# Patient Record
Sex: FEMALE | Race: WHITE | NOT HISPANIC OR LATINO
[De-identification: names, ages, dates, MRNs, and addresses within clinical notes are randomized per-mention and may not be internally consistent; named-entity substitution may affect disease eponyms.]

---

## 2017-03-18 ENCOUNTER — TRANSCRIPTION ENCOUNTER (OUTPATIENT)
Age: 82
End: 2017-03-18

## 2018-04-16 ENCOUNTER — TRANSCRIPTION ENCOUNTER (OUTPATIENT)
Age: 83
End: 2018-04-16

## 2018-04-26 VITALS
TEMPERATURE: 98 F | RESPIRATION RATE: 18 BRPM | HEART RATE: 89 BPM | DIASTOLIC BLOOD PRESSURE: 63 MMHG | SYSTOLIC BLOOD PRESSURE: 126 MMHG | OXYGEN SATURATION: 100 %

## 2018-04-26 NOTE — ED ADULT TRIAGE NOTE - CHIEF COMPLAINT QUOTE
Pt had a fall on 4/15.  seen at [facility].  worked up for DVT with neg findings.  did not have xray of foot.  Dc with abx for cellulitis of L Leg (7 days abx so far)  Leg is warm and reddened to touch.  Bruising and numbness to toes on L foot.  unable to ambulate.  Positive pedal pulse.  L arm restriced.  on Eliquis.  does not offer any other complaints

## 2018-04-26 NOTE — ED PROVIDER NOTE - ATTENDING CONTRIBUTION TO CARE
Locurto  pt s/p fall with subsequent swelling LLE  pt on AC for afib  placed on po antibiotic  with worsening pain and erythema though edema somewhat less Pt also had negative outpt duplex 3 days ago She denies fever or SOB  exam  pt with clear lungs  Card Irreg but rate controlled  + LLE edema below knee with anterior erythema and shiny appearance C/W cellulitis  no crepitance  nl distal pulse and tactile temperature  no pain on passive plantar/dorsiflexion  tactile sensation preserved  plain films  no fx or air seen  To admit for IV antibiotics

## 2018-04-26 NOTE — ED PROVIDER NOTE - OBJECTIVE STATEMENT
85F w/ hx breast CA s/p mastectomy/chemo ('94), uterine CA s/p hysterectomy ('91) and Afib (on Eliquis) presenting with worsening pain in her LLE.     Pt reports that she fell on 4/15 after 85F w/ hx breast CA s/p mastectomy/chemo ('94), uterine CA s/p hysterectomy ('91) and Afib (on Eliquis) presenting with worsening pain in her LLE.     Pt reports that she fell on 4/15 after stumbling on the several steps leading up to her building. At that time, she does not recall any trauma to her leg but sustained a laceration on her finger. Patient was able to ambulate after the fall without any issues. She subsequently developed erythema, swelling and tenderness of her L leg in her mid-calf region. Given these symptoms, she was seen at [facility] n 4/23. At that time, she LE duplex which was negative for DVT and was prescribed a course of Doxycycline (which she has been taking over the last several days). However, pt reports that she developed worsening leg pain and swelling so she came to the ED today. No fevers/chills. 85F w/ hx breast CA s/p mastectomy/chemo ('94), uterine CA s/p hysterectomy ('91) and Afib (on Eliquis) presenting with worsening pain in her LLE.     Pt reports that she fell on 4/15 after stumbling on the several steps leading up to her building. At that time, she does not recall any trauma to her leg but sustained a laceration on her finger. Patient was able to ambulate after the fall without any issues. She subsequently developed erythema, swelling and tenderness of her L leg in her mid-calf region. Given these symptoms, she was seen at [facility] n 4/23. At that time, she LE duplex which was negative for DVT and was prescribed a course of Doxycycline (which she has been taking over the last several days). However, pt reports that she developed worsening leg pain and swelling so she came to the ED today. No fevers/chills. Mildly decreased sensation over dorsum of L foot.

## 2018-04-26 NOTE — ED PROVIDER NOTE - MEDICAL DECISION MAKING DETAILS
pt w/ cellulitis of LLE. Peripheral pulses preserved, unlikely compartment syndrome. Will check CBC, CMP and Xrays of tibia/fibula, ankle and foot to r/o fracture. IV clindamycin. Admit for failed po abx.

## 2018-04-27 ENCOUNTER — INPATIENT (INPATIENT)
Facility: HOSPITAL | Age: 83
LOS: 1 days | Discharge: HOME HEALTH | End: 2018-04-29
Attending: HOSPITALIST | Admitting: HOSPITALIST
Payer: MEDICARE

## 2018-04-27 DIAGNOSIS — S80.12XA CONTUSION OF LEFT LOWER LEG, INITIAL ENCOUNTER: ICD-10-CM

## 2018-04-27 DIAGNOSIS — L03.90 CELLULITIS, UNSPECIFIED: ICD-10-CM

## 2018-04-27 DIAGNOSIS — Z90.10 ACQUIRED ABSENCE OF UNSPECIFIED BREAST AND NIPPLE: Chronic | ICD-10-CM

## 2018-04-27 DIAGNOSIS — Z90.710 ACQUIRED ABSENCE OF BOTH CERVIX AND UTERUS: Chronic | ICD-10-CM

## 2018-04-27 DIAGNOSIS — L03.116 CELLULITIS OF LEFT LOWER LIMB: ICD-10-CM

## 2018-04-27 DIAGNOSIS — I48.91 UNSPECIFIED ATRIAL FIBRILLATION: ICD-10-CM

## 2018-04-27 DIAGNOSIS — Z29.9 ENCOUNTER FOR PROPHYLACTIC MEASURES, UNSPECIFIED: ICD-10-CM

## 2018-04-27 DIAGNOSIS — D64.9 ANEMIA, UNSPECIFIED: ICD-10-CM

## 2018-04-27 DIAGNOSIS — D69.6 THROMBOCYTOPENIA, UNSPECIFIED: ICD-10-CM

## 2018-04-27 LAB
ALBUMIN SERPL ELPH-MCNC: 4.2 G/DL — SIGNIFICANT CHANGE UP (ref 3.3–5)
ALP SERPL-CCNC: 69 U/L — SIGNIFICANT CHANGE UP (ref 40–120)
ALT FLD-CCNC: 20 U/L — SIGNIFICANT CHANGE UP (ref 4–33)
APTT BLD: 33.2 SEC — SIGNIFICANT CHANGE UP (ref 27.5–37.4)
AST SERPL-CCNC: 28 U/L — SIGNIFICANT CHANGE UP (ref 4–32)
BASE EXCESS BLDV CALC-SCNC: 0.6 MMOL/L — SIGNIFICANT CHANGE UP
BASOPHILS # BLD AUTO: 0 K/UL — SIGNIFICANT CHANGE UP (ref 0–0.2)
BASOPHILS NFR BLD AUTO: 0 % — SIGNIFICANT CHANGE UP (ref 0–2)
BASOPHILS NFR SPEC: 1.8 % — SIGNIFICANT CHANGE UP (ref 0–2)
BILIRUB SERPL-MCNC: 0.7 MG/DL — SIGNIFICANT CHANGE UP (ref 0.2–1.2)
BUN SERPL-MCNC: 15 MG/DL — SIGNIFICANT CHANGE UP (ref 7–23)
BURR CELLS BLD QL SMEAR: SIGNIFICANT CHANGE UP
CALCIUM SERPL-MCNC: 9.4 MG/DL — SIGNIFICANT CHANGE UP (ref 8.4–10.5)
CHLORIDE SERPL-SCNC: 100 MMOL/L — SIGNIFICANT CHANGE UP (ref 98–107)
CO2 SERPL-SCNC: 22 MMOL/L — SIGNIFICANT CHANGE UP (ref 22–31)
CREAT SERPL-MCNC: 0.89 MG/DL — SIGNIFICANT CHANGE UP (ref 0.5–1.3)
EOSINOPHIL # BLD AUTO: 0.01 K/UL — SIGNIFICANT CHANGE UP (ref 0–0.5)
EOSINOPHIL NFR BLD AUTO: 0.1 % — SIGNIFICANT CHANGE UP (ref 0–6)
EOSINOPHIL NFR FLD: 0.9 % — SIGNIFICANT CHANGE UP (ref 0–6)
GAS PNL BLDV: 140 MMOL/L — SIGNIFICANT CHANGE UP (ref 136–146)
GIANT PLATELETS BLD QL SMEAR: PRESENT — SIGNIFICANT CHANGE UP
GLUCOSE BLDV-MCNC: 105 — HIGH (ref 70–99)
GLUCOSE SERPL-MCNC: 109 MG/DL — HIGH (ref 70–99)
HCO3 BLDV-SCNC: 24 MMOL/L — SIGNIFICANT CHANGE UP (ref 20–27)
HCT VFR BLD CALC: 30.2 % — LOW (ref 34.5–45)
HCT VFR BLDV CALC: 27.6 % — LOW (ref 34.5–45)
HGB BLD-MCNC: 9.3 G/DL — LOW (ref 11.5–15.5)
HGB BLDV-MCNC: 8.9 G/DL — LOW (ref 11.5–15.5)
IMM GRANULOCYTES # BLD AUTO: 0.07 # — SIGNIFICANT CHANGE UP
IMM GRANULOCYTES NFR BLD AUTO: 1 % — SIGNIFICANT CHANGE UP (ref 0–1.5)
INR BLD: 1.5 — HIGH (ref 0.88–1.17)
LYMPHOCYTES # BLD AUTO: 1.67 K/UL — SIGNIFICANT CHANGE UP (ref 1–3.3)
LYMPHOCYTES # BLD AUTO: 23.7 % — SIGNIFICANT CHANGE UP (ref 13–44)
LYMPHOCYTES NFR SPEC AUTO: 20.7 % — SIGNIFICANT CHANGE UP (ref 13–44)
MACROCYTES BLD QL: SIGNIFICANT CHANGE UP
MAGNESIUM SERPL-MCNC: 2 MG/DL — SIGNIFICANT CHANGE UP (ref 1.6–2.6)
MCHC RBC-ENTMCNC: 29.9 PG — SIGNIFICANT CHANGE UP (ref 27–34)
MCHC RBC-ENTMCNC: 30.8 % — LOW (ref 32–36)
MCV RBC AUTO: 97.1 FL — SIGNIFICANT CHANGE UP (ref 80–100)
MICROCYTES BLD QL: SLIGHT — SIGNIFICANT CHANGE UP
MONOCYTES # BLD AUTO: 3.09 K/UL — HIGH (ref 0–0.9)
MONOCYTES NFR BLD AUTO: 43.8 % — HIGH (ref 2–14)
MONOCYTES NFR BLD: 35.1 % — HIGH (ref 2–9)
NEUTROPHIL AB SER-ACNC: 35.2 % — LOW (ref 43–77)
NEUTROPHILS # BLD AUTO: 2.21 K/UL — SIGNIFICANT CHANGE UP (ref 1.8–7.4)
NEUTROPHILS NFR BLD AUTO: 31.4 % — LOW (ref 43–77)
NRBC # FLD: 0 — SIGNIFICANT CHANGE UP
PCO2 BLDV: 46 MMHG — SIGNIFICANT CHANGE UP (ref 41–51)
PH BLDV: 7.37 PH — SIGNIFICANT CHANGE UP (ref 7.32–7.43)
PHOSPHATE SERPL-MCNC: 3.5 MG/DL — SIGNIFICANT CHANGE UP (ref 2.5–4.5)
PLATELET # BLD AUTO: 136 K/UL — LOW (ref 150–400)
PLATELET COUNT - ESTIMATE: SIGNIFICANT CHANGE UP
PMV BLD: 12.6 FL — SIGNIFICANT CHANGE UP (ref 7–13)
PO2 BLDV: 28 MMHG — LOW (ref 35–40)
POTASSIUM BLDV-SCNC: 5 MMOL/L — HIGH (ref 3.4–4.5)
POTASSIUM SERPL-MCNC: 4.4 MMOL/L — SIGNIFICANT CHANGE UP (ref 3.5–5.3)
POTASSIUM SERPL-SCNC: 4.4 MMOL/L — SIGNIFICANT CHANGE UP (ref 3.5–5.3)
PROT SERPL-MCNC: 7.5 G/DL — SIGNIFICANT CHANGE UP (ref 6–8.3)
PROTHROM AB SERPL-ACNC: 16.8 SEC — HIGH (ref 9.8–13.1)
RBC # BLD: 3.11 M/UL — LOW (ref 3.8–5.2)
RBC # FLD: 14.7 % — HIGH (ref 10.3–14.5)
SAO2 % BLDV: 51.5 % — LOW (ref 60–85)
SCHISTOCYTES BLD QL AUTO: SLIGHT — SIGNIFICANT CHANGE UP
SMUDGE CELLS # BLD: PRESENT — SIGNIFICANT CHANGE UP
SODIUM SERPL-SCNC: 139 MMOL/L — SIGNIFICANT CHANGE UP (ref 135–145)
VARIANT LYMPHS # BLD: 6.3 % — SIGNIFICANT CHANGE UP
WBC # BLD: 7.05 K/UL — SIGNIFICANT CHANGE UP (ref 3.8–10.5)
WBC # FLD AUTO: 7.05 K/UL — SIGNIFICANT CHANGE UP (ref 3.8–10.5)

## 2018-04-27 PROCEDURE — 73600 X-RAY EXAM OF ANKLE: CPT | Mod: 26,LT

## 2018-04-27 PROCEDURE — 73590 X-RAY EXAM OF LOWER LEG: CPT | Mod: 26,LT

## 2018-04-27 PROCEDURE — 99223 1ST HOSP IP/OBS HIGH 75: CPT | Mod: AI,GC

## 2018-04-27 PROCEDURE — 71045 X-RAY EXAM CHEST 1 VIEW: CPT | Mod: 26

## 2018-04-27 PROCEDURE — 93010 ELECTROCARDIOGRAM REPORT: CPT

## 2018-04-27 PROCEDURE — 73630 X-RAY EXAM OF FOOT: CPT | Mod: 26,LT

## 2018-04-27 RX ORDER — APIXABAN 2.5 MG/1
2.5 TABLET, FILM COATED ORAL EVERY 12 HOURS
Qty: 0 | Refills: 0 | Status: DISCONTINUED | OUTPATIENT
Start: 2018-04-27 | End: 2018-04-29

## 2018-04-27 RX ORDER — ACETAMINOPHEN 500 MG
650 TABLET ORAL EVERY 6 HOURS
Qty: 0 | Refills: 0 | Status: DISCONTINUED | OUTPATIENT
Start: 2018-04-27 | End: 2018-04-28

## 2018-04-27 RX ORDER — OXYCODONE HYDROCHLORIDE 5 MG/1
5 TABLET ORAL EVERY 6 HOURS
Qty: 0 | Refills: 0 | Status: DISCONTINUED | OUTPATIENT
Start: 2018-04-27 | End: 2018-04-29

## 2018-04-27 RX ORDER — DOCUSATE SODIUM 100 MG
100 CAPSULE ORAL DAILY
Qty: 0 | Refills: 0 | Status: DISCONTINUED | OUTPATIENT
Start: 2018-04-27 | End: 2018-04-29

## 2018-04-27 RX ORDER — SENNA PLUS 8.6 MG/1
1 TABLET ORAL DAILY
Qty: 0 | Refills: 0 | Status: DISCONTINUED | OUTPATIENT
Start: 2018-04-27 | End: 2018-04-29

## 2018-04-27 RX ORDER — OXYCODONE AND ACETAMINOPHEN 5; 325 MG/1; MG/1
1 TABLET ORAL EVERY 6 HOURS
Qty: 0 | Refills: 0 | Status: DISCONTINUED | OUTPATIENT
Start: 2018-04-27 | End: 2018-04-27

## 2018-04-27 RX ORDER — ACETAMINOPHEN 500 MG
650 TABLET ORAL EVERY 6 HOURS
Qty: 0 | Refills: 0 | Status: DISCONTINUED | OUTPATIENT
Start: 2018-04-27 | End: 2018-04-29

## 2018-04-27 RX ORDER — METOPROLOL TARTRATE 50 MG
25 TABLET ORAL EVERY 8 HOURS
Qty: 0 | Refills: 0 | Status: DISCONTINUED | OUTPATIENT
Start: 2018-04-27 | End: 2018-04-29

## 2018-04-27 RX ADMIN — Medication 100 MILLIGRAM(S): at 12:13

## 2018-04-27 RX ADMIN — Medication 100 MILLIGRAM(S): at 03:19

## 2018-04-27 RX ADMIN — Medication 25 MILLIGRAM(S): at 22:39

## 2018-04-27 RX ADMIN — OXYCODONE HYDROCHLORIDE 5 MILLIGRAM(S): 5 TABLET ORAL at 23:09

## 2018-04-27 RX ADMIN — Medication 1 TABLET(S): at 12:13

## 2018-04-27 RX ADMIN — Medication 25 MILLIGRAM(S): at 15:00

## 2018-04-27 RX ADMIN — Medication 650 MILLIGRAM(S): at 08:43

## 2018-04-27 RX ADMIN — Medication 100 MILLIGRAM(S): at 12:15

## 2018-04-27 RX ADMIN — Medication 650 MILLIGRAM(S): at 19:03

## 2018-04-27 RX ADMIN — Medication 100 MILLIGRAM(S): at 18:04

## 2018-04-27 RX ADMIN — SENNA PLUS 1 TABLET(S): 8.6 TABLET ORAL at 12:15

## 2018-04-27 RX ADMIN — OXYCODONE HYDROCHLORIDE 5 MILLIGRAM(S): 5 TABLET ORAL at 22:39

## 2018-04-27 RX ADMIN — APIXABAN 2.5 MILLIGRAM(S): 2.5 TABLET, FILM COATED ORAL at 18:03

## 2018-04-27 RX ADMIN — Medication 650 MILLIGRAM(S): at 07:43

## 2018-04-27 RX ADMIN — Medication 650 MILLIGRAM(S): at 18:04

## 2018-04-27 NOTE — H&P ADULT - PROBLEM SELECTOR PLAN 1
- P/w 2 wks hx of left leg swelling with erythema and pain s/p fall, likely cellulitis  - Xray of leg/foot showed no fracture  - C/w clindamycin IV q8h.  - PT c/s due to recent fall. - P/w 2 wks hx of left leg swelling with erythema and pain s/p fall, likely cellulitis  - Xray of leg/foot showed no fracture  - C/w clindamycin IV q8h.  - F/u blood culture.   - PT c/s due to recent fall. - P/w 2 wks hx of left leg swelling with erythema and pain s/p fall, likely hematoma in setting of eliquis use.   - Xray of leg/foot showed no fracture  - Will get CT lower extremity  - Also with right hip/thigh lump likely hematoma.   - Will c/w eliquis as risk of stroke outweigh hematoma. Will monitor closely for the hematoma. Will d/c eliquis if growth in size. - P/w 2 wks hx of left leg swelling with erythema and pain s/p fall, likely hematoma in setting of eliquis use.   - Xray of leg/foot showed no fracture  - Will get CT lower extremity  - Also with right hip/thigh lump likely hematoma.   - Percocet for severe pain, tylenol for mild to moderate pain.   - Senna & colace while on opiate.   - Will c/w eliquis as risk of stroke outweigh hematoma. Will monitor closely for the hematoma. Will d/c eliquis if growth in size.

## 2018-04-27 NOTE — H&P ADULT - PROBLEM SELECTOR PLAN 4
- Mild thrombocytopenia, unknown baseline. Could be 2/2 eliquis use.  - Continue to monitor - hgb 9.3, unknown baseline  - No signs of active bleeding  - Check iron study, b12, folate

## 2018-04-27 NOTE — H&P ADULT - ASSESSMENT
85  female w/ hx breast CA s/p mastectomy/chemo ('94), uterine CA s/p hysterectomy ('91) and Afib (on Eliquis) present with 2 week hx of left lower leg swelling, pain, and erythema s/p fall, likely 2/2 cellulitis. 85  female w/ hx breast CA s/p mastectomy/chemo ('94), uterine CA s/p hysterectomy ('91) and Afib (on Eliquis) present with 2 week hx of left lower leg swelling, pain, and erythema s/p fall, likely 2/2 hematoma +/- superimposed cellulitis.

## 2018-04-27 NOTE — H&P ADULT - PROBLEM SELECTOR PLAN 3
- hgb 9.3, unknown baseline  - No signs of active bleeding  - Check iron study, b12, folate - [id number] score of 3.  - C/w metoprolol and eliquis

## 2018-04-27 NOTE — ED ADULT NURSE NOTE - ED STAT RN HANDOFF DETAILS
handoff report given to MARIELOS Monsivais pt in NAD, awaiting transportation.  Will continue to monitor patient closely.

## 2018-04-27 NOTE — H&P ADULT - NSHPPHYSICALEXAM_GEN_ALL_CORE
GENERAL: NAD, well-developed  HEAD:  Atraumatic, Normocephalic  EYES: EOMI, PERRLA, conjunctiva and sclera clear  NECK: Supple, No JVD  CHEST/LUNG: Clear to auscultation bilaterally; No wheeze  HEART: irregularly irregular; No murmurs, rubs, or gallops  ABDOMEN: Soft, Nontender, Nondistended; Bowel sounds present  EXTREMITIES:  2+ Peripheral Pulse on right, 1+ pedal pulse on left, 2+ edema of LLE to mid calf. erythema in mid calf region with small raised area measuring 3X3cm, diffuse tenderness to palpitation in LLE. Purplish discoloration on left toes.   PSYCH: AAOx3, appropriate mood and affect  NEUROLOGY: non-focal, no sensory or motor deficit.   SKIN: erythema in mid calf region with small raised area measuring 3X3cm, purplish bruise on left posterior thigh and hip measuring 10X6cm. tender to palpation.

## 2018-04-27 NOTE — ED ADULT NURSE NOTE - OBJECTIVE STATEMENT
received to room 20. c/o left lower leg pain. states had a fall almost 2 weeks ago. was seen at urgent care and sent home with no meds. then was seen at different hospital. dx with cellulitis and sent home with doxycycline. states pain/swelling has worsened since last visit to ED. area is reddened and swollen to touch. foot has non-pitting edema and is ecchymotic. +pedal pulse. awaits md shook in no acute distress with family at bedside.

## 2018-04-27 NOTE — H&P ADULT - NSHPLABSRESULTS_GEN_ALL_CORE
.  Labs reviewed personally.                          9.3    7.05  )-----------( 136      ( 27 Apr 2018 00:20 )             30.2     Hgb Trend: 9.3<--  04-27    139  |  100  |  15  ----------------------------<  109<H>  4.4   |  22  |  0.89    Ca    9.4      27 Apr 2018 00:20  Phos  3.5     04-27  Mg     2.0     04-27    TPro  7.5  /  Alb  4.2  /  TBili  0.7  /  DBili  x   /  AST  28  /  ALT  20  /  AlkPhos  69  04-27    Creatinine Trend: 0.89<--  PT/INR - ( 27 Apr 2018 00:20 )   PT: 16.8 SEC;   INR: 1.50          PTT - ( 27 Apr 2018 00:20 )  PTT:33.2 SEC      Imaging reviewed personally.

## 2018-04-27 NOTE — ED ADULT NURSE NOTE - CHIEF COMPLAINT QUOTE
Pt had a fall on 4/15.  seen at [facility].  worked up for DVT with neg findings.  did not have xray of foot.  Dc with abx for cellulitis of L Leg (7 days abx so far)  Leg is warm and reddened to touch.  Bruising and numbness to toes on L foot.  unable to ambulate.  Positive pedal pulse.  L arm restriced.  on Eliquis.  does not offer any other complaints

## 2018-04-27 NOTE — H&P ADULT - PROBLEM SELECTOR PLAN 2
- [id number] score of 3.  - C/w metoprolol and eliquis - Concern for superimpose cellulitis with area of erythema and warm.   - Will c/w empirical clindamycin IV q8h.  - F/u blood culture.

## 2018-04-27 NOTE — H&P ADULT - ATTENDING COMMENTS
Pt was seen and examed at bedside with resident.  84 yo F PMH breast CA s/p mastectomy/chemo, uterine CA s/p hysterectomy and Afib on Eliquis was admitted for 2 week hx of left lower leg swelling, pain s/p fall, found possible 2/2 hematoma +/- superimposed cellulitis. Pt afebrile, normal WBC, no signs of sepsis. Empirically started IV Abx clindamycin as Pt failed out patient Abx doxycycline. Bx sent pending result. CT LE for concerns about hematoma. Will close monitor on Eliquis. Currently no signs of active bleeding. f/u H/h and anemia work up. Pain management.  - Afib, [id number] score of 3, close monitor on Eliquis.   - dyspnea on exertion, f/u ProBNP, and TTE while in patient. Pt missed out patient Echo appointment due to admission.   Explained above findings and plan of care with Pt.

## 2018-04-27 NOTE — H&P ADULT - NSHPOUTPATIENTPROVIDERS_GEN_ALL_CORE
Dr. Deion See (cardiologist)  Dr. Deion See (PMD) Dr. Deion See (cardiologist)  Dr. Germain Metz (PMD)

## 2018-04-27 NOTE — H&P ADULT - HISTORY OF PRESENT ILLNESS
Ms. Pritchett is a 85  female w/ hx breast CA s/p mastectomy/chemo ('94), uterine CA s/p hysterectomy ('91) and Afib (on Eliquis) present to ED with left lower leg swelling and pain. Patient recalled about 2 weeks ago, she had a fall from the stairs, where she hits her right hip and her left leg, but did not hit her head. She did not notice any cut or skin laceration that time. She was able to ambulate after the fall without any problem. The next day, she notice that her left leg has been getting swollen and red with some pain. She went to urgent care and had a Xray of the leg which did not showed any fracture. She was recommended to put some ice pack and sent home. However, the swelling is getting worse. Few days later, her friend accidentally close the window and hit her finger, causing nonstop bleeding to her right index finger. She went to [facility] 4 days ago and had lower extremity doppler which did not show DVTs. She was discharged on Doxycycline. However, she reports worsening swelling/pain/erythema, and subsequently discoloration of her left toes so she present to the ED today. She also had some numbness on her left foot. Also noted the bruise on her right hip/thigh region with small bump. She noted some chills few days ago, however denied fever, n/v, CP, or SOB, purulent drainage from the legs. At home, she is independent with her ADLs, she uses cane to walk. Although she reports that she was given furosemide by her cardiologist for the past 2 months for RUIZ.     In the ED, VSS. She was given tylenol for the pain. Preliminary reports of the xray of the left leg/foot/ankle did not show any fractures.

## 2018-04-27 NOTE — ED ADULT NURSE REASSESSMENT NOTE - NS ED NURSE REASSESS COMMENT FT1
pt awake, a/ox3, vitally stable in NAD, blood cultures sent, medication running as ordered, will continue to monitor

## 2018-04-27 NOTE — H&P ADULT - PROBLEM SELECTOR PLAN 5
- On eliquis    PT consult  Diet: DASH/TLC - Mild thrombocytopenia, unknown baseline. Could be 2/2 eliquis use.  - Continue to monitor

## 2018-04-28 ENCOUNTER — TRANSCRIPTION ENCOUNTER (OUTPATIENT)
Age: 83
End: 2018-04-28

## 2018-04-28 LAB
FERRITIN SERPL-MCNC: 213.4 NG/ML — HIGH (ref 15–150)
FOLATE SERPL-MCNC: > 20 NG/ML — HIGH (ref 4.7–20)
HCT VFR BLD CALC: 30.8 % — LOW (ref 34.5–45)
HGB BLD-MCNC: 9.5 G/DL — LOW (ref 11.5–15.5)
IRON SATN MFR SERPL: 251 UG/DL — SIGNIFICANT CHANGE UP (ref 140–530)
IRON SATN MFR SERPL: 48 UG/DL — SIGNIFICANT CHANGE UP (ref 30–160)
MCHC RBC-ENTMCNC: 30.1 PG — SIGNIFICANT CHANGE UP (ref 27–34)
MCHC RBC-ENTMCNC: 30.8 % — LOW (ref 32–36)
MCV RBC AUTO: 97.5 FL — SIGNIFICANT CHANGE UP (ref 80–100)
NRBC # FLD: 0 — SIGNIFICANT CHANGE UP
NT-PROBNP SERPL-SCNC: 2589 PG/ML — SIGNIFICANT CHANGE UP
PLATELET # BLD AUTO: 124 K/UL — LOW (ref 150–400)
PMV BLD: 12.7 FL — SIGNIFICANT CHANGE UP (ref 7–13)
RBC # BLD: 3.16 M/UL — LOW (ref 3.8–5.2)
RBC # FLD: 14.9 % — HIGH (ref 10.3–14.5)
SPECIMEN SOURCE: SIGNIFICANT CHANGE UP
SPECIMEN SOURCE: SIGNIFICANT CHANGE UP
UIBC SERPL-MCNC: 203.1 UG/DL — SIGNIFICANT CHANGE UP (ref 110–370)
VIT B12 SERPL-MCNC: 591 PG/ML — SIGNIFICANT CHANGE UP (ref 200–900)
WBC # BLD: 4.95 K/UL — SIGNIFICANT CHANGE UP (ref 3.8–10.5)
WBC # FLD AUTO: 4.95 K/UL — SIGNIFICANT CHANGE UP (ref 3.8–10.5)

## 2018-04-28 PROCEDURE — 99221 1ST HOSP IP/OBS SF/LOW 40: CPT

## 2018-04-28 PROCEDURE — 99233 SBSQ HOSP IP/OBS HIGH 50: CPT | Mod: GC

## 2018-04-28 RX ORDER — IBUPROFEN 200 MG
200 TABLET ORAL EVERY 6 HOURS
Qty: 0 | Refills: 0 | Status: DISCONTINUED | OUTPATIENT
Start: 2018-04-28 | End: 2018-04-29

## 2018-04-28 RX ORDER — ACETAMINOPHEN 500 MG
650 TABLET ORAL EVERY 6 HOURS
Qty: 0 | Refills: 0 | Status: DISCONTINUED | OUTPATIENT
Start: 2018-04-28 | End: 2018-04-29

## 2018-04-28 RX ORDER — OXYCODONE HYDROCHLORIDE 5 MG/1
5 TABLET ORAL ONCE
Qty: 0 | Refills: 0 | Status: DISCONTINUED | OUTPATIENT
Start: 2018-04-28 | End: 2018-04-28

## 2018-04-28 RX ADMIN — Medication 200 MILLIGRAM(S): at 12:22

## 2018-04-28 RX ADMIN — Medication 25 MILLIGRAM(S): at 22:11

## 2018-04-28 RX ADMIN — Medication 25 MILLIGRAM(S): at 14:30

## 2018-04-28 RX ADMIN — Medication 300 MILLIGRAM(S): at 17:44

## 2018-04-28 RX ADMIN — APIXABAN 2.5 MILLIGRAM(S): 2.5 TABLET, FILM COATED ORAL at 05:30

## 2018-04-28 RX ADMIN — APIXABAN 2.5 MILLIGRAM(S): 2.5 TABLET, FILM COATED ORAL at 17:44

## 2018-04-28 RX ADMIN — OXYCODONE HYDROCHLORIDE 5 MILLIGRAM(S): 5 TABLET ORAL at 04:22

## 2018-04-28 RX ADMIN — Medication 200 MILLIGRAM(S): at 11:22

## 2018-04-28 RX ADMIN — OXYCODONE HYDROCHLORIDE 5 MILLIGRAM(S): 5 TABLET ORAL at 03:48

## 2018-04-28 RX ADMIN — Medication 300 MILLIGRAM(S): at 11:24

## 2018-04-28 RX ADMIN — Medication 1 TABLET(S): at 11:23

## 2018-04-28 RX ADMIN — Medication 200 MILLIGRAM(S): at 18:45

## 2018-04-28 RX ADMIN — Medication 300 MILLIGRAM(S): at 23:10

## 2018-04-28 RX ADMIN — Medication 100 MILLIGRAM(S): at 02:19

## 2018-04-28 NOTE — DISCHARGE NOTE ADULT - PLAN OF CARE
Continue taking antibiotic You came in with left lower leg pain after a fall, likely having underlying infection of the skin called cellulitis. Please continue to take antibiotic for __ more days. Please follow up with your primary care physician in 1-2 weeks after discharged from hospital . Stable You were found to have low blood count likely due to your history of cancer. Please follow up with your primary care physician in 1-2 weeks after discharged from hospital . Continue taking eliquis Please continue to take eliquis twice daily. Stop taking it if you notice any signs of bleeding such as bloody or black stool. Please follow up with your cardiologist in 1- 2 weeks. You came in with left lower leg pain after a fall, likely having underlying infection of the skin called cellulitis. Please continue to take antibiotic for 6 more days. Please follow up with your primary care physician in 1-2 weeks after discharged from hospital . You came in with left lower leg pain after a fall, likely having underlying infection of the skin called cellulitis. Please continue to take antibiotic for 6 more days. Please follow up with your primary care physician in 1-2 weeks after discharged from hospital. For pain, please take Tylenol and oxycodone as prescribed. Please avoid NSAIDs including ibuprofen, Motrin and Aleve as these may interact with Eliquis and cause increased risk of bleeding. Please continue to take eliquis twice daily. Stop taking it if you notice any signs of bleeding such as bloody or black stool. Please follow up with your cardiologist in 1- 2 weeks. Please avoid NSAIDs such as Motrin and Aleve while taking Eliquis as this may increase your risk of bleeding.

## 2018-04-28 NOTE — DISCHARGE NOTE ADULT - CARE PLAN
Principal Discharge DX:	Cellulitis of left leg  Goal:	Continue taking antibiotic  Assessment and plan of treatment:	You came in with left lower leg pain after a fall, likely having underlying infection of the skin called cellulitis. Please continue to take antibiotic for __ more days. Please follow up with your primary care physician in 1-2 weeks after discharged from hospital .  Secondary Diagnosis:	Anemia  Goal:	Stable  Assessment and plan of treatment:	You were found to have low blood count likely due to your history of cancer. Please follow up with your primary care physician in 1-2 weeks after discharged from hospital .  Secondary Diagnosis:	Atrial fibrillation  Goal:	Continue taking eliquis  Assessment and plan of treatment:	Please continue to take eliquis twice daily. Stop taking it if you notice any signs of bleeding such as bloody or black stool. Please follow up with your cardiologist in 1- 2 weeks. Principal Discharge DX:	Cellulitis of left leg  Goal:	Continue taking antibiotic  Assessment and plan of treatment:	You came in with left lower leg pain after a fall, likely having underlying infection of the skin called cellulitis. Please continue to take antibiotic for 6 more days. Please follow up with your primary care physician in 1-2 weeks after discharged from hospital .  Secondary Diagnosis:	Anemia  Goal:	Stable  Assessment and plan of treatment:	You were found to have low blood count likely due to your history of cancer. Please follow up with your primary care physician in 1-2 weeks after discharged from hospital .  Secondary Diagnosis:	Atrial fibrillation  Goal:	Continue taking eliquis  Assessment and plan of treatment:	Please continue to take eliquis twice daily. Stop taking it if you notice any signs of bleeding such as bloody or black stool. Please follow up with your cardiologist in 1- 2 weeks. Principal Discharge DX:	Cellulitis of left leg  Goal:	Continue taking antibiotic  Assessment and plan of treatment:	You came in with left lower leg pain after a fall, likely having underlying infection of the skin called cellulitis. Please continue to take antibiotic for 6 more days. Please follow up with your primary care physician in 1-2 weeks after discharged from hospital. For pain, please take Tylenol and oxycodone as prescribed. Please avoid NSAIDs including ibuprofen, Motrin and Aleve as these may interact with Eliquis and cause increased risk of bleeding.  Secondary Diagnosis:	Anemia  Goal:	Stable  Assessment and plan of treatment:	You were found to have low blood count likely due to your history of cancer. Please follow up with your primary care physician in 1-2 weeks after discharged from hospital .  Secondary Diagnosis:	Atrial fibrillation  Goal:	Continue taking eliquis  Assessment and plan of treatment:	Please continue to take eliquis twice daily. Stop taking it if you notice any signs of bleeding such as bloody or black stool. Please follow up with your cardiologist in 1- 2 weeks. Please avoid NSAIDs such as Motrin and Aleve while taking Eliquis as this may increase your risk of bleeding.

## 2018-04-28 NOTE — DISCHARGE NOTE ADULT - ADDITIONAL INSTRUCTIONS
Please see PCP in 1 week.  You are already on blood thinner of Eliquis- No CP /sob NSAIDS- No CP /sob moltrin/ alleve/ naprosyn/ advil/ Iburopen.

## 2018-04-28 NOTE — DISCHARGE NOTE ADULT - CARE PROVIDER_API CALL
you longo  [address] [place] [address]  Phone: [phone]  Fax: (   )    -    Deion See), Cardiovascular Disease; Internal Medicine  [address] [place] [address]  Phone: [phone]  Fax: [phone]

## 2018-04-28 NOTE — DISCHARGE NOTE ADULT - HOME CARE AGENCY
[facility] [phone]. Initial visit will be day after discharge home. A nurse will call prior to home visit

## 2018-04-28 NOTE — PROVIDER CONTACT NOTE (OTHER) - ASSESSMENT
Temp 95
Bp 89/37. Patient calm and no signs of acute distress.
pt resting comfortably In the bed. no complaints at this time.

## 2018-04-28 NOTE — PROGRESS NOTE ADULT - SUBJECTIVE AND OBJECTIVE BOX
Contact Info:  Valentin Sharif M.D., PGY-1  Pager: ns- [phone], MZI- [phone]      Chief Complaint: Patient is a 85y old  Female who presents with a chief complaint of left lower leg swelling/pain (27 Apr 2018 08:47)        INTERVAL HPI/OVERNIGHT EVENTS:   Overnight, patient was complain of severe pain on her left lower leg and was given oxycodone 5mg X2. No other events  Seen and examined patient at bedside. Denied fever, chill, nausea, vomiting, headache, CP, SOB, abdominal pain, diarrhea, or constipation.       MEDICATIONS  (STANDING):  apixaban 2.5 milliGRAM(s) Oral every 12 hours  clindamycin IVPB 600 milliGRAM(s) IV Intermittent every 8 hours  docusate sodium 100 milliGRAM(s) Oral daily  metoprolol tartrate 25 milliGRAM(s) Oral every 8 hours  multivitamin 1 Tablet(s) Oral daily  senna 1 Tablet(s) Oral daily    MEDICATIONS  (PRN):  acetaminophen   Tablet 650 milliGRAM(s) Oral every 6 hours PRN For Temp greater than 38 C (100.4 F)  acetaminophen   Tablet. 650 milliGRAM(s) Oral every 6 hours PRN Mild and Moderate Pain  oxyCODONE    IR 5 milliGRAM(s) Oral every 6 hours PRN Severe Pain (7 - 10)      Vital Signs Last 24 Hrs  T(C): 36.4 (28 Apr 2018 05:00), Max: 36.4 (27 Apr 2018 14:19)  T(F): 97.5 (28 Apr 2018 05:00), Max: 97.5 (27 Apr 2018 14:19)  HR: 68 (28 Apr 2018 05:22) (68 - 87)  BP: 99/55 (28 Apr 2018 05:22) (89/37 - 124/75)  BP(mean): --  RR: 18 (28 Apr 2018 05:00) (18 - 18)  SpO2: 100% (28 Apr 2018 05:00) (100% - 100%)  Supplemental O2: [ ] No, on Room Air [ ] Yes,     I&O's Detail    CAPILLARY BLOOD GLUCOSE          PHYSICAL EXAM:  Daily     Daily    GENERAL: NAD, well-developed  HEAD:  Atraumatic, Normocephalic  EYES: EOMI, PERRLA, conjunctiva and sclera clear  NECK: Supple, No JVD  CHEST/LUNG: Clear to auscultation bilaterally; No wheeze  HEART: irregularly irregular; No murmurs, rubs, or gallops  ABDOMEN: Soft, Nontender, Nondistended; Bowel sounds present  EXTREMITIES:  2+ Peripheral Pulse on right, 1+ pedal pulse on left, 2+ edema of LLE to mid calf. erythema in mid calf region with small raised area measuring 3X3cm, diffuse tenderness to palpitation in LLE. Purplish discoloration on left toes.   PSYCH: AAOx3, appropriate mood and affect  NEUROLOGY: non-focal, no sensory or motor deficit.   SKIN: erythema in mid calf region with small raised area measuring 3X3cm, purplish bruise on left posterior thigh and hip measuring 10X6cm. tender to palpation.    LABS:                        9.3    7.05  )-----------( 136      ( 27 Apr 2018 00:20 )             30.2     04-27    139  |  100  |  15  ----------------------------<  109<H>  4.4   |  22  |  0.89    Ca    9.4      27 Apr 2018 00:20  Phos  3.5     04-27  Mg     2.0     04-27    TPro  7.5  /  Alb  4.2  /  TBili  0.7  /  DBili  x   /  AST  28  /  ALT  20  /  AlkPhos  69  04-27    LIVER FUNCTIONS - ( 27 Apr 2018 00:20 )  Alb: 4.2 g/dL / Pro: 7.5 g/dL / ALK PHOS: 69 u/L / ALT: 20 u/L / AST: 28 u/L / GGT: x     / T. Bili 0.7 mg/dL / D. Bili x         PT/INR - ( 27 Apr 2018 00:20 )   PT: 16.8 SEC;   INR: 1.50          PTT - ( 27 Apr 2018 00:20 )  PTT:33.2 SEC      Microbiology:  Culture - Blood (04.27.18 @ 03:46)    Culture - Blood:   NO ORGANISMS ISOLATED  NO ORGANISMS ISOLATED AT 24 HOURS    Specimen Source: BLOOD      Culture - Blood (04.27.18 @ 03:38)    Culture - Blood:   NO ORGANISMS ISOLATED  NO ORGANISMS ISOLATED AT 24 HOURS    Specimen Source: BLOOD PERIPHERAL          RADIOLOGY & ADDITIONAL TESTS:  CT Lower Extremity pending      Imaging Personally Reviewed:  [ ] YES  [ ] NO  Consultant(s) Notes Reviewed:  [X] YES  [ ] NO  Care Discussed with Consultants/Other Providers [ ] YES  [ ] NO Contact Info:  Valentin Sharif M.D., PGY-1  Pager: ns- [phone], L[facility]- [phone]      Chief Complaint: Patient is a 85y old  Female who presents with a chief complaint of left lower leg swelling/pain (27 Apr 2018 08:47)        INTERVAL HPI/OVERNIGHT EVENTS:   Overnight, patient was complain of severe pain on her left medial foot and was given oxycodone 5mg X2. No other events  Seen and examined patient at bedside. She reports the bump on her left midshin has gotten smaller. Denied fever, chill, nausea, vomiting, headache, CP, SOB, abdominal pain, diarrhea, or constipation.       MEDICATIONS  (STANDING):  apixaban 2.5 milliGRAM(s) Oral every 12 hours  clindamycin IVPB 600 milliGRAM(s) IV Intermittent every 8 hours  docusate sodium 100 milliGRAM(s) Oral daily  metoprolol tartrate 25 milliGRAM(s) Oral every 8 hours  multivitamin 1 Tablet(s) Oral daily  senna 1 Tablet(s) Oral daily    MEDICATIONS  (PRN):  acetaminophen   Tablet 650 milliGRAM(s) Oral every 6 hours PRN For Temp greater than 38 C (100.4 F)  acetaminophen   Tablet. 650 milliGRAM(s) Oral every 6 hours PRN Mild and Moderate Pain  oxyCODONE    IR 5 milliGRAM(s) Oral every 6 hours PRN Severe Pain (7 - 10)      Vital Signs Last 24 Hrs  T(C): 36.4 (28 Apr 2018 05:00), Max: 36.4 (27 Apr 2018 14:19)  T(F): 97.5 (28 Apr 2018 05:00), Max: 97.5 (27 Apr 2018 14:19)  HR: 68 (28 Apr 2018 05:22) (68 - 87)  BP: 99/55 (28 Apr 2018 05:22) (89/37 - 124/75)  BP(mean): --  RR: 18 (28 Apr 2018 05:00) (18 - 18)  SpO2: 100% (28 Apr 2018 05:00) (100% - 100%)  Supplemental O2: [ ] No, on Room Air [ ] Yes,     I&O's Detail    CAPILLARY BLOOD GLUCOSE          PHYSICAL EXAM:  Daily     Daily    GENERAL: NAD, well-developed  HEAD:  Atraumatic, Normocephalic  EYES: EOMI, PERRLA, conjunctiva and sclera clear  NECK: Supple, No JVD  CHEST/LUNG: Clear to auscultation bilaterally; No wheeze  HEART: irregularly irregular; No murmurs, rubs, or gallops  ABDOMEN: Soft, Nontender, Nondistended; Bowel sounds present  EXTREMITIES:  2+ Peripheral Pulse on right, 1+ pedal pulse on left, 2+ edema of LLE to mid calf. erythema in mid calf region with small raised area measuring 3X3cm, diffuse tenderness to palpitation in LLE. Purplish discoloration on left toes.   PSYCH: AAOx3, appropriate mood and affect  NEUROLOGY: non-focal, no sensory or motor deficit.   SKIN: erythema in mid calf region with small raised area measuring 3X3cm, purplish bruise on left posterior thigh and hip measuring 10X6cm. tender to palpation.    LABS:                        9.3    7.05  )-----------( 136      ( 27 Apr 2018 00:20 )             30.2     04-27    139  |  100  |  15  ----------------------------<  109<H>  4.4   |  22  |  0.89    Ca    9.4      27 Apr 2018 00:20  Phos  3.5     04-27  Mg     2.0     04-27    TPro  7.5  /  Alb  4.2  /  TBili  0.7  /  DBili  x   /  AST  28  /  ALT  20  /  AlkPhos  69  04-27    LIVER FUNCTIONS - ( 27 Apr 2018 00:20 )  Alb: 4.2 g/dL / Pro: 7.5 g/dL / ALK PHOS: 69 u/L / ALT: 20 u/L / AST: 28 u/L / GGT: x     / T. Bili 0.7 mg/dL / D. Bili x         PT/INR - ( 27 Apr 2018 00:20 )   PT: 16.8 SEC;   INR: 1.50          PTT - ( 27 Apr 2018 00:20 )  PTT:33.2 SEC      Microbiology:  Culture - Blood (04.27.18 @ 03:46)    Culture - Blood:   NO ORGANISMS ISOLATED  NO ORGANISMS ISOLATED AT 24 HOURS    Specimen Source: BLOOD      Culture - Blood (04.27.18 @ 03:38)    Culture - Blood:   NO ORGANISMS ISOLATED  NO ORGANISMS ISOLATED AT 24 HOURS    Specimen Source: BLOOD PERIPHERAL          RADIOLOGY & ADDITIONAL TESTS:  CT Lower Extremity pending      Imaging Personally Reviewed:  [ ] YES  [ ] NO  Consultant(s) Notes Reviewed:  [X] YES  [ ] NO  Care Discussed with Consultants/Other Providers [ ] YES  [ ] NO

## 2018-04-28 NOTE — DISCHARGE NOTE ADULT - MEDICATION SUMMARY - MEDICATIONS TO TAKE
I will START or STAY ON the medications listed below when I get home from the hospital:    acetaminophen 325 mg oral tablet  -- 2 tab(s) by mouth every 6 hours, As needed, Mild Pain (1 - 3)  -- Indication: For Pain    ibuprofen 200 mg oral tablet  -- 1 tab(s) by mouth every 6 hours, As needed, Moderate Pain (4-6)  -- Indication: For Pain    apixaban 2.5 mg oral tablet  -- 1 tab(s) by mouth every 12 hours  -- Indication: For Afib    metoprolol tartrate 25 mg oral tablet  -- 1 tab(s) by mouth every 8 hours  -- Indication: For Afib    furosemide 20 mg oral tablet  -- 1 tab(s) by mouth once a day  -- Indication: For swelling    Multiple Vitamins oral tablet  -- 1 tab(s) by mouth once a day  -- Indication: For supplement I will START or STAY ON the medications listed below when I get home from the hospital:    acetaminophen 325 mg oral tablet  -- 2 tab(s) by mouth every 6 hours, As needed, Mild Pain (1 - 3)  -- Indication: For Pain    oxyCODONE 5 mg oral tablet  -- 1 tab(s) by mouth 2 times a day, As Needed -Severe Pain (7 - 10) MDD:2 tablets  -- Indication: For Pain    apixaban 2.5 mg oral tablet  -- 1 tab(s) by mouth every 12 hours  -- Indication: For Afib    metoprolol tartrate 25 mg oral tablet  -- 1 tab(s) by mouth every 8 hours  -- Indication: For Afib    furosemide 20 mg oral tablet  -- 1 tab(s) by mouth once a day  -- Indication: For swelling    clindamycin 300 mg oral capsule  -- 1 cap(s) by mouth every 6 hours  -- Indication: For Cellulitis     Multiple Vitamins oral tablet  -- 1 tab(s) by mouth once a day  -- Indication: For supplement

## 2018-04-28 NOTE — DISCHARGE NOTE ADULT - HOSPITAL COURSE
History of Present Illness: 	  Ms. Pritchett is a 85  female w/ hx breast CA s/p mastectomy/chemo ('94), uterine CA s/p hysterectomy ('91) and Afib (on Eliquis) present to ED with left lower leg swelling and pain. Patient recalled about 2 weeks ago, she had a fall from the stairs, where she hits her right hip and her left leg, but did not hit her head. She did not notice any cut or skin laceration that time. She was able to ambulate after the fall without any problem. The next day, she notice that her left leg has been getting swollen and red with some pain. She went to urgent care and had a Xray of the leg which did not showed any fracture. She was recommended to put some ice pack and sent home. However, the swelling is getting worse. Few days later, her friend accidentally close the window and hit her finger, causing nonstop bleeding to her right index finger. She went to [facility] 4 days ago and had lower extremity doppler which did not show DVTs. She was discharged on Doxycycline. However, she reports worsening swelling/pain/erythema, and subsequently discoloration of her left toes so she present to the ED today. She also had some numbness on her left foot. Also noted the bruise on her right hip/thigh region with small bump. She noted some chills few days ago, however denied fever, n/v, CP, or SOB, purulent drainage from the legs. At home, she is independent with her ADLs, she uses cane to walk. Although she reports that she was given furosemide by her cardiologist for the past 2 months for RUIZ.   In the ED, VSS. She was given tylenol for the pain. Preliminary reports of the xray of the left leg/foot/ankle did not show any fractures.     Hospital Course:  Patient had CT of left lower extremity which showed _____. Patient was started on clindamycin for concern of cellulitis. She was continued on her eliquis for her afib as the benefit of stroke prevention outweigh hematoma.  She was started on oxycodone 5mg q6h PRN, ibuprofen and tylenol for the pain. PT evaluated the patient and recommended home with home PT. Patient also was found to have anemia, iron study reveal AoCD, likely 2/2 hx of cancer. Patient was switch to oral clindamycin. Pt was deemed medically stable to be discharged home. History of Present Illness: 	  Ms. Pritchett is a 85  female w/ hx breast CA s/p mastectomy/chemo ('94), uterine CA s/p hysterectomy ('91) and Afib (on Eliquis) present to ED with left lower leg swelling and pain. Patient recalled about 2 weeks ago, she had a fall from the stairs, where she hits her right hip and her left leg, but did not hit her head. She did not notice any cut or skin laceration that time. She was able to ambulate after the fall without any problem. The next day, she notice that her left leg has been getting swollen and red with some pain. She went to urgent care and had a Xray of the leg which did not showed any fracture. She was recommended to put some ice pack and sent home. However, the swelling is getting worse. Few days later, her friend accidentally close the window and hit her finger, causing nonstop bleeding to her right index finger. She went to [facility] 4 days ago and had lower extremity doppler which did not show DVTs. She was discharged on Doxycycline. However, she reports worsening swelling/pain/erythema, and subsequently discoloration of her left toes so she present to the ED today. She also had some numbness on her left foot. Also noted the bruise on her right hip/thigh region with small bump. She noted some chills few days ago, however denied fever, n/v, CP, or SOB, purulent drainage from the legs. At home, she is independent with her ADLs, she uses cane to walk. Although she reports that she was given furosemide by her cardiologist for the past 2 months for RUIZ.   In the ED, VSS. She was given tylenol for the pain. Preliminary reports of the xray of the left leg/foot/ankle did not show any fractures.     Hospital Course:  Patient had CT of left lower extremity which demonstrated 3.8 cm hematoma within the medial subcutaneous tissues at the level the tibial mid shaft. vascular consulted for bluish discoloration of distal LLE, but determined that no intervention required as good distal perfusion was demonstrated. Patient was started on clindamycin for concern of cellulitis. LLE swelling and hematoma markedly improved during course. She was continued on her eliquis for her afib as the benefit of stroke prevention outweigh hematoma.  She was started on oxycodone 5mg q6h PRN, ibuprofen and tylenol for the pain. PT evaluated the patient and recommended home with home PT. Patient also was found to have anemia, iron study reveal anemia of chronic disease, likely 2/2 hx of cancer. Patient was switch to oral clindamycin. Pt was deemed medically stable to be discharged home. History of Present Illness: 	  Ms. Pritchett is a 85  female w/ hx breast CA s/p mastectomy/chemo ('94), uterine CA s/p hysterectomy ('91) and Afib (on Eliquis) present to ED with left lower leg swelling and pain. Patient recalled about 2 weeks ago, she had a fall from the stairs, where she hits her right hip and her left leg, but did not hit her head. She did not notice any cut or skin laceration that time. She was able to ambulate after the fall without any problem. The next day, she notice that her left leg has been getting swollen and red with some pain. She went to urgent care and had a Xray of the leg which did not showed any fracture. She was recommended to put some ice pack and sent home. However, the swelling is getting worse. Few days later, her friend accidentally close the window and hit her finger, causing nonstop bleeding to her right index finger. She went to [facility] 4 days ago and had lower extremity doppler which did not show DVTs. She was discharged on Doxycycline. However, she reports worsening swelling/pain/erythema, and subsequently discoloration of her left toes so she present to the ED today. She also had some numbness on her left foot. Also noted the bruise on her right hip/thigh region with small bump. She noted some chills few days ago, however denied fever, n/v, CP, or SOB, purulent drainage from the legs. At home, she is independent with her ADLs, she uses cane to walk. Although she reports that she was given furosemide by her cardiologist for the past 2 months for RUIZ.   In the ED, VSS. She was given tylenol for the pain. Preliminary reports of the xray of the left leg/foot/ankle did not show any fractures.     Hospital Course:  Patient had CT of left lower extremity which demonstrated 3.8 cm hematoma within the medial subcutaneous tissues at the level the tibial mid shaft. vascular consulted for bluish discoloration of distal LLE, but determined that no intervention required as good distal perfusion was demonstrated. Patient was started on clindamycin for concern of cellulitis. LLE swelling and hematoma markedly improved during course. She was continued on her eliquis for her afib as the benefit of stroke prevention outweigh hematoma.  She was started on oxycodone 5mg q6h PRN and tylenol for the pain. PT evaluated the patient and recommended home with home PT. Patient also was found to have anemia, iron study reveal anemia of chronic disease, likely 2/2 hx of cancer. Patient was switch to oral clindamycin. Pt was deemed medically stable to be discharged home. History of Present Illness: 	  Ms. Pritchett is a 85  female w/ hx breast CA s/p mastectomy/chemo ('94), uterine CA s/p hysterectomy ('91) and Afib (on Eliquis) present to ED with left lower leg swelling and pain. Patient recalled about 2 weeks ago, she had a fall from the stairs, where she hits her right hip and her left leg, but did not hit her head. She did not notice any cut or skin laceration that time. She was able to ambulate after the fall without any problem. The next day, she notice that her left leg has been getting swollen and red with some pain. She went to urgent care and had a Xray of the leg which did not showed any fracture. She was recommended to put some ice pack and sent home. However, the swelling is getting worse. Few days later, her friend accidentally close the window and hit her finger, causing nonstop bleeding to her right index finger. She went to [facility] 4 days ago and had lower extremity doppler which did not show DVTs. She was discharged on Doxycycline. However, she reports worsening swelling/pain/erythema, and subsequently discoloration of her left toes so she present to the ED today. She also had some numbness on her left foot. Also noted the bruise on her right hip/thigh region with small bump. She noted some chills few days ago, however denied fever, n/v, CP, or SOB, purulent drainage from the legs. At home, she is independent with her ADLs, she uses cane to walk. Although she reports that she was given furosemide by her cardiologist for the past 2 months for RUIZ.   In the ED, VSS. She was given tylenol for the pain. Preliminary reports of the xray of the left leg/foot/ankle did not show any fractures.     Hospital Course:  Patient had CT of left lower extremity which demonstrated 3.8 cm hematoma within the medial subcutaneous tissues at the level the tibial mid shaft. vascular consulted for bluish discoloration of distal LLE, but determined that no intervention required as good distal perfusion was demonstrated. Patient was started on clindamycin for concern of cellulitis. LLE swelling and hematoma markedly improved during course. She was continued on her eliquis for her afib as the benefit of stroke prevention outweigh hematoma.  She was started on oxycodone 5mg q6h PRN and tylenol for the pain. PT evaluated the patient and recommended home with home PT. Patient also was found to have anemia, iron study reveal anemia of chronic disease, likely 2/2 hx of cancer. Patient was switch to oral clindamycin to complete a total 7 day course. Pt was deemed medically stable to be discharged home.

## 2018-04-28 NOTE — DISCHARGE NOTE ADULT - NSFTFSERVOTHERFT_GEN_ALL_CORE
Nutrition consulted for assessment.  Patient reports that his appetite is good at the present time, although, reported that he consumed 90% of breakfast and 50% of his lunch meal today.  He has an upper denture and states that he has difficulty with foods that are not easily chewed.  Denies any difficulty swallowing and no nausea/vomiting or GI discomfort at this time.  Patient had been taking a stool softener daily at home (2 pills in the morning and 1 pill at night) for regularity.  Reports his last BM was on 3/28/17.  No micronutrient supplementation taken PTA.  Patient reports his UBW is 130 - 135 lbs, with recent weight gain due to B/L lower extremity edema.  Patient confirmed NKFA. home PT

## 2018-04-28 NOTE — PHYSICAL THERAPY INITIAL EVALUATION ADULT - PERTINENT HX OF CURRENT PROBLEM, REHAB EVAL
Pt. is an 85 year old female admitted to [facility] secondary to cellulitis. PMH: Breast cancer, hysterectomy

## 2018-04-28 NOTE — PHYSICAL THERAPY INITIAL EVALUATION ADULT - PLANNED THERAPY INTERVENTIONS, PT EVAL
transfer training/balance training/stair negotiation/bed mobility training/gait training/strengthening

## 2018-04-28 NOTE — PHYSICAL THERAPY INITIAL EVALUATION ADULT - ADDITIONAL COMMENTS
Pt. lives in a pvt apartment alone. Pt. has 3 steps with HR x 1 to enter their home. Pt. was previously ambulating household and community distances with a cane independently. Pt. was previously independent with ADLs.  Pt. returned to sitting at edge of bed at end of therapy session with all lines and tubes intact, call bell in reach and in NAD.

## 2018-04-28 NOTE — DISCHARGE NOTE ADULT - PATIENT PORTAL LINK FT
You can access the "[employer] Patient Portal, offered by [facility], by registering with the following website: [email or web address]

## 2018-04-28 NOTE — DISCHARGE NOTE ADULT - MEDICATION SUMMARY - MEDICATIONS TO STOP TAKING
I will STOP taking the medications listed below when I get home from the hospital:    doxycycline hyclate 100 mg oral tablet  -- 1 tab(s) by mouth every 12 hours  -- Avoid prolonged or excessive exposure to direct and/or artificial sunlight while taking this medication.  Do not take this drug if you are pregnant.  Finish all this medication unless otherwise directed by prescriber.  Medication should be taken with plenty of water.

## 2018-04-28 NOTE — PROGRESS NOTE ADULT - PROBLEM SELECTOR PLAN 1
- P/w 2 wks hx of left leg swelling with mid-shin bump with erythema and pain s/p fall, likely hematoma in setting of eliquis use.   - No fracture seen on xray, pending CT lower extremity to evaluate hematoma  - Also with right hip/thigh small bump likely hematoma.   - Percocet for severe pain, tylenol for mild to moderate pain.   - Senna & colace while on opiate.   - C/w eliquis as benefit of preventing stroke outweigh hematoma. Will monitor closely for the hematoma. Will d/c eliquis if growth in size. - P/w 2 wks hx of left leg swelling with mid-shin bump with erythema and pain s/p fall, likely hematoma in setting of eliquis use.   - No fracture seen on xray, pending CT lower extremity to evaluate hematoma  - Also with right hip/thigh small bump likely hematoma.   - Oxycodone for severe pain, ibuprofen for mod pain, tylenol for mild pain.   - Senna & colace while on opiate.   - C/w eliquis as benefit of preventing stroke outweigh hematoma. Will monitor closely for the hematoma. Will d/c eliquis if growth in size.

## 2018-04-28 NOTE — PROVIDER CONTACT NOTE (OTHER) - ACTION/TREATMENT ORDERED:
MD notified. Recheck Temp
Md notified. MD ordered a recheck of BP while patient is awake
will cont to monitor.

## 2018-04-28 NOTE — CONSULT NOTE ADULT - ASSESSMENT
85F with afib on eliquis s/p fall with trauma to left lower extremity, now with substantial hematoma with bruising and edema down to the foot    -palpable pulses and good capillary refill  -no urgent vascular surgery intervention  -would recommend leg elevation and ACE to help with swelling  -risk/ benefit of anticoagulation as per cardiology  -patient in some discomfort, please continue with oxycodone PRN for pain control 85F with afib on eliquis s/p fall with trauma to left lower extremity, now with substantial hematoma with bruising and edema down to the foot    -palpable pulses and good capillary refill  -no urgent vascular surgery intervention  -would recommend leg elevation and ACE to help with swelling  -risk/ benefit of anticoagulation as per cardiology  -patient in some discomfort, please continue with oxycodone PRN for pain control   -discussed with Dr. Asher 85F with afib on eliquis s/p fall with trauma to left lower extremity, now with substantial hematoma with bruising and edema down to the foot    -palpable pulses and good capillary refill  -no urgent vascular surgery intervention  -would recommend leg elevation and ACE to help with swelling  -risk/ benefit of anticoagulation as per cardiology  -patient in some discomfort, please continue with oxycodone PRN for pain control   -recall vascular surgery with any questions/ issues   -discussed with Dr. Asher

## 2018-04-28 NOTE — CONSULT NOTE ADULT - SUBJECTIVE AND OBJECTIVE BOX
VASCULAR SURGERY CONSULT NOTE    85F with atrial fibrillation on eliquis who reports falling two weeks ago on stairs and sustaining trauma to her left lower extremity. She had immediate pain in the area and went to urgent care. As per patient Xrays were performed and were negative. She had continued pain and some surrounding erythema. On presentation here, she began treatment with antibiotics. As per patient, pain and swelling has improved, but areas of discoloration have extended down into the foot. Sensory and motor currently intact, patient was able to walk with physical therapy today. Good capillary refill in the foot.   Medical history significant for breast CA s/p mastectomy/chemo ('94), uterine CA s/p hysterectomy ('91) and Afib (on Eliquis).      HPI:  Ms. Pritchett is a 85  female w/ hx breast CA s/p mastectomy/chemo ('94), uterine CA s/p hysterectomy ('91) and Afib (on Eliquis) present to ED with left lower leg swelling and pain. Patient recalled about 2 weeks ago, she had a fall from the stairs, where she hits her right hip and her left leg, but did not hit her head. She did not notice any cut or skin laceration that time. She was able to ambulate after the fall without any problem. The next day, she notice that her left leg has been getting swollen and red with some pain. She went to urgent care and had a Xray of the leg which did not showed any fracture. She was recommended to put some ice pack and sent home. However, the swelling is getting worse. Few days later, her friend accidentally close the window and hit her finger, causing nonstop bleeding to her right index finger. She went to [facility] 4 days ago and had lower extremity doppler which did not show DVTs. She was discharged on Doxycycline. However, she reports worsening swelling/pain/erythema, and subsequently discoloration of her left toes so she present to the ED today. She also had some numbness on her left foot. Also noted the bruise on her right hip/thigh region with small bump. She noted some chills few days ago, however denied fever, n/v, CP, or SOB, purulent drainage from the legs. At home, she is independent with her ADLs, she uses cane to walk. Although she reports that she was given furosemide by her cardiologist for the past 2 months for RUIZ.     In the ED, VSS. She was given tylenol for the pain. Preliminary reports of the xray of the left leg/foot/ankle did not show any fractures. (27 Apr 2018 08:47)      PAST MEDICAL & SURGICAL HISTORY:  Atrial fibrillation  Uterine cancer  Breast cancer  H/O: hysterectomy  H/O mastectomy      FAMILY HISTORY:  No pertinent family history in first degree relatives      SOCIAL HISTORY:    MEDICATIONS  (STANDING):  apixaban 2.5 milliGRAM(s) Oral every 12 hours  clindamycin   Capsule 300 milliGRAM(s) Oral every 6 hours  docusate sodium 100 milliGRAM(s) Oral daily  metoprolol tartrate 25 milliGRAM(s) Oral every 8 hours  multivitamin 1 Tablet(s) Oral daily  senna 1 Tablet(s) Oral daily    MEDICATIONS  (PRN):  acetaminophen   Tablet 650 milliGRAM(s) Oral every 6 hours PRN For Temp greater than 38 C (100.4 F)  acetaminophen   Tablet. 650 milliGRAM(s) Oral every 6 hours PRN Mild Pain (1 - 3)  ibuprofen  Tablet 200 milliGRAM(s) Oral every 6 hours PRN Moderate Pain (4-6)  oxyCODONE    IR 5 milliGRAM(s) Oral every 6 hours PRN Severe Pain (7 - 10)    Allergies    iv  contrast (Unknown)  morphine (Rash)  penicillin (Rash)  Percocet 5/325 (Unknown)  Sulfacetamide Sodium (Rash)    Intolerances    PHYSICAL EXAM    Vital Signs Last 24 Hrs  T(C): 36.1 (28 Apr 2018 14:31), Max: 36.4 (27 Apr 2018 23:39)  T(F): 97 (28 Apr 2018 14:31), Max: 97.5 (27 Apr 2018 23:39)  HR: 72 (28 Apr 2018 14:16) (68 - 79)  BP: 101/68 (28 Apr 2018 14:16) (89/37 - 124/75)  BP(mean): --  RR: 18 (28 Apr 2018 14:16) (18 - 18)  SpO2: 100% (28 Apr 2018 14:16) (100% - 100%)  Daily     Daily     General: WN/WD NAD  Neurology: A&Ox3, nonfocal, OCASIO x 4  Head:  Normocephalic, atraumatic  ENT:  Mucosa moist, no ulcerations  Neck:  Supple, no sinuses or palpable masses  Lymphatic:  No palpable cervical, supraclavicular, axillary or inguinal adenopathy  Respiratory: CTA B/L  CV: irregularly irregular, no murmur   Abdominal: Soft, NT, ND no palpable mass  MSK: LLE with large hematoma ~5x7 cm with some surrounding erythema. Bruising and edema extends down into the foot/ toes. Toes pink with brisk capillary refill. Motor and sensory intact. Palpable DP and PT pulses.                           9.5    4.95  )-----------( 124      ( 28 Apr 2018 07:40 )             30.8     04-27    139  |  100  |  15  ----------------------------<  109<H>  4.4   |  22  |  0.89    Ca    9.4      27 Apr 2018 00:20  Phos  3.5     04-27  Mg     2.0     04-27    TPro  7.5  /  Alb  4.2  /  TBili  0.7  /  DBili  x   /  AST  28  /  ALT  20  /  AlkPhos  69  04-27    PT/INR - ( 27 Apr 2018 00:20 )   PT: 16.8 SEC;   INR: 1.50          PTT - ( 27 Apr 2018 00:20 )  PTT:33.2 SEC      IMAGING STUDIES:    CT LLE:     3.8 cm hematoma within the medial subcutaneous tissues at the   level the tibial mid shaft; correlate for any history of trauma.   Superinfection or abscess is a less likely possibility and should be   excluded on a clinical basis. Moderate subcutaneous edema extending   distally from this region. No lytic or blastic lesions. VASCULAR SURGERY CONSULT NOTE    85F with atrial fibrillation on eliquis who reports falling two weeks ago on stairs and sustaining trauma to her left lower extremity. She had immediate pain in the area and went to urgent care. As per patient Xrays were performed and were negative. She had continued pain and some surrounding erythema. On presentation here, she began treatment with antibiotics. As per patient, pain and swelling has improved, but areas of discoloration have extended down into the foot. Sensory and motor currently intact, patient was able to walk with physical therapy today. Good capillary refill in the foot.   Medical history significant for breast CA s/p mastectomy/chemo ('94), uterine CA s/p hysterectomy ('91) and Afib (on Eliquis).      HPI:  Ms. Pritchett is a 85  female w/ hx breast CA s/p mastectomy/chemo ('94), uterine CA s/p hysterectomy ('91) and Afib (on Eliquis) present to ED with left lower leg swelling and pain. Patient recalled about 2 weeks ago, she had a fall from the stairs, where she hits her right hip and her left leg, but did not hit her head. She did not notice any cut or skin laceration that time. She was able to ambulate after the fall without any problem. The next day, she notice that her left leg has been getting swollen and red with some pain. She went to urgent care and had a Xray of the leg which did not showed any fracture. She was recommended to put some ice pack and sent home. However, the swelling is getting worse. Few days later, her friend accidentally close the window and hit her finger, causing nonstop bleeding to her right index finger. She went to [facility] 4 days ago and had lower extremity doppler which did not show DVTs. She was discharged on Doxycycline. However, she reports worsening swelling/pain/erythema, and subsequently discoloration of her left toes so she present to the ED today. She also had some numbness on her left foot. Also noted the bruise on her right hip/thigh region with small bump. She noted some chills few days ago, however denied fever, n/v, CP, or SOB, purulent drainage from the legs. At home, she is independent with her ADLs, she uses cane to walk. Although she reports that she was given furosemide by her cardiologist for the past 2 months for RUIZ.     In the ED, VSS. She was given tylenol for the pain. Preliminary reports of the xray of the left leg/foot/ankle did not show any fractures. (27 Apr 2018 08:47)      PAST MEDICAL & SURGICAL HISTORY:  Atrial fibrillation  Uterine cancer  Breast cancer  H/O: hysterectomy  H/O mastectomy      FAMILY HISTORY:  No pertinent family history in first degree relatives      SOCIAL HISTORY:    MEDICATIONS  (STANDING):  apixaban 2.5 milliGRAM(s) Oral every 12 hours  clindamycin   Capsule 300 milliGRAM(s) Oral every 6 hours  docusate sodium 100 milliGRAM(s) Oral daily  metoprolol tartrate 25 milliGRAM(s) Oral every 8 hours  multivitamin 1 Tablet(s) Oral daily  senna 1 Tablet(s) Oral daily    MEDICATIONS  (PRN):  acetaminophen   Tablet 650 milliGRAM(s) Oral every 6 hours PRN For Temp greater than 38 C (100.4 F)  acetaminophen   Tablet. 650 milliGRAM(s) Oral every 6 hours PRN Mild Pain (1 - 3)  ibuprofen  Tablet 200 milliGRAM(s) Oral every 6 hours PRN Moderate Pain (4-6)  oxyCODONE    IR 5 milliGRAM(s) Oral every 6 hours PRN Severe Pain (7 - 10)    Allergies    iv  contrast (Unknown)  morphine (Rash)  penicillin (Rash)  Percocet 5/325 (Unknown)  Sulfacetamide Sodium (Rash)    REVIEW OF SYSTEMS:  CONSTITUTIONAL: No weakness, fevers or chills  EYES/ENT: No visual changes;  No vertigo or throat pain   NECK: No pain or stiffness  RESPIRATORY: no shortness of breath  CARDIOVASCULAR: No chest pain or palpitations at present  GASTROINTESTINAL: No abdominal or epigastric pain. No nausea, vomiting, or hematemesis; No diarrhea or constipation. No melena or hematochezia.  GENITOURINARY: No dysuria, frequency, foamy urine, urinary urgency, incontinence or hematuria  NEUROLOGICAL: No numbness or weakness  SKIN see HPI  All other review of systems is negative unless indicated above.      PHYSICAL EXAM    Vital Signs Last 24 Hrs  T(C): 36.1 (28 Apr 2018 14:31), Max: 36.4 (27 Apr 2018 23:39)  T(F): 97 (28 Apr 2018 14:31), Max: 97.5 (27 Apr 2018 23:39)  HR: 72 (28 Apr 2018 14:16) (68 - 79)  BP: 101/68 (28 Apr 2018 14:16) (89/37 - 124/75)  BP(mean): --  RR: 18 (28 Apr 2018 14:16) (18 - 18)  SpO2: 100% (28 Apr 2018 14:16) (100% - 100%)  Daily     Daily     General: WN/WD NAD  Neurology: A&Ox3, nonfocal, OCASIO x 4  Head:  Normocephalic, atraumatic  ENT:  Mucosa moist, no ulcerations  Neck:  Supple, no sinuses or palpable masses  Lymphatic:  No palpable cervical, supraclavicular, axillary or inguinal adenopathy  Respiratory: CTA B/L  CV: irregularly irregular, no murmur   Abdominal: Soft, NT, ND no palpable mass  MSK: LLE with large hematoma ~5x7 cm with some surrounding erythema. Bruising and edema extends down into the foot/ toes. Toes pink with brisk capillary refill. Motor and sensory intact. Palpable DP and PT pulses.                           9.5    4.95  )-----------( 124      ( 28 Apr 2018 07:40 )             30.8     04-27    139  |  100  |  15  ----------------------------<  109<H>  4.4   |  22  |  0.89    Ca    9.4      27 Apr 2018 00:20  Phos  3.5     04-27  Mg     2.0     04-27    TPro  7.5  /  Alb  4.2  /  TBili  0.7  /  DBili  x   /  AST  28  /  ALT  20  /  AlkPhos  69  04-27    PT/INR - ( 27 Apr 2018 00:20 )   PT: 16.8 SEC;   INR: 1.50          PTT - ( 27 Apr 2018 00:20 )  PTT:33.2 SEC      IMAGING STUDIES:    CT LLE:     3.8 cm hematoma within the medial subcutaneous tissues at the   level the tibial mid shaft; correlate for any history of trauma.   Superinfection or abscess is a less likely possibility and should be   excluded on a clinical basis. Moderate subcutaneous edema extending   distally from this region. No lytic or blastic lesions.

## 2018-04-28 NOTE — CONSULT NOTE ADULT - ATTENDING COMMENTS
Pt. was seen and examined. Agree with above. Plan d/w the team.  L leg traumatic hematoma. No signa of active bleeding  no vascular sx interventions at this time

## 2018-04-28 NOTE — DISCHARGE NOTE ADULT - PROVIDER TOKENS
FREE:[LAST:[longo],FIRST:[you],PHONE:[phone]],FAX:[(   [phone]    -],ADDRESS:[[address] [place] [address]]],TOKEN:'[id number]'

## 2018-04-29 VITALS
DIASTOLIC BLOOD PRESSURE: 60 MMHG | TEMPERATURE: 98 F | RESPIRATION RATE: 18 BRPM | OXYGEN SATURATION: 97 % | SYSTOLIC BLOOD PRESSURE: 104 MMHG | HEART RATE: 70 BPM

## 2018-04-29 LAB
HCT VFR BLD CALC: 31.3 % — LOW (ref 34.5–45)
HGB BLD-MCNC: 9.7 G/DL — LOW (ref 11.5–15.5)
MCHC RBC-ENTMCNC: 30 PG — SIGNIFICANT CHANGE UP (ref 27–34)
MCHC RBC-ENTMCNC: 31 % — LOW (ref 32–36)
MCV RBC AUTO: 96.9 FL — SIGNIFICANT CHANGE UP (ref 80–100)
NRBC # FLD: 0 — SIGNIFICANT CHANGE UP
PLATELET # BLD AUTO: 151 K/UL — SIGNIFICANT CHANGE UP (ref 150–400)
PMV BLD: 12.4 FL — SIGNIFICANT CHANGE UP (ref 7–13)
RBC # BLD: 3.23 M/UL — LOW (ref 3.8–5.2)
RBC # FLD: 15 % — HIGH (ref 10.3–14.5)
WBC # BLD: 5.22 K/UL — SIGNIFICANT CHANGE UP (ref 3.8–10.5)
WBC # FLD AUTO: 5.22 K/UL — SIGNIFICANT CHANGE UP (ref 3.8–10.5)

## 2018-04-29 PROCEDURE — 99239 HOSP IP/OBS DSCHRG MGMT >30: CPT

## 2018-04-29 RX ORDER — OXYCODONE HYDROCHLORIDE 5 MG/1
1 TABLET ORAL
Qty: 6 | Refills: 0
Start: 2018-04-29 | End: 2018-05-01

## 2018-04-29 RX ORDER — IBUPROFEN 200 MG
1 TABLET ORAL
Qty: 0 | Refills: 0 | COMMUNITY
Start: 2018-04-29

## 2018-04-29 RX ORDER — ACETAMINOPHEN 500 MG
2 TABLET ORAL
Qty: 0 | Refills: 0 | DISCHARGE
Start: 2018-04-29

## 2018-04-29 RX ADMIN — APIXABAN 2.5 MILLIGRAM(S): 2.5 TABLET, FILM COATED ORAL at 05:00

## 2018-04-29 RX ADMIN — Medication 1 TABLET(S): at 13:01

## 2018-04-29 RX ADMIN — Medication 300 MILLIGRAM(S): at 13:01

## 2018-04-29 RX ADMIN — Medication 300 MILLIGRAM(S): at 05:00

## 2018-04-29 RX ADMIN — Medication 25 MILLIGRAM(S): at 05:00

## 2018-04-29 NOTE — PROGRESS NOTE ADULT - PROBLEM SELECTOR PLAN 4
- Normocytic anemia  - Iron studies, B12 and folate indicate chronic disease as etiology
- hgb 9.3, unknown baseline  - No signs of active bleeding  - Check iron study, b12, folate

## 2018-04-29 NOTE — PROGRESS NOTE ADULT - SUBJECTIVE AND OBJECTIVE BOX
Patient is a 85y old  Female who presents with a chief complaint of left lower leg swelling/pain (28 Apr 2018 09:39)      INTERVAL HPI/OVERNIGHT EVENTS: No acute events overnight. Denies fever, chills. States that LLE pain and swelling much improved. Hematoma size is also decreased per patient.     MEDICATIONS  (STANDING):  apixaban 2.5 milliGRAM(s) Oral every 12 hours  clindamycin   Capsule 300 milliGRAM(s) Oral every 6 hours  docusate sodium 100 milliGRAM(s) Oral daily  metoprolol tartrate 25 milliGRAM(s) Oral every 8 hours  multivitamin 1 Tablet(s) Oral daily  senna 1 Tablet(s) Oral daily    MEDICATIONS  (PRN):  acetaminophen   Tablet 650 milliGRAM(s) Oral every 6 hours PRN For Temp greater than 38 C (100.4 F)  acetaminophen   Tablet. 650 milliGRAM(s) Oral every 6 hours PRN Mild Pain (1 - 3)  ibuprofen  Tablet 200 milliGRAM(s) Oral every 6 hours PRN Moderate Pain (4-6)  oxyCODONE    IR 5 milliGRAM(s) Oral every 6 hours PRN Severe Pain (7 - 10)      Allergies    iv  contrast (Unknown)  morphine (Rash)  penicillin (Rash)  Percocet 5/325 (Unknown)  Sulfacetamide Sodium (Rash)    Intolerances        REVIEW OF SYSTEMS:  Negative unless otherwise stated in HPI    Vital Signs Last 24 Hrs  T(C): 36.4 (29 Apr 2018 04:56), Max: 36.4 (28 Apr 2018 21:38)  T(F): 97.6 (29 Apr 2018 04:56), Max: 97.6 (29 Apr 2018 04:56)  HR: 70 (29 Apr 2018 04:56) (70 - 79)  BP: 104/60 (29 Apr 2018 04:56) (101/68 - 115/62)  BP(mean): --  RR: 18 (29 Apr 2018 04:56) (18 - 18)  SpO2: 97% (29 Apr 2018 04:56) (97% - 100%)    PHYSICAL EXAM:  GENERAL: NAD, well-groomed, well-developed  HEAD:  Atraumatic, Normocephalic  EYES: EOMI, PERRLA, conjunctiva and sclera clear  NECK: Supple, No JVD  NERVOUS SYSTEM:  Alert & Oriented X3  CHEST/LUNG: Clear to auscultation bilaterally; No rales, rhonchi, wheezing, or rubs  HEART: Regular rate and rhythm; No murmurs, rubs, or gallops  ABDOMEN: Soft, Nontender, Nondistended; Bowel sounds present  EXTREMITIES: LLE swollen compared to right with bluish discoloration, hematoma observed, DP pulses intact and symmetrical, normal ROM  LYMPH: No lymphadenopathy noted      LABS:                        9.7    5.22  )-----------( 151      ( 29 Apr 2018 09:20 )             31.3             BLOOD CULTURE  04-27 @ 03:46 --    NO ORGANISMS ISOLATED  NO ORGANISMS ISOLATED AT 48 HRS.  --  04-27 @ 03:38 --    NO ORGANISMS ISOLATED  NO ORGANISMS ISOLATED AT 48 HRS.  --      Radiology:  CT Left lower extremity:  3.8 cm hematoma within the medial subcutaneous tissues at the level the tibial mid shaft; correlate for any history of trauma.   Superinfection or abscess is a less likely possibility and should be excluded on a clinical basis. Moderate subcutaneous edema extending distally from this region. No lytic or blastic lesions.

## 2018-04-29 NOTE — PROGRESS NOTE ADULT - PROBLEM SELECTOR PLAN 3
- [id number] score of 3.  - C/w metoprolol and eliquis
- [id number] score of 3.  - C/w metoprolol and eliquis

## 2018-04-29 NOTE — PROGRESS NOTE ADULT - ASSESSMENT
85  female w/ hx breast CA s/p mastectomy/chemo ('94), uterine CA s/p hysterectomy ('91) and Afib (on Eliquis) present with 2 week hx of left lower leg swelling, pain, and erythema s/p fall, likely 2/2 hematoma +/- superimposed cellulitis.
85  female w/ hx breast CA s/p mastectomy/chemo ('94), uterine CA s/p hysterectomy ('91) and Afib (on Eliquis) present with 2 week hx of left lower leg swelling, pain, and erythema s/p fall found to have hematoma with superimposed cellulitis now resolving on Abx.

## 2018-04-29 NOTE — PROGRESS NOTE ADULT - ATTENDING COMMENTS
Patient seen and examined with TEAM   Agree with above  A/P of Dr Jensen.  JORGE A calf erythema and swelling greatly improved.  C/w Clindamycin PO   H/h stable- C/w Eiquis. No  NSAIDS with Eliquis. Percocet 1 tab q 12 prn pain x 3 days ONLY  Vascular consult appreciated- No  vacular int needed.  d/c home to f/u PCP Dr Howell at [phone]  and Cardiology.  Meds to [facility] at [phone]    45 minutes with patient and coordination of discharge
Agree with Above A/p by team Dr Landry  c/w Clindamycin for L calf cellulitis.  L calf with hematoma on Eliquis  Discoloration to L toes- Vascular consult called by Team- ?? compartmental syndrome???

## 2018-04-29 NOTE — PROGRESS NOTE ADULT - PROBLEM SELECTOR PLAN 5
- Mild thrombocytopenia, now resolving, may be related to acute infection
- Mild thrombocytopenia, unknown baseline. Could be 2/2 eliquis use.  - Continue to monitor

## 2018-04-29 NOTE — PROGRESS NOTE ADULT - PROBLEM SELECTOR PLAN 2
- Concern for superimpose cellulitis with area of erythema and warm.   - C/w empirical clindamycin po Q6H for total of 7 days  - Blood Cx NTD X2 (4/27)
- Concern for superimpose cellulitis with area of erythema and warm.   - C/w empirical clindamycin IV q8h (day 2)  - Blood Cx NTD X2 (4/27)

## 2018-04-29 NOTE — PROGRESS NOTE ADULT - PROBLEM SELECTOR PLAN 1
- P/w 2 wks hx of left leg swelling with mid-shin bump with erythema and pain s/p fall, due to hematoma in setting of eliquis use, decreasing in size  - No fracture seen on xray, CT demonstrates hematoma as above  - Oxycodone for severe pain, ibuprofen for mod pain, tylenol for mild pain.   - Senna & colace while on opiate.   - C/w eliquis as benefit of preventing stroke outweigh hematoma. Hematoma has been decreasing in size and H/H is stable  - Per vascular surgery, no surgical intervention required as patient with good perfusion to the distal extremities

## 2018-04-30 LAB — TRANSFERRIN SERPL-MCNC: 220 MG/DL — SIGNIFICANT CHANGE UP (ref 200–360)

## 2018-05-02 LAB
BACTERIA BLD CULT: SIGNIFICANT CHANGE UP
BACTERIA BLD CULT: SIGNIFICANT CHANGE UP

## 2018-05-03 ENCOUNTER — TRANSCRIPTION ENCOUNTER (OUTPATIENT)
Age: 83
End: 2018-05-03

## 2018-05-10 ENCOUNTER — TRANSCRIPTION ENCOUNTER (OUTPATIENT)
Age: 83
End: 2018-05-10

## 2019-07-15 NOTE — PHYSICAL THERAPY INITIAL EVALUATION ADULT - PATIENT PROFILE REVIEW, REHAB EVAL

## 2020-08-25 NOTE — PATIENT PROFILE ADULT. - PATIENT REPRESENTATIVE: ( YOU CAN CHOOSE ANY PERSON THAT CAN ASSIST YOU WITH YOUR HEALTH CARE PREFERENCES, DOES NOT HAVE TO BE A SPOUSE, IMMEDIATE FAMILY OR SIGNIFICANT OTHER/PARTNER)
[facility] lung biopsy specimens obtained and sent to Pathology per Dr. Haresh Mendez.   
Yes
Yes

## 2021-04-09 NOTE — ED PROVIDER NOTE - GASTROINTESTINAL NEGATIVE STATEMENT, MLM
(3) slightly limited no abdominal pain, no bloating, no constipation, no diarrhea, no nausea and no vomiting.

## 2022-01-01 ENCOUNTER — INPATIENT (INPATIENT)
Facility: HOSPITAL | Age: 87
LOS: 29 days | Discharge: NURSING, REHAB OR LONG-TERM CARE | DRG: 329 | End: 2022-04-07
Attending: STUDENT IN AN ORGANIZED HEALTH CARE EDUCATION/TRAINING PROGRAM | Admitting: STUDENT IN AN ORGANIZED HEALTH CARE EDUCATION/TRAINING PROGRAM
Payer: MEDICARE

## 2022-01-01 ENCOUNTER — TRANSCRIPTION ENCOUNTER (OUTPATIENT)
Age: 87
End: 2022-01-01

## 2022-01-01 ENCOUNTER — INPATIENT (INPATIENT)
Facility: HOSPITAL | Age: 87
LOS: 12 days | DRG: 871 | End: 2022-05-24
Attending: INTERNAL MEDICINE | Admitting: STUDENT IN AN ORGANIZED HEALTH CARE EDUCATION/TRAINING PROGRAM
Payer: MEDICARE

## 2022-01-01 ENCOUNTER — INPATIENT (INPATIENT)
Facility: HOSPITAL | Age: 87
LOS: 5 days | Discharge: NURSING, REHAB OR LONG-TERM CARE | DRG: 840 | End: 2022-04-28
Attending: HOSPITALIST | Admitting: HOSPITALIST
Payer: MEDICARE

## 2022-01-01 ENCOUNTER — RESULT REVIEW (OUTPATIENT)
Age: 87
End: 2022-01-01

## 2022-01-01 VITALS
RESPIRATION RATE: 18 BRPM | HEART RATE: 69 BPM | SYSTOLIC BLOOD PRESSURE: 116 MMHG | OXYGEN SATURATION: 93 % | TEMPERATURE: 96 F | DIASTOLIC BLOOD PRESSURE: 63 MMHG

## 2022-01-01 VITALS
TEMPERATURE: 98 F | RESPIRATION RATE: 22 BRPM | DIASTOLIC BLOOD PRESSURE: 63 MMHG | HEART RATE: 123 BPM | OXYGEN SATURATION: 93 % | SYSTOLIC BLOOD PRESSURE: 116 MMHG

## 2022-01-01 VITALS
TEMPERATURE: 97 F | OXYGEN SATURATION: 93 % | RESPIRATION RATE: 16 BRPM | HEART RATE: 81 BPM | SYSTOLIC BLOOD PRESSURE: 109 MMHG | DIASTOLIC BLOOD PRESSURE: 66 MMHG

## 2022-01-01 VITALS
OXYGEN SATURATION: 100 % | DIASTOLIC BLOOD PRESSURE: 47 MMHG | SYSTOLIC BLOOD PRESSURE: 102 MMHG | RESPIRATION RATE: 17 BRPM | HEART RATE: 99 BPM | TEMPERATURE: 98 F

## 2022-01-01 VITALS
WEIGHT: 130.07 LBS | TEMPERATURE: 98 F | HEART RATE: 122 BPM | OXYGEN SATURATION: 98 % | HEIGHT: 68 IN | RESPIRATION RATE: 19 BRPM | DIASTOLIC BLOOD PRESSURE: 95 MMHG | SYSTOLIC BLOOD PRESSURE: 159 MMHG

## 2022-01-01 VITALS — HEIGHT: 68 IN | HEART RATE: 54 BPM | WEIGHT: 160.06 LBS

## 2022-01-01 DIAGNOSIS — Z90.710 ACQUIRED ABSENCE OF BOTH CERVIX AND UTERUS: Chronic | ICD-10-CM

## 2022-01-01 DIAGNOSIS — C93.10 CHRONIC MYELOMONOCYTIC LEUKEMIA NOT HAVING ACHIEVED REMISSION: ICD-10-CM

## 2022-01-01 DIAGNOSIS — Z90.13 ACQUIRED ABSENCE OF BILATERAL BREASTS AND NIPPLES: Chronic | ICD-10-CM

## 2022-01-01 DIAGNOSIS — R45.1 RESTLESSNESS AND AGITATION: ICD-10-CM

## 2022-01-01 DIAGNOSIS — I50.30 UNSPECIFIED DIASTOLIC (CONGESTIVE) HEART FAILURE: ICD-10-CM

## 2022-01-01 DIAGNOSIS — Z85.3 PERSONAL HISTORY OF MALIGNANT NEOPLASM OF BREAST: ICD-10-CM

## 2022-01-01 DIAGNOSIS — Z51.5 ENCOUNTER FOR PALLIATIVE CARE: ICD-10-CM

## 2022-01-01 DIAGNOSIS — I48.20 CHRONIC ATRIAL FIBRILLATION, UNSPECIFIED: ICD-10-CM

## 2022-01-01 DIAGNOSIS — R06.00 DYSPNEA, UNSPECIFIED: ICD-10-CM

## 2022-01-01 DIAGNOSIS — Z90.10 ACQUIRED ABSENCE OF UNSPECIFIED BREAST AND NIPPLE: Chronic | ICD-10-CM

## 2022-01-01 DIAGNOSIS — C92.10 CHRONIC MYELOID LEUKEMIA, BCR/ABL-POSITIVE, NOT HAVING ACHIEVED REMISSION: ICD-10-CM

## 2022-01-01 DIAGNOSIS — D64.9 ANEMIA, UNSPECIFIED: ICD-10-CM

## 2022-01-01 DIAGNOSIS — Z29.9 ENCOUNTER FOR PROPHYLACTIC MEASURES, UNSPECIFIED: ICD-10-CM

## 2022-01-01 DIAGNOSIS — K56.609 UNSPECIFIED INTESTINAL OBSTRUCTION, UNSPECIFIED AS TO PARTIAL VERSUS COMPLETE OBSTRUCTION: ICD-10-CM

## 2022-01-01 DIAGNOSIS — Z02.9 ENCOUNTER FOR ADMINISTRATIVE EXAMINATIONS, UNSPECIFIED: ICD-10-CM

## 2022-01-01 DIAGNOSIS — S31.000A UNSPECIFIED OPEN WOUND OF LOWER BACK AND PELVIS WITHOUT PENETRATION INTO RETROPERITONEUM, INITIAL ENCOUNTER: ICD-10-CM

## 2022-01-01 DIAGNOSIS — R53.81 OTHER MALAISE: ICD-10-CM

## 2022-01-01 DIAGNOSIS — E03.9 HYPOTHYROIDISM, UNSPECIFIED: ICD-10-CM

## 2022-01-01 DIAGNOSIS — J90 PLEURAL EFFUSION, NOT ELSEWHERE CLASSIFIED: ICD-10-CM

## 2022-01-01 DIAGNOSIS — L89.150 PRESSURE ULCER OF SACRAL REGION, UNSTAGEABLE: ICD-10-CM

## 2022-01-01 DIAGNOSIS — F39 UNSPECIFIED MOOD [AFFECTIVE] DISORDER: ICD-10-CM

## 2022-01-01 DIAGNOSIS — R11.10 VOMITING, UNSPECIFIED: ICD-10-CM

## 2022-01-01 DIAGNOSIS — Z85.42 PERSONAL HISTORY OF MALIGNANT NEOPLASM OF OTHER PARTS OF UTERUS: ICD-10-CM

## 2022-01-01 DIAGNOSIS — R52 PAIN, UNSPECIFIED: ICD-10-CM

## 2022-01-01 DIAGNOSIS — Z71.89 OTHER SPECIFIED COUNSELING: ICD-10-CM

## 2022-01-01 DIAGNOSIS — Z87.19 PERSONAL HISTORY OF OTHER DISEASES OF THE DIGESTIVE SYSTEM: ICD-10-CM

## 2022-01-01 DIAGNOSIS — E43 UNSPECIFIED SEVERE PROTEIN-CALORIE MALNUTRITION: ICD-10-CM

## 2022-01-01 DIAGNOSIS — A41.9 SEPSIS, UNSPECIFIED ORGANISM: ICD-10-CM

## 2022-01-01 DIAGNOSIS — N39.0 URINARY TRACT INFECTION, SITE NOT SPECIFIED: ICD-10-CM

## 2022-01-01 DIAGNOSIS — C80.1 MALIGNANT (PRIMARY) NEOPLASM, UNSPECIFIED: ICD-10-CM

## 2022-01-01 DIAGNOSIS — D63.8 ANEMIA IN OTHER CHRONIC DISEASES CLASSIFIED ELSEWHERE: ICD-10-CM

## 2022-01-01 DIAGNOSIS — R93.89 ABNORMAL FINDINGS ON DIAGNOSTIC IMAGING OF OTHER SPECIFIED BODY STRUCTURES: ICD-10-CM

## 2022-01-01 DIAGNOSIS — D46.9 MYELODYSPLASTIC SYNDROME, UNSPECIFIED: ICD-10-CM

## 2022-01-01 DIAGNOSIS — R53.2 FUNCTIONAL QUADRIPLEGIA: ICD-10-CM

## 2022-01-01 LAB
-  AMIKACIN: SIGNIFICANT CHANGE UP
-  AMPICILLIN: SIGNIFICANT CHANGE UP
-  AZTREONAM: SIGNIFICANT CHANGE UP
-  CEFEPIME: SIGNIFICANT CHANGE UP
-  CEFTAZIDIME: SIGNIFICANT CHANGE UP
-  CIPROFLOXACIN: SIGNIFICANT CHANGE UP
-  CIPROFLOXACIN: SIGNIFICANT CHANGE UP
-  DAPTOMYCIN: SIGNIFICANT CHANGE UP
-  GENTAMICIN: SIGNIFICANT CHANGE UP
-  IMIPENEM: SIGNIFICANT CHANGE UP
-  LEVOFLOXACIN: SIGNIFICANT CHANGE UP
-  LEVOFLOXACIN: SIGNIFICANT CHANGE UP
-  LINEZOLID: SIGNIFICANT CHANGE UP
-  MEROPENEM: SIGNIFICANT CHANGE UP
-  NITROFURANTOIN: SIGNIFICANT CHANGE UP
-  PIPERACILLIN/TAZOBACTAM: SIGNIFICANT CHANGE UP
-  TETRACYCLINE: SIGNIFICANT CHANGE UP
-  TOBRAMYCIN: SIGNIFICANT CHANGE UP
-  VANCOMYCIN: SIGNIFICANT CHANGE UP
A1C WITH ESTIMATED AVERAGE GLUCOSE RESULT: 5.5 % — SIGNIFICANT CHANGE UP (ref 4–5.6)
ABO RH CONFIRMATION: SIGNIFICANT CHANGE UP
ACANTHOCYTES BLD QL SMEAR: SIGNIFICANT CHANGE UP
ACANTHOCYTES BLD QL SMEAR: SLIGHT — SIGNIFICANT CHANGE UP
ALBUMIN SERPL ELPH-MCNC: 1.5 G/DL — LOW (ref 3.3–5)
ALBUMIN SERPL ELPH-MCNC: 1.6 G/DL — LOW (ref 3.5–5)
ALBUMIN SERPL ELPH-MCNC: 1.7 G/DL — LOW (ref 3.3–5)
ALBUMIN SERPL ELPH-MCNC: 1.7 G/DL — LOW (ref 3.5–5)
ALBUMIN SERPL ELPH-MCNC: 1.8 G/DL — LOW (ref 3.3–5)
ALBUMIN SERPL ELPH-MCNC: 1.8 G/DL — LOW (ref 3.3–5)
ALBUMIN SERPL ELPH-MCNC: 1.9 G/DL — LOW (ref 3.5–5)
ALBUMIN SERPL ELPH-MCNC: 1.9 G/DL — LOW (ref 3.5–5)
ALBUMIN SERPL ELPH-MCNC: 2 G/DL — LOW (ref 3.5–5)
ALBUMIN SERPL ELPH-MCNC: 2.2 G/DL — LOW (ref 3.3–5)
ALBUMIN SERPL ELPH-MCNC: 2.2 G/DL — LOW (ref 3.5–5)
ALBUMIN SERPL ELPH-MCNC: 2.2 G/DL — LOW (ref 3.5–5)
ALBUMIN SERPL ELPH-MCNC: 2.3 G/DL — LOW (ref 3.3–5)
ALBUMIN SERPL ELPH-MCNC: 2.3 G/DL — LOW (ref 3.5–5)
ALBUMIN SERPL ELPH-MCNC: 2.6 G/DL — LOW (ref 3.5–5)
ALBUMIN SERPL ELPH-MCNC: 3.7 G/DL — SIGNIFICANT CHANGE UP (ref 3.5–5)
ALLERGY+IMMUNOLOGY DIAG STUDY NOTE: SIGNIFICANT CHANGE UP
ALLERGY+IMMUNOLOGY DIAG STUDY NOTE: SIGNIFICANT CHANGE UP
ALP SERPL-CCNC: 134 U/L — HIGH (ref 40–120)
ALP SERPL-CCNC: 145 U/L — HIGH (ref 40–120)
ALP SERPL-CCNC: 146 U/L — HIGH (ref 40–120)
ALP SERPL-CCNC: 147 U/L — HIGH (ref 40–120)
ALP SERPL-CCNC: 183 U/L — HIGH (ref 40–120)
ALP SERPL-CCNC: 51 U/L — SIGNIFICANT CHANGE UP (ref 40–120)
ALP SERPL-CCNC: 52 U/L — SIGNIFICANT CHANGE UP (ref 40–120)
ALP SERPL-CCNC: 66 U/L — SIGNIFICANT CHANGE UP (ref 40–120)
ALP SERPL-CCNC: 67 U/L — SIGNIFICANT CHANGE UP (ref 40–120)
ALP SERPL-CCNC: 69 U/L — SIGNIFICANT CHANGE UP (ref 40–120)
ALP SERPL-CCNC: 73 U/L — SIGNIFICANT CHANGE UP (ref 40–120)
ALP SERPL-CCNC: 75 U/L — SIGNIFICANT CHANGE UP (ref 40–120)
ALP SERPL-CCNC: 75 U/L — SIGNIFICANT CHANGE UP (ref 40–120)
ALP SERPL-CCNC: 76 U/L — SIGNIFICANT CHANGE UP (ref 40–120)
ALP SERPL-CCNC: 80 U/L — SIGNIFICANT CHANGE UP (ref 40–120)
ALP SERPL-CCNC: 84 U/L — SIGNIFICANT CHANGE UP (ref 40–120)
ALP SERPL-CCNC: 86 U/L — SIGNIFICANT CHANGE UP (ref 40–120)
ALP SERPL-CCNC: 89 U/L — SIGNIFICANT CHANGE UP (ref 40–120)
ALP SERPL-CCNC: 89 U/L — SIGNIFICANT CHANGE UP (ref 40–120)
ALP SERPL-CCNC: 93 U/L — SIGNIFICANT CHANGE UP (ref 40–120)
ALP SERPL-CCNC: 94 U/L — SIGNIFICANT CHANGE UP (ref 40–120)
ALT FLD-CCNC: 10 U/L DA — SIGNIFICANT CHANGE UP (ref 10–60)
ALT FLD-CCNC: 11 U/L DA — SIGNIFICANT CHANGE UP (ref 10–60)
ALT FLD-CCNC: 11 U/L — LOW (ref 12–78)
ALT FLD-CCNC: 12 U/L DA — SIGNIFICANT CHANGE UP (ref 10–60)
ALT FLD-CCNC: 12 U/L DA — SIGNIFICANT CHANGE UP (ref 10–60)
ALT FLD-CCNC: 12 U/L — SIGNIFICANT CHANGE UP (ref 12–78)
ALT FLD-CCNC: 13 U/L DA — SIGNIFICANT CHANGE UP (ref 10–60)
ALT FLD-CCNC: 14 U/L DA — SIGNIFICANT CHANGE UP (ref 10–60)
ALT FLD-CCNC: 14 U/L DA — SIGNIFICANT CHANGE UP (ref 10–60)
ALT FLD-CCNC: 15 U/L — SIGNIFICANT CHANGE UP (ref 10–45)
ALT FLD-CCNC: 16 U/L DA — SIGNIFICANT CHANGE UP (ref 10–60)
ALT FLD-CCNC: 18 U/L DA — SIGNIFICANT CHANGE UP (ref 10–60)
ALT FLD-CCNC: 29 U/L DA — SIGNIFICANT CHANGE UP (ref 10–60)
ALT FLD-CCNC: 36 U/L DA — SIGNIFICANT CHANGE UP (ref 10–60)
ALT FLD-CCNC: 37 U/L DA — SIGNIFICANT CHANGE UP (ref 10–60)
ALT FLD-CCNC: 58 U/L DA — SIGNIFICANT CHANGE UP (ref 10–60)
ALT FLD-CCNC: 9 U/L — LOW (ref 10–45)
ALT FLD-CCNC: 9 U/L — LOW (ref 12–78)
ALT FLD-CCNC: 9 U/L — LOW (ref 12–78)
ANION GAP SERPL CALC-SCNC: 11 MMOL/L — SIGNIFICANT CHANGE UP (ref 5–17)
ANION GAP SERPL CALC-SCNC: 12 MMOL/L — SIGNIFICANT CHANGE UP (ref 5–17)
ANION GAP SERPL CALC-SCNC: 13 MMOL/L — SIGNIFICANT CHANGE UP (ref 5–17)
ANION GAP SERPL CALC-SCNC: 14 MMOL/L — SIGNIFICANT CHANGE UP (ref 5–17)
ANION GAP SERPL CALC-SCNC: 4 MMOL/L — LOW (ref 5–17)
ANION GAP SERPL CALC-SCNC: 5 MMOL/L — SIGNIFICANT CHANGE UP (ref 5–17)
ANION GAP SERPL CALC-SCNC: 6 MMOL/L — SIGNIFICANT CHANGE UP (ref 5–17)
ANION GAP SERPL CALC-SCNC: 7 MMOL/L — SIGNIFICANT CHANGE UP (ref 5–17)
ANION GAP SERPL CALC-SCNC: 8 MMOL/L — SIGNIFICANT CHANGE UP (ref 5–17)
ANION GAP SERPL CALC-SCNC: 9 MMOL/L — SIGNIFICANT CHANGE UP (ref 5–17)
ANISOCYTOSIS BLD QL: SIGNIFICANT CHANGE UP
ANISOCYTOSIS BLD QL: SIGNIFICANT CHANGE UP
ANISOCYTOSIS BLD QL: SLIGHT — SIGNIFICANT CHANGE UP
APPEARANCE UR: ABNORMAL
APPEARANCE UR: ABNORMAL
APPEARANCE UR: CLEAR — SIGNIFICANT CHANGE UP
APTT BLD: 35 SEC — SIGNIFICANT CHANGE UP (ref 27.5–35.5)
APTT BLD: 39.4 SEC — HIGH (ref 27.5–35.5)
AST SERPL-CCNC: 131 U/L — HIGH (ref 10–40)
AST SERPL-CCNC: 15 U/L — SIGNIFICANT CHANGE UP (ref 15–37)
AST SERPL-CCNC: 15 U/L — SIGNIFICANT CHANGE UP (ref 15–37)
AST SERPL-CCNC: 16 U/L — SIGNIFICANT CHANGE UP (ref 15–37)
AST SERPL-CCNC: 17 U/L — SIGNIFICANT CHANGE UP (ref 10–40)
AST SERPL-CCNC: 17 U/L — SIGNIFICANT CHANGE UP (ref 15–37)
AST SERPL-CCNC: 18 U/L — SIGNIFICANT CHANGE UP (ref 10–40)
AST SERPL-CCNC: 18 U/L — SIGNIFICANT CHANGE UP (ref 10–40)
AST SERPL-CCNC: 19 U/L — SIGNIFICANT CHANGE UP (ref 10–40)
AST SERPL-CCNC: 21 U/L — SIGNIFICANT CHANGE UP (ref 10–40)
AST SERPL-CCNC: 21 U/L — SIGNIFICANT CHANGE UP (ref 10–40)
AST SERPL-CCNC: 26 U/L — SIGNIFICANT CHANGE UP (ref 10–40)
AST SERPL-CCNC: 26 U/L — SIGNIFICANT CHANGE UP (ref 10–40)
AST SERPL-CCNC: 27 U/L — SIGNIFICANT CHANGE UP (ref 10–40)
AST SERPL-CCNC: 28 U/L — SIGNIFICANT CHANGE UP (ref 10–40)
AST SERPL-CCNC: 34 U/L — SIGNIFICANT CHANGE UP (ref 10–40)
AST SERPL-CCNC: 38 U/L — SIGNIFICANT CHANGE UP (ref 10–40)
AST SERPL-CCNC: 43 U/L — HIGH (ref 10–40)
AST SERPL-CCNC: 44 U/L — HIGH (ref 10–40)
AST SERPL-CCNC: 48 U/L — HIGH (ref 10–40)
AST SERPL-CCNC: 70 U/L — HIGH (ref 10–40)
BACTERIA # UR AUTO: NEGATIVE — SIGNIFICANT CHANGE UP
BASE EXCESS BLDV CALC-SCNC: -1 MMOL/L — SIGNIFICANT CHANGE UP (ref -2–2)
BASE EXCESS BLDV CALC-SCNC: -4.7 MMOL/L — LOW (ref -2–2)
BASE EXCESS BLDV CALC-SCNC: -6 MMOL/L — LOW (ref -2–2)
BASOPHILS # BLD AUTO: 0 K/UL — SIGNIFICANT CHANGE UP (ref 0–0.2)
BASOPHILS # BLD AUTO: 0.01 K/UL — SIGNIFICANT CHANGE UP (ref 0–0.2)
BASOPHILS # BLD AUTO: 0.02 K/UL — SIGNIFICANT CHANGE UP (ref 0–0.2)
BASOPHILS # BLD AUTO: 0.03 K/UL — SIGNIFICANT CHANGE UP (ref 0–0.2)
BASOPHILS # BLD AUTO: 0.04 K/UL — SIGNIFICANT CHANGE UP (ref 0–0.2)
BASOPHILS # BLD AUTO: 0.08 K/UL — SIGNIFICANT CHANGE UP (ref 0–0.2)
BASOPHILS # BLD AUTO: 0.1 K/UL — SIGNIFICANT CHANGE UP (ref 0–0.2)
BASOPHILS # BLD AUTO: SIGNIFICANT CHANGE UP K/UL (ref 0–0.2)
BASOPHILS # BLD AUTO: SIGNIFICANT CHANGE UP K/UL (ref 0–0.2)
BASOPHILS NFR BLD AUTO: 0 % — SIGNIFICANT CHANGE UP (ref 0–2)
BASOPHILS NFR BLD AUTO: 0.1 % — SIGNIFICANT CHANGE UP (ref 0–2)
BASOPHILS NFR BLD AUTO: 0.2 % — SIGNIFICANT CHANGE UP (ref 0–2)
BASOPHILS NFR BLD AUTO: SIGNIFICANT CHANGE UP % (ref 0–2)
BASOPHILS NFR BLD AUTO: SIGNIFICANT CHANGE UP % (ref 0–2)
BILIRUB DIRECT SERPL-MCNC: 0.2 MG/DL — SIGNIFICANT CHANGE UP (ref 0–0.3)
BILIRUB DIRECT SERPL-MCNC: 0.7 MG/DL — HIGH (ref 0–0.3)
BILIRUB INDIRECT FLD-MCNC: 0.3 MG/DL — SIGNIFICANT CHANGE UP (ref 0.2–1)
BILIRUB INDIRECT FLD-MCNC: 0.6 MG/DL — SIGNIFICANT CHANGE UP (ref 0.2–1)
BILIRUB SERPL-MCNC: 0.4 MG/DL — SIGNIFICANT CHANGE UP (ref 0.2–1.2)
BILIRUB SERPL-MCNC: 0.5 MG/DL — SIGNIFICANT CHANGE UP (ref 0.2–1.2)
BILIRUB SERPL-MCNC: 0.6 MG/DL — SIGNIFICANT CHANGE UP (ref 0.2–1.2)
BILIRUB SERPL-MCNC: 0.7 MG/DL — SIGNIFICANT CHANGE UP (ref 0.2–1.2)
BILIRUB SERPL-MCNC: 0.7 MG/DL — SIGNIFICANT CHANGE UP (ref 0.2–1.2)
BILIRUB SERPL-MCNC: 0.8 MG/DL — SIGNIFICANT CHANGE UP (ref 0.2–1.2)
BILIRUB SERPL-MCNC: 0.9 MG/DL — SIGNIFICANT CHANGE UP (ref 0.2–1.2)
BILIRUB SERPL-MCNC: 1 MG/DL — SIGNIFICANT CHANGE UP (ref 0.2–1.2)
BILIRUB SERPL-MCNC: 1 MG/DL — SIGNIFICANT CHANGE UP (ref 0.2–1.2)
BILIRUB SERPL-MCNC: 1.3 MG/DL — HIGH (ref 0.2–1.2)
BILIRUB UR-MCNC: NEGATIVE — SIGNIFICANT CHANGE UP
BLD GP AB SCN SERPL QL: SIGNIFICANT CHANGE UP
BLOOD GAS VENOUS - CREATININE: SIGNIFICANT CHANGE UP MG/DL (ref 0.5–1.3)
BUN SERPL-MCNC: 10 MG/DL — SIGNIFICANT CHANGE UP (ref 7–18)
BUN SERPL-MCNC: 11 MG/DL — SIGNIFICANT CHANGE UP (ref 7–18)
BUN SERPL-MCNC: 11 MG/DL — SIGNIFICANT CHANGE UP (ref 7–18)
BUN SERPL-MCNC: 12 MG/DL — SIGNIFICANT CHANGE UP (ref 7–18)
BUN SERPL-MCNC: 13 MG/DL — SIGNIFICANT CHANGE UP (ref 7–18)
BUN SERPL-MCNC: 13 MG/DL — SIGNIFICANT CHANGE UP (ref 7–18)
BUN SERPL-MCNC: 14 MG/DL — SIGNIFICANT CHANGE UP (ref 7–18)
BUN SERPL-MCNC: 14 MG/DL — SIGNIFICANT CHANGE UP (ref 7–18)
BUN SERPL-MCNC: 15 MG/DL — SIGNIFICANT CHANGE UP (ref 7–18)
BUN SERPL-MCNC: 16 MG/DL — SIGNIFICANT CHANGE UP (ref 7–18)
BUN SERPL-MCNC: 17 MG/DL — SIGNIFICANT CHANGE UP (ref 7–18)
BUN SERPL-MCNC: 18 MG/DL — SIGNIFICANT CHANGE UP (ref 7–18)
BUN SERPL-MCNC: 19 MG/DL — HIGH (ref 7–18)
BUN SERPL-MCNC: 19 MG/DL — HIGH (ref 7–18)
BUN SERPL-MCNC: 20 MG/DL — HIGH (ref 7–18)
BUN SERPL-MCNC: 20 MG/DL — HIGH (ref 7–18)
BUN SERPL-MCNC: 22 MG/DL — HIGH (ref 7–18)
BUN SERPL-MCNC: 23 MG/DL — HIGH (ref 7–18)
BUN SERPL-MCNC: 25 MG/DL — HIGH (ref 7–18)
BUN SERPL-MCNC: 25 MG/DL — HIGH (ref 7–18)
BUN SERPL-MCNC: 26 MG/DL — HIGH (ref 7–18)
BUN SERPL-MCNC: 27 MG/DL — HIGH (ref 7–18)
BUN SERPL-MCNC: 27 MG/DL — HIGH (ref 7–18)
BUN SERPL-MCNC: 27 MG/DL — HIGH (ref 7–23)
BUN SERPL-MCNC: 27 MG/DL — HIGH (ref 7–23)
BUN SERPL-MCNC: 28 MG/DL — HIGH (ref 7–23)
BUN SERPL-MCNC: 29 MG/DL — HIGH (ref 7–23)
BUN SERPL-MCNC: 32 MG/DL — HIGH (ref 7–18)
BUN SERPL-MCNC: 37 MG/DL — HIGH (ref 7–23)
BUN SERPL-MCNC: 42 MG/DL — HIGH (ref 7–23)
BUN SERPL-MCNC: 44 MG/DL — HIGH (ref 7–23)
BUN SERPL-MCNC: 46 MG/DL — HIGH (ref 7–23)
BURR CELLS BLD QL SMEAR: PRESENT — SIGNIFICANT CHANGE UP
BURR CELLS BLD QL SMEAR: SLIGHT — SIGNIFICANT CHANGE UP
BURR CELLS BLD QL SMEAR: SLIGHT — SIGNIFICANT CHANGE UP
CA-I BLD-SCNC: 5 MG/DL — SIGNIFICANT CHANGE UP (ref 4.5–5.6)
CA-I BLD-SCNC: 5.2 MG/DL — SIGNIFICANT CHANGE UP (ref 4.5–5.6)
CA-I BLD-SCNC: 5.3 MG/DL — SIGNIFICANT CHANGE UP (ref 4.5–5.6)
CA-I SERPL-SCNC: 1.2 MMOL/L — SIGNIFICANT CHANGE UP (ref 1.15–1.33)
CA-I SERPL-SCNC: 1.21 MMOL/L — SIGNIFICANT CHANGE UP (ref 1.15–1.33)
CA-I SERPL-SCNC: 1.22 MMOL/L — SIGNIFICANT CHANGE UP (ref 1.15–1.33)
CALCIUM SERPL-MCNC: 7.8 MG/DL — LOW (ref 8.4–10.5)
CALCIUM SERPL-MCNC: 7.9 MG/DL — LOW (ref 8.4–10.5)
CALCIUM SERPL-MCNC: 8 MG/DL — LOW (ref 8.4–10.5)
CALCIUM SERPL-MCNC: 8.1 MG/DL — LOW (ref 8.4–10.5)
CALCIUM SERPL-MCNC: 8.2 MG/DL — LOW (ref 8.4–10.5)
CALCIUM SERPL-MCNC: 8.3 MG/DL — LOW (ref 8.4–10.5)
CALCIUM SERPL-MCNC: 8.3 MG/DL — LOW (ref 8.5–10.1)
CALCIUM SERPL-MCNC: 8.3 MG/DL — LOW (ref 8.5–10.1)
CALCIUM SERPL-MCNC: 8.4 MG/DL — LOW (ref 8.5–10.1)
CALCIUM SERPL-MCNC: 8.4 MG/DL — SIGNIFICANT CHANGE UP (ref 8.4–10.5)
CALCIUM SERPL-MCNC: 8.5 MG/DL — SIGNIFICANT CHANGE UP (ref 8.4–10.5)
CALCIUM SERPL-MCNC: 8.5 MG/DL — SIGNIFICANT CHANGE UP (ref 8.5–10.1)
CALCIUM SERPL-MCNC: 8.6 MG/DL — SIGNIFICANT CHANGE UP (ref 8.4–10.5)
CALCIUM SERPL-MCNC: 8.7 MG/DL — SIGNIFICANT CHANGE UP (ref 8.5–10.1)
CALCIUM SERPL-MCNC: 8.7 MG/DL — SIGNIFICANT CHANGE UP (ref 8.5–10.1)
CALCIUM SERPL-MCNC: 8.8 MG/DL — SIGNIFICANT CHANGE UP (ref 8.4–10.5)
CALCIUM SERPL-MCNC: 9.3 MG/DL — SIGNIFICANT CHANGE UP (ref 8.4–10.5)
CHLORIDE BLDV-SCNC: 101 MMOL/L — SIGNIFICANT CHANGE UP (ref 96–108)
CHLORIDE BLDV-SCNC: 102 MMOL/L — SIGNIFICANT CHANGE UP (ref 96–108)
CHLORIDE BLDV-SCNC: 102 MMOL/L — SIGNIFICANT CHANGE UP (ref 96–108)
CHLORIDE SERPL-SCNC: 100 MMOL/L — SIGNIFICANT CHANGE UP (ref 96–108)
CHLORIDE SERPL-SCNC: 101 MMOL/L — SIGNIFICANT CHANGE UP (ref 96–108)
CHLORIDE SERPL-SCNC: 101 MMOL/L — SIGNIFICANT CHANGE UP (ref 96–108)
CHLORIDE SERPL-SCNC: 102 MMOL/L — SIGNIFICANT CHANGE UP (ref 96–108)
CHLORIDE SERPL-SCNC: 103 MMOL/L — SIGNIFICANT CHANGE UP (ref 96–108)
CHLORIDE SERPL-SCNC: 104 MMOL/L — SIGNIFICANT CHANGE UP (ref 96–108)
CHLORIDE SERPL-SCNC: 105 MMOL/L — SIGNIFICANT CHANGE UP (ref 96–108)
CHLORIDE SERPL-SCNC: 106 MMOL/L — SIGNIFICANT CHANGE UP (ref 96–108)
CHLORIDE SERPL-SCNC: 107 MMOL/L — SIGNIFICANT CHANGE UP (ref 96–108)
CHLORIDE SERPL-SCNC: 107 MMOL/L — SIGNIFICANT CHANGE UP (ref 96–108)
CHLORIDE SERPL-SCNC: 108 MMOL/L — SIGNIFICANT CHANGE UP (ref 96–108)
CHLORIDE SERPL-SCNC: 109 MMOL/L — HIGH (ref 96–108)
CHLORIDE SERPL-SCNC: 111 MMOL/L — HIGH (ref 96–108)
CK MB BLD-MCNC: 3.3 % — SIGNIFICANT CHANGE UP (ref 0–3.5)
CK MB CFR SERPL CALC: 1.1 NG/ML — SIGNIFICANT CHANGE UP (ref 0–3.6)
CK SERPL-CCNC: 33 U/L — SIGNIFICANT CHANGE UP (ref 21–215)
CO2 BLDV-SCNC: 22 MMOL/L — SIGNIFICANT CHANGE UP (ref 22–26)
CO2 BLDV-SCNC: 22 MMOL/L — SIGNIFICANT CHANGE UP (ref 22–26)
CO2 BLDV-SCNC: 25 MMOL/L — SIGNIFICANT CHANGE UP (ref 22–26)
CO2 SERPL-SCNC: 18 MMOL/L — LOW (ref 22–31)
CO2 SERPL-SCNC: 18 MMOL/L — LOW (ref 22–31)
CO2 SERPL-SCNC: 20 MMOL/L — LOW (ref 22–31)
CO2 SERPL-SCNC: 21 MMOL/L — LOW (ref 22–31)
CO2 SERPL-SCNC: 22 MMOL/L — SIGNIFICANT CHANGE UP (ref 22–31)
CO2 SERPL-SCNC: 23 MMOL/L — SIGNIFICANT CHANGE UP (ref 22–31)
CO2 SERPL-SCNC: 24 MMOL/L — SIGNIFICANT CHANGE UP (ref 22–31)
CO2 SERPL-SCNC: 25 MMOL/L — SIGNIFICANT CHANGE UP (ref 22–31)
CO2 SERPL-SCNC: 26 MMOL/L — SIGNIFICANT CHANGE UP (ref 22–31)
CO2 SERPL-SCNC: 27 MMOL/L — SIGNIFICANT CHANGE UP (ref 22–31)
CO2 SERPL-SCNC: 28 MMOL/L — SIGNIFICANT CHANGE UP (ref 22–31)
CO2 SERPL-SCNC: 29 MMOL/L — SIGNIFICANT CHANGE UP (ref 22–31)
COLOR SPEC: SIGNIFICANT CHANGE UP
COLOR SPEC: SIGNIFICANT CHANGE UP
COLOR SPEC: YELLOW — SIGNIFICANT CHANGE UP
CORTICOSTEROID BINDING GLOBULIN RESULT: 1.7 MG/DL — SIGNIFICANT CHANGE UP
CORTIS F/TOTAL MFR SERPL: 64 % — SIGNIFICANT CHANGE UP
CORTIS SERPL-MCNC: 32 UG/DL — HIGH
CORTISOL, FREE RESULT: 21 UG/DL — HIGH
CREAT ?TM UR-MCNC: 94 MG/DL — SIGNIFICANT CHANGE UP
CREAT SERPL-MCNC: 0.5 MG/DL — SIGNIFICANT CHANGE UP (ref 0.5–1.3)
CREAT SERPL-MCNC: 0.5 MG/DL — SIGNIFICANT CHANGE UP (ref 0.5–1.3)
CREAT SERPL-MCNC: 0.54 MG/DL — SIGNIFICANT CHANGE UP (ref 0.5–1.3)
CREAT SERPL-MCNC: 0.55 MG/DL — SIGNIFICANT CHANGE UP (ref 0.5–1.3)
CREAT SERPL-MCNC: 0.56 MG/DL — SIGNIFICANT CHANGE UP (ref 0.5–1.3)
CREAT SERPL-MCNC: 0.57 MG/DL — SIGNIFICANT CHANGE UP (ref 0.5–1.3)
CREAT SERPL-MCNC: 0.57 MG/DL — SIGNIFICANT CHANGE UP (ref 0.5–1.3)
CREAT SERPL-MCNC: 0.58 MG/DL — SIGNIFICANT CHANGE UP (ref 0.5–1.3)
CREAT SERPL-MCNC: 0.61 MG/DL — SIGNIFICANT CHANGE UP (ref 0.5–1.3)
CREAT SERPL-MCNC: 0.64 MG/DL — SIGNIFICANT CHANGE UP (ref 0.5–1.3)
CREAT SERPL-MCNC: 0.66 MG/DL — SIGNIFICANT CHANGE UP (ref 0.5–1.3)
CREAT SERPL-MCNC: 0.67 MG/DL — SIGNIFICANT CHANGE UP (ref 0.5–1.3)
CREAT SERPL-MCNC: 0.67 MG/DL — SIGNIFICANT CHANGE UP (ref 0.5–1.3)
CREAT SERPL-MCNC: 0.69 MG/DL — SIGNIFICANT CHANGE UP (ref 0.5–1.3)
CREAT SERPL-MCNC: 0.7 MG/DL — SIGNIFICANT CHANGE UP (ref 0.5–1.3)
CREAT SERPL-MCNC: 0.71 MG/DL — SIGNIFICANT CHANGE UP (ref 0.5–1.3)
CREAT SERPL-MCNC: 0.74 MG/DL — SIGNIFICANT CHANGE UP (ref 0.5–1.3)
CREAT SERPL-MCNC: 0.8 MG/DL — SIGNIFICANT CHANGE UP (ref 0.5–1.3)
CREAT SERPL-MCNC: 0.81 MG/DL — SIGNIFICANT CHANGE UP (ref 0.5–1.3)
CREAT SERPL-MCNC: 0.85 MG/DL — SIGNIFICANT CHANGE UP (ref 0.5–1.3)
CREAT SERPL-MCNC: 0.85 MG/DL — SIGNIFICANT CHANGE UP (ref 0.5–1.3)
CREAT SERPL-MCNC: 0.88 MG/DL — SIGNIFICANT CHANGE UP (ref 0.5–1.3)
CREAT SERPL-MCNC: 0.89 MG/DL — SIGNIFICANT CHANGE UP (ref 0.5–1.3)
CREAT SERPL-MCNC: 0.89 MG/DL — SIGNIFICANT CHANGE UP (ref 0.5–1.3)
CREAT SERPL-MCNC: 0.95 MG/DL — SIGNIFICANT CHANGE UP (ref 0.5–1.3)
CREAT SERPL-MCNC: 0.96 MG/DL — SIGNIFICANT CHANGE UP (ref 0.5–1.3)
CREAT SERPL-MCNC: 0.98 MG/DL — SIGNIFICANT CHANGE UP (ref 0.5–1.3)
CREAT SERPL-MCNC: 1.01 MG/DL — SIGNIFICANT CHANGE UP (ref 0.5–1.3)
CREAT SERPL-MCNC: 1.02 MG/DL — SIGNIFICANT CHANGE UP (ref 0.5–1.3)
CREAT SERPL-MCNC: 1.03 MG/DL — SIGNIFICANT CHANGE UP (ref 0.5–1.3)
CREAT SERPL-MCNC: 1.05 MG/DL — SIGNIFICANT CHANGE UP (ref 0.5–1.3)
CREAT SERPL-MCNC: 1.1 MG/DL — SIGNIFICANT CHANGE UP (ref 0.5–1.3)
CREAT SERPL-MCNC: 1.21 MG/DL — SIGNIFICANT CHANGE UP (ref 0.5–1.3)
CREAT SERPL-MCNC: 1.22 MG/DL — SIGNIFICANT CHANGE UP (ref 0.5–1.3)
CREAT SERPL-MCNC: 1.43 MG/DL — HIGH (ref 0.5–1.3)
CREAT SERPL-MCNC: 1.61 MG/DL — HIGH (ref 0.5–1.3)
CREAT SERPL-MCNC: 1.76 MG/DL — HIGH (ref 0.5–1.3)
CULTURE RESULTS: NO GROWTH — SIGNIFICANT CHANGE UP
CULTURE RESULTS: SIGNIFICANT CHANGE UP
D DIMER BLD IA.RAPID-MCNC: 8831 NG/ML DDU — HIGH
DACRYOCYTES BLD QL SMEAR: SLIGHT — SIGNIFICANT CHANGE UP
DACRYOCYTES BLD QL SMEAR: SLIGHT — SIGNIFICANT CHANGE UP
DIFF PNL FLD: ABNORMAL
DIGOXIN SERPL-MCNC: 0.7 NG/ML — LOW (ref 0.8–2)
DIGOXIN SERPL-MCNC: 1 NG/ML — SIGNIFICANT CHANGE UP (ref 0.8–2)
DIGOXIN SERPL-MCNC: 1.3 NG/ML — SIGNIFICANT CHANGE UP (ref 0.8–2)
DIGOXIN SERPL-MCNC: 2.1 NG/ML — HIGH (ref 0.8–2)
DIR ANTIGLOB POLYSPECIFIC INTERPRETATION: SIGNIFICANT CHANGE UP
DIR ANTIGLOB POLYSPECIFIC INTERPRETATION: SIGNIFICANT CHANGE UP
EGFR: 27 ML/MIN/1.73M2 — LOW
EGFR: 30 ML/MIN/1.73M2 — LOW
EGFR: 35 ML/MIN/1.73M2 — LOW
EGFR: 42 ML/MIN/1.73M2 — LOW
EGFR: 43 ML/MIN/1.73M2 — LOW
EGFR: 48 ML/MIN/1.73M2 — LOW
EGFR: 51 ML/MIN/1.73M2 — LOW
EGFR: 52 ML/MIN/1.73M2 — LOW
EGFR: 53 ML/MIN/1.73M2 — LOW
EGFR: 54 ML/MIN/1.73M2 — LOW
EGFR: 55 ML/MIN/1.73M2 — LOW
EGFR: 57 ML/MIN/1.73M2 — LOW
EGFR: 58 ML/MIN/1.73M2 — LOW
EGFR: 62 ML/MIN/1.73M2 — SIGNIFICANT CHANGE UP
EGFR: 62 ML/MIN/1.73M2 — SIGNIFICANT CHANGE UP
EGFR: 63 ML/MIN/1.73M2 — SIGNIFICANT CHANGE UP
EGFR: 65 ML/MIN/1.73M2 — SIGNIFICANT CHANGE UP
EGFR: 65 ML/MIN/1.73M2 — SIGNIFICANT CHANGE UP
EGFR: 69 ML/MIN/1.73M2 — SIGNIFICANT CHANGE UP
EGFR: 70 ML/MIN/1.73M2 — SIGNIFICANT CHANGE UP
EGFR: 78 ML/MIN/1.73M2 — SIGNIFICANT CHANGE UP
EGFR: 82 ML/MIN/1.73M2 — SIGNIFICANT CHANGE UP
EGFR: 83 ML/MIN/1.73M2 — SIGNIFICANT CHANGE UP
EGFR: 83 ML/MIN/1.73M2 — SIGNIFICANT CHANGE UP
EGFR: 84 ML/MIN/1.73M2 — SIGNIFICANT CHANGE UP
EGFR: 86 ML/MIN/1.73M2 — SIGNIFICANT CHANGE UP
EGFR: 86 ML/MIN/1.73M2 — SIGNIFICANT CHANGE UP
EGFR: 87 ML/MIN/1.73M2 — SIGNIFICANT CHANGE UP
EGFR: 87 ML/MIN/1.73M2 — SIGNIFICANT CHANGE UP
EGFR: 88 ML/MIN/1.73M2 — SIGNIFICANT CHANGE UP
EGFR: 90 ML/MIN/1.73M2 — SIGNIFICANT CHANGE UP
EGFR: 90 ML/MIN/1.73M2 — SIGNIFICANT CHANGE UP
ELLIPTOCYTES BLD QL SMEAR: SLIGHT — SIGNIFICANT CHANGE UP
EOSINOPHIL # BLD AUTO: 0 K/UL — SIGNIFICANT CHANGE UP (ref 0–0.5)
EOSINOPHIL # BLD AUTO: 0.01 K/UL — SIGNIFICANT CHANGE UP (ref 0–0.5)
EOSINOPHIL # BLD AUTO: 0.01 K/UL — SIGNIFICANT CHANGE UP (ref 0–0.5)
EOSINOPHIL # BLD AUTO: 0.02 K/UL — SIGNIFICANT CHANGE UP (ref 0–0.5)
EOSINOPHIL # BLD AUTO: SIGNIFICANT CHANGE UP K/UL (ref 0–0.5)
EOSINOPHIL # BLD AUTO: SIGNIFICANT CHANGE UP K/UL (ref 0–0.5)
EOSINOPHIL NFR BLD AUTO: 0 % — SIGNIFICANT CHANGE UP (ref 0–6)
EOSINOPHIL NFR BLD AUTO: 0.1 % — SIGNIFICANT CHANGE UP (ref 0–6)
EOSINOPHIL NFR BLD AUTO: SIGNIFICANT CHANGE UP % (ref 0–6)
EOSINOPHIL NFR BLD AUTO: SIGNIFICANT CHANGE UP % (ref 0–6)
EPI CELLS # UR: 18 /HPF — HIGH
ESTIMATED AVERAGE GLUCOSE: 111 MG/DL — SIGNIFICANT CHANGE UP (ref 68–114)
FERRITIN SERPL-MCNC: 493 NG/ML — HIGH (ref 15–150)
FERRITIN SERPL-MCNC: 598 NG/ML — HIGH (ref 15–150)
FOLATE SERPL-MCNC: 10.7 NG/ML — SIGNIFICANT CHANGE UP
FOLATE SERPL-MCNC: 13.1 NG/ML — SIGNIFICANT CHANGE UP
FOLATE SERPL-MCNC: 14.3 NG/ML — SIGNIFICANT CHANGE UP
GAS PNL BLDV: 127 MMOL/L — LOW (ref 136–145)
GAS PNL BLDV: 130 MMOL/L — LOW (ref 136–145)
GAS PNL BLDV: 132 MMOL/L — LOW (ref 136–145)
GAS PNL BLDV: SIGNIFICANT CHANGE UP
GIANT PLATELETS BLD QL SMEAR: PRESENT — SIGNIFICANT CHANGE UP
GIANT PLATELETS BLD QL SMEAR: PRESENT — SIGNIFICANT CHANGE UP
GLUCOSE BLDC GLUCOMTR-MCNC: 100 MG/DL — HIGH (ref 70–99)
GLUCOSE BLDC GLUCOMTR-MCNC: 103 MG/DL — HIGH (ref 70–99)
GLUCOSE BLDC GLUCOMTR-MCNC: 105 MG/DL — HIGH (ref 70–99)
GLUCOSE BLDC GLUCOMTR-MCNC: 111 MG/DL — HIGH (ref 70–99)
GLUCOSE BLDC GLUCOMTR-MCNC: 113 MG/DL — HIGH (ref 70–99)
GLUCOSE BLDC GLUCOMTR-MCNC: 122 MG/DL — HIGH (ref 70–99)
GLUCOSE BLDC GLUCOMTR-MCNC: 127 MG/DL — HIGH (ref 70–99)
GLUCOSE BLDC GLUCOMTR-MCNC: 127 MG/DL — HIGH (ref 70–99)
GLUCOSE BLDC GLUCOMTR-MCNC: 137 MG/DL — HIGH (ref 70–99)
GLUCOSE BLDC GLUCOMTR-MCNC: 138 MG/DL — HIGH (ref 70–99)
GLUCOSE BLDC GLUCOMTR-MCNC: 139 MG/DL — HIGH (ref 70–99)
GLUCOSE BLDC GLUCOMTR-MCNC: 141 MG/DL — HIGH (ref 70–99)
GLUCOSE BLDC GLUCOMTR-MCNC: 141 MG/DL — HIGH (ref 70–99)
GLUCOSE BLDC GLUCOMTR-MCNC: 142 MG/DL — HIGH (ref 70–99)
GLUCOSE BLDC GLUCOMTR-MCNC: 144 MG/DL — HIGH (ref 70–99)
GLUCOSE BLDC GLUCOMTR-MCNC: 144 MG/DL — HIGH (ref 70–99)
GLUCOSE BLDC GLUCOMTR-MCNC: 145 MG/DL — HIGH (ref 70–99)
GLUCOSE BLDC GLUCOMTR-MCNC: 145 MG/DL — HIGH (ref 70–99)
GLUCOSE BLDC GLUCOMTR-MCNC: 151 MG/DL — HIGH (ref 70–99)
GLUCOSE BLDC GLUCOMTR-MCNC: 154 MG/DL — HIGH (ref 70–99)
GLUCOSE BLDC GLUCOMTR-MCNC: 159 MG/DL — HIGH (ref 70–99)
GLUCOSE BLDC GLUCOMTR-MCNC: 72 MG/DL — SIGNIFICANT CHANGE UP (ref 70–99)
GLUCOSE BLDC GLUCOMTR-MCNC: 85 MG/DL — SIGNIFICANT CHANGE UP (ref 70–99)
GLUCOSE BLDC GLUCOMTR-MCNC: 89 MG/DL — SIGNIFICANT CHANGE UP (ref 70–99)
GLUCOSE BLDC GLUCOMTR-MCNC: 98 MG/DL — SIGNIFICANT CHANGE UP (ref 70–99)
GLUCOSE BLDV-MCNC: 110 MG/DL — HIGH (ref 70–99)
GLUCOSE BLDV-MCNC: 110 MG/DL — HIGH (ref 70–99)
GLUCOSE BLDV-MCNC: 138 MG/DL — HIGH (ref 70–99)
GLUCOSE SERPL-MCNC: 100 MG/DL — HIGH (ref 70–99)
GLUCOSE SERPL-MCNC: 102 MG/DL — HIGH (ref 70–99)
GLUCOSE SERPL-MCNC: 102 MG/DL — HIGH (ref 70–99)
GLUCOSE SERPL-MCNC: 104 MG/DL — HIGH (ref 70–99)
GLUCOSE SERPL-MCNC: 104 MG/DL — HIGH (ref 70–99)
GLUCOSE SERPL-MCNC: 106 MG/DL — HIGH (ref 70–99)
GLUCOSE SERPL-MCNC: 109 MG/DL — HIGH (ref 70–99)
GLUCOSE SERPL-MCNC: 110 MG/DL — HIGH (ref 70–99)
GLUCOSE SERPL-MCNC: 110 MG/DL — HIGH (ref 70–99)
GLUCOSE SERPL-MCNC: 112 MG/DL — HIGH (ref 70–99)
GLUCOSE SERPL-MCNC: 115 MG/DL — HIGH (ref 70–99)
GLUCOSE SERPL-MCNC: 119 MG/DL — HIGH (ref 70–99)
GLUCOSE SERPL-MCNC: 123 MG/DL — HIGH (ref 70–99)
GLUCOSE SERPL-MCNC: 123 MG/DL — HIGH (ref 70–99)
GLUCOSE SERPL-MCNC: 127 MG/DL — HIGH (ref 70–99)
GLUCOSE SERPL-MCNC: 131 MG/DL — HIGH (ref 70–99)
GLUCOSE SERPL-MCNC: 133 MG/DL — HIGH (ref 70–99)
GLUCOSE SERPL-MCNC: 143 MG/DL — HIGH (ref 70–99)
GLUCOSE SERPL-MCNC: 146 MG/DL — HIGH (ref 70–99)
GLUCOSE SERPL-MCNC: 150 MG/DL — HIGH (ref 70–99)
GLUCOSE SERPL-MCNC: 223 MG/DL — HIGH (ref 70–99)
GLUCOSE SERPL-MCNC: 74 MG/DL — SIGNIFICANT CHANGE UP (ref 70–99)
GLUCOSE SERPL-MCNC: 77 MG/DL — SIGNIFICANT CHANGE UP (ref 70–99)
GLUCOSE SERPL-MCNC: 78 MG/DL — SIGNIFICANT CHANGE UP (ref 70–99)
GLUCOSE SERPL-MCNC: 82 MG/DL — SIGNIFICANT CHANGE UP (ref 70–99)
GLUCOSE SERPL-MCNC: 87 MG/DL — SIGNIFICANT CHANGE UP (ref 70–99)
GLUCOSE SERPL-MCNC: 88 MG/DL — SIGNIFICANT CHANGE UP (ref 70–99)
GLUCOSE SERPL-MCNC: 90 MG/DL — SIGNIFICANT CHANGE UP (ref 70–99)
GLUCOSE SERPL-MCNC: 90 MG/DL — SIGNIFICANT CHANGE UP (ref 70–99)
GLUCOSE SERPL-MCNC: 94 MG/DL — SIGNIFICANT CHANGE UP (ref 70–99)
GLUCOSE SERPL-MCNC: 94 MG/DL — SIGNIFICANT CHANGE UP (ref 70–99)
GLUCOSE SERPL-MCNC: 96 MG/DL — SIGNIFICANT CHANGE UP (ref 70–99)
GLUCOSE SERPL-MCNC: 97 MG/DL — SIGNIFICANT CHANGE UP (ref 70–99)
GLUCOSE SERPL-MCNC: 98 MG/DL — SIGNIFICANT CHANGE UP (ref 70–99)
GLUCOSE UR QL: NEGATIVE — SIGNIFICANT CHANGE UP
HAPTOGLOB SERPL-MCNC: 230 MG/DL — HIGH (ref 34–200)
HAPTOGLOB SERPL-MCNC: 360 MG/DL — HIGH (ref 34–200)
HCO3 BLDV-SCNC: 20 MMOL/L — LOW (ref 22–29)
HCO3 BLDV-SCNC: 21 MMOL/L — LOW (ref 22–29)
HCO3 BLDV-SCNC: 24 MMOL/L — SIGNIFICANT CHANGE UP (ref 22–29)
HCT VFR BLD CALC: 20.8 % — CRITICAL LOW (ref 34.5–45)
HCT VFR BLD CALC: 21.7 % — LOW (ref 34.5–45)
HCT VFR BLD CALC: 22.5 % — LOW (ref 34.5–45)
HCT VFR BLD CALC: 22.5 % — LOW (ref 34.5–45)
HCT VFR BLD CALC: 22.8 % — LOW (ref 34.5–45)
HCT VFR BLD CALC: 23 % — LOW (ref 34.5–45)
HCT VFR BLD CALC: 23 % — LOW (ref 34.5–45)
HCT VFR BLD CALC: 23.3 % — LOW (ref 34.5–45)
HCT VFR BLD CALC: 23.4 % — LOW (ref 34.5–45)
HCT VFR BLD CALC: 23.9 % — LOW (ref 34.5–45)
HCT VFR BLD CALC: 23.9 % — LOW (ref 34.5–45)
HCT VFR BLD CALC: 24.7 % — LOW (ref 34.5–45)
HCT VFR BLD CALC: 25.1 % — LOW (ref 34.5–45)
HCT VFR BLD CALC: 25.3 % — LOW (ref 34.5–45)
HCT VFR BLD CALC: 25.6 % — LOW (ref 34.5–45)
HCT VFR BLD CALC: 26 % — LOW (ref 34.5–45)
HCT VFR BLD CALC: 26.2 % — LOW (ref 34.5–45)
HCT VFR BLD CALC: 26.6 % — LOW (ref 34.5–45)
HCT VFR BLD CALC: 26.6 % — LOW (ref 34.5–45)
HCT VFR BLD CALC: 26.7 % — LOW (ref 34.5–45)
HCT VFR BLD CALC: 26.8 % — LOW (ref 34.5–45)
HCT VFR BLD CALC: 26.8 % — LOW (ref 34.5–45)
HCT VFR BLD CALC: 26.9 % — LOW (ref 34.5–45)
HCT VFR BLD CALC: 27.1 % — LOW (ref 34.5–45)
HCT VFR BLD CALC: 27.1 % — LOW (ref 34.5–45)
HCT VFR BLD CALC: 27.3 % — LOW (ref 34.5–45)
HCT VFR BLD CALC: 27.3 % — LOW (ref 34.5–45)
HCT VFR BLD CALC: 27.4 % — LOW (ref 34.5–45)
HCT VFR BLD CALC: 27.9 % — LOW (ref 34.5–45)
HCT VFR BLD CALC: 28.2 % — LOW (ref 34.5–45)
HCT VFR BLD CALC: 28.2 % — LOW (ref 34.5–45)
HCT VFR BLD CALC: 28.3 % — LOW (ref 34.5–45)
HCT VFR BLD CALC: 29.5 % — LOW (ref 34.5–45)
HCT VFR BLD CALC: 29.6 % — LOW (ref 34.5–45)
HCT VFR BLD CALC: 29.6 % — LOW (ref 34.5–45)
HCT VFR BLD CALC: 29.9 % — LOW (ref 34.5–45)
HCT VFR BLD CALC: 30.4 % — LOW (ref 34.5–45)
HCT VFR BLD CALC: 31.3 % — LOW (ref 34.5–45)
HCT VFR BLD CALC: 31.3 % — LOW (ref 34.5–45)
HCT VFR BLD CALC: 31.6 % — LOW (ref 34.5–45)
HCT VFR BLD CALC: 32.6 % — LOW (ref 34.5–45)
HCT VFR BLD CALC: 32.7 % — LOW (ref 34.5–45)
HCT VFR BLD CALC: 34.6 % — SIGNIFICANT CHANGE UP (ref 34.5–45)
HCT VFR BLD CALC: 38.3 % — SIGNIFICANT CHANGE UP (ref 34.5–45)
HCT VFR BLDA CALC: 26 % — LOW (ref 34.5–46.5)
HCT VFR BLDA CALC: 29 % — LOW (ref 34.5–46.5)
HCT VFR BLDA CALC: 30 % — LOW (ref 34.5–46.5)
HGB BLD CALC-MCNC: 8.8 G/DL — LOW (ref 11.7–16.1)
HGB BLD CALC-MCNC: 9.5 G/DL — LOW (ref 11.7–16.1)
HGB BLD CALC-MCNC: 9.9 G/DL — LOW (ref 11.7–16.1)
HGB BLD-MCNC: 10 G/DL — LOW (ref 11.5–15.5)
HGB BLD-MCNC: 10.1 G/DL — LOW (ref 11.5–15.5)
HGB BLD-MCNC: 10.2 G/DL — LOW (ref 11.5–15.5)
HGB BLD-MCNC: 11 G/DL — LOW (ref 11.5–15.5)
HGB BLD-MCNC: 11.4 G/DL — LOW (ref 11.5–15.5)
HGB BLD-MCNC: 6.5 G/DL — CRITICAL LOW (ref 11.5–15.5)
HGB BLD-MCNC: 6.9 G/DL — CRITICAL LOW (ref 11.5–15.5)
HGB BLD-MCNC: 7 G/DL — CRITICAL LOW (ref 11.5–15.5)
HGB BLD-MCNC: 7.1 G/DL — LOW (ref 11.5–15.5)
HGB BLD-MCNC: 7.1 G/DL — LOW (ref 11.5–15.5)
HGB BLD-MCNC: 7.2 G/DL — LOW (ref 11.5–15.5)
HGB BLD-MCNC: 7.3 G/DL — LOW (ref 11.5–15.5)
HGB BLD-MCNC: 7.5 G/DL — LOW (ref 11.5–15.5)
HGB BLD-MCNC: 7.6 G/DL — LOW (ref 11.5–15.5)
HGB BLD-MCNC: 7.6 G/DL — LOW (ref 11.5–15.5)
HGB BLD-MCNC: 7.7 G/DL — LOW (ref 11.5–15.5)
HGB BLD-MCNC: 7.8 G/DL — LOW (ref 11.5–15.5)
HGB BLD-MCNC: 7.8 G/DL — LOW (ref 11.5–15.5)
HGB BLD-MCNC: 8 G/DL — LOW (ref 11.5–15.5)
HGB BLD-MCNC: 8.2 G/DL — LOW (ref 11.5–15.5)
HGB BLD-MCNC: 8.3 G/DL — LOW (ref 11.5–15.5)
HGB BLD-MCNC: 8.4 G/DL — LOW (ref 11.5–15.5)
HGB BLD-MCNC: 8.4 G/DL — LOW (ref 11.5–15.5)
HGB BLD-MCNC: 8.5 G/DL — LOW (ref 11.5–15.5)
HGB BLD-MCNC: 8.6 G/DL — LOW (ref 11.5–15.5)
HGB BLD-MCNC: 8.8 G/DL — LOW (ref 11.5–15.5)
HGB BLD-MCNC: 8.9 G/DL — LOW (ref 11.5–15.5)
HGB BLD-MCNC: 9 G/DL — LOW (ref 11.5–15.5)
HGB BLD-MCNC: 9.1 G/DL — LOW (ref 11.5–15.5)
HGB BLD-MCNC: 9.2 G/DL — LOW (ref 11.5–15.5)
HGB BLD-MCNC: 9.2 G/DL — LOW (ref 11.5–15.5)
HGB BLD-MCNC: 9.4 G/DL — LOW (ref 11.5–15.5)
HGB BLD-MCNC: 9.5 G/DL — LOW (ref 11.5–15.5)
HGB BLD-MCNC: 9.6 G/DL — LOW (ref 11.5–15.5)
HGB BLD-MCNC: 9.7 G/DL — LOW (ref 11.5–15.5)
HGB BLD-MCNC: 9.8 G/DL — LOW (ref 11.5–15.5)
HUMAN ERYTHROCYTE ANTIGEN PANEL RESULT: SIGNIFICANT CHANGE UP
HYALINE CASTS # UR AUTO: 2 /LPF — SIGNIFICANT CHANGE UP (ref 0–7)
HYPOCHROMIA BLD QL: SLIGHT — SIGNIFICANT CHANGE UP
IMM GRANULOCYTES NFR BLD AUTO: 1.7 % — HIGH (ref 0–1.5)
IMM GRANULOCYTES NFR BLD AUTO: 12.8 % — HIGH (ref 0–1.5)
IMM GRANULOCYTES NFR BLD AUTO: 17.6 % — HIGH (ref 0–1.5)
IMM GRANULOCYTES NFR BLD AUTO: 5.5 % — HIGH (ref 0–1.5)
IMM GRANULOCYTES NFR BLD AUTO: 5.5 % — HIGH (ref 0–1.5)
IMM GRANULOCYTES NFR BLD AUTO: 8.8 % — HIGH (ref 0–1.5)
IMM GRANULOCYTES NFR BLD AUTO: SIGNIFICANT CHANGE UP % (ref 0–1.5)
IMM GRANULOCYTES NFR BLD AUTO: SIGNIFICANT CHANGE UP % (ref 0–1.5)
INR BLD: 1.26 RATIO — HIGH (ref 0.88–1.16)
INR BLD: 1.97 RATIO — HIGH (ref 0.88–1.16)
INR BLD: 2.15 RATIO — HIGH (ref 0.88–1.16)
IRON SATN MFR SERPL: 16 % — SIGNIFICANT CHANGE UP (ref 15–50)
IRON SATN MFR SERPL: 20 UG/DL — LOW (ref 40–150)
IRON SATN MFR SERPL: 48 % — SIGNIFICANT CHANGE UP (ref 14–50)
IRON SATN MFR SERPL: 66 UG/DL — SIGNIFICANT CHANGE UP (ref 30–160)
KETONES UR-MCNC: NEGATIVE — SIGNIFICANT CHANGE UP
LACTATE BLDV-MCNC: 1 MMOL/L — SIGNIFICANT CHANGE UP (ref 0.7–2)
LACTATE BLDV-MCNC: 2.9 MMOL/L — HIGH (ref 0.7–2)
LACTATE BLDV-MCNC: 3.8 MMOL/L — HIGH (ref 0.7–2)
LACTATE SERPL-SCNC: 1.3 MMOL/L — SIGNIFICANT CHANGE UP (ref 0.7–2)
LACTATE SERPL-SCNC: 1.4 MMOL/L — SIGNIFICANT CHANGE UP (ref 0.7–2)
LDH SERPL L TO P-CCNC: 222 U/L — SIGNIFICANT CHANGE UP (ref 50–242)
LDH SERPL L TO P-CCNC: 230 U/L — HIGH (ref 120–225)
LEUKOCYTE ESTERASE UR-ACNC: ABNORMAL
LEUKOCYTE ESTERASE UR-ACNC: ABNORMAL
LEUKOCYTE ESTERASE UR-ACNC: NEGATIVE — SIGNIFICANT CHANGE UP
LG PLATELETS BLD QL AUTO: SLIGHT — SIGNIFICANT CHANGE UP
LIDOCAIN IGE QN: 135 U/L — SIGNIFICANT CHANGE UP (ref 73–393)
LYMPHOCYTES # BLD AUTO: 0 % — LOW (ref 13–44)
LYMPHOCYTES # BLD AUTO: 0 K/UL — LOW (ref 1–3.3)
LYMPHOCYTES # BLD AUTO: 0.45 K/UL — LOW (ref 1–3.3)
LYMPHOCYTES # BLD AUTO: 0.52 K/UL — LOW (ref 1–3.3)
LYMPHOCYTES # BLD AUTO: 0.55 K/UL — LOW (ref 1–3.3)
LYMPHOCYTES # BLD AUTO: 0.63 K/UL — LOW (ref 1–3.3)
LYMPHOCYTES # BLD AUTO: 0.65 K/UL — LOW (ref 1–3.3)
LYMPHOCYTES # BLD AUTO: 0.71 K/UL — LOW (ref 1–3.3)
LYMPHOCYTES # BLD AUTO: 0.76 K/UL — LOW (ref 1–3.3)
LYMPHOCYTES # BLD AUTO: 0.77 K/UL — LOW (ref 1–3.3)
LYMPHOCYTES # BLD AUTO: 0.82 K/UL — LOW (ref 1–3.3)
LYMPHOCYTES # BLD AUTO: 1.05 K/UL — SIGNIFICANT CHANGE UP (ref 1–3.3)
LYMPHOCYTES # BLD AUTO: 1.08 K/UL — SIGNIFICANT CHANGE UP (ref 1–3.3)
LYMPHOCYTES # BLD AUTO: 1.27 K/UL — SIGNIFICANT CHANGE UP (ref 1–3.3)
LYMPHOCYTES # BLD AUTO: 1.36 K/UL — SIGNIFICANT CHANGE UP (ref 1–3.3)
LYMPHOCYTES # BLD AUTO: 11 % — LOW (ref 13–44)
LYMPHOCYTES # BLD AUTO: 13 % — SIGNIFICANT CHANGE UP (ref 13–44)
LYMPHOCYTES # BLD AUTO: 2 % — LOW (ref 13–44)
LYMPHOCYTES # BLD AUTO: 2.1 K/UL — SIGNIFICANT CHANGE UP (ref 1–3.3)
LYMPHOCYTES # BLD AUTO: 2.3 % — LOW (ref 13–44)
LYMPHOCYTES # BLD AUTO: 2.35 K/UL — SIGNIFICANT CHANGE UP (ref 1–3.3)
LYMPHOCYTES # BLD AUTO: 2.9 % — LOW (ref 13–44)
LYMPHOCYTES # BLD AUTO: 23 % — SIGNIFICANT CHANGE UP (ref 13–44)
LYMPHOCYTES # BLD AUTO: 3 % — LOW (ref 13–44)
LYMPHOCYTES # BLD AUTO: 3.03 K/UL — SIGNIFICANT CHANGE UP (ref 1–3.3)
LYMPHOCYTES # BLD AUTO: 3.23 K/UL — SIGNIFICANT CHANGE UP (ref 1–3.3)
LYMPHOCYTES # BLD AUTO: 3.4 % — LOW (ref 13–44)
LYMPHOCYTES # BLD AUTO: 4 % — LOW (ref 13–44)
LYMPHOCYTES # BLD AUTO: 4.33 K/UL — HIGH (ref 1–3.3)
LYMPHOCYTES # BLD AUTO: 4.5 % — LOW (ref 13–44)
LYMPHOCYTES # BLD AUTO: 4.5 % — LOW (ref 13–44)
LYMPHOCYTES # BLD AUTO: 5 % — LOW (ref 13–44)
LYMPHOCYTES # BLD AUTO: 5.04 K/UL — HIGH (ref 1–3.3)
LYMPHOCYTES # BLD AUTO: 5.5 % — LOW (ref 13–44)
LYMPHOCYTES # BLD AUTO: 6 % — LOW (ref 13–44)
LYMPHOCYTES # BLD AUTO: 7 % — LOW (ref 13–44)
LYMPHOCYTES # BLD AUTO: SIGNIFICANT CHANGE UP % (ref 13–44)
LYMPHOCYTES # BLD AUTO: SIGNIFICANT CHANGE UP % (ref 13–44)
LYMPHOCYTES # BLD AUTO: SIGNIFICANT CHANGE UP K/UL (ref 1–3.3)
LYMPHOCYTES # BLD AUTO: SIGNIFICANT CHANGE UP K/UL (ref 1–3.3)
MACROCYTES BLD QL: SLIGHT — SIGNIFICANT CHANGE UP
MAGNESIUM SERPL-MCNC: 1.4 MG/DL — LOW (ref 1.6–2.6)
MAGNESIUM SERPL-MCNC: 1.5 MG/DL — LOW (ref 1.6–2.6)
MAGNESIUM SERPL-MCNC: 1.5 MG/DL — LOW (ref 1.6–2.6)
MAGNESIUM SERPL-MCNC: 1.6 MG/DL — SIGNIFICANT CHANGE UP (ref 1.6–2.6)
MAGNESIUM SERPL-MCNC: 1.7 MG/DL — SIGNIFICANT CHANGE UP (ref 1.6–2.6)
MAGNESIUM SERPL-MCNC: 1.8 MG/DL — SIGNIFICANT CHANGE UP (ref 1.6–2.6)
MAGNESIUM SERPL-MCNC: 1.9 MG/DL — SIGNIFICANT CHANGE UP (ref 1.6–2.6)
MAGNESIUM SERPL-MCNC: 2 MG/DL — SIGNIFICANT CHANGE UP (ref 1.6–2.6)
MAGNESIUM SERPL-MCNC: 3.4 MG/DL — HIGH (ref 1.6–2.6)
MANUAL SMEAR VERIFICATION: SIGNIFICANT CHANGE UP
MCHC RBC-ENTMCNC: 27.9 PG — SIGNIFICANT CHANGE UP (ref 27–34)
MCHC RBC-ENTMCNC: 27.9 PG — SIGNIFICANT CHANGE UP (ref 27–34)
MCHC RBC-ENTMCNC: 28 PG — SIGNIFICANT CHANGE UP (ref 27–34)
MCHC RBC-ENTMCNC: 28.1 PG — SIGNIFICANT CHANGE UP (ref 27–34)
MCHC RBC-ENTMCNC: 28.2 PG — SIGNIFICANT CHANGE UP (ref 27–34)
MCHC RBC-ENTMCNC: 28.3 PG — SIGNIFICANT CHANGE UP (ref 27–34)
MCHC RBC-ENTMCNC: 28.4 PG — SIGNIFICANT CHANGE UP (ref 27–34)
MCHC RBC-ENTMCNC: 28.5 PG — SIGNIFICANT CHANGE UP (ref 27–34)
MCHC RBC-ENTMCNC: 28.6 PG — SIGNIFICANT CHANGE UP (ref 27–34)
MCHC RBC-ENTMCNC: 28.6 PG — SIGNIFICANT CHANGE UP (ref 27–34)
MCHC RBC-ENTMCNC: 28.7 PG — SIGNIFICANT CHANGE UP (ref 27–34)
MCHC RBC-ENTMCNC: 28.8 PG — SIGNIFICANT CHANGE UP (ref 27–34)
MCHC RBC-ENTMCNC: 28.9 PG — SIGNIFICANT CHANGE UP (ref 27–34)
MCHC RBC-ENTMCNC: 29 PG — SIGNIFICANT CHANGE UP (ref 27–34)
MCHC RBC-ENTMCNC: 29.1 PG — SIGNIFICANT CHANGE UP (ref 27–34)
MCHC RBC-ENTMCNC: 29.2 PG — SIGNIFICANT CHANGE UP (ref 27–34)
MCHC RBC-ENTMCNC: 29.2 PG — SIGNIFICANT CHANGE UP (ref 27–34)
MCHC RBC-ENTMCNC: 29.3 PG — SIGNIFICANT CHANGE UP (ref 27–34)
MCHC RBC-ENTMCNC: 29.4 PG — SIGNIFICANT CHANGE UP (ref 27–34)
MCHC RBC-ENTMCNC: 29.4 PG — SIGNIFICANT CHANGE UP (ref 27–34)
MCHC RBC-ENTMCNC: 29.5 PG — SIGNIFICANT CHANGE UP (ref 27–34)
MCHC RBC-ENTMCNC: 29.5 PG — SIGNIFICANT CHANGE UP (ref 27–34)
MCHC RBC-ENTMCNC: 29.7 PG — SIGNIFICANT CHANGE UP (ref 27–34)
MCHC RBC-ENTMCNC: 29.8 GM/DL — LOW (ref 32–36)
MCHC RBC-ENTMCNC: 30.3 GM/DL — LOW (ref 32–36)
MCHC RBC-ENTMCNC: 30.6 GM/DL — LOW (ref 32–36)
MCHC RBC-ENTMCNC: 30.7 GM/DL — LOW (ref 32–36)
MCHC RBC-ENTMCNC: 30.8 GM/DL — LOW (ref 32–36)
MCHC RBC-ENTMCNC: 30.8 GM/DL — LOW (ref 32–36)
MCHC RBC-ENTMCNC: 30.9 GM/DL — LOW (ref 32–36)
MCHC RBC-ENTMCNC: 31 GM/DL — LOW (ref 32–36)
MCHC RBC-ENTMCNC: 31.1 GM/DL — LOW (ref 32–36)
MCHC RBC-ENTMCNC: 31.1 GM/DL — LOW (ref 32–36)
MCHC RBC-ENTMCNC: 31.2 GM/DL — LOW (ref 32–36)
MCHC RBC-ENTMCNC: 31.3 GM/DL — LOW (ref 32–36)
MCHC RBC-ENTMCNC: 31.4 GM/DL — LOW (ref 32–36)
MCHC RBC-ENTMCNC: 31.5 GM/DL — LOW (ref 32–36)
MCHC RBC-ENTMCNC: 31.6 GM/DL — LOW (ref 32–36)
MCHC RBC-ENTMCNC: 31.7 GM/DL — LOW (ref 32–36)
MCHC RBC-ENTMCNC: 31.8 GM/DL — LOW (ref 32–36)
MCHC RBC-ENTMCNC: 31.8 GM/DL — LOW (ref 32–36)
MCHC RBC-ENTMCNC: 31.9 GM/DL — LOW (ref 32–36)
MCHC RBC-ENTMCNC: 32 GM/DL — SIGNIFICANT CHANGE UP (ref 32–36)
MCHC RBC-ENTMCNC: 32 GM/DL — SIGNIFICANT CHANGE UP (ref 32–36)
MCHC RBC-ENTMCNC: 32.1 GM/DL — SIGNIFICANT CHANGE UP (ref 32–36)
MCHC RBC-ENTMCNC: 32.2 GM/DL — SIGNIFICANT CHANGE UP (ref 32–36)
MCHC RBC-ENTMCNC: 32.2 GM/DL — SIGNIFICANT CHANGE UP (ref 32–36)
MCHC RBC-ENTMCNC: 32.3 GM/DL — SIGNIFICANT CHANGE UP (ref 32–36)
MCHC RBC-ENTMCNC: 32.3 GM/DL — SIGNIFICANT CHANGE UP (ref 32–36)
MCHC RBC-ENTMCNC: 32.4 GM/DL — SIGNIFICANT CHANGE UP (ref 32–36)
MCHC RBC-ENTMCNC: 32.4 GM/DL — SIGNIFICANT CHANGE UP (ref 32–36)
MCHC RBC-ENTMCNC: 33 GM/DL — SIGNIFICANT CHANGE UP (ref 32–36)
MCV RBC AUTO: 86.5 FL — SIGNIFICANT CHANGE UP (ref 80–100)
MCV RBC AUTO: 87.9 FL — SIGNIFICANT CHANGE UP (ref 80–100)
MCV RBC AUTO: 88.6 FL — SIGNIFICANT CHANGE UP (ref 80–100)
MCV RBC AUTO: 89 FL — SIGNIFICANT CHANGE UP (ref 80–100)
MCV RBC AUTO: 89.1 FL — SIGNIFICANT CHANGE UP (ref 80–100)
MCV RBC AUTO: 89.3 FL — SIGNIFICANT CHANGE UP (ref 80–100)
MCV RBC AUTO: 89.7 FL — SIGNIFICANT CHANGE UP (ref 80–100)
MCV RBC AUTO: 90 FL — SIGNIFICANT CHANGE UP (ref 80–100)
MCV RBC AUTO: 90 FL — SIGNIFICANT CHANGE UP (ref 80–100)
MCV RBC AUTO: 90.1 FL — SIGNIFICANT CHANGE UP (ref 80–100)
MCV RBC AUTO: 90.2 FL — SIGNIFICANT CHANGE UP (ref 80–100)
MCV RBC AUTO: 90.3 FL — SIGNIFICANT CHANGE UP (ref 80–100)
MCV RBC AUTO: 90.4 FL — SIGNIFICANT CHANGE UP (ref 80–100)
MCV RBC AUTO: 90.4 FL — SIGNIFICANT CHANGE UP (ref 80–100)
MCV RBC AUTO: 90.6 FL — SIGNIFICANT CHANGE UP (ref 80–100)
MCV RBC AUTO: 90.8 FL — SIGNIFICANT CHANGE UP (ref 80–100)
MCV RBC AUTO: 91 FL — SIGNIFICANT CHANGE UP (ref 80–100)
MCV RBC AUTO: 91.1 FL — SIGNIFICANT CHANGE UP (ref 80–100)
MCV RBC AUTO: 91.2 FL — SIGNIFICANT CHANGE UP (ref 80–100)
MCV RBC AUTO: 91.5 FL — SIGNIFICANT CHANGE UP (ref 80–100)
MCV RBC AUTO: 91.5 FL — SIGNIFICANT CHANGE UP (ref 80–100)
MCV RBC AUTO: 91.6 FL — SIGNIFICANT CHANGE UP (ref 80–100)
MCV RBC AUTO: 91.8 FL — SIGNIFICANT CHANGE UP (ref 80–100)
MCV RBC AUTO: 92.1 FL — SIGNIFICANT CHANGE UP (ref 80–100)
MCV RBC AUTO: 92.1 FL — SIGNIFICANT CHANGE UP (ref 80–100)
MCV RBC AUTO: 92.2 FL — SIGNIFICANT CHANGE UP (ref 80–100)
MCV RBC AUTO: 92.4 FL — SIGNIFICANT CHANGE UP (ref 80–100)
MCV RBC AUTO: 92.5 FL — SIGNIFICANT CHANGE UP (ref 80–100)
MCV RBC AUTO: 92.5 FL — SIGNIFICANT CHANGE UP (ref 80–100)
MCV RBC AUTO: 92.6 FL — SIGNIFICANT CHANGE UP (ref 80–100)
MCV RBC AUTO: 92.8 FL — SIGNIFICANT CHANGE UP (ref 80–100)
MCV RBC AUTO: 92.9 FL — SIGNIFICANT CHANGE UP (ref 80–100)
MCV RBC AUTO: 93.1 FL — SIGNIFICANT CHANGE UP (ref 80–100)
MCV RBC AUTO: 93.5 FL — SIGNIFICANT CHANGE UP (ref 80–100)
MCV RBC AUTO: 93.7 FL — SIGNIFICANT CHANGE UP (ref 80–100)
MCV RBC AUTO: 94 FL — SIGNIFICANT CHANGE UP (ref 80–100)
MCV RBC AUTO: 94 FL — SIGNIFICANT CHANGE UP (ref 80–100)
MCV RBC AUTO: 94.2 FL — SIGNIFICANT CHANGE UP (ref 80–100)
MCV RBC AUTO: 97.2 FL — SIGNIFICANT CHANGE UP (ref 80–100)
METAMYELOCYTES # FLD: 1 % — HIGH (ref 0–0)
METAMYELOCYTES # FLD: 2 % — HIGH (ref 0–0)
METAMYELOCYTES # FLD: 2 % — HIGH (ref 0–0)
METAMYELOCYTES # FLD: 3 % — HIGH (ref 0–0)
METHOD TYPE: SIGNIFICANT CHANGE UP
METHOD TYPE: SIGNIFICANT CHANGE UP
MICROCYTES BLD QL: SLIGHT — SIGNIFICANT CHANGE UP
MONOCYTES # BLD AUTO: 0.44 K/UL — SIGNIFICANT CHANGE UP (ref 0–0.9)
MONOCYTES # BLD AUTO: 0.58 K/UL — SIGNIFICANT CHANGE UP (ref 0–0.9)
MONOCYTES # BLD AUTO: 1.8 K/UL — HIGH (ref 0–0.9)
MONOCYTES # BLD AUTO: 1.9 K/UL — HIGH (ref 0–0.9)
MONOCYTES # BLD AUTO: 10.6 K/UL — HIGH (ref 0–0.9)
MONOCYTES # BLD AUTO: 13.11 K/UL — HIGH (ref 0–0.9)
MONOCYTES # BLD AUTO: 14.02 K/UL — HIGH (ref 0–0.9)
MONOCYTES # BLD AUTO: 2.29 K/UL — HIGH (ref 0–0.9)
MONOCYTES # BLD AUTO: 2.31 K/UL — HIGH (ref 0–0.9)
MONOCYTES # BLD AUTO: 2.72 K/UL — HIGH (ref 0–0.9)
MONOCYTES # BLD AUTO: 2.94 K/UL — HIGH (ref 0–0.9)
MONOCYTES # BLD AUTO: 3.07 K/UL — HIGH (ref 0–0.9)
MONOCYTES # BLD AUTO: 3.92 K/UL — HIGH (ref 0–0.9)
MONOCYTES # BLD AUTO: 4.24 K/UL — HIGH (ref 0–0.9)
MONOCYTES # BLD AUTO: 5.11 K/UL — HIGH (ref 0–0.9)
MONOCYTES # BLD AUTO: 6.22 K/UL — HIGH (ref 0–0.9)
MONOCYTES # BLD AUTO: 7.13 K/UL — HIGH (ref 0–0.9)
MONOCYTES # BLD AUTO: 7.35 K/UL — HIGH (ref 0–0.9)
MONOCYTES # BLD AUTO: 7.57 K/UL — HIGH (ref 0–0.9)
MONOCYTES # BLD AUTO: 7.84 K/UL — HIGH (ref 0–0.9)
MONOCYTES # BLD AUTO: SIGNIFICANT CHANGE UP K/UL (ref 0–0.9)
MONOCYTES # BLD AUTO: SIGNIFICANT CHANGE UP K/UL (ref 0–0.9)
MONOCYTES NFR BLD AUTO: 1.8 % — LOW (ref 2–14)
MONOCYTES NFR BLD AUTO: 12 % — SIGNIFICANT CHANGE UP (ref 2–14)
MONOCYTES NFR BLD AUTO: 13 % — SIGNIFICANT CHANGE UP (ref 2–14)
MONOCYTES NFR BLD AUTO: 14 % — SIGNIFICANT CHANGE UP (ref 2–14)
MONOCYTES NFR BLD AUTO: 19 % — HIGH (ref 2–14)
MONOCYTES NFR BLD AUTO: 19.4 % — HIGH (ref 2–14)
MONOCYTES NFR BLD AUTO: 2 % — SIGNIFICANT CHANGE UP (ref 2–14)
MONOCYTES NFR BLD AUTO: 20 % — HIGH (ref 2–14)
MONOCYTES NFR BLD AUTO: 20 % — HIGH (ref 2–14)
MONOCYTES NFR BLD AUTO: 21 % — HIGH (ref 2–14)
MONOCYTES NFR BLD AUTO: 21 % — HIGH (ref 2–14)
MONOCYTES NFR BLD AUTO: 25 % — HIGH (ref 2–14)
MONOCYTES NFR BLD AUTO: 25 % — HIGH (ref 2–14)
MONOCYTES NFR BLD AUTO: 25.4 % — HIGH (ref 2–14)
MONOCYTES NFR BLD AUTO: 26 % — HIGH (ref 2–14)
MONOCYTES NFR BLD AUTO: 26.9 % — HIGH (ref 2–14)
MONOCYTES NFR BLD AUTO: 29.2 % — HIGH (ref 2–14)
MONOCYTES NFR BLD AUTO: 31.6 % — HIGH (ref 2–14)
MONOCYTES NFR BLD AUTO: 5 % — SIGNIFICANT CHANGE UP (ref 2–14)
MONOCYTES NFR BLD AUTO: 7 % — SIGNIFICANT CHANGE UP (ref 2–14)
MONOCYTES NFR BLD AUTO: SIGNIFICANT CHANGE UP % (ref 2–14)
MONOCYTES NFR BLD AUTO: SIGNIFICANT CHANGE UP % (ref 2–14)
MRSA PCR RESULT.: SIGNIFICANT CHANGE UP
MYELOCYTES NFR BLD: 1 % — HIGH (ref 0–0)
MYELOCYTES NFR BLD: 3 % — HIGH (ref 0–0)
MYELOCYTES NFR BLD: 3 % — HIGH (ref 0–0)
MYELOCYTES NFR BLD: 7 % — HIGH (ref 0–0)
NEUTROPHILS # BLD AUTO: 10.66 K/UL — HIGH (ref 1.8–7.4)
NEUTROPHILS # BLD AUTO: 11.73 K/UL — HIGH (ref 1.8–7.4)
NEUTROPHILS # BLD AUTO: 13.36 K/UL — HIGH (ref 1.8–7.4)
NEUTROPHILS # BLD AUTO: 14.67 K/UL — HIGH (ref 1.8–7.4)
NEUTROPHILS # BLD AUTO: 16.33 K/UL — HIGH (ref 1.8–7.4)
NEUTROPHILS # BLD AUTO: 16.44 K/UL — HIGH (ref 1.8–7.4)
NEUTROPHILS # BLD AUTO: 20.99 K/UL — HIGH (ref 1.8–7.4)
NEUTROPHILS # BLD AUTO: 21.21 K/UL — HIGH (ref 1.8–7.4)
NEUTROPHILS # BLD AUTO: 22.35 K/UL — HIGH (ref 1.8–7.4)
NEUTROPHILS # BLD AUTO: 25.24 K/UL — HIGH (ref 1.8–7.4)
NEUTROPHILS # BLD AUTO: 27.05 K/UL — HIGH (ref 1.8–7.4)
NEUTROPHILS # BLD AUTO: 27.81 K/UL — HIGH (ref 1.8–7.4)
NEUTROPHILS # BLD AUTO: 28.1 K/UL — HIGH (ref 1.8–7.4)
NEUTROPHILS # BLD AUTO: 28.31 K/UL — HIGH (ref 1.8–7.4)
NEUTROPHILS # BLD AUTO: 31.81 K/UL — HIGH (ref 1.8–7.4)
NEUTROPHILS # BLD AUTO: 32.25 K/UL — HIGH (ref 1.8–7.4)
NEUTROPHILS # BLD AUTO: 32.78 K/UL — HIGH (ref 1.8–7.4)
NEUTROPHILS # BLD AUTO: 6.49 K/UL — SIGNIFICANT CHANGE UP (ref 1.8–7.4)
NEUTROPHILS # BLD AUTO: 7.79 K/UL — HIGH (ref 1.8–7.4)
NEUTROPHILS # BLD AUTO: 9.27 K/UL — HIGH (ref 1.8–7.4)
NEUTROPHILS # BLD AUTO: SIGNIFICANT CHANGE UP K/UL (ref 1.8–7.4)
NEUTROPHILS # BLD AUTO: SIGNIFICANT CHANGE UP K/UL (ref 1.8–7.4)
NEUTROPHILS NFR BLD AUTO: 47.7 % — SIGNIFICANT CHANGE UP (ref 43–77)
NEUTROPHILS NFR BLD AUTO: 54 % — SIGNIFICANT CHANGE UP (ref 43–77)
NEUTROPHILS NFR BLD AUTO: 56.6 % — SIGNIFICANT CHANGE UP (ref 43–77)
NEUTROPHILS NFR BLD AUTO: 59 % — SIGNIFICANT CHANGE UP (ref 43–77)
NEUTROPHILS NFR BLD AUTO: 64.3 % — SIGNIFICANT CHANGE UP (ref 43–77)
NEUTROPHILS NFR BLD AUTO: 64.5 % — SIGNIFICANT CHANGE UP (ref 43–77)
NEUTROPHILS NFR BLD AUTO: 65 % — SIGNIFICANT CHANGE UP (ref 43–77)
NEUTROPHILS NFR BLD AUTO: 65 % — SIGNIFICANT CHANGE UP (ref 43–77)
NEUTROPHILS NFR BLD AUTO: 66 % — SIGNIFICANT CHANGE UP (ref 43–77)
NEUTROPHILS NFR BLD AUTO: 67 % — SIGNIFICANT CHANGE UP (ref 43–77)
NEUTROPHILS NFR BLD AUTO: 68 % — SIGNIFICANT CHANGE UP (ref 43–77)
NEUTROPHILS NFR BLD AUTO: 72 % — SIGNIFICANT CHANGE UP (ref 43–77)
NEUTROPHILS NFR BLD AUTO: 72.9 % — SIGNIFICANT CHANGE UP (ref 43–77)
NEUTROPHILS NFR BLD AUTO: 73 % — SIGNIFICANT CHANGE UP (ref 43–77)
NEUTROPHILS NFR BLD AUTO: 77 % — SIGNIFICANT CHANGE UP (ref 43–77)
NEUTROPHILS NFR BLD AUTO: 77 % — SIGNIFICANT CHANGE UP (ref 43–77)
NEUTROPHILS NFR BLD AUTO: 78 % — HIGH (ref 43–77)
NEUTROPHILS NFR BLD AUTO: 85 % — HIGH (ref 43–77)
NEUTROPHILS NFR BLD AUTO: 85 % — HIGH (ref 43–77)
NEUTROPHILS NFR BLD AUTO: 98.2 % — HIGH (ref 43–77)
NEUTROPHILS NFR BLD AUTO: SIGNIFICANT CHANGE UP % (ref 43–77)
NEUTROPHILS NFR BLD AUTO: SIGNIFICANT CHANGE UP % (ref 43–77)
NEUTS BAND # BLD: 1 % — SIGNIFICANT CHANGE UP (ref 0–8)
NEUTS BAND # BLD: 2 % — SIGNIFICANT CHANGE UP (ref 0–8)
NEUTS BAND # BLD: 2 % — SIGNIFICANT CHANGE UP (ref 0–8)
NEUTS BAND # BLD: 5 % — SIGNIFICANT CHANGE UP (ref 0–8)
NEUTS BAND # BLD: 5 % — SIGNIFICANT CHANGE UP (ref 0–8)
NITRITE UR-MCNC: NEGATIVE — SIGNIFICANT CHANGE UP
NRBC # BLD: 0 /100 WBCS — SIGNIFICANT CHANGE UP (ref 0–0)
NRBC # BLD: 0 /100 — SIGNIFICANT CHANGE UP (ref 0–0)
NRBC # BLD: 0 — SIGNIFICANT CHANGE UP
NRBC # BLD: 1 /100 WBCS — HIGH (ref 0–0)
NRBC # BLD: SIGNIFICANT CHANGE UP /100 WBCS (ref 0–0)
NRBC # BLD: SIGNIFICANT CHANGE UP /100 WBCS (ref 0–0)
NT-PROBNP SERPL-SCNC: HIGH PG/ML (ref 0–450)
OB PNL STL: POSITIVE
ORGANISM # SPEC MICROSCOPIC CNT: SIGNIFICANT CHANGE UP
OVALOCYTES BLD QL SMEAR: SLIGHT — SIGNIFICANT CHANGE UP
PCO2 BLDV: 37 MMHG — LOW (ref 39–42)
PCO2 BLDV: 41 MMHG — SIGNIFICANT CHANGE UP (ref 39–42)
PCO2 BLDV: 42 MMHG — SIGNIFICANT CHANGE UP (ref 39–42)
PH BLDV: 7.29 — LOW (ref 7.32–7.43)
PH BLDV: 7.32 — SIGNIFICANT CHANGE UP (ref 7.32–7.43)
PH BLDV: 7.41 — SIGNIFICANT CHANGE UP (ref 7.32–7.43)
PH UR: 6 — SIGNIFICANT CHANGE UP (ref 5–8)
PHOSPHATE SERPL-MCNC: 2 MG/DL — LOW (ref 2.5–4.5)
PHOSPHATE SERPL-MCNC: 2.1 MG/DL — LOW (ref 2.5–4.5)
PHOSPHATE SERPL-MCNC: 2.1 MG/DL — LOW (ref 2.5–4.5)
PHOSPHATE SERPL-MCNC: 2.3 MG/DL — LOW (ref 2.5–4.5)
PHOSPHATE SERPL-MCNC: 2.3 MG/DL — LOW (ref 2.5–4.5)
PHOSPHATE SERPL-MCNC: 2.4 MG/DL — LOW (ref 2.5–4.5)
PHOSPHATE SERPL-MCNC: 2.5 MG/DL — SIGNIFICANT CHANGE UP (ref 2.5–4.5)
PHOSPHATE SERPL-MCNC: 2.5 MG/DL — SIGNIFICANT CHANGE UP (ref 2.5–4.5)
PHOSPHATE SERPL-MCNC: 2.7 MG/DL — SIGNIFICANT CHANGE UP (ref 2.5–4.5)
PHOSPHATE SERPL-MCNC: 2.9 MG/DL — SIGNIFICANT CHANGE UP (ref 2.5–4.5)
PHOSPHATE SERPL-MCNC: 2.9 MG/DL — SIGNIFICANT CHANGE UP (ref 2.5–4.5)
PHOSPHATE SERPL-MCNC: 3 MG/DL — SIGNIFICANT CHANGE UP (ref 2.5–4.5)
PHOSPHATE SERPL-MCNC: 3.1 MG/DL — SIGNIFICANT CHANGE UP (ref 2.5–4.5)
PHOSPHATE SERPL-MCNC: 3.3 MG/DL — SIGNIFICANT CHANGE UP (ref 2.5–4.5)
PHOSPHATE SERPL-MCNC: 3.3 MG/DL — SIGNIFICANT CHANGE UP (ref 2.5–4.5)
PHOSPHATE SERPL-MCNC: 3.4 MG/DL — SIGNIFICANT CHANGE UP (ref 2.5–4.5)
PHOSPHATE SERPL-MCNC: 3.5 MG/DL — SIGNIFICANT CHANGE UP (ref 2.5–4.5)
PHOSPHATE SERPL-MCNC: 3.5 MG/DL — SIGNIFICANT CHANGE UP (ref 2.5–4.5)
PHOSPHATE SERPL-MCNC: 4.2 MG/DL — SIGNIFICANT CHANGE UP (ref 2.5–4.5)
PHOSPHATE SERPL-MCNC: 4.2 MG/DL — SIGNIFICANT CHANGE UP (ref 2.5–4.5)
PHOSPHATE SERPL-MCNC: 4.6 MG/DL — HIGH (ref 2.5–4.5)
PHOSPHATE SERPL-MCNC: 4.7 MG/DL — HIGH (ref 2.5–4.5)
PHOSPHATE SERPL-MCNC: 4.8 MG/DL — HIGH (ref 2.5–4.5)
PHOSPHATE SERPL-MCNC: 4.8 MG/DL — HIGH (ref 2.5–4.5)
PLAT MORPH BLD: NORMAL — SIGNIFICANT CHANGE UP
PLATELET # BLD AUTO: 100 K/UL — LOW (ref 150–400)
PLATELET # BLD AUTO: 102 K/UL — LOW (ref 150–400)
PLATELET # BLD AUTO: 102 K/UL — LOW (ref 150–400)
PLATELET # BLD AUTO: 104 K/UL — LOW (ref 150–400)
PLATELET # BLD AUTO: 105 K/UL — LOW (ref 150–400)
PLATELET # BLD AUTO: 106 K/UL — LOW (ref 150–400)
PLATELET # BLD AUTO: 107 K/UL — LOW (ref 150–400)
PLATELET # BLD AUTO: 114 K/UL — LOW (ref 150–400)
PLATELET # BLD AUTO: 119 K/UL — LOW (ref 150–400)
PLATELET # BLD AUTO: 120 K/UL — LOW (ref 150–400)
PLATELET # BLD AUTO: 125 K/UL — LOW (ref 150–400)
PLATELET # BLD AUTO: 128 K/UL — LOW (ref 150–400)
PLATELET # BLD AUTO: 132 K/UL — LOW (ref 150–400)
PLATELET # BLD AUTO: 132 K/UL — LOW (ref 150–400)
PLATELET # BLD AUTO: 135 K/UL — LOW (ref 150–400)
PLATELET # BLD AUTO: 139 K/UL — LOW (ref 150–400)
PLATELET # BLD AUTO: 140 K/UL — LOW (ref 150–400)
PLATELET # BLD AUTO: 145 K/UL — LOW (ref 150–400)
PLATELET # BLD AUTO: 146 K/UL — LOW (ref 150–400)
PLATELET # BLD AUTO: 171 K/UL — SIGNIFICANT CHANGE UP (ref 150–400)
PLATELET # BLD AUTO: 172 K/UL — SIGNIFICANT CHANGE UP (ref 150–400)
PLATELET # BLD AUTO: 181 K/UL — SIGNIFICANT CHANGE UP (ref 150–400)
PLATELET # BLD AUTO: 194 K/UL — SIGNIFICANT CHANGE UP (ref 150–400)
PLATELET # BLD AUTO: 199 K/UL — SIGNIFICANT CHANGE UP (ref 150–400)
PLATELET # BLD AUTO: 204 K/UL — SIGNIFICANT CHANGE UP (ref 150–400)
PLATELET # BLD AUTO: 206 K/UL — SIGNIFICANT CHANGE UP (ref 150–400)
PLATELET # BLD AUTO: 208 K/UL — SIGNIFICANT CHANGE UP (ref 150–400)
PLATELET # BLD AUTO: 209 K/UL — SIGNIFICANT CHANGE UP (ref 150–400)
PLATELET # BLD AUTO: 213 K/UL — SIGNIFICANT CHANGE UP (ref 150–400)
PLATELET # BLD AUTO: 217 K/UL — SIGNIFICANT CHANGE UP (ref 150–400)
PLATELET # BLD AUTO: 218 K/UL — SIGNIFICANT CHANGE UP (ref 150–400)
PLATELET # BLD AUTO: 219 K/UL — SIGNIFICANT CHANGE UP (ref 150–400)
PLATELET # BLD AUTO: 238 K/UL — SIGNIFICANT CHANGE UP (ref 150–400)
PLATELET # BLD AUTO: 253 K/UL — SIGNIFICANT CHANGE UP (ref 150–400)
PLATELET # BLD AUTO: 262 K/UL — SIGNIFICANT CHANGE UP (ref 150–400)
PLATELET # BLD AUTO: 270 K/UL — SIGNIFICANT CHANGE UP (ref 150–400)
PLATELET # BLD AUTO: 79 K/UL — LOW (ref 150–400)
PLATELET # BLD AUTO: 84 K/UL — LOW (ref 150–400)
PLATELET # BLD AUTO: 89 K/UL — LOW (ref 150–400)
PLATELET COUNT - ESTIMATE: ABNORMAL
PLATELET COUNT - ESTIMATE: NORMAL — SIGNIFICANT CHANGE UP
PO2 BLDV: 25 MMHG — SIGNIFICANT CHANGE UP (ref 25–45)
PO2 BLDV: 40 MMHG — SIGNIFICANT CHANGE UP (ref 25–45)
PO2 BLDV: 63 MMHG — HIGH (ref 25–45)
POIKILOCYTOSIS BLD QL AUTO: SIGNIFICANT CHANGE UP
POIKILOCYTOSIS BLD QL AUTO: SLIGHT — SIGNIFICANT CHANGE UP
POLYCHROMASIA BLD QL SMEAR: SLIGHT — SIGNIFICANT CHANGE UP
POTASSIUM BLDV-SCNC: 3.1 MMOL/L — LOW (ref 3.5–5.1)
POTASSIUM BLDV-SCNC: 4.5 MMOL/L — SIGNIFICANT CHANGE UP (ref 3.5–5.1)
POTASSIUM BLDV-SCNC: 5 MMOL/L — SIGNIFICANT CHANGE UP (ref 3.5–5.1)
POTASSIUM SERPL-MCNC: 3.2 MMOL/L — LOW (ref 3.5–5.3)
POTASSIUM SERPL-MCNC: 3.2 MMOL/L — LOW (ref 3.5–5.3)
POTASSIUM SERPL-MCNC: 3.3 MMOL/L — LOW (ref 3.5–5.3)
POTASSIUM SERPL-MCNC: 3.4 MMOL/L — LOW (ref 3.5–5.3)
POTASSIUM SERPL-MCNC: 3.5 MMOL/L — SIGNIFICANT CHANGE UP (ref 3.5–5.3)
POTASSIUM SERPL-MCNC: 3.6 MMOL/L — SIGNIFICANT CHANGE UP (ref 3.5–5.3)
POTASSIUM SERPL-MCNC: 3.7 MMOL/L — SIGNIFICANT CHANGE UP (ref 3.5–5.3)
POTASSIUM SERPL-MCNC: 3.8 MMOL/L — SIGNIFICANT CHANGE UP (ref 3.5–5.3)
POTASSIUM SERPL-MCNC: 3.9 MMOL/L — SIGNIFICANT CHANGE UP (ref 3.5–5.3)
POTASSIUM SERPL-MCNC: 4 MMOL/L — SIGNIFICANT CHANGE UP (ref 3.5–5.3)
POTASSIUM SERPL-MCNC: 4.1 MMOL/L — SIGNIFICANT CHANGE UP (ref 3.5–5.3)
POTASSIUM SERPL-MCNC: 4.1 MMOL/L — SIGNIFICANT CHANGE UP (ref 3.5–5.3)
POTASSIUM SERPL-MCNC: 4.2 MMOL/L — SIGNIFICANT CHANGE UP (ref 3.5–5.3)
POTASSIUM SERPL-MCNC: 4.3 MMOL/L — SIGNIFICANT CHANGE UP (ref 3.5–5.3)
POTASSIUM SERPL-MCNC: 4.4 MMOL/L — SIGNIFICANT CHANGE UP (ref 3.5–5.3)
POTASSIUM SERPL-MCNC: 5 MMOL/L — SIGNIFICANT CHANGE UP (ref 3.5–5.3)
POTASSIUM SERPL-MCNC: 5.8 MMOL/L — HIGH (ref 3.5–5.3)
POTASSIUM SERPL-SCNC: 3.2 MMOL/L — LOW (ref 3.5–5.3)
POTASSIUM SERPL-SCNC: 3.2 MMOL/L — LOW (ref 3.5–5.3)
POTASSIUM SERPL-SCNC: 3.3 MMOL/L — LOW (ref 3.5–5.3)
POTASSIUM SERPL-SCNC: 3.4 MMOL/L — LOW (ref 3.5–5.3)
POTASSIUM SERPL-SCNC: 3.5 MMOL/L — SIGNIFICANT CHANGE UP (ref 3.5–5.3)
POTASSIUM SERPL-SCNC: 3.6 MMOL/L — SIGNIFICANT CHANGE UP (ref 3.5–5.3)
POTASSIUM SERPL-SCNC: 3.7 MMOL/L — SIGNIFICANT CHANGE UP (ref 3.5–5.3)
POTASSIUM SERPL-SCNC: 3.8 MMOL/L — SIGNIFICANT CHANGE UP (ref 3.5–5.3)
POTASSIUM SERPL-SCNC: 3.9 MMOL/L — SIGNIFICANT CHANGE UP (ref 3.5–5.3)
POTASSIUM SERPL-SCNC: 4 MMOL/L — SIGNIFICANT CHANGE UP (ref 3.5–5.3)
POTASSIUM SERPL-SCNC: 4.1 MMOL/L — SIGNIFICANT CHANGE UP (ref 3.5–5.3)
POTASSIUM SERPL-SCNC: 4.1 MMOL/L — SIGNIFICANT CHANGE UP (ref 3.5–5.3)
POTASSIUM SERPL-SCNC: 4.2 MMOL/L — SIGNIFICANT CHANGE UP (ref 3.5–5.3)
POTASSIUM SERPL-SCNC: 4.3 MMOL/L — SIGNIFICANT CHANGE UP (ref 3.5–5.3)
POTASSIUM SERPL-SCNC: 4.4 MMOL/L — SIGNIFICANT CHANGE UP (ref 3.5–5.3)
POTASSIUM SERPL-SCNC: 5 MMOL/L — SIGNIFICANT CHANGE UP (ref 3.5–5.3)
POTASSIUM SERPL-SCNC: 5.8 MMOL/L — HIGH (ref 3.5–5.3)
PROCALCITONIN SERPL-MCNC: 1.3 NG/ML — HIGH (ref 0.02–0.1)
PROT SERPL-MCNC: 5.1 G/DL — LOW (ref 6–8.3)
PROT SERPL-MCNC: 5.2 G/DL — LOW (ref 6–8.3)
PROT SERPL-MCNC: 5.3 G/DL — LOW (ref 6–8.3)
PROT SERPL-MCNC: 5.5 G/DL — LOW (ref 6–8.3)
PROT SERPL-MCNC: 5.6 G/DL — LOW (ref 6–8.3)
PROT SERPL-MCNC: 5.8 G/DL — LOW (ref 6–8.3)
PROT SERPL-MCNC: 5.9 G/DL — LOW (ref 6–8.3)
PROT SERPL-MCNC: 6.1 G/DL — SIGNIFICANT CHANGE UP (ref 6–8.3)
PROT SERPL-MCNC: 6.2 G/DL — SIGNIFICANT CHANGE UP (ref 6–8.3)
PROT SERPL-MCNC: 6.5 G/DL — SIGNIFICANT CHANGE UP (ref 6–8.3)
PROT SERPL-MCNC: 6.5 G/DL — SIGNIFICANT CHANGE UP (ref 6–8.3)
PROT SERPL-MCNC: 7.1 G/DL — SIGNIFICANT CHANGE UP (ref 6–8.3)
PROT SERPL-MCNC: 8.6 G/DL — HIGH (ref 6–8.3)
PROT UR-MCNC: 100
PROT UR-MCNC: 30 MG/DL
PROT UR-MCNC: ABNORMAL
PROTHROM AB SERPL-ACNC: 14.8 SEC — HIGH (ref 10.5–13.4)
PROTHROM AB SERPL-ACNC: 23 SEC — HIGH (ref 10.5–13.4)
PROTHROM AB SERPL-ACNC: 24.9 SEC — HIGH (ref 10.5–13.4)
RAPID RVP RESULT: SIGNIFICANT CHANGE UP
RBC # BLD: 2.32 M/UL — LOW (ref 3.8–5.2)
RBC # BLD: 2.41 M/UL — LOW (ref 3.8–5.2)
RBC # BLD: 2.42 M/UL — LOW (ref 3.8–5.2)
RBC # BLD: 2.43 M/UL — LOW (ref 3.8–5.2)
RBC # BLD: 2.46 M/UL — LOW (ref 3.8–5.2)
RBC # BLD: 2.49 M/UL — LOW (ref 3.8–5.2)
RBC # BLD: 2.54 M/UL — LOW (ref 3.8–5.2)
RBC # BLD: 2.58 M/UL — LOW (ref 3.8–5.2)
RBC # BLD: 2.63 M/UL — LOW (ref 3.8–5.2)
RBC # BLD: 2.65 M/UL — LOW (ref 3.8–5.2)
RBC # BLD: 2.71 M/UL — LOW (ref 3.8–5.2)
RBC # BLD: 2.72 M/UL — LOW (ref 3.8–5.2)
RBC # BLD: 2.79 M/UL — LOW (ref 3.8–5.2)
RBC # BLD: 2.8 M/UL — LOW (ref 3.8–5.2)
RBC # BLD: 2.82 M/UL — LOW (ref 3.8–5.2)
RBC # BLD: 2.84 M/UL — LOW (ref 3.8–5.2)
RBC # BLD: 2.88 M/UL — LOW (ref 3.8–5.2)
RBC # BLD: 2.91 M/UL — LOW (ref 3.8–5.2)
RBC # BLD: 2.92 M/UL — LOW (ref 3.8–5.2)
RBC # BLD: 2.92 M/UL — LOW (ref 3.8–5.2)
RBC # BLD: 2.93 M/UL — LOW (ref 3.8–5.2)
RBC # BLD: 2.94 M/UL — LOW (ref 3.8–5.2)
RBC # BLD: 2.94 M/UL — LOW (ref 3.8–5.2)
RBC # BLD: 2.96 M/UL — LOW (ref 3.8–5.2)
RBC # BLD: 2.97 M/UL — LOW (ref 3.8–5.2)
RBC # BLD: 2.99 M/UL — LOW (ref 3.8–5.2)
RBC # BLD: 3 M/UL — LOW (ref 3.8–5.2)
RBC # BLD: 3.03 M/UL — LOW (ref 3.8–5.2)
RBC # BLD: 3.06 M/UL — LOW (ref 3.8–5.2)
RBC # BLD: 3.09 M/UL — LOW (ref 3.8–5.2)
RBC # BLD: 3.18 M/UL — LOW (ref 3.8–5.2)
RBC # BLD: 3.19 M/UL — LOW (ref 3.8–5.2)
RBC # BLD: 3.2 M/UL — LOW (ref 3.8–5.2)
RBC # BLD: 3.23 M/UL — LOW (ref 3.8–5.2)
RBC # BLD: 3.25 M/UL — LOW (ref 3.8–5.2)
RBC # BLD: 3.26 M/UL — LOW (ref 3.8–5.2)
RBC # BLD: 3.3 M/UL — LOW (ref 3.8–5.2)
RBC # BLD: 3.33 M/UL — LOW (ref 3.8–5.2)
RBC # BLD: 3.4 M/UL — LOW (ref 3.8–5.2)
RBC # BLD: 3.48 M/UL — LOW (ref 3.8–5.2)
RBC # BLD: 3.52 M/UL — LOW (ref 3.8–5.2)
RBC # BLD: 3.81 M/UL — SIGNIFICANT CHANGE UP (ref 3.8–5.2)
RBC # BLD: 3.94 M/UL — SIGNIFICANT CHANGE UP (ref 3.8–5.2)
RBC # FLD: 16 % — HIGH (ref 10.3–14.5)
RBC # FLD: 16 % — HIGH (ref 10.3–14.5)
RBC # FLD: 16.1 % — HIGH (ref 10.3–14.5)
RBC # FLD: 16.3 % — HIGH (ref 10.3–14.5)
RBC # FLD: 16.6 % — HIGH (ref 10.3–14.5)
RBC # FLD: 17 % — HIGH (ref 10.3–14.5)
RBC # FLD: 17.2 % — HIGH (ref 10.3–14.5)
RBC # FLD: 17.3 % — HIGH (ref 10.3–14.5)
RBC # FLD: 17.6 % — HIGH (ref 10.3–14.5)
RBC # FLD: 17.6 % — HIGH (ref 10.3–14.5)
RBC # FLD: 17.9 % — HIGH (ref 10.3–14.5)
RBC # FLD: 18 % — HIGH (ref 10.3–14.5)
RBC # FLD: 18.1 % — HIGH (ref 10.3–14.5)
RBC # FLD: 18.4 % — HIGH (ref 10.3–14.5)
RBC # FLD: 18.6 % — HIGH (ref 10.3–14.5)
RBC # FLD: 18.6 % — HIGH (ref 10.3–14.5)
RBC # FLD: 18.9 % — HIGH (ref 10.3–14.5)
RBC # FLD: 18.9 % — HIGH (ref 10.3–14.5)
RBC # FLD: 19 % — HIGH (ref 10.3–14.5)
RBC # FLD: 19 % — HIGH (ref 10.3–14.5)
RBC # FLD: 19.2 % — HIGH (ref 10.3–14.5)
RBC # FLD: 19.2 % — HIGH (ref 10.3–14.5)
RBC # FLD: 19.3 % — HIGH (ref 10.3–14.5)
RBC # FLD: 19.5 % — HIGH (ref 10.3–14.5)
RBC # FLD: 19.7 % — HIGH (ref 10.3–14.5)
RBC # FLD: 19.9 % — HIGH (ref 10.3–14.5)
RBC # FLD: 20 % — HIGH (ref 10.3–14.5)
RBC # FLD: 20 % — HIGH (ref 10.3–14.5)
RBC # FLD: 20.1 % — HIGH (ref 10.3–14.5)
RBC # FLD: 20.1 % — HIGH (ref 10.3–14.5)
RBC # FLD: 20.2 % — HIGH (ref 10.3–14.5)
RBC # FLD: 20.5 % — HIGH (ref 10.3–14.5)
RBC # FLD: 20.6 % — HIGH (ref 10.3–14.5)
RBC # FLD: 20.8 % — HIGH (ref 10.3–14.5)
RBC # FLD: 21.2 % — HIGH (ref 10.3–14.5)
RBC BLD AUTO: ABNORMAL
RBC CASTS # UR COMP ASSIST: 1 /HPF — SIGNIFICANT CHANGE UP (ref 0–4)
RETICS #: 39 K/UL — SIGNIFICANT CHANGE UP (ref 25–125)
RETICS #: 45.1 K/UL — SIGNIFICANT CHANGE UP (ref 25–125)
RETICS/RBC NFR: 1.4 % — SIGNIFICANT CHANGE UP (ref 0.5–2.5)
RETICS/RBC NFR: 1.6 % — SIGNIFICANT CHANGE UP (ref 0.5–2.5)
S AUREUS DNA NOSE QL NAA+PROBE: DETECTED
S AUREUS DNA NOSE QL NAA+PROBE: SIGNIFICANT CHANGE UP
S AUREUS DNA NOSE QL NAA+PROBE: SIGNIFICANT CHANGE UP
SAO2 % BLDV: 33.8 % — LOW (ref 67–88)
SAO2 % BLDV: 62.6 % — LOW (ref 67–88)
SAO2 % BLDV: 91.1 % — HIGH (ref 67–88)
SARS-COV-2 RNA SPEC QL NAA+PROBE: SIGNIFICANT CHANGE UP
SCHISTOCYTES BLD QL AUTO: SIGNIFICANT CHANGE UP
SCHISTOCYTES BLD QL AUTO: SIGNIFICANT CHANGE UP
SCHISTOCYTES BLD QL AUTO: SLIGHT — SIGNIFICANT CHANGE UP
SMUDGE CELLS # BLD: PRESENT — SIGNIFICANT CHANGE UP
SODIUM SERPL-SCNC: 131 MMOL/L — LOW (ref 135–145)
SODIUM SERPL-SCNC: 131 MMOL/L — LOW (ref 135–145)
SODIUM SERPL-SCNC: 135 MMOL/L — SIGNIFICANT CHANGE UP (ref 135–145)
SODIUM SERPL-SCNC: 136 MMOL/L — SIGNIFICANT CHANGE UP (ref 135–145)
SODIUM SERPL-SCNC: 137 MMOL/L — SIGNIFICANT CHANGE UP (ref 135–145)
SODIUM SERPL-SCNC: 138 MMOL/L — SIGNIFICANT CHANGE UP (ref 135–145)
SODIUM SERPL-SCNC: 139 MMOL/L — SIGNIFICANT CHANGE UP (ref 135–145)
SODIUM SERPL-SCNC: 140 MMOL/L — SIGNIFICANT CHANGE UP (ref 135–145)
SODIUM SERPL-SCNC: 141 MMOL/L — SIGNIFICANT CHANGE UP (ref 135–145)
SODIUM SERPL-SCNC: 141 MMOL/L — SIGNIFICANT CHANGE UP (ref 135–145)
SODIUM UR-SCNC: 13 MMOL/L — SIGNIFICANT CHANGE UP
SP GR SPEC: 1.01 — SIGNIFICANT CHANGE UP (ref 1.01–1.02)
SPECIMEN SOURCE: SIGNIFICANT CHANGE UP
SPHEROCYTES BLD QL SMEAR: SLIGHT — SIGNIFICANT CHANGE UP
SURGICAL PATHOLOGY STUDY: SIGNIFICANT CHANGE UP
T3 SERPL-MCNC: 42 NG/DL — LOW (ref 80–200)
T4 AB SER-ACNC: 4 UG/DL — LOW (ref 4.6–12)
T4 AB SER-ACNC: 4.4 UG/DL — LOW (ref 4.6–12)
T4 FREE SERPL-MCNC: 0.8 NG/DL — LOW (ref 0.9–1.8)
TARGETS BLD QL SMEAR: SLIGHT — SIGNIFICANT CHANGE UP
TIBC SERPL-MCNC: 128 UG/DL — LOW (ref 250–450)
TIBC SERPL-MCNC: 139 UG/DL — LOW (ref 220–430)
TRANSFERRIN SERPL-MCNC: 99 MG/DL — LOW (ref 200–360)
TRIGL SERPL-MCNC: 48 MG/DL — SIGNIFICANT CHANGE UP
TRIGL SERPL-MCNC: 71 MG/DL — SIGNIFICANT CHANGE UP
TROPONIN I, HIGH SENSITIVITY RESULT: 10.8 NG/L — SIGNIFICANT CHANGE UP
TROPONIN I, HIGH SENSITIVITY RESULT: 11.4 NG/L — SIGNIFICANT CHANGE UP
TROPONIN I, HIGH SENSITIVITY RESULT: 47.6 NG/L — SIGNIFICANT CHANGE UP
TROPONIN I, HIGH SENSITIVITY RESULT: 61.1 NG/L — HIGH
TSH SERPL-MCNC: 12.4 UIU/ML — HIGH (ref 0.36–3.74)
TSH SERPL-MCNC: 12.9 UIU/ML — HIGH (ref 0.36–3.74)
TSH SERPL-MCNC: 7.61 UU/ML — HIGH (ref 0.34–4.82)
TSH SERPL-MCNC: 9.92 UIU/ML — HIGH (ref 0.27–4.2)
UIBC SERPL-MCNC: 108 UG/DL — LOW (ref 110–370)
UIBC SERPL-MCNC: 73 UG/DL — LOW (ref 110–370)
UROBILINOGEN FLD QL: NEGATIVE — SIGNIFICANT CHANGE UP
VIT B12 SERPL-MCNC: 1213 PG/ML — SIGNIFICANT CHANGE UP (ref 232–1245)
VIT B12 SERPL-MCNC: 1301 PG/ML — HIGH (ref 232–1245)
VIT B12 SERPL-MCNC: 1453 PG/ML — HIGH (ref 232–1245)
WBC # BLD: 10.06 K/UL — SIGNIFICANT CHANGE UP (ref 3.8–10.5)
WBC # BLD: 10.26 K/UL — SIGNIFICANT CHANGE UP (ref 3.8–10.5)
WBC # BLD: 12.11 K/UL — HIGH (ref 3.8–10.5)
WBC # BLD: 13.47 K/UL — HIGH (ref 3.8–10.5)
WBC # BLD: 14.05 K/UL — HIGH (ref 3.8–10.5)
WBC # BLD: 15.04 K/UL — HIGH (ref 3.8–10.5)
WBC # BLD: 15.42 K/UL — HIGH (ref 3.8–10.5)
WBC # BLD: 15.67 K/UL — HIGH (ref 3.8–10.5)
WBC # BLD: 16.11 K/UL — HIGH (ref 3.8–10.5)
WBC # BLD: 16.21 K/UL — HIGH (ref 3.8–10.5)
WBC # BLD: 16.49 K/UL — HIGH (ref 3.8–10.5)
WBC # BLD: 17.53 K/UL — HIGH (ref 3.8–10.5)
WBC # BLD: 17.72 K/UL — HIGH (ref 3.8–10.5)
WBC # BLD: 21.65 K/UL — HIGH (ref 3.8–10.5)
WBC # BLD: 21.92 K/UL — HIGH (ref 3.8–10.5)
WBC # BLD: 22.37 K/UL — HIGH (ref 3.8–10.5)
WBC # BLD: 24.86 K/UL — HIGH (ref 3.8–10.5)
WBC # BLD: 25.99 K/UL — HIGH (ref 3.8–10.5)
WBC # BLD: 26.8 K/UL — HIGH (ref 3.8–10.5)
WBC # BLD: 27.12 K/UL — HIGH (ref 3.8–10.5)
WBC # BLD: 27.3 K/UL — HIGH (ref 3.8–10.5)
WBC # BLD: 27.33 K/UL — HIGH (ref 3.8–10.5)
WBC # BLD: 28.78 K/UL — HIGH (ref 3.8–10.5)
WBC # BLD: 28.95 K/UL — HIGH (ref 3.8–10.5)
WBC # BLD: 29.49 K/UL — HIGH (ref 3.8–10.5)
WBC # BLD: 31.64 K/UL — HIGH (ref 3.8–10.5)
WBC # BLD: 32.39 K/UL — HIGH (ref 3.8–10.5)
WBC # BLD: 32.67 K/UL — HIGH (ref 3.8–10.5)
WBC # BLD: 34.98 K/UL — HIGH (ref 3.8–10.5)
WBC # BLD: 35.65 K/UL — HIGH (ref 3.8–10.5)
WBC # BLD: 36.06 K/UL — HIGH (ref 3.8–10.5)
WBC # BLD: 36.9 K/UL — HIGH (ref 3.8–10.5)
WBC # BLD: 37.94 K/UL — HIGH (ref 3.8–10.5)
WBC # BLD: 38.57 K/UL — HIGH (ref 3.8–10.5)
WBC # BLD: 39.21 K/UL — HIGH (ref 3.8–10.5)
WBC # BLD: 39.32 K/UL — HIGH (ref 3.8–10.5)
WBC # BLD: 39.44 K/UL — HIGH (ref 3.8–10.5)
WBC # BLD: 44.43 K/UL — CRITICAL HIGH (ref 3.8–10.5)
WBC # BLD: 50.43 K/UL — CRITICAL HIGH (ref 3.8–10.5)
WBC # BLD: 8.17 K/UL — SIGNIFICANT CHANGE UP (ref 3.8–10.5)
WBC # BLD: 9.96 K/UL — SIGNIFICANT CHANGE UP (ref 3.8–10.5)
WBC # FLD AUTO: 10.06 K/UL — SIGNIFICANT CHANGE UP (ref 3.8–10.5)
WBC # FLD AUTO: 10.26 K/UL — SIGNIFICANT CHANGE UP (ref 3.8–10.5)
WBC # FLD AUTO: 12.11 K/UL — HIGH (ref 3.8–10.5)
WBC # FLD AUTO: 13.47 K/UL — HIGH (ref 3.8–10.5)
WBC # FLD AUTO: 14.05 K/UL — HIGH (ref 3.8–10.5)
WBC # FLD AUTO: 15.04 K/UL — HIGH (ref 3.8–10.5)
WBC # FLD AUTO: 15.42 K/UL — HIGH (ref 3.8–10.5)
WBC # FLD AUTO: 15.67 K/UL — HIGH (ref 3.8–10.5)
WBC # FLD AUTO: 16.11 K/UL — HIGH (ref 3.8–10.5)
WBC # FLD AUTO: 16.21 K/UL — HIGH (ref 3.8–10.5)
WBC # FLD AUTO: 16.49 K/UL — HIGH (ref 3.8–10.5)
WBC # FLD AUTO: 17.53 K/UL — HIGH (ref 3.8–10.5)
WBC # FLD AUTO: 17.72 K/UL — HIGH (ref 3.8–10.5)
WBC # FLD AUTO: 21.65 K/UL — HIGH (ref 3.8–10.5)
WBC # FLD AUTO: 21.92 K/UL — HIGH (ref 3.8–10.5)
WBC # FLD AUTO: 22.37 K/UL — HIGH (ref 3.8–10.5)
WBC # FLD AUTO: 24.86 K/UL — HIGH (ref 3.8–10.5)
WBC # FLD AUTO: 25.99 K/UL — HIGH (ref 3.8–10.5)
WBC # FLD AUTO: 26.8 K/UL — HIGH (ref 3.8–10.5)
WBC # FLD AUTO: 27.12 K/UL — HIGH (ref 3.8–10.5)
WBC # FLD AUTO: 27.3 K/UL — HIGH (ref 3.8–10.5)
WBC # FLD AUTO: 27.33 K/UL — HIGH (ref 3.8–10.5)
WBC # FLD AUTO: 28.78 K/UL — HIGH (ref 3.8–10.5)
WBC # FLD AUTO: 28.95 K/UL — HIGH (ref 3.8–10.5)
WBC # FLD AUTO: 29.49 K/UL — HIGH (ref 3.8–10.5)
WBC # FLD AUTO: 31.64 K/UL — HIGH (ref 3.8–10.5)
WBC # FLD AUTO: 32.39 K/UL — HIGH (ref 3.8–10.5)
WBC # FLD AUTO: 32.67 K/UL — HIGH (ref 3.8–10.5)
WBC # FLD AUTO: 34.98 K/UL — HIGH (ref 3.8–10.5)
WBC # FLD AUTO: 35.65 K/UL — HIGH (ref 3.8–10.5)
WBC # FLD AUTO: 36.06 K/UL — HIGH (ref 3.8–10.5)
WBC # FLD AUTO: 36.9 K/UL — HIGH (ref 3.8–10.5)
WBC # FLD AUTO: 37.94 K/UL — HIGH (ref 3.8–10.5)
WBC # FLD AUTO: 38.57 K/UL — HIGH (ref 3.8–10.5)
WBC # FLD AUTO: 39.21 K/UL — HIGH (ref 3.8–10.5)
WBC # FLD AUTO: 39.32 K/UL — HIGH (ref 3.8–10.5)
WBC # FLD AUTO: 39.44 K/UL — HIGH (ref 3.8–10.5)
WBC # FLD AUTO: 44.43 K/UL — CRITICAL HIGH (ref 3.8–10.5)
WBC # FLD AUTO: 50.43 K/UL — CRITICAL HIGH (ref 3.8–10.5)
WBC # FLD AUTO: 8.17 K/UL — SIGNIFICANT CHANGE UP (ref 3.8–10.5)
WBC # FLD AUTO: 9.96 K/UL — SIGNIFICANT CHANGE UP (ref 3.8–10.5)
WBC UR QL: >50

## 2022-01-01 PROCEDURE — 99233 SBSQ HOSP IP/OBS HIGH 50: CPT | Mod: GC

## 2022-01-01 PROCEDURE — 99233 SBSQ HOSP IP/OBS HIGH 50: CPT

## 2022-01-01 PROCEDURE — 85027 COMPLETE CBC AUTOMATED: CPT

## 2022-01-01 PROCEDURE — 86077 PHYS BLOOD BANK SERV XMATCH: CPT

## 2022-01-01 PROCEDURE — 87086 URINE CULTURE/COLONY COUNT: CPT

## 2022-01-01 PROCEDURE — P9040: CPT

## 2022-01-01 PROCEDURE — 80048 BASIC METABOLIC PNL TOTAL CA: CPT

## 2022-01-01 PROCEDURE — 82565 ASSAY OF CREATININE: CPT

## 2022-01-01 PROCEDURE — C1889: CPT

## 2022-01-01 PROCEDURE — 82435 ASSAY OF BLOOD CHLORIDE: CPT

## 2022-01-01 PROCEDURE — 84443 ASSAY THYROID STIM HORMONE: CPT

## 2022-01-01 PROCEDURE — 99232 SBSQ HOSP IP/OBS MODERATE 35: CPT

## 2022-01-01 PROCEDURE — 99223 1ST HOSP IP/OBS HIGH 75: CPT | Mod: GC

## 2022-01-01 PROCEDURE — 84100 ASSAY OF PHOSPHORUS: CPT

## 2022-01-01 PROCEDURE — 84436 ASSAY OF TOTAL THYROXINE: CPT

## 2022-01-01 PROCEDURE — 71250 CT THORAX DX C-: CPT | Mod: QQ

## 2022-01-01 PROCEDURE — 83735 ASSAY OF MAGNESIUM: CPT

## 2022-01-01 PROCEDURE — 36573 INSJ PICC RS&I 5 YR+: CPT

## 2022-01-01 PROCEDURE — 86922 COMPATIBILITY TEST ANTIGLOB: CPT

## 2022-01-01 PROCEDURE — P9047: CPT

## 2022-01-01 PROCEDURE — 87040 BLOOD CULTURE FOR BACTERIA: CPT

## 2022-01-01 PROCEDURE — 82962 GLUCOSE BLOOD TEST: CPT

## 2022-01-01 PROCEDURE — 80053 COMPREHEN METABOLIC PANEL: CPT

## 2022-01-01 PROCEDURE — 97110 THERAPEUTIC EXERCISES: CPT

## 2022-01-01 PROCEDURE — 88302 TISSUE EXAM BY PATHOLOGIST: CPT

## 2022-01-01 PROCEDURE — U0005: CPT

## 2022-01-01 PROCEDURE — 80162 ASSAY OF DIGOXIN TOTAL: CPT

## 2022-01-01 PROCEDURE — 85014 HEMATOCRIT: CPT

## 2022-01-01 PROCEDURE — 87077 CULTURE AEROBIC IDENTIFY: CPT

## 2022-01-01 PROCEDURE — 93971 EXTREMITY STUDY: CPT

## 2022-01-01 PROCEDURE — 87640 STAPH A DNA AMP PROBE: CPT

## 2022-01-01 PROCEDURE — 82746 ASSAY OF FOLIC ACID SERUM: CPT

## 2022-01-01 PROCEDURE — 82330 ASSAY OF CALCIUM: CPT

## 2022-01-01 PROCEDURE — 93971 EXTREMITY STUDY: CPT | Mod: 26,LT

## 2022-01-01 PROCEDURE — 86880 COOMBS TEST DIRECT: CPT

## 2022-01-01 PROCEDURE — 99232 SBSQ HOSP IP/OBS MODERATE 35: CPT | Mod: GC

## 2022-01-01 PROCEDURE — 84466 ASSAY OF TRANSFERRIN: CPT

## 2022-01-01 PROCEDURE — 74019 RADEX ABDOMEN 2 VIEWS: CPT | Mod: 26

## 2022-01-01 PROCEDURE — 84480 ASSAY TRIIODOTHYRONINE (T3): CPT

## 2022-01-01 PROCEDURE — 44130 BOWEL TO BOWEL FUSION: CPT | Mod: AS

## 2022-01-01 PROCEDURE — 80076 HEPATIC FUNCTION PANEL: CPT

## 2022-01-01 PROCEDURE — 85025 COMPLETE CBC W/AUTO DIFF WBC: CPT

## 2022-01-01 PROCEDURE — 82553 CREATINE MB FRACTION: CPT

## 2022-01-01 PROCEDURE — 71045 X-RAY EXAM CHEST 1 VIEW: CPT | Mod: 26

## 2022-01-01 PROCEDURE — 99291 CRITICAL CARE FIRST HOUR: CPT | Mod: CS

## 2022-01-01 PROCEDURE — 71250 CT THORAX DX C-: CPT | Mod: 26

## 2022-01-01 PROCEDURE — 93970 EXTREMITY STUDY: CPT

## 2022-01-01 PROCEDURE — 83540 ASSAY OF IRON: CPT

## 2022-01-01 PROCEDURE — 97116 GAIT TRAINING THERAPY: CPT

## 2022-01-01 PROCEDURE — 74176 CT ABD & PELVIS W/O CONTRAST: CPT | Mod: 26

## 2022-01-01 PROCEDURE — 74018 RADEX ABDOMEN 1 VIEW: CPT | Mod: 26

## 2022-01-01 PROCEDURE — 72100 X-RAY EXAM L-S SPINE 2/3 VWS: CPT

## 2022-01-01 PROCEDURE — 85018 HEMOGLOBIN: CPT

## 2022-01-01 PROCEDURE — 87641 MR-STAPH DNA AMP PROBE: CPT

## 2022-01-01 PROCEDURE — 82607 VITAMIN B-12: CPT

## 2022-01-01 PROCEDURE — 87186 SC STD MICRODIL/AGAR DIL: CPT

## 2022-01-01 PROCEDURE — 82728 ASSAY OF FERRITIN: CPT

## 2022-01-01 PROCEDURE — 85379 FIBRIN DEGRADATION QUANT: CPT

## 2022-01-01 PROCEDURE — 74176 CT ABD & PELVIS W/O CONTRAST: CPT

## 2022-01-01 PROCEDURE — 86870 RBC ANTIBODY IDENTIFICATION: CPT

## 2022-01-01 PROCEDURE — 87635 SARS-COV-2 COVID-19 AMP PRB: CPT

## 2022-01-01 PROCEDURE — 82947 ASSAY GLUCOSE BLOOD QUANT: CPT

## 2022-01-01 PROCEDURE — 99223 1ST HOSP IP/OBS HIGH 75: CPT

## 2022-01-01 PROCEDURE — 76937 US GUIDE VASCULAR ACCESS: CPT

## 2022-01-01 PROCEDURE — 71045 X-RAY EXAM CHEST 1 VIEW: CPT

## 2022-01-01 PROCEDURE — 83010 ASSAY OF HAPTOGLOBIN QUANT: CPT

## 2022-01-01 PROCEDURE — 97162 PT EVAL MOD COMPLEX 30 MIN: CPT

## 2022-01-01 PROCEDURE — 0001U RBC DNA HEA 35 AG 11 BLD GRP: CPT

## 2022-01-01 PROCEDURE — 83605 ASSAY OF LACTIC ACID: CPT

## 2022-01-01 PROCEDURE — 71045 X-RAY EXAM CHEST 1 VIEW: CPT | Mod: 26,76

## 2022-01-01 PROCEDURE — 84132 ASSAY OF SERUM POTASSIUM: CPT

## 2022-01-01 PROCEDURE — 82272 OCCULT BLD FECES 1-3 TESTS: CPT

## 2022-01-01 PROCEDURE — 99285 EMERGENCY DEPT VISIT HI MDM: CPT | Mod: 25

## 2022-01-01 PROCEDURE — 85045 AUTOMATED RETICULOCYTE COUNT: CPT

## 2022-01-01 PROCEDURE — 77001 FLUOROGUIDE FOR VEIN DEVICE: CPT

## 2022-01-01 PROCEDURE — 86850 RBC ANTIBODY SCREEN: CPT

## 2022-01-01 PROCEDURE — 84478 ASSAY OF TRIGLYCERIDES: CPT

## 2022-01-01 PROCEDURE — 93306 TTE W/DOPPLER COMPLETE: CPT | Mod: 26

## 2022-01-01 PROCEDURE — 88302 TISSUE EXAM BY PATHOLOGIST: CPT | Mod: 26

## 2022-01-01 PROCEDURE — 85610 PROTHROMBIN TIME: CPT

## 2022-01-01 PROCEDURE — C1751: CPT

## 2022-01-01 PROCEDURE — 97112 NEUROMUSCULAR REEDUCATION: CPT

## 2022-01-01 PROCEDURE — 86901 BLOOD TYPING SEROLOGIC RH(D): CPT

## 2022-01-01 PROCEDURE — 74176 CT ABD & PELVIS W/O CONTRAST: CPT | Mod: 26,MA

## 2022-01-01 PROCEDURE — 97164 PT RE-EVAL EST PLAN CARE: CPT

## 2022-01-01 PROCEDURE — 93005 ELECTROCARDIOGRAM TRACING: CPT

## 2022-01-01 PROCEDURE — 36415 COLL VENOUS BLD VENIPUNCTURE: CPT

## 2022-01-01 PROCEDURE — 81001 URINALYSIS AUTO W/SCOPE: CPT

## 2022-01-01 PROCEDURE — 74018 RADEX ABDOMEN 1 VIEW: CPT

## 2022-01-01 PROCEDURE — 99232 SBSQ HOSP IP/OBS MODERATE 35: CPT | Mod: 57

## 2022-01-01 PROCEDURE — 83615 LACTATE (LD) (LDH) ENZYME: CPT

## 2022-01-01 PROCEDURE — 85730 THROMBOPLASTIN TIME PARTIAL: CPT

## 2022-01-01 PROCEDURE — 84439 ASSAY OF FREE THYROXINE: CPT

## 2022-01-01 PROCEDURE — 99239 HOSP IP/OBS DSCHRG MGMT >30: CPT

## 2022-01-01 PROCEDURE — 36430 TRANSFUSION BLD/BLD COMPNT: CPT

## 2022-01-01 PROCEDURE — 96375 TX/PRO/DX INJ NEW DRUG ADDON: CPT

## 2022-01-01 PROCEDURE — 86900 BLOOD TYPING SEROLOGIC ABO: CPT

## 2022-01-01 PROCEDURE — 74176 CT ABD & PELVIS W/O CONTRAST: CPT | Mod: MA

## 2022-01-01 PROCEDURE — 93970 EXTREMITY STUDY: CPT | Mod: 26

## 2022-01-01 PROCEDURE — 99222 1ST HOSP IP/OBS MODERATE 55: CPT

## 2022-01-01 PROCEDURE — 83880 ASSAY OF NATRIURETIC PEPTIDE: CPT

## 2022-01-01 PROCEDURE — 99291 CRITICAL CARE FIRST HOUR: CPT | Mod: 24

## 2022-01-01 PROCEDURE — 97530 THERAPEUTIC ACTIVITIES: CPT

## 2022-01-01 PROCEDURE — 99292 CRITICAL CARE ADDL 30 MIN: CPT | Mod: CS

## 2022-01-01 PROCEDURE — 84300 ASSAY OF URINE SODIUM: CPT

## 2022-01-01 PROCEDURE — 93306 TTE W/DOPPLER COMPLETE: CPT

## 2022-01-01 PROCEDURE — 92610 EVALUATE SWALLOWING FUNCTION: CPT

## 2022-01-01 PROCEDURE — P9016: CPT

## 2022-01-01 PROCEDURE — 99285 EMERGENCY DEPT VISIT HI MDM: CPT

## 2022-01-01 PROCEDURE — 74019 RADEX ABDOMEN 2 VIEWS: CPT

## 2022-01-01 PROCEDURE — 84484 ASSAY OF TROPONIN QUANT: CPT

## 2022-01-01 PROCEDURE — 86860 RBC ANTIBODY ELUTION: CPT

## 2022-01-01 PROCEDURE — 76937 US GUIDE VASCULAR ACCESS: CPT | Mod: 26

## 2022-01-01 PROCEDURE — 84145 PROCALCITONIN (PCT): CPT

## 2022-01-01 PROCEDURE — 96374 THER/PROPH/DIAG INJ IV PUSH: CPT

## 2022-01-01 PROCEDURE — 82803 BLOOD GASES ANY COMBINATION: CPT

## 2022-01-01 PROCEDURE — 83690 ASSAY OF LIPASE: CPT

## 2022-01-01 PROCEDURE — 84295 ASSAY OF SERUM SODIUM: CPT

## 2022-01-01 PROCEDURE — U0003: CPT

## 2022-01-01 PROCEDURE — 99497 ADVNCD CARE PLAN 30 MIN: CPT | Mod: 25

## 2022-01-01 PROCEDURE — 82570 ASSAY OF URINE CREATININE: CPT

## 2022-01-01 PROCEDURE — 83550 IRON BINDING TEST: CPT

## 2022-01-01 PROCEDURE — 44130 BOWEL TO BOWEL FUSION: CPT

## 2022-01-01 PROCEDURE — 36573 INSJ PICC RS&I 5 YR+: CPT | Mod: 53

## 2022-01-01 PROCEDURE — 0225U NFCT DS DNA&RNA 21 SARSCOV2: CPT

## 2022-01-01 PROCEDURE — 72100 X-RAY EXAM L-S SPINE 2/3 VWS: CPT | Mod: 26

## 2022-01-01 PROCEDURE — 82550 ASSAY OF CK (CPK): CPT

## 2022-01-01 PROCEDURE — 83036 HEMOGLOBIN GLYCOSYLATED A1C: CPT

## 2022-01-01 PROCEDURE — 86923 COMPATIBILITY TEST ELECTRIC: CPT

## 2022-01-01 PROCEDURE — 99291 CRITICAL CARE FIRST HOUR: CPT

## 2022-01-01 PROCEDURE — 71250 CT THORAX DX C-: CPT

## 2022-01-01 PROCEDURE — 71045 X-RAY EXAM CHEST 1 VIEW: CPT | Mod: 26,77

## 2022-01-01 PROCEDURE — 82533 TOTAL CORTISOL: CPT

## 2022-01-01 DEVICE — MESH HERNIA VENTRALIGHT ST ELLIPSE 4X6IN: Type: IMPLANTABLE DEVICE | Status: FUNCTIONAL

## 2022-01-01 DEVICE — IMPLANTABLE DEVICE: Type: IMPLANTABLE DEVICE | Status: FUNCTIONAL

## 2022-01-01 DEVICE — MESH HERNIA VENTRALIGHT ST ELLIPSE 6X8IN: Type: IMPLANTABLE DEVICE | Status: FUNCTIONAL

## 2022-01-01 DEVICE — STAPLER COVIDIEN TRI-STAPLE 60MM TAN RELOAD: Type: IMPLANTABLE DEVICE | Status: FUNCTIONAL

## 2022-01-01 DEVICE — MESH HERNIA VENTRALIGHT ST CIRCLE 4.5IN: Type: IMPLANTABLE DEVICE | Status: FUNCTIONAL

## 2022-01-01 DEVICE — CLIP APPLIER COVIDIEN ENDOCLIP III 5MM: Type: IMPLANTABLE DEVICE | Status: FUNCTIONAL

## 2022-01-01 DEVICE — DEVICE ABSORBATACK 5MM 30 TACK FIXATION: Type: IMPLANTABLE DEVICE | Status: FUNCTIONAL

## 2022-01-01 RX ORDER — HYDROMORPHONE HYDROCHLORIDE 2 MG/ML
2 INJECTION INTRAMUSCULAR; INTRAVENOUS; SUBCUTANEOUS
Refills: 0 | Status: DISCONTINUED | OUTPATIENT
Start: 2022-01-01 | End: 2022-01-01

## 2022-01-01 RX ORDER — LEVOTHYROXINE SODIUM 125 MCG
25 TABLET ORAL
Refills: 0 | Status: DISCONTINUED | OUTPATIENT
Start: 2022-01-01 | End: 2022-01-01

## 2022-01-01 RX ORDER — MORPHINE SULFATE 50 MG/1
1 CAPSULE, EXTENDED RELEASE ORAL ONCE
Refills: 0 | Status: DISCONTINUED | OUTPATIENT
Start: 2022-01-01 | End: 2022-01-01

## 2022-01-01 RX ORDER — SENNA PLUS 8.6 MG/1
2 TABLET ORAL
Qty: 0 | Refills: 0 | DISCHARGE
Start: 2022-01-01

## 2022-01-01 RX ORDER — SODIUM CHLORIDE 9 MG/ML
500 INJECTION INTRAMUSCULAR; INTRAVENOUS; SUBCUTANEOUS ONCE
Refills: 0 | Status: COMPLETED | OUTPATIENT
Start: 2022-01-01 | End: 2022-01-01

## 2022-01-01 RX ORDER — MAGNESIUM HYDROXIDE 400 MG/1
15 TABLET, CHEWABLE ORAL AT BEDTIME
Refills: 0 | Status: DISCONTINUED | OUTPATIENT
Start: 2022-01-01 | End: 2022-01-01

## 2022-01-01 RX ORDER — ELECTROLYTE SOLUTION,INJ
1 VIAL (ML) INTRAVENOUS
Refills: 0 | Status: DISCONTINUED | OUTPATIENT
Start: 2022-01-01 | End: 2022-01-01

## 2022-01-01 RX ORDER — FUROSEMIDE 40 MG
20 TABLET ORAL ONCE
Refills: 0 | Status: COMPLETED | OUTPATIENT
Start: 2022-01-01 | End: 2022-01-01

## 2022-01-01 RX ORDER — POTASSIUM PHOSPHATE, MONOBASIC POTASSIUM PHOSPHATE, DIBASIC 236; 224 MG/ML; MG/ML
15 INJECTION, SOLUTION INTRAVENOUS ONCE
Refills: 0 | Status: COMPLETED | OUTPATIENT
Start: 2022-01-01 | End: 2022-01-01

## 2022-01-01 RX ORDER — ALBUMIN HUMAN 25 %
100 VIAL (ML) INTRAVENOUS ONCE
Refills: 0 | Status: COMPLETED | OUTPATIENT
Start: 2022-01-01 | End: 2022-01-01

## 2022-01-01 RX ORDER — SODIUM CHLORIDE 9 MG/ML
1000 INJECTION, SOLUTION INTRAVENOUS ONCE
Refills: 0 | Status: COMPLETED | OUTPATIENT
Start: 2022-01-01 | End: 2022-01-01

## 2022-01-01 RX ORDER — I.V. FAT EMULSION 20 G/100ML
0.76 EMULSION INTRAVENOUS
Qty: 50 | Refills: 0 | Status: DISCONTINUED | OUTPATIENT
Start: 2022-01-01 | End: 2022-01-01

## 2022-01-01 RX ORDER — MULTIVIT-MIN/FERROUS GLUCONATE 9 MG/15 ML
1 LIQUID (ML) ORAL DAILY
Refills: 0 | Status: DISCONTINUED | OUTPATIENT
Start: 2022-01-01 | End: 2022-01-01

## 2022-01-01 RX ORDER — GABAPENTIN 400 MG/1
200 CAPSULE ORAL AT BEDTIME
Refills: 0 | Status: DISCONTINUED | OUTPATIENT
Start: 2022-01-01 | End: 2022-01-01

## 2022-01-01 RX ORDER — POTASSIUM CHLORIDE 20 MEQ
10 PACKET (EA) ORAL
Refills: 0 | Status: COMPLETED | OUTPATIENT
Start: 2022-01-01 | End: 2022-01-01

## 2022-01-01 RX ORDER — SODIUM CHLORIDE 9 MG/ML
1000 INJECTION INTRAMUSCULAR; INTRAVENOUS; SUBCUTANEOUS
Refills: 0 | Status: DISCONTINUED | OUTPATIENT
Start: 2022-01-01 | End: 2022-01-01

## 2022-01-01 RX ORDER — METOPROLOL TARTRATE 50 MG
2.5 TABLET ORAL EVERY 6 HOURS
Refills: 0 | Status: DISCONTINUED | OUTPATIENT
Start: 2022-01-01 | End: 2022-01-01

## 2022-01-01 RX ORDER — METOPROLOL TARTRATE 50 MG
2.5 TABLET ORAL ONCE
Refills: 0 | Status: COMPLETED | OUTPATIENT
Start: 2022-01-01 | End: 2022-01-01

## 2022-01-01 RX ORDER — VANCOMYCIN HCL 1 G
1000 VIAL (EA) INTRAVENOUS ONCE
Refills: 0 | Status: COMPLETED | OUTPATIENT
Start: 2022-01-01 | End: 2022-01-01

## 2022-01-01 RX ORDER — SODIUM CHLORIDE 9 MG/ML
1000 INJECTION, SOLUTION INTRAVENOUS
Refills: 0 | Status: DISCONTINUED | OUTPATIENT
Start: 2022-01-01 | End: 2022-01-01

## 2022-01-01 RX ORDER — HYDROMORPHONE HYDROCHLORIDE 2 MG/ML
0.2 INJECTION INTRAMUSCULAR; INTRAVENOUS; SUBCUTANEOUS
Refills: 0 | Status: DISCONTINUED | OUTPATIENT
Start: 2022-01-01 | End: 2022-01-01

## 2022-01-01 RX ORDER — ACETAMINOPHEN 500 MG
1000 TABLET ORAL ONCE
Refills: 0 | Status: COMPLETED | OUTPATIENT
Start: 2022-01-01 | End: 2022-01-01

## 2022-01-01 RX ORDER — NOREPINEPHRINE BITARTRATE/D5W 8 MG/250ML
0.05 PLASTIC BAG, INJECTION (ML) INTRAVENOUS
Qty: 16 | Refills: 0 | Status: DISCONTINUED | OUTPATIENT
Start: 2022-01-01 | End: 2022-01-01

## 2022-01-01 RX ORDER — METRONIDAZOLE 500 MG
TABLET ORAL
Refills: 0 | Status: DISCONTINUED | OUTPATIENT
Start: 2022-01-01 | End: 2022-01-01

## 2022-01-01 RX ORDER — I.V. FAT EMULSION 20 G/100ML
0.38 EMULSION INTRAVENOUS
Qty: 25 | Refills: 0 | Status: DISCONTINUED | OUTPATIENT
Start: 2022-01-01 | End: 2022-01-01

## 2022-01-01 RX ORDER — HEPARIN SODIUM 5000 [USP'U]/ML
5000 INJECTION INTRAVENOUS; SUBCUTANEOUS EVERY 12 HOURS
Refills: 0 | Status: DISCONTINUED | OUTPATIENT
Start: 2022-01-01 | End: 2022-01-01

## 2022-01-01 RX ORDER — DIGOXIN 250 MCG
250 TABLET ORAL ONCE
Refills: 0 | Status: COMPLETED | OUTPATIENT
Start: 2022-01-01 | End: 2022-01-01

## 2022-01-01 RX ORDER — EPINEPHRINE 0.3 MG/.3ML
0.01 INJECTION INTRAMUSCULAR; SUBCUTANEOUS ONCE
Refills: 0 | Status: COMPLETED | OUTPATIENT
Start: 2022-01-01 | End: 2022-01-01

## 2022-01-01 RX ORDER — I.V. FAT EMULSION 20 G/100ML
0.38 EMULSION INTRAVENOUS
Qty: 50 | Refills: 0 | Status: DISCONTINUED | OUTPATIENT
Start: 2022-01-01 | End: 2022-01-01

## 2022-01-01 RX ORDER — CEFEPIME 1 G/1
2000 INJECTION, POWDER, FOR SOLUTION INTRAMUSCULAR; INTRAVENOUS ONCE
Refills: 0 | Status: COMPLETED | OUTPATIENT
Start: 2022-01-01 | End: 2022-01-01

## 2022-01-01 RX ORDER — METRONIDAZOLE 500 MG
500 TABLET ORAL EVERY 8 HOURS
Refills: 0 | Status: DISCONTINUED | OUTPATIENT
Start: 2022-01-01 | End: 2022-01-01

## 2022-01-01 RX ORDER — CIPROFLOXACIN LACTATE 400MG/40ML
1 VIAL (ML) INTRAVENOUS
Qty: 0 | Refills: 0 | DISCHARGE
Start: 2022-01-01

## 2022-01-01 RX ORDER — HYDROMORPHONE HYDROCHLORIDE 2 MG/ML
1 INJECTION INTRAMUSCULAR; INTRAVENOUS; SUBCUTANEOUS
Refills: 0 | Status: DISCONTINUED | OUTPATIENT
Start: 2022-01-01 | End: 2022-01-01

## 2022-01-01 RX ORDER — DIGOXIN 250 MCG
1 TABLET ORAL
Qty: 0 | Refills: 0 | DISCHARGE
Start: 2022-01-01

## 2022-01-01 RX ORDER — HYDROMORPHONE HYDROCHLORIDE 2 MG/ML
0.2 INJECTION INTRAMUSCULAR; INTRAVENOUS; SUBCUTANEOUS EVERY 4 HOURS
Refills: 0 | Status: DISCONTINUED | OUTPATIENT
Start: 2022-01-01 | End: 2022-01-01

## 2022-01-01 RX ORDER — PANTOPRAZOLE SODIUM 20 MG/1
40 TABLET, DELAYED RELEASE ORAL
Refills: 0 | Status: DISCONTINUED | OUTPATIENT
Start: 2022-01-01 | End: 2022-01-01

## 2022-01-01 RX ORDER — CEFPODOXIME PROXETIL 100 MG
200 TABLET ORAL EVERY 12 HOURS
Refills: 0 | Status: DISCONTINUED | OUTPATIENT
Start: 2022-01-01 | End: 2022-01-01

## 2022-01-01 RX ORDER — MIRTAZAPINE 45 MG/1
15 TABLET, ORALLY DISINTEGRATING ORAL AT BEDTIME
Refills: 0 | Status: DISCONTINUED | OUTPATIENT
Start: 2022-01-01 | End: 2022-01-01

## 2022-01-01 RX ORDER — HYDROMORPHONE HYDROCHLORIDE 2 MG/ML
0.5 INJECTION INTRAMUSCULAR; INTRAVENOUS; SUBCUTANEOUS
Refills: 0 | Status: DISCONTINUED | OUTPATIENT
Start: 2022-01-01 | End: 2022-01-01

## 2022-01-01 RX ORDER — MORPHINE SULFATE 50 MG/1
2 CAPSULE, EXTENDED RELEASE ORAL ONCE
Refills: 0 | Status: DISCONTINUED | OUTPATIENT
Start: 2022-01-01 | End: 2022-01-01

## 2022-01-01 RX ORDER — ACETAMINOPHEN 500 MG
650 TABLET ORAL ONCE
Refills: 0 | Status: COMPLETED | OUTPATIENT
Start: 2022-01-01 | End: 2022-01-01

## 2022-01-01 RX ORDER — ACETAMINOPHEN 500 MG
20.31 TABLET ORAL
Qty: 0 | Refills: 0 | DISCHARGE
Start: 2022-01-01

## 2022-01-01 RX ORDER — METOPROLOL TARTRATE 50 MG
1 TABLET ORAL
Qty: 0 | Refills: 0 | DISCHARGE

## 2022-01-01 RX ORDER — ONDANSETRON 8 MG/1
4 TABLET, FILM COATED ORAL EVERY 6 HOURS
Refills: 0 | Status: DISCONTINUED | OUTPATIENT
Start: 2022-01-01 | End: 2022-01-01

## 2022-01-01 RX ORDER — NYSTATIN CREAM 100000 [USP'U]/G
1 CREAM TOPICAL EVERY 8 HOURS
Refills: 0 | Status: DISCONTINUED | OUTPATIENT
Start: 2022-01-01 | End: 2022-01-01

## 2022-01-01 RX ORDER — METOPROLOL TARTRATE 50 MG
25 TABLET ORAL DAILY
Refills: 0 | Status: DISCONTINUED | OUTPATIENT
Start: 2022-01-01 | End: 2022-01-01

## 2022-01-01 RX ORDER — NYSTATIN CREAM 100000 [USP'U]/G
1 CREAM TOPICAL
Qty: 0 | Refills: 0 | DISCHARGE
Start: 2022-01-01

## 2022-01-01 RX ORDER — LEVOTHYROXINE SODIUM 125 MCG
50 TABLET ORAL DAILY
Refills: 0 | Status: DISCONTINUED | OUTPATIENT
Start: 2022-01-01 | End: 2022-01-01

## 2022-01-01 RX ORDER — BUMETANIDE 0.25 MG/ML
1 INJECTION INTRAMUSCULAR; INTRAVENOUS ONCE
Refills: 0 | Status: COMPLETED | OUTPATIENT
Start: 2022-01-01 | End: 2022-01-01

## 2022-01-01 RX ORDER — ACETAMINOPHEN 500 MG
2 TABLET ORAL
Qty: 0 | Refills: 0 | DISCHARGE

## 2022-01-01 RX ORDER — POTASSIUM CHLORIDE 20 MEQ
10 PACKET (EA) ORAL
Refills: 0 | Status: DISCONTINUED | OUTPATIENT
Start: 2022-01-01 | End: 2022-01-01

## 2022-01-01 RX ORDER — BUMETANIDE 0.25 MG/ML
2 INJECTION INTRAMUSCULAR; INTRAVENOUS EVERY 12 HOURS
Refills: 0 | Status: DISCONTINUED | OUTPATIENT
Start: 2022-01-01 | End: 2022-01-01

## 2022-01-01 RX ORDER — SODIUM CHLORIDE 9 MG/ML
500 INJECTION, SOLUTION INTRAVENOUS ONCE
Refills: 0 | Status: DISCONTINUED | OUTPATIENT
Start: 2022-01-01 | End: 2022-01-01

## 2022-01-01 RX ORDER — ACETAMINOPHEN 500 MG
650 TABLET ORAL EVERY 6 HOURS
Refills: 0 | Status: DISCONTINUED | OUTPATIENT
Start: 2022-01-01 | End: 2022-01-01

## 2022-01-01 RX ORDER — THIAMINE MONONITRATE (VIT B1) 100 MG
100 TABLET ORAL DAILY
Refills: 0 | Status: DISCONTINUED | OUTPATIENT
Start: 2022-01-01 | End: 2022-01-01

## 2022-01-01 RX ORDER — NOREPINEPHRINE BITARTRATE/D5W 8 MG/250ML
0.05 PLASTIC BAG, INJECTION (ML) INTRAVENOUS
Qty: 8 | Refills: 0 | Status: DISCONTINUED | OUTPATIENT
Start: 2022-01-01 | End: 2022-01-01

## 2022-01-01 RX ORDER — ALBUMIN HUMAN 25 %
100 VIAL (ML) INTRAVENOUS EVERY 8 HOURS
Refills: 0 | Status: COMPLETED | OUTPATIENT
Start: 2022-01-01 | End: 2022-01-01

## 2022-01-01 RX ORDER — I.V. FAT EMULSION 20 G/100ML
0 EMULSION INTRAVENOUS
Qty: 0 | Refills: 0 | DISCHARGE
Start: 2022-01-01

## 2022-01-01 RX ORDER — ESCITALOPRAM OXALATE 10 MG/1
5 TABLET, FILM COATED ORAL DAILY
Refills: 0 | Status: DISCONTINUED | OUTPATIENT
Start: 2022-01-01 | End: 2022-01-01

## 2022-01-01 RX ORDER — CEFTRIAXONE 500 MG/1
1000 INJECTION, POWDER, FOR SOLUTION INTRAMUSCULAR; INTRAVENOUS
Qty: 0 | Refills: 0 | DISCHARGE
Start: 2022-01-01 | End: 2022-01-01

## 2022-01-01 RX ORDER — SODIUM CHLORIDE 9 MG/ML
500 INJECTION, SOLUTION INTRAVENOUS ONCE
Refills: 0 | Status: COMPLETED | OUTPATIENT
Start: 2022-01-01 | End: 2022-01-01

## 2022-01-01 RX ORDER — METRONIDAZOLE 500 MG
500 TABLET ORAL ONCE
Refills: 0 | Status: COMPLETED | OUTPATIENT
Start: 2022-01-01 | End: 2022-01-01

## 2022-01-01 RX ORDER — DEXTROSE 50 % IN WATER 50 %
25 SYRINGE (ML) INTRAVENOUS ONCE
Refills: 0 | Status: DISCONTINUED | OUTPATIENT
Start: 2022-01-01 | End: 2022-01-01

## 2022-01-01 RX ORDER — LEVOTHYROXINE SODIUM 125 MCG
1 TABLET ORAL
Qty: 0 | Refills: 0 | DISCHARGE
Start: 2022-01-01

## 2022-01-01 RX ORDER — COLLAGENASE CLOSTRIDIUM HIST. 250 UNIT/G
1 OINTMENT (GRAM) TOPICAL
Refills: 0 | Status: DISCONTINUED | OUTPATIENT
Start: 2022-01-01 | End: 2022-01-01

## 2022-01-01 RX ORDER — PANTOPRAZOLE SODIUM 20 MG/1
40 TABLET, DELAYED RELEASE ORAL DAILY
Refills: 0 | Status: DISCONTINUED | OUTPATIENT
Start: 2022-01-01 | End: 2022-01-01

## 2022-01-01 RX ORDER — CEFTRIAXONE 500 MG/1
INJECTION, POWDER, FOR SOLUTION INTRAMUSCULAR; INTRAVENOUS
Refills: 0 | Status: DISCONTINUED | OUTPATIENT
Start: 2022-01-01 | End: 2022-01-01

## 2022-01-01 RX ORDER — DIGOXIN 250 MCG
125 TABLET ORAL DAILY
Refills: 0 | Status: DISCONTINUED | OUTPATIENT
Start: 2022-01-01 | End: 2022-01-01

## 2022-01-01 RX ORDER — MAGNESIUM SULFATE 500 MG/ML
1 VIAL (ML) INJECTION ONCE
Refills: 0 | Status: COMPLETED | OUTPATIENT
Start: 2022-01-01 | End: 2022-01-01

## 2022-01-01 RX ORDER — PANTOPRAZOLE SODIUM 20 MG/1
1 TABLET, DELAYED RELEASE ORAL
Qty: 0 | Refills: 0 | DISCHARGE
Start: 2022-01-01

## 2022-01-01 RX ORDER — DIPHENHYDRAMINE HCL 50 MG
50 CAPSULE ORAL ONCE
Refills: 0 | Status: DISCONTINUED | OUTPATIENT
Start: 2022-01-01 | End: 2022-01-01

## 2022-01-01 RX ORDER — ACETAMINOPHEN 500 MG
1000 TABLET ORAL EVERY 6 HOURS
Refills: 0 | Status: COMPLETED | OUTPATIENT
Start: 2022-01-01 | End: 2022-01-01

## 2022-01-01 RX ORDER — METOCLOPRAMIDE HCL 10 MG
10 TABLET ORAL ONCE
Refills: 0 | Status: COMPLETED | OUTPATIENT
Start: 2022-01-01 | End: 2022-01-01

## 2022-01-01 RX ORDER — INSULIN LISPRO 100/ML
VIAL (ML) SUBCUTANEOUS EVERY 6 HOURS
Refills: 0 | Status: DISCONTINUED | OUTPATIENT
Start: 2022-01-01 | End: 2022-01-01

## 2022-01-01 RX ORDER — IOHEXOL 300 MG/ML
30 INJECTION, SOLUTION INTRAVENOUS ONCE
Refills: 0 | Status: COMPLETED | OUTPATIENT
Start: 2022-01-01 | End: 2022-01-01

## 2022-01-01 RX ORDER — MIRTAZAPINE 45 MG/1
1 TABLET, ORALLY DISINTEGRATING ORAL
Qty: 0 | Refills: 0 | DISCHARGE
Start: 2022-01-01

## 2022-01-01 RX ORDER — CEFEPIME 1 G/1
1000 INJECTION, POWDER, FOR SOLUTION INTRAMUSCULAR; INTRAVENOUS ONCE
Refills: 0 | Status: DISCONTINUED | OUTPATIENT
Start: 2022-01-01 | End: 2022-01-01

## 2022-01-01 RX ORDER — SODIUM,POTASSIUM PHOSPHATES 278-250MG
1 POWDER IN PACKET (EA) ORAL ONCE
Refills: 0 | Status: COMPLETED | OUTPATIENT
Start: 2022-01-01 | End: 2022-01-01

## 2022-01-01 RX ORDER — MORPHINE SULFATE 50 MG/1
1 CAPSULE, EXTENDED RELEASE ORAL EVERY 4 HOURS
Refills: 0 | Status: DISCONTINUED | OUTPATIENT
Start: 2022-01-01 | End: 2022-01-01

## 2022-01-01 RX ORDER — LEVOTHYROXINE SODIUM 125 MCG
1 TABLET ORAL
Qty: 0 | Refills: 0 | DISCHARGE

## 2022-01-01 RX ORDER — HYDROMORPHONE HYDROCHLORIDE 2 MG/ML
0.8 INJECTION INTRAMUSCULAR; INTRAVENOUS; SUBCUTANEOUS
Refills: 0 | Status: DISCONTINUED | OUTPATIENT
Start: 2022-01-01 | End: 2022-01-01

## 2022-01-01 RX ORDER — FUROSEMIDE 40 MG
1 TABLET ORAL
Qty: 0 | Refills: 0 | DISCHARGE

## 2022-01-01 RX ORDER — DIGOXIN 250 MCG
500 TABLET ORAL ONCE
Refills: 0 | Status: COMPLETED | OUTPATIENT
Start: 2022-01-01 | End: 2022-01-01

## 2022-01-01 RX ORDER — ROBINUL 0.2 MG/ML
0.4 INJECTION INTRAMUSCULAR; INTRAVENOUS EVERY 6 HOURS
Refills: 0 | Status: DISCONTINUED | OUTPATIENT
Start: 2022-01-01 | End: 2022-01-01

## 2022-01-01 RX ORDER — ANASTROZOLE 1 MG/1
1 TABLET ORAL
Qty: 0 | Refills: 0 | DISCHARGE

## 2022-01-01 RX ORDER — IRON SUCROSE 20 MG/ML
200 INJECTION, SOLUTION INTRAVENOUS ONCE
Refills: 0 | Status: COMPLETED | OUTPATIENT
Start: 2022-01-01 | End: 2022-01-01

## 2022-01-01 RX ORDER — POLYETHYLENE GLYCOL 3350 17 G/17G
17 POWDER, FOR SOLUTION ORAL DAILY
Refills: 0 | Status: DISCONTINUED | OUTPATIENT
Start: 2022-01-01 | End: 2022-01-01

## 2022-01-01 RX ORDER — KETOROLAC TROMETHAMINE 30 MG/ML
15 SYRINGE (ML) INJECTION ONCE
Refills: 0 | Status: DISCONTINUED | OUTPATIENT
Start: 2022-01-01 | End: 2022-01-01

## 2022-01-01 RX ORDER — HEPARIN SODIUM 5000 [USP'U]/ML
5000 INJECTION INTRAVENOUS; SUBCUTANEOUS EVERY 8 HOURS
Refills: 0 | Status: DISCONTINUED | OUTPATIENT
Start: 2022-01-01 | End: 2022-01-01

## 2022-01-01 RX ORDER — NYSTATIN CREAM 100000 [USP'U]/G
1 CREAM TOPICAL
Refills: 0 | Status: DISCONTINUED | OUTPATIENT
Start: 2022-01-01 | End: 2022-01-01

## 2022-01-01 RX ORDER — SENNA PLUS 8.6 MG/1
2 TABLET ORAL AT BEDTIME
Refills: 0 | Status: DISCONTINUED | OUTPATIENT
Start: 2022-01-01 | End: 2022-01-01

## 2022-01-01 RX ORDER — GLUCAGON INJECTION, SOLUTION 0.5 MG/.1ML
1 INJECTION, SOLUTION SUBCUTANEOUS ONCE
Refills: 0 | Status: DISCONTINUED | OUTPATIENT
Start: 2022-01-01 | End: 2022-01-01

## 2022-01-01 RX ORDER — HYDROMORPHONE HYDROCHLORIDE 2 MG/ML
0.5 INJECTION INTRAMUSCULAR; INTRAVENOUS; SUBCUTANEOUS ONCE
Refills: 0 | Status: DISCONTINUED | OUTPATIENT
Start: 2022-01-01 | End: 2022-01-01

## 2022-01-01 RX ORDER — ONDANSETRON 8 MG/1
4 TABLET, FILM COATED ORAL EVERY 8 HOURS
Refills: 0 | Status: DISCONTINUED | OUTPATIENT
Start: 2022-01-01 | End: 2022-01-01

## 2022-01-01 RX ORDER — DIGOXIN 250 MCG
500 TABLET ORAL ONCE
Refills: 0 | Status: DISCONTINUED | OUTPATIENT
Start: 2022-01-01 | End: 2022-01-01

## 2022-01-01 RX ORDER — POLYETHYLENE GLYCOL 3350 17 G/17G
17 POWDER, FOR SOLUTION ORAL
Qty: 0 | Refills: 0 | DISCHARGE
Start: 2022-01-01

## 2022-01-01 RX ORDER — CEFTRIAXONE 500 MG/1
1000 INJECTION, POWDER, FOR SOLUTION INTRAMUSCULAR; INTRAVENOUS ONCE
Refills: 0 | Status: COMPLETED | OUTPATIENT
Start: 2022-01-01 | End: 2022-01-01

## 2022-01-01 RX ORDER — ALBUMIN HUMAN 25 %
50 VIAL (ML) INTRAVENOUS EVERY 8 HOURS
Refills: 0 | Status: COMPLETED | OUTPATIENT
Start: 2022-01-01 | End: 2022-01-01

## 2022-01-01 RX ORDER — MULTIVIT-MIN/FERROUS GLUCONATE 9 MG/15 ML
15 LIQUID (ML) ORAL
Qty: 0 | Refills: 0 | DISCHARGE

## 2022-01-01 RX ORDER — BUMETANIDE 0.25 MG/ML
0.25 INJECTION INTRAMUSCULAR; INTRAVENOUS
Qty: 20 | Refills: 0 | Status: DISCONTINUED | OUTPATIENT
Start: 2022-01-01 | End: 2022-01-01

## 2022-01-01 RX ORDER — HYDROMORPHONE HYDROCHLORIDE 2 MG/ML
0.5 INJECTION INTRAMUSCULAR; INTRAVENOUS; SUBCUTANEOUS EVERY 4 HOURS
Refills: 0 | Status: DISCONTINUED | OUTPATIENT
Start: 2022-01-01 | End: 2022-01-01

## 2022-01-01 RX ORDER — CEFEPIME 1 G/1
INJECTION, POWDER, FOR SOLUTION INTRAMUSCULAR; INTRAVENOUS
Refills: 0 | Status: DISCONTINUED | OUTPATIENT
Start: 2022-01-01 | End: 2022-01-01

## 2022-01-01 RX ORDER — AZTREONAM 2 G
1000 VIAL (EA) INJECTION EVERY 12 HOURS
Refills: 0 | Status: DISCONTINUED | OUTPATIENT
Start: 2022-01-01 | End: 2022-01-01

## 2022-01-01 RX ORDER — LANOLIN ALCOHOL/MO/W.PET/CERES
3 CREAM (GRAM) TOPICAL AT BEDTIME
Refills: 0 | Status: DISCONTINUED | OUTPATIENT
Start: 2022-01-01 | End: 2022-01-01

## 2022-01-01 RX ORDER — CEFTRIAXONE 500 MG/1
1000 INJECTION, POWDER, FOR SOLUTION INTRAMUSCULAR; INTRAVENOUS EVERY 24 HOURS
Refills: 0 | Status: DISCONTINUED | OUTPATIENT
Start: 2022-01-01 | End: 2022-01-01

## 2022-01-01 RX ORDER — ONDANSETRON 8 MG/1
1 TABLET, FILM COATED ORAL
Qty: 0 | Refills: 0 | DISCHARGE
Start: 2022-01-01

## 2022-01-01 RX ORDER — HYDROCORTISONE 20 MG
100 TABLET ORAL ONCE
Refills: 0 | Status: COMPLETED | OUTPATIENT
Start: 2022-01-01 | End: 2022-01-01

## 2022-01-01 RX ORDER — GABAPENTIN 400 MG/1
2 CAPSULE ORAL
Qty: 0 | Refills: 0 | DISCHARGE

## 2022-01-01 RX ORDER — ASCORBIC ACID 60 MG
500 TABLET,CHEWABLE ORAL DAILY
Refills: 0 | Status: DISCONTINUED | OUTPATIENT
Start: 2022-01-01 | End: 2022-01-01

## 2022-01-01 RX ORDER — APIXABAN 2.5 MG/1
1 TABLET, FILM COATED ORAL
Qty: 0 | Refills: 0 | DISCHARGE

## 2022-01-01 RX ORDER — CEFEPIME 1 G/1
2000 INJECTION, POWDER, FOR SOLUTION INTRAMUSCULAR; INTRAVENOUS EVERY 8 HOURS
Refills: 0 | Status: DISCONTINUED | OUTPATIENT
Start: 2022-01-01 | End: 2022-01-01

## 2022-01-01 RX ORDER — LEVOTHYROXINE SODIUM 125 MCG
25 TABLET ORAL DAILY
Refills: 0 | Status: DISCONTINUED | OUTPATIENT
Start: 2022-01-01 | End: 2022-01-01

## 2022-01-01 RX ORDER — LACTULOSE 10 G/15ML
15 SOLUTION ORAL
Qty: 0 | Refills: 0 | DISCHARGE

## 2022-01-01 RX ORDER — MIDODRINE HYDROCHLORIDE 2.5 MG/1
10 TABLET ORAL EVERY 8 HOURS
Refills: 0 | Status: DISCONTINUED | OUTPATIENT
Start: 2022-01-01 | End: 2022-01-01

## 2022-01-01 RX ORDER — CHLORHEXIDINE GLUCONATE 213 G/1000ML
1 SOLUTION TOPICAL DAILY
Refills: 0 | Status: DISCONTINUED | OUTPATIENT
Start: 2022-01-01 | End: 2022-01-01

## 2022-01-01 RX ORDER — ONDANSETRON 8 MG/1
4 TABLET, FILM COATED ORAL ONCE
Refills: 0 | Status: DISCONTINUED | OUTPATIENT
Start: 2022-01-01 | End: 2022-01-01

## 2022-01-01 RX ORDER — MUPIROCIN 20 MG/G
1 OINTMENT TOPICAL EVERY 12 HOURS
Refills: 0 | Status: DISCONTINUED | OUTPATIENT
Start: 2022-01-01 | End: 2022-01-01

## 2022-01-01 RX ORDER — HYDROMORPHONE HYDROCHLORIDE 2 MG/ML
0.5 INJECTION INTRAMUSCULAR; INTRAVENOUS; SUBCUTANEOUS EVERY 6 HOURS
Refills: 0 | Status: DISCONTINUED | OUTPATIENT
Start: 2022-01-01 | End: 2022-01-01

## 2022-01-01 RX ORDER — MAGNESIUM SULFATE 500 MG/ML
2 VIAL (ML) INJECTION ONCE
Refills: 0 | Status: COMPLETED | OUTPATIENT
Start: 2022-01-01 | End: 2022-01-01

## 2022-01-01 RX ORDER — AZTREONAM 2 G
1000 VIAL (EA) INJECTION EVERY 8 HOURS
Refills: 0 | Status: DISCONTINUED | OUTPATIENT
Start: 2022-01-01 | End: 2022-01-01

## 2022-01-01 RX ORDER — LANOLIN ALCOHOL/MO/W.PET/CERES
1 CREAM (GRAM) TOPICAL
Qty: 0 | Refills: 0 | DISCHARGE
Start: 2022-01-01

## 2022-01-01 RX ORDER — ANASTROZOLE 1 MG/1
1 TABLET ORAL DAILY
Refills: 0 | Status: DISCONTINUED | OUTPATIENT
Start: 2022-01-01 | End: 2022-01-01

## 2022-01-01 RX ORDER — CHLORHEXIDINE GLUCONATE 213 G/1000ML
1 SOLUTION TOPICAL
Refills: 0 | Status: DISCONTINUED | OUTPATIENT
Start: 2022-01-01 | End: 2022-01-01

## 2022-01-01 RX ORDER — DEXTROSE 50 % IN WATER 50 %
15 SYRINGE (ML) INTRAVENOUS ONCE
Refills: 0 | Status: DISCONTINUED | OUTPATIENT
Start: 2022-01-01 | End: 2022-01-01

## 2022-01-01 RX ORDER — ONDANSETRON 8 MG/1
4 TABLET, FILM COATED ORAL ONCE
Refills: 0 | Status: COMPLETED | OUTPATIENT
Start: 2022-01-01 | End: 2022-01-01

## 2022-01-01 RX ORDER — IOHEXOL 300 MG/ML
500 INJECTION, SOLUTION INTRAVENOUS
Refills: 0 | Status: DISCONTINUED | OUTPATIENT
Start: 2022-01-01 | End: 2022-01-01

## 2022-01-01 RX ORDER — IOHEXOL 300 MG/ML
30 INJECTION, SOLUTION INTRAVENOUS ONCE
Refills: 0 | Status: DISCONTINUED | OUTPATIENT
Start: 2022-01-01 | End: 2022-01-01

## 2022-01-01 RX ORDER — HYDROMORPHONE HYDROCHLORIDE 2 MG/ML
1 INJECTION INTRAMUSCULAR; INTRAVENOUS; SUBCUTANEOUS EVERY 4 HOURS
Refills: 0 | Status: DISCONTINUED | OUTPATIENT
Start: 2022-01-01 | End: 2022-01-01

## 2022-01-01 RX ORDER — PANTOPRAZOLE SODIUM 20 MG/1
40 TABLET, DELAYED RELEASE ORAL ONCE
Refills: 0 | Status: COMPLETED | OUTPATIENT
Start: 2022-01-01 | End: 2022-01-01

## 2022-01-01 RX ORDER — ESCITALOPRAM OXALATE 10 MG/1
1 TABLET, FILM COATED ORAL
Qty: 0 | Refills: 0 | DISCHARGE
Start: 2022-01-01

## 2022-01-01 RX ORDER — HYDROMORPHONE HYDROCHLORIDE 2 MG/ML
1 INJECTION INTRAMUSCULAR; INTRAVENOUS; SUBCUTANEOUS ONCE
Refills: 0 | Status: DISCONTINUED | OUTPATIENT
Start: 2022-01-01 | End: 2022-01-01

## 2022-01-01 RX ORDER — VANCOMYCIN HCL 1 G
1000 VIAL (EA) INTRAVENOUS EVERY 12 HOURS
Refills: 0 | Status: DISCONTINUED | OUTPATIENT
Start: 2022-01-01 | End: 2022-01-01

## 2022-01-01 RX ORDER — SODIUM CHLORIDE 9 MG/ML
10 INJECTION INTRAMUSCULAR; INTRAVENOUS; SUBCUTANEOUS
Refills: 0 | Status: DISCONTINUED | OUTPATIENT
Start: 2022-01-01 | End: 2022-01-01

## 2022-01-01 RX ORDER — SODIUM CHLORIDE 9 MG/ML
2000 INJECTION INTRAMUSCULAR; INTRAVENOUS; SUBCUTANEOUS ONCE
Refills: 0 | Status: DISCONTINUED | OUTPATIENT
Start: 2022-01-01 | End: 2022-01-01

## 2022-01-01 RX ORDER — FUROSEMIDE 40 MG
20 TABLET ORAL DAILY
Refills: 0 | Status: DISCONTINUED | OUTPATIENT
Start: 2022-01-01 | End: 2022-01-01

## 2022-01-01 RX ORDER — LANOLIN ALCOHOL/MO/W.PET/CERES
5 CREAM (GRAM) TOPICAL ONCE
Refills: 0 | Status: COMPLETED | OUTPATIENT
Start: 2022-01-01 | End: 2022-01-01

## 2022-01-01 RX ORDER — SODIUM CHLORIDE 9 MG/ML
1000 INJECTION INTRAMUSCULAR; INTRAVENOUS; SUBCUTANEOUS ONCE
Refills: 0 | Status: COMPLETED | OUTPATIENT
Start: 2022-01-01 | End: 2022-01-01

## 2022-01-01 RX ORDER — MORPHINE SULFATE 50 MG/1
2 CAPSULE, EXTENDED RELEASE ORAL EVERY 4 HOURS
Refills: 0 | Status: DISCONTINUED | OUTPATIENT
Start: 2022-01-01 | End: 2022-01-01

## 2022-01-01 RX ORDER — DEXTROSE 50 % IN WATER 50 %
12.5 SYRINGE (ML) INTRAVENOUS ONCE
Refills: 0 | Status: DISCONTINUED | OUTPATIENT
Start: 2022-01-01 | End: 2022-01-01

## 2022-01-01 RX ORDER — POLYETHYLENE GLYCOL 3350 17 G/17G
0 POWDER, FOR SOLUTION ORAL
Qty: 0 | Refills: 0 | DISCHARGE

## 2022-01-01 RX ORDER — ESCITALOPRAM OXALATE 10 MG/1
10 TABLET, FILM COATED ORAL DAILY
Refills: 0 | Status: DISCONTINUED | OUTPATIENT
Start: 2022-01-01 | End: 2022-01-01

## 2022-01-01 RX ADMIN — Medication 1 APPLICATION(S): at 06:04

## 2022-01-01 RX ADMIN — HYDROMORPHONE HYDROCHLORIDE 0.2 MILLIGRAM(S): 2 INJECTION INTRAMUSCULAR; INTRAVENOUS; SUBCUTANEOUS at 09:12

## 2022-01-01 RX ADMIN — HEPARIN SODIUM 5000 UNIT(S): 5000 INJECTION INTRAVENOUS; SUBCUTANEOUS at 13:24

## 2022-01-01 RX ADMIN — HYDROMORPHONE HYDROCHLORIDE 1 MILLIGRAM(S): 2 INJECTION INTRAMUSCULAR; INTRAVENOUS; SUBCUTANEOUS at 13:36

## 2022-01-01 RX ADMIN — Medication 400 MILLIGRAM(S): at 06:21

## 2022-01-01 RX ADMIN — Medication 100 MILLIEQUIVALENT(S): at 14:22

## 2022-01-01 RX ADMIN — SODIUM CHLORIDE 500 MILLILITER(S): 9 INJECTION INTRAMUSCULAR; INTRAVENOUS; SUBCUTANEOUS at 11:35

## 2022-01-01 RX ADMIN — HYDROMORPHONE HYDROCHLORIDE 0.5 MILLIGRAM(S): 2 INJECTION INTRAMUSCULAR; INTRAVENOUS; SUBCUTANEOUS at 05:20

## 2022-01-01 RX ADMIN — Medication 125 MICROGRAM(S): at 12:17

## 2022-01-01 RX ADMIN — Medication 3 MILLIGRAM(S): at 21:18

## 2022-01-01 RX ADMIN — Medication 0.5 MILLIGRAM(S): at 13:15

## 2022-01-01 RX ADMIN — MORPHINE SULFATE 1 MILLIGRAM(S): 50 CAPSULE, EXTENDED RELEASE ORAL at 05:25

## 2022-01-01 RX ADMIN — Medication 400 MILLIGRAM(S): at 15:13

## 2022-01-01 RX ADMIN — Medication 125 MICROGRAM(S): at 06:10

## 2022-01-01 RX ADMIN — Medication 650 MILLIGRAM(S): at 22:48

## 2022-01-01 RX ADMIN — Medication 125 MICROGRAM(S): at 05:50

## 2022-01-01 RX ADMIN — Medication 100 MILLIGRAM(S): at 15:14

## 2022-01-01 RX ADMIN — Medication 1 ENEMA: at 17:57

## 2022-01-01 RX ADMIN — Medication 1000 MILLIGRAM(S): at 19:20

## 2022-01-01 RX ADMIN — HYDROMORPHONE HYDROCHLORIDE 0.2 MILLIGRAM(S): 2 INJECTION INTRAMUSCULAR; INTRAVENOUS; SUBCUTANEOUS at 14:06

## 2022-01-01 RX ADMIN — Medication 50 MILLILITER(S): at 22:40

## 2022-01-01 RX ADMIN — MUPIROCIN 1 APPLICATION(S): 20 OINTMENT TOPICAL at 17:50

## 2022-01-01 RX ADMIN — NYSTATIN CREAM 1 APPLICATION(S): 100000 CREAM TOPICAL at 06:06

## 2022-01-01 RX ADMIN — HEPARIN SODIUM 5000 UNIT(S): 5000 INJECTION INTRAVENOUS; SUBCUTANEOUS at 21:06

## 2022-01-01 RX ADMIN — ANASTROZOLE 1 MILLIGRAM(S): 1 TABLET ORAL at 11:39

## 2022-01-01 RX ADMIN — MUPIROCIN 1 APPLICATION(S): 20 OINTMENT TOPICAL at 18:21

## 2022-01-01 RX ADMIN — Medication 50 MILLIGRAM(S): at 01:11

## 2022-01-01 RX ADMIN — Medication 50 MILLIGRAM(S): at 07:50

## 2022-01-01 RX ADMIN — ANASTROZOLE 1 MILLIGRAM(S): 1 TABLET ORAL at 13:48

## 2022-01-01 RX ADMIN — Medication 100 MILLIGRAM(S): at 13:22

## 2022-01-01 RX ADMIN — PANTOPRAZOLE SODIUM 40 MILLIGRAM(S): 20 TABLET, DELAYED RELEASE ORAL at 06:05

## 2022-01-01 RX ADMIN — HYDROMORPHONE HYDROCHLORIDE 0.5 MILLIGRAM(S): 2 INJECTION INTRAMUSCULAR; INTRAVENOUS; SUBCUTANEOUS at 22:38

## 2022-01-01 RX ADMIN — Medication 100 MILLIEQUIVALENT(S): at 12:40

## 2022-01-01 RX ADMIN — Medication 125 MICROGRAM(S): at 06:04

## 2022-01-01 RX ADMIN — Medication 100 MILLIGRAM(S): at 21:28

## 2022-01-01 RX ADMIN — CHLORHEXIDINE GLUCONATE 1 APPLICATION(S): 213 SOLUTION TOPICAL at 13:13

## 2022-01-01 RX ADMIN — IRON SUCROSE 200 MILLIGRAM(S): 20 INJECTION, SOLUTION INTRAVENOUS at 17:43

## 2022-01-01 RX ADMIN — CHLORHEXIDINE GLUCONATE 1 APPLICATION(S): 213 SOLUTION TOPICAL at 05:23

## 2022-01-01 RX ADMIN — Medication 1 APPLICATION(S): at 18:10

## 2022-01-01 RX ADMIN — Medication 0.2 MILLIGRAM(S): at 17:42

## 2022-01-01 RX ADMIN — MUPIROCIN 1 APPLICATION(S): 20 OINTMENT TOPICAL at 05:35

## 2022-01-01 RX ADMIN — Medication 25 MICROGRAM(S): at 05:05

## 2022-01-01 RX ADMIN — HYDROMORPHONE HYDROCHLORIDE 1 MILLIGRAM(S): 2 INJECTION INTRAMUSCULAR; INTRAVENOUS; SUBCUTANEOUS at 06:54

## 2022-01-01 RX ADMIN — HYDROMORPHONE HYDROCHLORIDE 0.2 MILLIGRAM(S): 2 INJECTION INTRAMUSCULAR; INTRAVENOUS; SUBCUTANEOUS at 21:38

## 2022-01-01 RX ADMIN — ONDANSETRON 4 MILLIGRAM(S): 8 TABLET, FILM COATED ORAL at 06:46

## 2022-01-01 RX ADMIN — Medication 1000 MILLIGRAM(S): at 13:00

## 2022-01-01 RX ADMIN — Medication 50 MILLIGRAM(S): at 13:17

## 2022-01-01 RX ADMIN — Medication 650 MILLIGRAM(S): at 06:15

## 2022-01-01 RX ADMIN — CHLORHEXIDINE GLUCONATE 1 APPLICATION(S): 213 SOLUTION TOPICAL at 11:18

## 2022-01-01 RX ADMIN — Medication 400 MILLIGRAM(S): at 12:42

## 2022-01-01 RX ADMIN — Medication 125 MICROGRAM(S): at 11:34

## 2022-01-01 RX ADMIN — Medication 100 GRAM(S): at 21:05

## 2022-01-01 RX ADMIN — HYDROMORPHONE HYDROCHLORIDE 1 MILLIGRAM(S): 2 INJECTION INTRAMUSCULAR; INTRAVENOUS; SUBCUTANEOUS at 07:29

## 2022-01-01 RX ADMIN — Medication 10 MILLIGRAM(S): at 02:27

## 2022-01-01 RX ADMIN — HYDROMORPHONE HYDROCHLORIDE 0.5 MILLIGRAM(S): 2 INJECTION INTRAMUSCULAR; INTRAVENOUS; SUBCUTANEOUS at 23:10

## 2022-01-01 RX ADMIN — HEPARIN SODIUM 5000 UNIT(S): 5000 INJECTION INTRAVENOUS; SUBCUTANEOUS at 05:26

## 2022-01-01 RX ADMIN — PANTOPRAZOLE SODIUM 40 MILLIGRAM(S): 20 TABLET, DELAYED RELEASE ORAL at 12:16

## 2022-01-01 RX ADMIN — MIRTAZAPINE 15 MILLIGRAM(S): 45 TABLET, ORALLY DISINTEGRATING ORAL at 21:17

## 2022-01-01 RX ADMIN — Medication 1000 MILLIGRAM(S): at 12:30

## 2022-01-01 RX ADMIN — Medication 500 MILLIGRAM(S): at 11:13

## 2022-01-01 RX ADMIN — HYDROMORPHONE HYDROCHLORIDE 0.2 MILLIGRAM(S): 2 INJECTION INTRAMUSCULAR; INTRAVENOUS; SUBCUTANEOUS at 00:39

## 2022-01-01 RX ADMIN — MIRTAZAPINE 15 MILLIGRAM(S): 45 TABLET, ORALLY DISINTEGRATING ORAL at 20:48

## 2022-01-01 RX ADMIN — MIRTAZAPINE 15 MILLIGRAM(S): 45 TABLET, ORALLY DISINTEGRATING ORAL at 21:18

## 2022-01-01 RX ADMIN — Medication 650 MILLIGRAM(S): at 23:10

## 2022-01-01 RX ADMIN — Medication 25 GRAM(S): at 01:45

## 2022-01-01 RX ADMIN — GABAPENTIN 200 MILLIGRAM(S): 400 CAPSULE ORAL at 21:41

## 2022-01-01 RX ADMIN — Medication 25 MICROGRAM(S): at 05:47

## 2022-01-01 RX ADMIN — Medication 50 MILLIGRAM(S): at 14:47

## 2022-01-01 RX ADMIN — MORPHINE SULFATE 2 MILLIGRAM(S): 50 CAPSULE, EXTENDED RELEASE ORAL at 03:13

## 2022-01-01 RX ADMIN — NYSTATIN CREAM 1 APPLICATION(S): 100000 CREAM TOPICAL at 18:10

## 2022-01-01 RX ADMIN — POLYETHYLENE GLYCOL 3350 17 GRAM(S): 17 POWDER, FOR SOLUTION ORAL at 15:23

## 2022-01-01 RX ADMIN — CEFEPIME 100 MILLIGRAM(S): 1 INJECTION, POWDER, FOR SOLUTION INTRAMUSCULAR; INTRAVENOUS at 16:15

## 2022-01-01 RX ADMIN — HEPARIN SODIUM 5000 UNIT(S): 5000 INJECTION INTRAVENOUS; SUBCUTANEOUS at 05:05

## 2022-01-01 RX ADMIN — ONDANSETRON 4 MILLIGRAM(S): 8 TABLET, FILM COATED ORAL at 14:03

## 2022-01-01 RX ADMIN — Medication 20 MILLIGRAM(S): at 11:25

## 2022-01-01 RX ADMIN — SODIUM CHLORIDE 1000 MILLILITER(S): 9 INJECTION, SOLUTION INTRAVENOUS at 00:01

## 2022-01-01 RX ADMIN — Medication 2.5 MILLIGRAM(S): at 06:20

## 2022-01-01 RX ADMIN — HYDROMORPHONE HYDROCHLORIDE 0.5 MILLIGRAM(S): 2 INJECTION INTRAMUSCULAR; INTRAVENOUS; SUBCUTANEOUS at 12:35

## 2022-01-01 RX ADMIN — Medication 100 MILLIGRAM(S): at 22:00

## 2022-01-01 RX ADMIN — Medication 25 MICROGRAM(S): at 06:33

## 2022-01-01 RX ADMIN — I.V. FAT EMULSION 5.21 GM/KG/DAY: 20 EMULSION INTRAVENOUS at 22:05

## 2022-01-01 RX ADMIN — Medication 20 MILLIGRAM(S): at 06:28

## 2022-01-01 RX ADMIN — Medication 100 MILLIGRAM(S): at 21:35

## 2022-01-01 RX ADMIN — ANASTROZOLE 1 MILLIGRAM(S): 1 TABLET ORAL at 10:05

## 2022-01-01 RX ADMIN — Medication 400 MILLIGRAM(S): at 11:39

## 2022-01-01 RX ADMIN — Medication 400 MILLIGRAM(S): at 22:44

## 2022-01-01 RX ADMIN — HYDROMORPHONE HYDROCHLORIDE 0.2 MILLIGRAM(S): 2 INJECTION INTRAMUSCULAR; INTRAVENOUS; SUBCUTANEOUS at 06:39

## 2022-01-01 RX ADMIN — Medication 125 MICROGRAM(S): at 05:47

## 2022-01-01 RX ADMIN — Medication 1000 MILLIGRAM(S): at 06:07

## 2022-01-01 RX ADMIN — Medication 50 MILLILITER(S): at 13:01

## 2022-01-01 RX ADMIN — HEPARIN SODIUM 5000 UNIT(S): 5000 INJECTION INTRAVENOUS; SUBCUTANEOUS at 05:44

## 2022-01-01 RX ADMIN — Medication 1 TABLET(S): at 11:14

## 2022-01-01 RX ADMIN — Medication 1 TABLET(S): at 11:40

## 2022-01-01 RX ADMIN — HEPARIN SODIUM 5000 UNIT(S): 5000 INJECTION INTRAVENOUS; SUBCUTANEOUS at 13:03

## 2022-01-01 RX ADMIN — Medication 50 MILLILITER(S): at 01:40

## 2022-01-01 RX ADMIN — HEPARIN SODIUM 5000 UNIT(S): 5000 INJECTION INTRAVENOUS; SUBCUTANEOUS at 21:02

## 2022-01-01 RX ADMIN — HEPARIN SODIUM 5000 UNIT(S): 5000 INJECTION INTRAVENOUS; SUBCUTANEOUS at 21:42

## 2022-01-01 RX ADMIN — Medication 25 MILLIGRAM(S): at 05:43

## 2022-01-01 RX ADMIN — HYDROMORPHONE HYDROCHLORIDE 0.5 MILLIGRAM(S): 2 INJECTION INTRAMUSCULAR; INTRAVENOUS; SUBCUTANEOUS at 01:56

## 2022-01-01 RX ADMIN — HYDROMORPHONE HYDROCHLORIDE 0.2 MILLIGRAM(S): 2 INJECTION INTRAMUSCULAR; INTRAVENOUS; SUBCUTANEOUS at 17:58

## 2022-01-01 RX ADMIN — Medication 2.5 MILLIGRAM(S): at 17:50

## 2022-01-01 RX ADMIN — HYDROMORPHONE HYDROCHLORIDE 0.5 MILLIGRAM(S): 2 INJECTION INTRAMUSCULAR; INTRAVENOUS; SUBCUTANEOUS at 06:03

## 2022-01-01 RX ADMIN — Medication 1 APPLICATION(S): at 17:23

## 2022-01-01 RX ADMIN — Medication 100 MILLIEQUIVALENT(S): at 13:30

## 2022-01-01 RX ADMIN — Medication 100 MILLIGRAM(S): at 21:42

## 2022-01-01 RX ADMIN — Medication 100 MILLIGRAM(S): at 13:02

## 2022-01-01 RX ADMIN — Medication 650 MILLIGRAM(S): at 21:19

## 2022-01-01 RX ADMIN — POLYETHYLENE GLYCOL 3350 17 GRAM(S): 17 POWDER, FOR SOLUTION ORAL at 17:43

## 2022-01-01 RX ADMIN — Medication 15 MILLIGRAM(S): at 23:51

## 2022-01-01 RX ADMIN — Medication 25 MICROGRAM(S): at 06:04

## 2022-01-01 RX ADMIN — Medication 125 MICROGRAM(S): at 06:09

## 2022-01-01 RX ADMIN — NYSTATIN CREAM 1 APPLICATION(S): 100000 CREAM TOPICAL at 05:46

## 2022-01-01 RX ADMIN — Medication 100 MILLIGRAM(S): at 21:21

## 2022-01-01 RX ADMIN — CHLORHEXIDINE GLUCONATE 1 APPLICATION(S): 213 SOLUTION TOPICAL at 11:46

## 2022-01-01 RX ADMIN — Medication 400 MILLIGRAM(S): at 17:34

## 2022-01-01 RX ADMIN — HYDROMORPHONE HYDROCHLORIDE 0.5 MILLIGRAM(S): 2 INJECTION INTRAMUSCULAR; INTRAVENOUS; SUBCUTANEOUS at 00:54

## 2022-01-01 RX ADMIN — Medication 650 MILLIGRAM(S): at 05:48

## 2022-01-01 RX ADMIN — HYDROMORPHONE HYDROCHLORIDE 1 MILLIGRAM(S): 2 INJECTION INTRAMUSCULAR; INTRAVENOUS; SUBCUTANEOUS at 11:01

## 2022-01-01 RX ADMIN — HEPARIN SODIUM 5000 UNIT(S): 5000 INJECTION INTRAVENOUS; SUBCUTANEOUS at 17:56

## 2022-01-01 RX ADMIN — Medication 100 MILLIGRAM(S): at 02:01

## 2022-01-01 RX ADMIN — Medication 100 MILLIGRAM(S): at 11:51

## 2022-01-01 RX ADMIN — Medication 50 MILLILITER(S): at 22:36

## 2022-01-01 RX ADMIN — GABAPENTIN 200 MILLIGRAM(S): 400 CAPSULE ORAL at 22:41

## 2022-01-01 RX ADMIN — HYDROMORPHONE HYDROCHLORIDE 1 MILLIGRAM(S): 2 INJECTION INTRAMUSCULAR; INTRAVENOUS; SUBCUTANEOUS at 03:51

## 2022-01-01 RX ADMIN — ANASTROZOLE 1 MILLIGRAM(S): 1 TABLET ORAL at 11:13

## 2022-01-01 RX ADMIN — Medication 100 MILLIEQUIVALENT(S): at 11:40

## 2022-01-01 RX ADMIN — CHLORHEXIDINE GLUCONATE 1 APPLICATION(S): 213 SOLUTION TOPICAL at 05:11

## 2022-01-01 RX ADMIN — Medication 400 MILLIGRAM(S): at 05:00

## 2022-01-01 RX ADMIN — SENNA PLUS 2 TABLET(S): 8.6 TABLET ORAL at 20:46

## 2022-01-01 RX ADMIN — Medication 125 MICROGRAM(S): at 13:24

## 2022-01-01 RX ADMIN — Medication 100 MILLIEQUIVALENT(S): at 09:29

## 2022-01-01 RX ADMIN — Medication 650 MILLIGRAM(S): at 21:41

## 2022-01-01 RX ADMIN — PANTOPRAZOLE SODIUM 40 MILLIGRAM(S): 20 TABLET, DELAYED RELEASE ORAL at 06:09

## 2022-01-01 RX ADMIN — Medication 50 MILLILITER(S): at 19:10

## 2022-01-01 RX ADMIN — MIRTAZAPINE 15 MILLIGRAM(S): 45 TABLET, ORALLY DISINTEGRATING ORAL at 21:55

## 2022-01-01 RX ADMIN — Medication 1000 MILLIGRAM(S): at 18:48

## 2022-01-01 RX ADMIN — CEFTRIAXONE 100 MILLIGRAM(S): 500 INJECTION, POWDER, FOR SOLUTION INTRAMUSCULAR; INTRAVENOUS at 13:29

## 2022-01-01 RX ADMIN — Medication 30 MILLILITER(S): at 05:32

## 2022-01-01 RX ADMIN — SODIUM CHLORIDE 75 MILLILITER(S): 9 INJECTION, SOLUTION INTRAVENOUS at 17:07

## 2022-01-01 RX ADMIN — Medication 10 MILLIGRAM(S): at 06:44

## 2022-01-01 RX ADMIN — HYDROMORPHONE HYDROCHLORIDE 0.2 MILLIGRAM(S): 2 INJECTION INTRAMUSCULAR; INTRAVENOUS; SUBCUTANEOUS at 01:22

## 2022-01-01 RX ADMIN — SODIUM CHLORIDE 500 MILLILITER(S): 9 INJECTION INTRAMUSCULAR; INTRAVENOUS; SUBCUTANEOUS at 11:03

## 2022-01-01 RX ADMIN — MUPIROCIN 1 APPLICATION(S): 20 OINTMENT TOPICAL at 18:53

## 2022-01-01 RX ADMIN — MUPIROCIN 1 APPLICATION(S): 20 OINTMENT TOPICAL at 05:26

## 2022-01-01 RX ADMIN — Medication 50 MILLILITER(S): at 18:58

## 2022-01-01 RX ADMIN — Medication 400 MILLIGRAM(S): at 09:26

## 2022-01-01 RX ADMIN — HYDROMORPHONE HYDROCHLORIDE 0.2 MILLIGRAM(S): 2 INJECTION INTRAMUSCULAR; INTRAVENOUS; SUBCUTANEOUS at 13:36

## 2022-01-01 RX ADMIN — ONDANSETRON 4 MILLIGRAM(S): 8 TABLET, FILM COATED ORAL at 02:04

## 2022-01-01 RX ADMIN — Medication 650 MILLIGRAM(S): at 19:57

## 2022-01-01 RX ADMIN — Medication 100 MILLIEQUIVALENT(S): at 11:07

## 2022-01-01 RX ADMIN — Medication 125 MICROGRAM(S): at 13:49

## 2022-01-01 RX ADMIN — Medication 0.5 MILLIGRAM(S): at 09:12

## 2022-01-01 RX ADMIN — Medication 100 MILLIGRAM(S): at 00:08

## 2022-01-01 RX ADMIN — CHLORHEXIDINE GLUCONATE 1 APPLICATION(S): 213 SOLUTION TOPICAL at 12:07

## 2022-01-01 RX ADMIN — Medication 100 MILLIGRAM(S): at 05:34

## 2022-01-01 RX ADMIN — Medication 125 MICROGRAM(S): at 12:03

## 2022-01-01 RX ADMIN — HYDROMORPHONE HYDROCHLORIDE 0.2 MILLIGRAM(S): 2 INJECTION INTRAMUSCULAR; INTRAVENOUS; SUBCUTANEOUS at 01:52

## 2022-01-01 RX ADMIN — Medication 50 MILLIGRAM(S): at 14:06

## 2022-01-01 RX ADMIN — HYDROMORPHONE HYDROCHLORIDE 0.5 MILLIGRAM(S): 2 INJECTION INTRAMUSCULAR; INTRAVENOUS; SUBCUTANEOUS at 05:07

## 2022-01-01 RX ADMIN — HYDROMORPHONE HYDROCHLORIDE 0.2 MILLIGRAM(S): 2 INJECTION INTRAMUSCULAR; INTRAVENOUS; SUBCUTANEOUS at 01:40

## 2022-01-01 RX ADMIN — HYDROMORPHONE HYDROCHLORIDE 0.2 MILLIGRAM(S): 2 INJECTION INTRAMUSCULAR; INTRAVENOUS; SUBCUTANEOUS at 05:35

## 2022-01-01 RX ADMIN — POTASSIUM PHOSPHATE, MONOBASIC POTASSIUM PHOSPHATE, DIBASIC 62.5 MILLIMOLE(S): 236; 224 INJECTION, SOLUTION INTRAVENOUS at 17:40

## 2022-01-01 RX ADMIN — POLYETHYLENE GLYCOL 3350 17 GRAM(S): 17 POWDER, FOR SOLUTION ORAL at 11:09

## 2022-01-01 RX ADMIN — HYDROMORPHONE HYDROCHLORIDE 1 MILLIGRAM(S): 2 INJECTION INTRAMUSCULAR; INTRAVENOUS; SUBCUTANEOUS at 09:12

## 2022-01-01 RX ADMIN — ANASTROZOLE 1 MILLIGRAM(S): 1 TABLET ORAL at 12:41

## 2022-01-01 RX ADMIN — GABAPENTIN 200 MILLIGRAM(S): 400 CAPSULE ORAL at 20:45

## 2022-01-01 RX ADMIN — Medication 50 MILLIGRAM(S): at 15:14

## 2022-01-01 RX ADMIN — CEFEPIME 100 MILLIGRAM(S): 1 INJECTION, POWDER, FOR SOLUTION INTRAMUSCULAR; INTRAVENOUS at 21:03

## 2022-01-01 RX ADMIN — ANASTROZOLE 1 MILLIGRAM(S): 1 TABLET ORAL at 12:06

## 2022-01-01 RX ADMIN — Medication 0.5 MILLIGRAM(S): at 21:47

## 2022-01-01 RX ADMIN — Medication 25 MICROGRAM(S): at 06:03

## 2022-01-01 RX ADMIN — Medication 1000 MILLIGRAM(S): at 10:30

## 2022-01-01 RX ADMIN — HYDROMORPHONE HYDROCHLORIDE 0.5 MILLIGRAM(S): 2 INJECTION INTRAMUSCULAR; INTRAVENOUS; SUBCUTANEOUS at 12:25

## 2022-01-01 RX ADMIN — Medication 100 MILLIGRAM(S): at 09:59

## 2022-01-01 RX ADMIN — Medication 1 TABLET(S): at 11:48

## 2022-01-01 RX ADMIN — PANTOPRAZOLE SODIUM 40 MILLIGRAM(S): 20 TABLET, DELAYED RELEASE ORAL at 06:04

## 2022-01-01 RX ADMIN — SODIUM CHLORIDE 75 MILLILITER(S): 9 INJECTION, SOLUTION INTRAVENOUS at 13:34

## 2022-01-01 RX ADMIN — HYDROMORPHONE HYDROCHLORIDE 0.5 MILLIGRAM(S): 2 INJECTION INTRAMUSCULAR; INTRAVENOUS; SUBCUTANEOUS at 15:40

## 2022-01-01 RX ADMIN — PANTOPRAZOLE SODIUM 40 MILLIGRAM(S): 20 TABLET, DELAYED RELEASE ORAL at 06:10

## 2022-01-01 RX ADMIN — Medication 400 MILLIGRAM(S): at 03:10

## 2022-01-01 RX ADMIN — HYDROMORPHONE HYDROCHLORIDE 0.2 MILLIGRAM(S): 2 INJECTION INTRAMUSCULAR; INTRAVENOUS; SUBCUTANEOUS at 02:32

## 2022-01-01 RX ADMIN — Medication 125 MICROGRAM(S): at 06:03

## 2022-01-01 RX ADMIN — HYDROMORPHONE HYDROCHLORIDE 0.2 MILLIGRAM(S): 2 INJECTION INTRAMUSCULAR; INTRAVENOUS; SUBCUTANEOUS at 18:37

## 2022-01-01 RX ADMIN — MIRTAZAPINE 15 MILLIGRAM(S): 45 TABLET, ORALLY DISINTEGRATING ORAL at 21:41

## 2022-01-01 RX ADMIN — ANASTROZOLE 1 MILLIGRAM(S): 1 TABLET ORAL at 12:14

## 2022-01-01 RX ADMIN — Medication 100 MILLIGRAM(S): at 14:07

## 2022-01-01 RX ADMIN — MUPIROCIN 1 APPLICATION(S): 20 OINTMENT TOPICAL at 18:40

## 2022-01-01 RX ADMIN — Medication 1000 MILLIGRAM(S): at 23:15

## 2022-01-01 RX ADMIN — HYDROMORPHONE HYDROCHLORIDE 1 MILLIGRAM(S): 2 INJECTION INTRAMUSCULAR; INTRAVENOUS; SUBCUTANEOUS at 08:07

## 2022-01-01 RX ADMIN — Medication 5 MILLIGRAM(S): at 01:37

## 2022-01-01 RX ADMIN — SODIUM CHLORIDE 75 MILLILITER(S): 9 INJECTION, SOLUTION INTRAVENOUS at 01:15

## 2022-01-01 RX ADMIN — Medication 500 MILLIGRAM(S): at 12:01

## 2022-01-01 RX ADMIN — MIDODRINE HYDROCHLORIDE 10 MILLIGRAM(S): 2.5 TABLET ORAL at 14:08

## 2022-01-01 RX ADMIN — Medication 100 MILLIEQUIVALENT(S): at 01:42

## 2022-01-01 RX ADMIN — CHLORHEXIDINE GLUCONATE 1 APPLICATION(S): 213 SOLUTION TOPICAL at 13:11

## 2022-01-01 RX ADMIN — Medication 2.5 MILLIGRAM(S): at 13:15

## 2022-01-01 RX ADMIN — ESCITALOPRAM OXALATE 10 MILLIGRAM(S): 10 TABLET, FILM COATED ORAL at 11:58

## 2022-01-01 RX ADMIN — ANASTROZOLE 1 MILLIGRAM(S): 1 TABLET ORAL at 11:36

## 2022-01-01 RX ADMIN — Medication 20 MILLIGRAM(S): at 17:27

## 2022-01-01 RX ADMIN — Medication 400 MILLIGRAM(S): at 19:07

## 2022-01-01 RX ADMIN — HYDROMORPHONE HYDROCHLORIDE 1 MILLIGRAM(S): 2 INJECTION INTRAMUSCULAR; INTRAVENOUS; SUBCUTANEOUS at 21:48

## 2022-01-01 RX ADMIN — Medication 20 MILLIGRAM(S): at 19:58

## 2022-01-01 RX ADMIN — HEPARIN SODIUM 5000 UNIT(S): 5000 INJECTION INTRAVENOUS; SUBCUTANEOUS at 19:19

## 2022-01-01 RX ADMIN — SODIUM CHLORIDE 75 MILLILITER(S): 9 INJECTION INTRAMUSCULAR; INTRAVENOUS; SUBCUTANEOUS at 04:24

## 2022-01-01 RX ADMIN — Medication 0.5 MILLIGRAM(S): at 05:24

## 2022-01-01 RX ADMIN — HYDROMORPHONE HYDROCHLORIDE 0.2 MILLIGRAM(S): 2 INJECTION INTRAMUSCULAR; INTRAVENOUS; SUBCUTANEOUS at 15:00

## 2022-01-01 RX ADMIN — MORPHINE SULFATE 1 MILLIGRAM(S): 50 CAPSULE, EXTENDED RELEASE ORAL at 05:10

## 2022-01-01 RX ADMIN — PANTOPRAZOLE SODIUM 40 MILLIGRAM(S): 20 TABLET, DELAYED RELEASE ORAL at 12:41

## 2022-01-01 RX ADMIN — MUPIROCIN 1 APPLICATION(S): 20 OINTMENT TOPICAL at 05:52

## 2022-01-01 RX ADMIN — ESCITALOPRAM OXALATE 10 MILLIGRAM(S): 10 TABLET, FILM COATED ORAL at 12:28

## 2022-01-01 RX ADMIN — HYDROMORPHONE HYDROCHLORIDE 1 MILLIGRAM(S): 2 INJECTION INTRAMUSCULAR; INTRAVENOUS; SUBCUTANEOUS at 17:11

## 2022-01-01 RX ADMIN — Medication 25 MICROGRAM(S): at 05:29

## 2022-01-01 RX ADMIN — Medication 6.81 MICROGRAM(S)/KG/MIN: at 03:14

## 2022-01-01 RX ADMIN — CHLORHEXIDINE GLUCONATE 1 APPLICATION(S): 213 SOLUTION TOPICAL at 05:06

## 2022-01-01 RX ADMIN — Medication 1 MILLIGRAM(S): at 15:49

## 2022-01-01 RX ADMIN — ONDANSETRON 4 MILLIGRAM(S): 8 TABLET, FILM COATED ORAL at 20:34

## 2022-01-01 RX ADMIN — SODIUM CHLORIDE 75 MILLILITER(S): 9 INJECTION, SOLUTION INTRAVENOUS at 18:40

## 2022-01-01 RX ADMIN — PANTOPRAZOLE SODIUM 40 MILLIGRAM(S): 20 TABLET, DELAYED RELEASE ORAL at 06:44

## 2022-01-01 RX ADMIN — Medication 100 MILLIGRAM(S): at 13:24

## 2022-01-01 RX ADMIN — Medication 125 MICROGRAM(S): at 12:50

## 2022-01-01 RX ADMIN — ESCITALOPRAM OXALATE 10 MILLIGRAM(S): 10 TABLET, FILM COATED ORAL at 11:40

## 2022-01-01 RX ADMIN — Medication 650 MILLIGRAM(S): at 05:44

## 2022-01-01 RX ADMIN — MUPIROCIN 1 APPLICATION(S): 20 OINTMENT TOPICAL at 05:27

## 2022-01-01 RX ADMIN — PANTOPRAZOLE SODIUM 40 MILLIGRAM(S): 20 TABLET, DELAYED RELEASE ORAL at 12:31

## 2022-01-01 RX ADMIN — HYDROMORPHONE HYDROCHLORIDE 0.2 MILLIGRAM(S): 2 INJECTION INTRAMUSCULAR; INTRAVENOUS; SUBCUTANEOUS at 02:12

## 2022-01-01 RX ADMIN — Medication 650 MILLIGRAM(S): at 18:46

## 2022-01-01 RX ADMIN — SENNA PLUS 2 TABLET(S): 8.6 TABLET ORAL at 21:17

## 2022-01-01 RX ADMIN — Medication 650 MILLIGRAM(S): at 10:51

## 2022-01-01 RX ADMIN — NYSTATIN CREAM 1 APPLICATION(S): 100000 CREAM TOPICAL at 06:03

## 2022-01-01 RX ADMIN — SODIUM CHLORIDE 1000 MILLILITER(S): 9 INJECTION, SOLUTION INTRAVENOUS at 10:13

## 2022-01-01 RX ADMIN — HEPARIN SODIUM 5000 UNIT(S): 5000 INJECTION INTRAVENOUS; SUBCUTANEOUS at 05:01

## 2022-01-01 RX ADMIN — Medication 125 MICROGRAM(S): at 11:18

## 2022-01-01 RX ADMIN — I.V. FAT EMULSION 5.21 GM/KG/DAY: 20 EMULSION INTRAVENOUS at 12:44

## 2022-01-01 RX ADMIN — Medication 25 MICROGRAM(S): at 05:35

## 2022-01-01 RX ADMIN — ANASTROZOLE 1 MILLIGRAM(S): 1 TABLET ORAL at 12:12

## 2022-01-01 RX ADMIN — Medication 650 MILLIGRAM(S): at 23:29

## 2022-01-01 RX ADMIN — HEPARIN SODIUM 5000 UNIT(S): 5000 INJECTION INTRAVENOUS; SUBCUTANEOUS at 14:27

## 2022-01-01 RX ADMIN — Medication 650 MILLIGRAM(S): at 21:48

## 2022-01-01 RX ADMIN — HEPARIN SODIUM 5000 UNIT(S): 5000 INJECTION INTRAVENOUS; SUBCUTANEOUS at 21:17

## 2022-01-01 RX ADMIN — I.V. FAT EMULSION 20.8 GM/KG/DAY: 20 EMULSION INTRAVENOUS at 22:28

## 2022-01-01 RX ADMIN — Medication 20 MILLIGRAM(S): at 10:58

## 2022-01-01 RX ADMIN — HYDROMORPHONE HYDROCHLORIDE 0.2 MILLIGRAM(S): 2 INJECTION INTRAMUSCULAR; INTRAVENOUS; SUBCUTANEOUS at 22:55

## 2022-01-01 RX ADMIN — Medication 650 MILLIGRAM(S): at 22:00

## 2022-01-01 RX ADMIN — ONDANSETRON 4 MILLIGRAM(S): 8 TABLET, FILM COATED ORAL at 19:11

## 2022-01-01 RX ADMIN — Medication 20 MILLIGRAM(S): at 10:18

## 2022-01-01 RX ADMIN — SENNA PLUS 2 TABLET(S): 8.6 TABLET ORAL at 21:02

## 2022-01-01 RX ADMIN — Medication 400 MILLIGRAM(S): at 01:17

## 2022-01-01 RX ADMIN — Medication 125 MICROGRAM(S): at 05:45

## 2022-01-01 RX ADMIN — Medication 250 MICROGRAM(S): at 05:08

## 2022-01-01 RX ADMIN — PANTOPRAZOLE SODIUM 40 MILLIGRAM(S): 20 TABLET, DELAYED RELEASE ORAL at 06:03

## 2022-01-01 RX ADMIN — Medication 25 MILLIGRAM(S): at 12:51

## 2022-01-01 RX ADMIN — Medication 100 MILLIGRAM(S): at 18:12

## 2022-01-01 RX ADMIN — Medication 100 MILLIGRAM(S): at 13:19

## 2022-01-01 RX ADMIN — SODIUM CHLORIDE 85 MILLILITER(S): 9 INJECTION, SOLUTION INTRAVENOUS at 12:26

## 2022-01-01 RX ADMIN — CHLORHEXIDINE GLUCONATE 1 APPLICATION(S): 213 SOLUTION TOPICAL at 06:11

## 2022-01-01 RX ADMIN — Medication 650 MILLIGRAM(S): at 13:00

## 2022-01-01 RX ADMIN — BUMETANIDE 1 MILLIGRAM(S): 0.25 INJECTION INTRAMUSCULAR; INTRAVENOUS at 03:14

## 2022-01-01 RX ADMIN — HYDROMORPHONE HYDROCHLORIDE 0.5 MILLIGRAM(S): 2 INJECTION INTRAMUSCULAR; INTRAVENOUS; SUBCUTANEOUS at 15:55

## 2022-01-01 RX ADMIN — HYDROMORPHONE HYDROCHLORIDE 2 MILLIGRAM(S): 2 INJECTION INTRAMUSCULAR; INTRAVENOUS; SUBCUTANEOUS at 08:30

## 2022-01-01 RX ADMIN — ONDANSETRON 4 MILLIGRAM(S): 8 TABLET, FILM COATED ORAL at 00:05

## 2022-01-01 RX ADMIN — NYSTATIN CREAM 1 APPLICATION(S): 100000 CREAM TOPICAL at 17:17

## 2022-01-01 RX ADMIN — MIDODRINE HYDROCHLORIDE 10 MILLIGRAM(S): 2.5 TABLET ORAL at 21:02

## 2022-01-01 RX ADMIN — MUPIROCIN 1 APPLICATION(S): 20 OINTMENT TOPICAL at 18:14

## 2022-01-01 RX ADMIN — Medication 1 APPLICATION(S): at 06:48

## 2022-01-01 RX ADMIN — Medication 650 MILLIGRAM(S): at 19:04

## 2022-01-01 RX ADMIN — Medication 100 MILLIEQUIVALENT(S): at 10:57

## 2022-01-01 RX ADMIN — Medication 100 MILLIEQUIVALENT(S): at 15:20

## 2022-01-01 RX ADMIN — ANASTROZOLE 1 MILLIGRAM(S): 1 TABLET ORAL at 12:28

## 2022-01-01 RX ADMIN — Medication 25 MILLIGRAM(S): at 05:44

## 2022-01-01 RX ADMIN — HYDROMORPHONE HYDROCHLORIDE 0.5 MILLIGRAM(S): 2 INJECTION INTRAMUSCULAR; INTRAVENOUS; SUBCUTANEOUS at 17:28

## 2022-01-01 RX ADMIN — Medication 50 MILLIGRAM(S): at 02:01

## 2022-01-01 RX ADMIN — HYDROMORPHONE HYDROCHLORIDE 1 MILLIGRAM(S): 2 INJECTION INTRAMUSCULAR; INTRAVENOUS; SUBCUTANEOUS at 16:20

## 2022-01-01 RX ADMIN — Medication 650 MILLIGRAM(S): at 21:00

## 2022-01-01 RX ADMIN — Medication 1000 MILLIGRAM(S): at 19:15

## 2022-01-01 RX ADMIN — HYDROMORPHONE HYDROCHLORIDE 1 MILLIGRAM(S): 2 INJECTION INTRAMUSCULAR; INTRAVENOUS; SUBCUTANEOUS at 16:05

## 2022-01-01 RX ADMIN — HYDROMORPHONE HYDROCHLORIDE 0.2 MILLIGRAM(S): 2 INJECTION INTRAMUSCULAR; INTRAVENOUS; SUBCUTANEOUS at 18:26

## 2022-01-01 RX ADMIN — HYDROMORPHONE HYDROCHLORIDE 0.2 MILLIGRAM(S): 2 INJECTION INTRAMUSCULAR; INTRAVENOUS; SUBCUTANEOUS at 05:37

## 2022-01-01 RX ADMIN — CEFEPIME 100 MILLIGRAM(S): 1 INJECTION, POWDER, FOR SOLUTION INTRAMUSCULAR; INTRAVENOUS at 06:33

## 2022-01-01 RX ADMIN — HEPARIN SODIUM 5000 UNIT(S): 5000 INJECTION INTRAVENOUS; SUBCUTANEOUS at 14:08

## 2022-01-01 RX ADMIN — CHLORHEXIDINE GLUCONATE 1 APPLICATION(S): 213 SOLUTION TOPICAL at 13:50

## 2022-01-01 RX ADMIN — HYDROMORPHONE HYDROCHLORIDE 1 MILLIGRAM(S): 2 INJECTION INTRAMUSCULAR; INTRAVENOUS; SUBCUTANEOUS at 08:45

## 2022-01-01 RX ADMIN — Medication 100 MILLIGRAM(S): at 05:01

## 2022-01-01 RX ADMIN — SODIUM CHLORIDE 75 MILLILITER(S): 9 INJECTION, SOLUTION INTRAVENOUS at 04:47

## 2022-01-01 RX ADMIN — Medication 1 EACH: at 22:24

## 2022-01-01 RX ADMIN — GABAPENTIN 200 MILLIGRAM(S): 400 CAPSULE ORAL at 21:59

## 2022-01-01 RX ADMIN — HYDROMORPHONE HYDROCHLORIDE 0.5 MILLIGRAM(S): 2 INJECTION INTRAMUSCULAR; INTRAVENOUS; SUBCUTANEOUS at 05:35

## 2022-01-01 RX ADMIN — Medication 2.5 MILLIGRAM(S): at 18:27

## 2022-01-01 RX ADMIN — Medication 50 MILLILITER(S): at 06:38

## 2022-01-01 RX ADMIN — Medication 125 MICROGRAM(S): at 05:43

## 2022-01-01 RX ADMIN — Medication 250 MILLIGRAM(S): at 00:30

## 2022-01-01 RX ADMIN — Medication 400 MILLIGRAM(S): at 10:16

## 2022-01-01 RX ADMIN — MIDODRINE HYDROCHLORIDE 10 MILLIGRAM(S): 2.5 TABLET ORAL at 05:06

## 2022-01-01 RX ADMIN — ANASTROZOLE 1 MILLIGRAM(S): 1 TABLET ORAL at 12:01

## 2022-01-01 RX ADMIN — SENNA PLUS 2 TABLET(S): 8.6 TABLET ORAL at 22:38

## 2022-01-01 RX ADMIN — Medication 100 MILLIEQUIVALENT(S): at 02:43

## 2022-01-01 RX ADMIN — Medication 400 MILLIGRAM(S): at 13:18

## 2022-01-01 RX ADMIN — HYDROMORPHONE HYDROCHLORIDE 1 MILLIGRAM(S): 2 INJECTION INTRAMUSCULAR; INTRAVENOUS; SUBCUTANEOUS at 03:36

## 2022-01-01 RX ADMIN — PANTOPRAZOLE SODIUM 40 MILLIGRAM(S): 20 TABLET, DELAYED RELEASE ORAL at 13:31

## 2022-01-01 RX ADMIN — Medication 125 MICROGRAM(S): at 05:51

## 2022-01-01 RX ADMIN — Medication 500 MILLIGRAM(S): at 11:38

## 2022-01-01 RX ADMIN — HYDROMORPHONE HYDROCHLORIDE 0.2 MILLIGRAM(S): 2 INJECTION INTRAMUSCULAR; INTRAVENOUS; SUBCUTANEOUS at 22:50

## 2022-01-01 RX ADMIN — Medication 400 MILLIGRAM(S): at 22:54

## 2022-01-01 RX ADMIN — SODIUM CHLORIDE 500 MILLILITER(S): 9 INJECTION, SOLUTION INTRAVENOUS at 05:57

## 2022-01-01 RX ADMIN — MUPIROCIN 1 APPLICATION(S): 20 OINTMENT TOPICAL at 18:12

## 2022-01-01 RX ADMIN — MUPIROCIN 1 APPLICATION(S): 20 OINTMENT TOPICAL at 17:33

## 2022-01-01 RX ADMIN — HEPARIN SODIUM 5000 UNIT(S): 5000 INJECTION INTRAVENOUS; SUBCUTANEOUS at 14:06

## 2022-01-01 RX ADMIN — Medication 1000 MILLIGRAM(S): at 00:58

## 2022-01-01 RX ADMIN — GABAPENTIN 200 MILLIGRAM(S): 400 CAPSULE ORAL at 20:48

## 2022-01-01 RX ADMIN — Medication 650 MILLIGRAM(S): at 22:04

## 2022-01-01 RX ADMIN — HYDROMORPHONE HYDROCHLORIDE 0.2 MILLIGRAM(S): 2 INJECTION INTRAMUSCULAR; INTRAVENOUS; SUBCUTANEOUS at 02:16

## 2022-01-01 RX ADMIN — Medication 15 MILLIGRAM(S): at 10:16

## 2022-01-01 RX ADMIN — Medication 100 MILLIGRAM(S): at 21:38

## 2022-01-01 RX ADMIN — PANTOPRAZOLE SODIUM 40 MILLIGRAM(S): 20 TABLET, DELAYED RELEASE ORAL at 05:32

## 2022-01-01 RX ADMIN — Medication 0.5 MILLIGRAM(S): at 07:30

## 2022-01-01 RX ADMIN — ONDANSETRON 4 MILLIGRAM(S): 8 TABLET, FILM COATED ORAL at 19:57

## 2022-01-01 RX ADMIN — Medication 25 MILLIGRAM(S): at 05:50

## 2022-01-01 RX ADMIN — Medication 50 MILLILITER(S): at 21:43

## 2022-01-01 RX ADMIN — Medication 1 APPLICATION(S): at 05:50

## 2022-01-01 RX ADMIN — Medication 2.5 MILLIGRAM(S): at 00:06

## 2022-01-01 RX ADMIN — HEPARIN SODIUM 5000 UNIT(S): 5000 INJECTION INTRAVENOUS; SUBCUTANEOUS at 06:47

## 2022-01-01 RX ADMIN — Medication 125 MICROGRAM(S): at 06:00

## 2022-01-01 RX ADMIN — PANTOPRAZOLE SODIUM 40 MILLIGRAM(S): 20 TABLET, DELAYED RELEASE ORAL at 06:31

## 2022-01-01 RX ADMIN — ESCITALOPRAM OXALATE 10 MILLIGRAM(S): 10 TABLET, FILM COATED ORAL at 11:14

## 2022-01-01 RX ADMIN — Medication 15 MILLIGRAM(S): at 09:46

## 2022-01-01 RX ADMIN — HYDROMORPHONE HYDROCHLORIDE 0.5 MILLIGRAM(S): 2 INJECTION INTRAMUSCULAR; INTRAVENOUS; SUBCUTANEOUS at 21:42

## 2022-01-01 RX ADMIN — Medication 2.5 MILLIGRAM(S): at 00:05

## 2022-01-01 RX ADMIN — ONDANSETRON 4 MILLIGRAM(S): 8 TABLET, FILM COATED ORAL at 18:30

## 2022-01-01 RX ADMIN — I.V. FAT EMULSION 5.21 GM/KG/DAY: 20 EMULSION INTRAVENOUS at 23:52

## 2022-01-01 RX ADMIN — ONDANSETRON 4 MILLIGRAM(S): 8 TABLET, FILM COATED ORAL at 13:27

## 2022-01-01 RX ADMIN — Medication 0.5 MILLIGRAM(S): at 13:35

## 2022-01-01 RX ADMIN — Medication 25 MILLIGRAM(S): at 05:26

## 2022-01-01 RX ADMIN — HYDROMORPHONE HYDROCHLORIDE 0.2 MILLIGRAM(S): 2 INJECTION INTRAMUSCULAR; INTRAVENOUS; SUBCUTANEOUS at 21:22

## 2022-01-01 RX ADMIN — MUPIROCIN 1 APPLICATION(S): 20 OINTMENT TOPICAL at 05:45

## 2022-01-01 RX ADMIN — ONDANSETRON 4 MILLIGRAM(S): 8 TABLET, FILM COATED ORAL at 16:36

## 2022-01-01 RX ADMIN — Medication 100 MILLIGRAM(S): at 21:17

## 2022-01-01 RX ADMIN — Medication 2.5 MILLIGRAM(S): at 12:31

## 2022-01-01 RX ADMIN — MUPIROCIN 1 APPLICATION(S): 20 OINTMENT TOPICAL at 18:08

## 2022-01-01 RX ADMIN — CHLORHEXIDINE GLUCONATE 1 APPLICATION(S): 213 SOLUTION TOPICAL at 11:19

## 2022-01-01 RX ADMIN — SODIUM CHLORIDE 50 MILLILITER(S): 9 INJECTION INTRAMUSCULAR; INTRAVENOUS; SUBCUTANEOUS at 08:16

## 2022-01-01 RX ADMIN — PANTOPRAZOLE SODIUM 40 MILLIGRAM(S): 20 TABLET, DELAYED RELEASE ORAL at 11:27

## 2022-01-01 RX ADMIN — PANTOPRAZOLE SODIUM 40 MILLIGRAM(S): 20 TABLET, DELAYED RELEASE ORAL at 10:45

## 2022-01-01 RX ADMIN — HYDROMORPHONE HYDROCHLORIDE 0.5 MILLIGRAM(S): 2 INJECTION INTRAMUSCULAR; INTRAVENOUS; SUBCUTANEOUS at 14:05

## 2022-01-01 RX ADMIN — Medication 100 MILLIEQUIVALENT(S): at 09:46

## 2022-01-01 RX ADMIN — Medication 50 MILLIGRAM(S): at 01:54

## 2022-01-01 RX ADMIN — MORPHINE SULFATE 2 MILLIGRAM(S): 50 CAPSULE, EXTENDED RELEASE ORAL at 14:50

## 2022-01-01 RX ADMIN — Medication 2.5 MILLIGRAM(S): at 18:07

## 2022-01-01 RX ADMIN — Medication 400 MILLIGRAM(S): at 18:50

## 2022-01-01 RX ADMIN — Medication 125 MICROGRAM(S): at 11:51

## 2022-01-01 RX ADMIN — MORPHINE SULFATE 2 MILLIGRAM(S): 50 CAPSULE, EXTENDED RELEASE ORAL at 03:00

## 2022-01-01 RX ADMIN — HYDROMORPHONE HYDROCHLORIDE 1 MILLIGRAM(S): 2 INJECTION INTRAMUSCULAR; INTRAVENOUS; SUBCUTANEOUS at 08:34

## 2022-01-01 RX ADMIN — Medication 20 MILLIGRAM(S): at 12:31

## 2022-01-01 RX ADMIN — Medication 400 MILLIGRAM(S): at 12:38

## 2022-01-01 RX ADMIN — Medication 125 MICROGRAM(S): at 12:31

## 2022-01-01 RX ADMIN — Medication 100 MILLIGRAM(S): at 15:07

## 2022-01-01 RX ADMIN — MUPIROCIN 1 APPLICATION(S): 20 OINTMENT TOPICAL at 06:00

## 2022-01-01 RX ADMIN — Medication 10 MILLIGRAM(S): at 13:25

## 2022-01-01 RX ADMIN — PANTOPRAZOLE SODIUM 40 MILLIGRAM(S): 20 TABLET, DELAYED RELEASE ORAL at 05:51

## 2022-01-01 RX ADMIN — MUPIROCIN 1 APPLICATION(S): 20 OINTMENT TOPICAL at 18:34

## 2022-01-01 RX ADMIN — Medication 50 MILLILITER(S): at 17:59

## 2022-01-01 RX ADMIN — Medication 50 MILLILITER(S): at 01:10

## 2022-01-01 RX ADMIN — ANASTROZOLE 1 MILLIGRAM(S): 1 TABLET ORAL at 11:25

## 2022-01-01 RX ADMIN — Medication 0.2 MILLIGRAM(S): at 16:06

## 2022-01-01 RX ADMIN — Medication 2.5 MILLIGRAM(S): at 05:41

## 2022-01-01 RX ADMIN — SODIUM CHLORIDE 1000 MILLILITER(S): 9 INJECTION, SOLUTION INTRAVENOUS at 17:13

## 2022-01-01 RX ADMIN — Medication 25 MICROGRAM(S): at 06:32

## 2022-01-01 RX ADMIN — Medication 100 MILLIGRAM(S): at 12:10

## 2022-01-01 RX ADMIN — Medication 400 MILLIGRAM(S): at 15:14

## 2022-01-01 RX ADMIN — CEFEPIME 100 MILLIGRAM(S): 1 INJECTION, POWDER, FOR SOLUTION INTRAMUSCULAR; INTRAVENOUS at 13:02

## 2022-01-01 RX ADMIN — PANTOPRAZOLE SODIUM 40 MILLIGRAM(S): 20 TABLET, DELAYED RELEASE ORAL at 06:17

## 2022-01-01 RX ADMIN — Medication 650 MILLIGRAM(S): at 11:26

## 2022-01-01 RX ADMIN — Medication 100 MILLIGRAM(S): at 17:42

## 2022-01-01 RX ADMIN — HEPARIN SODIUM 5000 UNIT(S): 5000 INJECTION INTRAVENOUS; SUBCUTANEOUS at 21:20

## 2022-01-01 RX ADMIN — HYDROMORPHONE HYDROCHLORIDE 0.5 MILLIGRAM(S): 2 INJECTION INTRAMUSCULAR; INTRAVENOUS; SUBCUTANEOUS at 17:37

## 2022-01-01 RX ADMIN — Medication 1000 MILLIGRAM(S): at 14:50

## 2022-01-01 RX ADMIN — Medication 10 MILLIGRAM(S): at 05:59

## 2022-01-01 RX ADMIN — CEFEPIME 100 MILLIGRAM(S): 1 INJECTION, POWDER, FOR SOLUTION INTRAMUSCULAR; INTRAVENOUS at 21:17

## 2022-01-01 RX ADMIN — Medication 500 MILLIGRAM(S): at 12:28

## 2022-01-01 RX ADMIN — CHLORHEXIDINE GLUCONATE 1 APPLICATION(S): 213 SOLUTION TOPICAL at 05:38

## 2022-01-01 RX ADMIN — NYSTATIN CREAM 1 APPLICATION(S): 100000 CREAM TOPICAL at 06:45

## 2022-01-01 RX ADMIN — MIRTAZAPINE 15 MILLIGRAM(S): 45 TABLET, ORALLY DISINTEGRATING ORAL at 21:53

## 2022-01-01 RX ADMIN — HYDROMORPHONE HYDROCHLORIDE 0.2 MILLIGRAM(S): 2 INJECTION INTRAMUSCULAR; INTRAVENOUS; SUBCUTANEOUS at 18:40

## 2022-01-01 RX ADMIN — HEPARIN SODIUM 5000 UNIT(S): 5000 INJECTION INTRAVENOUS; SUBCUTANEOUS at 13:25

## 2022-01-01 RX ADMIN — HEPARIN SODIUM 5000 UNIT(S): 5000 INJECTION INTRAVENOUS; SUBCUTANEOUS at 06:34

## 2022-01-01 RX ADMIN — Medication 650 MILLIGRAM(S): at 06:45

## 2022-01-01 RX ADMIN — NYSTATIN CREAM 1 APPLICATION(S): 100000 CREAM TOPICAL at 18:01

## 2022-01-01 RX ADMIN — PANTOPRAZOLE SODIUM 40 MILLIGRAM(S): 20 TABLET, DELAYED RELEASE ORAL at 06:00

## 2022-01-01 RX ADMIN — Medication 50 MILLIGRAM(S): at 00:05

## 2022-01-01 RX ADMIN — ESCITALOPRAM OXALATE 10 MILLIGRAM(S): 10 TABLET, FILM COATED ORAL at 12:01

## 2022-01-01 RX ADMIN — Medication 25 MILLIGRAM(S): at 06:09

## 2022-01-01 RX ADMIN — Medication 125 MICROGRAM(S): at 11:46

## 2022-01-01 RX ADMIN — HEPARIN SODIUM 5000 UNIT(S): 5000 INJECTION INTRAVENOUS; SUBCUTANEOUS at 18:17

## 2022-01-01 RX ADMIN — Medication 125 MICROGRAM(S): at 13:10

## 2022-01-01 RX ADMIN — CHLORHEXIDINE GLUCONATE 1 APPLICATION(S): 213 SOLUTION TOPICAL at 06:45

## 2022-01-01 RX ADMIN — Medication 650 MILLIGRAM(S): at 22:11

## 2022-01-01 RX ADMIN — Medication 400 MILLIGRAM(S): at 20:14

## 2022-01-01 RX ADMIN — Medication 650 MILLIGRAM(S): at 19:27

## 2022-01-01 RX ADMIN — CEFEPIME 100 MILLIGRAM(S): 1 INJECTION, POWDER, FOR SOLUTION INTRAMUSCULAR; INTRAVENOUS at 05:06

## 2022-01-01 RX ADMIN — MIRTAZAPINE 15 MILLIGRAM(S): 45 TABLET, ORALLY DISINTEGRATING ORAL at 22:38

## 2022-01-01 RX ADMIN — Medication 1 TABLET(S): at 21:05

## 2022-01-01 RX ADMIN — PANTOPRAZOLE SODIUM 40 MILLIGRAM(S): 20 TABLET, DELAYED RELEASE ORAL at 05:47

## 2022-01-01 RX ADMIN — HEPARIN SODIUM 5000 UNIT(S): 5000 INJECTION INTRAVENOUS; SUBCUTANEOUS at 05:36

## 2022-01-01 RX ADMIN — Medication 1 TABLET(S): at 12:27

## 2022-01-01 RX ADMIN — Medication 1000 MILLIGRAM(S): at 15:43

## 2022-01-01 RX ADMIN — CEFTRIAXONE 100 MILLIGRAM(S): 500 INJECTION, POWDER, FOR SOLUTION INTRAMUSCULAR; INTRAVENOUS at 11:06

## 2022-01-01 RX ADMIN — POLYETHYLENE GLYCOL 3350 17 GRAM(S): 17 POWDER, FOR SOLUTION ORAL at 11:13

## 2022-01-01 RX ADMIN — NYSTATIN CREAM 1 APPLICATION(S): 100000 CREAM TOPICAL at 05:49

## 2022-01-01 RX ADMIN — Medication 63 EACH: at 22:04

## 2022-01-01 RX ADMIN — HYDROMORPHONE HYDROCHLORIDE 2 MILLIGRAM(S): 2 INJECTION INTRAMUSCULAR; INTRAVENOUS; SUBCUTANEOUS at 08:15

## 2022-01-01 RX ADMIN — HYDROMORPHONE HYDROCHLORIDE 0.2 MILLIGRAM(S): 2 INJECTION INTRAMUSCULAR; INTRAVENOUS; SUBCUTANEOUS at 17:43

## 2022-01-01 RX ADMIN — Medication 1 APPLICATION(S): at 06:01

## 2022-01-01 RX ADMIN — Medication 1000 MILLIGRAM(S): at 06:41

## 2022-01-01 RX ADMIN — Medication 25 MICROGRAM(S): at 05:38

## 2022-01-01 RX ADMIN — HYDROMORPHONE HYDROCHLORIDE 1 MILLIGRAM(S): 2 INJECTION INTRAMUSCULAR; INTRAVENOUS; SUBCUTANEOUS at 09:06

## 2022-01-01 RX ADMIN — Medication 650 MILLIGRAM(S): at 18:48

## 2022-01-01 RX ADMIN — Medication 500 MICROGRAM(S): at 00:27

## 2022-01-01 RX ADMIN — Medication 1 EACH: at 22:04

## 2022-01-01 RX ADMIN — Medication 50 MILLILITER(S): at 13:19

## 2022-01-01 RX ADMIN — Medication 0.2 MILLIGRAM(S): at 08:53

## 2022-01-01 RX ADMIN — Medication 400 MILLIGRAM(S): at 00:29

## 2022-01-01 RX ADMIN — PANTOPRAZOLE SODIUM 40 MILLIGRAM(S): 20 TABLET, DELAYED RELEASE ORAL at 06:27

## 2022-01-01 RX ADMIN — HYDROMORPHONE HYDROCHLORIDE 0.2 MILLIGRAM(S): 2 INJECTION INTRAMUSCULAR; INTRAVENOUS; SUBCUTANEOUS at 09:16

## 2022-01-01 RX ADMIN — NYSTATIN CREAM 1 APPLICATION(S): 100000 CREAM TOPICAL at 17:25

## 2022-01-01 RX ADMIN — SODIUM CHLORIDE 500 MILLILITER(S): 9 INJECTION INTRAMUSCULAR; INTRAVENOUS; SUBCUTANEOUS at 08:19

## 2022-01-01 RX ADMIN — Medication 0.5 MILLIGRAM(S): at 09:06

## 2022-01-01 RX ADMIN — Medication 500 MILLIGRAM(S): at 11:48

## 2022-01-01 RX ADMIN — Medication 100 MILLIGRAM(S): at 05:26

## 2022-01-01 RX ADMIN — PANTOPRAZOLE SODIUM 40 MILLIGRAM(S): 20 TABLET, DELAYED RELEASE ORAL at 12:03

## 2022-01-01 RX ADMIN — BUMETANIDE 2.5 MG/HR: 0.25 INJECTION INTRAMUSCULAR; INTRAVENOUS at 10:33

## 2022-01-01 RX ADMIN — Medication 650 MILLIGRAM(S): at 20:35

## 2022-01-01 RX ADMIN — Medication 20 MILLIGRAM(S): at 18:07

## 2022-01-01 RX ADMIN — Medication 100 MILLIGRAM(S): at 14:27

## 2022-01-01 RX ADMIN — CHLORHEXIDINE GLUCONATE 1 APPLICATION(S): 213 SOLUTION TOPICAL at 11:52

## 2022-01-01 RX ADMIN — SODIUM CHLORIDE 1000 MILLILITER(S): 9 INJECTION, SOLUTION INTRAVENOUS at 01:13

## 2022-01-01 RX ADMIN — Medication 1 APPLICATION(S): at 17:40

## 2022-01-01 RX ADMIN — Medication 650 MILLIGRAM(S): at 12:15

## 2022-01-01 RX ADMIN — Medication 650 MILLIGRAM(S): at 20:17

## 2022-01-01 RX ADMIN — Medication 50 MILLIGRAM(S): at 13:19

## 2022-01-01 RX ADMIN — Medication 100 MILLIEQUIVALENT(S): at 10:00

## 2022-01-01 RX ADMIN — Medication 100 MILLIGRAM(S): at 02:42

## 2022-01-01 RX ADMIN — PANTOPRAZOLE SODIUM 40 MILLIGRAM(S): 20 TABLET, DELAYED RELEASE ORAL at 11:18

## 2022-01-01 RX ADMIN — HYDROMORPHONE HYDROCHLORIDE 0.2 MILLIGRAM(S): 2 INJECTION INTRAMUSCULAR; INTRAVENOUS; SUBCUTANEOUS at 18:23

## 2022-01-01 RX ADMIN — BUMETANIDE 5 MG/HR: 0.25 INJECTION INTRAMUSCULAR; INTRAVENOUS at 04:13

## 2022-01-01 RX ADMIN — Medication 650 MILLIGRAM(S): at 07:30

## 2022-01-01 RX ADMIN — HYDROMORPHONE HYDROCHLORIDE 1 MILLIGRAM(S): 2 INJECTION INTRAMUSCULAR; INTRAVENOUS; SUBCUTANEOUS at 05:24

## 2022-01-01 RX ADMIN — MUPIROCIN 1 APPLICATION(S): 20 OINTMENT TOPICAL at 18:32

## 2022-01-01 RX ADMIN — Medication 650 MILLIGRAM(S): at 19:05

## 2022-01-01 RX ADMIN — MIRTAZAPINE 15 MILLIGRAM(S): 45 TABLET, ORALLY DISINTEGRATING ORAL at 21:33

## 2022-01-01 RX ADMIN — SENNA PLUS 2 TABLET(S): 8.6 TABLET ORAL at 21:18

## 2022-01-01 RX ADMIN — SODIUM CHLORIDE 500 MILLILITER(S): 9 INJECTION INTRAMUSCULAR; INTRAVENOUS; SUBCUTANEOUS at 08:52

## 2022-01-01 RX ADMIN — Medication 25 MILLIGRAM(S): at 05:51

## 2022-01-01 RX ADMIN — PANTOPRAZOLE SODIUM 40 MILLIGRAM(S): 20 TABLET, DELAYED RELEASE ORAL at 06:32

## 2022-01-01 RX ADMIN — GABAPENTIN 200 MILLIGRAM(S): 400 CAPSULE ORAL at 21:16

## 2022-01-01 RX ADMIN — Medication 42 EACH: at 12:45

## 2022-01-01 RX ADMIN — Medication 20 MILLIGRAM(S): at 06:32

## 2022-01-01 RX ADMIN — Medication 1 APPLICATION(S): at 18:01

## 2022-01-01 RX ADMIN — Medication 1000 MILLIGRAM(S): at 13:16

## 2022-01-01 RX ADMIN — Medication 125 MICROGRAM(S): at 11:21

## 2022-01-01 RX ADMIN — Medication 125 MICROGRAM(S): at 06:31

## 2022-01-01 RX ADMIN — HYDROMORPHONE HYDROCHLORIDE 1 MILLIGRAM(S): 2 INJECTION INTRAMUSCULAR; INTRAVENOUS; SUBCUTANEOUS at 10:46

## 2022-01-01 RX ADMIN — CHLORHEXIDINE GLUCONATE 1 APPLICATION(S): 213 SOLUTION TOPICAL at 06:00

## 2022-01-01 RX ADMIN — SODIUM CHLORIDE 1000 MILLILITER(S): 9 INJECTION, SOLUTION INTRAVENOUS at 22:54

## 2022-01-01 RX ADMIN — Medication 400 MILLIGRAM(S): at 12:48

## 2022-01-01 RX ADMIN — Medication 1000 MILLIGRAM(S): at 13:35

## 2022-01-01 RX ADMIN — Medication 125 MICROGRAM(S): at 11:55

## 2022-01-01 RX ADMIN — Medication 1000 MILLIGRAM(S): at 22:07

## 2022-01-01 RX ADMIN — HYDROMORPHONE HYDROCHLORIDE 0.2 MILLIGRAM(S): 2 INJECTION INTRAMUSCULAR; INTRAVENOUS; SUBCUTANEOUS at 01:13

## 2022-01-01 RX ADMIN — HYDROMORPHONE HYDROCHLORIDE 2 MILLIGRAM(S): 2 INJECTION INTRAMUSCULAR; INTRAVENOUS; SUBCUTANEOUS at 15:49

## 2022-01-01 RX ADMIN — HYDROMORPHONE HYDROCHLORIDE 0.2 MILLIGRAM(S): 2 INJECTION INTRAMUSCULAR; INTRAVENOUS; SUBCUTANEOUS at 22:48

## 2022-01-01 RX ADMIN — Medication 650 MILLIGRAM(S): at 21:55

## 2022-01-01 RX ADMIN — Medication 125 MICROGRAM(S): at 11:26

## 2022-01-01 RX ADMIN — Medication 2.5 MILLIGRAM(S): at 11:34

## 2022-01-01 RX ADMIN — NYSTATIN CREAM 1 APPLICATION(S): 100000 CREAM TOPICAL at 06:01

## 2022-01-01 RX ADMIN — MORPHINE SULFATE 2 MILLIGRAM(S): 50 CAPSULE, EXTENDED RELEASE ORAL at 14:34

## 2022-01-01 RX ADMIN — POTASSIUM PHOSPHATE, MONOBASIC POTASSIUM PHOSPHATE, DIBASIC 62.5 MILLIMOLE(S): 236; 224 INJECTION, SOLUTION INTRAVENOUS at 07:04

## 2022-01-01 RX ADMIN — Medication 100 MILLIGRAM(S): at 06:55

## 2022-01-01 RX ADMIN — Medication 0.2 MILLIGRAM(S): at 14:55

## 2022-01-01 RX ADMIN — Medication 3.07 MICROGRAM(S)/KG/MIN: at 23:39

## 2022-01-01 RX ADMIN — MUPIROCIN 1 APPLICATION(S): 20 OINTMENT TOPICAL at 18:19

## 2022-01-01 RX ADMIN — Medication 25 MICROGRAM(S): at 06:00

## 2022-01-01 RX ADMIN — HYDROMORPHONE HYDROCHLORIDE 0.2 MILLIGRAM(S): 2 INJECTION INTRAMUSCULAR; INTRAVENOUS; SUBCUTANEOUS at 06:10

## 2022-01-01 RX ADMIN — HYDROMORPHONE HYDROCHLORIDE 1 MILLIGRAM(S): 2 INJECTION INTRAMUSCULAR; INTRAVENOUS; SUBCUTANEOUS at 06:39

## 2022-01-01 RX ADMIN — HYDROMORPHONE HYDROCHLORIDE 1 MILLIGRAM(S): 2 INJECTION INTRAMUSCULAR; INTRAVENOUS; SUBCUTANEOUS at 08:19

## 2022-01-01 RX ADMIN — HYDROMORPHONE HYDROCHLORIDE 0.2 MILLIGRAM(S): 2 INJECTION INTRAMUSCULAR; INTRAVENOUS; SUBCUTANEOUS at 08:53

## 2022-01-01 RX ADMIN — CEFTRIAXONE 1000 MILLIGRAM(S): 500 INJECTION, POWDER, FOR SOLUTION INTRAMUSCULAR; INTRAVENOUS at 16:20

## 2022-01-01 RX ADMIN — Medication 100 MILLIGRAM(S): at 06:00

## 2022-01-01 RX ADMIN — Medication 25 MILLIGRAM(S): at 06:04

## 2022-01-01 RX ADMIN — Medication 50 MILLILITER(S): at 05:25

## 2022-01-01 RX ADMIN — Medication 1000 MILLIGRAM(S): at 23:24

## 2022-01-01 RX ADMIN — Medication 25 MILLIGRAM(S): at 06:10

## 2022-01-01 RX ADMIN — HYDROMORPHONE HYDROCHLORIDE 1 MILLIGRAM(S): 2 INJECTION INTRAMUSCULAR; INTRAVENOUS; SUBCUTANEOUS at 01:24

## 2022-01-01 RX ADMIN — HYDROMORPHONE HYDROCHLORIDE 0.2 MILLIGRAM(S): 2 INJECTION INTRAMUSCULAR; INTRAVENOUS; SUBCUTANEOUS at 14:19

## 2022-01-01 RX ADMIN — HYDROMORPHONE HYDROCHLORIDE 0.2 MILLIGRAM(S): 2 INJECTION INTRAMUSCULAR; INTRAVENOUS; SUBCUTANEOUS at 06:24

## 2022-01-01 RX ADMIN — Medication 10 MILLIGRAM(S): at 23:29

## 2022-01-01 RX ADMIN — NYSTATIN CREAM 1 APPLICATION(S): 100000 CREAM TOPICAL at 17:23

## 2022-01-01 RX ADMIN — Medication 100 MILLIGRAM(S): at 10:58

## 2022-01-01 RX ADMIN — Medication 125 MICROGRAM(S): at 13:12

## 2022-01-01 RX ADMIN — MORPHINE SULFATE 2 MILLIGRAM(S): 50 CAPSULE, EXTENDED RELEASE ORAL at 20:18

## 2022-01-01 RX ADMIN — SODIUM CHLORIDE 1000 MILLILITER(S): 9 INJECTION, SOLUTION INTRAVENOUS at 00:49

## 2022-01-01 RX ADMIN — Medication 100 GRAM(S): at 10:33

## 2022-01-01 RX ADMIN — SODIUM CHLORIDE 90 MILLILITER(S): 9 INJECTION, SOLUTION INTRAVENOUS at 21:57

## 2022-01-01 RX ADMIN — SODIUM CHLORIDE 30 MILLILITER(S): 9 INJECTION INTRAMUSCULAR; INTRAVENOUS; SUBCUTANEOUS at 20:10

## 2022-01-01 RX ADMIN — POLYETHYLENE GLYCOL 3350 17 GRAM(S): 17 POWDER, FOR SOLUTION ORAL at 11:40

## 2022-01-01 RX ADMIN — Medication 3 MILLIGRAM(S): at 21:41

## 2022-01-01 RX ADMIN — Medication 125 MICROGRAM(S): at 12:06

## 2022-01-01 RX ADMIN — Medication 500 MILLIGRAM(S): at 11:57

## 2022-01-01 RX ADMIN — Medication 25 MICROGRAM(S): at 05:07

## 2022-01-01 RX ADMIN — Medication 6.81 MICROGRAM(S)/KG/MIN: at 04:13

## 2022-01-01 RX ADMIN — HYDROMORPHONE HYDROCHLORIDE 0.2 MILLIGRAM(S): 2 INJECTION INTRAMUSCULAR; INTRAVENOUS; SUBCUTANEOUS at 17:28

## 2022-01-01 RX ADMIN — HEPARIN SODIUM 5000 UNIT(S): 5000 INJECTION INTRAVENOUS; SUBCUTANEOUS at 05:32

## 2022-01-01 RX ADMIN — Medication 50 MILLIGRAM(S): at 03:01

## 2022-01-01 RX ADMIN — Medication 1 APPLICATION(S): at 06:08

## 2022-01-01 RX ADMIN — Medication 1 TABLET(S): at 11:58

## 2022-01-01 RX ADMIN — HYDROMORPHONE HYDROCHLORIDE 0.2 MILLIGRAM(S): 2 INJECTION INTRAMUSCULAR; INTRAVENOUS; SUBCUTANEOUS at 09:56

## 2022-01-01 RX ADMIN — HYDROMORPHONE HYDROCHLORIDE 1 MILLIGRAM(S): 2 INJECTION INTRAMUSCULAR; INTRAVENOUS; SUBCUTANEOUS at 08:30

## 2022-01-01 RX ADMIN — Medication 1000 MILLIGRAM(S): at 04:38

## 2022-01-01 RX ADMIN — Medication 125 MICROGRAM(S): at 06:44

## 2022-01-01 RX ADMIN — Medication 400 MILLIGRAM(S): at 19:08

## 2022-01-01 RX ADMIN — Medication 1000 MILLIGRAM(S): at 20:53

## 2022-01-01 RX ADMIN — Medication 1000 MILLIGRAM(S): at 12:48

## 2022-01-01 RX ADMIN — ANASTROZOLE 1 MILLIGRAM(S): 1 TABLET ORAL at 12:30

## 2022-01-01 RX ADMIN — HYDROMORPHONE HYDROCHLORIDE 1 MILLIGRAM(S): 2 INJECTION INTRAMUSCULAR; INTRAVENOUS; SUBCUTANEOUS at 08:35

## 2022-01-01 RX ADMIN — ANASTROZOLE 1 MILLIGRAM(S): 1 TABLET ORAL at 13:10

## 2022-01-01 RX ADMIN — IOHEXOL 30 MILLILITER(S): 300 INJECTION, SOLUTION INTRAVENOUS at 13:46

## 2022-01-01 RX ADMIN — Medication 100 MILLIEQUIVALENT(S): at 13:33

## 2022-01-01 RX ADMIN — Medication 6.81 MICROGRAM(S)/KG/MIN: at 10:33

## 2022-01-01 RX ADMIN — Medication 100 MILLIGRAM(S): at 21:06

## 2022-01-01 RX ADMIN — Medication 2.5 MILLIGRAM(S): at 18:33

## 2022-01-01 RX ADMIN — ESCITALOPRAM OXALATE 10 MILLIGRAM(S): 10 TABLET, FILM COATED ORAL at 11:48

## 2022-01-01 RX ADMIN — Medication 1 TABLET(S): at 12:01

## 2022-01-01 RX ADMIN — HEPARIN SODIUM 5000 UNIT(S): 5000 INJECTION INTRAVENOUS; SUBCUTANEOUS at 13:15

## 2022-01-01 RX ADMIN — Medication 0.5 MILLIGRAM(S): at 08:20

## 2022-01-01 RX ADMIN — CHLORHEXIDINE GLUCONATE 1 APPLICATION(S): 213 SOLUTION TOPICAL at 11:41

## 2022-01-01 RX ADMIN — CEFEPIME 100 MILLIGRAM(S): 1 INJECTION, POWDER, FOR SOLUTION INTRAMUSCULAR; INTRAVENOUS at 11:09

## 2022-01-01 RX ADMIN — ANASTROZOLE 1 MILLIGRAM(S): 1 TABLET ORAL at 11:58

## 2022-01-01 RX ADMIN — SODIUM CHLORIDE 1000 MILLILITER(S): 9 INJECTION, SOLUTION INTRAVENOUS at 21:33

## 2022-01-01 RX ADMIN — HYDROMORPHONE HYDROCHLORIDE 0.2 MILLIGRAM(S): 2 INJECTION INTRAMUSCULAR; INTRAVENOUS; SUBCUTANEOUS at 10:27

## 2022-01-01 RX ADMIN — PANTOPRAZOLE SODIUM 40 MILLIGRAM(S): 20 TABLET, DELAYED RELEASE ORAL at 12:06

## 2022-01-01 RX ADMIN — Medication 1000 MILLIGRAM(S): at 06:02

## 2022-01-01 RX ADMIN — HYDROMORPHONE HYDROCHLORIDE 0.5 MILLIGRAM(S): 2 INJECTION INTRAMUSCULAR; INTRAVENOUS; SUBCUTANEOUS at 13:25

## 2022-01-01 RX ADMIN — CHLORHEXIDINE GLUCONATE 1 APPLICATION(S): 213 SOLUTION TOPICAL at 04:55

## 2022-01-01 RX ADMIN — Medication 1000 MILLIGRAM(S): at 09:55

## 2022-01-01 RX ADMIN — HYDROMORPHONE HYDROCHLORIDE 1 MILLIGRAM(S): 2 INJECTION INTRAMUSCULAR; INTRAVENOUS; SUBCUTANEOUS at 13:15

## 2022-01-01 RX ADMIN — ANASTROZOLE 1 MILLIGRAM(S): 1 TABLET ORAL at 12:16

## 2022-01-01 RX ADMIN — Medication 100 MILLIGRAM(S): at 15:13

## 2022-01-01 RX ADMIN — SODIUM CHLORIDE 75 MILLILITER(S): 9 INJECTION INTRAMUSCULAR; INTRAVENOUS; SUBCUTANEOUS at 11:34

## 2022-01-01 RX ADMIN — ONDANSETRON 4 MILLIGRAM(S): 8 TABLET, FILM COATED ORAL at 08:52

## 2022-01-01 RX ADMIN — MUPIROCIN 1 APPLICATION(S): 20 OINTMENT TOPICAL at 06:48

## 2022-01-01 RX ADMIN — I.V. FAT EMULSION 5.21 GM/KG/DAY: 20 EMULSION INTRAVENOUS at 22:01

## 2022-01-01 RX ADMIN — Medication 1000 MILLIGRAM(S): at 01:45

## 2022-01-01 RX ADMIN — Medication 50 MILLIGRAM(S): at 02:42

## 2022-01-01 RX ADMIN — HYDROMORPHONE HYDROCHLORIDE 0.5 MILLIGRAM(S): 2 INJECTION INTRAMUSCULAR; INTRAVENOUS; SUBCUTANEOUS at 12:10

## 2022-01-01 RX ADMIN — Medication 125 MICROGRAM(S): at 06:33

## 2022-01-01 RX ADMIN — ONDANSETRON 4 MILLIGRAM(S): 8 TABLET, FILM COATED ORAL at 00:56

## 2022-01-01 RX ADMIN — Medication 0.5 MILLIGRAM(S): at 01:24

## 2022-01-01 RX ADMIN — HYDROMORPHONE HYDROCHLORIDE 0.2 MILLIGRAM(S): 2 INJECTION INTRAMUSCULAR; INTRAVENOUS; SUBCUTANEOUS at 17:30

## 2022-01-01 RX ADMIN — ANASTROZOLE 1 MILLIGRAM(S): 1 TABLET ORAL at 11:34

## 2022-01-01 RX ADMIN — Medication 1 APPLICATION(S): at 05:47

## 2022-01-01 RX ADMIN — Medication 1000 MILLIGRAM(S): at 20:05

## 2022-01-01 RX ADMIN — MORPHINE SULFATE 2 MILLIGRAM(S): 50 CAPSULE, EXTENDED RELEASE ORAL at 20:03

## 2022-01-01 RX ADMIN — ESCITALOPRAM OXALATE 5 MILLIGRAM(S): 10 TABLET, FILM COATED ORAL at 11:08

## 2022-01-01 RX ADMIN — PANTOPRAZOLE SODIUM 40 MILLIGRAM(S): 20 TABLET, DELAYED RELEASE ORAL at 13:13

## 2022-01-01 RX ADMIN — ANASTROZOLE 1 MILLIGRAM(S): 1 TABLET ORAL at 11:48

## 2022-01-01 RX ADMIN — Medication 650 MILLIGRAM(S): at 22:25

## 2022-01-01 RX ADMIN — SODIUM CHLORIDE 75 MILLILITER(S): 9 INJECTION, SOLUTION INTRAVENOUS at 09:46

## 2022-01-01 RX ADMIN — CHLORHEXIDINE GLUCONATE 1 APPLICATION(S): 213 SOLUTION TOPICAL at 11:00

## 2022-01-01 RX ADMIN — EPINEPHRINE 0.01 MILLIGRAM(S): 0.3 INJECTION INTRAMUSCULAR; SUBCUTANEOUS at 23:40

## 2022-01-01 RX ADMIN — HYDROMORPHONE HYDROCHLORIDE 0.2 MILLIGRAM(S): 2 INJECTION INTRAMUSCULAR; INTRAVENOUS; SUBCUTANEOUS at 01:37

## 2022-01-01 RX ADMIN — Medication 100 MILLIEQUIVALENT(S): at 03:55

## 2022-01-01 RX ADMIN — Medication 25 MICROGRAM(S): at 06:25

## 2022-01-01 RX ADMIN — Medication 15 MILLIGRAM(S): at 23:29

## 2022-01-01 RX ADMIN — Medication 1 APPLICATION(S): at 17:45

## 2022-01-01 RX ADMIN — Medication 100 MILLIGRAM(S): at 05:44

## 2022-01-01 RX ADMIN — HYDROMORPHONE HYDROCHLORIDE 0.2 MILLIGRAM(S): 2 INJECTION INTRAMUSCULAR; INTRAVENOUS; SUBCUTANEOUS at 11:27

## 2022-01-01 RX ADMIN — HYDROMORPHONE HYDROCHLORIDE 0.2 MILLIGRAM(S): 2 INJECTION INTRAMUSCULAR; INTRAVENOUS; SUBCUTANEOUS at 09:55

## 2022-01-01 RX ADMIN — Medication 650 MILLIGRAM(S): at 12:26

## 2022-01-01 RX ADMIN — Medication 63 EACH: at 22:01

## 2022-01-01 RX ADMIN — ONDANSETRON 4 MILLIGRAM(S): 8 TABLET, FILM COATED ORAL at 06:29

## 2022-01-01 RX ADMIN — ONDANSETRON 4 MILLIGRAM(S): 8 TABLET, FILM COATED ORAL at 14:49

## 2022-01-01 RX ADMIN — Medication 50 MILLIGRAM(S): at 13:24

## 2022-01-01 RX ADMIN — Medication 650 MILLIGRAM(S): at 23:37

## 2022-01-01 RX ADMIN — HYDROMORPHONE HYDROCHLORIDE 0.2 MILLIGRAM(S): 2 INJECTION INTRAMUSCULAR; INTRAVENOUS; SUBCUTANEOUS at 05:38

## 2022-01-01 RX ADMIN — CHLORHEXIDINE GLUCONATE 1 APPLICATION(S): 213 SOLUTION TOPICAL at 12:20

## 2022-01-01 RX ADMIN — Medication 0.5 MILLIGRAM(S): at 17:11

## 2022-01-01 RX ADMIN — Medication 6.81 MICROGRAM(S)/KG/MIN: at 00:01

## 2022-01-01 RX ADMIN — Medication 25 MICROGRAM(S): at 06:24

## 2022-01-01 RX ADMIN — Medication 650 MILLIGRAM(S): at 06:32

## 2022-01-01 RX ADMIN — CEFTRIAXONE 100 MILLIGRAM(S): 500 INJECTION, POWDER, FOR SOLUTION INTRAMUSCULAR; INTRAVENOUS at 04:17

## 2022-01-01 RX ADMIN — Medication 2.5 MILLIGRAM(S): at 13:56

## 2022-01-01 RX ADMIN — ANASTROZOLE 1 MILLIGRAM(S): 1 TABLET ORAL at 12:17

## 2022-01-01 RX ADMIN — MUPIROCIN 1 APPLICATION(S): 20 OINTMENT TOPICAL at 05:19

## 2022-01-01 RX ADMIN — Medication 50 MILLIGRAM(S): at 13:49

## 2022-01-01 RX ADMIN — Medication 50 MILLIGRAM(S): at 13:11

## 2022-01-01 RX ADMIN — HYDROMORPHONE HYDROCHLORIDE 0.5 MILLIGRAM(S): 2 INJECTION INTRAMUSCULAR; INTRAVENOUS; SUBCUTANEOUS at 13:40

## 2022-01-01 RX ADMIN — Medication 0.5 MILLIGRAM(S): at 10:57

## 2022-01-01 RX ADMIN — HEPARIN SODIUM 5000 UNIT(S): 5000 INJECTION INTRAVENOUS; SUBCUTANEOUS at 21:28

## 2022-01-01 RX ADMIN — Medication 25 MICROGRAM(S): at 05:37

## 2022-01-01 RX ADMIN — HYDROMORPHONE HYDROCHLORIDE 0.5 MILLIGRAM(S): 2 INJECTION INTRAMUSCULAR; INTRAVENOUS; SUBCUTANEOUS at 11:47

## 2022-01-01 RX ADMIN — Medication 25 MILLIGRAM(S): at 06:03

## 2022-01-01 RX ADMIN — Medication 100 MILLIGRAM(S): at 06:46

## 2022-01-01 RX ADMIN — HYDROMORPHONE HYDROCHLORIDE 1 MILLIGRAM(S): 2 INJECTION INTRAMUSCULAR; INTRAVENOUS; SUBCUTANEOUS at 19:51

## 2022-01-01 RX ADMIN — MUPIROCIN 1 APPLICATION(S): 20 OINTMENT TOPICAL at 05:38

## 2022-01-01 RX ADMIN — HYDROMORPHONE HYDROCHLORIDE 0.2 MILLIGRAM(S): 2 INJECTION INTRAMUSCULAR; INTRAVENOUS; SUBCUTANEOUS at 13:12

## 2022-01-01 RX ADMIN — HYDROMORPHONE HYDROCHLORIDE 0.2 MILLIGRAM(S): 2 INJECTION INTRAMUSCULAR; INTRAVENOUS; SUBCUTANEOUS at 14:23

## 2022-01-01 RX ADMIN — Medication 10 MILLIGRAM(S): at 21:16

## 2022-01-01 RX ADMIN — POLYETHYLENE GLYCOL 3350 17 GRAM(S): 17 POWDER, FOR SOLUTION ORAL at 12:14

## 2022-01-01 RX ADMIN — Medication 25 MICROGRAM(S): at 05:11

## 2022-01-01 RX ADMIN — Medication 250 MILLIGRAM(S): at 17:04

## 2022-01-01 RX ADMIN — ONDANSETRON 4 MILLIGRAM(S): 8 TABLET, FILM COATED ORAL at 12:13

## 2022-01-01 RX ADMIN — Medication 25 MICROGRAM(S): at 06:34

## 2022-01-01 RX ADMIN — Medication 400 MILLIGRAM(S): at 14:25

## 2022-01-01 RX ADMIN — Medication 100 MILLIGRAM(S): at 03:13

## 2022-01-01 RX ADMIN — Medication 50 MILLILITER(S): at 05:02

## 2022-01-01 RX ADMIN — SODIUM CHLORIDE 1000 MILLILITER(S): 9 INJECTION INTRAMUSCULAR; INTRAVENOUS; SUBCUTANEOUS at 11:06

## 2022-01-01 RX ADMIN — HYDROMORPHONE HYDROCHLORIDE 1 MILLIGRAM(S): 2 INJECTION INTRAMUSCULAR; INTRAVENOUS; SUBCUTANEOUS at 07:44

## 2022-01-01 RX ADMIN — MIRTAZAPINE 15 MILLIGRAM(S): 45 TABLET, ORALLY DISINTEGRATING ORAL at 20:46

## 2022-01-01 RX ADMIN — Medication 400 MILLIGRAM(S): at 05:24

## 2022-01-01 RX ADMIN — SODIUM CHLORIDE 1000 MILLILITER(S): 9 INJECTION INTRAMUSCULAR; INTRAVENOUS; SUBCUTANEOUS at 06:07

## 2022-01-01 RX ADMIN — Medication 400 MILLIGRAM(S): at 21:35

## 2022-01-01 RX ADMIN — SODIUM CHLORIDE 1000 MILLILITER(S): 9 INJECTION INTRAMUSCULAR; INTRAVENOUS; SUBCUTANEOUS at 00:07

## 2022-01-01 RX ADMIN — CEFEPIME 100 MILLIGRAM(S): 1 INJECTION, POWDER, FOR SOLUTION INTRAMUSCULAR; INTRAVENOUS at 00:08

## 2022-01-01 RX ADMIN — Medication 50 MILLIGRAM(S): at 01:32

## 2022-03-08 NOTE — ED PROVIDER NOTE - CARE PLAN
1 Principal Discharge DX:	Vomiting   Principal Discharge DX:	Vomiting  Secondary Diagnosis:	SBO (small bowel obstruction)

## 2022-03-08 NOTE — ED ADULT NURSE NOTE - OBJECTIVE STATEMENT
pt is here for dizziness.  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

## 2022-03-08 NOTE — CONSULT NOTE ADULT - SUBJECTIVE AND OBJECTIVE BOX
NICK OBRIEN  --------------------------------------------------  HPI:  88 year old female with PMH of Afib on metoprolol, uterine CA s/p radiation and hysterectomy, breast cancer s/p chemo and bilateral mastectomy presents to the ED complaining of lower abdominal pain with nausea and vomiting. She thinks her symptoms are from lactulose or from a chicken sandwich she has prior to onset of symptoms. Patient states that she suffers from constipation and takes multiple different over the counter the drugs, she has been on lactulose for the past 2 days from her PMD and states she is still passing stool which is well formed. States she used to be on a blood thinner, but she had too much bleeding so it was stopped. Denies chest pain, worsening shortness of breath, dysuria or difficulty urinating. (08 Mar 2022 11:15)    PAST MEDICAL & SURGICAL HISTORY:  Breast cancer  s/p chemo and mammogram    Uterine cancer  s/p radiation and hysterectomy    Afib    S/P bilateral mastectomy    History of hysterectomy      FAMILY HISTORY:    REVIEW OF SYSTEMS: All negative except the one in documented in HPI  ----------------------------------  MEDICATIONS  (STANDING):  metoprolol tartrate Injectable 2.5 milliGRAM(s) IV Push every 6 hours  sodium chloride 0.9%. 1000 milliLiter(s) (75 mL/Hr) IV Continuous <Continuous>    MEDICATIONS  (PRN):  ondansetron Injectable 4 milliGRAM(s) IV Push every 6 hours PRN Nausea and/or Vomiting    Allergies    contrast media (iodine-based) (Short breath)  penicillin (Hives)  sulfa drugs (Short breath)    Intolerances      Vital Signs Last 24 Hrs  T(C): 36.5 (08 Mar 2022 07:54), Max: 36.5 (08 Mar 2022 07:54)  T(F): 97.7 (08 Mar 2022 07:54), Max: 97.7 (08 Mar 2022 07:54)  HR: 122 (08 Mar 2022 07:54) (122 - 122)  BP: 159/95 (08 Mar 2022 07:54) (159/95 - 159/95)  BP(mean): --  RR: 19 (08 Mar 2022 07:54) (19 - 19)  SpO2: 98% (08 Mar 2022 07:54) (98% - 98%)    PHYSICAL EXAMINATION:  General: Well developed. well nourished. not in distress  HEENT: AT, NC. PERRL. intact EOM. no throat erythema or exudate.   Neck: supple. no JVD. no palpable lymph nodes  Chest: CTA bilaterally  Heart: normal S1,S2. RRR. no heart murmur.  Abdomen: soft. non-tender. non-distended. + BS. no hernia or palpable masses.  Genital: not examined  Ext: no C/C/E. no calf tenderness.  Neuro: AAO x3. no focal weakness. no speech disorder  Skin: no rash  ---------------------------------------    LABS:  --------                        11.0   10.06 )-----------( 145      ( 08 Mar 2022 08:47 )             34.6     03-08    136  |  103  |  17  ----------------------------<  146<H>  3.9   |  25  |  0.95    Ca    9.3      08 Mar 2022 08:47  Phos  3.5     03-08  Mg     1.9     03-08    TPro  8.6<H>  /  Alb  3.7  /  TBili  0.8  /  DBili  x   /  AST  18  /  ALT  18  /  AlkPhos  80  03-08      Urinalysis Basic - ( 08 Mar 2022 10:56 )    Color: Yellow / Appearance: Clear / S.015 / pH: x  Gluc: x / Ketone: Negative  / Bili: Negative / Urobili: Negative   Blood: x / Protein: 30 mg/dL / Nitrite: Negative   Leuk Esterase: Negative / RBC: 10-25 /HPF / WBC 0-2 /HPF   Sq Epi: x / Non Sq Epi: Few /HPF / Bacteria: Trace /HPF         NICK OBRIEN  --------------------------------------------------  HPI:  88 year old female with PMH of Afib on metoprolol, uterine CA s/p radiation and hysterectomy, breast cancer s/p chemo and bilateral mastectomy presents to the ED complaining of lower abdominal pain with nausea and vomiting. She thinks her symptoms are from lactulose or from a chicken sandwich she has prior to onset of symptoms. Patient states that she suffers from constipation and takes multiple different over the counter the drugs, she has been on lactulose for the past 2 days from her PMD and states she is still passing stool which is well formed. States she used to be on a blood thinner, but she had too much bleeding so it was stopped. Denies chest pain, worsening shortness of breath, dysuria or difficulty urinating. (08 Mar 2022 11:15)    PAST MEDICAL & SURGICAL HISTORY:  Breast cancer  s/p chemo and mastectomy    Uterine cancer  s/p radiation and hysterectomy    Atrial Fibrillation    S/P bilateral mastectomy    History of hysterectomy      FAMILY HISTORY:  Daughter: Type 1 Dm2    REVIEW OF SYSTEMS: All negative except the one in documented in HPI  ----------------------------------  MEDICATIONS  (STANDING):  metoprolol tartrate Injectable 2.5 milliGRAM(s) IV Push every 6 hours  sodium chloride 0.9%. 1000 milliLiter(s) (75 mL/Hr) IV Continuous <Continuous>    MEDICATIONS  (PRN):  ondansetron Injectable 4 milliGRAM(s) IV Push every 6 hours PRN Nausea and/or Vomiting    Allergies    contrast media (iodine-based) (Short breath)  penicillin (Hives)  sulfa drugs (Short breath)    Intolerances      Vital Signs Last 24 Hrs  T(C): 36.5 (08 Mar 2022 07:54), Max: 36.5 (08 Mar 2022 07:54)  T(F): 97.7 (08 Mar 2022 07:54), Max: 97.7 (08 Mar 2022 07:54)  HR: 122 (08 Mar 2022 07:54) (122 - 122)  BP: 159/95 (08 Mar 2022 07:54) (159/95 - 159/95)  BP(mean): --  RR: 19 (08 Mar 2022 07:54) (19 - 19)  SpO2: 98% (08 Mar 2022 07:54) (98% - 98%)    PHYSICAL EXAMINATION:  General: Well developed. well nourished. not in distress  HEENT: AT, NC. PERRL. intact EOM. no throat erythema or exudate.   Neck: supple. no JVD. no palpable lymph nodes  Chest: CTA bilaterally  Heart: normal S1,S2. RRR. no heart murmur.  Abdomen: soft. non-tender. non-distended. + BS. no hernia or palpable masses.  Genital: not examined  Ext: no C/C/E. no calf tenderness.  Neuro: AAO x3. no focal weakness. no speech disorder  Skin: no rash  ---------------------------------------    LABS:  --------                        11.0   10.06 )-----------( 145      ( 08 Mar 2022 08:47 )             34.6     03-08    136  |  103  |  17  ----------------------------<  146<H>  3.9   |  25  |  0.95    Ca    9.3      08 Mar 2022 08:47  Phos  3.5     03-08  Mg     1.9     03-08    TPro  8.6<H>  /  Alb  3.7  /  TBili  0.8  /  DBili  x   /  AST  18  /  ALT  18  /  AlkPhos  80  03-08      Urinalysis Basic - ( 08 Mar 2022 10:56 )    Color: Yellow / Appearance: Clear / S.015 / pH: x  Gluc: x / Ketone: Negative  / Bili: Negative / Urobili: Negative   Blood: x / Protein: 30 mg/dL / Nitrite: Negative   Leuk Esterase: Negative / RBC: 10-25 /HPF / WBC 0-2 /HPF   Sq Epi: x / Non Sq Epi: Few /HPF / Bacteria: Trace /HPF

## 2022-03-08 NOTE — ED PROVIDER NOTE - ATTENDING CONTRIBUTION TO CARE
89 y/o F PMH breast cancer s/p b/l mastectomy, uterine cancer, recent dx "blood disorder" s/p bone marrow biopsy 1 month ago, afib (not on AC) presents to ED c/o N/V and lower abd pain since last night. Denies any other symptoms. Pt well appearing, mild diffuse tenderness. Will assess for SBO and other GI pathology with labs/CT and reassess.

## 2022-03-08 NOTE — ED PROVIDER NOTE - CLINICAL SUMMARY MEDICAL DECISION MAKING FREE TEXT BOX
89 y/o F PMH breast cancer s/p b/l mastectomy, uterine cancer, recent dx "blood disorder" s/p bone marrow biopsy 1 month ago, afib (not on AC) presents to ED c/o multiple episodes NBNB vomiting, abd pain, and loose stools onset last night s/p eating chicken roll. concern for food related illness, gastroenteritis, obstruction, diverticulitis, ACS. plan labs ekg CXR CTAP nausea control reassess

## 2022-03-08 NOTE — H&P ADULT - NSHPLABSRESULTS_GEN_ALL_CORE
LABS:                        11.0   10.06 )-----------( 145      ( 08 Mar 2022 08:47 )             34.6     03-08    136  |  103  |  17  ----------------------------<  146<H>  3.9   |  25  |  0.95    Ca    9.3      08 Mar 2022 08:47  Phos  3.5     03-08  Mg     1.9     03-08    TPro  8.6<H>  /  Alb  3.7  /  TBili  0.8  /  DBili  x   /  AST  18  /  ALT  18  /  AlkPhos  80  03-08    Urinalysis Basic - ( 08 Mar 2022 10:56 )    Color: Yellow / Appearance: Clear / S.015 / pH: x  Gluc: x / Ketone: Negative  / Bili: Negative / Urobili: Negative   Blood: x / Protein: 30 mg/dL / Nitrite: Negative   Leuk Esterase: Negative / RBC: 10-25 /HPF / WBC 0-2 /HPF   Sq Epi: x / Non Sq Epi: Few /HPF / Bacteria: Trace /HPF    RADIOLOGY & ADDITIONAL STUDIES:  < from: CT Abdomen and Pelvis No Cont (22 @ 09:24) >    ACC: [id number] EXAM:  CT ABDOMEN AND PELVIS                          PROCEDURE DATE:  2022      INTERPRETATION:  CLINICAL INFORMATION: Nausea and vomiting    COMPARISON: None.    CONTRAST/COMPLICATIONS:  IV Contrast: NONE  Oral Contrast: NONE  Complications: None reported at time of study completion    PROCEDURE:  CT of the Abdomen and Pelvis was performed.  Sagittal and coronal reformats were performed.    FINDINGS:  LOWER CHEST: Clear lung bases. Cardiomegaly.    Please note that evaluation of the abdominal organs and vascular   structures is limited by lack of intravenous contrast.    LIVER: Subcentimeter hepatic hypodensity, too small to further   characterize.  BILE DUCTS: Normal caliber.  GALLBLADDER: Within normal limits.  SPLEEN: Atrophic.  PANCREAS: Within normal limits.  ADRENALS: Within normal limits.  KIDNEYS/URETERS: Left renal hypodensity, possibly cyst. No hydronephrosis.    BLADDER: Within normal limits.  REPRODUCTIVE ORGANS: Hysterectomy.    BOWEL: Gastric distention. Small bowel distention with transition in the   pelvis. Appendix is not visualized. No evidence of inflammation in the   pericecal region. Colonic diverticulosis.  PERITONEUM: Mild ascites.  VESSELS: Atherosclerotic calcifications.  RETROPERITONEUM/LYMPH NODES: No lymphadenopathy.  ABDOMINAL WALL: Umbilical fluid collection measuring 1.9 x 0.9 cm.   Postsurgical changes.  BONES: Degenerative changes. Compression deformities of T12 and L1.    IMPRESSION:  Small bowel obstruction with transition in the pelvis.    --- End of Report ---    LIZZ ROY MD; Attending Radiologist  This document has been electronically signed. Mar  8 2022  9:45AM    < end of copied text >

## 2022-03-08 NOTE — CONSULT NOTE ADULT - ASSESSMENT
88 year old female with PMH of Afib on metoprolol, uterine CA s/p radiation and hysterectomy, breast cancer s/p chemo and bilateral mastectomy, recently diagnosed Myelodysplastic Sydrome presents to the ED complaining of lower abdominal pain with nausea and vomiting. Pt admitted to Surgery for Small Bowel Obstruction. Medicine consulted for management for chronic medical conditions.     #SBO- currently pt is NPO, on NGT decompression. Rest of the management as per primary team  #Afib-EKG showed Rapid Afib with HR @107. Not on Anticoagulation due to risk of bleeding. Takes metoprolol succinate 25 mg at home. Continue with lopressor 2.5 iv q6 while pt is NPO.   #Breast Ca s/p b/l mastectomy-Takes anastrazole at home   #MDS-Follow up with outpatient hematologist      88 year old female with PMH of Afib on metoprolol, uterine CA s/p radiation and hysterectomy, breast cancer s/p chemo and bilateral mastectomy, recently diagnosed Myelodysplastic Sydrome presents to the ED complaining of lower abdominal pain with nausea and vomiting. Pt admitted to Surgery for Small Bowel Obstruction. Medicine consulted for management for chronic medical conditions.     #SBO- currently pt is NPO, on NGT decompression. Rest of the management as per primary team  #Chronic Atrial Fibrillation-EKG showed Rapid Afib with HR @107. Not on Anticoagulation due to risk of bleeding. Takes metoprolol succinate 25 mg at home. Continue with lopressor 2.5 iv q6 while pt is NPO.   #Breast Ca s/p b/l mastectomy-Takes anastrazole at home   #Myelodysplatic Syndrome-Follow up with outpatient hematologist

## 2022-03-08 NOTE — ED PROVIDER NOTE - PHYSICAL EXAMINATION
Gen: well developed female NAD   HEENT: NCAT, EOMI, no nasal discharge, mucous membranes dry   CV: irregularly irregular, +S1/S2, no M/R/G  Resp: CTAB, no W/R/R  GI: Abdomen soft non-distended +mild TTP b/l lower abdomen   MSK: No open wounds, no bruising, no LE edema  Neuro: A&Ox4, following commands, moving all four extremities spontaneously  Psych: appropriate mood

## 2022-03-08 NOTE — H&P ADULT - HISTORY OF PRESENT ILLNESS
88 year old female with PMH of Afib on metoprolol, uterine CA s/p radiation and hysterectomy, breast cancer s/p chemo and bilateral mastectomy presents to the ED complaining of lower abdominal pain with nausea and vomiting. She thinks her symptoms are from lactulose or from a chicken sandwich she has prior to onset of symptoms. Patient states that she suffers from constipation and takes multiple different over the counter the drugs, she has been on lactulose for the past 2 days from her PMD and states she is still passing stool which is well formed. States she used to be on a blood thinner, but she had too much bleeding so it was stopped. Denies chest pain, worsening shortness of breath, dysuria or difficulty urinating.

## 2022-03-08 NOTE — H&P ADULT - NSHPPHYSICALEXAM_GEN_ALL_CORE
GEN: alert, NAD  HEAD: NCAT  RESP: respirations nonlabored  CV: irregular, tachycardia  ABD: Soft, tender to palpation across lower abdomen   : normal external genitalia  Extremities: no edema bilaterally  MSK: no calf tenderness bilaterally  PSYCH: normal affect  NEURO: A&Ox3 Vital Signs Last 24 Hrs  T(C): 36.9 (08 Mar 2022 12:26), Max: 36.9 (08 Mar 2022 12:26)  T(F): 98.4 (08 Mar 2022 12:26), Max: 98.4 (08 Mar 2022 12:26)  HR: 93 (08 Mar 2022 12:26) (93 - 122)  BP: 126/76 (08 Mar 2022 12:26) (126/76 - 159/95)  RR: 17 (08 Mar 2022 12:26) (17 - 19)  SpO2: 100% (08 Mar 2022 12:26) (98% - 100%)    GEN: alert, NAD  HEAD: NCAT  RESP: respirations nonlabored  CV: irregular, tachycardia  ABD: Soft, tender to palpation across lower abdomen   : normal external genitalia  Extremities: no edema bilaterally  MSK: no calf tenderness bilaterally  SKIN: warm and dry   PSYCH: normal affect  NEURO: A&Ox3

## 2022-03-08 NOTE — ED ADULT NURSE NOTE - NSIMPLEMENTINTERV_GEN_ALL_ED
Implemented All Universal Safety Interventions:  [place] to call system. Call bell, personal items and telephone within reach. Instruct patient to call for assistance. Room bathroom lighting operational. Non-slip footwear when patient is off stretcher. Physically safe environment: no spills, clutter or unnecessary equipment. Stretcher in lowest position, wheels locked, appropriate side rails in place.

## 2022-03-08 NOTE — H&P ADULT - NSICDXPASTMEDICALHX_GEN_ALL_CORE_FT
PAST MEDICAL HISTORY:  Afib     Breast cancer s/p chemo and mammogram    Uterine cancer s/p radiation and hysterectomy

## 2022-03-08 NOTE — ED PROVIDER NOTE - OBJECTIVE STATEMENT
89 y/o F PMH breast cancer s/p b/l mastectomy, uterine cancer, recent dx "blood disorder" s/p bone marrow biopsy 1 month ago, afib (not on AC) presents to ED c/o multiple episodes NBNB vomiting onset last night s/p eating chicken roll. Pt also reports she has been taking lactulose from her PMD in the last 2 days for constipation. states she is having BMs, nonbloody. reports chills last night, denies dysuria, hematuria, vaginal bleeding.

## 2022-03-08 NOTE — PATIENT PROFILE ADULT - FALL HARM RISK - HARM RISK INTERVENTIONS

## 2022-03-08 NOTE — H&P ADULT - ASSESSMENT
88 year old female with small bowel obstruction   PSH of hysterectomy  Afib with RVR    - NGT placed, follow up the chest xray  - NPO with IVF  - IV metoprolol x1 ordered  - remote telemetry  - medical consult called, may need cardiology consult   - hold pain medication   - discuss with Dr. Cleveland 88 year old female with small bowel obstruction   PSH of hysterectomy  Afib with RVR    - NGT placed, follow up the chest xray  - NPO with IVF  - IV metoprolol x1 ordered  - remote telemetry  - medical consult called, may need cardiology consult   - hold pain medication   - discuss with Dr. Cleveland

## 2022-03-08 NOTE — CONSULT NOTE ADULT - ATTENDING COMMENTS
89yo F PMHx of Breast CA s/p bilateral masectomy, Uterine CA 89yo F PMHx of Breast CA s/p bilateral mastectomy, Uterine CA, Atrial Fibrillation (not on AC due to bleeding) who presented with nausea and vomiting, found to have SBO. Patient admitted to surgical service, planned for conservative management. Medicine consulted for management of A. Fib with RVR. Patient tachycardic to 120s on admission, which improved IV metoprolol. Would continue Metoprolol 2.5mg q6h IV while patient NPO. Monitor on telemetry. Resume Metoprolol XL 25mg when patient able to tolerate PO. No AC due to history of GI Bleed. 87yo F PMHx of Breast CA s/p bilateral mastectomy, Uterine CA, Atrial Fibrillation (not on AC due to bleeding) who presented with nausea and vomiting, found to have SBO. Patient admitted to surgical service, planned for conservative management. Medicine consulted for management of A. Fib with RVR. Patient tachycardic to 120s on admission, which improved IV metoprolol. Would continue Metoprolol 2.5mg q6h IV while patient NPO. Monitor on telemetry. Resume Metoprolol XL 25mg when patient able to tolerate PO. No AC due to history of bleeding. 87yo F PMHx of Breast CA s/p bilateral mastectomy, Uterine CA, Atrial Fibrillation (not on AC due to bleeding) who presented with nausea and vomiting, found to have SBO. Patient admitted to surgical service, planned for conservative management. Medicine consulted for management of A. Fib with RVR. Patient tachycardic to 120s on admission, which improved IV metoprolol. Would continue Metoprolol 2.5mg q6h IV while patient NPO. Monitor on telemetry. Resume Metoprolol XL 25mg when patient able to tolerate PO. No AC due to history of bleeding. Rest of management as per surgical team.

## 2022-03-09 PROBLEM — I48.91 UNSPECIFIED ATRIAL FIBRILLATION: Chronic | Status: ACTIVE | Noted: 2018-04-27

## 2022-03-09 NOTE — PROGRESS NOTE ADULT - ASSESSMENT
88 year old female with PMH of Afib on metoprolol, uterine CA s/p radiation and hysterectomy, breast cancer s/p chemo and bilateral mastectomy, recently diagnosed Myelodysplastic Sydrome presents to the ED complaining of lower abdominal pain with nausea and vomiting. Pt admitted to Surgery for Small Bowel Obstruction. Medicine consulted for management for chronic medical conditions.     #SBO- currently pt is NPO, on NGT decompression. Rest of the management as per primary team  #Chronic Atrial Fibrillation-EKG showed Rapid Afib with HR @107. Not on Anticoagulation due to risk of bleeding. Takes metoprolol succinate 25 mg at home. Continue with lopressor 2.5 iv q6 while pt is NPO. Continue telemetry monitoring. HR is controlled on lopressor.   #Breast Ca s/p b/l mastectomy-Takes anastrazole at home   #Myelodysplatic Syndrome-Follow up with outpatient hematologist      88 year old female with PMH of Afib on metoprolol, uterine CA s/p radiation and hysterectomy, breast cancer s/p chemo and bilateral mastectomy, recently diagnosed Myelodysplastic Sydrome presents to the ED complaining of lower abdominal pain with nausea and vomiting. Pt admitted to Surgery for Small Bowel Obstruction. Medicine consulted for management for chronic medical conditions.     #SBO- currently pt is NPO, on NGT decompression. Rest of the management as per primary team  #Chronic Atrial Fibrillation-EKG showed Rapid Afib with HR @107. Not on Anticoagulation due to risk of bleeding. Takes metoprolol succinate 25 mg at home. Continue with lopressor 2.5 iv q6 while pt is NPO. Continue telemetry monitoring. HR is controlled on lopressor.   #Breast Ca s/p b/l mastectomy-Takes anastrazole at home   #Myelodysplatic Syndrome-Follow up with outpatient hematologist     #Pre-operative clearance  RCRI 0 points  3.9 % 30-day risk of death, MI, or cardiac arrest  METS>4  Pt is medically optimized to undergo Intermediate risk surgery for SBO without any further cardiac workup.

## 2022-03-09 NOTE — PROGRESS NOTE ADULT - ASSESSMENT
88 year old female with small bowel obstruction   PSH of hysterectomy  Chronic Afib     - continue NGT to low continuous suction  - NPO with IVF  - Continue lopressor 2.5 iv q6 as per medicine while NPO  - remote telemetry  - hold pain medication

## 2022-03-09 NOTE — PROGRESS NOTE ADULT - SUBJECTIVE AND OBJECTIVE BOX
NICK OBRIEN  --------------------------------------------------  HPI:  88 year old female with PMH of Afib on metoprolol, uterine CA s/p radiation and hysterectomy, breast cancer s/p chemo and bilateral mastectomy presents to the ED complaining of lower abdominal pain with nausea and vomiting. She thinks her symptoms are from lactulose or from a chicken sandwich she has prior to onset of symptoms. Patient states that she suffers from constipation and takes multiple different over the counter the drugs, she has been on lactulose for the past 2 days from her PMD and states she is still passing stool which is well formed. States she used to be on a blood thinner, but she had too much bleeding so it was stopped. Denies chest pain, worsening shortness of breath, dysuria or difficulty urinating. (08 Mar 2022 11:15)    PAST MEDICAL & SURGICAL HISTORY:  Breast cancer  s/p chemo and mammogram    Uterine cancer  s/p radiation and hysterectomy    Afib    S/P bilateral mastectomy    History of hysterectomy      FAMILY HISTORY:    REVIEW OF SYSTEMS: All negative except the one in documented in HPI  ----------------------------------  MEDICATIONS  (STANDING):  heparin   Injectable 5000 Unit(s) SubCutaneous every 12 hours  metoprolol tartrate Injectable 2.5 milliGRAM(s) IV Push every 6 hours  pantoprazole  Injectable 40 milliGRAM(s) IV Push daily  sodium chloride 0.9%. 1000 milliLiter(s) (75 mL/Hr) IV Continuous <Continuous>    MEDICATIONS  (PRN):  ondansetron Injectable 4 milliGRAM(s) IV Push every 6 hours PRN Nausea and/or Vomiting    Allergies    contrast media (iodine-based) (Short breath)  penicillin (Hives)  sulfa drugs (Short breath)    Intolerances      Vital Signs Last 24 Hrs  T(C): 37.4 (09 Mar 2022 05:25), Max: 37.4 (09 Mar 2022 05:25)  T(F): 99.3 (09 Mar 2022 05:25), Max: 99.3 (09 Mar 2022 05:25)  HR: 88 (09 Mar 2022 05:25) (88 - 96)  BP: 116/63 (09 Mar 2022 05:25) (99/84 - 126/76)  BP(mean): 88 (08 Mar 2022 15:12) (88 - 88)  RR: 18 (09 Mar 2022 05:25) (16 - 18)  SpO2: 100% (09 Mar 2022 05:25) (100% - 100%)    PHYSICAL EXAMINATION:  General: Well developed. well nourished. not in distress  HEENT: AT, NC. PERRL. intact EOM. no throat erythema or exudate.   Neck: supple. no JVD. no palpable lymph nodes  Chest: CTA bilaterally  Heart: normal S1,S2. RRR. no heart murmur.  Abdomen: soft. non-tender. non-distended. + BS. no hernia or palpable masses.  Genital: not examined  Ext: no C/C/E. no calf tenderness.  Neuro: AAO x3. no focal weakness. no speech disorder  Skin: no rash  ---------------------------------------    LABS:  --------                        9.8    9.96  )-----------( 120      ( 09 Mar 2022 06:24 )             31.3     03-09    140  |  109<H>  |  14  ----------------------------<  102<H>  3.8   |  25  |  0.74    Ca    8.5      09 Mar 2022 06:24  Phos  2.7     03-09  Mg     2.0     03-09    TPro  8.6<H>  /  Alb  3.7  /  TBili  0.8  /  DBili  x   /  AST  18  /  ALT  18  /  AlkPhos  80  03-08      Urinalysis Basic - ( 08 Mar 2022 10:56 )    Color: Yellow / Appearance: Clear / S.015 / pH: x  Gluc: x / Ketone: Negative  / Bili: Negative / Urobili: Negative   Blood: x / Protein: 30 mg/dL / Nitrite: Negative   Leuk Esterase: Negative / RBC: 10-25 /HPF / WBC 0-2 /HPF   Sq Epi: x / Non Sq Epi: Few /HPF / Bacteria: Trace /HPF         NICK OBRIEN  --------------------------------------------------  HPI:  88 year old female with PMH of Afib on metoprolol, uterine CA s/p radiation and hysterectomy, breast cancer s/p chemo and bilateral mastectomy presents to the ED complaining of lower abdominal pain with nausea and vomiting. She thinks her symptoms are from lactulose or from a chicken sandwich she has prior to onset of symptoms. Patient states that she suffers from constipation and takes multiple different over the counter the drugs, she has been on lactulose for the past 2 days from her PMD and states she is still passing stool which is well formed. States she used to be on a blood thinner, but she had too much bleeding so it was stopped. Denies chest pain, worsening shortness of breath, dysuria or difficulty urinating. (08 Mar 2022 11:15)    PAST MEDICAL & SURGICAL HISTORY:  Breast cancer  s/p chemo and mammogram    Uterine cancer  s/p radiation and hysterectomy    Afib    S/P bilateral mastectomy    History of hysterectomy      FAMILY HISTORY:    REVIEW OF SYSTEMS: All negative except the one in documented in HPI  ----------------------------------  MEDICATIONS  (STANDING):  heparin   Injectable 5000 Unit(s) SubCutaneous every 12 hours  metoprolol tartrate Injectable 2.5 milliGRAM(s) IV Push every 6 hours  pantoprazole  Injectable 40 milliGRAM(s) IV Push daily  sodium chloride 0.9%. 1000 milliLiter(s) (75 mL/Hr) IV Continuous <Continuous>    MEDICATIONS  (PRN):  ondansetron Injectable 4 milliGRAM(s) IV Push every 6 hours PRN Nausea and/or Vomiting    Allergies    contrast media (iodine-based) (Short breath)  penicillin (Hives)  sulfa drugs (Short breath)    Intolerances      Vital Signs Last 24 Hrs  T(C): 37.4 (09 Mar 2022 05:25), Max: 37.4 (09 Mar 2022 05:25)  T(F): 99.3 (09 Mar 2022 05:25), Max: 99.3 (09 Mar 2022 05:25)  HR: 88 (09 Mar 2022 05:25) (88 - 96)  BP: 116/63 (09 Mar 2022 05:25) (99/84 - 126/76)  BP(mean): 88 (08 Mar 2022 15:12) (88 - 88)  RR: 18 (09 Mar 2022 05:25) (16 - 18)  SpO2: 100% (09 Mar 2022 05:25) (100% - 100%)    PHYSICAL EXAMINATION:  General: Well developed. well nourished. not in distress  HEENT: AT, NC. PERRL. intact EOM. no throat erythema or exudate.   Neck: supple. no JVD. no palpable lymph nodes  Chest: CTA bilaterally  Heart: normal S1,S2. irregular. no heart murmur.  Abdomen: soft. non-tender. non-distended. + BS. no hernia or palpable masses.  Genital: not examined  Ext: no C/C/E. no calf tenderness.  Neuro: AAO x3. no focal weakness. no speech disorder  Skin: no rash  ---------------------------------------    LABS:  --------                        9.8    9.96  )-----------( 120      ( 09 Mar 2022 06:24 )             31.3     03-09    140  |  109<H>  |  14  ----------------------------<  102<H>  3.8   |  25  |  0.74    Ca    8.5      09 Mar 2022 06:24  Phos  2.7     03-09  Mg     2.0     03-09    TPro  8.6<H>  /  Alb  3.7  /  TBili  0.8  /  DBili  x   /  AST  18  /  ALT  18  /  AlkPhos  80  03-08      Urinalysis Basic - ( 08 Mar 2022 10:56 )    Color: Yellow / Appearance: Clear / S.015 / pH: x  Gluc: x / Ketone: Negative  / Bili: Negative / Urobili: Negative   Blood: x / Protein: 30 mg/dL / Nitrite: Negative   Leuk Esterase: Negative / RBC: 10-25 /HPF / WBC 0-2 /HPF   Sq Epi: x / Non Sq Epi: Few /HPF / Bacteria: Trace /HPF

## 2022-03-09 NOTE — PROGRESS NOTE ADULT - SUBJECTIVE AND OBJECTIVE BOX
INTERVAL HPI/OVERNIGHT EVENTS:  Pt seen and examined. No acute events overnight  Abdominal pain improving   NGT with minimal output  Denied nausea/vomiting  -Flatus/BM    MEDICATIONS  (STANDING):  heparin   Injectable 5000 Unit(s) SubCutaneous every 12 hours  metoprolol tartrate Injectable 2.5 milliGRAM(s) IV Push every 6 hours  pantoprazole  Injectable 40 milliGRAM(s) IV Push daily  sodium chloride 0.9%. 1000 milliLiter(s) (75 mL/Hr) IV Continuous <Continuous>    MEDICATIONS  (PRN):  ondansetron Injectable 4 milliGRAM(s) IV Push every 6 hours PRN Nausea and/or Vomiting      Vital Signs Last 24 Hrs  T(C): 37.4 (09 Mar 2022 05:25), Max: 37.4 (09 Mar 2022 05:25)  T(F): 99.3 (09 Mar 2022 05:25), Max: 99.3 (09 Mar 2022 05:25)  HR: 88 (09 Mar 2022 05:25) (88 - 96)  BP: 116/63 (09 Mar 2022 05:25) (99/84 - 126/76)  BP(mean): 88 (08 Mar 2022 15:12) (88 - 88)  RR: 18 (09 Mar 2022 05:25) (16 - 18)  SpO2: 100% (09 Mar 2022 05:25) (100% - 100%)    Physical:  General: A&Ox3. NAD  ENT: NGT 600cc dark gastric output  Abdomen: Soft nondistended, lower mid abdominal tenderness. Midline scar healed. No guarding    I&O's Detail    08 Mar 2022 07:01  -  09 Mar 2022 07:00  --------------------------------------------------------  IN:    sodium chloride 0.9%: 450 mL  Total IN: 450 mL    OUT:    Nasogastric/Oral tube (mL): 600 mL  Total OUT: 600 mL    Total NET: -150 mL    LABS:                        9.8    9.96  )-----------( 120      ( 09 Mar 2022 06:24 )             31.3             03-09    140  |  109<H>  |  14  ----------------------------<  102<H>  3.8   |  25  |  0.74    Ca    8.5      09 Mar 2022 06:24  Phos  2.7     03-09  Mg     2.0     03-09    TPro  8.6<H>  /  Alb  3.7  /  TBili  0.8  /  DBili  x   /  AST  18  /  ALT  18  /  AlkPhos  80  03-08

## 2022-03-10 NOTE — DISCHARGE NOTE PROVIDER - CARE PROVIDER_API CALL
Fausto Cleveland (MD)  Surgery  [address] [place] [address]  Phone: [phone]  Fax: [phone]  Follow Up Time: 2 weeks   Fausto Cleveland (MD)  Surgery  [address] [place] [address]  Phone: [phone]  Fax: [phone]  Follow Up Time: 2 weeks    Dr. Eugenio Alvarado, PCP  Phone: [phone]  Fax: (   )    -  Follow Up Time:    Fausto Cleveland (MD)  Surgery  [address] [facility], [place] [address]  Phone: [phone]  Fax: [phone]  Follow Up Time: 2 weeks    Dr. Eugenio Alvarado, PCP  Phone: [phone]  Fax: (   )    -  Follow Up Time:     Martín Neal  HEMATOLOGY  [address] [place] [address], [address]  [place] [address]  Phone: [phone]  Fax: [phone]  Follow Up Time: 2 weeks

## 2022-03-10 NOTE — DISCHARGE NOTE NURSING/CASE MANAGEMENT/SOCIAL WORK - PATIENT PORTAL LINK FT
You can access the FollowMyHealth Patient Portal offered by [facility] by registering at the following website: [email or web address]. By joining [employer]’s FollowMyHealth portal, you will also be able to view your health information using other applications (apps) compatible with our system.

## 2022-03-10 NOTE — DISCHARGE NOTE PROVIDER - PROVIDER TOKENS
PROVIDER:[TOKEN:[[id number]],FOLLOWUP:[2 weeks]] PROVIDER:[TOKEN:[[id number]],FOLLOWUP:[2 weeks]],FREE:[LAST:[Dr. Eugenio Alvarado],FIRST:[PCP],PHONE:[phone]:[(   )    -]] PROVIDER:[TOKEN:[[id number]],FOLLOWUP:[2 weeks]],FREE:[LAST:[Dr. Eugenio Alvarado],FIRST:[PCP],PHONE:[phone]:[(   )    -]],PROVIDER:[TOKEN:[[id number]],FOLLOWUP:[2 weeks]]

## 2022-03-10 NOTE — DISCHARGE NOTE PROVIDER - NSDCCPTREATMENT_GEN_ALL_CORE_FT
PRINCIPAL PROCEDURE  Procedure: Ileocolic bypass  Findings and Treatment: Laparoscopic WILLIAM with ileocolic bypass

## 2022-03-10 NOTE — DISCHARGE NOTE NURSING/CASE MANAGEMENT/SOCIAL WORK - NSDCPEFALRISK_GEN_ALL_CORE
For information on Fall & Injury Prevention, visit: [email or web address] OR  [email or web address] OR  https://www.cdc.gov/steadi/patient.html

## 2022-03-10 NOTE — DISCHARGE NOTE PROVIDER - DETAILS OF MALNUTRITION DIAGNOSIS/DIAGNOSES
This patient has been assessed with a concern for Malnutrition and was treated during this hospitalization for the following Nutrition diagnosis/diagnoses:     -  03/25/2022: Severe protein-calorie malnutrition

## 2022-03-10 NOTE — DISCHARGE NOTE PROVIDER - NSDCMRMEDTOKEN_GEN_ALL_CORE_FT
anastrozole 1 mg oral tablet: 1 tab(s) orally once a day  gabapentin 100 mg oral capsule: 2 tab(s) orally once a day (at bedtime)  Metoprolol Tartrate 25 mg oral tablet: 1 tab(s) orally once a day  MiraLax oral powder for reconstitution:   Multiple Vitamins oral tablet: 1 tab(s) orally once a day   acetaminophen 160 mg/5 mL oral suspension: 20.31 milliliter(s) orally every 6 hours, As needed, Temp greater or equal to 38C (100.4F), Mild Pain (1 - 3)  anastrozole 1 mg oral tablet: 1 tab(s) orally once a day  fat emulsion with fish, medium chain, olive, and soy oil 20% intravenous emulsion:  intravenous   gabapentin 100 mg oral capsule: 2 tab(s) orally once a day (at bedtime)  Metoprolol Tartrate 25 mg oral tablet: 1 tab(s) orally once a day  MiraLax oral powder for reconstitution:   Multiple Vitamins oral tablet: 1 tab(s) orally once a day  ondansetron 4 mg oral tablet, disintegratin tab(s) orally 3 times a day   acetaminophen 160 mg/5 mL oral suspension: 20.31 milliliter(s) orally every 6 hours, As needed, Temp greater or equal to 38C (100.4F), Mild Pain (1 - 3)  anastrozole 1 mg oral tablet: 1 tab(s) orally once a day  Digitek 125 mcg (0.125 mg) oral tablet: 1 tab(s) orally once a day  gabapentin 100 mg oral capsule: 2 tab(s) orally once a day (at bedtime)  Metoprolol Tartrate 25 mg oral tablet: 1 tab(s) orally once a day  MiraLax oral powder for reconstitution:   mirtazapine 15 mg oral tablet: 1 tab(s) orally once a day (at bedtime)  Multiple Vitamins oral tablet: 1 tab(s) orally once a day  ondansetron 4 mg oral tablet, disintegratin tab(s) orally 3 times a day  pantoprazole 40 mg oral delayed release tablet: 1 tab(s) orally once a day (before a meal)  senna oral tablet: 2 tab(s) orally once a day (at bedtime)   acetaminophen 160 mg/5 mL oral suspension: 20.31 milliliter(s) orally every 6 hours, As needed, Temp greater or equal to 38C (100.4F), Mild Pain (1 - 3)  anastrozole 1 mg oral tablet: 1 tab(s) orally once a day  Digitek 125 mcg (0.125 mg) oral tablet: 1 tab(s) orally once a day  gabapentin 100 mg oral capsule: 2 tab(s) orally once a day (at bedtime)  Metoprolol Tartrate 25 mg oral tablet: 1 tab(s) orally once a day  MiraLax oral powder for reconstitution:   mirtazapine 15 mg oral tablet: 1 tab(s) orally once a day (at bedtime)  Multiple Vitamins oral tablet: 1 tab(s) orally once a day  nystatin 100,000 units/g topical powder: Apply topically to affected area every 8 hours  ondansetron 4 mg oral tablet, disintegratin tab(s) orally 3 times a day  pantoprazole 40 mg oral delayed release tablet: 1 tab(s) orally once a day (before a meal)  senna oral tablet: 2 tab(s) orally once a day (at bedtime)

## 2022-03-10 NOTE — PROGRESS NOTE ADULT - SUBJECTIVE AND OBJECTIVE BOX
S: Overnight patient reports passing gas and having a bowel movement. NGT removed and patient now eating regular diet.    O:  Vital Signs Last 24 Hrs  T(C): 36.1 (10 Mar 2022 13:52), Max: 36.9 (10 Mar 2022 00:02)  T(F): 97 (10 Mar 2022 13:52), Max: 98.5 (10 Mar 2022 00:02)  HR: 94 (10 Mar 2022 13:52) (90 - 96)  BP: 127/63 (10 Mar 2022 13:52) (109/57 - 127/63)  BP(mean): 78 (09 Mar 2022 20:24) (78 - 78)  RR: 18 (10 Mar 2022 13:52) (17 - 18)  SpO2: 95% (10 Mar 2022 13:52) (95% - 99%)    GENERAL: NAD, well-developed  HEAD:  Atraumatic, Normocephalic  EYES: EOMI, PERRLA, conjunctiva and sclera clear  NECK: Supple, No JVD  CHEST/LUNG: Clear to auscultation bilaterally; No wheeze  HEART: irregular; No murmurs, rubs, or gallops  ABDOMEN: Soft, Nontender, Nondistended; Bowel sounds present  EXTREMITIES:  2+ Peripheral Pulses, No clubbing, cyanosis, or edema  PSYCH: AAOx3  NEUROLOGY: non-focal  SKIN: No rashes or lesions    metoprolol succinate ER 25 milliGRAM(s) Oral daily  ondansetron Injectable 4 milliGRAM(s) IV Push every 6 hours PRN                            8.9    10.26 )-----------( 102      ( 10 Mar 2022 05:44 )             28.3       03-10    138  |  109<H>  |  12  ----------------------------<  98  3.6   |  25  |  0.71    Ca    8.5      10 Mar 2022 05:44  Phos  2.5     03-09  Mg     1.8     03-10

## 2022-03-10 NOTE — PROGRESS NOTE ADULT - SUBJECTIVE AND OBJECTIVE BOX
Patient seen and examined at bedside   Denies nausea and vomiting  Admits to BM last night and this AM  Admits to flatus  Denies abdominal pain     Vital Signs Last 24 Hrs  T(F): 98.3 (03-10-22 @ 06:08), Max: 98.9 (03-09-22 @ 16:23)  HR: 96 (03-10-22 @ 06:08)  BP: 109/57 (03-10-22 @ 06:08)  RR: 17 (03-10-22 @ 06:08)  SpO2: 99% (03-10-22 @ 06:08)    GENERAL: Alert, NAD  CHEST/LUNG: respirations nonlabored  ABDOMEN: soft, Nontender, Nondistended, no palpable hernia or masses  EXTREMITIES:  no calf tenderness, No edema    I&O's Detail    LABS:                        8.9    10.26 )-----------( 102      ( 10 Mar 2022 05:44 )             28.3     03-10    138  |  109<H>  |  12  ----------------------------<  98  3.6   |  25  |  0.71    Ca    8.5      10 Mar 2022 05:44  Phos  2.5     03-09  Mg     1.8     03-10

## 2022-03-10 NOTE — PROGRESS NOTE ADULT - ASSESSMENT
88 year old female with PMH of Afib on metoprolol, uterine CA s/p radiation and hysterectomy, breast cancer s/p chemo and bilateral mastectomy, recently diagnosed Myelodysplastic Sydrome presents to the ED complaining of lower abdominal pain with nausea and vomiting. Pt admitted to Surgery for Small Bowel Obstruction. Medicine consulted for management for chronic medical conditions.     #SBO  #Chronic Atrial Fibrillation with RVR  #H/o breast cancer  #H/o uterine cancer    Patient tolerating diet, Metoprolol 25mg resumed, heart rate remains stable. Would restart patient on anastrazole now that tolerating diet. No anti-coagulation for atrial fibrillation due to prior bleeding.

## 2022-03-10 NOTE — DISCHARGE NOTE PROVIDER - NSDCFUADDAPPT_GEN_ALL_CORE_FT
- Follow up with surgeon, Dr. Cleveland within 1-2 weeks  - Follow up with your hematologist  - Follow up with PCP/cardiologist for further care - Follow up with surgeon, Dr. Cleveland within 1-2 weeks  - Follow up with your hematologist, Dr. Neal  - Follow up with PCP/cardiologist for further care

## 2022-03-10 NOTE — PROGRESS NOTE ADULT - ASSESSMENT
88 year old female with resolved small bowel obstruction due to Right Inguinal Hernia which is currently reduced    - diet advance to low fiber  - recommend inguinal hernia repair however patient still unsure if she wants surgery due to her current medical conditions  -   88 year old female with resolved small bowel obstruction due to Right Inguinal Hernia which is currently reduced    - diet advance to low fiber  - recommend inguinal hernia repair however patient still unsure if she wants surgery due to her current medical conditions  - if patient unwilling to have surgery this admission will plan for discharge when tolerating diet  - discuss with Dr. Cleveland

## 2022-03-10 NOTE — DISCHARGE NOTE PROVIDER - NSDCCPCAREPLAN_GEN_ALL_CORE_FT
PRINCIPAL DISCHARGE DIAGNOSIS  Diagnosis: Small bowel obstruction  Assessment and Plan of Treatment: Please follow-up with your surgeon in 1 week. Drink plenty of fluids and rest as needed. Call for any fever over 101, nausea, vomiting, severe pain, no passing of gas or bowel movement.   DIET  You may resume your regular diet as normal.      SECONDARY DISCHARGE DIAGNOSES  Diagnosis: Afib  Assessment and Plan of Treatment: continue home metoprolol dose as previously directed    Diagnosis: Right inguinal hernia  Assessment and Plan of Treatment: Recommend surigmariia preair. please follow up with Dr. Cleveland for possible elective surgical repair.    Diagnosis: SBO (small bowel obstruction)  Assessment and Plan of Treatment:      PRINCIPAL DISCHARGE DIAGNOSIS  Diagnosis: Small bowel obstruction  Assessment and Plan of Treatment: Please follow-up with your surgeon in 1 week. Drink plenty of fluids and rest as needed. Call for any fever over 101, nausea, vomiting, severe pain, no passing of gas or bowel movement.   DIET  You may resume your regular diet as normal.      SECONDARY DISCHARGE DIAGNOSES  Diagnosis: SBO (small bowel obstruction)  Assessment and Plan of Treatment:     Diagnosis: Afib  Assessment and Plan of Treatment: continue home metoprolol dose as previously directed  digoxin added, continued and follow up with cardiologist    Diagnosis: Myelodysplastic syndrome  Assessment and Plan of Treatment: f/u with Dr. Neal    Diagnosis: Right inguinal hernia  Assessment and Plan of Treatment: Recommend surigcal preair. please follow up with Dr. Cleveland for possible elective surgical repair.     PRINCIPAL DISCHARGE DIAGNOSIS  Diagnosis: Small bowel obstruction  Assessment and Plan of Treatment: Please follow-up with your surgeon in 1 week. Drink plenty of fluids and rest as needed. Call for any fever over 101, nausea, vomiting, severe pain, no passing of gas or bowel movement.   DIET  You may resume your regular diet as normal.      SECONDARY DISCHARGE DIAGNOSES  Diagnosis: Afib  Assessment and Plan of Treatment: continue home metoprolol dose as previously directed  digoxin added, continued and follow up with cardiologist    Diagnosis: Right inguinal hernia  Assessment and Plan of Treatment: Recommend surigcal preair. please follow up with Dr. Cleveland for possible elective surgical repair.    Diagnosis: Myelodysplastic syndrome  Assessment and Plan of Treatment: f/u with Dr. Neal     PRINCIPAL DISCHARGE DIAGNOSIS  Diagnosis: Small bowel obstruction  Assessment and Plan of Treatment: Please follow-up with your surgeon in 1 week. Drink plenty of fluids and rest as needed. Call for any fever over 101, nausea, vomiting, severe pain, no passing of gas or bowel movement.   DIET  Continue with soft diet. Eat small portions.  Continue on a bowel regimen. Use Tap water enemas as needed to have bowel movements.      SECONDARY DISCHARGE DIAGNOSES  Diagnosis: Chronic atrial fibrillation with RVR  Assessment and Plan of Treatment: continue home metoprolol dose as previously directed  digoxin added, continued and follow up with cardiologist  You are not on anti-coagulation due to a history of bleeding.    Diagnosis: Right inguinal hernia  Assessment and Plan of Treatment: Recommend surigmariia preair. please follow up with Dr. Cleveland for possible elective surgical repair.    Diagnosis: Myelodysplastic syndrome  Assessment and Plan of Treatment: You recieved intermittent   f/u with Dr. Neal    Diagnosis: Severe protein-calorie malnutrition  Assessment and Plan of Treatment: Continue taking Ensure with meals.    Diagnosis: Bilateral pleural effusion  Assessment and Plan of Treatment: You had bilateral pleural effusions. Please take Lasix as needed for shortness of breath and LE edema.

## 2022-03-10 NOTE — DISCHARGE NOTE PROVIDER - ATTENDING DISCHARGE PHYSICAL EXAMINATION:
GENERAL: NAD, well-developed  HEAD:  Atraumatic, Normocephalic  EYES: EOMI, PERRLA, conjunctiva and sclera clear  NECK: Supple, No JVD  CHEST/LUNG: Clear to auscultation bilaterally; No wheeze  HEART: Irregular  rate and rhythm; No murmurs, rubs, or gallops  ABDOMEN: Soft, Nontender, Nondistended; Bowel sounds present  EXTREMITIES:  2+ Peripheral Pulses, No clubbing, cyanosis, 1+ pitting edema bilaterally  PSYCH: AAOx3  NEUROLOGY: non-focal  SKIN: No rashes or lesions

## 2022-03-10 NOTE — DISCHARGE NOTE PROVIDER - NSDCPNSUBOBJ_GEN_ALL_CORE
#Small Bowel Obstruction s/p Ileocolic Bypass with Lysis of Adhesions  #Chronic Atrial Fibrillation  #Bilateral Pleural Effusions  #Acute Hypoxic Respiratory Failure, resolved  #CMML  #Severe Protein Calorie Malnutrition

## 2022-03-10 NOTE — DISCHARGE NOTE PROVIDER - HOSPITAL COURSE
HPI:  88 year old female with PMH of Afib on metoprolol, uterine CA s/p radiation and hysterectomy, breast cancer s/p chemo and bilateral mastectomy presents to the ED complaining of lower abdominal pain with nausea and vomiting. She thinks her symptoms are from lactulose or from a chicken sandwich she has prior to onset of symptoms. Patient states that she suffers from constipation and takes multiple different over the counter the drugs, she has been on lactulose for the past 2 days from her PMD and states she is still passing stool which is well formed. States she used to be on a blood thinner, but she had too much bleeding so it was stopped. Denies chest pain, worsening shortness of breath, dysuria or difficulty urinating. (08 Mar 2022 11:15)  Patient was found to have small bowel obstruction and small Right Inguinal Hernia. Patient had NGT placed and was admitted for conservative management. Hernia was reduced. Surgery was offered and recommended to the patient but patient declined at this time. NGT was removed and diet advanced as tolerated. She was cleared for discharge when she was tolerating a regular diet, having adequate bowel function, pain was resolved, she was ambulating and voiding.       88 year old female with PMH of Afib on metoprolol, uterine CA s/p radiation and hysterectomy, breast cancer s/p chemo and bilateral mastectomy presents to the ED complaining of lower abdominal pain with nausea and vomiting. She thinks her symptoms are from lactulose or from a chicken sandwich she has prior to onset of symptoms. Patient states that she suffers from constipation and takes multiple different over the counter the drugs, she has been on lactulose for the past 2 days from her PMD and states she is still passing stool which is well formed. States she used to be on a blood thinner, but she had too much bleeding so it was stopped. Denies chest pain, worsening shortness of breath, dysuria or difficulty urinating.     Patient was found to have small bowel obstruction and small right inguinal Hernia. Patient had NGT placed and was admitted for conservative management. Hernia was reduced. Surgery was offered and recommended to the patient but patient declined initially due to concerns about comorbidities; however given delayed improvement, pt was taken to the OR and underwent diagnostic laparoscopy where she was found to have RLQ ileocolic adhesion with inguinal hernia; therefore underwent IHR and also extensive lysis of adhesion in which could not be safely fully mobilized so ileocolic bypass was performed. Pt postoperatively was medically unstable with hypotension and afib with RVR, transferred to ICU for further management. Pt was resuscitated with fluid and BP support, rate controlled with digoxin/betablockers. Once pt became optimized, was downgraded to med/surg. Cardiology and medicine kept following for MERCEDEZ and pleural effusion due to fluid shift, resolved with medical management. 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 Over the course, pt waxed and waned in terms of bowel function recovery, requiring prolonged nutrition -PICC placed and TPN started. Pt fully tolerated clear liquid diet with return of bowel function at the time of disposition. Pt had urinary retention, wilson ***. PT evaluated pt and recommended JANEEN, referrals made by CM, and pt discharged to [facility] with follow up instructions.   88 year old female with PMH of Afib on metoprolol, uterine CA s/p radiation and hysterectomy, breast cancer s/p chemo and bilateral mastectomy presents to the ED complaining of lower abdominal pain with nausea and vomiting. She thinks her symptoms are from lactulose or from a chicken sandwich she has prior to onset of symptoms. Patient states that she suffers from constipation and takes multiple different over the counter the drugs, she has been on lactulose for the past 2 days from her PMD and states she is still passing stool which is well formed. States she used to be on a blood thinner, but she had too much bleeding so it was stopped. Denies chest pain, worsening shortness of breath, dysuria or difficulty urinating.     Patient was found to have small bowel obstruction and small right inguinal Hernia. Patient had NGT placed and was admitted for conservative management. Hernia was reduced. Surgery was offered and recommended to the patient but patient declined initially due to concerns about comorbidities; however given delayed improvement, pt was taken to the OR and underwent diagnostic laparoscopy where she was found to have RLQ ileocolic adhesion with inguinal hernia; therefore underwent IHR and also extensive lysis of adhesion in which could not be safely fully mobilized so ileocolic bypass was performed. Pt postoperatively was medically unstable with hypotension and afib with RVR, transferred to ICU for further management. Pt was resuscitated with fluid and BP support, rate controlled with digoxin/betablockers. Once pt became optimized, was downgraded to med/surg. Cardiology and medicine kept following for MERCEDEZ and pleural effusion due to fluid shift, resolved with medical management. 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 Over the course, pt waxed and waned in terms of bowel function recovery, requiring prolonged nutrition -PICC placed and TPN started. Pt fully tolerated clear liquid diet with return of bowel function at the time of disposition. Pt had urinary retention, wilson inserted. Indwelling wilson catheter discontinued. Pt passed TOV. PICC removed. Pt is tolerating po.  PT evaluated pt and recommended JANEEN, referrals made by CM, and pt discharged to [facility] with follow up instructions.     This is brief summary of patient's hospitalization. Refer to medical record for complete details of hospital course.   88 year old female with PMH of Afib on metoprolol, uterine CA s/p radiation and hysterectomy, breast cancer s/p chemo and bilateral mastectomy presents to the ED complaining of lower abdominal pain with nausea and vomiting. She thinks her symptoms are from lactulose or from a chicken sandwich she has prior to onset of symptoms. Patient states that she suffers from constipation and takes multiple different over the counter the drugs, she has been on lactulose for the past 2 days from her PMD and states she is still passing stool which is well formed. States she used to be on a blood thinner, but she had too much bleeding so it was stopped. Denies chest pain, worsening shortness of breath, dysuria or difficulty urinating.     Patient was found to have small bowel obstruction and small right inguinal Hernia. Patient had NGT placed and was admitted for conservative management. Hernia was reduced. Surgery was offered and recommended to the patient but patient declined initially due to concerns about comorbidities; however given delayed improvement, pt was taken to the OR and underwent diagnostic laparoscopy where she was found to have RLQ ileocolic adhesion with inguinal hernia; therefore underwent IHR and also extensive lysis of adhesion in which could not be safely fully mobilized so ileocolic bypass was performed. Pt postoperatively was medically unstable with hypotension and afib with RVR, transferred to ICU for further management. Pt was resuscitated with fluid and BP support, rate controlled with digoxin/betablockers. Once pt became optimized, was downgraded to med/surg. Cardiology and medicine kept following for MERCEDEZ and pleural effusion due to fluid shift, resolved with medical management. 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 Over the course, pt waxed and waned in terms of bowel function recovery, requiring prolonged nutrition -PICC placed and TPN started. Pt fully tolerated clear liquid diet with return of bowel function at the time of disposition. Pt had urinary retention, wilson inserted. Indwelling wilson catheter discontinued. Pt passed TOV. PICC removed. Pt is tolerating soft diet. PT evaluated pt and recommended JANEEN, referrals made by CM, and pt discharged to [facility] with follow up instructions.     This is brief summary of patient's hospitalization. Refer to medical record for complete details of hospital course.   88 year old female with PMH of Afib on metoprolol, uterine CA s/p radiation and hysterectomy, breast cancer s/p chemo and bilateral mastectomy presents to the ED complaining of lower abdominal pain with nausea and vomiting. She thinks her symptoms are from lactulose or from a chicken sandwich she has prior to onset of symptoms. Patient states that she suffers from constipation and takes multiple different over the counter the drugs, she has been on lactulose for the past 2 days from her PMD and states she is still passing stool which is well formed. States she used to be on a blood thinner, but she had too much bleeding so it was stopped. Denies chest pain, worsening shortness of breath, dysuria or difficulty urinating.     Patient was found to have small bowel obstruction and small right inguinal Hernia. Patient had NGT placed and was admitted for conservative management. Hernia was reduced. Surgery was offered and recommended to the patient but patient declined initially due to concerns about comorbidities; however given delayed improvement, pt was taken to the OR and underwent diagnostic laparoscopy where she was found to have RLQ ileocolic adhesion with inguinal hernia; therefore underwent IHR and also extensive lysis of adhesion in which could not be safely fully mobilized so ileocolic bypass was performed. Pt postoperatively was medically unstable with hypotension and afib with RVR, transferred to ICU for further management. Pt was resuscitated with fluid and BP support, rate controlled with digoxin/betablockers. Once pt became optimized, was downgraded to med/surg. Cardiology and medicine kept following for MERCEDEZ and pleural effusion due to fluid shift, resolved with medical management. 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 Over the course, pt waxed and waned in terms of bowel function recovery, requiring prolonged nutrition -PICC placed and TPN started. Pt fully tolerated clear liquid diet with return of bowel function at the time of disposition. Pt had urinary retention, wilson inserted. Indwelling wilson catheter discontinued. Pt passed TOV. PICC removed. Pt is tolerating soft diet. PT evaluated pt and recommended JANEEN, referrals made by CM, and pt discharged to [facility] with follow up instructions.     This is brief summary of patient's hospitalization. Refer to medical record for complete details of hospital course.

## 2022-03-11 NOTE — PROGRESS NOTE ADULT - ASSESSMENT
sbo  pt has been and still refusing any surgical intervention  abd pain    recommended surgical intervention to be considered, pt refusing "im too old for surgery"  oob  observation  ngt if n/v develops

## 2022-03-11 NOTE — PROGRESS NOTE ADULT - SUBJECTIVE AND OBJECTIVE BOX
NICK MICAH  --------------------------------------------------  HPI:  88 year old female with PMH of Afib on metoprolol, uterine CA s/p radiation and hysterectomy, breast cancer s/p chemo and bilateral mastectomy presents to the ED complaining of lower abdominal pain with nausea and vomiting. She thinks her symptoms are from lactulose or from a chicken sandwich she has prior to onset of symptoms. Patient states that she suffers from constipation and takes multiple different over the counter the drugs, she has been on lactulose for the past 2 days from her PMD and states she is still passing stool which is well formed. States she used to be on a blood thinner, but she had too much bleeding so it was stopped. Denies chest pain, worsening shortness of breath, dysuria or difficulty urinating. (08 Mar 2022 11:15)    PAST MEDICAL & SURGICAL HISTORY:  Breast cancer    Uterine cancer    Atrial fibrillation    Breast cancer  s/p chemo and mammogram    Uterine cancer  s/p radiation and hysterectomy    Afib    H/O mastectomy    H/O: hysterectomy    S/P bilateral mastectomy    History of hysterectomy      FAMILY HISTORY:  No pertinent family history in first degree relatives      REVIEW OF SYSTEMS: All negative except the one in documented in HPI  ----------------------------------  MEDICATIONS  (STANDING):  metoprolol succinate ER 25 milliGRAM(s) Oral daily    MEDICATIONS  (PRN):  ondansetron Injectable 4 milliGRAM(s) IV Push every 6 hours PRN Nausea and/or Vomiting    Allergies    contrast media (iodine-based) (Short breath)  iv  contrast (Unknown)  morphine (Rash)  penicillin (Hives)  penicillin (Rash)  Percocet 5/325 (Unknown)  sulfa drugs (Short breath)  Sulfacetamide Sodium (Rash)    Intolerances      Vital Signs Last 24 Hrs  T(C): 36.8 (11 Mar 2022 05:20), Max: 37 (10 Mar 2022 20:18)  T(F): 98.3 (11 Mar 2022 05:20), Max: 98.6 (10 Mar 2022 20:18)  HR: 108 (11 Mar 2022 05:20) (94 - 108)  BP: 122/71 (11 Mar 2022 05:20) (122/71 - 127/63)  BP(mean): --  RR: 18 (11 Mar 2022 05:20) (17 - 18)  SpO2: 97% (11 Mar 2022 05:20) (95% - 98%)    PHYSICAL EXAMINATION:  General: Well developed. well nourished. not in distress  HEENT: AT, NC. PERRL. intact EOM. no throat erythema or exudate.   Neck: supple. no JVD. no palpable lymph nodes  Chest: CTA bilaterally  Heart: normal S1,S2. RRR. no heart murmur.  Abdomen: soft. non-tender. non-distended. + BS. no hernia or palpable masses.  Genital: not examined  Ext: no C/C/E. no calf tenderness.  Neuro: AAO x3. no focal weakness. no speech disorder  Skin: no rash  ---------------------------------------    LABS:  --------                        8.9    10.26 )-----------( 102      ( 10 Mar 2022 05:44 )             28.3     03-10    138  |  109<H>  |  12  ----------------------------<  98  3.6   |  25  |  0.71    Ca    8.5      10 Mar 2022 05:44  Phos  2.5     03-09  Mg     1.8     03-10

## 2022-03-11 NOTE — PROGRESS NOTE ADULT - SUBJECTIVE AND OBJECTIVE BOX
Pt s- new complaints. +lower abd pain, No flatus.  ICU Vital Signs Last 24 Hrs  T(C): 36.8 (11 Mar 2022 05:20), Max: 37 (10 Mar 2022 20:18)  T(F): 98.3 (11 Mar 2022 05:20), Max: 98.6 (10 Mar 2022 20:18)  HR: 108 (11 Mar 2022 05:20) (94 - 108)  BP: 122/71 (11 Mar 2022 05:20) (122/71 - 127/63)  BP(mean): --  ABP: --  ABP(mean): --  RR: 18 (11 Mar 2022 05:20) (17 - 18)  SpO2: 97% (11 Mar 2022 05:20) (95% - 98%)  Alert nad  Abd: soft +lower abd tenderness, voluntay guarding, no rebound tnderness                        8.9    10.26 )-----------( 102      ( 10 Mar 2022 05:44 )             28.3   03-10    138  |  109<H>  |  12  ----------------------------<  98  3.6   |  25  |  0.71    Ca    8.5      10 Mar 2022 05:44  Phos  2.5     03-09  Mg     1.8     03-10

## 2022-03-11 NOTE — PROGRESS NOTE ADULT - ASSESSMENT
88 year old female with PMH of Afib on metoprolol, uterine CA s/p radiation and hysterectomy, breast cancer s/p chemo and bilateral mastectomy, recently diagnosed Myelodysplastic Sydrome presents to the ED complaining of lower abdominal pain with nausea and vomiting. Pt admitted to Surgery for Small Bowel Obstruction. Medicine consulted for management for chronic medical conditions.     #SBO- Pt started on low fiber diet. Pt currently refusing surgery. Rest of the management as per primary team  #Chronic Atrial Fibrillation-EKG showed Rapid Afib with HR @107 on admission. Not on Anticoagulation due to risk of bleeding. Continue with home dose of metoprolol succinate 25 mg. HR controlled  #Breast Ca s/p b/l mastectomy-Takes anastrazole at home   #Myelodysplastic Syndrome-Follow up with outpatient hematologist

## 2022-03-11 NOTE — CHART NOTE - NSCHARTNOTEFT_GEN_A_CORE
Patient re-examined at bedside. Complaining of lower abdominal pain and bloating. Denies vomiting but admits to spitting up bile and belching. Admits to flatus, but not frequent and states she is constipated and unable to have a bowel movement. States she does not have an appetite.   On exam patient is tender to palpation across lower abdomen, mild distention. No masses or hernia palpated on exam.   Advised patient not to eat or drink any more due to her current symptoms. IVF reordered and NPO order placed. Will obtain CT scan with IV contrast for further evaluation. Discussed with Dr. Cleveland

## 2022-03-12 NOTE — BRIEF OPERATIVE NOTE - NSICDXBRIEFPROCEDURE_GEN_ALL_CORE_FT
PROCEDURES:  Ileocolic bypass 12-Mar-2022 00:07:09 Laparoscopic WILLIAM with ileocolic bypass [place]

## 2022-03-12 NOTE — CONSULT NOTE ADULT - ASSESSMENT
88 year old female with PMH of Afib on metoprolol, uterine CA s/p radiation and hysterectomy, breast cancer s/p chemo and bilateral mastectomy presents to the ED complaining of lower abdominal pain with nausea and vomiting. Pt admitted for SBO. Pt had laparoscopic ileocolic bypass 3/11 for SBO. ICU consulted for Hypotension and Rapid Afib.     #SBO s/p laparoscopic ileocolic bypass 3/11   #Hypotension  #Rapid Afib  #H/O Breast Cancer   #H/O Uterine Cancer         Neuro  -AAOX3, no acute issues     Cardiovascular  #Hypotension  -BP 75/40, got 2.5 liters of bolus in last 24 hrs without much improvement  -Can be sepsis  -      Pulmonary  -    Infections  -    Nephro  -    Gastrointestinal  -    Heme  -    Endocrine  -    Skin/Catheters  -      Prophylaxis    88 year old female with PMH of Afib on metoprolol, uterine CA s/p radiation and hysterectomy, breast cancer s/p chemo and bilateral mastectomy presents to the ED complaining of lower abdominal pain with nausea and vomiting. Pt admitted for SBO. Pt had laparoscopic ileocolic bypass 3/11 for SBO. ICU consulted for Hypotension and Rapid Afib.     #SBO s/p laparoscopic ileocolic bypass 3/11   #Hypotension  #Rapid Afib  #MERCEDEZ  #Myelodysplastic syndrome  #H/O Breast Cancer   #H/O Uterine Cancer         Neuro  -AAOX3, no acute issues     Cardiovascular  #Hypotension  -BP 75/40, got 2.5 liters of bolus in last 24 hrs without much improvement  -Can be related to sepsis  -Pt is s/p laparoscopic ileocolic bypass 3/11   -WBC count increased to 27 K, afebrile   -Antibiotic coverage broadened to Aztreonam (pt is allergic to Penicillin-gets throat swelling)  -Pt has RIJ, can start pressors if required   -f/u Blood Cx and Urine Cx     #Rapid afib with RVR  -H/O Afib (not on AC due to bleeding risk), takes metoprolol at home  -HR noted to be 130-150s  -Got 2.5 liter fluid bolus and 2.5 iv metoprolol due to low BP  -Pt remains in rapid Afib despite fluid resuscitation  -Will give digoxin 500 mcg   -Monitor HR       Pulmonary  #Pneumonia  -CXR shows right lobe infiltrate, WBC 27 K, no fever   -c/w aztreonam     Infections  #Sepsis  -BP 75/40, got 2.5 liters of bolus in last 24 hrs without much improvement  -Can be related to sepsis  -Pt is s/p laparoscopic ileocolic bypass 3/11   -WBC count increased to 27 K, afebrile   -Antibiotic coverage broadened to Aztreonam (pt is allergic to Penicillin-gets throat swelling)  -Pt has RIJ, can start pressors if required   -f/u Blood Cx and Urine Cx       Nephro  #MERCEDEZ   -Cr 0.71>1.21  -s/p 2.5 liter bolus  -c/w iv fluids  -Pt has Kinsey Catheter, monitor Uo    Gastrointestinal  #SBO s/p laparoscopic ileocolic bypass 3/11   -Pt has NGT and 1 drain at surgical site   -Pain control   -Surgery following     Heme  #Myelodysplastic syndrome  -Platelet count 84, Hb 9,5  -Monitor CBC     Endocrine  -No acute issues     Skin/Catheters  -RIJ   -Right Abdominal drain   -Kinsey catheter     Prophylaxis   -Heparin sc for DVT ppx    88 year old female with PMH of Afib on metoprolol, uterine CA s/p radiation and hysterectomy, breast cancer s/p chemo and bilateral mastectomy presents to the ED complaining of lower abdominal pain with nausea and vomiting. Pt admitted for SBO. Pt had laparoscopic ileocolic bypass 3/11 for SBO. ICU consulted for Hypotension and Rapid Afib.     #SBO s/p laparoscopic ileocolic bypass 3/11   #Hypotension  #Rapid Afib  #MERCEDEZ  #Myelodysplastic syndrome  #H/O Breast Cancer   #H/O Uterine Cancer         Neuro  -AAOX3, no acute issues     Cardiovascular  #Hypotension  -BP 75/40, got 2.5 liters of bolus in last 24 hrs without much improvement  -Can be related to sepsis  -Pt is s/p laparoscopic ileocolic bypass 3/11   -WBC count increased to 27 K, afebrile   -Antibiotic coverage broadened to Aztreonam (pt is allergic to Penicillin-gets throat swelling)  -Pt has RIJ, can start pressors if required   -f/u Blood Cx and Urine Cx     #Rapid afib with RVR  -H/O Afib (not on AC due to bleeding risk), takes metoprolol at home  -HR noted to be 130-150s  -Got 2.5 liter fluid bolus and 2.5 iv metoprolol due to low BP  -Pt remains in rapid Afib despite fluid resuscitation  -Will give digoxin 500 mcg   -Monitor HR       Pulmonary  #Pneumonia  -CXR shows right lobe infiltrate, WBC 27 K, no fever   -c/w aztreonam     Infections  #Sepsis  -BP 75/40, got 2.5 liters of bolus in last 24 hrs without much improvement  -Can be related to sepsis  -Pt is s/p laparoscopic ileocolic bypass 3/11   -WBC count increased to 27 K, afebrile   -Antibiotic coverage broadened to Aztreonam (pt is allergic to Penicillin-gets throat swelling)  -Pt has RIJ, can start pressors if required   -f/u Blood Cx and Urine Cx       Nephro  #MERCEDEZ   -Cr 0.71>1.21  -s/p 2.5 liter bolus  -c/w iv fluids  -Pt has Kinsey Catheter, monitor Uo    Gastrointestinal  #SBO s/p laparoscopic ileocolic bypass 3/11   -Pt has NGT and 1 drain at surgical site   -Pain control   -Surgery following     Heme  #Myelodysplastic syndrome  -Platelet count 84, Hb 9,5  -Monitor CBC     #Breast Cancer  -s/p bilateral mastectomy  -on anastrazole at home, holding for now as pt NPO     Endocrine  -No acute issues     Skin/Catheters  -RIJ   -Right Abdominal drain   -Kinsey catheter     Prophylaxis   -Heparin sc for DVT ppx

## 2022-03-12 NOTE — PROGRESS NOTE ADULT - SUBJECTIVE AND OBJECTIVE BOX
Surgery Post-Operative Note    Subjective:  Patient seen at bedside  Pt states feels ok. c/o nausea. Denies abd pain        T(C): 36.6 (03-12-22 @ 06:11), Max: 36.7 (03-11-22 @ 13:40)  HR: 108 (03-12-22 @ 06:11) (98 - 108)  BP: 94/64 (03-12-22 @ 06:11) (94/64 - 125/76)  RR: 18 (03-12-22 @ 06:11) (15 - 20)  SpO2: 96% (03-12-22 @ 06:11) (96% - 99%)  Wt(kg): --    Physical Exam:    Gen: awake, alert oriented NAD  HEENT: anicteric. NGT in place  Abd: surgical wounds dressed c/d/i.  incisional tenderness  Pelvic: Kinsey catheter in place with clear urine  Ext: warm to touch no c/c/e    MEDICATIONS  (STANDING):  acetaminophen   IVPB .. 1000 milliGRAM(s) IV Intermittent every 6 hours  clindamycin IVPB 600 milliGRAM(s) IV Intermittent every 8 hours  sodium chloride 0.9%. 1000 milliLiter(s) (50 mL/Hr) IV Continuous <Continuous>    MEDICATIONS  (PRN):  ondansetron Injectable 4 milliGRAM(s) IV Push every 6 hours PRN Nausea and/or Vomiting          I&O's Detail    11 Mar 2022 07:01  -  12 Mar 2022 06:44  --------------------------------------------------------  IN:    Lactated Ringers Bolus: 1500 mL  Total IN: 1500 mL    OUT:    Blood Loss (mL): 20 mL    Bulb (mL): 230 mL    Indwelling Catheter - Urethral (mL): 650 mL    Nasogastric/Oral tube (mL): 300 mL  Total OUT: 1200 mL    Total NET: 300 mL

## 2022-03-12 NOTE — PROGRESS NOTE ADULT - SUBJECTIVE AND OBJECTIVE BOX
S: Patient underwent laparoscopic lysis of adhesions with ileocolic bypass yesterday. Today patient mildly hypotensive, but alert and talking. Reports feeling a bit better.    O:  Vital Signs Last 24 Hrs  T(C): 36.8 (12 Mar 2022 17:00), Max: 36.9 (12 Mar 2022 14:00)  T(F): 98.3 (12 Mar 2022 17:00), Max: 98.4 (12 Mar 2022 14:00)  HR: 111 (12 Mar 2022 18:27) (99 - 122)  BP: 83/42 (12 Mar 2022 18:27) (72/53 - 125/76)  BP(mean): 51 (12 Mar 2022 18:27) (51 - 98)  RR: 18 (12 Mar 2022 14:00) (15 - 20)  SpO2: 93% (12 Mar 2022 18:27) (93% - 99%)    GENERAL: NAD, well-developed  HEAD:  Atraumatic, Normocephalic  EYES: EOMI, PERRLA, conjunctiva and sclera clear  NECK: Supple, No JVD  CHEST/LUNG: Clear to auscultation bilaterally; No wheeze  HEART: Irregular, tachycardic. No murmurs, rubs, or gallops  ABDOMEN: Soft, Nontender, Nondistended; Bowel sounds present  EXTREMITIES:  2+ Peripheral Pulses, No clubbing, cyanosis, or edema  PSYCH: AAOx3  NEUROLOGY: non-focal  SKIN: No rashes or lesions    acetaminophen   IVPB .. 1000 milliGRAM(s) IV Intermittent every 6 hours  clindamycin IVPB 600 milliGRAM(s) IV Intermittent every 8 hours  ondansetron Injectable 4 milliGRAM(s) IV Push every 6 hours PRN  sodium chloride 0.9%. 1000 milliLiter(s) IV Continuous <Continuous>                            9.5    27.12 )-----------( 84       ( 12 Mar 2022 13:38 )             29.6       03-12    136  |  104  |  16  ----------------------------<  115<H>  3.2<L>   |  24  |  1.21    Ca    7.9<L>      12 Mar 2022 07:00    TPro  5.3<L>  /  Alb  2.2<L>  /  TBili  1.3<H>  /  DBili  0.7<H>  /  AST  17  /  ALT  11  /  AlkPhos  52  03-12

## 2022-03-12 NOTE — PROGRESS NOTE ADULT - ASSESSMENT
88 year old female with PMH of Afib on metoprolol, uterine CA s/p radiation and hysterectomy, breast cancer s/p chemo and bilateral mastectomy, recently diagnosed Myelodysplastic Syndrome presents to the ED complaining of lower abdominal pain with nausea and vomiting. Pt admitted to Surgery for Small Bowel Obstruction. Patient failed conservative management and went to the OR for lysis of adhesions and ileocolic bypass. Medicine consulted for management for chronic medical conditions.     #SBO s/p WILLIAM and ileocolic bypass  #Chronic Atrial Fibrillation with RVR  #H/o breast cancer  #H/o uterine cancer  #Hypotension  #RLL infiltrate, atelectasis vs. developing pneumonia  #Acute Kidney Injury    Patient has leukocytosis, possible stress response from recent surgery. If WBC continues to trend upwards or has fevers more than 24 hours after surgery would consider additional anti-biotics coverage with ceftriaxone or cefepime. Avoid zosyn given penicillin allergy with throat swelling. For Atrial Fibrillation with RVR, R 120-130 will continue to monitor given low BP. If persistently elevated can give metoprolol 2.5mg IVP as BP allows. If BP drops with metoprolol, would Digoxin 250mg q6h x2 doses if HR persistently >135. Patient mildly hypotensive, per discussion with surgical attending, monitor urine output and mental status and will limit fluid boluses. If patient has altered mental status, would consult ICU. Serial bowel exams and continue bowel rest.

## 2022-03-12 NOTE — CONSULT NOTE ADULT - SUBJECTIVE AND OBJECTIVE BOX
Patient is a 88y old  Female who presents with a chief complaint of small bowel obstruction (12 Mar 2022 19:22)      Initial HPI on admission:  HPI:  88 year old female with PMH of Afib on metoprolol, uterine CA s/p radiation and hysterectomy, breast cancer s/p chemo and bilateral mastectomy presents to the ED complaining of lower abdominal pain with nausea and vomiting. She thinks her symptoms are from lactulose or from a chicken sandwich she has prior to onset of symptoms. Patient states that she suffers from constipation and takes multiple different over the counter the drugs, she has been on lactulose for the past 2 days from her PMD and states she is still passing stool which is well formed. States she used to be on a blood thinner, but she had too much bleeding so it was stopped. Denies chest pain, worsening shortness of breath, dysuria or difficulty urinating. (08 Mar 2022 11:15)      BRIEF HOSPITAL COURSE: Pt had laparoscopic, ileocolic bypass 3/11 for SBO. Pt noted to be hypotensive with SBP in 70-80s and rapid Afib with HR in 150s. Pt got 2.5  liters bolus in last 24 hrs with temporary improvement in BP and HR. Pt also got 1 dose of metoprolol 2.5 iv push for rapid Afib. Pt got 3 doses of clindamycin. WBC 27 k. Pt remains afebrile.     PAST MEDICAL & SURGICAL HISTORY:  Breast cancer    Uterine cancer    Atrial fibrillation    Breast cancer  s/p chemo and mammogram    Uterine cancer  s/p radiation and hysterectomy    Afib    H/O mastectomy    H/O: hysterectomy    S/P bilateral mastectomy    History of hysterectomy      Allergies    contrast media (iodine-based) (Short breath)  iv  contrast (Unknown)  morphine (Rash)  penicillin (Hives)  penicillin (Rash)  Percocet 5/325 (Unknown)  sulfa drugs (Short breath)  Sulfacetamide Sodium (Rash)    Intolerances      FAMILY HISTORY:  No pertinent family history in first degree relatives            Medications:  acetaminophen   IVPB .. 1000 milliGRAM(s) IV Intermittent every 6 hours  ondansetron Injectable 4 milliGRAM(s) IV Push every 6 hours PRN  sodium chloride 0.9%. 1000 milliLiter(s) IV Continuous <Continuous>      vent settings      Vital Signs Last 24 Hrs  T(C): 36.6 (12 Mar 2022 22:31), Max: 36.9 (12 Mar 2022 14:00)  T(F): 97.8 (12 Mar 2022 22:31), Max: 98.4 (12 Mar 2022 14:00)  HR: 106 (12 Mar 2022 22:31) (99 - 122)  BP: 86/41 (12 Mar 2022 22:31) (72/53 - 125/76)  BP(mean): 59 (12 Mar 2022 20:33) (51 - 98)  RR: 18 (12 Mar 2022 22:31) (15 - 20)  SpO2: 97% (12 Mar 2022 22:31) (93% - 99%)          03-11 @ 07:01  -  03-12 @ 07:00  --------------------------------------------------------  IN: 1500 mL / OUT: 1200 mL / NET: 300 mL          LABS:                        9.5    27.12 )-----------( 84       ( 12 Mar 2022 13:38 )             29.6     03-12    136  |  104  |  16  ----------------------------<  115<H>  3.2<L>   |  24  |  1.21    Ca    7.9<L>      12 Mar 2022 07:00    TPro  5.3<L>  /  Alb  2.2<L>  /  TBili  1.3<H>  /  DBili  0.7<H>  /  AST  17  /  ALT  11  /  AlkPhos  52  03-12          CAPILLARY BLOOD GLUCOSE            CULTURES:  Culture Results:   <10,000 CFU/mL Normal Urogenital Ira (03-08 @ 18:19)        Physical Examination:  General - NAD  Eyes - PERRLA, EOM intact  ENT - Nonicteric sclerae, PERRLA, EOMI. Oropharynx clear. Moist mucous membranes. Conjunctivae appear well perfused.   Neck - No noticeable or palpable swelling, redness or rash around throat or on face  Lymph Nodes - No lymphadenopathy  Cardiovascular - RRR no m/r/g, no JVD, no carotid bruits  Lungs - Clear to ascultation, no use of accessory muscles, no crackles or wheezes.  Skin - No rashes, skin warm and dry, no erythematous areas  Abdomen - Normal bowel sounds, abdomen soft and nontender, no hepatosplenomegaly.  Extremities - No edema, cyanosis or clubbing  MusculoSkeletal - 5/5 strength, normal range of motion, no swollen or erythematous  joints.  Neurological – Alert and oriented x 3, CN 2-12 grossly intact.        RADIOLOGY REVIEWED ** Patient is a 88y old  Female who presents with a chief complaint of small bowel obstruction (12 Mar 2022 19:22)      Initial HPI on admission:  HPI:  88 year old female with PMH of Afib on metoprolol, uterine CA s/p radiation and hysterectomy, breast cancer s/p chemo and bilateral mastectomy presents to the ED complaining of lower abdominal pain with nausea and vomiting. She thinks her symptoms are from lactulose or from a chicken sandwich she has prior to onset of symptoms. Patient states that she suffers from constipation and takes multiple different over the counter the drugs, she has been on lactulose for the past 2 days from her PMD and states she is still passing stool which is well formed. States she used to be on a blood thinner, but she had too much bleeding so it was stopped. Denies chest pain, worsening shortness of breath, dysuria or difficulty urinating. (08 Mar 2022 11:15)      BRIEF HOSPITAL COURSE: Pt had laparoscopic, ileocolic bypass 3/11 for SBO. Pt noted to be hypotensive with SBP in 70-80s and rapid Afib with HR in 150s. Pt got 2.5  liters bolus in last 24 hrs with temporary improvement in BP and HR. Pt also got 1 dose of metoprolol 2.5 iv push for rapid Afib. Pt got 3 doses of clindamycin. WBC 27 k. Pt remains afebrile.     PAST MEDICAL & SURGICAL HISTORY:  Breast cancer    Uterine cancer    Atrial fibrillation    Breast cancer  s/p chemo and mammogram    Uterine cancer  s/p radiation and hysterectomy    Afib    H/O mastectomy    H/O: hysterectomy    S/P bilateral mastectomy    History of hysterectomy      Allergies    contrast media (iodine-based) (Short breath)  iv  contrast (Unknown)  morphine (Rash)  penicillin (Hives)  penicillin (Rash)  Percocet 5/325 (Unknown)  sulfa drugs (Short breath)  Sulfacetamide Sodium (Rash)    Intolerances      FAMILY HISTORY:  No pertinent family history in first degree relatives            Medications:  acetaminophen   IVPB .. 1000 milliGRAM(s) IV Intermittent every 6 hours  ondansetron Injectable 4 milliGRAM(s) IV Push every 6 hours PRN  sodium chloride 0.9%. 1000 milliLiter(s) IV Continuous <Continuous>      vent settings      Vital Signs Last 24 Hrs  T(C): 36.6 (12 Mar 2022 22:31), Max: 36.9 (12 Mar 2022 14:00)  T(F): 97.8 (12 Mar 2022 22:31), Max: 98.4 (12 Mar 2022 14:00)  HR: 106 (12 Mar 2022 22:31) (99 - 122)  BP: 86/41 (12 Mar 2022 22:31) (72/53 - 125/76)  BP(mean): 59 (12 Mar 2022 20:33) (51 - 98)  RR: 18 (12 Mar 2022 22:31) (15 - 20)  SpO2: 97% (12 Mar 2022 22:31) (93% - 99%)          03-11 @ 07:01  -  03-12 @ 07:00  --------------------------------------------------------  IN: 1500 mL / OUT: 1200 mL / NET: 300 mL          LABS:                        9.5    27.12 )-----------( 84       ( 12 Mar 2022 13:38 )             29.6     03-12    136  |  104  |  16  ----------------------------<  115<H>  3.2<L>   |  24  |  1.21    Ca    7.9<L>      12 Mar 2022 07:00    TPro  5.3<L>  /  Alb  2.2<L>  /  TBili  1.3<H>  /  DBili  0.7<H>  /  AST  17  /  ALT  11  /  AlkPhos  52  03-12          CAPILLARY BLOOD GLUCOSE            CULTURES:  Culture Results:   <10,000 CFU/mL Normal Urogenital Ira (03-08 @ 18:19)        Physical Examination:  General - NAD  Eyes - PERRLA, EOM intact  ENT - Nonicteric sclerae, PERRLA, EOMI. Oropharynx clear. Moist mucous membranes. Conjunctivae appear well perfused.   Neck - No noticeable or palpable swelling, redness or rash around throat or on face  Lymph Nodes - No lymphadenopathy  Cardiovascular - RRR no m/r/g, no JVD, no carotid bruits  Lungs - Clear to ascultation, no use of accessory muscles, no crackles or wheezes.  Skin - No rashes, skin warm and dry, no erythematous areas  Abdomen -WALLACE drain, abdominal tenderness present at surgical site   Extremities - No edema, cyanosis or clubbing  MusculoSkeletal - 5/5 strength, normal range of motion, no swollen or erythematous  joints.  Neurological – Alert and oriented x 3, CN 2-12 grossly intact.        RADIOLOGY REVIEWED **

## 2022-03-12 NOTE — CHART NOTE - NSCHARTNOTEFT_GEN_A_CORE
Called by Surgery about patient being hypotensive with BP 72/53. Pt evaluated at bedside. Pt AOx3 in no acute distress. Endorses dizziness but denies chest pain, SOB, fever, chills, N/V/D. Pt is s/p ileocolic bypass for SBO POD 1. 20ml blood loss documented during surgery and 230ml though wound bulb. Pt receiving 2nd 500cc bolus at the time of examination. Current BP 90/43, . Lungs clear to auscultation with no crackles or wheeze, Irregularly irregular HR on auscultation of heart, abdomen tender 2/2 surgery, extremities warm, peripheral pulses intact. Hb 9 this am, 8.9 on 3/10. Nurse informed to hold metoprolol. Pt placed on tele. BP seems to be responding well to IVF, Repeat BP after completion of bolus. Recommend repeat Hb within 6 hours.

## 2022-03-12 NOTE — PROGRESS NOTE ADULT - ASSESSMENT
s/p diagnostic laparoscopic, ileocolic bypass 3/11  1. Cont NPO  2. IV fluids  3. IV abx x 24hrs  4. Pain control  5. OOB to chair/ambulation  6. Cont NGT

## 2022-03-12 NOTE — CONSULT NOTE ADULT - ATTENDING COMMENTS
Patient is an 89 y/o female with PMH of AFIB on eliquis, uterine cancer s/p hysterectomy, breast cancer s/p bilateral mastectomy and recent dx of myelodysplastic syndrome. She was admitted to the hospital march 8, 2022 with a chief complaint of abdominal pain. She had surgery for small bowel obstruction on 3-. It was done laparoscopically with EBL 0f 20cc, and a drain was left. There was lysis of adhesions and a ileocolic bypass. After the surgery the patient was discharged to . The next morning she was hypotensive ( sys 84mmhg ). Her CXR has evolved to now show a right lower lobe infiltrate. there is no fever but a new leukocytosis ( WBC 27K ). The medical consult team requested an ICU consult with intent to transfer the patient for AFIB with a rapid VR and hypotension despite 2L fluid resuscitation. We will make a volume assessment first. She has a centra line so transducing the CVP will be quick and easy. Can also use POCUS and measure IVC size. Will hold SC heparin and send an HIT. DVT prophylaxis with pneumatic compression. Broaden antibiotic coverage with Aztreonam ( pt. allergic to PCN ). Will likely treat AFIB with Digoxin. but other options exist. Dx- hypotension, AFIB with rapid ventricular rate. The problems being treated here are unrelated to the surgical procedure. I reviewed all laboratory and roentgenographic data and any previous medical record from [facility] that existed. I also discussed the case with the ED attending or referring physician.

## 2022-03-12 NOTE — PROGRESS NOTE ADULT - SUBJECTIVE AND OBJECTIVE BOX
Pt s- complaints. no n/v   72/53 p112 a/f  Abd: soft appropriate incisional tenderness  WALLACE-= 230cc serosanguinous drainage  no calf swelling or erythema  wbc 21  h&h 9/28.2    a/ laprsocopic ileocolic bypass  hypotension    p: iv bolus  medical consult  recheck bp after bolus  observation

## 2022-03-13 NOTE — PROGRESS NOTE ADULT - ASSESSMENT
post op ileocolic bypass  tacchycardia  hypotension slightly improved    await bowel function  fluid and HR mgt per ICU team

## 2022-03-13 NOTE — PROGRESS NOTE ADULT - ASSESSMENT
88 year old female with PMH of Afib on metoprolol, uterine CA s/p radiation and hysterectomy, breast cancer s/p chemo and bilateral mastectomy presents to the ED complaining of lower abdominal pain with nausea and vomiting. Pt admitted for SBO. Pt had laparoscopic ileocolic bypass 3/11 for SBO. ICU consulted for Hypotension and Rapid Afib.     #SBO s/p laparoscopic ileocolic bypass 3/11   #Hypotension  #Rapid Afib  #MERCEDEZ  #Myelodysplastic syndrome  #H/O Breast Cancer   #H/O Uterine Cancer         Neuro  -AAOX3, no acute issues     Cardiovascular  #Hypotension  -BP 75/40, got 2.5 liters of bolus in last 24 hrs without much improvement  -sepsis vs post op  -Pt is s/p laparoscopic ileocolic bypass 3/11   -WBC count increased to 27 K, afebrile   -Antibiotic coverage broadened to Aztreonam (pt is allergic to Penicillin-gets throat swelling)  -Pt has RIJ, can start pressors if required   -f/u Blood Cx and Urine Cx     #Rapid afib with RVR  -H/O Afib (not on AC due to bleeding risk), takes metoprolol at home  -HR noted to be 130-150s >> improved to 120s  - given 4.5L over past 24 hrs 3/12-3/13  - likely in setting of sepsis  -Pt remains in rapid Afib despite fluid resuscitation  -Will give digoxin 500 mcg   -Monitor HR       Pulmonary  #Pneumonia  -CXR shows right lobe infiltrate, WBC 27 K, no fever   -c/w aztreonam     Infections  #Sepsis  -BP 75/40, got 2.5 liters of bolus prior to ICU, then another 2L administered  - standing ivf  -Pt is s/p laparoscopic ileocolic bypass 3/11   -WBC count increased to 27 K, afebrile   -Antibiotic coverage broadened to Aztreonam (pt is allergic to Penicillin-gets throat swelling)  -Pt has RIJ, can start pressors if required   -f/u Blood Cx and Urine Cx       Nephro  #MERCEDEZ   -Cr 0.71>1.21  -s/p 2.5 liter bolus  -c/w iv fluids  -Pt has Kinsey Catheter, monitor Uo    Gastrointestinal  #SBO s/p laparoscopic ileocolic bypass 3/11   -Pt has NGT and 1 drain at surgical site   -Pain control   -Surgery following     Heme  #Myelodysplastic syndrome  -Platelet count 84, Hb 9,5  -Monitor CBC     #Breast Cancer  -s/p bilateral mastectomy  -on anastrazole at home, holding for now as pt NPO     Endocrine  -No acute issues     Skin/Catheters  -RIJ   -Right Abdominal drain   -Kinsey catheter     Prophylaxis   -Heparin sc for DVT ppx in AM 3/13

## 2022-03-13 NOTE — DIETITIAN INITIAL EVALUATION ADULT. - OTHER INFO
Patient from home admitted for small bowel obstrictions, s/p laparoscopic ileocolic bypass on 3/11/22, transferred from surgery floor to ICU with hypotension/Afib. Visited pt. asleep, presently NPO with NG tube with low suctioning & rt. abdomin WALLACE drainage, surgery/team following, surgical incision wound with left/rt. leg edema 1+ Patient from home admitted for small bowel obstrictions, s/p laparoscopic ileocolic bypass on 3/11/22, transferred from surgery floor to ICU for hypotension/Afib monitoring. Visited pt. asleep, presently NPO with NG tube with low suctioning & rt. abdomin WALLACE drainage, surgery/team following, surgical incision wound with left/rt. leg edema 1+

## 2022-03-13 NOTE — DIETITIAN INITIAL EVALUATION ADULT. - PERTINENT LABORATORY DATA
03-13 Na136 mmol/L Glu 88 mg/dL K+ 4.4 mmol/L Cr  1.43 mg/dL<H> BUN 22 mg/dL<H>   03-13 Phos 4.2 mg/dL   03-13 Alb 2.0 g/dL<L>        03-13-22 @ 11:43 HgbA1C 5.5

## 2022-03-13 NOTE — PROGRESS NOTE ADULT - SUBJECTIVE AND OBJECTIVE BOX
Pt s- new complaints  ICU Vital Signs Last 24 Hrs  T(C): 36.5 (13 Mar 2022 08:00), Max: 36.9 (12 Mar 2022 14:00)  T(F): 97.7 (13 Mar 2022 08:00), Max: 98.4 (12 Mar 2022 14:00)  HR: 112 (13 Mar 2022 08:30) (101 - 138)  BP: 86/44 (13 Mar 2022 08:30) (78/40 - 119/49)  BP(mean): 52 (13 Mar 2022 08:30) (49 - 77)  ABP: --  ABP(mean): --  RR: 15 (13 Mar 2022 08:30) (14 - 23)  SpO2: 94% (13 Mar 2022 08:00) (90% - 99%)  Alert nad  Abd: soft minimal incisional tenderness  WALLACE: 60 serous drainage  I&O's Detail    12 Mar 2022 06:01  -  13 Mar 2022 07:00  --------------------------------------------------------  IN:    IV PiggyBack: 100 mL    IV PiggyBack: 50 mL    Lactated Ringers: 375 mL    Lactated Ringers Bolus: 2000 mL  Total IN: 2525 mL    OUT:    Bulb (mL): 60 mL    Indwelling Catheter - Urethral (mL): 435 mL    Nasogastric/Oral tube (mL): 400 mL  Total OUT: 895 mL    Total NET: 1630 mL      13 Mar 2022 07:01  -  13 Mar 2022 08:36  --------------------------------------------------------  IN:    IV PiggyBack: 50 mL    IV PiggyBack: 50 mL    Lactated Ringers: 75 mL  Total IN: 175 mL    OUT:    Indwelling Catheter - Urethral (mL): 50 mL  Total OUT: 50 mL    Total NET: 125 mL                        8.6    28.78 )-----------( 89       ( 13 Mar 2022 04:29 )             27.4   03-13    136  |  107  |  22<H>  ----------------------------<  88  4.4   |  24  |  1.43<H>    Ca    7.9<L>      13 Mar 2022 04:29  Phos  4.2     03-13  Mg     1.7     03-13    TPro  5.2<L>  /  Alb  2.0<L>  /  TBili  0.9  /  DBili  x   /  AST  27  /  ALT  13  /  AlkPhos  51  03-13

## 2022-03-13 NOTE — PROGRESS NOTE ADULT - SUBJECTIVE AND OBJECTIVE BOX
INTERVAL HPI/OVERNIGHT EVENTS:   Pt received 2.5L on floors. 2L LR in ICU. POCUS done at bedside shows 9mm IVC prior to 2L bolus. BP improved after 2L and currently on standing fluids. Pt complaining of headache given ofirmev. Pt is dig loaded for rapid afib 130-150s. Otherwise, pt is AOx3.     PRESSORS: [ ] YES [ ] NO  WHICH:    ANTIBIOTICS:                      Antimicrobial:  aztreonam  IVPB 1000 milliGRAM(s) IV Intermittent every 8 hours    Cardiovascular:    Pulmonary:    Hematalogic:    Other:  chlorhexidine 2% Cloths 1 Application(s) Topical daily  lactated ringers. 1000 milliLiter(s) IV Continuous <Continuous>  ondansetron Injectable 4 milliGRAM(s) IV Push every 6 hours PRN    aztreonam  IVPB 1000 milliGRAM(s) IV Intermittent every 8 hours  chlorhexidine 2% Cloths 1 Application(s) Topical daily  lactated ringers. 1000 milliLiter(s) IV Continuous <Continuous>  ondansetron Injectable 4 milliGRAM(s) IV Push every 6 hours PRN    Drug Dosing Weight  Height (cm): 172.7 (08 Mar 2022 07:54)  Weight (kg): 65.5 (12 Mar 2022 23:45)  BMI (kg/m2): 22 (12 Mar 2022 23:45)  BSA (m2): 1.78 (12 Mar 2022 23:45)    CENTRAL LINE: [ ] YES [ ] NO  LOCATION:   DATE INSERTED:  REMOVE: [ ] YES [ ] NO  EXPLAIN:    CHILDS: [ ] YES [ ] NO    DATE INSERTED:  REMOVE:  [ ] YES [ ] NO  EXPLAIN:    A-LINE:  [ ] YES [ ] NO  LOCATION:   DATE INSERTED:  REMOVE:  [ ] YES [ ] NO  EXPLAIN:    PMH -reviewed admission note, no change since admission    ICU Vital Signs Last 24 Hrs  T(C): 35.7 (12 Mar 2022 23:45), Max: 36.9 (12 Mar 2022 14:00)  T(F): 96.3 (12 Mar 2022 23:45), Max: 98.4 (12 Mar 2022 14:00)  HR: 138 (13 Mar 2022 01:00) (106 - 138)  BP: 95/53 (13 Mar 2022 01:00) (72/53 - 100/46)  BP(mean): 59 (13 Mar 2022 01:00) (49 - 62)  ABP: --  ABP(mean): --  RR: 16 (13 Mar 2022 01:00) (16 - 21)  SpO2: 96% (13 Mar 2022 01:00) (93% - 99%)            03-11 @ 07:01  -  03-12 @ 07:00  --------------------------------------------------------  IN: 1500 mL / OUT: 1200 mL / NET: 300 mL            PHYSICAL EXAM:    General - NAD  Eyes - PERRLA, EOM intact  ENT - Nonicteric sclerae, PERRLA, EOMI. Oropharynx clear. Moist mucous membranes. Conjunctivae appear well perfused.   Neck - No noticeable or palpable swelling, redness or rash around throat or on face  Lymph Nodes - No lymphadenopathy  Cardiovascular - RRR no m/r/g, no JVD, no carotid bruits  Lungs - Clear to ascultation, no use of accessory muscles, no crackles or wheezes.  Skin - No rashes, skin warm and dry, no erythematous areas  Abdomen -WALLACE drain, abdominal tenderness present at surgical site   Extremities - No edema, cyanosis or clubbing  MusculoSkeletal - 5/5 strength, normal range of motion, no swollen or erythematous  joints.  Neurological – Alert and oriented x 3, CN 2-12 grossly intact.    LABS:  CBC Full  -  ( 12 Mar 2022 13:38 )  WBC Count : 27.12 K/uL  RBC Count : 3.23 M/uL  Hemoglobin : 9.5 g/dL  Hematocrit : 29.6 %  Platelet Count - Automated : 84 K/uL  Mean Cell Volume : 91.6 fl  Mean Cell Hemoglobin : 29.4 pg  Mean Cell Hemoglobin Concentration : 32.1 gm/dL  Auto Neutrophil # : x  Auto Lymphocyte # : x  Auto Monocyte # : x  Auto Eosinophil # : x  Auto Basophil # : x  Auto Neutrophil % : x  Auto Lymphocyte % : x  Auto Monocyte % : x  Auto Eosinophil % : x  Auto Basophil % : x    03-12    136  |  104  |  16  ----------------------------<  115<H>  3.2<L>   |  24  |  1.21    Ca    7.9<L>      12 Mar 2022 07:00    TPro  5.3<L>  /  Alb  2.2<L>  /  TBili  1.3<H>  /  DBili  0.7<H>  /  AST  17  /  ALT  11  /  AlkPhos  52  03-12            RADIOLOGY & ADDITIONAL STUDIES REVIEWED:  ***    GOALS OF CARE DISCUSSION WITH PATIENT/FAMILY/PROXY:    CRITICAL CARE TIME SPENT: 35 minutes

## 2022-03-13 NOTE — DIETITIAN INITIAL EVALUATION ADULT. - FACTORS AFF FOOD INTAKE
abdomina pain, small bowel obstruction, Afib, breast/uterine, s/p b/l mastectomy, hysterectomy/difficulty with food procurement/preparation/persistent constipation/other (specify)/NPO

## 2022-03-13 NOTE — CHART NOTE - NSCHARTNOTEFT_GEN_A_CORE
Patients daughter Ms Zach Snow was updated regarding patients current clinical condition. all questions and concerns answered.

## 2022-03-13 NOTE — DIETITIAN INITIAL EVALUATION ADULT. - PERTINENT MEDS FT
MEDICATIONS  (STANDING):  aztreonam  IVPB 1000 milliGRAM(s) IV Intermittent every 12 hours  chlorhexidine 2% Cloths 1 Application(s) Topical daily  clindamycin IVPB 600 milliGRAM(s) IV Intermittent every 8 hours  dextrose 5% + sodium chloride 0.9%. 1000 milliLiter(s) (75 mL/Hr) IV Continuous <Continuous>  heparin   Injectable 5000 Unit(s) SubCutaneous every 8 hours  metoprolol tartrate Injectable 2.5 milliGRAM(s) IV Push every 6 hours    MEDICATIONS  (PRN):  ondansetron Injectable 4 milliGRAM(s) IV Push every 6 hours PRN Nausea and/or Vomiting

## 2022-03-14 NOTE — PROGRESS NOTE ADULT - ASSESSMENT
88 year old female with PMH of Afib on metoprolol, uterine CA s/p radiation and hysterectomy, breast cancer s/p chemo and bilateral mastectomy presents to the ED complaining of lower abdominal pain with nausea and vomiting. Pt admitted for SBO. Pt had laparoscopic ileocolic bypass 3/11 for SBO. ICU consulted for Hypotension and Rapid Afib.     #SBO s/p laparoscopic ileocolic bypass 3/11   #Hypotension  #Rapid Afib  #MERCEDEZ  #Myelodysplastic syndrome  #H/O Breast Cancer   #H/O Uterine Cancer         Neuro  -AAOX3, no acute issues   -Pain post of is tolerated  -IV tylenol    Cardiovascular  #Hypotension  -BP 75/40, got 2.5 liters of bolus in last 24 hrs without much improvement    -sepsis vs post op vs Afib RVR    #Sepsis  -WBC count increased to 27 K, to 30s AF  -Bcx and urine cultures -ve, likely intrabdominal  - given 5L over since surgery  -Antibiotic coverage broadened to Aztreonam (pt is allergic to Penicillin-gets throat swelling), and clinda to cover anerobes      #Rapid afib with RVR  -H/O Afib (not on AC due to bleeding risk), takes metoprolol at home  -HR noted to be 130-150s >> improved to 120s  -Pt remains in rapid Afib despite fluid resuscitation  -digoxin 250 mcg given- post op, 500 yesterday, now rate is improved at low 100s  -continue dig 125 Iv daily        Pulmonary  #Pneumonia  -CXR shows right lobe infiltrate, WBC 27 K, no fever   -c/w aztreonam     Infections  #Sepsis  -BP 75/40, got 2.5 liters of bolus prior to ICU, then another 2L administered  - standing ivf  -Pt is s/p laparoscopic ileocolic bypass 3/11   -WBC count increased to 27 K, afebrile   -Antibiotic coverage broadened to Aztreonam (pt is allergic to Penicillin-gets throat swelling)  -Pt has RIJ, can start pressors if required   -f/u Blood Cx and Urine Cx       Nephro  #MERCEDEZ   -Cr 0.71>1.21>> now resolved 1.01  -s/p fluids  -Dc fluids for now due to net +ve status  -Pt has Kinsey Catheter, monitor Uo    Gastrointestinal  #SBO s/p laparoscopic ileocolic bypass 3/11   -Pt has NGT and 1 WALLACE drain at surgical site   -No flatus, no BMs must stay NPO   -may feed tomorrow??  -Pain control   -Surgery following     Heme  #Myelodysplastic syndrome  -with anemia and stable thrombocytopenia  -Monitor CBC     #Breast Cancer  -s/p bilateral mastectomy  -on anastrazole at home, holding for now as pt NPO     Endocrine  -No acute issues     Skin/Catheters  -RIJ   -Right Abdominal drain   -Kinsey catheter     Prophylaxis   -Heparin sc for DVT ppx in AM 3/13  -Will consult cardiology regarding AC once patient is off the ICU

## 2022-03-14 NOTE — PROGRESS NOTE ADULT - SUBJECTIVE AND OBJECTIVE BOX
INTERVAL HPI/OVERNIGHT EVENTS:   Hypotensive overnight  given small bolus  500cc-750c  started on 0.08mcg of levo  now normotensive on levo  no symptoms otherwise    PRESSORS: [X ] YES  WHICH: levo    ANTIBIOTICS:      Aztreonam            DATE STARTED: 3/13/2022 day 2  ANTIBIOTICS:       clinda           DATE STARTED:  3/13/2022 day 2      Antimicrobial:  aztreonam  IVPB 1000 milliGRAM(s) IV Intermittent every 12 hours  clindamycin IVPB 600 milliGRAM(s) IV Intermittent every 8 hours    Cardiovascular:  digoxin  Injectable 125 MICROGram(s) IV Push daily  norepinephrine Infusion 0.05 MICROgram(s)/kG/Min IV Continuous <Continuous>    Pulmonary:    Hematalogic:  heparin   Injectable 5000 Unit(s) SubCutaneous every 8 hours    Other:  albumin human 25% IVPB 50 milliLiter(s) IV Intermittent every 8 hours  chlorhexidine 2% Cloths 1 Application(s) Topical daily  ondansetron Injectable 4 milliGRAM(s) IV Push every 6 hours PRN      Drug Dosing Weight  Height (cm): 172.7 (08 Mar 2022 07:54)  Weight (kg): 65.5 (12 Mar 2022 23:45)  BMI (kg/m2): 22 (12 Mar 2022 23:45)  BSA (m2): 1.78 (12 Mar 2022 23:45)    CENTRAL LINE: [X ] YES  LOCATION:  [facility]  DATE INSERTED: 3/11/2022 inra op      CHILDS: [ X] YES    DATE INSERTED: 3/11/2022      A-LINE:  [X ] NO      PMH/Social Hx/Fam Hx -reviewed admission note, no change since admission  PAST MEDICAL & SURGICAL HISTORY:  Breast cancer    Uterine cancer    Atrial fibrillation    Breast cancer  s/p chemo and mammogram    Uterine cancer  s/p radiation and hysterectomy    Afib    H/O mastectomy    H/O: hysterectomy    S/P bilateral mastectomy    History of hysterectomy      Heart faliure: acute [ ] chronic [ ] acute or chronic [ ] diastolic [ ] systolic [ ] combied systolic and diastolic[ ]  MERCEDEZ: ATN[ ] renal medullary necrosis [ ] CKD I [ ]CKDII [ ]CKD III [ ]CKD IV [ ]CKD V [ ]Other pathological lesions [ ]  Abdominal Nutrition Status: malnutrition [ ] cachexia [ ] morbid obesity/BMI=40 [ ] Supplement ordered [___________]     T(C): 35.7 (03-14-22 @ 05:35), Max: 37.9 (03-13-22 @ 20:00)  HR: 108 (03-14-22 @ 10:15)  BP: 118/59 (03-14-22 @ 10:15)  BP(mean): 71 (03-14-22 @ 10:15)  ABP: --  ABP(mean): --  RR: 18 (03-14-22 @ 10:15)  SpO2: 93% (03-14-22 @ 10:15)  Wt(kg): --      PHYSICAL EXAM    GENERAL: NAD, lying in bed comfortably  HEAD:  Atraumatic, Normocephalic  EYES: EOMI, PERRLA, conjunctiva and sclera clear  ENT: Moist mucous membranes, has draining suctioning NGT   NECK: Supple, No JVD  CHEST/LUNG: Clear to auscultation bilaterally; No rales, rhonchi, wheezing, or rubs. Unlabored respirations  HEART: Regular rate and rhythm; No murmurs, rubs, or gallops  ABDOMEN: hypoactive BS Soft, mild ttp in the drain site,  WALLACE has 20cc serosanguinous. Nondistended.   EXTREMITIES:  2+ Peripheral Pulses, brisk capillary refill. No clubbing, cyanosis, or edema  NERVOUS SYSTEM:  Alert & Oriented X3, speech clear. No deficits   MSK: FROM all 4 extremities, full and equal strength  SKIN: No rashes or lesions    03-13 @ 07:01  -  03-14 @ 07:00  --------------------------------------------------------  IN: 4478.9 mL / OUT: 1719 mL / NET: 2759.9 mL                    LABS:  CBC Full  -  ( 14 Mar 2022 03:53 )  WBC Count : 31.64 K/uL  RBC Count : 2.92 M/uL  Hemoglobin : 8.2 g/dL  Hematocrit : 26.8 %  Platelet Count - Automated : 100 K/uL  Mean Cell Volume : 91.8 fl  Mean Cell Hemoglobin : 28.1 pg  Mean Cell Hemoglobin Concentration : 30.6 gm/dL  Auto Neutrophil # : x  Auto Lymphocyte # : x  Auto Monocyte # : x  Auto Eosinophil # : x  Auto Basophil # : x  Auto Neutrophil % : x  Auto Lymphocyte % : x  Auto Monocyte % : x  Auto Eosinophil % : x  Auto Basophil % : x    03-14    137  |  108  |  26<H>  ----------------------------<  123<H>  4.1   |  23  |  1.01    Ca    8.1<L>      14 Mar 2022 03:53  Phos  2.7     03-14  Mg     1.8     03-14    TPro  5.2<L>  /  Alb  2.0<L>  /  TBili  0.9  /  DBili  x   /  AST  27  /  ALT  13  /  AlkPhos  51  03-13        Culture Results:   No growth to date. (03-13 @ 08:29)  Culture Results:   No growth to date. (03-13 @ 08:29)  Culture Results:   No growth (03-13 @ 08:24)      RADIOLOGY & ADDITIONAL STUDIES REVIEWED:      [ ]GOALS OF CARE DISCUSSION WITH PATIENT/FAMILY/PROXY:    CRITICAL CARE TIME SPENT: 35 minutes

## 2022-03-14 NOTE — CONSULT NOTE ADULT - ASSESSMENT
Low grade fever  Leukocytosis  ? intraabdominal infection  No evidence of any other infection    Plan: Continue Azactam 1g iv q12  Continue Clindamycin 600mg iv q8

## 2022-03-14 NOTE — CONSULT NOTE ADULT - SUBJECTIVE AND OBJECTIVE BOX
HPI:  88 year old female with PMH of Afib on metoprolol, uterine CA s/p radiation and hysterectomy, breast cancer s/p chemo and bilateral mastectomy presents to the ED complaining of lower abdominal pain with nausea and vomiting. She thinks her symptoms are from lactulose or from a chicken sandwich she has prior to onset of symptoms. Patient states that she suffers from constipation and takes multiple different over the counter the drugs, she has been on lactulose for the past 2 days from her PMD and states she is still passing stool which is well formed. States she used to be on a blood thinner, but she had too much bleeding so it was stopped. Denies chest pain, worsening shortness of breath, dysuria or difficulty urinating. (08 Mar 2022 11:15)    REVIEW OF SYSTEMS:  [  ] Not able to elicit  General:	  Chest:	  GI:	  :  Skin:	  Musculoskeletal:	  Neuro:    PAST MEDICAL & SURGICAL HISTORY:  Breast cancer    Uterine cancer    Atrial fibrillation    Breast cancer  s/p chemo and mammogram    Uterine cancer  s/p radiation and hysterectomy    Afib    H/O mastectomy    H/O: hysterectomy    S/P bilateral mastectomy    History of hysterectomy      ALLERGIES: contrast media (iodine-based) (Short breath)  iv  contrast (Unknown)  penicillin (Hives)  penicillin (Rash)  sulfa drugs (Short breath)  Sulfacetamide Sodium (Rash)    MEDS:  albumin human 25% IVPB 50 milliLiter(s) IV Intermittent every 8 hours  aztreonam  IVPB 1000 milliGRAM(s) IV Intermittent every 12 hours  chlorhexidine 2% Cloths 1 Application(s) Topical daily  clindamycin IVPB 600 milliGRAM(s) IV Intermittent every 8 hours  digoxin  Injectable 125 MICROGram(s) IV Push daily  heparin   Injectable 5000 Unit(s) SubCutaneous every 8 hours  norepinephrine Infusion 0.05 MICROgram(s)/kG/Min IV Continuous <Continuous>  ondansetron Injectable 4 milliGRAM(s) IV Push every 6 hours PRN    SOCIAL HISTORY:  Smoker:      FAMILY HISTORY:  No pertinent family history in first degree relatives      VITALS:  Vital Signs Last 24 Hrs  T(C): 36.2 (14 Mar 2022 08:00), Max: 37.9 (13 Mar 2022 20:00)  T(F): 97.2 (14 Mar 2022 08:00), Max: 100.3 (13 Mar 2022 20:00)  HR: 85 (14 Mar 2022 13:15) (85 - 133)  BP: 90/56 (14 Mar 2022 13:15) (76/45 - 141/125)  BP(mean): 59 (14 Mar 2022 13:15) (47 - 129)  RR: 22 (14 Mar 2022 13:15) (15 - 34)  SpO2: 95% (14 Mar 2022 13:15) (82% - 97%)      PHYSICAL EXAM:  Constitutional:  HEENT:  Neck:  Respiratory:  Cardiovascular:  Gastrointestinal:  Extremities:  Skin:  Ortho:  Neuro:      LABS/DIAGNOSTIC TESTS:                        8.2    31.64 )-----------( 100      ( 14 Mar 2022 03:53 )             26.8     WBC Count: 31.64 K/uL (03-14 @ 03:53)  WBC Count: 28.78 K/uL (03-13 @ 04:29)  WBC Count: 27.12 K/uL (03-12 @ 13:38)  WBC Count: 21.92 K/uL (03-12 @ 07:00)    03-14    137  |  108  |  26<H>  ----------------------------<  123<H>  4.1   |  23  |  1.01    Ca    8.1<L>      14 Mar 2022 03:53  Phos  2.7     03-14  Mg     1.8     03-14    TPro  5.2<L>  /  Alb  2.0<L>  /  TBili  0.9  /  DBili  x   /  AST  27  /  ALT  13  /  AlkPhos  51  03-13      LIVER FUNCTIONS - ( 13 Mar 2022 04:29 )  Alb: 2.0 g/dL / Pro: 5.2 g/dL / ALK PHOS: 51 U/L / ALT: 13 U/L DA / AST: 27 U/L / GGT: x               ABG -     CULTURES:   .Blood Blood-Peripheral  03-13 @ 08:29   No growth to date.  --  --      Clean Catch Clean Catch (Midstream)  03-13 @ 08:24   No growth  --  --      Clean Catch Clean Catch (Midstream)  03-08 @ 18:19   <10,000 CFU/mL Normal Urogenital Ira  --  --          RADIOLOGY:  < from: Xray Chest 1 View- PORTABLE-Urgent (Xray Chest 1 View- PORTABLE-Urgent .) (03.12.22 @ 10:07) >    ACC: [id number] EXAM:  XR CHEST PORTABLE URGENT 1V                          PROCEDURE DATE:  03/12/2022          INTERPRETATION:  INDICATION: Suspect fluid overload. Atrial fibrillation.   Vomiting    COMPARISON: Study of earlier in the day    FINDINGS:  Heart/Vascular: Cardiomegaly is again appreciated. Right-sided IJ line is   again seen with the tip overlying the superior vena cava  Pulmonary: There is hazy density again seen at the right lung base   suggesting a small pleural effusion similarto the prior study. Bilateral   axillary clips are appreciated.  Bones: Osteopenia  NG tube is again seen with the tip in the left upper quadrant of the   abdomen.    Impression:  Cardiomegaly with hazy opacity at the right lung base, unchanged. Right   central line and NG tube unchanged.        --- End of Report ---      -------------------------------------------------------------------------------------------------------------------------------------------------------------    \  < from: CT Abdomen and Pelvis No Cont (03.11.22 @ 16:53) >    ACC: [id number] EXAM:  CT ABDOMEN AND PELVIS                          PROCEDURE DATE:  03/11/2022          INTERPRETATION:  CLINICAL INFORMATION: Abdominal pain. Small bowel   obstruction, follow-up.    COMPARISON: 3/8/2022.    CONTRAST/COMPLICATIONS:  IV Contrast: NONE  Oral Contrast: NONE  Complications: None reported at time of study completion    PROCEDURE:  CT of the Abdomen and Pelvis was performed.  Sagittal and coronal reformats were performed.    FINDINGS:  LOWER CHEST: Cardiomegaly. Small bilateral pleural effusions.    LIVER: Within normal limits.  BILE DUCTS: Normal caliber.  GALLBLADDER: Within normal limits.  SPLEEN: Within normal limits.  PANCREAS: Within normal limits.  ADRENALS: Within normal limits.  KIDNEYS/URETERS: Left renal cysts.    BLADDER: Within normal limits.  REPRODUCTIVE ORGANS: Hysterectomy.    BOWEL: Multiple distended and fluid-filled loops of small bowel are again   noted with possible transition in the right lower quadrant. Colonic   diverticulosis without acute diverticulitis.Appendix is not visualized.   No evidence of inflammation in the pericecal region.  PERITONEUM: No ascites.  VESSELS: Atherosclerotic changes.  RETROPERITONEUM/LYMPH NODES: No lymphadenopathy.  ABDOMINAL WALL: Anasarca.  BONES: Degenerative changes. Compression fracture of T12. Compression   fracture of L1 with a fragment retrospect retropulsed into the spinal   canal as before, with associated spinal canal narrowing.    IMPRESSION:  Small bowel obstruction is again noted without significant interval   change.        --- End of Report ---      < end of copied text >   HPI:  88 year old female with PMH of Afib on metoprolol, uterine CA s/p radiation and hysterectomy, breast cancer s/p chemo and bilateral mastectomy presents to the ED complaining of lower abdominal pain with nausea and vomiting. She thinks her symptoms are from lactulose or from a chicken sandwich she has prior to onset of symptoms. Patient states that she suffers from constipation and takes multiple different over the counter the drugs, she has been on lactulose for the past 2 days from her PMD and states she is still passing stool which is well formed. States she used to be on a blood thinner, but she had too much bleeding so it was stopped. Denies chest pain, worsening shortness of breath, dysuria or difficulty urinating. (08 Mar 2022 11:15)    History as above, patient admitted from home for lower abdominal pain associated with nausea and vomiting.  CT shows small bowel obstruction and patient is s/p laparoscopic lysis of adhesions with ileocolic bypass on 3/12.   She was sent to the ICU as she required pressor support.  She was seen laying comfortably in bed with no acute distress.  Patient reports mild abdominal pain at this time, and denies any nausea or vomiting.  She also denies passing any flatus or bowel movements at this time.  She had a low grade fever of 100.3 last night with elevated WBC count, and all cultures are negative.    REVIEW OF SYSTEMS:  [  ] Not able to elicit  General: low grade fever +, no chills, no malaise   Chest: infrequent dry cough +, no shortness of breath, no chest pain  GI: no nvd, abdominal pain +  : no urinary symptoms   Skin: no rashes no cyanosis  Musculoskeletal: no trauma no LBP  Neuro: no ha's no dizziness     PAST MEDICAL & SURGICAL HISTORY:  Breast cancer    Uterine cancer    Atrial fibrillation    Breast cancer  s/p chemo and mammogram    Uterine cancer  s/p radiation and hysterectomy    Afib    H/O mastectomy    H/O: hysterectomy    S/P bilateral mastectomy    History of hysterectomy      ALLERGIES: contrast media (iodine-based) (Short breath)  iv  contrast (Unknown)  penicillin (Hives)  penicillin (Rash)  sulfa drugs (Short breath)  Sulfacetamide Sodium (Rash)    MEDS:  albumin human 25% IVPB 50 milliLiter(s) IV Intermittent every 8 hours  aztreonam  IVPB 1000 milliGRAM(s) IV Intermittent every 12 hours  chlorhexidine 2% Cloths 1 Application(s) Topical daily  clindamycin IVPB 600 milliGRAM(s) IV Intermittent every 8 hours  digoxin  Injectable 125 MICROGram(s) IV Push daily  heparin   Injectable 5000 Unit(s) SubCutaneous every 8 hours  norepinephrine Infusion 0.05 MICROgram(s)/kG/Min IV Continuous <Continuous>  ondansetron Injectable 4 milliGRAM(s) IV Push every 6 hours PRN    SOCIAL HISTORY:  Smoker:  Denies    FAMILY HISTORY:  No pertinent family history in first degree relatives      VITALS:  Vital Signs Last 24 Hrs  T(C): 36.2 (14 Mar 2022 08:00), Max: 37.9 (13 Mar 2022 20:00)  T(F): 97.2 (14 Mar 2022 08:00), Max: 100.3 (13 Mar 2022 20:00)  HR: 85 (14 Mar 2022 13:15) (85 - 133)  BP: 90/56 (14 Mar 2022 13:15) (76/45 - 141/125)  BP(mean): 59 (14 Mar 2022 13:15) (47 - 129)  RR: 22 (14 Mar 2022 13:15) (15 - 34)  SpO2: 95% (14 Mar 2022 13:15) (82% - 97%)      PHYSICAL EXAM:  Constitutional: Well developed, well groomed, no distress  HEENT: normocephalic with moist oral mucosa, right NG  Neck: supple, right IJ  Respiratory: normal, airway patent, breath sounds equal, clear to auscultate bilaterally, no rales, no rhonchi, no wheeze  Cardiovascular: Regular rate and rhythm, no murmurs  Gastrointestinal: +BS, normal, soft, nontender, nondistended, no rebound tenderness, no guarding, no rigidity, no organomegaly  : Kinsey catheter in place with clear urine  Extremities: right leg edema +2 with mild warmth, no erythema present, no cyanosis, no clubbing  Skin: no rashes  Ortho: no jt swelling  Neuro: AAO x 3        LABS/DIAGNOSTIC TESTS:                        8.2    31.64 )-----------( 100      ( 14 Mar 2022 03:53 )             26.8     WBC Count: 31.64 K/uL (03-14 @ 03:53)  WBC Count: 28.78 K/uL (03-13 @ 04:29)  WBC Count: 27.12 K/uL (03-12 @ 13:38)  WBC Count: 21.92 K/uL (03-12 @ 07:00)    03-14    137  |  108  |  26<H>  ----------------------------<  123<H>  4.1   |  23  |  1.01    Ca    8.1<L>      14 Mar 2022 03:53  Phos  2.7     03-14  Mg     1.8     03-14    TPro  5.2<L>  /  Alb  2.0<L>  /  TBili  0.9  /  DBili  x   /  AST  27  /  ALT  13  /  AlkPhos  51  03-13      LIVER FUNCTIONS - ( 13 Mar 2022 04:29 )  Alb: 2.0 g/dL / Pro: 5.2 g/dL / ALK PHOS: 51 U/L / ALT: 13 U/L DA / AST: 27 U/L / GGT: x               ABG -     CULTURES:   .Blood Blood-Peripheral  03-13 @ 08:29   No growth to date.  --  --      Clean Catch Clean Catch (Midstream)  03-13 @ 08:24   No growth  --  --      Clean Catch Clean Catch (Midstream)  03-08 @ 18:19   <10,000 CFU/mL Normal Urogenital Ira  --  --          RADIOLOGY:  < from: Xray Chest 1 View- PORTABLE-Urgent (Xray Chest 1 View- PORTABLE-Urgent .) (03.12.22 @ 10:07) >    ACC: [id number] EXAM:  XR CHEST PORTABLE URGENT 1V                          PROCEDURE DATE:  03/12/2022          INTERPRETATION:  INDICATION: Suspect fluid overload. Atrial fibrillation.   Vomiting    COMPARISON: Study of earlier in the day    FINDINGS:  Heart/Vascular: Cardiomegaly is again appreciated. Right-sided IJ line is   again seen with the tip overlying the superior vena cava  Pulmonary: There is hazy density again seen at the right lung base   suggesting a small pleural effusion similarto the prior study. Bilateral   axillary clips are appreciated.  Bones: Osteopenia  NG tube is again seen with the tip in the left upper quadrant of the   abdomen.    Impression:  Cardiomegaly with hazy opacity at the right lung base, unchanged. Right   central line and NG tube unchanged.        --- End of Report ---      -------------------------------------------------------------------------------------------------------------------------------------------------------------    \  < from: CT Abdomen and Pelvis No Cont (03.11.22 @ 16:53) >    ACC: [id number] EXAM:  CT ABDOMEN AND PELVIS                          PROCEDURE DATE:  03/11/2022          INTERPRETATION:  CLINICAL INFORMATION: Abdominal pain. Small bowel   obstruction, follow-up.    COMPARISON: 3/8/2022.    CONTRAST/COMPLICATIONS:  IV Contrast: NONE  Oral Contrast: NONE  Complications: None reported at time of study completion    PROCEDURE:  CT of the Abdomen and Pelvis was performed.  Sagittal and coronal reformats were performed.    FINDINGS:  LOWER CHEST: Cardiomegaly. Small bilateral pleural effusions.    LIVER: Within normal limits.  BILE DUCTS: Normal caliber.  GALLBLADDER: Within normal limits.  SPLEEN: Within normal limits.  PANCREAS: Within normal limits.  ADRENALS: Within normal limits.  KIDNEYS/URETERS: Left renal cysts.    BLADDER: Within normal limits.  REPRODUCTIVE ORGANS: Hysterectomy.    BOWEL: Multiple distended and fluid-filled loops of small bowel are again   noted with possible transition in the right lower quadrant. Colonic   diverticulosis without acute diverticulitis.Appendix is not visualized.   No evidence of inflammation in the pericecal region.  PERITONEUM: No ascites.  VESSELS: Atherosclerotic changes.  RETROPERITONEUM/LYMPH NODES: No lymphadenopathy.  ABDOMINAL WALL: Anasarca.  BONES: Degenerative changes. Compression fracture of T12. Compression   fracture of L1 with a fragment retrospect retropulsed into the spinal   canal as before, with associated spinal canal narrowing.    IMPRESSION:  Small bowel obstruction is again noted without significant interval   change.        --- End of Report ---      < end of copied text >

## 2022-03-14 NOTE — PROGRESS NOTE ADULT - SUBJECTIVE AND OBJECTIVE BOX
Pt s- new complaints. no flatus    on pressor,  Vital Signs Last 24 Hrs  T(C): 35.7 (14 Mar 2022 05:35), Max: 37.9 (13 Mar 2022 20:00)  T(F): 96.2 (14 Mar 2022 05:35), Max: 100.3 (13 Mar 2022 20:00)  HR: 95 (14 Mar 2022 08:00) (90 - 133)  BP: 121/67 (14 Mar 2022 08:00) (76/45 - 141/125)  BP(mean): 78 (14 Mar 2022 08:00) (47 - 129)  RR: 22 (14 Mar 2022 08:00) (14 - 29)  SpO2: 95% (14 Mar 2022 08:00) (84% - 98%)  Alert nad  Abd: soft minimal incisional tenderness,  ngt =600cc bilious  elian:35cc serous drainage.    I&O's Detail    13 Mar 2022 07:01  -  14 Mar 2022 07:00  --------------------------------------------------------  IN:    dextrose 5% + sodium chloride 0.9%: 425 mL    dextrose 5% + sodium chloride 0.9%: 1050 mL    IV PiggyBack: 150 mL    IV PiggyBack: 250 mL    IV PiggyBack: 100 mL    IV PiggyBack: 100 mL    Lactated Ringers: 375 mL    Lactated Ringers Bolus: 2000 mL    Norepinephrine: 24 mL  Total IN: 4474 mL    OUT:    Bulb (mL): 35 mL    Indwelling Catheter - Urethral (mL): 1054 mL    Nasogastric/Oral tube (mL): 600 mL  Total OUT: 1689 mL    Total NET: 2785 mL                          8.2    31.64 )-----------( 100      ( 14 Mar 2022 03:53 )             26.8   03-14    137  |  108  |  26<H>  ----------------------------<  123<H>  4.1   |  23  |  1.01    Ca    8.1<L>      14 Mar 2022 03:53  Phos  2.7     03-14  Mg     1.8     03-14    TPro  5.2<L>  /  Alb  2.0<L>  /  TBili  0.9  /  DBili  x   /  AST  27  /  ALT  13  /  AlkPhos  51  03-13

## 2022-03-14 NOTE — CHART NOTE - NSCHARTNOTEFT_GEN_A_CORE
Informed by Nurse that the patient's surgeon has instructed to put the [place] sump on continuous suction

## 2022-03-15 NOTE — PHYSICAL THERAPY INITIAL EVALUATION ADULT - SKIN INTEGRITY
with steri-strips on mid-abdominal area; +WALLACE drain on right upper abdominal quadrant/surgical incision/wound

## 2022-03-15 NOTE — CHART NOTE - NSCHARTNOTEFT_GEN_A_CORE
c/o nausea after clears  Not vomiting    Feels not passing flatus  Abd soft, mildly distended/tympanic  Surgical wounds dressed c/d/i    Will make NPO with ice chips  Reglan 10mg IV x 1  Low threshold for replacing NGT if vomiting

## 2022-03-15 NOTE — PROGRESS NOTE ADULT - SUBJECTIVE AND OBJECTIVE BOX
INTERVAL HPI/OVERNIGHT EVENTS:   no events  mild desats with exertion  passed gas    PRESSORS:  [X ] NO      ANTIBIOTICS:      Aztreonam            DATE STARTED: 3/13/2022 day 3  ANTIBIOTICS:       clinda           DATE STARTED:  3/13/2022 day 3    Antimicrobial:  aztreonam  IVPB 1000 milliGRAM(s) IV Intermittent every 12 hours  clindamycin IVPB 600 milliGRAM(s) IV Intermittent every 8 hours    Cardiovascular:  digoxin  Injectable 125 MICROGram(s) IV Push daily    Pulmonary:    Hematalogic:  heparin   Injectable 5000 Unit(s) SubCutaneous every 8 hours    Other:  albumin human 25% IVPB 50 milliLiter(s) IV Intermittent every 8 hours  chlorhexidine 2% Cloths 1 Application(s) Topical daily  mupirocin 2% Nasal 1 Application(s) Both Nostrils every 12 hours  ondansetron Injectable 4 milliGRAM(s) IV Push every 6 hours PRN      Drug Dosing Weight  Height (cm): 172.7 (08 Mar 2022 07:54)  Weight (kg): 65.5 (12 Mar 2022 23:45)  BMI (kg/m2): 22 (12 Mar 2022 23:45)  BSA (m2): 1.78 (12 Mar 2022 23:45)    CENTRAL LINE: [X ] YES  LOCATION:  [facility]  DATE INSERTED: 3/11/2022 intra op      CHILDS: [ X] YES    DATE INSERTED: 3/11/2022      A-LINE:  [X ] NO      PMH/Social Hx/Fam Hx -reviewed admission note, no change since admission  PAST MEDICAL & SURGICAL HISTORY:  Breast cancer    Uterine cancer    Atrial fibrillation    Breast cancer  s/p chemo and mammogram    Uterine cancer  s/p radiation and hysterectomy    Afib    H/O mastectomy    H/O: hysterectomy    S/P bilateral mastectomy    History of hysterectomy      Heart faliure: acute [ ] chronic [ ] acute or chronic [ ] diastolic [ ] systolic [ ] combied systolic and diastolic[ ]  MERCEDEZ: ATN[ ] renal medullary necrosis [ ] CKD I [ ]CKDII [ ]CKD III [ ]CKD IV [ ]CKD V [ ]Other pathological lesions [ ]  Abdominal Nutrition Status: malnutrition [ ] cachexia [ ] morbid obesity/BMI=40 [ ] Supplement ordered [___________]     T(C): 36.2 (03-15-22 @ 08:00), Max: 37.1 (03-15-22 @ 05:42)  HR: 87 (03-15-22 @ 09:00)  BP: 111/51 (03-15-22 @ 09:00)  BP(mean): 67 (03-15-22 @ 09:00)  ABP: --  ABP(mean): --  RR: 19 (03-15-22 @ 09:00)  SpO2: 96% (03-15-22 @ 09:00)  Wt(kg): --          03-14 @ 07:01  -  03-15 @ 07:00  --------------------------------------------------------  IN: 1532.2 mL / OUT: 1490 mL / NET: 42.2 mL            PHYSICAL EXAM    GENERAL: NAD, lying in bed comfortably  HEAD:  Atraumatic, Normocephalic  EYES: EOMI, PERRLA, conjunctiva and sclera clear  ENT: Moist mucous membranes, has draining suctioning NGT   NECK: Supple, No JVD  CHEST/LUNG: Clear to auscultation bilaterally; No rales, rhonchi, wheezing, or rubs. Unlabored respirations  HEART: Regular rate and rhythm; No murmurs, rubs, or gallops  ABDOMEN: hypoactive BS Soft, mild ttp in the drain site,  WALLACE has 20cc serosanguinous. Nondistended.   EXTREMITIES:  2+ Peripheral Pulses, brisk capillary refill. No clubbing, cyanosis, or edema  NERVOUS SYSTEM:  Alert & Oriented X3, speech clear. No deficits   MSK: FROM all 4 extremities, full and equal strength  SKIN: No rashes or lesions        LABS:  CBC Full  -  ( 15 Mar 2022 03:52 )  WBC Count : 22.37 K/uL  RBC Count : 2.97 M/uL  Hemoglobin : 8.5 g/dL  Hematocrit : 27.1 %  Platelet Count - Automated : 125 K/uL  Mean Cell Volume : 91.2 fl  Mean Cell Hemoglobin : 28.6 pg  Mean Cell Hemoglobin Concentration : 31.4 gm/dL  Auto Neutrophil # : 16.33 K/uL  Auto Lymphocyte # : 0.52 K/uL  Auto Monocyte # : 4.24 K/uL  Auto Eosinophil # : 0.02 K/uL  Auto Basophil # : 0.04 K/uL  Auto Neutrophil % : 72.9 %  Auto Lymphocyte % : 2.3 %  Auto Monocyte % : 19.0 %  Auto Eosinophil % : 0.1 %  Auto Basophil % : 0.2 %    03-15    141  |  111<H>  |  18  ----------------------------<  109<H>  3.5   |  24  |  0.70    Ca    8.4      15 Mar 2022 03:52  Phos  2.3     03-15  Mg     1.9     03-15    TPro  5.6<L>  /  Alb  2.3<L>  /  TBili  0.6  /  DBili  x   /  AST  21  /  ALT  12  /  AlkPhos  66  03-15        Culture Results:   No growth to date. (03-13 @ 08:29)  Culture Results:   No growth to date. (03-13 @ 08:29)  Culture Results:   No growth (03-13 @ 08:24)      RADIOLOGY & ADDITIONAL STUDIES REVIEWED:      [ X]GOALS OF CARE DISCUSSION WITH PATIENT/FAMILY/PROXY:    CRITICAL CARE TIME SPENT: 35 minutes

## 2022-03-15 NOTE — PROGRESS NOTE ADULT - ASSESSMENT
Low grade fever - improving  Leukocytosis - improving  ? intraabdominal infection  No evidence of any other infection    Plan: Continue Azactam 1g iv q12  Continue Clindamycin 600mg iv q8    Time spent : 32 minutes Low grade fever - improving  Leukocytosis - improving  ? intraabdominal infection  No evidence of any other infection    Plan: Continue Azactam 1g iv q12  Continue Clindamycin 600mg iv q8  If WBC count continues to downtrend, consider stopping antibiotics    Time spent : 32 minutes Low grade fever - improving  Leukocytosis - improving  ? intraabdominal infection  No evidence of any other infection    Plan: Continue Azactam 1g iv q12  Continue Clindamycin 600mg iv q8  If WBC count continues to downtrend, consider stopping antibiotics in the next few days.    Time spent : 32 minutes    I agree with above

## 2022-03-15 NOTE — PROGRESS NOTE ADULT - ASSESSMENT
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 NGT d/c'd 3/15. WBC 22 (31). Hypophosphatemia     -Ice chips, IVF  -Continue WALLACE monitor output   -IV Abx  -Monitor/replete electrolytes   -Pain control PRN   -DVT ppx  -Discussed with Dr. Cleveland

## 2022-03-15 NOTE — PROGRESS NOTE ADULT - ASSESSMENT
88 year old female with PMH of Afib on metoprolol, uterine CA s/p radiation and hysterectomy, breast cancer s/p chemo and bilateral mastectomy presents to the ED complaining of lower abdominal pain with nausea and vomiting. Pt admitted for SBO. Pt had laparoscopic ileocolic bypass 3/11 for SBO. ICU consulted for Hypotension and Rapid Afib.     #SBO s/p laparoscopic ileocolic bypass 3/11   #Hypotension  #Rapid Afib  #MERCEDEZ  #Myelodysplastic syndrome  #H/O Breast Cancer   #H/O Uterine Cancer         Neuro  -AAOX3, no acute issues   -Pain post of is tolerated  -IV tylenol    Cardiovascular  #Hypotension  -sepsis vs post op vs Afib RVR  -BP 75/40 post op, got intermittent boluses  -MAP is now stable 72 off pressors  -c/w Abx        #Sepsis  -WBC count increased to 27 K, to 30s AF  -Bcx and urine cultures -ve, likely intraabdominal  - given 5L over since surgery  -Antibiotic coverage broadened to Aztreonam (pt is allergic to Penicillin-gets throat swelling), and clinda to cover anerobes  -clinda added now day 3      #Rapid afib with RVR  -H/O Afib (not on AC due to bleeding risk), takes metoprolol at home  -HR noted to be 130-150s >> improved to 120s post op  -Pt remains in rapid Afib despite fluid resuscitation  -digoxin 250 mcg given- post op, 500 yesterday, now rate is improved at low 100s  -continue dig 125 Iv daily, HR now is at 90s controlled        Pulmonary  -Desats on exertion to high 80s,    #Pneumonia vs Post op atelectasis  -CXR shows right lobe infiltrate, WBC 27 K, no fever   -c/w aztreonam and clinda  -c/w IS    Infections  #Sepsis  -BP 75/40, got 2.5 liters of bolus prior to ICU,   -now s/p 5L worth of boluses  -Pt is s/p laparoscopic ileocolic bypass 3/11   -WBC count increased to 27 K, afebrile   -CXR shows right lobe infiltrate, WBC 27 K, no fever   -Bood Cx and Urine Cx are -ve  -Antibiotic coverage broadened to Aztreonam (pt is allergic to Penicillin-gets throat swelling), clinda added  -Pt has [facility], can start pressors if required         Nephro  #MERCEDEZ   -Cr 0.71>1.21>> now resolved 1.01  -s/p fluids  -Dc fluids for now due to net +ve status  -Pt has Kinsey Catheter, monitor Uop    Gastrointestinal  #SBO s/p laparoscopic ileocolic bypass 3/11   -Pt has NGT and 1 WALLACE drain at surgical site   -small flatus reported  -may feed later today 3/15   -Surgery following     Heme  #Myelodysplastic syndrome  -with anemia and stable thrombocytopenia  -Monitor CBC     #Breast Cancer  -s/p bilateral mastectomy  -on anastrazole at home, holding for now as pt NPO     Endocrine  -No acute issues     Skin/Catheters  -[facility]   -Right Abdominal drain   -Kinsey catheter     Prophylaxis   -Heparin sc for DVT ppx in AM 3/13  -Will consult cardiology regarding AC once patient is off the ICU

## 2022-03-15 NOTE — PROGRESS NOTE ADULT - SUBJECTIVE AND OBJECTIVE BOX
ICU visit:  88y Female is under our care for    REVIEW OF SYSTEMS:  [  ] Not able to elicit  General:	  Chest:	  GI:	  :  Skin:	  Musculoskeletal:	  Neuro:	    MEDS:  aztreonam  IVPB 1000 milliGRAM(s) IV Intermittent every 12 hours  clindamycin IVPB 600 milliGRAM(s) IV Intermittent every 8 hours    ALLERGIES: Allergies    contrast media (iodine-based) (Short breath)  iv  contrast (Unknown)  penicillin (Hives)  penicillin (Rash)  sulfa drugs (Short breath)  Sulfacetamide Sodium (Rash)    Intolerances        VITALS:  ICU Vital Signs Last 24 Hrs  T(C): 35.6 (15 Mar 2022 11:00), Max: 37.1 (15 Mar 2022 05:42)  T(F): 96 (15 Mar 2022 11:00), Max: 98.8 (15 Mar 2022 05:42)  HR: 103 (15 Mar 2022 11:00) (85 - 110)  BP: 122/72 (15 Mar 2022 11:00) (90/56 - 143/60)  BP(mean): 82 (15 Mar 2022 11:00) (55 - 94)  ABP: --  ABP(mean): --  RR: 19 (15 Mar 2022 11:00) (15 - 34)  SpO2: 98% (15 Mar 2022 11:00) (73% - 99%)      PHYSICAL EXAM:  HEENT:  Neck:  Respiratory:  Cardiovascular:  Gastrointestinal:  Extremities:  Skin:  Ortho:  Neuro:    LABS/DIAGNOSTIC TESTS:                        8.5    22.37 )-----------( 125      ( 15 Mar 2022 03:52 )             27.1     WBC Count: 22.37 K/uL (03-15 @ 03:52)  WBC Count: 31.64 K/uL (03-14 @ 03:53)  WBC Count: 28.78 K/uL (03-13 @ 04:29)  WBC Count: 27.12 K/uL (03-12 @ 13:38)  WBC Count: 21.92 K/uL (03-12 @ 07:00)    03-15    141  |  111<H>  |  18  ----------------------------<  109<H>  3.5   |  24  |  0.70    Ca    8.4      15 Mar 2022 03:52  Phos  2.3     03-15  Mg     1.9     03-15    TPro  5.6<L>  /  Alb  2.3<L>  /  TBili  0.6  /  DBili  x   /  AST  21  /  ALT  12  /  AlkPhos  66  03-15          CULTURES:   .Blood Blood-Peripheral  03-13 @ 08:29   No growth to date.  --  --      Clean Catch Clean Catch (Midstream)  03-13 @ 08:24   No growth  --  --      Clean Catch Clean Catch (Midstream)  03-08 @ 18:19   <10,000 CFU/mL Normal Urogenital Ira  --  --        RADIOLOGY:  no new studies ICU visit:  88y Female is under our care for leukocytosis and low grade fevers.  Patient was seen in the ICU laying comfortably in bed with no acute distress.  Blood and urine cultures are negative, patient remains afebrile and WBC count is downtrending.    REVIEW OF SYSTEMS:  [  ] Not able to elicit  General: no fevers no malaise  Chest: dry infrequent cough + no sob  GI: no nvd  : no urinary sxs   Skin: no rashes  Musculoskeletal: no trauma no LBP  Neuro: no ha's no dizziness     MEDS:  aztreonam  IVPB 1000 milliGRAM(s) IV Intermittent every 12 hours  clindamycin IVPB 600 milliGRAM(s) IV Intermittent every 8 hours    ALLERGIES: Allergies    contrast media (iodine-based) (Short breath)  iv  contrast (Unknown)  penicillin (Hives)  penicillin (Rash)  sulfa drugs (Short breath)  Sulfacetamide Sodium (Rash)    Intolerances        VITALS:  ICU Vital Signs Last 24 Hrs  T(C): 35.6 (15 Mar 2022 11:00), Max: 37.1 (15 Mar 2022 05:42)  T(F): 96 (15 Mar 2022 11:00), Max: 98.8 (15 Mar 2022 05:42)  HR: 103 (15 Mar 2022 11:00) (85 - 110)  BP: 122/72 (15 Mar 2022 11:00) (90/56 - 143/60)  BP(mean): 82 (15 Mar 2022 11:00) (55 - 94)  ABP: --  ABP(mean): --  RR: 19 (15 Mar 2022 11:00) (15 - 34)  SpO2: 98% (15 Mar 2022 11:00) (73% - 99%)      PHYSICAL EXAM:  HEENT: NG tube removed  Neck: supple no LN's   Respiratory: lungs clear no rales  Cardiovascular: S1 S2 reg no murmurs  Gastrointestinal: +BS with soft, nondistended abdomen; nontender  Extremities: no edema  Skin: no rashes  Ortho: n/a  Neuro: AAO x 3    LABS/DIAGNOSTIC TESTS:                        8.5    22.37 )-----------( 125      ( 15 Mar 2022 03:52 )             27.1     WBC Count: 22.37 K/uL (03-15 @ 03:52)  WBC Count: 31.64 K/uL (03-14 @ 03:53)  WBC Count: 28.78 K/uL (03-13 @ 04:29)  WBC Count: 27.12 K/uL (03-12 @ 13:38)  WBC Count: 21.92 K/uL (03-12 @ 07:00)    03-15    141  |  111<H>  |  18  ----------------------------<  109<H>  3.5   |  24  |  0.70    Ca    8.4      15 Mar 2022 03:52  Phos  2.3     03-15  Mg     1.9     03-15    TPro  5.6<L>  /  Alb  2.3<L>  /  TBili  0.6  /  DBili  x   /  AST  21  /  ALT  12  /  AlkPhos  66  03-15          CULTURES:   .Blood Blood-Peripheral  03-13 @ 08:29   No growth to date.  --  --      Clean Catch Clean Catch (Midstream)  03-13 @ 08:24   No growth  --  --      Clean Catch Clean Catch (Midstream)  03-08 @ 18:19   <10,000 CFU/mL Normal Urogenital Ira  --  --        RADIOLOGY:  no new studies

## 2022-03-15 NOTE — PROGRESS NOTE ADULT - SUBJECTIVE AND OBJECTIVE BOX
INTERVAL HPI/OVERNIGHT EVENTS:  Pt seen and examined at bedside.   Pt resting comfortably. NGT removed this morning   +flatus/-BM.   Denies N/V    MEDICATIONS  (STANDING):  albumin human 25% IVPB 50 milliLiter(s) IV Intermittent every 8 hours  aztreonam  IVPB 1000 milliGRAM(s) IV Intermittent every 12 hours  chlorhexidine 2% Cloths 1 Application(s) Topical daily  clindamycin IVPB 600 milliGRAM(s) IV Intermittent every 8 hours  dextrose 5% + sodium chloride 0.9%. 1000 milliLiter(s) (75 mL/Hr) IV Continuous <Continuous>  digoxin  Injectable 125 MICROGram(s) IV Push daily  heparin   Injectable 5000 Unit(s) SubCutaneous every 8 hours  mupirocin 2% Nasal 1 Application(s) Both Nostrils every 12 hours    MEDICATIONS  (PRN):  ondansetron Injectable 4 milliGRAM(s) IV Push every 6 hours PRN Nausea and/or Vomiting      Vital Signs Last 24 Hrs  T(C): 36.2 (15 Mar 2022 08:00), Max: 37.1 (15 Mar 2022 05:42)  T(F): 97.2 (15 Mar 2022 08:00), Max: 98.8 (15 Mar 2022 05:42)  HR: 87 (15 Mar 2022 09:00) (85 - 110)  BP: 111/51 (15 Mar 2022 09:00) (90/56 - 143/60)  BP(mean): 67 (15 Mar 2022 09:00) (55 - 94)  RR: 19 (15 Mar 2022 09:00) (15 - 34)  SpO2: 96% (15 Mar 2022 09:00) (73% - 99%)    Physical:  General: A&Ox3. NAD.  Respirations: Unlabored   Abdomen: Soft nondistended, appropriate incisional tenderness, WALLACE SS, perez CDI    I&O's Detail    14 Mar 2022 07:01  -  15 Mar 2022 07:00  --------------------------------------------------------  IN:    dextrose 5% + sodium chloride 0.9%: 150 mL    dextrose 5% + sodium chloride 0.9%: 750 mL    IV PiggyBack: 100 mL    IV PiggyBack: 150 mL    IV PiggyBack: 200 mL    IV PiggyBack: 100 mL    Norepinephrine: 9.9 mL    Norepinephrine: 9.8 mL  Total IN: 1469.7 mL    OUT:    Bulb (mL): 40 mL    Indwelling Catheter - Urethral (mL): 1125 mL    Nasogastric/Oral tube (mL): 325 mL    Oral Fluid: 0 mL  Total OUT: 1490 mL    Total NET: -20.3 mL          LABS:                        8.5    22.37 )-----------( 125      ( 15 Mar 2022 03:52 )             27.1             03-15    141  |  111<H>  |  18  ----------------------------<  109<H>  3.5   |  24  |  0.70    Ca    8.4      15 Mar 2022 03:52  Phos  2.3     03-15  Mg     1.9     03-15    TPro  5.6<L>  /  Alb  2.3<L>  /  TBili  0.6  /  DBili  x   /  AST  21  /  ALT  12  /  AlkPhos  66  03-15

## 2022-03-15 NOTE — CHART NOTE - NSCHARTNOTEFT_GEN_A_CORE
88 year old female with PMH of Afib on metoprolol, uterine CA s/p radiation and hysterectomy, breast cancer s/p chemo and bilateral mastectomy presents to the ED complaining of lower abdominal pain with nausea and vomiting, despite being able to pass stools.    In the the ED: HD stable and tachy, Afib 110s on EKG, Exam showed TTP in b/l Lower quadrants, Labs unremarkable, however abdominal films showed SBO in the transition into the pelvis.    HOSPITAL COURSE:   Surgery consulted.  Had NGT for decompression, and conservative managment with NPO and IV fluids, initially refusing surgery, diet advancement was complicated by persistent abdominal pain and bloating, CT 3/11 scan showed SBO with no interval change  and patient underwent a ileocolic bypass with Dr. Cleveland, on 3/11.  ICU consulted for hypotension 70s/40s and Afib RVR rate in the 130-150    ICU COURSE  Received IV fluids 2.4 littre bolus, lopressor pushes and dig.  Abx were started, broad coverage for concerns for sepsis. Allergic to penicillins, so she is on Azactam and clinda with ID following.    Patient has been appropriate, alert and oriented.   Post op pain was controlled with dilaudid x1 the day following surgery, otherwise PRN IV tylenol.     She continued to be NPO with the NG tube pauline milton placed on continuous suction. In anticipation to advance diet and dc tube once flatus was achieved.    Her blood pressure was maintained with intermittent boluses as well as small doses of levophed as needed to maintain a MAP above 65 she is s/p about 5L since post op.  For concerns for sepsis with intraabdominal source post op, patient has been on Azactam and clinda per ID recs- day 3     Afib and RVR were controlled on lopressor pushes PRN however the persistent tachycardia required dig loading and maintainance, for which she is now on dig 125mg IV daily with cardiology following    CONCLUSION  SBO: Abdominal pain controlled, with PRN iv tylenol as needed.Flatus achieved on morning of 3/15/2022, POD day 3-4, NG tube out. Diet pending per surgery recs.  CV: Rate controlled on Dig 125mg IV daily, BP stable off pressors. No prior AC due to bleeding risk, needs reassessment of risk vs benefits given a chadvasc of 2  On abx, leukocytosis trended down,  all cultures including bcx and ucx all NGTG.    Patient is otherwise optimized for dg to surgery service    f/u Diet  f/u BMs  f/u Abx duration  per ID  f/u AC and cardiology recs    Signed out to Afshan ZAZUETA    Many thanks for honor of taking care of her int he ICU

## 2022-03-15 NOTE — CHART NOTE - NSCHARTNOTEFT_GEN_A_CORE
Called by RN  Pt with 2 episodes of vomiting    NGT replaced  850ml bilious fluid     IV fluids  Monitor output  AXR in AM

## 2022-03-16 NOTE — CHART NOTE - NSCHARTNOTEFT_GEN_A_CORE
Reassessment:     Patient is a 88y old  Female who presents with a chief complaint of small bowel obstruction (16 Mar 2022 12:01)      Factors impacting intake: [ ] none [ ] nausea  [ ] vomiting [ ] diarrhea [ ] constipation  [ ]chewing problems [ ] swallowing issues  [X ] other: SBO, Afib, h/o breast/uterine cancer    Diet Prescription: Diet, NPO:   With Ice Chips/Sips of Water (03-15-22 @ 21:24)    Intake: Patient down grade to surgery floor from ICU on 03/15, s/p ileocolic bypass 3/11, visited pt. "resting" NPO with IV fluids & NG tube suctioning maintained, followed by Surgery/team, rec. alternate nutrition support if pt. NPO prolonged as medically feasible, ongoing WALLACE drainage w/woud care noted. RD available     Daily Weight in k (13 Mar 2022 07:30)    % Weight Change: wt. fluctuation possible due to fluid shift    Pertinent Medications: MEDICATIONS  (STANDING):  acetaminophen     Tablet .. 650 milliGRAM(s) Oral once  aztreonam  IVPB 1000 milliGRAM(s) IV Intermittent every 12 hours  chlorhexidine 2% Cloths 1 Application(s) Topical daily  clindamycin IVPB 600 milliGRAM(s) IV Intermittent every 8 hours  dextrose 5% + sodium chloride 0.45%. 1000 milliLiter(s) (90 mL/Hr) IV Continuous <Continuous>  digoxin  Injectable 125 MICROGram(s) IV Push daily  heparin   Injectable 5000 Unit(s) SubCutaneous every 8 hours  metoclopramide Injectable 10 milliGRAM(s) IV Push once  mupirocin 2% Nasal 1 Application(s) Both Nostrils every 12 hours    MEDICATIONS  (PRN):  ondansetron Injectable 4 milliGRAM(s) IV Push every 6 hours PRN Nausea and/or Vomiting    Pertinent Labs: - Na140 mmol/L Glu 106 mg/dL<H> K+ 3.3 mmol/L<L> Cr  0.66 mg/dL BUN 15 mg/dL - Phos 3.0 mg/dL -16 Alb 2.6 g/dL<L>     CAPILLARY BLOOD GLUCOSE    Skin: surgical wound care     Estimated Needs:   [ X] no change since previous assessment  [ ] recalculated:     Previous Nutrition Diagnosis:   [ ] Inadequate Energy Intake [ X]Inadequate Oral Intake [ ] Excessive Energy Intake   [ ] Underweight [ ] Increased Nutrient Needs [ ] Overweight/Obesity   [ ] Altered GI Function [ ] Unintended Weight Loss [ ] Food & Nutrition Related Knowledge Deficit [ ] Malnutrition     Nutrition Diagnosis is [X ] ongoing  [ ] resolved [ ] not applicable     New Nutrition Diagnosis: [ ] not applicable       Interventions: To meet nutrition needs     Recommend  [ ] Change Diet To:  [ ] Nutrition Supplement  [ ] Nutrition Support  [ ] Other:     Monitoring and Evaluation:   [X ] PO intake [ x ] Tolerance to diet prescription [ x ] weights [ x ] labs[ x ] follow up per protocol  [ ] other:

## 2022-03-16 NOTE — PROGRESS NOTE ADULT - SUBJECTIVE AND OBJECTIVE BOX
88y Female is under our care for     REVIEW OF SYSTEMS:  [  ] Not able to elicit  General:	  Chest:	  GI:	  :  Skin:	  Musculoskeletal:	  Neuro:	    MEDS:  aztreonam  IVPB 1000 milliGRAM(s) IV Intermittent every 12 hours  clindamycin IVPB 600 milliGRAM(s) IV Intermittent every 8 hours    ALLERGIES: Allergies    contrast media (iodine-based) (Short breath)  iv  contrast (Unknown)  penicillin (Hives)  penicillin (Rash)  sulfa drugs (Short breath)  Sulfacetamide Sodium (Rash)    Intolerances        VITALS:  Vital Signs Last 24 Hrs  T(C): 36.7 (16 Mar 2022 06:29), Max: 36.7 (16 Mar 2022 06:29)  T(F): 98.1 (16 Mar 2022 06:29), Max: 98.1 (16 Mar 2022 06:29)  HR: 91 (16 Mar 2022 06:29) (74 - 100)  BP: 127/69 (16 Mar 2022 06:29) (119/69 - 149/66)  BP(mean): 90 (15 Mar 2022 12:08) (90 - 90)  RR: 18 (16 Mar 2022 06:29) (18 - 23)  SpO2: 98% (16 Mar 2022 06:29) (98% - 100%)      PHYSICAL EXAM:  HEENT:  Neck:  Respiratory:  Cardiovascular:  Gastrointestinal:  Extremities:  Skin:  Ortho:  Neuro:    LABS/DIAGNOSTIC TESTS:                        8.5    15.67 )-----------( 132      ( 16 Mar 2022 06:29 )             27.3     WBC Count: 15.67 K/uL (03-16 @ 06:29)  WBC Count: 22.37 K/uL (03-15 @ 03:52)  WBC Count: 31.64 K/uL (03-14 @ 03:53)  WBC Count: 28.78 K/uL (03-13 @ 04:29)  WBC Count: 27.12 K/uL (03-12 @ 13:38)    03-16    140  |  108  |  15  ----------------------------<  106<H>  3.3<L>   |  28  |  0.66    Ca    8.6      16 Mar 2022 06:29  Phos  3.0     03-16  Mg     1.8     03-16    TPro  5.8<L>  /  Alb  2.6<L>  /  TBili  0.6  /  DBili  x   /  AST  19  /  ALT  14  /  AlkPhos  75  03-16      CULTURES:   .Blood Blood-Peripheral  03-13 @ 08:29   No growth to date.  --  --      Clean Catch Clean Catch (Midstream)  03-13 @ 08:24   No growth  --  --      Clean Catch Clean Catch (Midstream)  03-08 @ 18:19   <10,000 CFU/mL Normal Urogenital Ira  --  --        RADIOLOGY:  no new studies 88y Female is under our care for SBO and fevers.  Patient was downgraded to medicine service yesterday and was seen sitting comfortably in the chair with no acute distress.  She was advanced to clears yesterday and subsequently had 2 episodes of vomiting, NGT was replaced.  Patient remains afebrile and WBC count is downtrending.  She denies any nausea or flatus at this time.    REVIEW OF SYSTEMS:  [  ] Not able to elicit  General: no fevers no malaise  Chest: no cough no sob  GI: no nvd  : no urinary sxs   Skin: no rashes  Musculoskeletal: no trauma no LBP  Neuro: no ha's no dizziness     MEDS:  aztreonam  IVPB 1000 milliGRAM(s) IV Intermittent every 12 hours  clindamycin IVPB 600 milliGRAM(s) IV Intermittent every 8 hours    ALLERGIES: Allergies    contrast media (iodine-based) (Short breath)  iv  contrast (Unknown)  penicillin (Hives)  penicillin (Rash)  sulfa drugs (Short breath)  Sulfacetamide Sodium (Rash)    Intolerances        VITALS:  Vital Signs Last 24 Hrs  T(C): 36.7 (16 Mar 2022 06:29), Max: 36.7 (16 Mar 2022 06:29)  T(F): 98.1 (16 Mar 2022 06:29), Max: 98.1 (16 Mar 2022 06:29)  HR: 91 (16 Mar 2022 06:29) (74 - 100)  BP: 127/69 (16 Mar 2022 06:29) (119/69 - 149/66)  BP(mean): 90 (15 Mar 2022 12:08) (90 - 90)  RR: 18 (16 Mar 2022 06:29) (18 - 23)  SpO2: 98% (16 Mar 2022 06:29) (98% - 100%)      PHYSICAL EXAM:  HEENT: Right NG  Neck: supple no LN's   Respiratory: lungs clear no rales  Cardiovascular: S1 S2 reg no murmurs  Gastrointestinal: +BS with soft, nondistended abdomen; RUQ tenderness  Extremities: no edema  Skin: no rashes  Ortho: n/a  Neuro: AAO x 3    LABS/DIAGNOSTIC TESTS:                        8.5    15.67 )-----------( 132      ( 16 Mar 2022 06:29 )             27.3     WBC Count: 15.67 K/uL (03-16 @ 06:29)  WBC Count: 22.37 K/uL (03-15 @ 03:52)  WBC Count: 31.64 K/uL (03-14 @ 03:53)  WBC Count: 28.78 K/uL (03-13 @ 04:29)  WBC Count: 27.12 K/uL (03-12 @ 13:38)    03-16    140  |  108  |  15  ----------------------------<  106<H>  3.3<L>   |  28  |  0.66    Ca    8.6      16 Mar 2022 06:29  Phos  3.0     03-16  Mg     1.8     03-16    TPro  5.8<L>  /  Alb  2.6<L>  /  TBili  0.6  /  DBili  x   /  AST  19  /  ALT  14  /  AlkPhos  75  03-16      CULTURES:   .Blood Blood-Peripheral  03-13 @ 08:29   No growth to date.  --  --      Clean Catch Clean Catch (Midstream)  03-13 @ 08:24   No growth  --  --      Clean Catch Clean Catch (Midstream)  03-08 @ 18:19   <10,000 CFU/mL Normal Urogenital Ira  --  --        RADIOLOGY:  no new studies

## 2022-03-16 NOTE — PROGRESS NOTE ADULT - SUBJECTIVE AND OBJECTIVE BOX
INTERVAL HPI/OVERNIGHT EVENTS:  Pt seen and examined at bedside.   Nausea/vomiting overnight, NGT replaced, initial 900 cc out  NPO, NGT in place to wall suction   Denies F/BM     MEDICATIONS  (STANDING):  acetaminophen     Tablet .. 650 milliGRAM(s) Oral once  aztreonam  IVPB 1000 milliGRAM(s) IV Intermittent every 12 hours  chlorhexidine 2% Cloths 1 Application(s) Topical daily  clindamycin IVPB 600 milliGRAM(s) IV Intermittent every 8 hours  dextrose 5% + sodium chloride 0.45%. 1000 milliLiter(s) (90 mL/Hr) IV Continuous <Continuous>  digoxin  Injectable 125 MICROGram(s) IV Push daily  heparin   Injectable 5000 Unit(s) SubCutaneous every 8 hours  metoclopramide Injectable 10 milliGRAM(s) IV Push once  mupirocin 2% Nasal 1 Application(s) Both Nostrils every 12 hours  potassium chloride  10 mEq/100 mL IVPB 10 milliEquivalent(s) IV Intermittent every 1 hour    MEDICATIONS  (PRN):  ondansetron Injectable 4 milliGRAM(s) IV Push every 6 hours PRN Nausea and/or Vomiting      Vital Signs Last 24 Hrs  T(C): 36.7 (16 Mar 2022 06:29), Max: 36.7 (16 Mar 2022 06:29)  T(F): 98.1 (16 Mar 2022 06:29), Max: 98.1 (16 Mar 2022 06:29)  HR: 91 (16 Mar 2022 06:29) (74 - 103)  BP: 127/69 (16 Mar 2022 06:29) (111/51 - 149/66)  BP(mean): 90 (15 Mar 2022 12:08) (67 - 90)  RR: 18 (16 Mar 2022 06:29) (17 - 28)  SpO2: 98% (16 Mar 2022 06:29) (95% - 100%)    Physical:  General: A&Ox3. NAD.  Respirations: Unlabored   Abdomen: Soft nondistended, appropriate incisional tenderness, WALLACE SS. No rebound, no guarding, no peritonitis     I&O's Detail    15 Mar 2022 07:01  -  16 Mar 2022 07:00  --------------------------------------------------------  IN:    dextrose 5% + sodium chloride 0.9%: 150 mL    IV PiggyBack: 187.5 mL  Total IN: 337.5 mL    OUT:    Bulb (mL): 30 mL    Indwelling Catheter - Urethral (mL): 1550 mL    Nasogastric/Oral tube (mL): 1150 mL  Total OUT: 2730 mL    Total NET: -2392.5 mL          LABS:                        8.5    15.67 )-----------( 132      ( 16 Mar 2022 06:29 )             27.3             03-16    140  |  108  |  15  ----------------------------<  106<H>  3.3<L>   |  28  |  0.66    Ca    8.6      16 Mar 2022 06:29  Phos  3.0     03-16  Mg     1.8     03-16    TPro  5.8<L>  /  Alb  2.6<L>  /  TBili  0.6  /  DBili  x   /  AST  19  /  ALT  14  /  AlkPhos  75  03-16

## 2022-03-16 NOTE — PROGRESS NOTE ADULT - ASSESSMENT
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 NGT replaced overnight. WBC 15 (22). Hypokalemia     -NPO, NGT, monitor output   -Continue WALLACE monitor output   -IV Abx  -Monitor/replete electrolytes   -Pain control PRN   -DVT ppx  -Discussed with Dr. Cleveland

## 2022-03-17 NOTE — PROGRESS NOTE ADULT - ASSESSMENT
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 NGT replaced overnight. WBC 21 (15). Hypokalemia     -NPO, NGT, monitor output   -Continue WALLACE monitor output   -IV Abx  -Monitor/replete electrolytes   -Pain control PRN   -DVT ppx  -Discussed with Dr. Cleveland  	  89 y/o female s/p ileocolic bypass 3/11. NGT replaced overnight. WBC 21 (15). Hypokalemia     -NPO, NGT, monitor output   -Continue WALLACE monitor output   -IV Abx  -Monitor/replete electrolytes, f/u AM Mg, phos  -Pain control PRN   -DVT ppx  -Discussed with Dr. Cleveland

## 2022-03-17 NOTE — PROGRESS NOTE ADULT - SUBJECTIVE AND OBJECTIVE BOX
INTERVAL HPI/OVERNIGHT EVENTS:  Pt seen and examined at bedside.   Pt resting comfortably. No acute complaints.   NGT in place, output noted  NPO  Denies flatus/BM.   Denies N/V    MEDICATIONS  (STANDING):  aztreonam  IVPB 1000 milliGRAM(s) IV Intermittent every 12 hours  chlorhexidine 2% Cloths 1 Application(s) Topical daily  clindamycin IVPB 600 milliGRAM(s) IV Intermittent every 8 hours  dextrose 5% + sodium chloride 0.45%. 1000 milliLiter(s) (90 mL/Hr) IV Continuous <Continuous>  digoxin  Injectable 125 MICROGram(s) IV Push daily  heparin   Injectable 5000 Unit(s) SubCutaneous every 8 hours  metoclopramide Injectable 10 milliGRAM(s) IV Push once  mupirocin 2% Nasal 1 Application(s) Both Nostrils every 12 hours    MEDICATIONS  (PRN):  ondansetron Injectable 4 milliGRAM(s) IV Push every 6 hours PRN Nausea and/or Vomiting      Vital Signs Last 24 Hrs  T(C): 37.1 (17 Mar 2022 05:47), Max: 37.1 (17 Mar 2022 05:47)  T(F): 98.8 (17 Mar 2022 05:47), Max: 98.8 (17 Mar 2022 05:47)  HR: 98 (17 Mar 2022 05:47) (98 - 103)  BP: 121/67 (17 Mar 2022 05:47) (121/67 - 130/77)  BP(mean): 89 (16 Mar 2022 20:49) (89 - 89)  RR: 18 (17 Mar 2022 05:47) (18 - 18)  SpO2: 96% (17 Mar 2022 05:47) (92% - 96%)    Physical:  General: A&Ox3. NAD. NGT in place to wall suction   Respirations: Unlabored  Abdomen: Soft nondistended, nontender. No rebound, no guarding, no peritonitis, dressing C/D/I     I&O's Detail    16 Mar 2022 07:01  -  17 Mar 2022 07:00  --------------------------------------------------------  IN:    dextrose 5% + sodium chloride 0.45%: 1710 mL  Total IN: 1710 mL    OUT:    Bulb (mL): 110 mL    Indwelling Catheter - Urethral (mL): 1175 mL    Nasogastric/Oral tube (mL): 450 mL  Total OUT: 1735 mL    Total NET: -25 mL          LABS:                        8.4    21.65 )-----------( 146      ( 17 Mar 2022 07:04 )             26.7             03-17    140  |  107  |  13  ----------------------------<  123<H>  3.2<L>   |  27  |  0.56    Ca    8.3<L>      17 Mar 2022 07:04  Phos  3.0     03-16  Mg     1.8     03-16    TPro  5.8<L>  /  Alb  2.6<L>  /  TBili  0.6  /  DBili  x   /  AST  19  /  ALT  14  /  AlkPhos  75  03-16

## 2022-03-17 NOTE — PROGRESS NOTE ADULT - SUBJECTIVE AND OBJECTIVE BOX
88y Female is under our care for     REVIEW OF SYSTEMS:  [  ] Not able to elicit  General:	  Chest:	  GI:	  :  Skin:	  Musculoskeletal:	  Neuro:	    MEDS:  aztreonam  IVPB 1000 milliGRAM(s) IV Intermittent every 12 hours  clindamycin IVPB 600 milliGRAM(s) IV Intermittent every 8 hours    ALLERGIES: Allergies    contrast media (iodine-based) (Short breath)  iv  contrast (Unknown)  penicillin (Hives)  penicillin (Rash)  sulfa drugs (Short breath)  Sulfacetamide Sodium (Rash)    Intolerances        VITALS:  Vital Signs Last 24 Hrs  T(C): 37.1 (17 Mar 2022 05:47), Max: 37.1 (17 Mar 2022 05:47)  T(F): 98.8 (17 Mar 2022 05:47), Max: 98.8 (17 Mar 2022 05:47)  HR: 93 (17 Mar 2022 11:03) (93 - 103)  BP: 132/61 (17 Mar 2022 11:03) (121/67 - 132/61)  BP(mean): 89 (16 Mar 2022 20:49) (89 - 89)  RR: 18 (17 Mar 2022 05:47) (18 - 18)  SpO2: 93% (17 Mar 2022 11:03) (92% - 96%)      PHYSICAL EXAM:  HEENT:  Neck:  Respiratory:  Cardiovascular:  Gastrointestinal:  Extremities:  Skin:  Ortho:  Neuro:    LABS/DIAGNOSTIC TESTS:                        8.4    21.65 )-----------( 146      ( 17 Mar 2022 07:04 )             26.7     WBC Count: 21.65 K/uL (03-17 @ 07:04)  WBC Count: 15.67 K/uL (03-16 @ 06:29)  WBC Count: 22.37 K/uL (03-15 @ 03:52)  WBC Count: 31.64 K/uL (03-14 @ 03:53)  WBC Count: 28.78 K/uL (03-13 @ 04:29)    03-17    140  |  107  |  13  ----------------------------<  123<H>  3.2<L>   |  27  |  0.56    Ca    8.3<L>      17 Mar 2022 07:04  Phos  2.1     03-17  Mg     1.6     03-17    TPro  5.8<L>  /  Alb  2.6<L>  /  TBili  0.6  /  DBili  x   /  AST  19  /  ALT  14  /  AlkPhos  75  03-16      CULTURES:   .Blood Blood-Peripheral  03-13 @ 08:29   No growth to date.  --  --      Clean Catch Clean Catch (Midstream)  03-13 @ 08:24   No growth  --  --      Clean Catch Clean Catch (Midstream)  03-08 @ 18:19   <10,000 CFU/mL Normal Urogenital Ira  --  --        RADIOLOGY:    < from: Xray Abdomen 1 View PORTABLE -Routine (Xray Abdomen 1 View PORTABLE -Routine in AM.) (03.16.22 @ 12:29) >    ACC: [id number] EXAM:  XR ABDOMEN PORTABLE ROUTINE 1V                          PROCEDURE DATE:  03/16/2022          INTERPRETATION:  KUB: AP, 2 images    COMPARISON: CT abdomen 3/11/2022.    CLINICAL INFORMATION: SBO. Follow-up.    FINDINGS:  NG tubecoiled within collapsed stomach.  Air distended midabdominal small bowel consistent with small bowel   obstruction. Large bowel decompressed. Line No free intra-abdominal air.  No obvious intra-abdominal masses.  No abnormal calcifications.  The osseous structures are intact.    IMPRESSION:    SBO.    --- End of Report ---            ALDA BALTAZAR MD; Attending Radiologist  This document has been electronically signed. Mar 17 2022 10:48AM    < end of copied text >   88y Female is under our care for SBO and leukocytosis.  Patient was seen sitting comfortably in the chair with no acute distress.  She reports some intermittent nausea, however not at present and denies any flatus at this time.  Patient remains afebrile with increasing WBC count.    REVIEW OF SYSTEMS:  [  ] Not able to elicit  General: no fevers no malaise  Chest: no cough no sob  GI: intermittent nausea +, no vomiting or diarrhea  : no urinary sxs   Skin: no rashes  Musculoskeletal: no trauma no LBP  Neuro: no ha's no dizziness     MEDS:  aztreonam  IVPB 1000 milliGRAM(s) IV Intermittent every 12 hours  clindamycin IVPB 600 milliGRAM(s) IV Intermittent every 8 hours    ALLERGIES: Allergies    contrast media (iodine-based) (Short breath)  iv  contrast (Unknown)  penicillin (Hives)  penicillin (Rash)  sulfa drugs (Short breath)  Sulfacetamide Sodium (Rash)    Intolerances        VITALS:  Vital Signs Last 24 Hrs  T(C): 37.1 (17 Mar 2022 05:47), Max: 37.1 (17 Mar 2022 05:47)  T(F): 98.8 (17 Mar 2022 05:47), Max: 98.8 (17 Mar 2022 05:47)  HR: 93 (17 Mar 2022 11:03) (93 - 103)  BP: 132/61 (17 Mar 2022 11:03) (121/67 - 132/61)  BP(mean): 89 (16 Mar 2022 20:49) (89 - 89)  RR: 18 (17 Mar 2022 05:47) (18 - 18)  SpO2: 93% (17 Mar 2022 11:03) (92% - 96%)      PHYSICAL EXAM:  HEENT: Right NG  Neck: supple no LN's   Respiratory: lungs clear no rales  Cardiovascular: S1 S2 reg no murmurs  Gastrointestinal: +BS with soft, nondistended abdomen; RUQ tenderness  Extremities: no edema  Skin: no rashes  Ortho: n/a  Neuro: AAO x 3    LABS/DIAGNOSTIC TESTS:                        8.4    21.65 )-----------( 146      ( 17 Mar 2022 07:04 )             26.7     WBC Count: 21.65 K/uL (03-17 @ 07:04)  WBC Count: 15.67 K/uL (03-16 @ 06:29)  WBC Count: 22.37 K/uL (03-15 @ 03:52)  WBC Count: 31.64 K/uL (03-14 @ 03:53)  WBC Count: 28.78 K/uL (03-13 @ 04:29)    03-17    140  |  107  |  13  ----------------------------<  123<H>  3.2<L>   |  27  |  0.56    Ca    8.3<L>      17 Mar 2022 07:04  Phos  2.1     03-17  Mg     1.6     03-17    TPro  5.8<L>  /  Alb  2.6<L>  /  TBili  0.6  /  DBili  x   /  AST  19  /  ALT  14  /  AlkPhos  75  03-16      CULTURES:   .Blood Blood-Peripheral  03-13 @ 08:29   No growth to date.  --  --      Clean Catch Clean Catch (Midstream)  03-13 @ 08:24   No growth  --  --      Clean Catch Clean Catch (Midstream)  03-08 @ 18:19   <10,000 CFU/mL Normal Urogenital Ira  --  --        RADIOLOGY:    < from: Xray Abdomen 1 View PORTABLE -Routine (Xray Abdomen 1 View PORTABLE -Routine in AM.) (03.16.22 @ 12:29) >    ACC: [id number] EXAM:  XR ABDOMEN PORTABLE ROUTINE 1V                          PROCEDURE DATE:  03/16/2022          INTERPRETATION:  KUB: AP, 2 images    COMPARISON: CT abdomen 3/11/2022.    CLINICAL INFORMATION: SBO. Follow-up.    FINDINGS:  NG tubecoiled within collapsed stomach.  Air distended midabdominal small bowel consistent with small bowel   obstruction. Large bowel decompressed. Line No free intra-abdominal air.  No obvious intra-abdominal masses.  No abnormal calcifications.  The osseous structures are intact.    IMPRESSION:    SBO.    --- End of Report ---            ALDA BALTAZAR MD; Attending Radiologist  This document has been electronically signed. Mar 17 2022 10:48AM    < end of copied text >

## 2022-03-17 NOTE — PROGRESS NOTE ADULT - ASSESSMENT
Low grade fever - resolved  Leukocytosis  ? intraabdominal infection  No evidence of any other infection    Plan: Continue Azactam 1g iv q12  Continue Clindamycin 600mg iv q8             Low grade fever - resolved  Leukocytosis  ? intraabdominal infection  No evidence of any other infection    Plan: Continue Azactam 1g iv q12  Continue Clindamycin 600mg iv q8    I agree with above

## 2022-03-17 NOTE — CHART NOTE - NSCHARTNOTEFT_GEN_A_CORE
Called by RN for pt reporting b/l lower extremity edema and pain. Pt seen and examined at bedside, daughter and daughter's  present. Reports several days of R>L edema with tenderness to palpation. R LE 2+ pitting edema, LLE 1+ pitting edema, generalized tenderness to palpation in lower extremities, nonspecific to calf b/l. Extremity soft, perfused. Plan for b/l duplex of LE to further assess. Discussed with Dr. Cleveland.

## 2022-03-18 NOTE — CHART NOTE - NSCHARTNOTEFT_GEN_A_CORE
Patient was seen at bedside after her walk with student around the floor.   Patient has no acute complaints.     Patient states that she had a reaction to IV contrast over 15 years ago where she got a rash that went away shortly after. She denied any SOB, chest pain or any other cardiopulmonary issues at the time.     As per Dr. Cleveland patient will receive contrast through NGT for CT scan. Patient was seen at bedside after her walk with student around the floor.   Patient has no acute complaints.     Patient states that she had a reaction to IV contrast over 15 years ago where she got a rash that went away shortly after. She denied any SOB, chest pain or any other cardiopulmonary issues at the time.     As per Dr. Cleveland patient will receive contrast through NGT for CT scan.    All risks and benefits were discussed with patient who spoke to her daughter and consented the contrast through NGT.     Contrast was given at 13:30.  Nurse was notified to assess patient for any new signs or symptoms, including rash or cardiopulmonary symptoms every 30 minutes.   CT scan was notified and will take the CT scan at 16:30.

## 2022-03-18 NOTE — PROGRESS NOTE ADULT - SUBJECTIVE AND OBJECTIVE BOX
88y Female is under our care for     REVIEW OF SYSTEMS:  [  ] Not able to elicit  General:	  Chest:	  GI:	  :  Skin:	  Musculoskeletal:	  Neuro:	    MEDS:  aztreonam  IVPB 1000 milliGRAM(s) IV Intermittent every 12 hours  clindamycin IVPB 600 milliGRAM(s) IV Intermittent every 8 hours    ALLERGIES: Allergies    contrast media (iodine-based) (Short breath)  iv  contrast (Unknown)  penicillin (Hives)  penicillin (Rash)  sulfa drugs (Short breath)  Sulfacetamide Sodium (Rash)    Intolerances        VITALS:  Vital Signs Last 24 Hrs  T(C): 36.3 (18 Mar 2022 05:14), Max: 36.6 (17 Mar 2022 16:00)  T(F): 97.3 (18 Mar 2022 05:14), Max: 97.9 (17 Mar 2022 16:00)  HR: 91 (18 Mar 2022 05:14) (91 - 100)  BP: 110/51 (18 Mar 2022 05:14) (110/51 - 148/75)  BP(mean): 63 (18 Mar 2022 05:14) (63 - 63)  RR: 18 (18 Mar 2022 05:14) (16 - 20)  SpO2: 97% (18 Mar 2022 05:14) (92% - 97%)      PHYSICAL EXAM:  HEENT:  Neck:  Respiratory:  Cardiovascular:  Gastrointestinal:  Extremities:  Skin:  Ortho:  Neuro:    LABS/DIAGNOSTIC TESTS:                        7.6    25.99 )-----------( 140      ( 18 Mar 2022 05:59 )             23.9     WBC Count: 25.99 K/uL (03-18 @ 05:59)  WBC Count: 21.65 K/uL (03-17 @ 07:04)  WBC Count: 15.67 K/uL (03-16 @ 06:29)  WBC Count: 22.37 K/uL (03-15 @ 03:52)  WBC Count: 31.64 K/uL (03-14 @ 03:53)    03-18    140  |  108  |  10  ----------------------------<  112<H>  3.3<L>   |  27  |  0.55    Ca    7.8<L>      18 Mar 2022 05:59  Phos  2.0     03-18  Mg     1.5     03-18        CULTURES:   .Blood Blood-Peripheral  03-13 @ 08:29   No Growth Final  --  --      Clean Catch Clean Catch (Midstream)  03-13 @ 08:24   No growth  --  --      Clean Catch Clean Catch (Midstream)  03-08 @ 18:19   <10,000 CFU/mL Normal Urogenital Ira  --  --        RADIOLOGY:   < from: US Duplex Venous Lower Ext Complete, Bilateral (03.17.22 @ 17:29) >    ACC: [id number] EXAM:  US DPLX LWR EXT VEINS COMPL BI                          PROCEDURE DATE:  03/17/2022          INTERPRETATION:  CLINICAL INFORMATION: Leg pain and swelling    COMPARISON: None available.    TECHNIQUE: Duplex sonography of the BILATERAL LOWER extremity veins with   color and spectral Doppler, with and without compression.    FINDINGS:    RIGHT:  Normal compressibility of the RIGHT common femoral, femoral and popliteal   veins.  Doppler examination shows normal spontaneous and phasic flow.  No RIGHT calf vein thrombosis is detected.    LEFT:  Normal compressibility of the LEFT common femoral, femoral and popliteal   veins.  Doppler examination shows normal spontaneous and phasic flow.  No LEFT calf vein thrombosis is detected.    There is bilateral lower extremity soft tissue edema.    IMPRESSION:  No evidence of deep venous thrombosis in either lower extremity.          --- End of Report ---            LIZZ ROY MD; Attending Radiologist  This document has been electronically signed. Mar 17 2022  5:32PM    < end of copied text >   88y Female is under our care for leukocytosis and SBO.  Patient was seen sitting comfortably in the chair with no acute distress.  She denies any nausea or vomiting and reports passing flatus this morning.  Patient remains afebrile, WBC count is increasing and is pending CT scan.    REVIEW OF SYSTEMS:  [  ] Not able to elicit  General: no fevers no malaise  Chest: no cough no sob  GI: no nvd, abd pain +  : no urinary sxs   Skin: no rashes  Musculoskeletal: no trauma no LBP  Neuro: no ha's no dizziness     MEDS:  aztreonam  IVPB 1000 milliGRAM(s) IV Intermittent every 12 hours  clindamycin IVPB 600 milliGRAM(s) IV Intermittent every 8 hours    ALLERGIES: Allergies    contrast media (iodine-based) (Short breath)  iv  contrast (Unknown)  penicillin (Hives)  penicillin (Rash)  sulfa drugs (Short breath)  Sulfacetamide Sodium (Rash)    Intolerances        VITALS:  Vital Signs Last 24 Hrs  T(C): 36.3 (18 Mar 2022 05:14), Max: 36.6 (17 Mar 2022 16:00)  T(F): 97.3 (18 Mar 2022 05:14), Max: 97.9 (17 Mar 2022 16:00)  HR: 91 (18 Mar 2022 05:14) (91 - 100)  BP: 110/51 (18 Mar 2022 05:14) (110/51 - 148/75)  BP(mean): 63 (18 Mar 2022 05:14) (63 - 63)  RR: 18 (18 Mar 2022 05:14) (16 - 20)  SpO2: 97% (18 Mar 2022 05:14) (92% - 97%)      PHYSICAL EXAM:  HEENT: Right NG  Neck: supple no LN's   Respiratory: lungs clear no rales  Cardiovascular: S1 S2 reg no murmurs  Gastrointestinal: +BS with soft, nondistended abdomen; RUQ tenderness  Extremities: no edema  Skin: no rashes  Ortho: n/a  Neuro: AAO x 3      LABS/DIAGNOSTIC TESTS:                        7.6    25.99 )-----------( 140      ( 18 Mar 2022 05:59 )             23.9     WBC Count: 25.99 K/uL (03-18 @ 05:59)  WBC Count: 21.65 K/uL (03-17 @ 07:04)  WBC Count: 15.67 K/uL (03-16 @ 06:29)  WBC Count: 22.37 K/uL (03-15 @ 03:52)  WBC Count: 31.64 K/uL (03-14 @ 03:53)    03-18    140  |  108  |  10  ----------------------------<  112<H>  3.3<L>   |  27  |  0.55    Ca    7.8<L>      18 Mar 2022 05:59  Phos  2.0     03-18  Mg     1.5     03-18        CULTURES:   .Blood Blood-Peripheral  03-13 @ 08:29   No Growth Final  --  --      Clean Catch Clean Catch (Midstream)  03-13 @ 08:24   No growth  --  --      Clean Catch Clean Catch (Midstream)  03-08 @ 18:19   <10,000 CFU/mL Normal Urogenital Ira  --  --        RADIOLOGY:   < from: US Duplex Venous Lower Ext Complete, Bilateral (03.17.22 @ 17:29) >    ACC: [id number] EXAM:  US DPLX LWR EXT VEINS COMPL BI                          PROCEDURE DATE:  03/17/2022          INTERPRETATION:  CLINICAL INFORMATION: Leg pain and swelling    COMPARISON: None available.    TECHNIQUE: Duplex sonography of the BILATERAL LOWER extremity veins with   color and spectral Doppler, with and without compression.    FINDINGS:    RIGHT:  Normal compressibility of the RIGHT common femoral, femoral and popliteal   veins.  Doppler examination shows normal spontaneous and phasic flow.  No RIGHT calf vein thrombosis is detected.    LEFT:  Normal compressibility of the LEFT common femoral, femoral and popliteal   veins.  Doppler examination shows normal spontaneous and phasic flow.  No LEFT calf vein thrombosis is detected.    There is bilateral lower extremity soft tissue edema.    IMPRESSION:  No evidence of deep venous thrombosis in either lower extremity.          --- End of Report ---            LIZZ ROY MD; Attending Radiologist  This document has been electronically signed. Mar 17 2022  5:32PM    < end of copied text >

## 2022-03-18 NOTE — PROGRESS NOTE ADULT - ASSESSMENT
Low grade fever - resolved  Leukocytosis  ? intraabdominal infection  No evidence of any other infection    Plan: Continue Azactam 1g iv q12  Continue Clindamycin 600mg iv q8                   Low grade fever - resolved  Leukocytosis  ? intraabdominal infection  No evidence of any other infection    Plan: Continue Azactam 1g iv q12  Continue Clindamycin 600mg iv q8  Pending CT scan                   Low grade fever - resolved  Leukocytosis  ? intraabdominal infection  No evidence of any other infection    Plan: Continue Azactam 1g iv q12  Continue Clindamycin 600mg iv q8  Pending CT scan    I agree with above

## 2022-03-18 NOTE — PROGRESS NOTE ADULT - SUBJECTIVE AND OBJECTIVE BOX
INTERVAL HPI/OVERNIGHT EVENTS:  Pt seen and examined at bedside.   Pt resting comfortably.   NPO, NGT   +flatus/-BM.   Denies N/V, denies abdominal pain     MEDICATIONS  (STANDING):  aztreonam  IVPB 1000 milliGRAM(s) IV Intermittent every 12 hours  chlorhexidine 2% Cloths 1 Application(s) Topical daily  clindamycin IVPB 600 milliGRAM(s) IV Intermittent every 8 hours  dextrose 5% + sodium chloride 0.45%. 1000 milliLiter(s) (90 mL/Hr) IV Continuous <Continuous>  digoxin  Injectable 125 MICROGram(s) IV Push daily  heparin   Injectable 5000 Unit(s) SubCutaneous every 8 hours  metoclopramide Injectable 10 milliGRAM(s) IV Push once  mupirocin 2% Nasal 1 Application(s) Both Nostrils every 12 hours  potassium chloride  10 mEq/100 mL IVPB 10 milliEquivalent(s) IV Intermittent every 1 hour    MEDICATIONS  (PRN):  ondansetron Injectable 4 milliGRAM(s) IV Push every 6 hours PRN Nausea and/or Vomiting      Vital Signs Last 24 Hrs  T(C): 36.3 (18 Mar 2022 05:14), Max: 36.6 (17 Mar 2022 16:00)  T(F): 97.3 (18 Mar 2022 05:14), Max: 97.9 (17 Mar 2022 16:00)  HR: 91 (18 Mar 2022 05:14) (91 - 100)  BP: 110/51 (18 Mar 2022 05:14) (110/51 - 148/75)  BP(mean): 63 (18 Mar 2022 05:14) (63 - 63)  RR: 18 (18 Mar 2022 05:14) (16 - 20)  SpO2: 97% (18 Mar 2022 05:14) (92% - 97%)    Physical:  General: A&Ox3. NAD. NGT in place  Respirations: Unlabored   Abdomen: Soft nondistended, appropriate incisional tenderness, dressings C/D/I, WALLACE noted with minimal output SS  LE: R>L pitting edema, no calf tenderness b/l     I&O's Detail    17 Mar 2022 07:01  -  18 Mar 2022 07:00  --------------------------------------------------------  IN:    dextrose 5% + sodium chloride 0.45%: 1809 mL    IV PiggyBack: 50 mL    IV PiggyBack: 50 mL    IV PiggyBack: 50 mL  Total IN: 1959 mL    OUT:    Bulb (mL): 160 mL    Indwelling Catheter - Urethral (mL): 1200 mL    Nasogastric/Oral tube (mL): 660 mL  Total OUT: 2020 mL    Total NET: -61 mL          LABS:                        7.6    25.99 )-----------( 140      ( 18 Mar 2022 05:59 )             23.9             03-18    140  |  108  |  10  ----------------------------<  112<H>  3.3<L>   |  27  |  0.55    Ca    7.8<L>      18 Mar 2022 05:59  Phos  2.1     03-17  Mg     1.6     03-17    < from: US Duplex Venous Lower Ext Complete, Bilateral (03.17.22 @ 17:29) >  ACC: [id number] EXAM:  US DPLX LWR EXT VEINS COMPL BI                          PROCEDURE DATE:  03/17/2022          INTERPRETATION:  CLINICAL INFORMATION: Leg pain and swelling    COMPARISON: None available.    TECHNIQUE: Duplex sonography of the BILATERAL LOWER extremity veins with   color and spectral Doppler, with and without compression.    FINDINGS:    RIGHT:  Normal compressibility of the RIGHT common femoral, femoral and popliteal   veins.  Doppler examination shows normal spontaneous and phasic flow.  No RIGHT calf vein thrombosis is detected.    LEFT:  Normal compressibility of the LEFT common femoral, femoral and popliteal   veins.  Doppler examination shows normal spontaneous and phasic flow.  No LEFT calf vein thrombosis is detected.    There is bilateral lower extremity soft tissue edema.    IMPRESSION:  No evidence of deep venous thrombosis in either lower extremity.          --- End of Report ---            LIZZ ROY MD; Attending Radiologist  This document has been electronically signed. Mar 17 2022  5:32PM    < end of copied text >

## 2022-03-19 NOTE — PROGRESS NOTE ADULT - ASSESSMENT
s/p lap ileocolic bypass pod #8  await BM, failed attempt to remove ngt recently  f/u axr today to assess anastomosis after CT contrast yesterday  oob  observation  ngt ot [facility]

## 2022-03-19 NOTE — PROGRESS NOTE ADULT - SUBJECTIVE AND OBJECTIVE BOX
Pt s- new complaints  Admits flatus, no bm, wants ngt out "driving me crazy"  ICU Vital Signs Last 24 Hrs  T(C): 37 (19 Mar 2022 06:17), Max: 37 (19 Mar 2022 06:17)  T(F): 98.6 (19 Mar 2022 06:17), Max: 98.6 (19 Mar 2022 06:17)  HR: 95 (19 Mar 2022 06:17) (90 - 99)  BP: 105/54 (19 Mar 2022 06:17) (105/54 - 148/70)  BP(mean): --  ABP: --  ABP(mean): --  RR: 18 (19 Mar 2022 06:17) (16 - 19)  SpO2: 96% (19 Mar 2022 06:17) (92% - 96%)  Alert nad  Abd:soft nt nd                        7.7    24.86 )-----------( 172      ( 19 Mar 2022 06:54 )             23.9   03-19    139  |  106  |  11  ----------------------------<  102<H>  3.6   |  27  |  0.57    Ca    7.9<L>      19 Mar 2022 06:54  Phos  3.1     03-19  Mg     1.7     03-19    TPro  5.6<L>  /  Alb  1.9<L>  /  TBili  0.7  /  DBili  x   /  AST  34  /  ALT  12  /  AlkPhos  84  03-18

## 2022-03-20 NOTE — PROGRESS NOTE ADULT - SUBJECTIVE AND OBJECTIVE BOX
PGY-1 Progress Note discussed with attending    PAGER #: [phone]] TILL 5:00 PM  PLEASE CONTACT ON CALL TEAM:  - On Call Team (Please refer to Imelda) FROM 5:00 PM - 8:30PM  - Nightfloat Team FROM 8:30 -7:30 AM    CHIEF COMPLAINT & BRIEF HOSPITAL COURSE:  89 y/o female with PMH of AFIB not on Eliquis due to easy bleeding, uterine cancer s/p hysterectomy, breast cancer s/p bilateral mastectomy and recent dx of myelodysplastic syndrome. She was admitted to the hospital March 8, 2022 with a chief complaint of abdominal pain. She had surgery for small bowel obstruction on 3-. It was done laparoscopically with EBL 0f 20cc, and a drain was left. There was lysis of adhesions and a ileocolic bypass. Patient was treated with antibiotics for RLL pneumonia.     INTERVAL HPI/OVERNIGHT EVENTS: Patient is c/o b/l LE edema, denies any pain or discomfort, sat well on RA, no sob or O2 requirements.         MEDICATIONS  (STANDING):  aztreonam  IVPB 1000 milliGRAM(s) IV Intermittent every 12 hours  chlorhexidine 2% Cloths 1 Application(s) Topical daily  clindamycin IVPB 600 milliGRAM(s) IV Intermittent every 8 hours  dextrose 5% + sodium chloride 0.45%. 1000 milliLiter(s) (30 mL/Hr) IV Continuous <Continuous>  digoxin  Injectable 125 MICROGram(s) IV Push daily  heparin   Injectable 5000 Unit(s) SubCutaneous every 12 hours  metoclopramide Injectable 10 milliGRAM(s) IV Push once  mupirocin 2% Nasal 1 Application(s) Both Nostrils every 12 hours    MEDICATIONS  (PRN):  ondansetron Injectable 4 milliGRAM(s) IV Push every 6 hours PRN Nausea and/or Vomiting      Vital Signs Last 24 Hrs  T(C): 36.6 (20 Mar 2022 14:25), Max: 36.6 (20 Mar 2022 14:25)  T(F): 97.8 (20 Mar 2022 14:25), Max: 97.8 (20 Mar 2022 14:25)  HR: 84 (20 Mar 2022 14:25) (84 - 93)  BP: 91/51 (20 Mar 2022 14:25) (91/51 - 130/53)  BP(mean): 78 (19 Mar 2022 21:58) (78 - 78)  RR: 18 (20 Mar 2022 14:25) (18 - 18)  SpO2: 94% (20 Mar 2022 14:25) (92% - 96%)    PHYSICAL EXAMINATION:  GENERAL: NAD, thin, fragile, pale, NGT in place   HEAD:  Atraumatic, Normocephalic  EYES:  conjunctiva and sclera clear  NECK: Supple, No JVD, Normal thyroid  CHEST/LUNG: Clear to auscultation. Clear to percussion bilaterally; No rales, rhonchi, wheezing, or rubs  HEART: Regular rate and rhythm; No murmurs, rubs, or gallops  ABDOMEN: Soft, Nontender, Nondistended; Bowel sounds present, no pain or masses on palpation  NERVOUS SYSTEM:  Alert & Oriented X3  : Kinsey in place   EXTREMITIES:  2+ Peripheral Pulses, No clubbing or cyanosis, bilateral LE edema R>L tender, no erythema or warmth  SKIN: warm dry                          8.8    26.80 )-----------( 181      ( 20 Mar 2022 06:57 )             27.9     03-20    139  |  105  |  13  ----------------------------<  96  3.5   |  27  |  0.54    Ca    8.2<L>      20 Mar 2022 06:57  Phos  3.1     03-19  Mg     1.7     03-19    TPro  5.6<L>  /  Alb  1.9<L>  /  TBili  0.7  /  DBili  x   /  AST  34  /  ALT  12  /  AlkPhos  84  03-18    LIVER FUNCTIONS - ( 18 Mar 2022 22:56 )  Alb: 1.9 g/dL / Pro: 5.6 g/dL / ALK PHOS: 84 U/L / ALT: 12 U/L DA / AST: 34 U/L / GGT: x                 I&O's Summary    19 Mar 2022 07:01  -  20 Mar 2022 07:00  --------------------------------------------------------  IN: 0 mL / OUT: 610 mL / NET: -610 mL    20 Mar 2022 07:01  -  20 Mar 2022 17:49  --------------------------------------------------------  IN: 0 mL / OUT: 550 mL / NET: -550 mL

## 2022-03-20 NOTE — PROGRESS NOTE ADULT - ASSESSMENT
87 y/o female with PMH of A fib not on Eliquis due to easy bleeding, on metoprolol at home, uterine cancer s/p hysterectomy, breast cancer s/p bilateral mastectomy and recent dx of myelodysplastic syndrome. She was admitted to the hospital March 8, 2022 with a chief complaint of abdominal pain. She had surgery for small bowel obstruction on 3-. It was done laparoscopically with EBL 0f 20cc, and a drain was left. There was lysis of adhesions and a ileocolic bypass. Patient was in ICU for close monitoring, A fib with RVR, received treatment with antibiotics for RLL pneumonia.    As per surgery patient was noted to have b/l LE edema concerning for fluid overload.     Patient seen and examined at bedside, noted to have b/l edema R>L, denies any chest pain, palpitations or sob. VS borderline low blood pressure, asymptomatic, rate controlled A fib on digoxin, sat well 96% on RA. On imaging, CXR and CT abdomen noted to have bilateral pleural effusions/atelectasis R>L, echo showed severe TR, RA enlargement, LA enlargement, severe pulmonary hypertension 77 mmHg normal, systolic function and no diastolic function assessed due to arrhythmia.      At this moment, there is low suspicion for fluid overload and no diuresis is indicated. However, patient is moderate risk as per Wells score for PE given active MDS, immobilization, LE edema and although HR is <100 patient is on rate control medication.   On the other hand, patient has severe pulmonary hypertension and severe TR which can also cause respiratory symptoms which she does not have now.     Recommendations as follows:  - Obtain CTA chest to r/o PE  - Doppler of LE to r/o DVT  - Do not give diuresis  - Monitor I and O   - Incentive spirometry   - OOB to chair- ambulate as tolerated   - Elevate LE when lying down   - Pulm consult for Pulmonary hypertension   - Outpatient follow up with Cardiologist 87 y/o female with PMH of A fib not on Eliquis due to easy bleeding, on metoprolol at home, uterine cancer s/p hysterectomy, breast cancer s/p bilateral mastectomy and recent dx of myelodysplastic syndrome. She was admitted to the hospital March 8, 2022 with a chief complaint of abdominal pain. She had surgery for small bowel obstruction on 3-. It was done laparoscopically with EBL 0f 20cc, and a drain was left. There was lysis of adhesions and a ileocolic bypass. Patient was in ICU for close monitoring, A fib with RVR, received treatment with antibiotics for RLL pneumonia.    As per surgery patient was noted to have b/l LE edema concerning for fluid overload.     Patient seen and examined at bedside, noted to have b/l edema R>L, denies any chest pain, palpitations or sob. VS borderline low blood pressure, asymptomatic, rate controlled A fib on digoxin, sat well 96% on RA. On imaging, CXR and CT abdomen noted to have bilateral pleural effusions/atelectasis R>L, echo showed severe TR, RA enlargement, LA enlargement, severe pulmonary hypertension 77 mmHg normal, systolic function and no diastolic function assessed due to arrhythmia.      At this moment, no diuresis indicated as bp is low. However, patient is moderate risk as per Wells score for PE given active MDS, immobilization, LE edema and although HR is <100 patient is on rate control medication.   On the other hand, patient has severe pulmonary hypertension and severe TR which can also cause respiratory symptoms which she does not have now.     D/Dx: LE edema due to immobilization (likely),  severe pulmonary HTN, DVT/PE    Recommendations as follows:  - Obtain CTA chest to r/o PE  - Doppler of LE to r/o DVT  - Do not give diuresis  - Monitor I and O   - Incentive spirometry   - OOB to chair- ambulate as tolerated   - Elevate LE when lying down   - Pulm consult for Pulmonary hypertension   - Outpatient follow up with Cardiologist

## 2022-03-20 NOTE — PROGRESS NOTE ADULT - SUBJECTIVE AND OBJECTIVE BOX
Pt complaint of irritation from ngt. no n/v no pain, flatus no bm  ICU Vital Signs Last 24 Hrs  T(C): 36.6 (20 Mar 2022 14:25), Max: 36.6 (20 Mar 2022 14:25)  T(F): 97.8 (20 Mar 2022 14:25), Max: 97.8 (20 Mar 2022 14:25)  HR: 84 (20 Mar 2022 14:25) (84 - 93)  BP: 91/51 (20 Mar 2022 14:25) (91/51 - 130/53)  BP(mean): 78 (19 Mar 2022 21:58) (78 - 78)  ABP: --  ABP(mean): --  RR: 18 (20 Mar 2022 14:25) (18 - 18)  SpO2: 94% (20 Mar 2022 14:25) (92% - 96%)  Alert nad  Abd: soft minimal incisional tenderness. no guarding  ngt: 300cc                        8.8    26.80 )-----------( 181      ( 20 Mar 2022 06:57 )             27.9   03-20    139  |  105  |  13  ----------------------------<  96  3.5   |  27  |  0.54    Ca    8.2<L>      20 Mar 2022 06:57  Phos  3.1     03-19  Mg     1.7     03-19    TPro  5.6<L>  /  Alb  1.9<L>  /  TBili  0.7  /  DBili  x   /  AST  34  /  ALT  12  /  AlkPhos  84  03-18

## 2022-03-20 NOTE — PROGRESS NOTE ADULT - ASSESSMENT
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

## 2022-03-20 NOTE — CHART NOTE - NSCHARTNOTEFT_GEN_A_CORE
pt with continued complaint of lower extremity swelling  had previous duplex negative for dvt 3-17 after similar complaint  o2 sat 94%  hx afib  repeat duplex ordered  pt on hep SQ for dvt prophylaxis  medicine service called for f/u

## 2022-03-21 NOTE — PROGRESS NOTE ADULT - SUBJECTIVE AND OBJECTIVE BOX
89y Female is under our care for     REVIEW OF SYSTEMS:  [  ] Not able to elicit  General:	  Chest:	  GI:	  :  Skin:	  Musculoskeletal:	  Neuro:	    MEDS:  aztreonam  IVPB 1000 milliGRAM(s) IV Intermittent every 12 hours  clindamycin IVPB 600 milliGRAM(s) IV Intermittent every 8 hours    ALLERGIES: Allergies    contrast media (iodine-based) (Short breath)  iv  contrast (Unknown)  penicillin (Hives)  penicillin (Rash)  sulfa drugs (Short breath)  Sulfacetamide Sodium (Rash)    Intolerances        VITALS:  Vital Signs Last 24 Hrs  T(C): 36.6 (21 Mar 2022 05:20), Max: 37.1 (20 Mar 2022 21:22)  T(F): 97.9 (21 Mar 2022 05:20), Max: 98.7 (20 Mar 2022 21:22)  HR: 101 (21 Mar 2022 05:20) (84 - 101)  BP: 118/69 (21 Mar 2022 05:20) (91/51 - 125/56)  BP(mean): 73 (20 Mar 2022 21:22) (73 - 73)  RR: 20 (21 Mar 2022 05:20) (16 - 20)  SpO2: 91% (21 Mar 2022 05:20) (90% - 94%)      PHYSICAL EXAM:  HEENT:  Neck:  Respiratory:  Cardiovascular:  Gastrointestinal:  Extremities:  Skin:  Ortho:  Neuro:    LABS/DIAGNOSTIC TESTS:                        8.5    28.95 )-----------( 218      ( 21 Mar 2022 06:50 )             26.2     WBC Count: 28.95 K/uL (03-21 @ 06:50)  WBC Count: 26.80 K/uL (03-20 @ 06:57)  WBC Count: 24.86 K/uL (03-19 @ 06:54)  WBC Count: 25.99 K/uL (03-18 @ 05:59)  WBC Count: 21.65 K/uL (03-17 @ 07:04)    03-21    140  |  105  |  12  ----------------------------<  90  3.5   |  27  |  0.50    Ca    8.0<L>      21 Mar 2022 06:50        CULTURES:   .Blood Blood-Peripheral  03-13 @ 08:29   No Growth Final  --  --      Clean Catch Clean Catch (Midstream)  03-13 @ 08:24   No growth  --  --      Clean Catch Clean Catch (Midstream)  03-08 @ 18:19   <10,000 CFU/mL Normal Urogenital Ira  --  --        RADIOLOGY:    < from: Xray Abdomen 2 Views (03.19.22 @ 11:34) >    ACC: [id number] EXAM:  XR ABDOMEN 2V                          PROCEDURE DATE:  03/19/2022          INTERPRETATION:  Supine and erect abdominal films. 2 supine images.   Patient is postoperative for small bowel to colon bypass.    NG tube coiling in stomach and what appears to be a drain in the right   lower quadrant again noted.    There is persistent small bowel distention in the midabdomen    Findings are similar to March 16.    Basilar lung or pleural findings are also unchanged.    IMPRESSION: Persistent small bowel distention and basilar lung or pleural   findings. Incidentally noted is left hip degeneration.    --- End of Report ---            ALDA BORRERO MD; Attending Radiologist  This document has been electronically signed. Mar 19 2022 11:48AM    --------------------------------------------------------------------------------------------------------------------------------------------------------------------    < from: CT Abdomen and Pelvis w/ Oral Cont (03.18.22 @ 17:27) >    ACC: [id number] EXAM:  CT ABDOMEN AND PELVIS OC                          PROCEDURE DATE:  03/18/2022          INTERPRETATION:  CLINICAL INFORMATION: History of small bowel obstruction.    COMPARISON: 03/11/2022.    CONTRAST/COMPLICATIONS:  IV Contrast: NONE  Oral Contrast: Omnipaque 300  Complications: None reported at time of study completion    PROCEDURE:  CT of the Abdomen and Pelvis was performed.  Sagittal and coronal reformats were performed.    FINDINGS:  LOWER CHEST: Mild to moderate bilateral pleural effusions right more than   left with associated compression atelectasis of the dependent lower   lobes. Cardiomegaly. Coronary artery calcification.    LIVER: Within normal limits.  BILE DUCTS: Normal caliber.  GALLBLADDER: Distended.  SPLEEN: Somewhat atrophic.  PANCREAS: Within normal limits.  ADRENALS: Within normal limits.  KIDNEYS/URETERS: Small left renal cysts.    BLADDER: Collapsed around the Kinsey catheter.  REPRODUCTIVE ORGANS: Hysterectomy.    BOWEL: Mild to moderately dilated proximal small bowel measuring up to   3.7 cm. A transition point is seen in the left paracentral region of mid   abdomen (2:98). Enteric contrast does not extend into the distal small   bowel. There is evidence of right lower quadrant postsurgical sutures.   Linear distribution of gas in the right colon may be trapped air rather   than pneumatosis. Postsurgical changes in the right lower quadrant. NG   tube extending into the stomach.  PERITONEUM: No ascites.  VESSELS: Atherosclerotic changes.  RETROPERITONEUM/LYMPH NODES: No lymphadenopathy.  ABDOMINAL WALL: Anasarca. Evidence of a drain extending from the right   lower quadrant into the posterior lower pelvis. Small pockets of air   within the subcutaneous soft tissues of left anterior abdominal wall and   left inguinal region.  BONES: Degenerative changes. Stable compressions of L1 and L2.    IMPRESSION:  Proximal to mid small bowel obstruction with evidence of a transition   point as described above. No evidence of bowel perforation.    Additional findings as above.        --- End of Report ---            MELVA LEE MD; Attending Radiologist  This document has been electronically signed. Mar 18 2022  7:18PM    < end of copied text >   89y Female is under our care for leukocytosis.  Patient was seen sitting comfortably in the chair with no acute distress.  She feels very upset and uncomfortable with the duration of the NG tube.  Patients WBC count is uptrending, however there is no evidence of any infectious process.  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 Jose Neal (Hematologist) for the past 3 years.  She remains afebrile and reports passing flatus.    REVIEW OF SYSTEMS:  [  ] Not able to elicit  General: no fevers no malaise  Chest: no cough no sob  GI: no nvd  : no urinary sxs   Skin: no rashes  Musculoskeletal: no trauma no LBP  Neuro: no ha's no dizziness     MEDS:  aztreonam  IVPB 1000 milliGRAM(s) IV Intermittent every 12 hours  clindamycin IVPB 600 milliGRAM(s) IV Intermittent every 8 hours    ALLERGIES: Allergies    contrast media (iodine-based) (Short breath)  iv  contrast (Unknown)  penicillin (Hives)  penicillin (Rash)  sulfa drugs (Short breath)  Sulfacetamide Sodium (Rash)    Intolerances        VITALS:  Vital Signs Last 24 Hrs  T(C): 36.6 (21 Mar 2022 05:20), Max: 37.1 (20 Mar 2022 21:22)  T(F): 97.9 (21 Mar 2022 05:20), Max: 98.7 (20 Mar 2022 21:22)  HR: 101 (21 Mar 2022 05:20) (84 - 101)  BP: 118/69 (21 Mar 2022 05:20) (91/51 - 125/56)  BP(mean): 73 (20 Mar 2022 21:22) (73 - 73)  RR: 20 (21 Mar 2022 05:20) (16 - 20)  SpO2: 91% (21 Mar 2022 05:20) (90% - 94%)      PHYSICAL EXAM:  HEENT: Right NG  Neck: supple no LN's   Respiratory: lungs clear no rales  Cardiovascular: S1 S2 reg no murmurs  Gastrointestinal: +BS with soft, nondistended abdomen; RUQ tenderness  Extremities: Bilateral pedal edema +1  Skin: no rashes  Ortho: n/a  Neuro: AAO x 3    LABS/DIAGNOSTIC TESTS:                        8.5    28.95 )-----------( 218      ( 21 Mar 2022 06:50 )             26.2     WBC Count: 28.95 K/uL (03-21 @ 06:50)  WBC Count: 26.80 K/uL (03-20 @ 06:57)  WBC Count: 24.86 K/uL (03-19 @ 06:54)  WBC Count: 25.99 K/uL (03-18 @ 05:59)  WBC Count: 21.65 K/uL (03-17 @ 07:04)    03-21    140  |  105  |  12  ----------------------------<  90  3.5   |  27  |  0.50    Ca    8.0<L>      21 Mar 2022 06:50        CULTURES:   .Blood Blood-Peripheral  03-13 @ 08:29   No Growth Final  --  --      Clean Catch Clean Catch (Midstream)  03-13 @ 08:24   No growth  --  --      Clean Catch Clean Catch (Midstream)  03-08 @ 18:19   <10,000 CFU/mL Normal Urogenital Ira  --  --        RADIOLOGY:    < from: Xray Abdomen 2 Views (03.19.22 @ 11:34) >    ACC: [id number] EXAM:  XR ABDOMEN 2V                          PROCEDURE DATE:  03/19/2022          INTERPRETATION:  Supine and erect abdominal films. 2 supine images.   Patient is postoperative for small bowel to colon bypass.    NG tube coiling in stomach and what appears to be a drain in the right   lower quadrant again noted.    There is persistent small bowel distention in the midabdomen    Findings are similar to March 16.    Basilar lung or pleural findings are also unchanged.    IMPRESSION: Persistent small bowel distention and basilar lung or pleural   findings. Incidentally noted is left hip degeneration.    --- End of Report ---            ALDA BORRERO MD; Attending Radiologist  This document has been electronically signed. Mar 19 2022 11:48AM    --------------------------------------------------------------------------------------------------------------------------------------------------------------------    < from: CT Abdomen and Pelvis w/ Oral Cont (03.18.22 @ 17:27) >    ACC: [id number] EXAM:  CT ABDOMEN AND PELVIS OC                          PROCEDURE DATE:  03/18/2022          INTERPRETATION:  CLINICAL INFORMATION: History of small bowel obstruction.    COMPARISON: 03/11/2022.    CONTRAST/COMPLICATIONS:  IV Contrast: NONE  Oral Contrast: Omnipaque 300  Complications: None reported at time of study completion    PROCEDURE:  CT of the Abdomen and Pelvis was performed.  Sagittal and coronal reformats were performed.    FINDINGS:  LOWER CHEST: Mild to moderate bilateral pleural effusions right more than   left with associated compression atelectasis of the dependent lower   lobes. Cardiomegaly. Coronary artery calcification.    LIVER: Within normal limits.  BILE DUCTS: Normal caliber.  GALLBLADDER: Distended.  SPLEEN: Somewhat atrophic.  PANCREAS: Within normal limits.  ADRENALS: Within normal limits.  KIDNEYS/URETERS: Small left renal cysts.    BLADDER: Collapsed around the Kinsey catheter.  REPRODUCTIVE ORGANS: Hysterectomy.    BOWEL: Mild to moderately dilated proximal small bowel measuring up to   3.7 cm. A transition point is seen in the left paracentral region of mid   abdomen (2:98). Enteric contrast does not extend into the distal small   bowel. There is evidence of right lower quadrant postsurgical sutures.   Linear distribution of gas in the right colon may be trapped air rather   than pneumatosis. Postsurgical changes in the right lower quadrant. NG   tube extending into the stomach.  PERITONEUM: No ascites.  VESSELS: Atherosclerotic changes.  RETROPERITONEUM/LYMPH NODES: No lymphadenopathy.  ABDOMINAL WALL: Anasarca. Evidence of a drain extending from the right   lower quadrant into the posterior lower pelvis. Small pockets of air   within the subcutaneous soft tissues of left anterior abdominal wall and   left inguinal region.  BONES: Degenerative changes. Stable compressions of L1 and L2.    IMPRESSION:  Proximal to mid small bowel obstruction with evidence of a transition   point as described above. No evidence of bowel perforation.    Additional findings as above.        --- End of Report ---            MELVA LEE MD; Attending Radiologist  This document has been electronically signed. Mar 18 2022  7:18PM    < end of copied text >

## 2022-03-21 NOTE — PROGRESS NOTE ADULT - ASSESSMENT
A/P    pod#10 s/p lap WILLIAM, ileocolic bypass due to extensive adhesions likely 2/2 radiation tx   - NPO  - NGT for ileus, still significant residual, continue as tolerated  - leukocytosis unlikely from intraabdominal source, plan to consult hem/onc for further w/u; pt has extensive malignancy history with verbal report of bone biopsy to r/o bleeding disorder/leukemia  - ID following    pleural effusion  - likely fluid overload from prior resuscitation  - UO appropriate, lasix prn  - echo with normal LV fxn, pulm htn as per medicine assessment, consider pulm consult  - O2 supp     h/o afib  - AC held due to prior bleeding risk, on hepSQ bid  - on digoxin  - currently rate controlled    Disposition plan  - PT rec JANEEN once med appropriate

## 2022-03-21 NOTE — PROGRESS NOTE ADULT - SUBJECTIVE AND OBJECTIVE BOX
Pt c/o discomfort from NGT  +flatus  denies fever or chills  oob with PT assistance      Vitals:  afeb  mildly tachycardic 90-100s  normotensive  O2sat low 90s on room air, intermittent wheezes    Labs:  wbc 28, h/h stable, otherwise wnl    I/O:  NGT 500cc 24hr, 100cc past 12hr  UO 900cc  WALLACE 195cc serous    Imaging:  CT a/p on 3/18, significant for pleural effusion BL, dilated SB with no obvious TP, no intraabdominal collection  prior echo with normal EF, L atrial dilatation with tricuspid regurg, normal mitral valve  LE duplex negative 3/17

## 2022-03-21 NOTE — PROGRESS NOTE ADULT - SUBJECTIVE AND OBJECTIVE BOX
S: No acute overnight events. Patient reports passing gas today, but no BM.    O:  Vital Signs Last 24 Hrs  T(C): 36.8 (21 Mar 2022 21:34), Max: 36.8 (21 Mar 2022 21:34)  T(F): 98.2 (21 Mar 2022 21:34), Max: 98.2 (21 Mar 2022 21:34)  HR: 92 (21 Mar 2022 21:34) (92 - 101)  BP: 117/57 (21 Mar 2022 21:34) (116/54 - 118/69)  BP(mean): --  RR: 18 (21 Mar 2022 21:34) (16 - 20)  SpO2: 99% (21 Mar 2022 21:34) (91% - 99%)    GENERAL: NAD, well-developed  HEAD:  Atraumatic, Normocephalic  EYES: EOMI, PERRLA, conjunctiva and sclera clear  NECK: Supple, No JVD  CHEST/LUNG: Clear to auscultation bilaterally; No wheeze  HEART: Irregular, No murmurs, rubs, or gallops  ABDOMEN: Soft, Nontender, Nondistended; Bowel sounds present  EXTREMITIES:  2+ Peripheral Pulses, No clubbing, cyanosis, 2+ pitting edema on left, 1+ on right  PSYCH: AAOx3  NEUROLOGY: non-focal  SKIN: No rashes or lesions    chlorhexidine 2% Cloths 1 Application(s) Topical daily  dextrose 5% + sodium chloride 0.45%. 1000 milliLiter(s) IV Continuous <Continuous>  digoxin  Injectable 125 MICROGram(s) IV Push daily  heparin   Injectable 5000 Unit(s) SubCutaneous every 12 hours  mupirocin 2% Nasal 1 Application(s) Both Nostrils every 12 hours  ondansetron Injectable 4 milliGRAM(s) IV Push every 6 hours PRN  pantoprazole  Injectable 40 milliGRAM(s) IV Push daily                            8.5    28.95 )-----------( 218      ( 21 Mar 2022 06:50 )             26.2       03-21    140  |  105  |  12  ----------------------------<  90  3.5   |  27  |  0.50    Ca    8.0<L>      21 Mar 2022 06:50

## 2022-03-21 NOTE — PROGRESS NOTE ADULT - ASSESSMENT
88yo F PMHx of Uterine CA s/p hysterectomy, A. Fib, breast CA s/p b/l mastectomy, recent MDS diagnosis who presented with SBO. Patient went for ex-lap with lysis of adhesions and ileocolic bypass with anastamosis on 3/11/22. Medicine initially following for A. Fib with RVR, re-consulted for bilateral LE edema.    #Small Bowel Obstruction  #Chronic Atrial Fibrillation  #Bilateral LE Edema    - LE Doppler negative on 3/17 for DVT  - likely edema from volume resuscitation  - BP improved today, agree to give lasix 20mg IV, can continue daily as BP allows  - encourage ambulation, when patient in chair or laying in bed, would keep legs elevated  - HR controlled on digoxin

## 2022-03-21 NOTE — CHART NOTE - NSCHARTNOTEFT_GEN_A_CORE
Assessment:   89yFemalePatient is a 89y old  Female who presents with a chief complaint of small bowel obstruction (21 Mar 2022 11:35)      Factors impacting intake: [ ] none [ ] nausea  [ ] vomiting [ ] diarrhea [ ] constipation  [ ]chewing problems [ ] swallowing issues  [x ] other: visited patient in room ,patient is s/p lap WILLIAM, POD #10. remains NPO in-house. suggest To advance diet pending surgical decision.     Diet Presciption: Diet, NPO:   With Ice Chips/Sips of Water (03-15-22 @ 21:24)    Intake: inadequate     Current Weight: none   % Weight Change    Pertinent Medications: MEDICATIONS  (STANDING):  aztreonam  IVPB 1000 milliGRAM(s) IV Intermittent every 12 hours  chlorhexidine 2% Cloths 1 Application(s) Topical daily  clindamycin IVPB 600 milliGRAM(s) IV Intermittent every 8 hours  dextrose 5% + sodium chloride 0.45%. 1000 milliLiter(s) (30 mL/Hr) IV Continuous <Continuous>  digoxin  Injectable 125 MICROGram(s) IV Push daily  furosemide   Injectable 20 milliGRAM(s) IV Push once  heparin   Injectable 5000 Unit(s) SubCutaneous every 12 hours  metoclopramide Injectable 10 milliGRAM(s) IV Push once  mupirocin 2% Nasal 1 Application(s) Both Nostrils every 12 hours  pantoprazole  Injectable 40 milliGRAM(s) IV Push daily    MEDICATIONS  (PRN):  ondansetron Injectable 4 milliGRAM(s) IV Push every 6 hours PRN Nausea and/or Vomiting    Pertinent Labs: 03-21 Na140 mmol/L Glu 90 mg/dL K+ 3.5 mmol/L Cr  0.50 mg/dL BUN 12 mg/dL 03-19 Phos 3.1 mg/dL 03-18 Alb 1.9 g/dL<L>     CAPILLARY BLOOD GLUCOSE        Skin: NO pressure injury     Estimated Needs:   [x ] no change since previous assessment  [ ] recalculated:     Previous Nutrition Diagnosis:   [ ] Inadequate Energy Intake [x ]Inadequate Oral Intake [ ] Excessive Energy Intake   [ ] Underweight [ ] Increased Nutrient Needs [ ] Overweight/Obesity   [ ] Altered GI Function [ ] Unintended Weight Loss [ ] Food & Nutrition Related Knowledge Deficit [ ] Malnutrition     Nutrition Diagnosis is [x ] ongoing  [ ] resolved [ ] not applicable     New Nutrition Diagnosis: [ ] not applicable       Interventions:   Recommend  [ ] Change Diet To:  [ ] Nutrition Supplement  [ ] Nutrition Support  [x ] Other: suggest To advance diet pending surgical decision.       Monitoring and Evaluation:   [ ] PO intake [ x ] Tolerance to diet prescription [ x ] weights [ x ] labs[ x ] follow up per protocol  [ ] other:

## 2022-03-21 NOTE — PROGRESS NOTE ADULT - ASSESSMENT
Low grade fever - resolved  Leukocytosis  ? intraabdominal infection  No evidence of any other infection    Plan: Continue Azactam 1g iv q12  Continue Clindamycin 600mg iv q8  Recommend Heme/Onc consult as WBC count is uptrending and there is no evidence or source of infection at this time                   Low grade fever - resolved  Leukocytosis  No evidence of any infection  Early CMML (as per patient)    Plan: Continue Azactam 1g iv q12  Continue Clindamycin 600mg iv q8  Recommend Heme/Onc consult as WBC count is uptrending and there is no evidence or source of infection at this time and she reports history of CMML.                   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  No evidence of any infection  Early CMML (as per patient)    Plan:   ·	Can DC antibiotics  ·	Recommend Heme/Onc consult as WBC count is uptrending and there is no evidence or source of infection at this time and she reports history of CMML.  ·	Leukocytosis secondary to CMML - patient is cleared by infectious disease to get PICC line for TPN                   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  No evidence of any infection  Early CMML (as per patient)    Plan:   ·	Can DC antibiotics  ·	Recommend Heme/Onc consult as WBC count is uptrending and there is no evidence or source of infection at this time and she reports history of CMML.  ·	Leukocytosis secondary to CMML - patient is cleared by infectious disease to get PICC line for TPN    I agree with above

## 2022-03-22 NOTE — PROGRESS NOTE ADULT - NS ATTEND AMEND GEN_ALL_CORE FT
No N/V, tolerating clear liquids, +flatus, no BM.     Plan:  Advance to full liquid diet tomorrow if no N/V  D/c Kinsey tomorrow  Continue Lasix 20 mg oral daily

## 2022-03-22 NOTE — PROGRESS NOTE ADULT - SUBJECTIVE AND OBJECTIVE BOX
INTERVAL HPI/OVERNIGHT EVENTS:    No acute events overnight.   Pt resting comfortably. No acute complaints.   ngt removed yesterday  + flatus   Denies N/V    MEDICATIONS  (STANDING):  chlorhexidine 2% Cloths 1 Application(s) Topical daily  dextrose 5% + sodium chloride 0.45%. 1000 milliLiter(s) (30 mL/Hr) IV Continuous <Continuous>  digoxin  Injectable 125 MICROGram(s) IV Push daily  heparin   Injectable 5000 Unit(s) SubCutaneous every 12 hours  mupirocin 2% Nasal 1 Application(s) Both Nostrils every 12 hours  pantoprazole  Injectable 40 milliGRAM(s) IV Push daily    MEDICATIONS  (PRN):  ondansetron Injectable 4 milliGRAM(s) IV Push every 6 hours PRN Nausea and/or Vomiting      Vital Signs Last 24 Hrs  T(C): 36.6 (22 Mar 2022 05:41), Max: 36.8 (21 Mar 2022 21:34)  T(F): 97.8 (22 Mar 2022 05:41), Max: 98.2 (21 Mar 2022 21:34)  HR: 98 (22 Mar 2022 05:41) (92 - 98)  BP: 123/59 (22 Mar 2022 05:41) (116/54 - 123/59)  BP(mean): --  RR: 18 (22 Mar 2022 05:41) (16 - 18)  SpO2: 96% (22 Mar 2022 05:41) (96% - 99%)      PHYSICAL EXAM  General: Alert and oriented, not in acute distress  Resp: Breathing unlabored  Abdomen: Soft, nondistended, nontender, dressing cdi, WALLACE serosang  : No wilson catheter, no dysuria or hematuria  Extremities: No pedal edema    I&O's Detail    21 Mar 2022 07:01  -  22 Mar 2022 07:00  --------------------------------------------------------  IN:  Total IN: 0 mL    OUT:    Bulb (mL): 85 mL    Indwelling Catheter - Urethral (mL): 1350 mL  Total OUT: 1435 mL    Total NET: -1435 mL          LABS:                        8.0    27.33 )-----------( 217      ( 22 Mar 2022 06:22 )             25.1             03-22    141  |  106  |  12  ----------------------------<  88  3.5   |  28  |  0.57    Ca    7.8<L>      22 Mar 2022 06:22  Phos  2.9     03-22  Mg     1.6     03-22    TPro  5.1<L>  /  Alb  1.7<L>  /  TBili  0.5  /  DBili  x   /  AST  21  /  ALT  10  /  AlkPhos  67  03-22

## 2022-03-22 NOTE — PROGRESS NOTE ADULT - SUBJECTIVE AND OBJECTIVE BOX
NICK OBRIEN  89y  Female      Patient is a 89y old  Female who presents with a chief complaint of small bowel obstruction (22 Mar 2022 09:01)    CHIEF COMPLAINT & BRIEF HOSPITAL COURSE:  89 y/o female with PMH of AFIB not on Eliquis due to easy bleeding, uterine cancer s/p hysterectomy, breast cancer s/p bilateral mastectomy and recent dx of myelodysplastic syndrome. She was admitted to the hospital March 8, 2022 with a chief complaint of abdominal pain. She had surgery for small bowel obstruction on 3-. It was done laparoscopically with EBL 0f 20cc, and a drain was left. There was lysis of adhesions and a ileocolic bypass. Patient was treated with antibiotics for RLL pneumonia.     INTERVAL HPI/OVERNIGHT EVENTS: Patient is c/o b/l LE edema, denies any pain or discomfort, sat well on RA, no sob or O2 requirements.     PAST MEDICAL/SURGICAL HISTORY  PAST MEDICAL & SURGICAL HISTORY:  Breast cancer    Uterine cancer    Atrial fibrillation    Breast cancer  s/p chemo and mammogram    Uterine cancer  s/p radiation and hysterectomy    Afib    H/O mastectomy    H/O: hysterectomy    S/P bilateral mastectomy    History of hysterectomy        REVIEW OF SYSTEMS:  CONSTITUTIONAL: No fever, weight loss, or fatigue  EYES: No eye pain, visual disturbances, or discharge  ENMT:  No difficulty hearing, tinnitus, vertigo; No sinus or throat pain  NECK: No pain or stiffness  BREASTS: No pain, masses, or nipple discharge  RESPIRATORY: No cough, wheezing, chills or hemoptysis; No shortness of breath  CARDIOVASCULAR: No chest pain, palpitations, dizziness, or leg swelling  GASTROINTESTINAL: No abdominal or epigastric pain. No nausea, vomiting, or hematemesis; No diarrhea or constipation. No melena or hematochezia.  GENITOURINARY: No dysuria, frequency, hematuria, or incontinence  NEUROLOGICAL: No headaches, memory loss, loss of strength, numbness, or tremors  SKIN: No itching, burning, rashes, or lesions   LYMPH NODES: No enlarged glands  ENDOCRINE: No heat or cold intolerance; No hair loss  MUSCULOSKELETAL: No joint pain or swelling; No muscle, back, or extremity pain  PSYCHIATRIC: No depression, anxiety, mood swings, or difficulty sleeping  HEME/LYMPH: No easy bruising, or bleeding gums  ALLERY AND IMMUNOLOGIC: No hives or eczema    T(C): 36.6 (03-22-22 @ 05:41), Max: 36.8 (03-21-22 @ 21:34)  HR: 98 (03-22-22 @ 05:41) (92 - 98)  BP: 123/59 (03-22-22 @ 05:41) (116/54 - 123/59)  RR: 18 (03-22-22 @ 05:41) (16 - 18)  SpO2: 96% (03-22-22 @ 05:41) (96% - 99%)  Wt(kg): --Vital Signs Last 24 Hrs  T(C): 36.6 (22 Mar 2022 05:41), Max: 36.8 (21 Mar 2022 21:34)  T(F): 97.8 (22 Mar 2022 05:41), Max: 98.2 (21 Mar 2022 21:34)  HR: 98 (22 Mar 2022 05:41) (92 - 98)  BP: 123/59 (22 Mar 2022 05:41) (116/54 - 123/59)  BP(mean): --  RR: 18 (22 Mar 2022 05:41) (16 - 18)  SpO2: 96% (22 Mar 2022 05:41) (96% - 99%)    PHYSICAL EXAM:  GENERAL: NAD, well-groomed, well-developed  HEAD:  Atraumatic, Normocephalic  EYES: EOMI, PERRLA, conjunctiva and sclera clear  ENMT: No tonsillar erythema, exudates, or enlargement; Moist mucous membranes, Good dentition, No lesions  NECK: Supple, No JVD, Normal thyroid  NERVOUS SYSTEM:  Alert & Oriented X3, Good concentration; Motor Strength 5/5 B/L upper and lower extremities; DTRs 2+ intact and symmetric  CHEST/LUNG: Clear to percussion bilaterally; No rales, rhonchi, wheezing, or rubs  HEART: Regular rate and rhythm; No murmurs, rubs, or gallops  ABDOMEN: Soft, Nontender, Nondistended; Bowel sounds present  EXTREMITIES:  2+ Peripheral Pulses, No clubbing, cyanosis, or edema  LYMPH: No lymphadenopathy noted  SKIN: No rashes or lesions    Consultant(s) Notes Reviewed:  [x ] YES  [ ] NO  Care Discussed with Consultants/Other Providers [ x] YES  [ ] NO    LABS:  CBC   03-22-22 @ 06:22  Hematcorit 25.1  Hemoglobin 8.0  Mean Cell Hemoglobin 28.7  Platelet Count-Automated 217  RBC Count 2.79  Red Cell Distrib Width 17.9  Wbc Count 27.33      BMP  03-22-22 @ 06:22  Anion Gap. Serum 7  Blood Urea Nitrogen,Serm 12  Calcium, Total Serum 7.8  Carbon Dioxide, Serum 28  Chloride, Serum 106  Creatinine, Serum 0.57  eGFR in  --  eGFR in Non Afican American --  Gloucose, serum 88  Potassium, Serum 3.5  Sodium, Serum 141              03-21-22 @ 06:50  Anion Gap. Serum 8  Blood Urea Nitrogen,Serm 12  Calcium, Total Serum 8.0  Carbon Dioxide, Serum 27  Chloride, Serum 105  Creatinine, Serum 0.50  eGFR in  --  eGFR in Non Afican American --  Gloucose, serum 90  Potassium, Serum 3.5  Sodium, Serum 140              03-20-22 @ 06:57  Anion Gap. Serum 7  Blood Urea Nitrogen,Serm 13  Calcium, Total Serum 8.2  Carbon Dioxide, Serum 27  Chloride, Serum 105  Creatinine, Serum 0.54  eGFR in  --  eGFR in Non Afican American --  Gloucose, serum 96  Potassium, Serum 3.5  Sodium, Serum 139                  CMP  03-22-22 @ 06:22  Kiya Aminotransferase(ALT/SGPT)10  Albumin, Serum 1.7  Alkaline Phosphatase, Serum 67  Anion Gap, Serum 7  Aspartate Aminotransferase (AST/SGOT)21  Bilirubin Total, Serum 0.5  Blood Urea Nitrogen, Serum 12  Calcium,Total Serum 7.8  Carbon Dioxide, Serum 28  Chloride, Serum 106  Creatinine, Serum 0.57  eGFR if  --  eGFR if Non African American --  Glucose, Serum 88  Potassium, Serum 3.5  Protein Total, Serum 5.1  Sodium, Serum 141                      03-21-22 @ 06:50  Kiya Aminotransferase(ALT/SGPT)--  Albumin, Serum --  Alkaline Phosphatase, Serum --  Anion Gap, Serum 8  Aspartate Aminotransferase (AST/SGOT)--  Bilirubin Total, Serum --  Blood Urea Nitrogen, Serum 12  Calcium,Total Serum 8.0  Carbon Dioxide, Serum 27  Chloride, Serum 105  Creatinine, Serum 0.50  eGFR if  --  eGFR if Non African American --  Glucose, Serum 90  Potassium, Serum 3.5  Protein Total, Serum --  Sodium, Serum 140                      03-20-22 @ 06:57  Kiya Aminotransferase(ALT/SGPT)--  Albumin, Serum --  Alkaline Phosphatase, Serum --  Anion Gap, Serum 7  Aspartate Aminotransferase (AST/SGOT)--  Bilirubin Total, Serum --  Blood Urea Nitrogen, Serum 13  Calcium,Total Serum 8.2  Carbon Dioxide, Serum 27  Chloride, Serum 105  Creatinine, Serum 0.54  eGFR if  --  eGFR if Non African American --  Glucose, Serum 96  Potassium, Serum 3.5  Protein Total, Serum --  Sodium, Serum 139                          PT/INR      Amylase/Lipase            RADIOLOGY & ADDITIONAL TESTS:    Imaging Personally Reviewed:  [ ] YES  [ ] NO JAMEE OBRIENLLE  89y  Female      Patient is a 89y old  Female who presents with a chief complaint of small bowel obstruction (22 Mar 2022 09:01)    CHIEF COMPLAINT & BRIEF HOSPITAL COURSE:  87 y/o female with PMH of AFIB not on Eliquis due to easy bleeding, uterine cancer s/p hysterectomy, breast cancer s/p bilateral mastectomy and recent dx of myelodysplastic syndrome. She was admitted to the hospital March 8, 2022 with a chief complaint of abdominal pain. She had surgery for small bowel obstruction on 3-. It was done laparoscopically with EBL 0f 20cc, and a drain was left. There was lysis of adhesions and a ileocolic bypass. Patient was treated with antibiotics for RLL pneumonia.     INTERVAL HPI/OVERNIGHT EVENTS: Patient feels better today a dn has reduced  b/l LE edema, denies any pain or discomfort, sat well on RA, no sob or O2 requirements.     PAST MEDICAL/SURGICAL HISTORY  PAST MEDICAL & SURGICAL HISTORY:  Breast cancer    Uterine cancer    Atrial fibrillation    Breast cancer  s/p chemo and mammogram    Uterine cancer  s/p radiation and hysterectomy    Afib    H/O mastectomy    H/O: hysterectomy    S/P bilateral mastectomy    History of hysterectomy        T(C): 36.6 (03-22-22 @ 05:41), Max: 36.8 (03-21-22 @ 21:34)  HR: 98 (03-22-22 @ 05:41) (92 - 98)  BP: 123/59 (03-22-22 @ 05:41) (116/54 - 123/59)  RR: 18 (03-22-22 @ 05:41) (16 - 18)  SpO2: 96% (03-22-22 @ 05:41) (96% - 99%)  Wt(kg): --Vital Signs Last 24 Hrs  T(C): 36.6 (22 Mar 2022 05:41), Max: 36.8 (21 Mar 2022 21:34)  T(F): 97.8 (22 Mar 2022 05:41), Max: 98.2 (21 Mar 2022 21:34)  HR: 98 (22 Mar 2022 05:41) (92 - 98)  BP: 123/59 (22 Mar 2022 05:41) (116/54 - 123/59)  BP(mean): --  RR: 18 (22 Mar 2022 05:41) (16 - 18)  SpO2: 96% (22 Mar 2022 05:41) (96% - 99%)    PHYSICAL EXAM:  GENERAL: NAD, well-groomed, well-developed  HEAD:  Atraumatic, Normocephalic  EYES: EOMI, PERRLA, conjunctiva and sclera clear  NECK: Supple, No JVD, Normal thyroid  NERVOUS SYSTEM:  Alert & Oriented ,   CHEST/LUNG:  No rales, rhonchi, wheezing, or rubs  HEART: s1,s2, No murmurs, rubs, or gallops  ABDOMEN: Soft, Nontender, Nondistended; Bowel sounds present  EXTREMITIES:  2+ Peripheral Pulses, No clubbing, cyanosis, or edema  LYMPH: No lymphadenopathy noted  SKIN: No rashes or lesions    Consultant(s) Notes Reviewed:  [x ] YES  [ ] NO  Care Discussed with Consultants/Other Providers [ x] YES  [ ] NO    LABS:  CBC   03-22-22 @ 06:22  Hematcorit 25.1  Hemoglobin 8.0  Mean Cell Hemoglobin 28.7  Platelet Count-Automated 217  RBC Count 2.79  Red Cell Distrib Width 17.9  Wbc Count 27.33      BMP  03-22-22 @ 06:22  Anion Gap. Serum 7  Blood Urea Nitrogen,Serm 12  Calcium, Total Serum 7.8  Carbon Dioxide, Serum 28  Chloride, Serum 106  Creatinine, Serum 0.57  eGFR in  --  eGFR in Non Afican American --  Gloucose, serum 88  Potassium, Serum 3.5  Sodium, Serum 141              03-21-22 @ 06:50  Anion Gap. Serum 8  Blood Urea Nitrogen,Serm 12  Calcium, Total Serum 8.0  Carbon Dioxide, Serum 27  Chloride, Serum 105  Creatinine, Serum 0.50  eGFR in  --  eGFR in Non Afican American --  Gloucose, serum 90  Potassium, Serum 3.5  Sodium, Serum 140              03-20-22 @ 06:57  Anion Gap. Serum 7  Blood Urea Nitrogen,Serm 13  Calcium, Total Serum 8.2  Carbon Dioxide, Serum 27  Chloride, Serum 105  Creatinine, Serum 0.54  eGFR in  --  eGFR in Non Afican American --  Gloucose, serum 96  Potassium, Serum 3.5  Sodium, Serum 139                  CMP  03-22-22 @ 06:22  Kiya Aminotransferase(ALT/SGPT)10  Albumin, Serum 1.7  Alkaline Phosphatase, Serum 67  Anion Gap, Serum 7  Aspartate Aminotransferase (AST/SGOT)21  Bilirubin Total, Serum 0.5  Blood Urea Nitrogen, Serum 12  Calcium,Total Serum 7.8  Carbon Dioxide, Serum 28  Chloride, Serum 106  Creatinine, Serum 0.57  eGFR if  --  eGFR if Non African American --  Glucose, Serum 88  Potassium, Serum 3.5  Protein Total, Serum 5.1  Sodium, Serum 141                      03-21-22 @ 06:50  Kiya Aminotransferase(ALT/SGPT)--  Albumin, Serum --  Alkaline Phosphatase, Serum --  Anion Gap, Serum 8  Aspartate Aminotransferase (AST/SGOT)--  Bilirubin Total, Serum --  Blood Urea Nitrogen, Serum 12  Calcium,Total Serum 8.0  Carbon Dioxide, Serum 27  Chloride, Serum 105  Creatinine, Serum 0.50  eGFR if  --  eGFR if Non African American --  Glucose, Serum 90  Potassium, Serum 3.5  Protein Total, Serum --  Sodium, Serum 140                      03-20-22 @ 06:57  Kiya Aminotransferase(ALT/SGPT)--  Albumin, Serum --  Alkaline Phosphatase, Serum --  Anion Gap, Serum 7  Aspartate Aminotransferase (AST/SGOT)--  Bilirubin Total, Serum --  Blood Urea Nitrogen, Serum 13  Calcium,Total Serum 8.2  Carbon Dioxide, Serum 27  Chloride, Serum 105  Creatinine, Serum 0.54  eGFR if  --  eGFR if Non African American --  Glucose, Serum 96  Potassium, Serum 3.5  Protein Total, Serum --  Sodium, Serum 139                          PT/INR      Amylase/Lipase

## 2022-03-22 NOTE — PROGRESS NOTE ADULT - ASSESSMENT
89yo F PMHx of Uterine CA s/p hysterectomy, A. Fib, breast CA s/p b/l mastectomy, recent MDS diagnosis who presented with SBO. Patient went for ex-lap with lysis of adhesions and ileocolic bypass with anastamosis on 3/11/22. Medicine initially following for A. Fib with RVR, re-consulted for bilateral LE edema.    #Chronic Atrial Fibrillation  - AC held due to prior bleeding risk, on hepSQ bid  - on digoxin and metoprolol  - currently rate controlled    #Bilateral LE Edema  - LE Doppler negative on 3/17 for DVT  - likely edema from volume resuscitation s/p 20 lasix 3/21, strict i/o  - can continue lasix daily as BP allows  - encourage ambulation, when patient in chair or laying in bed,  -  keep legs elevated    # Small Bowel Obstruction s/p ileocolic bypass  - adv diet as per surgery  - resumed anastrozole    # pleural effusion r>l  - likely fluid overload from prior resuscitation, now asympstomatic   -  lasix prn  - echo showed dilated atria bilaterally, severe pulm htn,   - pt will need cardio / pulmonary outpt f/u   - O2 supp     Plan discussed with attending Dr. Ewing.  89yo F PMHx of Uterine CA s/p hysterectomy, A. Fib, breast CA s/p b/l mastectomy, recent MDS diagnosis who presented with SBO. Patient went for ex-lap with lysis of adhesions and ileocolic bypass with anastamosis on 3/11/22. Medicine initially following for A. Fib with RVR, re-consulted for bilateral LE edema.    #Chronic Atrial Fibrillation  - AC held due to prior bleeding risk, on hepSQ bid  - on digoxin and metoprolol  - currently rate controlled    #Bilateral LE Edema  - LE Doppler negative on 3/17 for DVT  - likely edema from volume resuscitation s/p 20 lasix 3/21, 3/22, strict i/o, edema improved  - encourage ambulation, when patient in chair or laying in bed,  -  keep legs elevated    # Small Bowel Obstruction s/p ileocolic bypass  - adv diet as per surgery  - resumed anastrozole    # pleural effusion r>l  - likely fluid overload from prior resuscitation, now asympstomatic   -  lasix prn  - echo showed dilated atria bilaterally, severe pulm htn,   - pt will need cardio / pulmonary outpt f/u   - O2 supp     Plan discussed with attending Dr. Ewing.

## 2022-03-22 NOTE — PROGRESS NOTE ADULT - ASSESSMENT
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

## 2022-03-22 NOTE — PROGRESS NOTE ADULT - ASSESSMENT
89 yoF pod#10 s/p lap WILLIAM, ileocolic bypass due to extensive adhesions likely 2/2 radiation tx     leukocytosis d/t CMML, follows outpt hem/onc, Dr. Wilde; abx d/c'ed  NGT removed, no n/v  UO appropriate s/p lasix IV 20mg yesterday, no evidence of resp congestion  afeb, vss    1. s/p ileocolic bypass  - adv to clears    2. pleural effusion  - likely fluid overload from prior resuscitation, now asymptomatic   - UO appropriate, lasix prn  - echo with normal LV fxn, pulm htn as per medicine assessment, consider pulm consult  - O2 supp     3. h/o afib  - AC held due to prior bleeding risk, on hepSQ bid  - on digoxin  - currently rate controlled    4. Disposition plan  - PT rec JANEEN once med appropriate   89 yoF pod#10 s/p lap WILLIAM, ileocolic bypass due to extensive adhesions likely 2/2 radiation tx     leukocytosis d/t CMML, follows outpt hem/onc, Dr. Wilde; abx d/c'ed  NGT removed, no n/v  UO appropriate s/p lasix IV 20mg yesterday, no evidence of resp congestion  afeb, vss    1. s/p ileocolic bypass  - adv to clears  - resume anastrozole/metop with parameter    2. pleural effusion  - likely fluid overload from prior resuscitation, now asympstomatic   - UO appropriate, lasix prn  - echo with normal LV fxn, pulm htn as per medicine assessment, consider pulm consult  - O2 supp     3. h/o afib  - AC held due to prior bleeding risk, on hepSQ bid  - on digoxin  - currently rate controlled    4. Disposition plan  - PT rec JANEEN once med appropriate

## 2022-03-22 NOTE — PROGRESS NOTE ADULT - SUBJECTIVE AND OBJECTIVE BOX
89y Female is under our care for     REVIEW OF SYSTEMS:  [  ] Not able to elicit  General:	  Chest:	  GI:	  :  Skin:	  Musculoskeletal:	  Neuro:	    MEDS:    ALLERGIES: Allergies    contrast media (iodine-based) (Short breath)  iv  contrast (Unknown)  penicillin (Hives)  penicillin (Rash)  sulfa drugs (Short breath)  Sulfacetamide Sodium (Rash)    Intolerances        VITALS:  Vital Signs Last 24 Hrs  T(C): 36.6 (22 Mar 2022 05:41), Max: 36.8 (21 Mar 2022 21:34)  T(F): 97.8 (22 Mar 2022 05:41), Max: 98.2 (21 Mar 2022 21:34)  HR: 98 (22 Mar 2022 05:41) (92 - 98)  BP: 123/59 (22 Mar 2022 05:41) (116/54 - 123/59)  BP(mean): --  RR: 18 (22 Mar 2022 05:41) (16 - 18)  SpO2: 96% (22 Mar 2022 05:41) (96% - 99%)      PHYSICAL EXAM:  HEENT:  Neck:  Respiratory:  Cardiovascular:  Gastrointestinal:  Extremities:  Skin:  Ortho:  Neuro:    LABS/DIAGNOSTIC TESTS:                        8.0    27.33 )-----------( 217      ( 22 Mar 2022 06:22 )             25.1     WBC Count: 27.33 K/uL (03-22 @ 06:22)  WBC Count: 28.95 K/uL (03-21 @ 06:50)  WBC Count: 26.80 K/uL (03-20 @ 06:57)  WBC Count: 24.86 K/uL (03-19 @ 06:54)  WBC Count: 25.99 K/uL (03-18 @ 05:59)    03-22    141  |  106  |  12  ----------------------------<  88  3.5   |  28  |  0.57    Ca    7.8<L>      22 Mar 2022 06:22  Phos  2.9     03-22  Mg     1.6     03-22    TPro  5.1<L>  /  Alb  1.7<L>  /  TBili  0.5  /  DBili  x   /  AST  21  /  ALT  10  /  AlkPhos  67  03-22      CULTURES:   .Blood Blood-Peripheral  03-13 @ 08:29   No Growth Final  --  --      Clean Catch Clean Catch (Midstream)  03-13 @ 08:24   No growth  --  --      Clean Catch Clean Catch (Midstream)  03-08 @ 18:19   <10,000 CFU/mL Normal Urogenital Ira  --  --        RADIOLOGY:  no new studies 89y Female is under our care for leukocytosis.  Patient was seen sitting comfortably in the chair with no acute distress.  Patients NG tube was removed last night, and diet was advanced to clears.  Patient was seen drinking fluids, and denies any nausea or increased abdominal pain.  She reports passing flatus and claims that she feels the urge to have a bowel movement.     REVIEW OF SYSTEMS:  [  ] Not able to elicit  General: no fevers no malaise  Chest: no cough no sob  GI: no nvd  : no urinary sxs   Skin: no rashes  Musculoskeletal: no trauma no LBP  Neuro: no ha's no dizziness     MEDS:    ALLERGIES: Allergies    contrast media (iodine-based) (Short breath)  iv  contrast (Unknown)  penicillin (Hives)  penicillin (Rash)  sulfa drugs (Short breath)  Sulfacetamide Sodium (Rash)    Intolerances        VITALS:  Vital Signs Last 24 Hrs  T(C): 36.6 (22 Mar 2022 05:41), Max: 36.8 (21 Mar 2022 21:34)  T(F): 97.8 (22 Mar 2022 05:41), Max: 98.2 (21 Mar 2022 21:34)  HR: 98 (22 Mar 2022 05:41) (92 - 98)  BP: 123/59 (22 Mar 2022 05:41) (116/54 - 123/59)  BP(mean): --  RR: 18 (22 Mar 2022 05:41) (16 - 18)  SpO2: 96% (22 Mar 2022 05:41) (96% - 99%)      PHYSICAL EXAM:  HEENT: Right NG  Neck: supple no LN's   Respiratory: lungs clear no rales  Cardiovascular: S1 S2 reg no murmurs  Gastrointestinal: +BS with soft, nondistended abdomen; mild RUQ tenderness on scope site  Extremities: Bilateral pedal edema +1  Skin: no rashes  Ortho: n/a  Neuro: AAO x 3    LABS/DIAGNOSTIC TESTS:                        8.0    27.33 )-----------( 217      ( 22 Mar 2022 06:22 )             25.1     WBC Count: 27.33 K/uL (03-22 @ 06:22)  WBC Count: 28.95 K/uL (03-21 @ 06:50)  WBC Count: 26.80 K/uL (03-20 @ 06:57)  WBC Count: 24.86 K/uL (03-19 @ 06:54)  WBC Count: 25.99 K/uL (03-18 @ 05:59)    03-22    141  |  106  |  12  ----------------------------<  88  3.5   |  28  |  0.57    Ca    7.8<L>      22 Mar 2022 06:22  Phos  2.9     03-22  Mg     1.6     03-22    TPro  5.1<L>  /  Alb  1.7<L>  /  TBili  0.5  /  DBili  x   /  AST  21  /  ALT  10  /  AlkPhos  67  03-22      CULTURES:   .Blood Blood-Peripheral  03-13 @ 08:29   No Growth Final  --  --      Clean Catch Clean Catch (Midstream)  03-13 @ 08:24   No growth  --  --      Clean Catch Clean Catch (Midstream)  03-08 @ 18:19   <10,000 CFU/mL Normal Urogenital Ira  --  --        RADIOLOGY:  no new studies

## 2022-03-23 NOTE — PROGRESS NOTE ADULT - ASSESSMENT
89yo F PMHx of Uterine CA s/p hysterectomy, A. Fib, breast CA s/p b/l mastectomy, recent MDS diagnosis who presented with SBO. Patient went for ex-lap with lysis of adhesions and ileocolic bypass with anastomosis on 3/11/22. Medicine initially following for A. Fib with RVR, re-consulted for bilateral LE edema.    #Bilateral LE Edema  - LE Doppler negative on 3/17 for DVT  - edema improved s/p lasix x 2 days  -monitor for now clinically for any need of further diuresis  - encourage ambulation,  -  keep legs elevated      #elevated wbc  in setting of hx of CMML  heamonc f/u    #Chronic Atrial Fibrillation  - AC held due to prior bleeding risk, on hepSQ bid  - on digoxin and metoprolol  - currently rate controlled    #Bilateral LE Edema  - LE Doppler negative on 3/17 for DVT  - edema improved s/p lasix x 2 days  -monitor for now clinically for any need of further diuresis  - encourage ambulation,  -  keep legs elevated    # Small Bowel Obstruction s/p ileocolic bypass  - adv diet as per surgery  - resumed anastrozole    # pleural effusion r>l  - likely fluid overload from prior resuscitation, now asymptomatic   - echo showed dilated atria bilaterally, severe pulm htn,   - pt will need cardio / pulmonary outpt f/u   - O2 supp     Plan discussed with attending Dr. Ewing 88yo F PMHx of Uterine CA s/p hysterectomy, A. Fib, breast CA s/p b/l mastectomy, recent MDS diagnosis who presented with SBO. Patient went for ex-lap with lysis of adhesions and ileocolic bypass with anastomosis on 3/11/22. Medicine initially following for A. Fib with RVR, re-consulted for bilateral LE edema.    #Bilateral LE Edema  - LE Doppler negative on 3/17 for DVT  - edema improved s/p lasix x 2 days  -monitor for now clinically for any need of further diuresis  - encourage ambulation,  -  keep legs elevated      #elevated wbc  in setting of hx of CMML  heamonc f/u  abx d/c    #Chronic Atrial Fibrillation  - AC held due to prior bleeding risk, on hepSQ bid  - on digoxin and metoprolol  - currently rate controlled    # Small Bowel Obstruction s/p ileocolic bypass  - adv diet as per surgery  - resumed anastrozole    # pleural effusion r>l  - likely fluid overload from prior resuscitation, now asymptomatic   - echo showed dilated atria bilaterally, severe pulm htn,   - pt will need cardio / pulmonary outpt f/u   - O2 supp     Plan discussed with attending Dr. Ewing

## 2022-03-23 NOTE — PROGRESS NOTE ADULT - NS ATTEND AMEND GEN_ALL_CORE FT
Tolerating liquids, no N/V. C/o some increase in abdominal pain, on exam - abdomen soft, non-distended, minimal diffuse tenderness.     Plan:  d/c Kinsey and drain  Continue liquid diet

## 2022-03-23 NOTE — PROGRESS NOTE ADULT - SUBJECTIVE AND OBJECTIVE BOX
NICK OBRIEN  89y  Female      Patient is a 89y old  Female who presents with a chief complaint of small bowel obstruction (23 Mar 2022 09:32)        PAST MEDICAL/SURGICAL HISTORY  PAST MEDICAL & SURGICAL HISTORY:  Breast cancer    Uterine cancer    Atrial fibrillation    Breast cancer  s/p chemo and mammogram    Uterine cancer  s/p radiation and hysterectomy    Afib    H/O mastectomy    H/O: hysterectomy    S/P bilateral mastectomy    History of hysterectomy        REVIEW OF SYSTEMS:  CONSTITUTIONAL: No fever, weight loss, or fatigue  EYES: No eye pain, visual disturbances, or discharge  ENMT:  No difficulty hearing, tinnitus, vertigo; No sinus or throat pain  NECK: No pain or stiffness  BREASTS: No pain, masses, or nipple discharge  RESPIRATORY: No cough, wheezing, chills or hemoptysis; No shortness of breath  CARDIOVASCULAR: No chest pain, palpitations, dizziness, or leg swelling  GASTROINTESTINAL: No abdominal or epigastric pain. No nausea, vomiting, or hematemesis; No diarrhea or constipation. No melena or hematochezia.  GENITOURINARY: No dysuria, frequency, hematuria, or incontinence  NEUROLOGICAL: No headaches, memory loss, loss of strength, numbness, or tremors  SKIN: No itching, burning, rashes, or lesions   LYMPH NODES: No enlarged glands  ENDOCRINE: No heat or cold intolerance; No hair loss  MUSCULOSKELETAL: No joint pain or swelling; No muscle, back, or extremity pain  PSYCHIATRIC: No depression, anxiety, mood swings, or difficulty sleeping  HEME/LYMPH: No easy bruising, or bleeding gums  ALLERY AND IMMUNOLOGIC: No hives or eczema    T(C): 36.9 (03-23-22 @ 05:24), Max: 36.9 (03-23-22 @ 05:24)  HR: 83 (03-23-22 @ 05:24) (83 - 102)  BP: 120/56 (03-23-22 @ 05:24) (97/55 - 122/63)  RR: 20 (03-23-22 @ 05:24) (17 - 20)  SpO2: 94% (03-23-22 @ 05:24) (91% - 94%)  Wt(kg): --Vital Signs Last 24 Hrs  T(C): 36.9 (23 Mar 2022 05:24), Max: 36.9 (23 Mar 2022 05:24)  T(F): 98.4 (23 Mar 2022 05:24), Max: 98.4 (23 Mar 2022 05:24)  HR: 83 (23 Mar 2022 05:24) (83 - 102)  BP: 120/56 (23 Mar 2022 05:24) (97/55 - 122/63)  BP(mean): 70 (23 Mar 2022 05:24) (65 - 70)  RR: 20 (23 Mar 2022 05:24) (17 - 20)  SpO2: 94% (23 Mar 2022 05:24) (91% - 94%)    PHYSICAL EXAM:  GENERAL: NAD, well-groomed, well-developed  HEAD:  Atraumatic, Normocephalic  EYES: EOMI, PERRLA, conjunctiva and sclera clear  ENMT: No tonsillar erythema, exudates, or enlargement; Moist mucous membranes, Good dentition, No lesions  NECK: Supple, No JVD, Normal thyroid  NERVOUS SYSTEM:  Alert & Oriented X3, Good concentration; Motor Strength 5/5 B/L upper and lower extremities; DTRs 2+ intact and symmetric  CHEST/LUNG: Clear to percussion bilaterally; No rales, rhonchi, wheezing, or rubs  HEART: Regular rate and rhythm; No murmurs, rubs, or gallops  ABDOMEN: Soft, Nontender, Nondistended; Bowel sounds present  EXTREMITIES:  2+ Peripheral Pulses, No clubbing, cyanosis, or edema  LYMPH: No lymphadenopathy noted  SKIN: No rashes or lesions    Consultant(s) Notes Reviewed:  [x ] YES  [ ] NO  Care Discussed with Consultants/Other Providers [ x] YES  [ ] NO    LABS:  CBC   03-23-22 @ 06:48  Hematcorit 26.9  Hemoglobin 8.3  Mean Cell Hemoglobin 28.2  Platelet Count-Automated 238  RBC Count 2.94  Red Cell Distrib Width 18.4  Wbc Count 29.49      BMP  03-23-22 @ 06:48  Anion Gap. Serum 4  Blood Urea Nitrogen,Serm 14  Calcium, Total Serum 8.4  Carbon Dioxide, Serum 29  Chloride, Serum 104  Creatinine, Serum 0.66  eGFR in  --  eGFR in Non Afican American --  Gloucose, serum 87  Potassium, Serum 3.6  Sodium, Serum 137              03-22-22 @ 06:22  Anion Gap. Serum 7  Blood Urea Nitrogen,Serm 12  Calcium, Total Serum 7.8  Carbon Dioxide, Serum 28  Chloride, Serum 106  Creatinine, Serum 0.57  eGFR in  --  eGFR in Non Afican American --  Gloucose, serum 88  Potassium, Serum 3.5  Sodium, Serum 141              03-21-22 @ 06:50  Anion Gap. Serum 8  Blood Urea Nitrogen,Serm 12  Calcium, Total Serum 8.0  Carbon Dioxide, Serum 27  Chloride, Serum 105  Creatinine, Serum 0.50  eGFR in  --  eGFR in Non Afican American --  Gloucose, serum 90  Potassium, Serum 3.5  Sodium, Serum 140                  CMP  03-23-22 @ 06:48  Kiya Aminotransferase(ALT/SGPT)--  Albumin, Serum --  Alkaline Phosphatase, Serum --  Anion Gap, Serum 4  Aspartate Aminotransferase (AST/SGOT)--  Bilirubin Total, Serum --  Blood Urea Nitrogen, Serum 14  Calcium,Total Serum 8.4  Carbon Dioxide, Serum 29  Chloride, Serum 104  Creatinine, Serum 0.66  eGFR if  --  eGFR if Non African American --  Glucose, Serum 87  Potassium, Serum 3.6  Protein Total, Serum --  Sodium, Serum 137                      03-22-22 @ 06:22  Kiya Aminotransferase(ALT/SGPT)10  Albumin, Serum 1.7  Alkaline Phosphatase, Serum 67  Anion Gap, Serum 7  Aspartate Aminotransferase (AST/SGOT)21  Bilirubin Total, Serum 0.5  Blood Urea Nitrogen, Serum 12  Calcium,Total Serum 7.8  Carbon Dioxide, Serum 28  Chloride, Serum 106  Creatinine, Serum 0.57  eGFR if  --  eGFR if Non African American --  Glucose, Serum 88  Potassium, Serum 3.5  Protein Total, Serum 5.1  Sodium, Serum 141                      03-21-22 @ 06:50  Kiya Aminotransferase(ALT/SGPT)--  Albumin, Serum --  Alkaline Phosphatase, Serum --  Anion Gap, Serum 8  Aspartate Aminotransferase (AST/SGOT)--  Bilirubin Total, Serum --  Blood Urea Nitrogen, Serum 12  Calcium,Total Serum 8.0  Carbon Dioxide, Serum 27  Chloride, Serum 105  Creatinine, Serum 0.50  eGFR if  --  eGFR if Non African American --  Glucose, Serum 90  Potassium, Serum 3.5  Protein Total, Serum --  Sodium, Serum 140                          PT/INR      Amylase/Lipase            RADIOLOGY & ADDITIONAL TESTS:    Imaging Personally Reviewed:  [ ] YES  [ ] NO NICK OBRIEN  89y  Female      Patient is a 89y old  Female who presents with a chief complaint of small bowel obstruction (23 Mar 2022 09:32)    overnight event / interval hx:   No acute events overnight. Patient seen and examined at bedside. No acute complaints. On clear liquid diet, denies N/V.   Admits to abdominal pain more in RLQ, near the WALLACE drain site and other surgical sites. Pt sitting in chair and as per pt desaturates to 90s on RA on ambulation to chair.     PAST MEDICAL/SURGICAL HISTORY  PAST MEDICAL & SURGICAL HISTORY:  Breast cancer    Uterine cancer    Atrial fibrillation    Breast cancer  s/p chemo and mammogram    Uterine cancer  s/p radiation and hysterectomy    Afib    H/O mastectomy    H/O: hysterectomy    S/P bilateral mastectomy    History of hysterectomy        REVIEW OF SYSTEMS:  CONSTITUTIONAL: No fever, weight loss, or fatigue  EYES: No eye pain, visual disturbances, or discharge  ENMT:  No difficulty hearing, tinnitus, vertigo; No sinus or throat pain  NECK: No pain or stiffness  BREASTS: No pain, masses, or nipple discharge  RESPIRATORY: No cough, wheezing, chills or hemoptysis; No shortness of breath  CARDIOVASCULAR: No chest pain, palpitations, dizziness, or leg swelling  GASTROINTESTINAL: ++ abdominal pain. No nausea, vomiting, or hematemesis; No diarrhea or constipation. No melena or hematochezia.  GENITOURINARY: No dysuria, frequency, hematuria, or incontinence  NEUROLOGICAL: No headaches, memory loss, loss of strength, numbness, or tremors  SKIN: No itching, burning, rashes, or lesions   LYMPH NODES: No enlarged glands  ENDOCRINE: No heat or cold intolerance; No hair loss  MUSCULOSKELETAL: No joint pain or swelling; No muscle, back, or extremity pain  PSYCHIATRIC: No depression, anxiety, mood swings, or difficulty sleeping  HEME/LYMPH: No easy bruising, or bleeding gums  ALLERY AND IMMUNOLOGIC: No hives or eczema    T(C): 36.9 (03-23-22 @ 05:24), Max: 36.9 (03-23-22 @ 05:24)  HR: 83 (03-23-22 @ 05:24) (83 - 102)  BP: 120/56 (03-23-22 @ 05:24) (97/55 - 122/63)  RR: 20 (03-23-22 @ 05:24) (17 - 20)  SpO2: 94% (03-23-22 @ 05:24) (91% - 94%)  Wt(kg): --Vital Signs Last 24 Hrs  T(C): 36.9 (23 Mar 2022 05:24), Max: 36.9 (23 Mar 2022 05:24)  T(F): 98.4 (23 Mar 2022 05:24), Max: 98.4 (23 Mar 2022 05:24)  HR: 83 (23 Mar 2022 05:24) (83 - 102)  BP: 120/56 (23 Mar 2022 05:24) (97/55 - 122/63)  BP(mean): 70 (23 Mar 2022 05:24) (65 - 70)  RR: 20 (23 Mar 2022 05:24) (17 - 20)  SpO2: 94% (23 Mar 2022 05:24) (91% - 94%)    PHYSICAL EXAM:  GENERAL:  well-groomed, well-developed, slight distress 2/2 pain   HEAD:  Atraumatic, Normocephalic  EYES: EOMI, PERRLA, conjunctiva and sclera clear  ENMT: No tonsillar erythema, exudates, or enlargement; Moist mucous membranes, Good dentition, No lesions  NECK: Supple, No JVD, Normal thyroid  NERVOUS SYSTEM:  Alert & Oriented X3, Good concentration; Motor Strength 5/5 B/L upper and lower extremities; DTRs 2+ intact and symmetric  CHEST/LUNG: Clear to percussion bilaterally; No rales, rhonchi, wheezing, or rubs  HEART: Regular rate and rhythm; No murmurs, rubs, or gallops  ABDOMEN: Soft, ++tender, Nondistended; surgical sites clean  EXTREMITIES:  2+ Peripheral Pulses, No clubbing, cyanosis, or edema  LYMPH: No lymphadenopathy noted  SKIN: No rashes     Consultant(s) Notes Reviewed:  [x ] YES  [ ] NO  Care Discussed with Consultants/Other Providers [ x] YES  [ ] NO    LABS:  CBC   03-23-22 @ 06:48  Hematcorit 26.9  Hemoglobin 8.3  Mean Cell Hemoglobin 28.2  Platelet Count-Automated 238  RBC Count 2.94  Red Cell Distrib Width 18.4  Wbc Count 29.49      BMP  03-23-22 @ 06:48  Anion Gap. Serum 4  Blood Urea Nitrogen,Serm 14  Calcium, Total Serum 8.4  Carbon Dioxide, Serum 29  Chloride, Serum 104  Creatinine, Serum 0.66  eGFR in  --  eGFR in Non Afican American --  Gloucose, serum 87  Potassium, Serum 3.6  Sodium, Serum 137              03-22-22 @ 06:22  Anion Gap. Serum 7  Blood Urea Nitrogen,Serm 12  Calcium, Total Serum 7.8  Carbon Dioxide, Serum 28  Chloride, Serum 106  Creatinine, Serum 0.57  eGFR in  --  eGFR in Non Afican American --  Gloucose, serum 88  Potassium, Serum 3.5  Sodium, Serum 141              03-21-22 @ 06:50  Anion Gap. Serum 8  Blood Urea Nitrogen,Serm 12  Calcium, Total Serum 8.0  Carbon Dioxide, Serum 27  Chloride, Serum 105  Creatinine, Serum 0.50  eGFR in  --  eGFR in Non Afican American --  Gloucose, serum 90  Potassium, Serum 3.5  Sodium, Serum 140                  CMP  03-23-22 @ 06:48  Kiya Aminotransferase(ALT/SGPT)--  Albumin, Serum --  Alkaline Phosphatase, Serum --  Anion Gap, Serum 4  Aspartate Aminotransferase (AST/SGOT)--  Bilirubin Total, Serum --  Blood Urea Nitrogen, Serum 14  Calcium,Total Serum 8.4  Carbon Dioxide, Serum 29  Chloride, Serum 104  Creatinine, Serum 0.66  eGFR if  --  eGFR if Non African American --  Glucose, Serum 87  Potassium, Serum 3.6  Protein Total, Serum --  Sodium, Serum 137                      03-22-22 @ 06:22  Kiya Aminotransferase(ALT/SGPT)10  Albumin, Serum 1.7  Alkaline Phosphatase, Serum 67  Anion Gap, Serum 7  Aspartate Aminotransferase (AST/SGOT)21  Bilirubin Total, Serum 0.5  Blood Urea Nitrogen, Serum 12  Calcium,Total Serum 7.8  Carbon Dioxide, Serum 28  Chloride, Serum 106  Creatinine, Serum 0.57  eGFR if  --  eGFR if Non African American --  Glucose, Serum 88  Potassium, Serum 3.5  Protein Total, Serum 5.1  Sodium, Serum 141                      03-21-22 @ 06:50  Ikya Aminotransferase(ALT/SGPT)--  Albumin, Serum --  Alkaline Phosphatase, Serum --  Anion Gap, Serum 8  Aspartate Aminotransferase (AST/SGOT)--  Bilirubin Total, Serum --  Blood Urea Nitrogen, Serum 12  Calcium,Total Serum 8.0  Carbon Dioxide, Serum 27  Chloride, Serum 105  Creatinine, Serum 0.50  eGFR if  --  eGFR if Non African American --  Glucose, Serum 90  Potassium, Serum 3.5  Protein Total, Serum --  Sodium, Serum 140                          PT/INR      Amylase/Lipase            RADIOLOGY & ADDITIONAL TESTS:    Imaging Personally Reviewed:  [ ] YES  [ ] NO

## 2022-03-23 NOTE — PROGRESS NOTE ADULT - ASSESSMENT
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

## 2022-03-23 NOTE — PROGRESS NOTE ADULT - ASSESSMENT
89 yoF pod# 11 s/p lap WILLIAM, ileocolic bypass due to extensive adhesions likely 2/2 radiation tx   leukocytosis d/t CMML, follows outpt hem/onc, Dr. Wilde; abx d/c'ed  NGT removed, no n/v  afeb, vss      1. s/p ileocolic bypass  - adv to clears  - resume anastrozole/metop with parameter  - TOV today     2. pleural effusion  - likely fluid overload from prior resuscitation, now asympstomatic   - UO appropriate, lasix prn, s/p 20 lasix 3/21, 3/22, strict i/o, edema improved, LE edema improved   - echo with normal LV fxn, pulm htn as per medicine assessment, consider pulm consult  - O2 supp   - encourage ambulation, when patient in chair or laying in bed, PT consulted and patient to walk with PT/ students/ PCA/ nurses, keep legs elevated     3. h/o afib  - AC held due to prior bleeding risk, on hepSQ bid  - on digoxin  - currently rate controlled    4. Disposition plan  - PT rec JANEEN once med appropriate   - will need pulm/ cardio outpt f/u   - will need surgery f/u  - will need heme onc f/u

## 2022-03-23 NOTE — CHART NOTE - NSCHARTNOTEFT_GEN_A_CORE
Patient seen and examined at bedside.   Patient had no acute complaints.     Patient wilson catheter removed and awaiting TOV.   Patient WALLACE drainx2 removed.   Patient tolerated procedures well.     PT was at bedside and was planning to see her after drain and wilson removal.

## 2022-03-23 NOTE — PROGRESS NOTE ADULT - SUBJECTIVE AND OBJECTIVE BOX
89y Female is under our care for     REVIEW OF SYSTEMS:  [  ] Not able to elicit  General:	  Chest:	  GI:	  :  Skin:	  Musculoskeletal:	  Neuro:	    MEDS:    ALLERGIES: Allergies    contrast media (iodine-based) (Short breath)  iv  contrast (Unknown)  penicillin (Hives)  penicillin (Rash)  sulfa drugs (Short breath)  Sulfacetamide Sodium (Rash)    Intolerances        VITALS:  Vital Signs Last 24 Hrs  T(C): 36.9 (23 Mar 2022 05:24), Max: 36.9 (23 Mar 2022 05:24)  T(F): 98.4 (23 Mar 2022 05:24), Max: 98.4 (23 Mar 2022 05:24)  HR: 92 (23 Mar 2022 11:14) (81 - 102)  BP: 118/68 (23 Mar 2022 11:14) (97/55 - 126/63)  BP(mean): 70 (23 Mar 2022 05:24) (65 - 70)  RR: 20 (23 Mar 2022 05:24) (17 - 20)  SpO2: 99% (23 Mar 2022 11:14) (91% - 99%)      PHYSICAL EXAM:  HEENT:  Neck:  Respiratory:  Cardiovascular:  Gastrointestinal:  Extremities:  Skin:  Ortho:  Neuro:    LABS/DIAGNOSTIC TESTS:                        8.3    29.49 )-----------( 238      ( 23 Mar 2022 06:48 )             26.9     WBC Count: 29.49 K/uL (03-23 @ 06:48)  WBC Count: 27.33 K/uL (03-22 @ 06:22)  WBC Count: 28.95 K/uL (03-21 @ 06:50)  WBC Count: 26.80 K/uL (03-20 @ 06:57)  WBC Count: 24.86 K/uL (03-19 @ 06:54)    03-23    137  |  104  |  14  ----------------------------<  87  3.6   |  29  |  0.66    Ca    8.4      23 Mar 2022 06:48  Phos  2.9     03-22  Mg     1.6     03-22    TPro  5.1<L>  /  Alb  1.7<L>  /  TBili  0.5  /  DBili  x   /  AST  21  /  ALT  10  /  AlkPhos  67  03-22      CULTURES:   .Blood Blood-Peripheral  03-13 @ 08:29   No Growth Final  --  --      Clean Catch Clean Catch (Midstream)  03-13 @ 08:24   No growth  --  --      Clean Catch Clean Catch (Midstream)  03-08 @ 18:19   <10,000 CFU/mL Normal Urogenital Ira  --  --        RADIOLOGY:  no new studies 89y Female is under our care for leukocytosis.  Patient was seen laying comfortably in bed with no acute distress.  Patients diet was advanced to full liquid this morning, and reports having increased abdominal pain with nausea but no vomiting.  Patient is passing flatus and again feels the urge to have a bowel movement.      REVIEW OF SYSTEMS:  [  ] Not able to elicit  General: no fevers no malaise  Chest: no cough no sob  GI: nausea +, no vomiting or diarrhea, abd pain +  : no urinary sxs   Skin: no rashes  Musculoskeletal: no trauma no LBP  Neuro: no ha's no dizziness     MEDS:    ALLERGIES: Allergies    contrast media (iodine-based) (Short breath)  iv  contrast (Unknown)  penicillin (Hives)  penicillin (Rash)  sulfa drugs (Short breath)  Sulfacetamide Sodium (Rash)    Intolerances        VITALS:  Vital Signs Last 24 Hrs  T(C): 36.9 (23 Mar 2022 05:24), Max: 36.9 (23 Mar 2022 05:24)  T(F): 98.4 (23 Mar 2022 05:24), Max: 98.4 (23 Mar 2022 05:24)  HR: 92 (23 Mar 2022 11:14) (81 - 102)  BP: 118/68 (23 Mar 2022 11:14) (97/55 - 126/63)  BP(mean): 70 (23 Mar 2022 05:24) (65 - 70)  RR: 20 (23 Mar 2022 05:24) (17 - 20)  SpO2: 99% (23 Mar 2022 11:14) (91% - 99%)      PHYSICAL EXAM:  HEENT: n/a  Neck: supple no LN's   Respiratory: lungs clear no rales  Cardiovascular: S1 S2 reg no murmurs  Gastrointestinal: +BS with soft, nondistended abdomen; generalized abdominal tenderness  Extremities: no edema  Skin: no rashes  Ortho: n/a  Neuro: AAO x 3    LABS/DIAGNOSTIC TESTS:                        8.3    29.49 )-----------( 238      ( 23 Mar 2022 06:48 )             26.9     WBC Count: 29.49 K/uL (03-23 @ 06:48)  WBC Count: 27.33 K/uL (03-22 @ 06:22)  WBC Count: 28.95 K/uL (03-21 @ 06:50)  WBC Count: 26.80 K/uL (03-20 @ 06:57)  WBC Count: 24.86 K/uL (03-19 @ 06:54)    03-23    137  |  104  |  14  ----------------------------<  87  3.6   |  29  |  0.66    Ca    8.4      23 Mar 2022 06:48  Phos  2.9     03-22  Mg     1.6     03-22    TPro  5.1<L>  /  Alb  1.7<L>  /  TBili  0.5  /  DBili  x   /  AST  21  /  ALT  10  /  AlkPhos  67  03-22      CULTURES:   .Blood Blood-Peripheral  03-13 @ 08:29   No Growth Final  --  --      Clean Catch Clean Catch (Midstream)  03-13 @ 08:24   No growth  --  --      Clean Catch Clean Catch (Midstream)  03-08 @ 18:19   <10,000 CFU/mL Normal Urogenital Ira  --  --        RADIOLOGY:  no new studies

## 2022-03-24 NOTE — PROGRESS NOTE ADULT - ASSESSMENT
89 yoF s/p lap WILLIAM, ileocolic bypass, pod#13    RRT called with hypotension a/w chest pain, troponin mildly elevated, EKG abnormal with new T wave inversion and a fib    1. s/p ileocolic bypass  - was tolerating clears, however low po intake  - +flatus, no bowel movement; c/o abd pain and bloatedness but nonperitonitic   - continue FLD as tolerated with supplement    2. leukocytosis  - 2/2 CMML, resumed anastrozole  - uptrended, likely 2/2 hemoconcentration     3. chest pain  - likely due to demand ischemia, trop mildly elevated, will repeat to trend  - 1x morphine given in RRT, nitro held d/t hypotension  - R pleural effusion and atelectasis on CXR, pending final read  - on telemetry  - pending discussion with attending in regards consults    4. a fib  - hepSQ q12 d/t bleeding risk, reeval for further need for tx AC  - digoxin as tolerated    5. MERCEDEZ  - likely 2/2 hep lock'ed 2 days ago d/t pleural effusion with diuretic therapy, low po intake  - 500cc bolus given overnight, will assess UO -wilson reinserted (~150cc dark urine returned)  - trend bmp

## 2022-03-24 NOTE — PROGRESS NOTE ADULT - SUBJECTIVE AND OBJECTIVE BOX
INTERVAL HPI/OVERNIGHT EVENTS:    RRT called yesterday with chest pain  trace chest discomfort on left radiating to abdomen  +flatus yesterday not today  does feel bloated  Denies n/v/f/c      MEDICATIONS  (STANDING):  anastrozole 1 milliGRAM(s) Oral daily  chlorhexidine 2% Cloths 1 Application(s) Topical daily  digoxin  Injectable 125 MICROGram(s) IV Push daily  heparin   Injectable 5000 Unit(s) SubCutaneous every 12 hours  metoprolol succinate ER 25 milliGRAM(s) Oral daily  mupirocin 2% Nasal 1 Application(s) Both Nostrils every 12 hours  pantoprazole  Injectable 40 milliGRAM(s) IV Push daily    MEDICATIONS  (PRN):  morphine  - Injectable 2 milliGRAM(s) IV Push every 4 hours PRN Severe Pain (7 - 10)  ondansetron Injectable 4 milliGRAM(s) IV Push every 6 hours PRN Nausea and/or Vomiting      Vital Signs Last 24 Hrs  T(C): 36.9 (24 Mar 2022 05:15), Max: 37.3 (23 Mar 2022 21:27)  T(F): 98.5 (24 Mar 2022 05:15), Max: 99.1 (23 Mar 2022 21:27)  HR: 86 (24 Mar 2022 07:58) (81 - 103)  BP: 93/48 (24 Mar 2022 07:58) (91/46 - 126/63)  BP(mean): 59 (24 Mar 2022 07:58) (59 - 59)  RR: 18 (24 Mar 2022 05:15) (18 - 20)  SpO2: 95% (24 Mar 2022 07:58) (91% - 99%)      PHYSICAL EXAM  General: Alert and oriented  Resp: Breathing unlabored on NC  Abdomen: Soft, nondistended, mild ttp, nonperitonitic  : wilson dark urine  Extremities: No pedal edema    I&O's Detail    23 Mar 2022 07:01  -  24 Mar 2022 07:00  --------------------------------------------------------  IN:    Lactated Ringers Bolus: 500 mL    Oral Fluid: 240 mL  Total IN: 740 mL    OUT:  Total OUT: 0 mL    Total NET: 740 mL          LABS:                        9.2    36.90 )-----------( 262      ( 24 Mar 2022 05:14 )             29.6             03-24    135  |  102  |  20<H>  ----------------------------<  94  5.0   |  25  |  1.61<H>    Ca    7.9<L>      24 Mar 2022 05:14  Phos  4.8     03-24  Mg     1.6     03-24

## 2022-03-24 NOTE — PROGRESS NOTE ADULT - SUBJECTIVE AND OBJECTIVE BOX
89y Female is under our care for     REVIEW OF SYSTEMS:  [  ] Not able to elicit  General:	  Chest:	  GI:	  :  Skin:	  Musculoskeletal:	  Neuro:	    MEDS:    ALLERGIES: Allergies    contrast media (iodine-based) (Short breath)  iv  contrast (Unknown)  penicillin (Hives)  penicillin (Rash)  sulfa drugs (Short breath)  Sulfacetamide Sodium (Rash)    Intolerances        VITALS:  Vital Signs Last 24 Hrs  T(C): 36.9 (24 Mar 2022 05:15), Max: 37.3 (23 Mar 2022 21:27)  T(F): 98.5 (24 Mar 2022 05:15), Max: 99.1 (23 Mar 2022 21:27)  HR: 86 (24 Mar 2022 07:58) (86 - 103)  BP: 93/48 (24 Mar 2022 07:58) (91/46 - 110/48)  BP(mean): 59 (24 Mar 2022 07:58) (59 - 59)  RR: 18 (24 Mar 2022 05:15) (18 - 20)  SpO2: 95% (24 Mar 2022 07:58) (91% - 97%)      PHYSICAL EXAM:  HEENT:  Neck:  Respiratory:  Cardiovascular:  Gastrointestinal:  Extremities:  Skin:  Ortho:  Neuro:    LABS/DIAGNOSTIC TESTS:                        9.2    36.90 )-----------( 262      ( 24 Mar 2022 05:14 )             29.6     WBC Count: 36.90 K/uL (03-24 @ 05:14)  WBC Count: 29.49 K/uL (03-23 @ 06:48)  WBC Count: 27.33 K/uL (03-22 @ 06:22)  WBC Count: 28.95 K/uL (03-21 @ 06:50)  WBC Count: 26.80 K/uL (03-20 @ 06:57)    03-24    135  |  102  |  20<H>  ----------------------------<  94  5.0   |  25  |  1.61<H>    Ca    7.9<L>      24 Mar 2022 05:14  Phos  4.8     03-24  Mg     1.6     03-24      Lactate, Blood: 1.4 mmol/L (03-24 @ 05:14)    CULTURES:   .Blood Blood-Peripheral  03-13 @ 08:29   No Growth Final  --  --      Clean Catch Clean Catch (Midstream)  03-13 @ 08:24   No growth  --  --      Clean Catch Clean Catch (Midstream)  03-08 @ 18:19   <10,000 CFU/mL Normal Urogenital Ira  --  --        RADIOLOGY:  no new studies   89y Female is under our care for leukocytosis.  Patient was seen laying comfortably in bed with no acute distress on 2L nasal canula.  Patient is s/p RRT early this morning for chest pain and hypoxia.  At present, she denies any chest pain or shortness of breath.  She remains afebrile with increasing WBC count.    REVIEW OF SYSTEMS:  [  ] Not able to elicit  General: no fevers no malaise  Chest: no cough no sob  GI: nausea +, no vomiting or diarrhea, abd pain +  : no urinary sxs   Skin: no rashes  Musculoskeletal: no trauma no LBP  Neuro: no ha's no dizziness     MEDS:    ALLERGIES: Allergies    contrast media (iodine-based) (Short breath)  iv  contrast (Unknown)  penicillin (Hives)  penicillin (Rash)  sulfa drugs (Short breath)  Sulfacetamide Sodium (Rash)    Intolerances        VITALS:  Vital Signs Last 24 Hrs  T(C): 36.9 (24 Mar 2022 05:15), Max: 37.3 (23 Mar 2022 21:27)  T(F): 98.5 (24 Mar 2022 05:15), Max: 99.1 (23 Mar 2022 21:27)  HR: 86 (24 Mar 2022 07:58) (86 - 103)  BP: 93/48 (24 Mar 2022 07:58) (91/46 - 110/48)  BP(mean): 59 (24 Mar 2022 07:58) (59 - 59)  RR: 18 (24 Mar 2022 05:15) (18 - 20)  SpO2: 95% (24 Mar 2022 07:58) (91% - 97%)      PHYSICAL EXAM:  HEENT: n/a  Neck: supple no LN's   Respiratory: lungs clear no rales  Cardiovascular: S1 S2 reg no murmurs  Gastrointestinal: +BS with soft, nondistended abdomen; RUQ abdominal tenderness  : Kinsey catheter in place  Extremities: no edema  Skin: no rashes  Ortho: n/a  Neuro: AAO x 3    LABS/DIAGNOSTIC TESTS:                        9.2    36.90 )-----------( 262      ( 24 Mar 2022 05:14 )             29.6     WBC Count: 36.90 K/uL (03-24 @ 05:14)  WBC Count: 29.49 K/uL (03-23 @ 06:48)  WBC Count: 27.33 K/uL (03-22 @ 06:22)  WBC Count: 28.95 K/uL (03-21 @ 06:50)  WBC Count: 26.80 K/uL (03-20 @ 06:57)    03-24    135  |  102  |  20<H>  ----------------------------<  94  5.0   |  25  |  1.61<H>    Ca    7.9<L>      24 Mar 2022 05:14  Phos  4.8     03-24  Mg     1.6     03-24      Lactate, Blood: 1.4 mmol/L (03-24 @ 05:14)    CULTURES:   .Blood Blood-Peripheral  03-13 @ 08:29   No Growth Final  --  --      Clean Catch Clean Catch (Midstream)  03-13 @ 08:24   No growth  --  --      Clean Catch Clean Catch (Midstream)  03-08 @ 18:19   <10,000 CFU/mL Normal Urogenital Ira  --  --        RADIOLOGY:  no new studies

## 2022-03-24 NOTE — CHART NOTE - NSCHARTNOTEFT_GEN_A_CORE
overnight RR for CP  med note on chart  last bedside US with 140cc urine compared to 90cc yesterday  Pt w/ sudden onset of chest pain, localized to left side, new , constant, non radiating to arms or back. Patient was also hypoxic to 80's on RA. as per Med Bedside EKG performed which showed new T wave inversions in anterolateral leads with a fib. Patient was given 1 mg of Morphine. No nitro given as BP was low .  Patient shane also reported that pain is now moving to epigastric area and she is having belching, no nausea    creat^1.6  pt was ordered for 500cc bolus LR    Vital Signs Last 24 Hrs  T(C): 36.9 (24 Mar 2022 05:15), Max: 37.3 (23 Mar 2022 21:27)  T(F): 98.5 (24 Mar 2022 05:15), Max: 99.1 (23 Mar 2022 21:27)  HR: 99 (24 Mar 2022 05:15) (81 - 100)  BP: 91/46 (24 Mar 2022 05:15) (91/46 - 126/63)  BP(mean): --  RR: 18 (24 Mar 2022 05:15) (18 - 18)  SpO2: 95% (24 Mar 2022 05:15) (93% - 99%)    case discussed with Dr Cleveland  hold further ivf boluses  will place wilson cath  serial bmp  f/u med recs

## 2022-03-24 NOTE — PROGRESS NOTE ADULT - ASSESSMENT
88yo F PMHx of Uterine CA s/p hysterectomy, A. Fib, breast CA s/p b/l mastectomy, recent MDS diagnosis who presented with SBO. Patient went for ex-lap with lysis of adhesions and ileocolic bypass with anastomosis on 3/11/22. Medicine initially following for A. Fib with RVR, re-consulted for bilateral LE edema.  Pt was rapid response overnight for new onset chest pain and was desaturating to 83s.       #new sob/ chest pain:  t1 61  f/u t2  echo showed rv pressure 60 , pul htn,   diastolic bp low, albumin 1.7 and probnp 14k, pt might benefit from albumin iv to increase intravascular vol   pulmonary consult for pulmonary htn    #abd pain:  pt c/o abd pain   bowel sounds not detectable  f/u abd xray     #r/o PE  SOB off of ac  f/u d-dimers   ct angio to r/o PE when valerie resolves    #valerie   most likely post renal as occured after wilson removal vs pre-renal decreased intravascular vol   f/u cr  f/u urine lytes  bladder scan to r/o urine retention  wilson present    discussed with dr PINEDO 89yo F PMHx of Uterine CA s/p hysterectomy, A. Fib, breast CA s/p b/l mastectomy, recent MDS diagnosis who presented with SBO. Patient went for ex-lap with lysis of adhesions and ileocolic bypass with anastomosis on 3/11/22. Medicine initially following for A. Fib with RVR, re-consulted for bilateral LE edema.  Pt was rapid response overnight for new onset chest pain and was desaturating to 83s.       #new sob/ chest pain:  t1 61  f/u t2  echo showed rv pressure 60 , pul htn,   diastolic bp low, albumin 1.7 and probnp 14k, pt might benefit from albumin iv to increase intravascular vol   pulmonary consult for pulmonary htn    #abd pain:  pt c/o abd pain   bowel sounds noticed   abd xray showed ilius      #valerie   most likely post renal as occured after wilson removal vs pre-renal decreased intravascular vol   f/u cr  f/u urine lytes  bladder scan to r/o urine retention  wilson present    #elevated wbc   consult heam-onc    discussed with dr PINEDO 88yo F PMHx of Uterine CA s/p hysterectomy, A. Fib, breast CA s/p b/l mastectomy, recent MDS diagnosis who presented with SBO. Patient went for ex-lap with lysis of adhesions and ileocolic bypass with anastomosis on 3/11/22. Medicine initially following for A. Fib with RVR, re-consulted for bilateral LE edema.  Pt was rapid response overnight for new onset chest pain and was desaturating to 83s.       #new sob/ chest pain:  t1 61  f/u t2  echo showed rv pressure 60 , pul htn,   CXR shows bilateral pleural effusions, patient may benefit from diuresis when BP more stable    #abd pain:  pt c/o abd pain, but no nausea or emesis   bowel sounds noticed  abd xray showed ilieus with stool in lower rectum, may consider suppository or enema      #valerie   most likely pre-renal decreased intravascular vol   f/u cr  f/u urine lytes  wilson present    #elevated wbc   consult hem-onc    discussed with dr PINEDO

## 2022-03-24 NOTE — PROGRESS NOTE ADULT - ASSESSMENT
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

## 2022-03-24 NOTE — RAPID RESPONSE TEAM SUMMARY - NSADDTLFINDINGSRRT_GEN_ALL_CORE
Patient has not passed TOV yet, bladder scan showed vinnie 90ml of urine   Bowel sounds not audible, HR irregular   Abdomen distended  Patient has not passed TOV yet, bladder scan showed vinnie 90ml of urine   Bowel sounds not audible, HR irregular   Abdomen distended   Creatinine trending up on labs

## 2022-03-24 NOTE — PROGRESS NOTE ADULT - SUBJECTIVE AND OBJECTIVE BOX
NICK OBRIEN  89y  Female      Patient is a 89y old  Female who presents with a chief complaint of small bowel obstruction (23 Mar 2022 11:48)        PAST MEDICAL/SURGICAL HISTORY  PAST MEDICAL & SURGICAL HISTORY:  Breast cancer    Uterine cancer    Atrial fibrillation    Breast cancer  s/p chemo and mammogram    Uterine cancer  s/p radiation and hysterectomy    Afib    H/O mastectomy    H/O: hysterectomy    S/P bilateral mastectomy    History of hysterectomy        REVIEW OF SYSTEMS:  CONSTITUTIONAL: No fever, weight loss, or fatigue  EYES: No eye pain, visual disturbances, or discharge  ENMT:  No difficulty hearing, tinnitus, vertigo; No sinus or throat pain  NECK: No pain or stiffness  BREASTS: No pain, masses, or nipple discharge  RESPIRATORY: No cough, wheezing, chills or hemoptysis; No shortness of breath  CARDIOVASCULAR: No chest pain, palpitations, dizziness, or leg swelling  GASTROINTESTINAL: No abdominal or epigastric pain. No nausea, vomiting, or hematemesis; No diarrhea or constipation. No melena or hematochezia.  GENITOURINARY: No dysuria, frequency, hematuria, or incontinence  NEUROLOGICAL: No headaches, memory loss, loss of strength, numbness, or tremors  SKIN: No itching, burning, rashes, or lesions   LYMPH NODES: No enlarged glands  ENDOCRINE: No heat or cold intolerance; No hair loss  MUSCULOSKELETAL: No joint pain or swelling; No muscle, back, or extremity pain  PSYCHIATRIC: No depression, anxiety, mood swings, or difficulty sleeping  HEME/LYMPH: No easy bruising, or bleeding gums  ALLERY AND IMMUNOLOGIC: No hives or eczema    T(C): 36.9 (03-24-22 @ 05:15), Max: 37.3 (03-23-22 @ 21:27)  HR: 86 (03-24-22 @ 07:58) (81 - 103)  BP: 93/48 (03-24-22 @ 07:58) (91/46 - 126/63)  RR: 18 (03-24-22 @ 05:15) (18 - 20)  SpO2: 95% (03-24-22 @ 07:58) (91% - 99%)  Wt(kg): --Vital Signs Last 24 Hrs  T(C): 36.9 (24 Mar 2022 05:15), Max: 37.3 (23 Mar 2022 21:27)  T(F): 98.5 (24 Mar 2022 05:15), Max: 99.1 (23 Mar 2022 21:27)  HR: 86 (24 Mar 2022 07:58) (81 - 103)  BP: 93/48 (24 Mar 2022 07:58) (91/46 - 126/63)  BP(mean): 59 (24 Mar 2022 07:58) (59 - 59)  RR: 18 (24 Mar 2022 05:15) (18 - 20)  SpO2: 95% (24 Mar 2022 07:58) (91% - 99%)    PHYSICAL EXAM:  GENERAL: NAD, well-groomed, well-developed  HEAD:  Atraumatic, Normocephalic  EYES: EOMI, PERRLA, conjunctiva and sclera clear  ENMT: No tonsillar erythema, exudates, or enlargement; Moist mucous membranes, Good dentition, No lesions  NECK: Supple, No JVD, Normal thyroid  NERVOUS SYSTEM:  Alert & Oriented X3, Good concentration; Motor Strength 5/5 B/L upper and lower extremities; DTRs 2+ intact and symmetric  CHEST/LUNG: Clear to percussion bilaterally; No rales, rhonchi, wheezing, or rubs  HEART: Regular rate and rhythm; No murmurs, rubs, or gallops  ABDOMEN: Soft, Nontender, Nondistended; Bowel sounds present  EXTREMITIES:  2+ Peripheral Pulses, No clubbing, cyanosis, or edema  LYMPH: No lymphadenopathy noted  SKIN: No rashes or lesions    Consultant(s) Notes Reviewed:  [x ] YES  [ ] NO  Care Discussed with Consultants/Other Providers [ x] YES  [ ] NO    LABS:  CBC   03-24-22 @ 05:14  Hematcorit 29.6  Hemoglobin 9.2  Mean Cell Hemoglobin 28.8  Platelet Count-Automated 262  RBC Count 3.19  Red Cell Distrib Width 18.9  Wbc Count 36.90      BMP  03-24-22 @ 05:14  Anion Gap. Serum 8  Blood Urea Nitrogen,Serm 20  Calcium, Total Serum 7.9  Carbon Dioxide, Serum 25  Chloride, Serum 102  Creatinine, Serum 1.61  eGFR in  --  eGFR in Non Afican American --  Gloucose, serum 94  Potassium, Serum 5.0  Sodium, Serum 135              03-23-22 @ 06:48  Anion Gap. Serum 4  Blood Urea Nitrogen,Serm 14  Calcium, Total Serum 8.4  Carbon Dioxide, Serum 29  Chloride, Serum 104  Creatinine, Serum 0.66  eGFR in  --  eGFR in Non Afican American --  Gloucose, serum 87  Potassium, Serum 3.6  Sodium, Serum 137              03-22-22 @ 06:22  Anion Gap. Serum 7  Blood Urea Nitrogen,Serm 12  Calcium, Total Serum 7.8  Carbon Dioxide, Serum 28  Chloride, Serum 106  Creatinine, Serum 0.57  eGFR in  --  eGFR in Non Afican American --  Gloucose, serum 88  Potassium, Serum 3.5  Sodium, Serum 141                  CMP  03-24-22 @ 05:14  Kiya Aminotransferase(ALT/SGPT)--  Albumin, Serum --  Alkaline Phosphatase, Serum --  Anion Gap, Serum 8  Aspartate Aminotransferase (AST/SGOT)--  Bilirubin Total, Serum --  Blood Urea Nitrogen, Serum 20  Calcium,Total Serum 7.9  Carbon Dioxide, Serum 25  Chloride, Serum 102  Creatinine, Serum 1.61  eGFR if  --  eGFR if Non African American --  Glucose, Serum 94  Potassium, Serum 5.0  Protein Total, Serum --  Sodium, Serum 135                      03-23-22 @ 06:48  Kiya Aminotransferase(ALT/SGPT)--  Albumin, Serum --  Alkaline Phosphatase, Serum --  Anion Gap, Serum 4  Aspartate Aminotransferase (AST/SGOT)--  Bilirubin Total, Serum --  Blood Urea Nitrogen, Serum 14  Calcium,Total Serum 8.4  Carbon Dioxide, Serum 29  Chloride, Serum 104  Creatinine, Serum 0.66  eGFR if  --  eGFR if Non African American --  Glucose, Serum 87  Potassium, Serum 3.6  Protein Total, Serum --  Sodium, Serum 137                      03-22-22 @ 06:22  Kiya Aminotransferase(ALT/SGPT)10  Albumin, Serum 1.7  Alkaline Phosphatase, Serum 67  Anion Gap, Serum 7  Aspartate Aminotransferase (AST/SGOT)21  Bilirubin Total, Serum 0.5  Blood Urea Nitrogen, Serum 12  Calcium,Total Serum 7.8  Carbon Dioxide, Serum 28  Chloride, Serum 106  Creatinine, Serum 0.57  eGFR if  --  eGFR if Non African American --  Glucose, Serum 88  Potassium, Serum 3.5  Protein Total, Serum 5.1  Sodium, Serum 141                          PT/INR      Amylase/Lipase            RADIOLOGY & ADDITIONAL TESTS:    Imaging Personally Reviewed:  [ ] YES  [ ] NO NICK OBRIEN  89y  Female      Patient is a 89y old  Female who presents with a chief complaint of small bowel obstruction (23 Mar 2022 11:48)        PAST MEDICAL/SURGICAL HISTORY  PAST MEDICAL & SURGICAL HISTORY:  Breast cancer    Uterine cancer    Atrial fibrillation    Breast cancer  s/p chemo and mammogram    Uterine cancer  s/p radiation and hysterectomy    Afib    H/O mastectomy    H/O: hysterectomy    S/P bilateral mastectomy    History of hysterectomy        REVIEW OF SYSTEMS:  CONSTITUTIONAL: No fever, weight loss, or fatigue  EYES: No eye pain, visual disturbances, or discharge  ENMT:  No difficulty hearing, tinnitus, vertigo; No sinus or throat pain  NECK: No pain or stiffness  BREASTS: No pain, masses, or nipple discharge  RESPIRATORY: No cough, wheezing, chills or hemoptysis; No shortness of breath  CARDIOVASCULAR: No chest pain, palpitations, dizziness, or leg swelling  GASTROINTESTINAL: ++ abdominal pain. No nausea, vomiting, or hematemesis; No diarrhea or constipation. No melena or hematochezia.  GENITOURINARY: No dysuria, frequency, hematuria, or incontinence  NEUROLOGICAL: No headaches, memory loss, loss of strength, numbness, or tremors  SKIN: No itching, burning, rashes, or lesions   LYMPH NODES: No enlarged glands  ENDOCRINE: No heat or cold intolerance; No hair loss  MUSCULOSKELETAL: No joint pain or swelling; No muscle, back, or extremity pain  PSYCHIATRIC: No depression, anxiety, mood swings, or difficulty sleeping  HEME/LYMPH: No easy bruising, or bleeding gums  ALLERY AND IMMUNOLOGIC: No hives or eczema    T(C): 36.9 (03-24-22 @ 05:15), Max: 37.3 (03-23-22 @ 21:27)  HR: 86 (03-24-22 @ 07:58) (81 - 103)  BP: 93/48 (03-24-22 @ 07:58) (91/46 - 126/63)  RR: 18 (03-24-22 @ 05:15) (18 - 20)  SpO2: 95% (03-24-22 @ 07:58) (91% - 99%)  Wt(kg): --Vital Signs Last 24 Hrs  T(C): 36.9 (24 Mar 2022 05:15), Max: 37.3 (23 Mar 2022 21:27)  T(F): 98.5 (24 Mar 2022 05:15), Max: 99.1 (23 Mar 2022 21:27)  HR: 86 (24 Mar 2022 07:58) (81 - 103)  BP: 93/48 (24 Mar 2022 07:58) (91/46 - 126/63)  BP(mean): 59 (24 Mar 2022 07:58) (59 - 59)  RR: 18 (24 Mar 2022 05:15) (18 - 20)  SpO2: 95% (24 Mar 2022 07:58) (91% - 99%)    EXAM:  GENERAL:  well-groomed, well-developed, slight distress 2/2 pain   HEAD:  Atraumatic, Normocephalic  EYES: EOMI, PERRLA, conjunctiva and sclera clear  ENMT: No tonsillar erythema, exudates, or enlargement; Moist mucous membranes, Good dentition, No lesions  NECK: Supple, No JVD, Normal thyroid  NERVOUS SYSTEM:  Alert & Oriented , ;   CHEST/LUNG: Clear to percussion bilaterally; No rales, rhonchi, wheezing, or rubs  HEART: Regular rate and rhythm; No murmurs, rubs, or gallops  ABDOMEN: Soft, ++tender, Nondistended; surgical sites clean  EXTREMITIES:  2+ Peripheral Pulses, No clubbing, cyanosis, or edema  LYMPH: No lymphadenopathy noted  SKIN: No rashes     Consultant(s) Notes Reviewed:  [x ] YES  [ ] NO  Care Discussed with Consultants/Other Providers [ x] YES  [ ] NO    LABS:  CBC   03-24-22 @ 05:14  Hematcorit 29.6  Hemoglobin 9.2  Mean Cell Hemoglobin 28.8  Platelet Count-Automated 262  RBC Count 3.19  Red Cell Distrib Width 18.9  Wbc Count 36.90      BMP  03-24-22 @ 05:14  Anion Gap. Serum 8  Blood Urea Nitrogen,Serm 20  Calcium, Total Serum 7.9  Carbon Dioxide, Serum 25  Chloride, Serum 102  Creatinine, Serum 1.61  eGFR in  --  eGFR in Non Afican American --  Gloucose, serum 94  Potassium, Serum 5.0  Sodium, Serum 135              03-23-22 @ 06:48  Anion Gap. Serum 4  Blood Urea Nitrogen,Serm 14  Calcium, Total Serum 8.4  Carbon Dioxide, Serum 29  Chloride, Serum 104  Creatinine, Serum 0.66  eGFR in  --  eGFR in Non Afican American --  Gloucose, serum 87  Potassium, Serum 3.6  Sodium, Serum 137              03-22-22 @ 06:22  Anion Gap. Serum 7  Blood Urea Nitrogen,Serm 12  Calcium, Total Serum 7.8  Carbon Dioxide, Serum 28  Chloride, Serum 106  Creatinine, Serum 0.57  eGFR in  --  eGFR in Non Afican American --  Gloucose, serum 88  Potassium, Serum 3.5  Sodium, Serum 141                  CMP  03-24-22 @ 05:14  Kiya Aminotransferase(ALT/SGPT)--  Albumin, Serum --  Alkaline Phosphatase, Serum --  Anion Gap, Serum 8  Aspartate Aminotransferase (AST/SGOT)--  Bilirubin Total, Serum --  Blood Urea Nitrogen, Serum 20  Calcium,Total Serum 7.9  Carbon Dioxide, Serum 25  Chloride, Serum 102  Creatinine, Serum 1.61  eGFR if  --  eGFR if Non African American --  Glucose, Serum 94  Potassium, Serum 5.0  Protein Total, Serum --  Sodium, Serum 135                      03-23-22 @ 06:48  Kiya Aminotransferase(ALT/SGPT)--  Albumin, Serum --  Alkaline Phosphatase, Serum --  Anion Gap, Serum 4  Aspartate Aminotransferase (AST/SGOT)--  Bilirubin Total, Serum --  Blood Urea Nitrogen, Serum 14  Calcium,Total Serum 8.4  Carbon Dioxide, Serum 29  Chloride, Serum 104  Creatinine, Serum 0.66  eGFR if  --  eGFR if Non African American --  Glucose, Serum 87  Potassium, Serum 3.6  Protein Total, Serum --  Sodium, Serum 137                      03-22-22 @ 06:22  Kiya Aminotransferase(ALT/SGPT)10  Albumin, Serum 1.7  Alkaline Phosphatase, Serum 67  Anion Gap, Serum 7  Aspartate Aminotransferase (AST/SGOT)21  Bilirubin Total, Serum 0.5  Blood Urea Nitrogen, Serum 12  Calcium,Total Serum 7.8  Carbon Dioxide, Serum 28  Chloride, Serum 106  Creatinine, Serum 0.57  eGFR if  --  eGFR if Non African American --  Glucose, Serum 88  Potassium, Serum 3.5  Protein Total, Serum 5.1  Sodium, Serum 141                          PT/INR      Amylase/Lipase            RADIOLOGY & ADDITIONAL TESTS:    Imaging Personally Reviewed:  [ ] YES  [ ] NO

## 2022-03-24 NOTE — RAPID RESPONSE TEAM SUMMARY - NSSITUATIONBACKGROUNDRRT_GEN_ALL_CORE
Patient is 88 YO female with medical history of A fib with RVR, SBO, Hypertension, Early CMML, s/p WILLIAM, ileocoloic valve, rapid response was called as patient had sudden onset of chest pain, localized to left side, new , constant, non radiating to arms or back. Patient was also hypoxic to 80's on RA. Bedside EKG performed which showed new T wave inversions in anterolateral leads with a fib. Patient was given 1 mg of Morphine. No nitro given as BP was low .  Patient shane also reported that pain is now moving to epigastric area and she is having belching, no nausea though. She was given Maalox and Protonix.   Since patient is high risk for PE (Cancer and off anticoagulation due to bleeding risk), will send basic labs, cardiac enzymes, D-Dimers, BMP, pro-BNP   Will also consider one dose of FD Lovenox   Plan discussed with Dr Chacon and endorsed to PA    Patient is 88 YO female with medical history of A fib with RVR, SBO, Hypertension, Early CMML, s/p WILLIAM, ileocoloic valve, rapid response was called as patient had sudden onset of chest pain, localized to left side, new , constant, non radiating to arms or back. Patient was also hypoxic to 80's on RA. Bedside EKG performed which showed new T wave inversions in anterolateral leads with a fib. Patient was given 1 mg of Morphine. No nitro given as BP was low .  Patient later also reported that pain is now moving to epigastric area and she is having belching, no nausea though. She was given Maalox and Protonix.   Since patient is high risk for PE (Cancer and off anticoagulation due to bleeding risk), will send basic labs, cardiac enzymes, D-Dimers, BMP, pro-BNP  Plan discussed with Dr Chacon and endorsed to PA

## 2022-03-25 NOTE — PROGRESS NOTE ADULT - SUBJECTIVE AND OBJECTIVE BOX
NICK OBRIEN  89y  Female      Patient is a 89y old  Female who presents with a chief complaint of small bowel obstruction (25 Mar 2022 08:56)        PAST MEDICAL/SURGICAL HISTORY  PAST MEDICAL & SURGICAL HISTORY:  Breast cancer    Uterine cancer    Atrial fibrillation    Breast cancer  s/p chemo and mammogram    Uterine cancer  s/p radiation and hysterectomy    Afib    H/O mastectomy    H/O: hysterectomy    S/P bilateral mastectomy    History of hysterectomy        REVIEW OF SYSTEMS:  CONSTITUTIONAL: No fever, weight loss, or fatigue  EYES: No eye pain, visual disturbances, or discharge  ENMT:  No difficulty hearing, tinnitus, vertigo; No sinus or throat pain  NECK: No pain or stiffness  BREASTS: No pain, masses, or nipple discharge  RESPIRATORY: No cough, wheezing, chills or hemoptysis; No shortness of breath  CARDIOVASCULAR: No chest pain, palpitations, dizziness, or leg swelling  GASTROINTESTINAL: No abdominal or epigastric pain. No nausea, vomiting, or hematemesis; No diarrhea or constipation. No melena or hematochezia.  GENITOURINARY: No dysuria, frequency, hematuria, or incontinence  NEUROLOGICAL: No headaches, memory loss, loss of strength, numbness, or tremors  SKIN: No itching, burning, rashes, or lesions   LYMPH NODES: No enlarged glands  ENDOCRINE: No heat or cold intolerance; No hair loss  MUSCULOSKELETAL: No joint pain or swelling; No muscle, back, or extremity pain  PSYCHIATRIC: No depression, anxiety, mood swings, or difficulty sleeping  HEME/LYMPH: No easy bruising, or bleeding gums  ALLERY AND IMMUNOLOGIC: No hives or eczema    T(C): 36.9 (03-25-22 @ 05:00), Max: 36.9 (03-24-22 @ 21:25)  HR: 95 (03-25-22 @ 05:00) (86 - 95)  BP: 135/70 (03-25-22 @ 05:00) (105/66 - 135/70)  RR: 18 (03-25-22 @ 05:00) (18 - 20)  SpO2: 98% (03-25-22 @ 05:00) (94% - 98%)  Wt(kg): --Vital Signs Last 24 Hrs  T(C): 36.9 (25 Mar 2022 05:00), Max: 36.9 (24 Mar 2022 21:25)  T(F): 98.4 (25 Mar 2022 05:00), Max: 98.4 (24 Mar 2022 21:25)  HR: 95 (25 Mar 2022 05:00) (86 - 95)  BP: 135/70 (25 Mar 2022 05:00) (105/66 - 135/70)  BP(mean): --  RR: 18 (25 Mar 2022 05:00) (18 - 20)  SpO2: 98% (25 Mar 2022 05:00) (94% - 98%)    PHYSICAL EXAM:  GENERAL: NAD, well-groomed, well-developed  HEAD:  Atraumatic, Normocephalic  EYES: EOMI, PERRLA, conjunctiva and sclera clear  ENMT: No tonsillar erythema, exudates, or enlargement; Moist mucous membranes, Good dentition, No lesions  NECK: Supple, No JVD, Normal thyroid  NERVOUS SYSTEM:  Alert & Oriented X3, Good concentration; Motor Strength 5/5 B/L upper and lower extremities; DTRs 2+ intact and symmetric  CHEST/LUNG: Clear to percussion bilaterally; No rales, rhonchi, wheezing, or rubs  HEART: Regular rate and rhythm; No murmurs, rubs, or gallops  ABDOMEN: Soft, Nontender, Nondistended; Bowel sounds present  EXTREMITIES:  2+ Peripheral Pulses, No clubbing, cyanosis, or edema  LYMPH: No lymphadenopathy noted  SKIN: No rashes or lesions    Consultant(s) Notes Reviewed:  [x ] YES  [ ] NO  Care Discussed with Consultants/Other Providers [ x] YES  [ ] NO    LABS:  CBC       BMP  03-24-22 @ 05:14  Anion Gap. Serum 8  Blood Urea Nitrogen,Serm 20  Calcium, Total Serum 7.9  Carbon Dioxide, Serum 25  Chloride, Serum 102  Creatinine, Serum 1.61  eGFR in  --  eGFR in Non Afican American --  Gloucose, serum 94  Potassium, Serum 5.0  Sodium, Serum 135              03-23-22 @ 06:48  Anion Gap. Serum 4  Blood Urea Nitrogen,Serm 14  Calcium, Total Serum 8.4  Carbon Dioxide, Serum 29  Chloride, Serum 104  Creatinine, Serum 0.66  eGFR in  --  eGFR in Non Afican American --  Gloucose, serum 87  Potassium, Serum 3.6  Sodium, Serum 137                  CMP  03-24-22 @ 05:14  Kiya Aminotransferase(ALT/SGPT)--  Albumin, Serum --  Alkaline Phosphatase, Serum --  Anion Gap, Serum 8  Aspartate Aminotransferase (AST/SGOT)--  Bilirubin Total, Serum --  Blood Urea Nitrogen, Serum 20  Calcium,Total Serum 7.9  Carbon Dioxide, Serum 25  Chloride, Serum 102  Creatinine, Serum 1.61  eGFR if  --  eGFR if Non African American --  Glucose, Serum 94  Potassium, Serum 5.0  Protein Total, Serum --  Sodium, Serum 135                      03-23-22 @ 06:48  Kiya Aminotransferase(ALT/SGPT)--  Albumin, Serum --  Alkaline Phosphatase, Serum --  Anion Gap, Serum 4  Aspartate Aminotransferase (AST/SGOT)--  Bilirubin Total, Serum --  Blood Urea Nitrogen, Serum 14  Calcium,Total Serum 8.4  Carbon Dioxide, Serum 29  Chloride, Serum 104  Creatinine, Serum 0.66  eGFR if  --  eGFR if Non African American --  Glucose, Serum 87  Potassium, Serum 3.6  Protein Total, Serum --  Sodium, Serum 137                          PT/INR      Amylase/Lipase            RADIOLOGY & ADDITIONAL TESTS:    Imaging Personally Reviewed:  [ ] YES  [ ] NO NICK OBRIEN  89y  Female      Patient is a 89y old  Female who presents with a chief complaint of small bowel obstruction (25 Mar 2022 08:56)    INTERVAL HPI/OVERNIGHT EVENTS:    Pt had n/v yesterday s/p  zofran/reglan. Pt feeling very weak. Pt abd rigid. s/p picc line         PAST MEDICAL/SURGICAL HISTORY  PAST MEDICAL & SURGICAL HISTORY:  Breast cancer    Uterine cancer    Atrial fibrillation    Breast cancer  s/p chemo and mammogram    Uterine cancer  s/p radiation and hysterectomy    Afib    H/O mastectomy    H/O: hysterectomy    S/P bilateral mastectomy    History of hysterectomy        REVIEW OF SYSTEMS:  CONSTITUTIONAL: No fever, weight loss, or fatigue  EYES: No eye pain, visual disturbances, or discharge  ENMT:  No difficulty hearing, tinnitus, vertigo; No sinus or throat pain  NECK: No pain or stiffness  BREASTS: No pain, masses, or nipple discharge  RESPIRATORY: No cough, wheezing, chills or hemoptysis; No shortness of breath  CARDIOVASCULAR: No chest pain, palpitations, dizziness, or leg swelling  GASTROINTESTINAL: + abdominal  pain. No nausea, vomiting, or hematemesis  GENITOURINARY: No dysuria, frequency, hematuria, or incontinence  NEUROLOGICAL: No headaches, memory loss, loss of strength, numbness, or tremors  SKIN: No itching, burning, rashes, or lesions   LYMPH NODES: No enlarged glands  ENDOCRINE: No heat or cold intolerance; No hair loss  MUSCULOSKELETAL: No joint pain or swelling; No muscle, back, or extremity pain  PSYCHIATRIC: No depression, anxiety, mood swings, or difficulty sleeping  HEME/LYMPH: No easy bruising, or bleeding gums  ALLERY AND IMMUNOLOGIC: No hives or eczema    T(C): 36.9 (03-25-22 @ 05:00), Max: 36.9 (03-24-22 @ 21:25)  HR: 95 (03-25-22 @ 05:00) (86 - 95)  BP: 135/70 (03-25-22 @ 05:00) (105/66 - 135/70)  RR: 18 (03-25-22 @ 05:00) (18 - 20)  SpO2: 98% (03-25-22 @ 05:00) (94% - 98%)  Wt(kg): --Vital Signs Last 24 Hrs  T(C): 36.9 (25 Mar 2022 05:00), Max: 36.9 (24 Mar 2022 21:25)  T(F): 98.4 (25 Mar 2022 05:00), Max: 98.4 (24 Mar 2022 21:25)  HR: 95 (25 Mar 2022 05:00) (86 - 95)  BP: 135/70 (25 Mar 2022 05:00) (105/66 - 135/70)  BP(mean): --  RR: 18 (25 Mar 2022 05:00) (18 - 20)  SpO2: 98% (25 Mar 2022 05:00) (94% - 98%)        EXAM:  GENERAL:  well-groomed, well-developed, tierd and weak  HEAD:  Atraumatic, Normocephalic  EYES: EOMI, PERRLA, conjunctiva and sclera clear  ENMT: No tonsillar erythema, exudates, or enlargement; Moist mucous membranes, Good dentition, No lesions  NECK: Supple, No JVD, Normal thyroid  NERVOUS SYSTEM:  Alert & Oriented , ;   CHEST/LUNG: No rales, rhonchi, wheezing, or rubs  HEART: s1,s2 , No murmurs, rubs, or gallops  ABDOMEN: Soft, ++tender, Nondistended; surgical sites clean, rigid  EXTREMITIES:  2+ Peripheral Pulses, No clubbing, cyanosis, or edema, picc line present on left arm   LYMPH: No lymphadenopathy noted  SKIN: chronic skin changes present      Consultant(s) Notes Reviewed:  [x ] YES  [ ] NO  Care Discussed with Consultants/Other Providers [ x] YES  [ ] NO    LABS:  CBC       BMP  03-24-22 @ 05:14  Anion Gap. Serum 8  Blood Urea Nitrogen,Serm 20  Calcium, Total Serum 7.9  Carbon Dioxide, Serum 25  Chloride, Serum 102  Creatinine, Serum 1.61  eGFR in  --  eGFR in Non Afican American --  Gloucose, serum 94  Potassium, Serum 5.0  Sodium, Serum 135              03-23-22 @ 06:48  Anion Gap. Serum 4  Blood Urea Nitrogen,Serm 14  Calcium, Total Serum 8.4  Carbon Dioxide, Serum 29  Chloride, Serum 104  Creatinine, Serum 0.66  eGFR in  --  eGFR in Non Afican American --  Gloucose, serum 87  Potassium, Serum 3.6  Sodium, Serum 137                  CMP  03-24-22 @ 05:14  Kiya Aminotransferase(ALT/SGPT)--  Albumin, Serum --  Alkaline Phosphatase, Serum --  Anion Gap, Serum 8  Aspartate Aminotransferase (AST/SGOT)--  Bilirubin Total, Serum --  Blood Urea Nitrogen, Serum 20  Calcium,Total Serum 7.9  Carbon Dioxide, Serum 25  Chloride, Serum 102  Creatinine, Serum 1.61  eGFR if  --  eGFR if Non African American --  Glucose, Serum 94  Potassium, Serum 5.0  Protein Total, Serum --  Sodium, Serum 135                      03-23-22 @ 06:48  Kiya Aminotransferase(ALT/SGPT)--  Albumin, Serum --  Alkaline Phosphatase, Serum --  Anion Gap, Serum 4  Aspartate Aminotransferase (AST/SGOT)--  Bilirubin Total, Serum --  Blood Urea Nitrogen, Serum 14  Calcium,Total Serum 8.4  Carbon Dioxide, Serum 29  Chloride, Serum 104  Creatinine, Serum 0.66  eGFR if  --  eGFR if Non African American --  Glucose, Serum 87  Potassium, Serum 3.6  Protein Total, Serum --  Sodium, Serum 137                          PT/INR      Amylase/Lipase

## 2022-03-25 NOTE — PROGRESS NOTE ADULT - ASSESSMENT
88yo F PMHx of Uterine CA s/p hysterectomy, A. Fib, breast CA s/p b/l mastectomy, recent MDS diagnosis who presented with SBO. Patient went for ex-lap with lysis of adhesions and ileocolic bypass with anastomosis on 3/11/22. Medicine initially following for A. Fib with RVR, re-consulted for bilateral LE edema.  Pt was rapid response overnight for new onset chest pain and was desaturating to 83s.       #new sob/ chest pain:  t1 61  f/u t2  echo showed rv pressure 60 , pul htn,   CXR shows bilateral pleural effusions, patient may benefit from diuresis when BP more stable      #abd pain:  pt c/o abd pain, but no nausea or emesis   bowel sounds noticed  abd x-ray showed ilieus with stool in lower rectum, may consider suppository or enema      #valerie   most likely pre-renal decreased intravascular vol   f/u cr  f/u urine lytes  wilson present    #elevated wbc   consult hem-onc   89yo F PMHx of Uterine CA s/p hysterectomy, A. Fib, breast CA s/p b/l mastectomy, recent MDS diagnosis who presented with SBO. Patient went for ex-lap with lysis of adhesions and ileocolic bypass with anastomosis on 3/11/22. Medicine initially following for A. Fib with RVR, re-consulted for bilateral LE edema.  Pt was rapid response overnight for new onset chest pain and was desaturating to 83s.         #abd pain:  ilius on xray abd, on exam abd rigid  s/p enema ,  plan for tpn  s/p picc line   monitor for any infection signs     #valerie   most likely pre-renal decreased intravascular vol   f/u cr  f/u urine lytes  wilson present  added on serum cr   please send cmp for am labs     #elevated wbc   consult hem-onc    #elevated d-dimer   8k  in setting of post operative status  off of o2  le dopplers were neg and no clinical indication of any repeat study  will keep monitoring for any o2 requirement change     discussed with Dr. Ewing , agree with above  89yo F PMHx of Uterine CA s/p hysterectomy, A. Fib, breast CA s/p b/l mastectomy, recent MDS diagnosis who presented with SBO. Patient went for ex-lap with lysis of adhesions and ileocolic bypass with anastomosis on 3/11/22. Medicine initially following for A. Fib with RVR, re-consulted for bilateral LE edema.  Pt was rapid response overnight for new onset chest pain and was desaturating to 83s.         #abd pain:  ilius on xray abd, on exam abd distended  s/p enema ,  plan for tpn  s/p picc line   monitor for any infection signs     #valerie   most likely pre-renal decreased intravascular vol   f/u cr  f/u urine lytes  wilson present  added on serum cr   please send cmp for am labs     #elevated wbc   consult hem-onc    #elevated d-dimer   8k  in setting of post operative status and CMML  off of o2  repeat LE dopplers given LE  O2 improved and has bilateral     discussed with Dr. Ewing , agree with above  88yo F PMHx of Uterine CA s/p hysterectomy, A. Fib, breast CA s/p b/l mastectomy, recent MDS diagnosis who presented with SBO. Patient went for ex-lap with lysis of adhesions and ileocolic bypass with anastomosis on 3/11/22. Medicine initially following for A. Fib with RVR, re-consulted for bilateral LE edema.  Pt was rapid response overnight for new onset chest pain and was desaturating to 83s.         #abd pain:  ilius on xray abd, on exam abd distended  s/p enema ,  plan for tpn  s/p picc line   monitor for any infection signs     #valerie   most likely pre-renal decreased intravascular vol   f/u cr  f/u urine lytes  wilson present  added on serum cr   please send cmp for am labs     #elevated wbc   consult hem-onc    #elevated d-dimer   8k  in setting of post operative status and CMML  off of o2  repeat LE dopplers given LE and elevated D-Dimer  O2 improved and has bilateral     discussed with Dr. Ewing , agree with above

## 2022-03-25 NOTE — PROGRESS NOTE ADULT - SUBJECTIVE AND OBJECTIVE BOX
INTERVAL HPI/OVERNIGHT EVENTS:    had n/v yesterday  improved with zofran/reglan  mild abd pain, gas pain      MEDICATIONS  (STANDING):  anastrozole 1 milliGRAM(s) Oral daily  chlorhexidine 2% Cloths 1 Application(s) Topical daily  digoxin  Injectable 125 MICROGram(s) IV Push daily  heparin   Injectable 5000 Unit(s) SubCutaneous every 12 hours  metoprolol succinate ER 25 milliGRAM(s) Oral daily  mupirocin 2% Nasal 1 Application(s) Both Nostrils every 12 hours  pantoprazole  Injectable 40 milliGRAM(s) IV Push daily  sodium chloride 0.9%. 1000 milliLiter(s) (30 mL/Hr) IV Continuous <Continuous>    MEDICATIONS  (PRN):  acetaminophen     Tablet .. 650 milliGRAM(s) Oral every 6 hours PRN Temp greater or equal to 38C (100.4F), Mild Pain (1 - 3)  ondansetron Injectable 4 milliGRAM(s) IV Push every 6 hours PRN Nausea and/or Vomiting      Vital Signs Last 24 Hrs  T(C): 36.9 (25 Mar 2022 05:00), Max: 36.9 (24 Mar 2022 21:25)  T(F): 98.4 (25 Mar 2022 05:00), Max: 98.4 (24 Mar 2022 21:25)  HR: 95 (25 Mar 2022 05:00) (86 - 95)  BP: 135/70 (25 Mar 2022 05:00) (105/66 - 135/70)  BP(mean): --  RR: 18 (25 Mar 2022 05:00) (18 - 20)  SpO2: 98% (25 Mar 2022 05:00) (94% - 98%)      PHYSICAL EXAM  General: Alert and oriented, not in acute distress  Resp: Breathing unlabored  Abdomen: Soft, mildly distended, mild ttp  : wilson clear  Extremities: No pedal edema    I&O's Detail    24 Mar 2022 07:01  -  25 Mar 2022 07:00  --------------------------------------------------------  IN:  Total IN: 0 mL    OUT:    Indwelling Catheter - Urethral (mL): 300 mL  Total OUT: 300 mL    Total NET: -300 mL          LABS:                        9.2    36.90 )-----------( 262      ( 24 Mar 2022 05:14 )             29.6             03-24    135  |  102  |  20<H>  ----------------------------<  94  5.0   |  25  |  1.61<H>    Ca    7.9<L>      24 Mar 2022 05:14  Phos  4.8     03-24  Mg     1.6     03-24

## 2022-03-25 NOTE — CONSULT NOTE ADULT - ASSESSMENT
[facility] NEPHROLOGY  Yinka Davey M.D.  Dominick Gold D.O.  Jalyn Quintana M.D.  Liliana Samayoa, MSN, ANP-C  [phone]    [address] [place] [address]   88 year old female with PMH of Chronic myelomonocytic leukemia (CMML), Afib on metoprolol (no A/C), uterine CA s/p radiation and hysterectomy, breast cancer s/p chemo and bilateral mastectomy presents with lower abdominal pain with nausea and vomiting. Pt a/w small bowel obstruction and Afib with RVR. s/p  Laparoscopic WILLIAM with ileocolic bypass on 3/11.  Complicated by early SBO. s/p RRT on 3/24 for chest pain found to have hypoxia. Nephrology consulted for TPN    1. Severe PCM- prolonged NPO (since 3/8) status due to SBO with edema; unable to tolerated FLD. Pt unaware of weight loss. Leukocytosis due to h/o CMML. BCX 3/17 NG.   Appropriate for TPN. s/p LUE PICC line placement.   Will give TPN with lipids @ 1L (1/2 dose on first day) on 3/26. Will d/c IVF once TPN is initiated.    Check FS 15 min and 1hr after starting TPN and then q6h thereafter. Check hgbA1C in am.   Check triglycerides. Check CMP/Mg/Phos/ Ionized calcium in am.   Daily weights. Strict I/O. Hold TPN if pt spikes fever and check BCX x2    2. MERCEDEZ- likely hemodynamically mediated in the setting of hypotension with n/v. Pt intravascularly depleted; recc to d/c IVF and give albumin gtt x 24 hrs. Check urine lytes. CT abd pelvis with no hydro. Strict I/Os. Avoid nephrotoxins/ NSAIDs/ RCA. Monitor BMP.    3. Small bowel obstruction- pt refusing NGT. Plan as per Surgery    4. LE edema- Dopplers 3/17 neg for LE DVT b/l. Likely intravascularly depleted with 3rd spacing. Recc albumin assisted diuresis. Give albumin 30 mins to Lasix 20mg IV x1 (as long as BP tolerates). Recc LUE dopplers to r/o DVT.       [facility] NEPHROLOGY  Yinka Davey M.D.  Dominick Gold D.O.  Jalyn Quintana M.D.  Liliana Samayoa, ANIKET, ANP-C  [phone]    [address] [place] [address]

## 2022-03-25 NOTE — PROGRESS NOTE ADULT - NS ATTEND AMEND GEN_ALL_CORE FT
Early postop SBO, persistent on CT. In addition, there is constipation which is contributing to the PO intolerance. Refused NG tube.   Oral contrast from the last CT has made it past the anastomosis which rules out obstruction or leak at that site. Small SQ fluid collection at the 12 port site is a seroma.     Plan:   Start TPN thought PICC  NPO for now, possibly advance to clear liquids tomorrow if on N/V  Discharge planning to Rehab with TPN by mid next week

## 2022-03-25 NOTE — CONSULT NOTE ADULT - SUBJECTIVE AND OBJECTIVE BOX
[facility] NEPHROLOGY- METABOLIC SUPPORT SERVICE CONSULTATION NOTE    88 year old female with PMH of Chronic myelomonocytic leukemia (CMML), Afib on metoprolol (no A/C), uterine CA s/p radiation and hysterectomy, breast cancer s/p chemo and bilateral mastectomy presents with lower abdominal pain with nausea and vomiting. Pt a/w small bowel obstruction and Afib with RVR. s/p  Laparoscopic WILLIAM with ileocolic bypass on 3/11.  Complicated by early SBO. s/p RRT on 3/24 for chest pain found to have hypoxia. Nephrology consulted for TPN    Pt was NPO since 3/8 and then tolerated CLD on 3/22-3/23 but unable to tolerate FLD. Pt c/o nausea currently and had 2 episodes of vomiting last night. Pt refusing NGT. Pt states she had a small BM on 3/24 post enema. Pt unaware of any weight loss. Pt denies any SOB or chest pain. +LE edema and LUE edema. +mild abd pain        PAST MEDICAL & SURGICAL HISTORY:  Breast cancer    Uterine cancer    Atrial fibrillation    Breast cancer  s/p chemo and mammogram    Uterine cancer  s/p radiation and hysterectomy    Afib    H/O mastectomy    H/O: hysterectomy    S/P bilateral mastectomy    History of hysterectomy      contrast media (iodine-based) (Short breath)  iv  contrast (Unknown)  penicillin (Hives)  penicillin (Rash)  sulfa drugs (Short breath)  Sulfacetamide Sodium (Rash)    Home Medications Reviewed  Hospital Medications:   MEDICATIONS  (STANDING):  anastrozole 1 milliGRAM(s) Oral daily  chlorhexidine 2% Cloths 1 Application(s) Topical daily  chlorhexidine 2% Cloths 1 Application(s) Topical <User Schedule>  digoxin  Injectable 125 MICROGram(s) IV Push daily  heparin   Injectable 5000 Unit(s) SubCutaneous every 12 hours  metoprolol succinate ER 25 milliGRAM(s) Oral daily  mupirocin 2% Nasal 1 Application(s) Both Nostrils every 12 hours  pantoprazole  Injectable 40 milliGRAM(s) IV Push daily  sodium chloride 0.9%. 1000 milliLiter(s) (30 mL/Hr) IV Continuous <Continuous>    SOCIAL HISTORY:  No toxic habits  FAMILY HISTORY:  No pertinent family history in first degree relatives      REVIEW OF SYSTEMS:  CONSTITUTIONAL: No  fevers or chills +fatigu  RESPIRATORY: No cough,  or shortness of breath  CARDIOVASCULAR: No chest pain or palpitations.  GASTROINTESTINAL: +mild abdominal  pain. + nausea. No vomiting today (2 episodes last night). +BM small yesterday.   GENITOURINARY: No dysuria,   NEUROLOGICAL: No numbness or weakness  SKIN: No rashes  VASCULAR: +bilateral lower extremity edema. +LUE edema (even prior to PICC line placement as per pt)  All other review of systems is negative unless indicated above.    VITALS:  T(F): 97.6 (03-25-22 @ 12:43), Max: 98.4 (03-24-22 @ 21:25)  HR: 82 (03-25-22 @ 12:43)  BP: 111/53 (03-25-22 @ 12:43)  RR: 20 (03-25-22 @ 12:43)  SpO2: 94% (03-25-22 @ 12:43)  Wt(kg): --  Height (cm): 172.7 (03-08 @ 07:54)  Weight (kg): 65.5 (03-12 @ 23:45)  BMI (kg/m2): 22 (03-12 @ 23:45)  BSA (m2): 1.78 (03-12 @ 23:45)    03-24 @ 07:01  -  03-25 @ 07:00  --------------------------------------------------------  IN: 0 mL / OUT: 300 mL / NET: -300 mL    03-25 @ 07:01  -  03-25 @ 15:17  --------------------------------------------------------  IN: 0 mL / OUT: 400 mL / NET: -400 mL      PHYSICAL EXAM:  Constitutional: NAD,   HEENT: anicteric sclera/ mild temporal wasting  Neck: No JVD  Respiratory: decreased BS at bases  Cardiovascular: S1, S2, RRR  Gastrointestinal: +BS laproscopic incisions covered  +epigastric and bill-umbilical pain on palpation  Extremities: +LUE edema b/ +1-2 pitting LE edema  Neurological: A/O x 3,   Psychiatric: Normal mood, normal affect  : + wilson.   Skin: No rashes  Vascular Access: +LUE single lumen PICC benign, lumen saved for TPN    LABS:  03-24    135  |  102  |  20<H>  ----------------------------<  94  5.0   |  25  |  1.61<H>    Ca    7.9<L>      24 Mar 2022 05:14  Phos  4.8     03-24  Mg     1.6     03-24      Creatinine Trend: 1.61 <--, 0.66 <--, 0.57 <--, 0.50 <--, 0.54 <--, 0.57 <--, 0.61 <--                        9.1    35.65 )-----------( 270      ( 25 Mar 2022 13:00 )             28.2     Urine Studies:      RADIOLOGY & ADDITIONAL STUDIES:    < from: CT Abdomen and Pelvis No Cont (03.25.22 @ 09:22) >    ACC: [id number] EXAM:  CT ABDOMEN AND PELVIS                        ACC: [id number] EXAM:  CT CHEST                          PROCEDURE DATE:  03/25/2022        < end of copied text >  < from: CT Abdomen and Pelvis No Cont (03.25.22 @ 09:22) >  IMPRESSION:    Limited noncontrast study.  < from: CT Abdomen and Pelvis No Cont (03.25.22 @ 09:22) >  KIDNEYS/URETERS: No hydronephrosis. Left renal cysts, one with peripheral   calcification.    BLADDER: Foleycatheter.    < end of copied text >    CHEST:  1. Moderate pleural effusions, right greater than left.    ABDOMEN/PELVIS:  1. Persistent small bowel obstruction. Small ascites and mesenteric edema.  2. Slight mural thickening versus underdistention of distal small bowel   loops in the pelvis, for example on images [id number] series 2. Question   enteritis. Correlate with lactate level.  3. Anterior superficial soft tissue density versus complex fluid measures   3.5 x 1.7 cm, image 173 series 2, increased in size and more well-defined   in comparison with 3/18/2022, of unclear significance. Correlate with   dedicated ultrasound for characterization.    --- End of Report ---    < end of copied text >

## 2022-03-25 NOTE — CHART NOTE - NSCHARTNOTEFT_GEN_A_CORE
Assessment:   Patient is a 89y old  Female who presents with a chief complaint of small bowel obstruction (25 Mar 2022 14:06) pt s/p Laparoscopic lysis of adhesions, ileocolic intestinal bypass, suture repair of right inguinal hernia 3/11. Pt has keesha either NPO or on liquids with or without PO supplement since admission (when on PO, taking little, not tolerating much, as regressing diet orders. PT reports pt has weak, weaker than a few days ago. Pt with additional fluids from edema and pleural effusion (noted).   Discussed with Sx PAs, Sx PMD and RN, Pt s/p PICC line this day for initiation TPN orders (to start at 1/2 dose tomorrow. Discussed with JOLANTA MD, including TPN orders/ calculations      Factors impacting intake: [ ] none [ ] nausea  [ ] vomiting [ ] diarrhea [ ] constipation  [ ]chewing problems [ ] swallowing issues  [x ] other: altered GI fx/ structure w/ prolonged no or poor PO    Diet Prescription: Diet, NPO:   Except Medications (22 @ 19:10)        Daily     Daily Weight in k.4 (25 Mar 2022 14:00)  Weight in k (13 Mar 2022 07:30)    % Weight Change: 2+ B/L leg edema, UE edema, pleural effusion (masking lean body mass wt loss)    Pertinent Medications: MEDICATIONS  (STANDING):  albumin human 25% IVPB 100 milliLiter(s) IV Intermittent once  anastrozole 1 milliGRAM(s) Oral daily  chlorhexidine 2% Cloths 1 Application(s) Topical <User Schedule>  dextrose 5%. 1000 milliLiter(s) (50 mL/Hr) IV Continuous <Continuous>  dextrose 5%. 1000 milliLiter(s) (100 mL/Hr) IV Continuous <Continuous>  dextrose 50% Injectable 25 Gram(s) IV Push once  dextrose 50% Injectable 12.5 Gram(s) IV Push once  dextrose 50% Injectable 25 Gram(s) IV Push once  digoxin  Injectable 125 MICROGram(s) IV Push daily  furosemide   Injectable 20 milliGRAM(s) IV Push once  glucagon  Injectable 1 milliGRAM(s) IntraMuscular once  heparin   Injectable 5000 Unit(s) SubCutaneous every 12 hours  metoprolol succinate ER 25 milliGRAM(s) Oral daily  pantoprazole  Injectable 40 milliGRAM(s) IV Push daily  sodium chloride 0.9%. 1000 milliLiter(s) (30 mL/Hr) IV Continuous <Continuous>    MEDICATIONS  (PRN):  acetaminophen     Tablet .. 650 milliGRAM(s) Oral every 6 hours PRN Temp greater or equal to 38C (100.4F), Mild Pain (1 - 3)  dextrose Oral Gel 15 Gram(s) Oral once PRN Blood Glucose LESS THAN 70 milliGRAM(s)/deciliter  ondansetron Injectable 4 milliGRAM(s) IV Push every 6 hours PRN Nausea and/or Vomiting  sodium chloride 0.9% lock flush 10 milliLiter(s) IV Push every 1 hour PRN Pre/post blood products, medications, blood draw, and to maintain line patency    Pertinent Labs:  Na--    Glu --    K+ --    Cr  1.76 mg/dL<H> BUN --     Phos 4.8 mg/dL<H>  Alb 1.7 g/dL<L>     CAPILLARY BLOOD GLUCOSE      Estimated Needs:   [ ] no change since previous assessment  [x ] recalculated:  For TPN : 1590 kcals (~25 kcals/ kg IBW @ 140#),  100 gm protein/ day (~25% kcals Protein)/ ~1.5-1.6 gms/ kg/day).    New Nutrition Diagnosis: [x ] PCM (severe) related to acute illness w/ alterered GI fx/ structure as evidenced by <50% intake> 5 days (more likely no/ poor PO x17-18 days, with edema and muscle weakness, temporal wasting ("mild")       Interventions:   Recommend  [ ] Change Diet To:  [ ] Nutrition Supplement  [x ] Nutrition Support: TPN/ lipids orders per JOLANTA MD.  [x ] Other: MD to monitor. RD available.     Monitoring and Evaluation:    [ x ] Tolerance to diet prescription [ x ] weights [ x ] labs[ x ] follow up per protocol  [ ] other:

## 2022-03-25 NOTE — PROGRESS NOTE ADULT - ASSESSMENT
89 yoF s/p lap WILLIAM, ileocolic bypass, pod#14    episode of n/v, NGT refused per pt; improved with antiemetics  vitals stable  AM labs pending    1. s/p ileocolic bypass  - NPO for n/v  - noncont CT a/p; if obstruction evident, will consider PICC/TPN    2. leukocytosis  - 2/2 CMML, resumed anastrozole  - uptrended, likely 2/2 hemoconcentration, awaiting AM labs    3. chest pain  - likely due to demand ischemia, trop mildly elevated, repeat pending  - R>L pleural effusion and atelectasis on CXR  - noncont CT chest  - on telemetry  - d-dimer sent per medicine, appreciate consult/recs    4. a fib  - hepSQ q12 for ppx only d/t bleeding risk, reeval for further need for tx AC  - digoxin as tolerated    5. MERCEDEZ  - likely 2/2 hep lock'ed 2 days ago d/t pleural effusion with diuretic therapy, low po intake  - UO 300cc documented, color improved  - maintenance ivf low rate  - awaiting labs  - wilson for I/O

## 2022-03-26 NOTE — PROGRESS NOTE ADULT - SUBJECTIVE AND OBJECTIVE BOX
[facility] NEPHROLOGY- PROGRESS NOTE    88 year old female with PMH of Chronic myelomonocytic leukemia (CMML), Afib on metoprolol (no A/C), uterine CA s/p radiation and hysterectomy, breast cancer s/p chemo and bilateral mastectomy presents with lower abdominal pain with nausea and vomiting. Pt a/w small bowel obstruction and Afib with RVR. s/p  Laparoscopic WILLIAM with ileocolic bypass on 3/11.  Complicated by early SBO. s/p RRT on 3/24 for chest pain found to have hypoxia. Nephrology consulted for TPN.    REVIEW OF SYSTEMS:  Gen: no changes in weight  Cards: no chest pain  Resp: no dyspnea  GI: no nausea, + vomiting overnight, no diarrhea  Vascular: + LE edema, + LUE edema    contrast media (iodine-based) (Short breath)  iv  contrast (Unknown)  penicillin (Hives)  penicillin (Rash)  sulfa drugs (Short breath)  Sulfacetamide Sodium (Rash)      Hospital Medications: Medications reviewed    VITALS:  T(F): 98 (03-26-22 @ 05:15), Max: 98.2 (03-25-22 @ 21:44)  HR: 81 (03-26-22 @ 05:15)  BP: 156/81 (03-26-22 @ 05:15)  RR: 18 (03-26-22 @ 05:15)  SpO2: 95% (03-26-22 @ 05:15)  Wt(kg): --  Height (cm): 172.7 (03-08 @ 07:54)  Weight (kg): 65.5 (03-12 @ 23:45)  BMI (kg/m2): 22 (03-12 @ 23:45)  BSA (m2): 1.78 (03-12 @ 23:45)    03-25 @ 07:01  -  03-26 @ 07:00  --------------------------------------------------------  IN: 360 mL / OUT: 1050 mL / NET: -690 mL        PHYSICAL EXAM:    Gen: NAD, calm  Cards: RRR, +S1/S2, no M/G/R  Resp: Decreased BS @ bases B/L  GI: soft, NT/ND, NABS  : + wilson  Vascular: 1+ LE edema B/L, LUE edema with PICC intact    LABS:  03-26    135  |  102  |  32<H>  ----------------------------<  106<H>  4.0   |  26  |  1.22    Ca    8.3<L>      26 Mar 2022 07:12    TPro  6.1  /  Alb  2.2<L>  /  TBili  0.7  /  DBili      /  AST  28  /  ALT  14  /  AlkPhos  69  03-26    Creatinine Trend: 1.22 <--, 1.76 <--, 1.61 <--, 0.66 <--, 0.57 <--, 0.50 <--, 0.54 <--                        8.5    32.67 )-----------( 253      ( 26 Mar 2022 07:12 )             26.6     Urine Studies:        RADIOLOGY & ADDITIONAL STUDIES:

## 2022-03-26 NOTE — PROGRESS NOTE ADULT - ASSESSMENT
89F s/p lap WILLIAM, ileocolic bypass, pod#15  nausea/vomiting last night     1. s/p ileocolic bypass  - NPO     2. leukocytosis  - 2/2 CMML, resumed anastrozole    3. chest pain  - likely due to demand ischemia, trop mildly elevated, repeat pending  - R>L pleural effusion and atelectasis on CXR  - noncont CT chest  - on telemetry  - d-dimer sent per medicine, appreciate consult/recs    4. a fib  - hepSQ q12 for ppx only d/t bleeding risk, reeval for further need for tx AC  - digoxin as tolerated    5. MERCEDEZ   - improved Cr wnl  89F s/p lap WILLIAM, ileocolic bypass, pod#15  nausea/vomiting last night     1. s/p ileocolic bypass  - NPO with rec'd TPN via PICC; will  pt again with attending    2. leukocytosis  - 2/2 CMML, resumed anastrozole    3. chest pain  - R>L pleural effusion and atelectasis on CXR  - noncont CT chest with pleural effusion  - on telemetry  - d-dimer sent per medicine, appreciate consult/recs    4. a fib  - hepSQ q12 for ppx only d/t bleeding risk, reeval for further need for tx AC  - digoxin as tolerated    5. MERCEDEZ   - improved Cr wnl  89F s/p lap WILLIAM, ileocolic bypass, pod#15  nausea/vomiting last night     1. s/p ileocolic bypass  - clrs with ensure  - rec'd TPN via PICC; will  pt again with attending    2. leukocytosis  - 2/2 CMML, resumed anastrozole    3. chest pain  - R>L pleural effusion and atelectasis on CXR  - noncont CT chest with pleural effusion  - on telemetry  - d-dimer sent per medicine, appreciate consult/recs    4. a fib  - hepSQ q12 for ppx only d/t bleeding risk, reeval for further need for tx AC  - digoxin as tolerated    5. MERCEDEZ   - improved Cr wnl   - D/c wilson for TOV    6. LUE edema  - elevation      discussed with Dr. Cleveland

## 2022-03-26 NOTE — PROGRESS NOTE ADULT - SUBJECTIVE AND OBJECTIVE BOX
INTERVAL HPI/OVERNIGHT EVENTS:  N/V overnight. Wilson in place. Pt refusing TPA via PICC line stating she believes it is too risky. Counseled pt the importance of nutrition for healing and gaining strength. Pt reports she will think about it.       MEDICATIONS  (STANDING):  anastrozole 1 milliGRAM(s) Oral daily  chlorhexidine 2% Cloths 1 Application(s) Topical <User Schedule>  dextrose 5%. 1000 milliLiter(s) (50 mL/Hr) IV Continuous <Continuous>  dextrose 5%. 1000 milliLiter(s) (100 mL/Hr) IV Continuous <Continuous>  dextrose 50% Injectable 25 Gram(s) IV Push once  dextrose 50% Injectable 25 Gram(s) IV Push once  dextrose 50% Injectable 12.5 Gram(s) IV Push once  digoxin  Injectable 125 MICROGram(s) IV Push daily  fat emulsion (Fish Oil and Plant Based) 20% Infusion 0.3817 Gm/kG/Day (5.21 mL/Hr) IV Continuous <Continuous>  glucagon  Injectable 1 milliGRAM(s) IntraMuscular once  heparin   Injectable 5000 Unit(s) SubCutaneous every 12 hours  insulin lispro (ADMELOG) corrective regimen sliding scale   SubCutaneous every 6 hours  metoprolol succinate ER 25 milliGRAM(s) Oral daily  pantoprazole  Injectable 40 milliGRAM(s) IV Push daily  Parenteral Nutrition - Adult 1 Each (42 mL/Hr) TPN Continuous <Continuous>  sodium chloride 0.9%. 1000 milliLiter(s) (30 mL/Hr) IV Continuous <Continuous>    MEDICATIONS  (PRN):  acetaminophen     Tablet .. 650 milliGRAM(s) Oral every 6 hours PRN Temp greater or equal to 38C (100.4F), Mild Pain (1 - 3)  dextrose Oral Gel 15 Gram(s) Oral once PRN Blood Glucose LESS THAN 70 milliGRAM(s)/deciliter  ondansetron Injectable 4 milliGRAM(s) IV Push every 6 hours PRN Nausea and/or Vomiting  sodium chloride 0.9% lock flush 10 milliLiter(s) IV Push every 1 hour PRN Pre/post blood products, medications, blood draw, and to maintain line patency      Vital Signs Last 24 Hrs  T(C): 36.7 (26 Mar 2022 05:15), Max: 36.8 (25 Mar 2022 21:44)  T(F): 98 (26 Mar 2022 05:15), Max: 98.2 (25 Mar 2022 21:44)  HR: 81 (26 Mar 2022 05:15) (81 - 91)  BP: 156/81 (26 Mar 2022 05:15) (111/53 - 156/81)  BP(mean): --  RR: 18 (26 Mar 2022 05:15) (18 - 20)  SpO2: 95% (26 Mar 2022 05:15) (94% - 99%)    Physical:  General: Alert and oriented, not in acute distress  Resp: Breathing unlabored  Abdomen: Soft, nondistended, nontender; incision sites c/d/i   : No wilson catheter, no dysuria or hematuria  Extremities: No pedal edema    I&O's Detail  25 Mar 2022 07:01  -  26 Mar 2022 07:00  --------------------------------------------------------  IN:    sodium chloride 0.9%: 360 mL  Total IN: 360 mL    OUT:    Indwelling Catheter - Urethral (mL): 1050 mL  Total OUT: 1050 mL  Total NET: -690 mL    LABS:                        8.5    32.67 )-----------( 253      ( 26 Mar 2022 07:12 )             26.6             03-26    135  |  102  |  32<H>  ----------------------------<  106<H>  4.0   |  26  |  1.22    Ca    8.3<L>      26 Mar 2022 07:12    TPro  6.1  /  Alb  2.2<L>  /  TBili  0.7  /  DBili  x   /  AST  28  /  ALT  14  /  AlkPhos  69  03-26    < from: CT Abdomen and Pelvis No Cont (03.25.22 @ 09:22) >  IMPRESSION:  Limited noncontrast study.  CHEST:  1. Moderate pleural effusions, right greater than left.    ABDOMEN/PELVIS:  1. Persistent small bowel obstruction. Small ascites and mesenteric edema.  2. Slight mural thickening versus underdistention of distal small bowel   loops in the pelvis, for example on images 231-240 series 2. Question   enteritis. Correlate with lactate level.  3. Anterior superficial soft tissue density versus complex fluid measures   3.5 x 1.7 cm, image 173 series 2, increased in size and more well-defined   in comparison with 3/18/2022, of unclear significance. Correlate with   dedicated ultrasound for characterization.  --- End of Report ---  < end of copied text >   INTERVAL HPI/OVERNIGHT EVENTS:  N/V overnight. Wilson in place. Pt refusing TPA via PICC line stating she believes it is too risky. Counseled pt the importance of nutrition for healing and gaining strength. Pt reports she will think about it.       MEDICATIONS  (STANDING):  anastrozole 1 milliGRAM(s) Oral daily  chlorhexidine 2% Cloths 1 Application(s) Topical <User Schedule>  dextrose 5%. 1000 milliLiter(s) (50 mL/Hr) IV Continuous <Continuous>  dextrose 5%. 1000 milliLiter(s) (100 mL/Hr) IV Continuous <Continuous>  dextrose 50% Injectable 25 Gram(s) IV Push once  dextrose 50% Injectable 25 Gram(s) IV Push once  dextrose 50% Injectable 12.5 Gram(s) IV Push once  digoxin  Injectable 125 MICROGram(s) IV Push daily  fat emulsion (Fish Oil and Plant Based) 20% Infusion 0.3817 Gm/kG/Day (5.21 mL/Hr) IV Continuous <Continuous>  glucagon  Injectable 1 milliGRAM(s) IntraMuscular once  heparin   Injectable 5000 Unit(s) SubCutaneous every 12 hours  insulin lispro (ADMELOG) corrective regimen sliding scale   SubCutaneous every 6 hours  metoprolol succinate ER 25 milliGRAM(s) Oral daily  pantoprazole  Injectable 40 milliGRAM(s) IV Push daily  Parenteral Nutrition - Adult 1 Each (42 mL/Hr) TPN Continuous <Continuous>  sodium chloride 0.9%. 1000 milliLiter(s) (30 mL/Hr) IV Continuous <Continuous>    MEDICATIONS  (PRN):  acetaminophen     Tablet .. 650 milliGRAM(s) Oral every 6 hours PRN Temp greater or equal to 38C (100.4F), Mild Pain (1 - 3)  dextrose Oral Gel 15 Gram(s) Oral once PRN Blood Glucose LESS THAN 70 milliGRAM(s)/deciliter  ondansetron Injectable 4 milliGRAM(s) IV Push every 6 hours PRN Nausea and/or Vomiting  sodium chloride 0.9% lock flush 10 milliLiter(s) IV Push every 1 hour PRN Pre/post blood products, medications, blood draw, and to maintain line patency      Vital Signs Last 24 Hrs  T(C): 36.7 (26 Mar 2022 05:15), Max: 36.8 (25 Mar 2022 21:44)  T(F): 98 (26 Mar 2022 05:15), Max: 98.2 (25 Mar 2022 21:44)  HR: 81 (26 Mar 2022 05:15) (81 - 91)  BP: 156/81 (26 Mar 2022 05:15) (111/53 - 156/81)  BP(mean): --  RR: 18 (26 Mar 2022 05:15) (18 - 20)  SpO2: 95% (26 Mar 2022 05:15) (94% - 99%)    Physical:  General: Alert and oriented, not in acute distress  Resp: Breathing unlabored  Abdomen: Soft, nondistended, nontender; incision sites c/d/i   : wilson catheter in place with clear yellow output   Extremities: No pedal edema    I&O's Detail  25 Mar 2022 07:01  -  26 Mar 2022 07:00  --------------------------------------------------------  IN:    sodium chloride 0.9%: 360 mL  Total IN: 360 mL    OUT:    Indwelling Catheter - Urethral (mL): 1050 mL  Total OUT: 1050 mL  Total NET: -690 mL    LABS:                        8.5    32.67 )-----------( 253      ( 26 Mar 2022 07:12 )             26.6             03-26    135  |  102  |  32<H>  ----------------------------<  106<H>  4.0   |  26  |  1.22    Ca    8.3<L>      26 Mar 2022 07:12    TPro  6.1  /  Alb  2.2<L>  /  TBili  0.7  /  DBili  x   /  AST  28  /  ALT  14  /  AlkPhos  69  03-26    < from: CT Abdomen and Pelvis No Cont (03.25.22 @ 09:22) >  IMPRESSION:  Limited noncontrast study.  CHEST:  1. Moderate pleural effusions, right greater than left.    ABDOMEN/PELVIS:  1. Persistent small bowel obstruction. Small ascites and mesenteric edema.  2. Slight mural thickening versus underdistention of distal small bowel   loops in the pelvis, for example on images 231-240 series 2. Question   enteritis. Correlate with lactate level.  3. Anterior superficial soft tissue density versus complex fluid measures   3.5 x 1.7 cm, image 173 series 2, increased in size and more well-defined   in comparison with 3/18/2022, of unclear significance. Correlate with   dedicated ultrasound for characterization.  --- End of Report ---  < end of copied text >   INTERVAL HPI/OVERNIGHT EVENTS:  N/V overnight. Wilson in place. Pt refusing TPN via PICC line stating she believes it is too risky. Counseled pt the importance of nutrition for healing and gaining strength. Pt reports she will think about it.       MEDICATIONS  (STANDING):  anastrozole 1 milliGRAM(s) Oral daily  chlorhexidine 2% Cloths 1 Application(s) Topical <User Schedule>  dextrose 5%. 1000 milliLiter(s) (50 mL/Hr) IV Continuous <Continuous>  dextrose 5%. 1000 milliLiter(s) (100 mL/Hr) IV Continuous <Continuous>  dextrose 50% Injectable 25 Gram(s) IV Push once  dextrose 50% Injectable 25 Gram(s) IV Push once  dextrose 50% Injectable 12.5 Gram(s) IV Push once  digoxin  Injectable 125 MICROGram(s) IV Push daily  fat emulsion (Fish Oil and Plant Based) 20% Infusion 0.3817 Gm/kG/Day (5.21 mL/Hr) IV Continuous <Continuous>  glucagon  Injectable 1 milliGRAM(s) IntraMuscular once  heparin   Injectable 5000 Unit(s) SubCutaneous every 12 hours  insulin lispro (ADMELOG) corrective regimen sliding scale   SubCutaneous every 6 hours  metoprolol succinate ER 25 milliGRAM(s) Oral daily  pantoprazole  Injectable 40 milliGRAM(s) IV Push daily  Parenteral Nutrition - Adult 1 Each (42 mL/Hr) TPN Continuous <Continuous>  sodium chloride 0.9%. 1000 milliLiter(s) (30 mL/Hr) IV Continuous <Continuous>    MEDICATIONS  (PRN):  acetaminophen     Tablet .. 650 milliGRAM(s) Oral every 6 hours PRN Temp greater or equal to 38C (100.4F), Mild Pain (1 - 3)  dextrose Oral Gel 15 Gram(s) Oral once PRN Blood Glucose LESS THAN 70 milliGRAM(s)/deciliter  ondansetron Injectable 4 milliGRAM(s) IV Push every 6 hours PRN Nausea and/or Vomiting  sodium chloride 0.9% lock flush 10 milliLiter(s) IV Push every 1 hour PRN Pre/post blood products, medications, blood draw, and to maintain line patency      Vital Signs Last 24 Hrs  T(C): 36.7 (26 Mar 2022 05:15), Max: 36.8 (25 Mar 2022 21:44)  T(F): 98 (26 Mar 2022 05:15), Max: 98.2 (25 Mar 2022 21:44)  HR: 81 (26 Mar 2022 05:15) (81 - 91)  BP: 156/81 (26 Mar 2022 05:15) (111/53 - 156/81)  BP(mean): --  RR: 18 (26 Mar 2022 05:15) (18 - 20)  SpO2: 95% (26 Mar 2022 05:15) (94% - 99%)    Physical:  General: Alert and oriented, not in acute distress  Resp: Breathing unlabored  Abdomen: Soft, nondistended, nontender; incision sites c/d/i   : wilson catheter in place with clear yellow output   Extremities: No pedal edema    I&O's Detail  25 Mar 2022 07:01  -  26 Mar 2022 07:00  --------------------------------------------------------  IN:    sodium chloride 0.9%: 360 mL  Total IN: 360 mL    OUT:    Indwelling Catheter - Urethral (mL): 1050 mL  Total OUT: 1050 mL  Total NET: -690 mL    LABS:                        8.5    32.67 )-----------( 253      ( 26 Mar 2022 07:12 )             26.6             03-26    135  |  102  |  32<H>  ----------------------------<  106<H>  4.0   |  26  |  1.22    Ca    8.3<L>      26 Mar 2022 07:12    TPro  6.1  /  Alb  2.2<L>  /  TBili  0.7  /  DBili  x   /  AST  28  /  ALT  14  /  AlkPhos  69  03-26    < from: CT Abdomen and Pelvis No Cont (03.25.22 @ 09:22) >  IMPRESSION:  Limited noncontrast study.  CHEST:  1. Moderate pleural effusions, right greater than left.    ABDOMEN/PELVIS:  1. Persistent small bowel obstruction. Small ascites and mesenteric edema.  2. Slight mural thickening versus underdistention of distal small bowel   loops in the pelvis, for example on images 231-240 series 2. Question   enteritis. Correlate with lactate level.  3. Anterior superficial soft tissue density versus complex fluid measures   3.5 x 1.7 cm, image 173 series 2, increased in size and more well-defined   in comparison with 3/18/2022, of unclear significance. Correlate with   dedicated ultrasound for characterization.  --- End of Report ---  < end of copied text >

## 2022-03-26 NOTE — PROGRESS NOTE ADULT - NS ATTEND AMEND GEN_ALL_CORE FT
Small amount of vomiting overnight after taking Tylenol PO. Will hold PO meds.   No change in abdominal exam.     s/p left arm PICC, some dependant edema- keep arm elevated  Urine output - improved, creatinine - normal, will d/c Kinsey  Start sips of clears

## 2022-03-26 NOTE — PROGRESS NOTE ADULT - ASSESSMENT
88yo F PMHx of Uterine CA s/p hysterectomy, A. Fib, breast CA s/p b/l mastectomy, recent MDS diagnosis who presented with SBO s/p ileocolic bypass on 3/11. Course complicated by recurrent SBO.    #SBO s/p ileocolic bypass  #Chronic Atrial Fibrillation  #Severe Protein Calorie Malnutrition  #CMML  #Acute Kidney Injury    -management of SBO as per surgery  -A. Fib rate controlled on metoprolol and digoxin  -plan for TPN to start today  -recommend hem/onc consult for leukocytosis  -Cr improving and patient having increased urination, MERCEDEZ likely 2/2 to intravascular depletion

## 2022-03-26 NOTE — PROGRESS NOTE ADULT - SUBJECTIVE AND OBJECTIVE BOX
S:    O:  Vital Signs Last 24 Hrs  T(C): 36.8 (26 Mar 2022 12:49), Max: 36.8 (25 Mar 2022 21:44)  T(F): 98.2 (26 Mar 2022 12:49), Max: 98.2 (25 Mar 2022 21:44)  HR: 87 (26 Mar 2022 12:49) (81 - 91)  BP: 128/55 (26 Mar 2022 12:49) (112/50 - 156/81)  BP(mean): --  RR: 18 (26 Mar 2022 12:49) (18 - 18)  SpO2: 95% (26 Mar 2022 12:49) (95% - 99%)    GENERAL: NAD, well-developed  HEAD:  Atraumatic, Normocephalic  EYES: EOMI, PERRLA, conjunctiva and sclera clear  NECK: Supple, No JVD  CHEST/LUNG: Clear to auscultation bilaterally; No wheeze  HEART: Regular rate and rhythm; No murmurs, rubs, or gallops  ABDOMEN: Soft, Nontender, Nondistended; Bowel sounds present  EXTREMITIES:  2+ Peripheral Pulses, No clubbing, cyanosis, or edema  PSYCH: AAOx3  NEUROLOGY: non-focal  SKIN: No rashes or lesions    acetaminophen     Tablet .. 650 milliGRAM(s) Oral every 6 hours PRN  anastrozole 1 milliGRAM(s) Oral daily  chlorhexidine 2% Cloths 1 Application(s) Topical <User Schedule>  dextrose 5%. 1000 milliLiter(s) IV Continuous <Continuous>  dextrose 5%. 1000 milliLiter(s) IV Continuous <Continuous>  dextrose 50% Injectable 25 Gram(s) IV Push once  dextrose 50% Injectable 12.5 Gram(s) IV Push once  dextrose 50% Injectable 25 Gram(s) IV Push once  dextrose Oral Gel 15 Gram(s) Oral once PRN  digoxin  Injectable 125 MICROGram(s) IV Push daily  glucagon  Injectable 1 milliGRAM(s) IntraMuscular once  heparin   Injectable 5000 Unit(s) SubCutaneous every 12 hours  insulin lispro (ADMELOG) corrective regimen sliding scale   SubCutaneous every 6 hours  metoprolol succinate ER 25 milliGRAM(s) Oral daily  ondansetron Injectable 4 milliGRAM(s) IV Push every 6 hours PRN  pantoprazole  Injectable 40 milliGRAM(s) IV Push daily  sodium chloride 0.9% lock flush 10 milliLiter(s) IV Push every 1 hour PRN  sodium chloride 0.9%. 1000 milliLiter(s) IV Continuous <Continuous>                            8.5    32.67 )-----------( 253      ( 26 Mar 2022 07:12 )             26.6       03-26    135  |  102  |  32<H>  ----------------------------<  106<H>  4.0   |  26  |  1.22    Ca    8.3<L>      26 Mar 2022 07:12    TPro  6.1  /  Alb  2.2<L>  /  TBili  0.7  /  DBili  x   /  AST  28  /  ALT  14  /  AlkPhos  69  03-26   S: No acute overnight events. Patient reports reflux. Abdominal pain improved.    O:  Vital Signs Last 24 Hrs  T(C): 36.8 (26 Mar 2022 12:49), Max: 36.8 (25 Mar 2022 21:44)  T(F): 98.2 (26 Mar 2022 12:49), Max: 98.2 (25 Mar 2022 21:44)  HR: 87 (26 Mar 2022 12:49) (81 - 91)  BP: 128/55 (26 Mar 2022 12:49) (112/50 - 156/81)  BP(mean): --  RR: 18 (26 Mar 2022 12:49) (18 - 18)  SpO2: 95% (26 Mar 2022 12:49) (95% - 99%)    GENERAL: NAD, well-developed  HEAD:  Atraumatic, Normocephalic  EYES: EOMI, PERRLA, conjunctiva and sclera clear  NECK: Supple, No JVD  CHEST/LUNG: Clear to auscultation bilaterally; No wheeze  HEART: Irregular rate and rhythm; No murmurs, rubs, or gallops  ABDOMEN: Soft, Nontender, distended; Hyperactive Bowel sounds present  EXTREMITIES:  2+ Peripheral Pulses, No clubbing, cyanosis, mild LE edema  PSYCH: AAOx3  NEUROLOGY: non-focal  SKIN: No rashes or lesions    acetaminophen     Tablet .. 650 milliGRAM(s) Oral every 6 hours PRN  anastrozole 1 milliGRAM(s) Oral daily  chlorhexidine 2% Cloths 1 Application(s) Topical <User Schedule>  dextrose 5%. 1000 milliLiter(s) IV Continuous <Continuous>  dextrose 5%. 1000 milliLiter(s) IV Continuous <Continuous>  dextrose 50% Injectable 25 Gram(s) IV Push once  dextrose 50% Injectable 12.5 Gram(s) IV Push once  dextrose 50% Injectable 25 Gram(s) IV Push once  dextrose Oral Gel 15 Gram(s) Oral once PRN  digoxin  Injectable 125 MICROGram(s) IV Push daily  glucagon  Injectable 1 milliGRAM(s) IntraMuscular once  heparin   Injectable 5000 Unit(s) SubCutaneous every 12 hours  insulin lispro (ADMELOG) corrective regimen sliding scale   SubCutaneous every 6 hours  metoprolol succinate ER 25 milliGRAM(s) Oral daily  ondansetron Injectable 4 milliGRAM(s) IV Push every 6 hours PRN  pantoprazole  Injectable 40 milliGRAM(s) IV Push daily  sodium chloride 0.9% lock flush 10 milliLiter(s) IV Push every 1 hour PRN  sodium chloride 0.9%. 1000 milliLiter(s) IV Continuous <Continuous>                            8.5    32.67 )-----------( 253      ( 26 Mar 2022 07:12 )             26.6       03-26    135  |  102  |  32<H>  ----------------------------<  106<H>  4.0   |  26  |  1.22    Ca    8.3<L>      26 Mar 2022 07:12    TPro  6.1  /  Alb  2.2<L>  /  TBili  0.7  /  DBili  x   /  AST  28  /  ALT  14  /  AlkPhos  69  03-26

## 2022-03-26 NOTE — PROGRESS NOTE ADULT - ASSESSMENT
88 year old female with PMH of Chronic myelomonocytic leukemia (CMML), Afib on metoprolol (no A/C), uterine CA s/p radiation and hysterectomy, breast cancer s/p chemo and bilateral mastectomy presents with lower abdominal pain with nausea and vomiting. Pt a/w small bowel obstruction and Afib with RVR. s/p  Laparoscopic WILLIAM with ileocolic bypass on 3/11.  Complicated by early SBO. s/p RRT on 3/24 for chest pain found to have hypoxia. Nephrology consulted for TPN    1. Severe PCM- prolonged NPO (since 3/8) status due to SBO with edema; unable to tolerated FLD. Pt unaware of weight loss. Leukocytosis due to h/o CMML. BCX 3/17 NG.   Appropriate for TPN. s/p LUE PICC line placement.   Will give TPN with lipids @ 1L (1/2 dose on first day) today. D/C IVF once TPN is initiated.    Check FS 15 min and 1hr after starting TPN and then q6h thereafter. Check hgbA1C in am.   Check triglycerides. Check CMP/Mg/Phos/ Ionized calcium in am.   Daily weights. Strict I/O. Hold TPN if pt spikes fever and check BCX x2    2. MERCEDEZ- likely hemodynamically mediated in the setting of hypotension with n/v. Scr improving. Pt intravascularly depleted; recc to d/c IVF once TPN started. Check urine lytes. CT abd pelvis with no hydro. Strict I/Os. Avoid nephrotoxins/ NSAIDs/ RCA. Monitor BMP.    3. Small bowel obstruction- pt refusing NGT. Plan as per Surgery    4. LE edema- Dopplers 3/17 neg for LE DVT b/l. Likely intravascularly depleted with 3rd spacing. Recc albumin assisted diuresis. Give albumin 30 mins to Lasix 20mg IV x1 (as long as BP tolerates). Recc LUE dopplers to r/o DVT.       [facility] NEPHROLOGY  Yinka Davey M.D.  Dominick Gold D.O.  Jalyn Quintana M.D.  Liliana Samayoa, ANIKET, ANP-C  [phone]    [address] [place] [address]

## 2022-03-27 NOTE — PROGRESS NOTE ADULT - NS ATTEND AMEND GEN_ALL_CORE FT
Started TPN, no N/V on sips of clears.   Left arm edema resolved.    Plan:   d/c to SNF with TPN and clear liquid diet  d/c right arm precautions due to left arm PICC

## 2022-03-27 NOTE — PROGRESS NOTE ADULT - ASSESSMENT
89F s/p lap WILLIAM, ileocolic bypass, pod#[id number]  nausea/vomiting last night     1. s/p ileocolic bypass  - clrs with ensure  - TPN    2. leukocytosis  - 2/2 CMML, resumed anastrozole    3. chest pain  - R>L pleural effusion and atelectasis on CXR  - noncont CT chest with pleural effusion  - on telemetry  - d-dimer sent per medicine, appreciate consult/recs    4. a fib  - hepSQ q12 for ppx only d/t bleeding risk, reeval for further need for tx AC  - digoxin as tolerated    5. MERCEDEZ   - Cr normalized    6. LUE edema improving  - elevation

## 2022-03-27 NOTE — PROGRESS NOTE ADULT - SUBJECTIVE AND OBJECTIVE BOX
S:    O:  Vital Signs Last 24 Hrs  T(C): 36.6 (27 Mar 2022 05:39), Max: 36.6 (27 Mar 2022 05:39)  T(F): 97.9 (27 Mar 2022 05:39), Max: 97.9 (27 Mar 2022 05:39)  HR: 86 (27 Mar 2022 05:39) (81 - 86)  BP: 114/47 (27 Mar 2022 05:39) (114/47 - 115/54)  BP(mean): 63 (27 Mar 2022 05:39) (63 - 68)  RR: 18 (27 Mar 2022 05:39) (16 - 18)  SpO2: 96% (27 Mar 2022 05:39) (93% - 96%)    GENERAL: NAD, well-developed  HEAD:  Atraumatic, Normocephalic  EYES: EOMI, PERRLA, conjunctiva and sclera clear  NECK: Supple, No JVD  CHEST/LUNG: Clear to auscultation bilaterally; No wheeze  HEART: Regular rate and rhythm; No murmurs, rubs, or gallops  ABDOMEN: Soft, Nontender, Nondistended; Bowel sounds present  EXTREMITIES:  2+ Peripheral Pulses, No clubbing, cyanosis, or edema  PSYCH: AAOx3  NEUROLOGY: non-focal  SKIN: No rashes or lesions    acetaminophen     Tablet .. 650 milliGRAM(s) Oral every 6 hours PRN  anastrozole 1 milliGRAM(s) Oral daily  chlorhexidine 2% Cloths 1 Application(s) Topical <User Schedule>  dextrose 5%. 1000 milliLiter(s) IV Continuous <Continuous>  dextrose 5%. 1000 milliLiter(s) IV Continuous <Continuous>  dextrose 50% Injectable 25 Gram(s) IV Push once  dextrose 50% Injectable 12.5 Gram(s) IV Push once  dextrose 50% Injectable 25 Gram(s) IV Push once  dextrose Oral Gel 15 Gram(s) Oral once PRN  digoxin  Injectable 125 MICROGram(s) IV Push daily  fat emulsion (Fish Oil and Plant Based) 20% Infusion 0.3817 Gm/kG/Day IV Continuous <Continuous>  fat emulsion (Fish Oil and Plant Based) 20% Infusion 0.3817 Gm/kG/Day IV Continuous <Continuous>  glucagon  Injectable 1 milliGRAM(s) IntraMuscular once  heparin   Injectable 5000 Unit(s) SubCutaneous every 12 hours  insulin lispro (ADMELOG) corrective regimen sliding scale   SubCutaneous every 6 hours  metoprolol succinate ER 25 milliGRAM(s) Oral daily  ondansetron Injectable 4 milliGRAM(s) IV Push every 6 hours PRN  pantoprazole  Injectable 40 milliGRAM(s) IV Push daily  Parenteral Nutrition - Adult 1 Each TPN Continuous <Continuous>  Parenteral Nutrition - Adult 1 Each TPN Continuous <Continuous>  sodium chloride 0.9% lock flush 10 milliLiter(s) IV Push every 1 hour PRN                            7.8    27.30 )-----------( 204      ( 27 Mar 2022 06:39 )             25.3       03-27    138  |  106  |  27<H>  ----------------------------<  82  3.7   |  26  |  0.80    Ca    8.3<L>      27 Mar 2022 06:39  Phos  2.4     03-27  Mg     1.9     03-27    TPro  5.8<L>  /  Alb  1.9<L>  /  TBili  0.6  /  DBili  x   /  AST  26  /  ALT  13  /  AlkPhos  73  03-27   S: Patient not feeling well today, reports feeling weak. Family upset about communication.    O:  Vital Signs Last 24 Hrs  T(C): 36.6 (27 Mar 2022 05:39), Max: 36.6 (27 Mar 2022 05:39)  T(F): 97.9 (27 Mar 2022 05:39), Max: 97.9 (27 Mar 2022 05:39)  HR: 86 (27 Mar 2022 05:39) (81 - 86)  BP: 114/47 (27 Mar 2022 05:39) (114/47 - 115/54)  BP(mean): 63 (27 Mar 2022 05:39) (63 - 68)  RR: 18 (27 Mar 2022 05:39) (16 - 18)  SpO2: 96% (27 Mar 2022 05:39) (93% - 96%)    GENERAL: NAD, well-developed  HEAD:  Atraumatic, Normocephalic  EYES: EOMI, PERRLA, conjunctiva and sclera clear  NECK: Supple, No JVD  CHEST/LUNG: Clear to auscultation bilaterally; No wheeze  HEART: Regular rate and rhythm; No murmurs, rubs, or gallops  ABDOMEN: Soft, Nontender, Nondistended; Bowel sounds present  EXTREMITIES:  2+ Peripheral Pulses, No clubbing, cyanosis, 1+ LE edema bilaterally  PSYCH: AAOx3  NEUROLOGY: non-focal  SKIN: No rashes or lesions    acetaminophen     Tablet .. 650 milliGRAM(s) Oral every 6 hours PRN  anastrozole 1 milliGRAM(s) Oral daily  chlorhexidine 2% Cloths 1 Application(s) Topical <User Schedule>  dextrose 5%. 1000 milliLiter(s) IV Continuous <Continuous>  dextrose 5%. 1000 milliLiter(s) IV Continuous <Continuous>  dextrose 50% Injectable 25 Gram(s) IV Push once  dextrose 50% Injectable 12.5 Gram(s) IV Push once  dextrose 50% Injectable 25 Gram(s) IV Push once  dextrose Oral Gel 15 Gram(s) Oral once PRN  digoxin  Injectable 125 MICROGram(s) IV Push daily  fat emulsion (Fish Oil and Plant Based) 20% Infusion 0.3817 Gm/kG/Day IV Continuous <Continuous>  fat emulsion (Fish Oil and Plant Based) 20% Infusion 0.3817 Gm/kG/Day IV Continuous <Continuous>  glucagon  Injectable 1 milliGRAM(s) IntraMuscular once  heparin   Injectable 5000 Unit(s) SubCutaneous every 12 hours  insulin lispro (ADMELOG) corrective regimen sliding scale   SubCutaneous every 6 hours  metoprolol succinate ER 25 milliGRAM(s) Oral daily  ondansetron Injectable 4 milliGRAM(s) IV Push every 6 hours PRN  pantoprazole  Injectable 40 milliGRAM(s) IV Push daily  Parenteral Nutrition - Adult 1 Each TPN Continuous <Continuous>  Parenteral Nutrition - Adult 1 Each TPN Continuous <Continuous>  sodium chloride 0.9% lock flush 10 milliLiter(s) IV Push every 1 hour PRN                            7.8    27.30 )-----------( 204      ( 27 Mar 2022 06:39 )             25.3       03-27    138  |  106  |  27<H>  ----------------------------<  82  3.7   |  26  |  0.80    Ca    8.3<L>      27 Mar 2022 06:39  Phos  2.4     03-27  Mg     1.9     03-27    TPro  5.8<L>  /  Alb  1.9<L>  /  TBili  0.6  /  DBili  x   /  AST  26  /  ALT  13  /  AlkPhos  73  03-27

## 2022-03-27 NOTE — PROGRESS NOTE ADULT - ASSESSMENT
88 year old female with PMH of Chronic myelomonocytic leukemia (CMML), Afib on metoprolol (no A/C), uterine CA s/p radiation and hysterectomy, breast cancer s/p chemo and bilateral mastectomy presents with lower abdominal pain with nausea and vomiting. Pt a/w small bowel obstruction and Afib with RVR. s/p  Laparoscopic WILLIAM with ileocolic bypass on 3/11.  Complicated by early SBO. s/p RRT on 3/24 for chest pain found to have hypoxia. Nephrology consulted for TPN    1. Severe PCM- prolonged NPO (since 3/8) status due to SBO with edema; unable to tolerated FLD. Pt unaware of weight loss. Leukocytosis due to h/o CMML. BCX 3/17 NG.   Appropriate for TPN. s/p LUE PICC line placement.   Patient had been ordered for TPN with lipids @ 1L on 3/26 however TPN not initiated as had been planned despite delivery of TPN and lipid formulation to the hospital. I had only been informed regarding this matter earlier this morning at which point decision had been made to start TPN with lipids immediately. TPN currently infusing with plans to continue until 10PM at which point will disregard bag and hang new TPN with lipids (1.5L). Will increase potassium phosphate given hypophosphatemia on labs this morning. I had lengthy discussion with patient's family members who were visibly upset that TPN had not started on 3/26 as planned. I reassured them that TPN was currently infusing and that it would continue without interruption for the remainder of the day. I had also explained that a 12 hour delay is unlikely to be clinically significant.  Check FS q6h. HbA1C 5.5.   TG 71. Check CMP/Mg/Phos/ Ionized calcium in am.   Daily weights. Strict I/O. Hold TPN and check blood cultures X 2 if pt spikes fever.    2. MERCEDEZ- likely hemodynamically mediated in the setting of hypotension with n/v. MERCEDEZ resolved. CT abd pelvis with no hydro. Strict I/Os. Avoid nephrotoxins/ NSAIDs/ RCA. Monitor BMP.    3. Small bowel obstruction- Currently on CLD. Plan as per Surgery    4. LE edema- Dopplers 3/17 neg for LE DVT b/l. As per primary team/medicine.       [facility] NEPHROLOGY  Yinka Davey M.D.  Dominick Gold D.O.  Jalyn Quintana M.D.  Liliana Samayoa, MSN, ANP-C  [phone]    [address] [place] [address]

## 2022-03-27 NOTE — PROGRESS NOTE ADULT - ASSESSMENT
89yo F PMHx of Uterine CA s/p hysterectomy, A. Fib, breast CA s/p b/l mastectomy, recent MDS diagnosis who presented with SBO s/p ileocolic bypass on 3/11. Course complicated by recurrent SBO.    #SBO s/p ileocolic bypass  #Chronic Atrial Fibrillation  #Severe Protein Calorie Malnutrition  #CMML  #Acute Kidney Injury    -management of SBO as per surgery  -A. Fib rate controlled on metoprolol and digoxin  -plan for TPN to start today  -recommend hem/onc consult for leukocytosis  -Cr improving and patient having increased urination, MERCEDEZ likely 2/2 to intravascular depletion 88yo F PMHx of Uterine CA s/p hysterectomy, A. Fib, breast CA s/p b/l mastectomy, recent MDS diagnosis who presented with SBO s/p ileocolic bypass on 3/11. Course complicated by recurrent SBO.    #SBO s/p ileocolic bypass  #Chronic Atrial Fibrillation  #Severe Protein Calorie Malnutrition  #CMML  #Acute Kidney Injury    -management of SBO as per surgery  -A. Fib rate controlled on metoprolol and digoxin  -TPN started this morning  -recommend hem/onc consult for leukocytosis  -Cr improving, patient with 700cc output  -consider low-dose lasix 20mg for LE edema 89yo F PMHx of Uterine CA s/p hysterectomy, A. Fib, breast CA s/p b/l mastectomy, recent MDS diagnosis who presented with SBO s/p ileocolic bypass on 3/11. Course complicated by recurrent SBO.    #SBO s/p ileocolic bypass  #Chronic Atrial Fibrillation  #Severe Protein Calorie Malnutrition  #CMML  #Acute Kidney Injury    -management of SBO as per surgery  -A. Fib rate controlled on metoprolol and digoxin  -TPN started this morning  -recommend hem/onc consult for leukocytosis  -Cr improving, patient with 700cc output  -consider low-dose lasix 20mg for LE edema or can allow patient to auto-diurese

## 2022-03-27 NOTE — PROGRESS NOTE ADULT - SUBJECTIVE AND OBJECTIVE BOX
[facility] NEPHROLOGY- PROGRESS NOTE    88 year old female with PMH of Chronic myelomonocytic leukemia (CMML), Afib on metoprolol (no A/C), uterine CA s/p radiation and hysterectomy, breast cancer s/p chemo and bilateral mastectomy presents with lower abdominal pain with nausea and vomiting. Pt a/w small bowel obstruction and Afib with RVR. s/p  Laparoscopic WILLIAM with ileocolic bypass on 3/11.  Complicated by early SBO. s/p RRT on 3/24 for chest pain found to have hypoxia. Nephrology consulted for TPN.      REVIEW OF SYSTEMS:  Gen: no changes in weight  Cards: no chest pain  Resp: no dyspnea  GI: no nausea, no vomiting, no diarrhea, + tolerating liquid diet  Vascular: + LE edema, + LUE edema    contrast media (iodine-based) (Short breath)  iv  contrast (Unknown)  penicillin (Hives)  penicillin (Rash)  sulfa drugs (Short breath)  Sulfacetamide Sodium (Rash)      Hospital Medications: Medications reviewed      VITALS:  T(F): 97.9 (03-27-22 @ 05:39), Max: 97.9 (03-27-22 @ 05:39)  HR: 86 (03-27-22 @ 05:39)  BP: 114/47 (03-27-22 @ 05:39)  RR: 18 (03-27-22 @ 05:39)  SpO2: 96% (03-27-22 @ 05:39)  Wt(kg): --    03-26 @ 07:01  -  03-27 @ 07:00  --------------------------------------------------------  IN: 0 mL / OUT: 700 mL / NET: -700 mL      PHYSICAL EXAM:    Gen: NAD, calm  Cards: RRR, +S1/S2, no M/G/R  Resp: Decreased BS @ bases B/L  GI: soft, NT/ND, NABS  : + wilson  Vascular: 1+ LE edema B/L, LUE edema with PICC intact      LABS:  03-27    138  |  106  |  27<H>  ----------------------------<  82  3.7   |  26  |  0.80    Ca    8.3<L>      27 Mar 2022 06:39  Phos  2.4     03-27  Mg     1.9     03-27    TPro  5.8<L>  /  Alb  1.9<L>  /  TBili  0.6  /  DBili      /  AST  26  /  ALT  13  /  AlkPhos  73  03-27    Creatinine Trend: 0.80 <--, 1.22 <--, 1.76 <--, 1.61 <--, 0.66 <--, 0.57 <--, 0.50 <--                        7.8    27.30 )-----------( 204      ( 27 Mar 2022 06:39 )             25.3     Urine Studies:

## 2022-03-27 NOTE — PROGRESS NOTE ADULT - SUBJECTIVE AND OBJECTIVE BOX
INTERVAL HPI/OVERNIGHT EVENTS:    No acute events overnight.   Pt resting comfortably. No acute complaints.   Tolerating liquid diet.   per night RN, pt refused TPN      MEDICATIONS  (STANDING):  anastrozole 1 milliGRAM(s) Oral daily  chlorhexidine 2% Cloths 1 Application(s) Topical <User Schedule>  dextrose 5%. 1000 milliLiter(s) (50 mL/Hr) IV Continuous <Continuous>  dextrose 5%. 1000 milliLiter(s) (100 mL/Hr) IV Continuous <Continuous>  dextrose 50% Injectable 25 Gram(s) IV Push once  dextrose 50% Injectable 12.5 Gram(s) IV Push once  dextrose 50% Injectable 25 Gram(s) IV Push once  digoxin  Injectable 125 MICROGram(s) IV Push daily  fat emulsion (Fish Oil and Plant Based) 20% Infusion 0.3817 Gm/kG/Day (5.21 mL/Hr) IV Continuous <Continuous>  glucagon  Injectable 1 milliGRAM(s) IntraMuscular once  heparin   Injectable 5000 Unit(s) SubCutaneous every 12 hours  insulin lispro (ADMELOG) corrective regimen sliding scale   SubCutaneous every 6 hours  metoprolol succinate ER 25 milliGRAM(s) Oral daily  pantoprazole  Injectable 40 milliGRAM(s) IV Push daily  Parenteral Nutrition - Adult 1 Each (42 mL/Hr) TPN Continuous <Continuous>    MEDICATIONS  (PRN):  acetaminophen     Tablet .. 650 milliGRAM(s) Oral every 6 hours PRN Temp greater or equal to 38C (100.4F), Mild Pain (1 - 3)  dextrose Oral Gel 15 Gram(s) Oral once PRN Blood Glucose LESS THAN 70 milliGRAM(s)/deciliter  ondansetron Injectable 4 milliGRAM(s) IV Push every 6 hours PRN Nausea and/or Vomiting  sodium chloride 0.9% lock flush 10 milliLiter(s) IV Push every 1 hour PRN Pre/post blood products, medications, blood draw, and to maintain line patency      Vital Signs Last 24 Hrs  T(C): 36.6 (27 Mar 2022 05:39), Max: 36.8 (26 Mar 2022 12:49)  T(F): 97.9 (27 Mar 2022 05:39), Max: 98.2 (26 Mar 2022 12:49)  HR: 86 (27 Mar 2022 05:39) (81 - 87)  BP: 114/47 (27 Mar 2022 05:39) (114/47 - 128/55)  BP(mean): 63 (27 Mar 2022 05:39) (63 - 68)  RR: 18 (27 Mar 2022 05:39) (16 - 18)  SpO2: 96% (27 Mar 2022 05:39) (93% - 96%)      PHYSICAL EXAM  General: Alert and oriented, not in acute distress  Resp: Breathing unlabored  Abdomen: Soft, nondistended, nontender  : No wilson catheter, no dysuria or hematuria  Extremities: No pedal edema    I&O's Detail    26 Mar 2022 07:01  -  27 Mar 2022 07:00  --------------------------------------------------------  IN:  Total IN: 0 mL    OUT:    Indwelling Catheter - Urethral (mL): 700 mL  Total OUT: 700 mL    Total NET: -700 mL          LABS:                        7.8    27.30 )-----------( 204      ( 27 Mar 2022 06:39 )             25.3             03-27    138  |  106  |  27<H>  ----------------------------<  82  3.7   |  26  |  0.80    Ca    8.3<L>      27 Mar 2022 06:39  Phos  2.4     03-27  Mg     1.9     03-27    TPro  5.8<L>  /  Alb  1.9<L>  /  TBili  0.6  /  DBili  x   /  AST  26  /  ALT  13  /  AlkPhos  73  03-27

## 2022-03-28 NOTE — PROGRESS NOTE ADULT - SUBJECTIVE AND OBJECTIVE BOX
[facility] NEPHROLOGY- PROGRESS NOTE    88 year old female with PMH of Chronic myelomonocytic leukemia (CMML), Afib on metoprolol (no A/C), uterine CA s/p radiation and hysterectomy, breast cancer s/p chemo and bilateral mastectomy presents with lower abdominal pain with nausea and vomiting. Pt a/w small bowel obstruction and Afib with RVR. s/p  Laparoscopic WILLIAM with ileocolic bypass on 3/11.  Complicated by early SBO. s/p RRT on 3/24 for chest pain found to have hypoxia. Nephrology consulted for TPN.      REVIEW OF SYSTEMS:  Gen: no changes in weight +fatigue  Cards: no chest pain  Resp: +mild dyspnea when moving  GI: no nausea, no vomiting, no diarrhea, + tolerating liquid diet  Vascular: + LE edema, + LUE edema    contrast media (iodine-based) (Short breath)  iv  contrast (Unknown)  penicillin (Hives)  penicillin (Rash)  sulfa drugs (Short breath)  Sulfacetamide Sodium (Rash)      Hospital Medications: Medications reviewed      VITALS:  T(F): 97.7 (03-28-22 @ 06:17), Max: 98.5 (03-27-22 @ 22:05)  HR: 78 (03-28-22 @ 09:59)  BP: 110/49 (03-28-22 @ 09:59)  RR: 18 (03-28-22 @ 06:17)  SpO2: 100% (03-28-22 @ 09:59)  Wt(kg): --    03-27 @ 07:01  -  03-28 @ 07:00  --------------------------------------------------------  IN: 1233.2 mL / OUT: 1050 mL / NET: 183.2 mL    03-28 @ 07:01  -  03-28 @ 11:13  --------------------------------------------------------  IN: 68.2 mL / OUT: 0 mL / NET: 68.2 mL      PHYSICAL EXAM:  Gen: NAD, calm  Cards: RRR, +S1/S2, no M/G/R  Resp: Decreased BS @ bases B/L  GI: soft, NT +BS  : + wilson  Vascular: 1+ LE edema B/L, LUE edema with PICC intact    LABS:  03-28    138  |  104  |  23<H>  ----------------------------<  150<H>  3.6   |  28  |  0.67    Ca    7.8<L>      28 Mar 2022 05:41  Phos  2.1     03-28  Mg     1.7     03-28    TPro  5.5<L>  /  Alb  1.7<L>  /  TBili  0.5  /  DBili      /  AST  26  /  ALT  13  /  AlkPhos  76  03-28    Creatinine Trend: 0.67 <--, 0.80 <--, 1.22 <--, 1.76 <--, 1.61 <--, 0.66 <--, 0.57 <--                        7.5    34.98 )-----------( 213      ( 28 Mar 2022 05:41 )             23.3     Urine Studies:

## 2022-03-28 NOTE — PROGRESS NOTE ADULT - ASSESSMENT
88 year old female with PMH of Chronic myelomonocytic leukemia (CMML), Afib on metoprolol (no A/C), uterine CA s/p radiation and hysterectomy, breast cancer s/p chemo and bilateral mastectomy presents with lower abdominal pain with nausea and vomiting. Pt a/w small bowel obstruction and Afib with RVR. s/p  Laparoscopic WILLIAM with ileocolic bypass on 3/11.  Complicated by early SBO. s/p RRT on 3/24 for chest pain found to have hypoxia. Nephrology consulted for TPN    1. Severe PCM- prolonged NPO (since 3/8) status due to SBO with edema; unable to tolerated FLD at the time. Pt unaware of weight loss. Leukocytosis due to h/o CMML. BCX 3/17 NG.   s/p LUE PICC line placement 3/25 and initiated on TPN on 3/26. c/w TPN with lipids @ 1.5L (reduced volume).  Will adjust electrolytes accordingly (calcium unavailable in TPN, however corrected Ca wnl; no need for peripheral IV calcium at this time). FS acceptable. Check FS q6h. c/w ISS HbA1C 5.5.   TG 71. Check CMP/Mg/Phos/ Ionized calcium/ TG in am.   Daily weights. Strict I/O. Hold TPN and check blood cultures X 2 if pt spikes fever.    2. MERCEDEZ- likely hemodynamically mediated in the setting of hypotension with n/v. MERCEDEZ resolved. CT abd pelvis with no hydro. Strict I/Os. Avoid nephrotoxins/ NSAIDs/ RCA. Monitor BMP.    3. Small bowel obstruction- Currently on CLD. Plan as per Surgery    4. LE edema- Dopplers 3/17 neg for LE DVT b/l and Dopplers 3/25 neg for LUE DVT.  Plan as per primary team/medicine.       [facility] NEPHROLOGY  Yinka Davey M.D.  Dominick Gold D.O.  Jalyn Quintana M.D.  Liliana Samayoa, MSN, ANP-C  [phone]    [id number] [place] [address]

## 2022-03-28 NOTE — PROGRESS NOTE ADULT - SUBJECTIVE AND OBJECTIVE BOX
S: No acute overnight events. Tolerating CLD.    O:  Vital Signs Last 24 Hrs  T(C): 36.6 (28 Mar 2022 14:14), Max: 36.9 (27 Mar 2022 22:05)  T(F): 97.8 (28 Mar 2022 14:14), Max: 98.5 (27 Mar 2022 22:05)  HR: 95 (28 Mar 2022 14:14) (78 - 95)  BP: 109/49 (28 Mar 2022 14:14) (104/31 - 115/51)  BP(mean): 47 (27 Mar 2022 22:05) (47 - 47)  RR: 18 (28 Mar 2022 14:14) (16 - 18)  SpO2: 94% (28 Mar 2022 14:14) (91% - 100%)    GENERAL: NAD, well-developed  HEAD:  Atraumatic, Normocephalic  EYES: EOMI, PERRLA, conjunctiva and sclera clear  NECK: Supple, No JVD  CHEST/LUNG: Clear to auscultation bilaterally; No wheeze  HEART: Irregular rate and rhythm; No murmurs, rubs, or gallops  ABDOMEN: Soft, Nontender, Nondistended; Bowel sounds present  EXTREMITIES:  2+ Peripheral Pulses, No clubbing, cyanosis, mild LE edema bilaterally  PSYCH: AAOx3  NEUROLOGY: non-focal  SKIN: No rashes or lesions    acetaminophen    Suspension .. 650 milliGRAM(s) Oral every 6 hours PRN  anastrozole 1 milliGRAM(s) Oral daily  chlorhexidine 2% Cloths 1 Application(s) Topical <User Schedule>  dextrose 5%. 1000 milliLiter(s) IV Continuous <Continuous>  dextrose 5%. 1000 milliLiter(s) IV Continuous <Continuous>  dextrose 50% Injectable 25 Gram(s) IV Push once  dextrose 50% Injectable 12.5 Gram(s) IV Push once  dextrose 50% Injectable 25 Gram(s) IV Push once  dextrose Oral Gel 15 Gram(s) Oral once PRN  digoxin  Injectable 125 MICROGram(s) IV Push daily  fat emulsion (Fish Oil and Plant Based) 20% Infusion 0.3817 Gm/kG/Day IV Continuous <Continuous>  fat emulsion (Fish Oil and Plant Based) 20% Infusion 0.3817 Gm/kG/Day IV Continuous <Continuous>  fat emulsion (Fish Oil and Plant Based) 20% Infusion 0.3817 Gm/kG/Day IV Continuous <Continuous>  glucagon  Injectable 1 milliGRAM(s) IntraMuscular once  heparin   Injectable 5000 Unit(s) SubCutaneous every 12 hours  insulin lispro (ADMELOG) corrective regimen sliding scale   SubCutaneous every 6 hours  metoprolol succinate ER 25 milliGRAM(s) Oral daily  ondansetron Injectable 4 milliGRAM(s) IV Push every 6 hours PRN  pantoprazole  Injectable 40 milliGRAM(s) IV Push daily  Parenteral Nutrition - Adult 1 Each TPN Continuous <Continuous>  Parenteral Nutrition - Adult 1 Each TPN Continuous <Continuous>  Parenteral Nutrition - Adult 1 Each TPN Continuous <Continuous>  sodium chloride 0.9% lock flush 10 milliLiter(s) IV Push every 1 hour PRN                            7.5    34.98 )-----------( 213      ( 28 Mar 2022 05:41 )             23.3       03-28    138  |  104  |  23<H>  ----------------------------<  150<H>  3.6   |  28  |  0.67    Ca    7.8<L>      28 Mar 2022 05:41  Phos  2.1     03-28  Mg     1.7     03-28    TPro  5.5<L>  /  Alb  1.7<L>  /  TBili  0.5  /  DBili  x   /  AST  26  /  ALT  13  /  AlkPhos  76  03-28

## 2022-03-28 NOTE — CHART NOTE - NSCHARTNOTEFT_GEN_A_CORE
Assessment:   89yFemalePatient is a 89y old  Female who presents with a chief complaint of small bowel obstruction (28 Mar 2022 11:17)      Factors impacting intake: [ ] none [ ] nausea  [ ] vomiting [ ] diarrhea [ ] constipation  [ ]chewing problems [ ] swallowing issues  [ x] other: visited patient in room. Reports taking liquids but not drinking ensure clear. also receiving TPN at 63ml/hx24 and fat emulsion at 25g/d. TPN solution provides: 1336kcal and 100g protein which meets needs. suggest To advance diet per surgical team and Provide TPN as per [facility] [place].     Diet Presciption: Diet, Clear Liquid:   Supplement Feeding Modality:  Oral  Ensure Clear Cans or Servings Per Day:  1       Frequency:  Three Times a day (03-26-22 @ 11:37)    Intake: meeting needs with TPN     Current Weight: 65.2kg(3/26)   % Weight change:     Pertinent Medications: MEDICATIONS  (STANDING):  anastrozole 1 milliGRAM(s) Oral daily  chlorhexidine 2% Cloths 1 Application(s) Topical <User Schedule>  dextrose 5%. 1000 milliLiter(s) (50 mL/Hr) IV Continuous <Continuous>  dextrose 5%. 1000 milliLiter(s) (100 mL/Hr) IV Continuous <Continuous>  dextrose 50% Injectable 25 Gram(s) IV Push once  dextrose 50% Injectable 12.5 Gram(s) IV Push once  dextrose 50% Injectable 25 Gram(s) IV Push once  digoxin  Injectable 125 MICROGram(s) IV Push daily  fat emulsion (Fish Oil and Plant Based) 20% Infusion 0.3817 Gm/kG/Day (5.21 mL/Hr) IV Continuous <Continuous>  fat emulsion (Fish Oil and Plant Based) 20% Infusion 0.3817 Gm/kG/Day (5.21 mL/Hr) IV Continuous <Continuous>  fat emulsion (Fish Oil and Plant Based) 20% Infusion 0.3817 Gm/kG/Day (5.21 mL/Hr) IV Continuous <Continuous>  glucagon  Injectable 1 milliGRAM(s) IntraMuscular once  heparin   Injectable 5000 Unit(s) SubCutaneous every 12 hours  insulin lispro (ADMELOG) corrective regimen sliding scale   SubCutaneous every 6 hours  metoprolol succinate ER 25 milliGRAM(s) Oral daily  pantoprazole  Injectable 40 milliGRAM(s) IV Push daily  Parenteral Nutrition - Adult 1 Each (63 mL/Hr) TPN Continuous <Continuous>  Parenteral Nutrition - Adult 1 Each (63 mL/Hr) TPN Continuous <Continuous>  Parenteral Nutrition - Adult 1 Each (42 mL/Hr) TPN Continuous <Continuous>    MEDICATIONS  (PRN):  acetaminophen    Suspension .. 650 milliGRAM(s) Oral every 6 hours PRN Temp greater or equal to 38C (100.4F), Mild Pain (1 - 3)  dextrose Oral Gel 15 Gram(s) Oral once PRN Blood Glucose LESS THAN 70 milliGRAM(s)/deciliter  ondansetron Injectable 4 milliGRAM(s) IV Push every 6 hours PRN Nausea and/or Vomiting  sodium chloride 0.9% lock flush 10 milliLiter(s) IV Push every 1 hour PRN Pre/post blood products, medications, blood draw, and to maintain line patency    Pertinent Labs: 03-28 Na138 mmol/L Glu 150 mg/dL<H> K+ 3.6 mmol/L Cr  0.67 mg/dL BUN 23 mg/dL<H> 03-28 Phos 2.1 mg/dL<L> 03-28 Alb 1.7 g/dL<L> 03-26 Chol --    LDL --    HDL --    Trig 71 mg/dL     CAPILLARY BLOOD GLUCOSE      POCT Blood Glucose.: 144 mg/dL (28 Mar 2022 05:19)  POCT Blood Glucose.: 145 mg/dL (27 Mar 2022 23:18)  POCT Blood Glucose.: 138 mg/dL (27 Mar 2022 18:19)    Skin: No pressure injury     Estimated Needs:   [x ] no change since previous assessment  [ ] recalculated:     Previous Nutrition Diagnosis:   [ ] Inadequate Energy Intake [ ]Inadequate Oral Intake [ ] Excessive Energy Intake   [ ] Underweight [ ] Increased Nutrient Needs [ ] Overweight/Obesity   [ ] Altered GI Function [ ] Unintended Weight Loss [ ] Food & Nutrition Related Knowledge Deficit [x ] Malnutrition , severe     Nutrition Diagnosis is [x ] ongoing  [ ] resolved [ ] not applicable     New Nutrition Diagnosis: [ ] not applicable       Interventions:   Recommend  [ ] Change Diet To:  [ ] Nutrition Supplement  [ ] Nutrition Support  [x ] Other:  suggest To advance diet per surgical team and Provide TPN as per [facility] MD.       Monitoring and Evaluation:   [x ] PO intake [ x ] Tolerance to diet prescription [ x ] weights [ x ] labs[ x ] follow up per protocol  [ ] other:

## 2022-03-28 NOTE — PROGRESS NOTE ADULT - SUBJECTIVE AND OBJECTIVE BOX
INTERVAL HPI/OVERNIGHT EVENTS:    No acute events overnight.   Pt resting comfortably. No acute complaints.   Tolerating liquid diet.   flatus  Denies N/V    MEDICATIONS  (STANDING):  anastrozole 1 milliGRAM(s) Oral daily  chlorhexidine 2% Cloths 1 Application(s) Topical <User Schedule>  dextrose 5%. 1000 milliLiter(s) (50 mL/Hr) IV Continuous <Continuous>  dextrose 5%. 1000 milliLiter(s) (100 mL/Hr) IV Continuous <Continuous>  dextrose 50% Injectable 25 Gram(s) IV Push once  dextrose 50% Injectable 12.5 Gram(s) IV Push once  dextrose 50% Injectable 25 Gram(s) IV Push once  digoxin  Injectable 125 MICROGram(s) IV Push daily  fat emulsion (Fish Oil and Plant Based) 20% Infusion 0.3817 Gm/kG/Day (5.21 mL/Hr) IV Continuous <Continuous>  fat emulsion (Fish Oil and Plant Based) 20% Infusion 0.3817 Gm/kG/Day (5.21 mL/Hr) IV Continuous <Continuous>  fat emulsion (Fish Oil and Plant Based) 20% Infusion 0.3817 Gm/kG/Day (5.21 mL/Hr) IV Continuous <Continuous>  glucagon  Injectable 1 milliGRAM(s) IntraMuscular once  heparin   Injectable 5000 Unit(s) SubCutaneous every 12 hours  insulin lispro (ADMELOG) corrective regimen sliding scale   SubCutaneous every 6 hours  metoprolol succinate ER 25 milliGRAM(s) Oral daily  pantoprazole  Injectable 40 milliGRAM(s) IV Push daily  Parenteral Nutrition - Adult 1 Each (63 mL/Hr) TPN Continuous <Continuous>  Parenteral Nutrition - Adult 1 Each (63 mL/Hr) TPN Continuous <Continuous>  Parenteral Nutrition - Adult 1 Each (42 mL/Hr) TPN Continuous <Continuous>    MEDICATIONS  (PRN):  acetaminophen    Suspension .. 650 milliGRAM(s) Oral every 6 hours PRN Temp greater or equal to 38C (100.4F), Mild Pain (1 - 3)  dextrose Oral Gel 15 Gram(s) Oral once PRN Blood Glucose LESS THAN 70 milliGRAM(s)/deciliter  ondansetron Injectable 4 milliGRAM(s) IV Push every 6 hours PRN Nausea and/or Vomiting  sodium chloride 0.9% lock flush 10 milliLiter(s) IV Push every 1 hour PRN Pre/post blood products, medications, blood draw, and to maintain line patency      Vital Signs Last 24 Hrs  T(C): 36.5 (28 Mar 2022 06:17), Max: 36.9 (27 Mar 2022 22:05)  T(F): 97.7 (28 Mar 2022 06:17), Max: 98.5 (27 Mar 2022 22:05)  HR: 78 (28 Mar 2022 09:59) (78 - 86)  BP: 110/49 (28 Mar 2022 09:59) (104/31 - 115/64)  BP(mean): 47 (27 Mar 2022 22:05) (47 - 47)  RR: 18 (28 Mar 2022 06:17) (16 - 18)  SpO2: 100% (28 Mar 2022 09:59) (91% - 100%)      PHYSICAL EXAM  General: Alert and oriented, not in acute distress  Resp: Breathing unlabored  Abdomen: Soft, nondistended, nontender  : wilson clear  Extremities: No pedal edema    I&O's Detail    27 Mar 2022 07:01  -  28 Mar 2022 07:00  --------------------------------------------------------  IN:    Fat Emulsion (Fish Oil &amp; Plant Based) 20% Infusion: 41.7 mL    Fat Emulsion (Fish Oil &amp; Plant Based) 20% Infusion: 62.5 mL    IV PiggyBack: 100 mL    TPN (Total Parenteral Nutrition): 1029 mL  Total IN: 1233.2 mL    OUT:    Indwelling Catheter - Urethral (mL): 1050 mL  Total OUT: 1050 mL    Total NET: 183.2 mL      28 Mar 2022 07:01  -  28 Mar 2022 11:17  --------------------------------------------------------  IN:    Fat Emulsion (Fish Oil &amp; Plant Based) 20% Infusion: 5.2 mL    TPN (Total Parenteral Nutrition): 63 mL  Total IN: 68.2 mL    OUT:  Total OUT: 0 mL    Total NET: 68.2 mL          LABS:                        7.5    34.98 )-----------( 213      ( 28 Mar 2022 05:41 )             23.3             03-28    138  |  104  |  23<H>  ----------------------------<  150<H>  3.6   |  28  |  0.67    Ca    7.8<L>      28 Mar 2022 05:41  Phos  2.1     03-28  Mg     1.7     03-28    TPro  5.5<L>  /  Alb  1.7<L>  /  TBili  0.5  /  DBili  x   /  AST  26  /  ALT  13  /  AlkPhos  76  03-28

## 2022-03-28 NOTE — PROGRESS NOTE ADULT - ASSESSMENT
89F s/p lap WILLIAM, ileocolic bypass     afeb, vss; no n/v maria clears    1. s/p ileocolic bypass  - clrs with ensure, will be discharged on clears  - TPN    2. leukocytosis  - 2/2 CMML, resumed anastrozole  - f/u with hematologist, Dr. Neal on d/c    3. chest pain  - resolved  - f/u with cards on discharge  - R pleural effusion with significant improvement    4. a fib  - hepSQ q12 for ppx only d/t bleeding risk, reeval for further need for tx AC  - digoxin/metop as tolerated    5. MERCEDEZ, resolved  - Cr normalized  - urinary retention, wilson, TOV vs. outpt TOV once more ambulatory

## 2022-03-28 NOTE — PROGRESS NOTE ADULT - ASSESSMENT
88yo F PMHx of Uterine CA s/p hysterectomy, A. Fib, breast CA s/p b/l mastectomy, recent MDS diagnosis who presented with SBO s/p ileocolic bypass on 3/11. Course complicated by recurrent SBO.    #SBO s/p ileocolic bypass  #Chronic Atrial Fibrillation  #Severe Protein Calorie Malnutrition  #CMML  #Acute Kidney Injury    -management of SBO as per surgery  -A. Fib rate controlled on metoprolol and digoxin  -TPN for nutritional support  -patient to follow-up outpatient with hematologist  -Cr improved back to baseline  -continue PT  -replete electrolytes

## 2022-03-29 NOTE — PROGRESS NOTE ADULT - SUBJECTIVE AND OBJECTIVE BOX
INTERVAL HPI/OVERNIGHT EVENTS:    No acute events overnight.   Pt resting comfortably. No acute complaints.   Tolerating liquid diet.   flatus  Denies N/V    MEDICATIONS  (STANDING):  anastrozole 1 milliGRAM(s) Oral daily  chlorhexidine 2% Cloths 1 Application(s) Topical <User Schedule>  dextrose 5%. 1000 milliLiter(s) (50 mL/Hr) IV Continuous <Continuous>  dextrose 5%. 1000 milliLiter(s) (100 mL/Hr) IV Continuous <Continuous>  dextrose 50% Injectable 25 Gram(s) IV Push once  dextrose 50% Injectable 12.5 Gram(s) IV Push once  dextrose 50% Injectable 25 Gram(s) IV Push once  digoxin  Injectable 125 MICROGram(s) IV Push daily  fat emulsion (Fish Oil and Plant Based) 20% Infusion 0.3817 Gm/kG/Day (5.21 mL/Hr) IV Continuous <Continuous>  glucagon  Injectable 1 milliGRAM(s) IntraMuscular once  heparin   Injectable 5000 Unit(s) SubCutaneous every 12 hours  insulin lispro (ADMELOG) corrective regimen sliding scale   SubCutaneous every 6 hours  metoprolol succinate ER 25 milliGRAM(s) Oral daily  pantoprazole  Injectable 40 milliGRAM(s) IV Push daily  Parenteral Nutrition - Adult 1 Each (63 mL/Hr) TPN Continuous <Continuous>    MEDICATIONS  (PRN):  acetaminophen    Suspension .. 650 milliGRAM(s) Oral every 6 hours PRN Temp greater or equal to 38C (100.4F), Mild Pain (1 - 3)  dextrose Oral Gel 15 Gram(s) Oral once PRN Blood Glucose LESS THAN 70 milliGRAM(s)/deciliter  ondansetron Injectable 4 milliGRAM(s) IV Push every 6 hours PRN Nausea and/or Vomiting  sodium chloride 0.9% lock flush 10 milliLiter(s) IV Push every 1 hour PRN Pre/post blood products, medications, blood draw, and to maintain line patency      Vital Signs Last 24 Hrs  T(C): 36.4 (29 Mar 2022 05:23), Max: 36.7 (28 Mar 2022 20:20)  T(F): 97.5 (29 Mar 2022 05:23), Max: 98 (28 Mar 2022 20:20)  HR: 66 (29 Mar 2022 05:23) (66 - 95)  BP: 153/66 (29 Mar 2022 05:23) (95/45 - 153/66)  BP(mean): --  RR: 17 (29 Mar 2022 05:23) (17 - 18)  SpO2: 97% (29 Mar 2022 05:23) (94% - 100%)      PHYSICAL EXAM  General: Alert and oriented, not in acute distress  Resp: Breathing unlabored  Abdomen: Soft, nondistended, nontender  : wilson clear  Extremities: No pedal edema    I&O's Detail    28 Mar 2022 07:01  -  29 Mar 2022 07:00  --------------------------------------------------------  IN:    Fat Emulsion (Fish Oil &amp; Plant Based) 20% Infusion: 114.6 mL    TPN (Total Parenteral Nutrition): 1386 mL  Total IN: 1500.6 mL    OUT:    Indwelling Catheter - Urethral (mL): 1100 mL  Total OUT: 1100 mL    Total NET: 400.6 mL          LABS:                        7.8    x     )-----------( 194      ( 29 Mar 2022 06:44 )             24.7             03-29    138  |  104  |  25<H>  ----------------------------<  119<H>  3.6   |  28  |  0.64    Ca    7.8<L>      29 Mar 2022 06:44  Phos  2.5     03-29  Mg     1.7     03-29    TPro  5.6<L>  /  Alb  1.7<L>  /  TBili  0.4  /  DBili  x   /  AST  38  /  ALT  16  /  AlkPhos  75  03-29

## 2022-03-29 NOTE — PROGRESS NOTE ADULT - ASSESSMENT
89F s/p lap WILLIAM, ileocolic bypass     afeb, vss; no n/v maria clears    1. s/p ileocolic bypass  - clrs with ensure, will be discharged on clears  - TPN  - enema    2. leukocytosis  - 2/2 CMML, resumed anastrozole  - f/u with hematologist, Dr. Neal on d/c    3. chest pain  - resolved  - f/u with cards on discharge  - R pleural effusion with significant improvement    4. a fib  - hepSQ q12 for ppx only d/t bleeding risk, reeval for further need for tx AC  - digoxin/metop as tolerated    5. MERCEDEZ, resolved  - Cr normalized  - urinary retention, wilson, TOV vs. outpt TOV once more ambulatory

## 2022-03-29 NOTE — PROGRESS NOTE ADULT - SUBJECTIVE AND OBJECTIVE BOX
[facility] NEPHROLOGY- PROGRESS NOTE    88 year old female with PMH of Chronic myelomonocytic leukemia (CMML), Afib on metoprolol (no A/C), uterine CA s/p radiation and hysterectomy, breast cancer s/p chemo and bilateral mastectomy presents with lower abdominal pain with nausea and vomiting. Pt a/w small bowel obstruction and Afib with RVR. s/p  Laparoscopic WILLIAM with ileocolic bypass on 3/11.  Complicated by early SBO. s/p RRT on 3/24 for chest pain found to have hypoxia. Nephrology consulted for TPN.      REVIEW OF SYSTEMS:  Gen: no changes in weight  Cards: no chest pain  Resp: +mild dyspnea after enema today.   GI: no nausea, no vomiting, +BM small post enema, + tolerating liquid diet  Vascular: + LE edema, + LUE edema    contrast media (iodine-based) (Short breath)  iv  contrast (Unknown)  penicillin (Hives)  penicillin (Rash)  sulfa drugs (Short breath)  Sulfacetamide Sodium (Rash)      Hospital Medications: Medications reviewed    VITALS:  T(F): 97.5 (03-29-22 @ 05:23), Max: 98 (03-28-22 @ 20:20)  HR: 66 (03-29-22 @ 05:23)  BP: 153/66 (03-29-22 @ 05:23)  RR: 17 (03-29-22 @ 05:23)  SpO2: 97% (03-29-22 @ 05:23)  Wt(kg): --    03-28 @ 07:01  -  03-29 @ 07:00  --------------------------------------------------------  IN: 1500.6 mL / OUT: 1100 mL / NET: 400.6 mL      PHYSICAL EXAM:  Gen: NAD, calm  Cards: RRR, +S1/S2, no M/G/R  Resp: Decreased BS @ bases B/L  GI: soft, NT +BS  : + wilson  Vascular: 1+ LE edema B/L, LUE edema with PICC intact    LABS:  03-29    138  |  104  |  25<H>  ----------------------------<  119<H>  3.6   |  28  |  0.64    Ca    7.8<L>      29 Mar 2022 06:44  Phos  2.5     03-29  Mg     1.7     03-29    TPro  5.6<L>  /  Alb  1.7<L>  /  TBili  0.4  /  DBili      /  AST  38  /  ALT  16  /  AlkPhos  75  03-29    Creatinine Trend: 0.64 <--, 0.67 <--, 0.80 <--, 1.22 <--, 1.76 <--, 1.61 <--, 0.66 <--                        7.8    39.32 )-----------( 194      ( 29 Mar 2022 06:44 )             24.7     Urine Studies:

## 2022-03-29 NOTE — PROGRESS NOTE ADULT - NS ATTEND AMEND GEN_ALL_CORE FT
No acute change in status. No N/V, +flatus, on TPN.     Plan:  Continue enemas  Discharge planning to rehab

## 2022-03-29 NOTE — PROGRESS NOTE ADULT - SUBJECTIVE AND OBJECTIVE BOX
Patient is a 89y old  Female who presents with a chief complaint of small bowel obstruction (29 Mar 2022 08:31)  s/p ileocolic bypass on 3/11. Course complicated by recurrent SBO.    medicine was consulted for Afib       INTERVAL HPI/OVERNIGHT EVENTS: offers no new complaints; current symptoms resolving    MEDICATIONS  (STANDING):  anastrozole 1 milliGRAM(s) Oral daily  chlorhexidine 2% Cloths 1 Application(s) Topical <User Schedule>  dextrose 5%. 1000 milliLiter(s) (50 mL/Hr) IV Continuous <Continuous>  dextrose 5%. 1000 milliLiter(s) (100 mL/Hr) IV Continuous <Continuous>  dextrose 50% Injectable 25 Gram(s) IV Push once  dextrose 50% Injectable 12.5 Gram(s) IV Push once  dextrose 50% Injectable 25 Gram(s) IV Push once  digoxin  Injectable 125 MICROGram(s) IV Push daily  fat emulsion (Fish Oil and Plant Based) 20% Infusion 0.3817 Gm/kG/Day (5.21 mL/Hr) IV Continuous <Continuous>  glucagon  Injectable 1 milliGRAM(s) IntraMuscular once  heparin   Injectable 5000 Unit(s) SubCutaneous every 12 hours  insulin lispro (ADMELOG) corrective regimen sliding scale   SubCutaneous every 6 hours  metoprolol succinate ER 25 milliGRAM(s) Oral daily  pantoprazole  Injectable 40 milliGRAM(s) IV Push daily  Parenteral Nutrition - Adult 1 Each (63 mL/Hr) TPN Continuous <Continuous>    MEDICATIONS  (PRN):  acetaminophen    Suspension .. 650 milliGRAM(s) Oral every 6 hours PRN Temp greater or equal to 38C (100.4F), Mild Pain (1 - 3)  dextrose Oral Gel 15 Gram(s) Oral once PRN Blood Glucose LESS THAN 70 milliGRAM(s)/deciliter  ondansetron Injectable 4 milliGRAM(s) IV Push every 6 hours PRN Nausea and/or Vomiting  sodium chloride 0.9% lock flush 10 milliLiter(s) IV Push every 1 hour PRN Pre/post blood products, medications, blood draw, and to maintain line patency      __________________________________________________  REVIEW OF SYSTEMS:    CONSTITUTIONAL: No fever,   EYES: no acute visual disturbances  NECK: No pain or stiffness  RESPIRATORY: No cough; No shortness of breath  CARDIOVASCULAR: No chest pain, no palpitations  GASTROINTESTINAL: No pain. No nausea or vomiting; No diarrhea   NEUROLOGICAL: No headache or numbness, no tremors  MUSCULOSKELETAL: No joint pain, no muscle pain  GENITOURINARY: no dysuria, no frequency, no hesitancy  PSYCHIATRY: no depression , no anxiety  ALL OTHER  ROS negative        Vital Signs Last 24 Hrs  T(C): 36.4 (29 Mar 2022 05:23), Max: 36.7 (28 Mar 2022 20:20)  T(F): 97.5 (29 Mar 2022 05:23), Max: 98 (28 Mar 2022 20:20)  HR: 66 (29 Mar 2022 05:23) (66 - 95)  BP: 153/66 (29 Mar 2022 05:23) (95/45 - 153/66)  BP(mean): --  RR: 17 (29 Mar 2022 05:23) (17 - 18)  SpO2: 97% (29 Mar 2022 05:23) (94% - 97%)    ________________________________________________  PHYSICAL EXAM:  GENERAL: NAD  HEENT: Normocephalic;  conjunctivae and sclerae clear; moist mucous membranes;   NECK : supple  CHEST/LUNG: Clear to auscultation bilaterally with good air entry   HEART: S1 S2  regular; no murmurs, gallops or rubs  ABDOMEN: Soft, Nontender, Nondistended; Bowel sounds present  EXTREMITIES: no cyanosis; no edema; no calf tenderness  SKIN: warm and dry; no rash  NERVOUS SYSTEM:  Awake and alert; Oriented  to place, person and time ; no new deficits    _________________________________________________  LABS:                        7.8    39.32 )-----------( 194      ( 29 Mar 2022 06:44 )             24.7     03-29    138  |  104  |  25<H>  ----------------------------<  119<H>  3.6   |  28  |  0.64    Ca    7.8<L>      29 Mar 2022 06:44  Phos  2.5     03-29  Mg     1.7     03-29    TPro  5.6<L>  /  Alb  1.7<L>  /  TBili  0.4  /  DBili  x   /  AST  38  /  ALT  16  /  AlkPhos  75  03-29        CAPILLARY BLOOD GLUCOSE      POCT Blood Glucose.: 137 mg/dL (29 Mar 2022 05:16)  POCT Blood Glucose.: 141 mg/dL (28 Mar 2022 23:56)  POCT Blood Glucose.: 142 mg/dL (28 Mar 2022 17:30)  POCT Blood Glucose.: 154 mg/dL (28 Mar 2022 13:10)        RADIOLOGY & ADDITIONAL TESTS:    Imaging Personally Reviewed:  YES/NO    Consultant(s) Notes Reviewed:   YES/ No    Care Discussed with Consultants :     Plan of care was discussed with patient and /or primary care giver; all questions and concerns were addressed and care was aligned with patient's wishes.

## 2022-03-29 NOTE — PROGRESS NOTE ADULT - ASSESSMENT
88 year old female with PMH of Chronic myelomonocytic leukemia (CMML), Afib on metoprolol (no A/C), uterine CA s/p radiation and hysterectomy, breast cancer s/p chemo and bilateral mastectomy presents with lower abdominal pain with nausea and vomiting. Pt a/w small bowel obstruction and Afib with RVR. s/p  Laparoscopic WILLIAM with ileocolic bypass on 3/11.  Complicated by early SBO. s/p RRT on 3/24 for chest pain found to have hypoxia. Nephrology consulted for TPN    1. Severe PCM- prolonged NPO (since 3/8) status due to SBO with edema; unable to tolerate FLD at the time. Pt unaware of weight loss. Leukocytosis due to h/o CMML. BCX 3/17 NG.   s/p LUE PICC line placement 3/25 and initiated on TPN on 3/26. c/w TPN with lipids @ 1.5L (reduced volume).  Will adjust electrolytes accordingly (calcium unavailable in TPN, however corrected Ca wnl; no need for peripheral IV calcium at this time). FS acceptable. Check FS q6h. c/w ISS HbA1C 5.5.   TG 48. Check CMP/Mg/Phos/ Ionized calcium in am.   Daily weights. Strict I/O. Hold TPN and check blood cultures X 2 if pt spikes fever.    2. MERCEDEZ- likely hemodynamically mediated in the setting of hypotension with n/v. MERCEDEZ resolved. CT abd pelvis with no hydro. Strict I/Os. Avoid nephrotoxins/ NSAIDs/ RCA. Monitor BMP.    3. Small bowel obstruction- Currently on CLD, tolerating well. Plan as per Surgery    4. LE edema- Dopplers 3/17 neg for LE DVT b/l and Dopplers 3/25 neg for LUE DVT.  Likely 3rd spacing from hypoalbuminemia. Plan as per primary team/medicine.       [facility] NEPHROLOGY  Yinka Davey M.D.  Dominick Gold D.O.  Jalyn Quintana M.D.  Liliana Samayoa, MSN, ANP-C  [phone]    [address] [place] [address]

## 2022-03-29 NOTE — PROGRESS NOTE ADULT - ASSESSMENT
88yo F PMHx of Uterine CA s/p hysterectomy, A. Fib, breast CA s/p b/l mastectomy, recent MDS diagnosis who presented with SBO. Patient went for ex-lap with lysis of adhesions and ileocolic bypass with anastomosis on 3/11/22. Medicine initially following for A. Fib with RVR, re-consulted for bilateral LE edema.        #SBO   ilius on xray abd, on exam abd distended  SBO s/p ileocolic bypas  -management of SBO as per surgery  -TPN for nutritional support    #valerie   most likely pre-renal decreased intravascular vol   Resolved, Cr 0.64 now     #elevated wbc   WBC in setting of CMML   patient to follow-up outpatient with hematologist    PLan for DC today after Renal consultation

## 2022-03-30 NOTE — PHYSICAL THERAPY INITIAL EVALUATION ADULT - PATIENT/FAMILY/SIGNIFICANT OTHER GOALS STATEMENT, PT EVAL
Feel stronger and better
return home; ambulate, transfer, perform ADLs independently while using a single-tip cane; be able to drive her car again

## 2022-03-30 NOTE — PHYSICAL THERAPY INITIAL EVALUATION ADULT - GENERAL OBSERVATIONS, REHAB EVAL
awake, alert, NAD; currently on O2@2L/min via NC; +WALLACE drain on right upper abdominal quadrant; +steri-strips on mid abdominal area
Consult received, chart reviewed. Patient received supine in bed, NAD, +wilson, TPN to E PICC. Patient agreed to EVALUATION from Physical Therapist.

## 2022-03-30 NOTE — PROGRESS NOTE ADULT - SUBJECTIVE AND OBJECTIVE BOX
PGY 1 Note discussed with supervising resident and primary attending    Patient is a 89y old  Female who presents with a chief complaint of small bowel obstruction (30 Mar 2022 08:52)      INTERVAL HPI/OVERNIGHT EVENTS: Patient seen and examined at bed side, was tolerating ckear liquid diet, no BMS yet. denied any nausea or vomiting.  MEDICATIONS  (STANDING):  anastrozole 1 milliGRAM(s) Oral daily  chlorhexidine 2% Cloths 1 Application(s) Topical <User Schedule>  dextrose 5%. 1000 milliLiter(s) (50 mL/Hr) IV Continuous <Continuous>  dextrose 5%. 1000 milliLiter(s) (100 mL/Hr) IV Continuous <Continuous>  dextrose 50% Injectable 25 Gram(s) IV Push once  dextrose 50% Injectable 12.5 Gram(s) IV Push once  dextrose 50% Injectable 25 Gram(s) IV Push once  digoxin     Tablet 125 MICROGram(s) Oral daily  fat emulsion (Fish Oil and Plant Based) 20% Infusion 0.3817 Gm/kG/Day (5.21 mL/Hr) IV Continuous <Continuous>  glucagon  Injectable 1 milliGRAM(s) IntraMuscular once  heparin   Injectable 5000 Unit(s) SubCutaneous every 12 hours  insulin lispro (ADMELOG) corrective regimen sliding scale   SubCutaneous every 6 hours  metoprolol succinate ER 25 milliGRAM(s) Oral daily  pantoprazole    Tablet 40 milliGRAM(s) Oral before breakfast  Parenteral Nutrition - Adult 1 Each (63 mL/Hr) TPN Continuous <Continuous>    MEDICATIONS  (PRN):  acetaminophen    Suspension .. 650 milliGRAM(s) Oral every 6 hours PRN Temp greater or equal to 38C (100.4F), Mild Pain (1 - 3)  dextrose Oral Gel 15 Gram(s) Oral once PRN Blood Glucose LESS THAN 70 milliGRAM(s)/deciliter  ondansetron Injectable 4 milliGRAM(s) IV Push every 6 hours PRN Nausea and/or Vomiting  sodium chloride 0.9% lock flush 10 milliLiter(s) IV Push every 1 hour PRN Pre/post blood products, medications, blood draw, and to maintain line patency      __________________________________________________  REVIEW OF SYSTEMS:    CONSTITUTIONAL: No fever,   EYES: no acute visual disturbances  NECK: No pain or stiffness  RESPIRATORY: No cough; No shortness of breath  CARDIOVASCULAR: No chest pain, no palpitations  GASTROINTESTINAL: No pain. No nausea or vomiting; No diarrhea   NEUROLOGICAL: No headache or numbness, no tremors  MUSCULOSKELETAL: No joint pain, no muscle pain  GENITOURINARY: no dysuria, no frequency, no hesitancy  PSYCHIATRY: no depression , no anxiety  ALL OTHER  ROS negative        Vital Signs Last 24 Hrs  T(C): 36.5 (30 Mar 2022 06:01), Max: 36.8 (29 Mar 2022 12:38)  T(F): 97.7 (30 Mar 2022 06:01), Max: 98.2 (29 Mar 2022 12:38)  HR: 77 (30 Mar 2022 06:01) (77 - 88)  BP: 115/56 (30 Mar 2022 06:01) (85/47 - 125/57)  BP(mean): --  RR: 18 (30 Mar 2022 06:01) (16 - 20)  SpO2: 98% (30 Mar 2022 06:01) (96% - 98%)    ________________________________________________  PHYSICAL EXAM:  GENERAL: elderly female  HEENT: Normocephalic;  conjunctivae and sclerae clear; moist mucous membranes;   NECK : supple  CHEST/LUNG: Clear to auscultation bilaterally with good air entry   HEART: S1 S2  regular; no murmurs, gallops or rubs  ABDOMEN: Soft, Nontender, Nondistended; Bowel sounds present  EXTREMITIES: no cyanosis; +2 edema on b/l lower extremity; no calf tenderness  SKIN: warm and dry; no rash  NERVOUS SYSTEM:  Awake and alert; Oriented  to place, person and time ; no new deficits    _________________________________________________  LABS:                        7.3    x     )-----------( 213      ( 30 Mar 2022 07:27 )             22.5     03-30    135  |  105  |  25<H>  ----------------------------<  127<H>  3.8   |  25  |  0.50    Ca    7.9<L>      30 Mar 2022 07:27  Phos  2.3     03-30  Mg     1.5     03-30    TPro  5.8<L>  /  Alb  1.7<L>  /  TBili  0.4  /  DBili  x   /  AST  70<H>  /  ALT  29  /  AlkPhos  93  03-30        CAPILLARY BLOOD GLUCOSE      POCT Blood Glucose.: 127 mg/dL (30 Mar 2022 05:44)  POCT Blood Glucose.: 141 mg/dL (29 Mar 2022 23:59)  POCT Blood Glucose.: 151 mg/dL (29 Mar 2022 18:14)  POCT Blood Glucose.: 145 mg/dL (29 Mar 2022 11:30)        RADIOLOGY & ADDITIONAL TESTS:    Imaging Personally Reviewed:  YES/NO    Consultant(s) Notes Reviewed:   YES/ No    Care Discussed with Consultants :     Plan of care was discussed with patient and /or primary care giver; all questions and concerns were addressed and care was aligned with patient's wishes.

## 2022-03-30 NOTE — PROGRESS NOTE ADULT - ASSESSMENT
88yo F PMHx of Uterine CA s/p hysterectomy, A. Fib, breast CA s/p b/l mastectomy, recent MDS diagnosis who presented with SBO. Patient went for ex-lap with lysis of adhesions and ileocolic bypass with anastomosis on 3/11/22. Medicine initially following for A. Fib with RVR, re-consulted for bilateral LE edema.        #SBO   ilius on xray abd, on exam abd distended  SBO s/p ileocolic bypas POD #19  -management of SBO as per surgery  -TPN for nutritional support, Patient started on clear liquids as per surgery.      #Afib  Patient was on IV dig, now switched to PO dig.  continue Digoxin and metoprolol.  Patient not on AC due to history of bleeding in past.        #MERCEDEZ  most likely pre-renal decreased intravascular vol   Resolved.    #Elevated wbc   WBC in setting of CMML   patient to follow-up outpatient with hematologist  WBC from am cbc pending.

## 2022-03-30 NOTE — PHYSICAL THERAPY INITIAL EVALUATION ADULT - DISCHARGE DISPOSITION, PT EVAL
subacute rehabilitation facility/rehabilitation facility
Subacute rehabilitation facility/rehabilitation facility

## 2022-03-30 NOTE — PHYSICAL THERAPY INITIAL EVALUATION ADULT - PATIENT PROFILE REVIEW, REHAB EVAL
including labs and imaging. This is a RE-EVALUATION/yes
EMR, labs, radiology and imaging reports reviewed/yes

## 2022-03-30 NOTE — PHYSICAL THERAPY INITIAL EVALUATION ADULT - PERTINENT HX OF CURRENT PROBLEM, REHAB EVAL
S/p diagnostic laparoscopy, ileocolic bypass POD#19
small bowel obstruction, s/p ileocolic bypass POD#0

## 2022-03-30 NOTE — PHYSICAL THERAPY INITIAL EVALUATION ADULT - PASSIVE RANGE OF MOTION EXAMINATION, REHAB EVAL
except LT Shoulder only assessed to 90 deg flx/abd secondary to picc/bilateral upper extremity Passive ROM was WFL (within functional limits)
no Passive ROM deficits were identified

## 2022-03-30 NOTE — PROGRESS NOTE ADULT - SUBJECTIVE AND OBJECTIVE BOX
S/p diagnostic laparoscopy, ileocolic bypass POD#19  Patient seen and examined at bedside with no complaints.   Admits to flatus. No BM  Admits to mild SOB   Denies pain, nausea/ vomiting.   Tolerating clear liquid diet.  On TPN  Failed TOV yesterday and wilson reinserted    Vital Signs Last 24 Hrs  T(F): 97.7 (03-30-22 @ 06:01), Max: 98.2 (03-29-22 @ 12:38)  HR: 77 (03-30-22 @ 06:01)  BP: 115/56 (03-30-22 @ 06:01)  RR: 18 (03-30-22 @ 06:01)  SpO2: 98% (03-30-22 @ 06:01)  POCT Blood Glucose.: 127 mg/dL (30 Mar 2022 05:44)    GENERAL: Alert, NAD  CHEST/LUNG: respirations nonlabored on 2L nasal cannula  ABDOMEN: Steri-strips c/d/i, soft, Nontender, Nondistended  : wilson in place draining clear yellow urine    I&O's Detail    29 Mar 2022 07:01  -  30 Mar 2022 07:00  --------------------------------------------------------  IN:    Fat Emulsion (Fish Oil &amp; Plant Based) 20% Infusion: 52.1 mL    Fat Emulsion (Fish Oil &amp; Plant Based) 20% Infusion: 10.4 mL    Oral Fluid: 210 mL    TPN (Total Parenteral Nutrition): 756 mL  Total IN: 1028.5 mL    OUT:    Indwelling Catheter - Urethral (mL): 1000 mL  Total OUT: 1000 mL    Total NET: 28.5 mL    LABS:                        7.3    x     )-----------( 213      ( 30 Mar 2022 07:27 )             22.5     03-30    135  |  105  |  25<H>  ----------------------------<  127<H>  3.8   |  25  |  0.50    Ca    7.9<L>      30 Mar 2022 07:27  Phos  2.3     03-30  Mg     1.5     03-30    TPro  5.8<L>  /  Alb  1.7<L>  /  TBili  0.4  /  DBili  x   /  AST  70<H>  /  ALT  29  /  AlkPhos  93  03-30

## 2022-03-30 NOTE — PHYSICAL THERAPY INITIAL EVALUATION ADULT - GAIT DEVIATIONS NOTED, PT EVAL
bilateral knee buckling during stance phase of gait on each LE/decreased fred/decreased step length/decreased stride length/decreased weight-shifting ability
decreased fred/increased time in double stance/decreased velocity of limb motion/decreased step length/decreased stride length/decreased weight-shifting ability

## 2022-03-30 NOTE — PROGRESS NOTE ADULT - SUBJECTIVE AND OBJECTIVE BOX
[facility] NEPHROLOGY- PROGRESS NOTE    88 year old female with PMH of Chronic myelomonocytic leukemia (CMML), Afib on metoprolol (no A/C), uterine CA s/p radiation and hysterectomy, breast cancer s/p chemo and bilateral mastectomy presents with lower abdominal pain with nausea and vomiting. Pt a/w small bowel obstruction and Afib with RVR. s/p  Laparoscopic WILLIAM with ileocolic bypass on 3/11.  Complicated by early SBO. s/p RRT on 3/24 for chest pain found to have hypoxia. Nephrology consulted for TPN.      REVIEW OF SYSTEMS:  Gen: no changes in weight  Cards: no chest pain  Resp: +mild dyspnea after washing up today.    GI: no nausea, no vomiting, +BM  post enema, + tolerating liquid diet  Vascular: + LE edema, + LUE edema    contrast media (iodine-based) (Short breath)  iv  contrast (Unknown)  penicillin (Hives)  penicillin (Rash)  sulfa drugs (Short breath)  Sulfacetamide Sodium (Rash)      Hospital Medications: Medications reviewed    VITALS:  T(F): 97.3 (03-30-22 @ 13:58), Max: 98 (03-29-22 @ 22:13)  HR: 92 (03-30-22 @ 13:58)  BP: 110/67 (03-30-22 @ 13:58)  RR: 18 (03-30-22 @ 13:58)  SpO2: 95% (03-30-22 @ 13:58)  Wt(kg): --    03-29 @ 07:01  -  03-30 @ 07:00  --------------------------------------------------------  IN: 1028.5 mL / OUT: 1000 mL / NET: 28.5 mL    03-30 @ 07:01  -  03-30 @ 15:49  --------------------------------------------------------  IN: 0 mL / OUT: 500 mL / NET: -500 mL      PHYSICAL EXAM:  Gen: NAD, calm  Cards: RRR, +S1/S2, no M/G/R  Resp: Decreased BS @ bases B/L  GI: soft, NT +BS  : + wilson  Vascular: 1+ LE edema B/L, LUE edema with PICC intact    LABS:  03-30    135  |  105  |  25<H>  ----------------------------<  127<H>  3.8   |  25  |  0.50    Ca    7.9<L>      30 Mar 2022 07:27  Phos  2.3     03-30  Mg     1.5     03-30    TPro  5.8<L>  /  Alb  1.7<L>  /  TBili  0.4  /  DBili      /  AST  70<H>  /  ALT  29  /  AlkPhos  93  03-30    Creatinine Trend: 0.50 <--, 0.64 <--, 0.67 <--, 0.80 <--, 1.22 <--, 1.76 <--, 1.61 <--                        7.3    44.43 )-----------( 213      ( 30 Mar 2022 07:27 )             22.5     Urine Studies:

## 2022-03-30 NOTE — PHYSICAL THERAPY INITIAL EVALUATION ADULT - BED MOBILITY LIMITATIONS, REHAB EVAL
decreased ability to use arms for pushing/pulling/decreased ability to use legs for bridging/pushing/impaired ability to control trunk for mobility
decreased ability to use arms for pushing/pulling/decreased ability to use legs for bridging/pushing

## 2022-03-30 NOTE — PHYSICAL THERAPY INITIAL EVALUATION ADULT - BALANCE DISTURBANCE, IDENTIFIED IMPAIRMENT CONTRIBUTE, REHAB EVAL
impaired postural control/decreased strength
impaired postural control/decreased ROM/decreased strength

## 2022-03-30 NOTE — PHYSICAL THERAPY INITIAL EVALUATION ADULT - IMPAIRMENTS FOUND, PT EVAL
aerobic capacity/endurance/gait, locomotion, and balance/muscle strength/posture/ROM/ventilation and respiration/gas exchange
aerobic capacity/endurance/gait, locomotion, and balance/muscle strength/posture/ventilation and respiration/gas exchange

## 2022-03-30 NOTE — PHYSICAL THERAPY INITIAL EVALUATION ADULT - THERAPY FREQUENCY, PT EVAL
Ok to wait but call if worsens, not better soon    Marky Josue M.D.    
3-5x/week
in hospital setting/3-5x/week

## 2022-03-30 NOTE — PHYSICAL THERAPY INITIAL EVALUATION ADULT - TRANSFER SAFETY CONCERNS NOTED: SIT/STAND, REHAB EVAL
losing balance/decreased weight-shifting ability
decreased balance during turns/losing balance/decreased sequencing ability

## 2022-03-30 NOTE — PHYSICAL THERAPY INITIAL EVALUATION ADULT - LIVES WITH, PROFILE
in an apartment with stairs and elevator access/alone
in an apartment with stairs and elevator access/alone

## 2022-03-30 NOTE — PHYSICAL THERAPY INITIAL EVALUATION ADULT - MANUAL MUSCLE TESTING RESULTS, REHAB EVAL
BUE 4/5 within assessed range. b/l hips 2+/5, knees 3-/5 and ankles at least 3/5 functionally
MMT on both upper and lower extremities are grossly graded 3/5

## 2022-03-30 NOTE — PHYSICAL THERAPY INITIAL EVALUATION ADULT - GAIT DISTANCE, PT EVAL
4ft.. pt reports gross fatigue, mBORG 8/10. Spo2 >89% on RA. Pt deferred additional amb or to amb to chair secondary to feeling tired and wating to be back to bed to rest. She agreed to be in chair w/assist at future time as she understands importance...
15 feet

## 2022-03-30 NOTE — PHYSICAL THERAPY INITIAL EVALUATION ADULT - DIAGNOSIS, PT EVAL
generalized deconditioning and weakness s/p ileocolic bypass; prolonged bedrest (PT consult only received on 3/14/22); unsteady gait and transfers; difficulty performing gait, transfers, and ADLs; impaired pulmonary status
(ICF Model) Pt. present w/impairments in Body Structures/Function, incl: Strength, Balance, aerobic capacity/endurance and ROM leading to deficits in performing the below noted Activities (Limitations).

## 2022-03-30 NOTE — PHYSICAL THERAPY INITIAL EVALUATION ADULT - ADDITIONAL COMMENTS
(PER IE) uses a single-tip cane; drives a car; patient stated that if she is capable of going home, she will initially stay with her daughter in [facility]
uses a single-tip cane; drives a car; patient stated that if she is capable of going home, she will initially stay with her daughter in [facility]

## 2022-03-30 NOTE — PROGRESS NOTE ADULT - ASSESSMENT
88 year old female with PMH of Chronic myelomonocytic leukemia (CMML), Afib on metoprolol (no A/C), uterine CA s/p radiation and hysterectomy, breast cancer s/p chemo and bilateral mastectomy presents with lower abdominal pain with nausea and vomiting. Pt a/w small bowel obstruction and Afib with RVR. s/p  Laparoscopic WILLIAM with ileocolic bypass on 3/11.  Complicated by early SBO. s/p RRT on 3/24 for chest pain found to have hypoxia. Nephrology consulted for TPN    1. Severe PCM- prolonged NPO (since 3/8) status due to SBO with edema; unable to tolerate FLD at the time. Pt unaware of weight loss. Leukocytosis due to h/o CMML. BCX 3/17 NG.   s/p LUE PICC line placement 3/25 and initiated on TPN on 3/26. c/w TPN with lipids @ 1.5L (reduced volume), will cycle over 12 hrs. Will adjust electrolytes accordingly (calcium unavailable in TPN, however corrected Ca wnl; no need for peripheral IV calcium at this time). FS acceptable. Check FS q6h. c/w ISS HbA1C 5.5.   TG 48. Check CMP/Mg/Phos/ Ionized calcium in am.   Daily weights. Strict I/O. Hold TPN and check blood cultures X 2 if pt spikes fever.    2. MERCEDEZ- likely hemodynamically mediated in the setting of hypotension with n/v. MERCEDEZ resolved. CT abd pelvis with no hydro. Strict I/Os. Avoid nephrotoxins/ NSAIDs/ RCA. Monitor BMP.    3. Small bowel obstruction- Currently on CLD, tolerating well. Plan as per Surgery    4. LE edema- Dopplers 3/17 neg for LE DVT b/l and Dopplers 3/25 neg for LUE DVT.  Likely 3rd spacing from hypoalbuminemia; will improve with TPN. Plan as per primary team/medicine.     Continue with current TPN with lipids @ 1.5L (c/w 50g fat, 100g AA and 202g Dextrose), can check weekly CMP/Mg/Phos/ ionized Ca and adjust electrolytes in TPN accordingly as an outpt.       [facility] NEPHROLOGY  Yinka Davey M.D.  Dominick Gold D.O.  Jalyn Quintana M.D.  Liliana Samayoa, MSN, ANP-C  [phone]    [address] [place] [address]

## 2022-03-30 NOTE — PHYSICAL THERAPY INITIAL EVALUATION ADULT - PHYSICAL ASSIST/NONPHYSICAL ASSIST: SIT/STAND, REHAB EVAL
verbal cues/nonverbal cues (demo/gestures)/1 person assist
set-up required/supervision/verbal cues/1 person assist

## 2022-03-30 NOTE — PHYSICAL THERAPY INITIAL EVALUATION ADULT - SKIN COLOR/CHARACTERISTICS
Allevyn at abdomen c/d/i, 2/3 abd. Steri-strips c/d/i, 1 abd steri-strip dry drainage. b/l lower leg dryness, flakey

## 2022-03-30 NOTE — PHYSICAL THERAPY INITIAL EVALUATION ADULT - PLANNED THERAPY INTERVENTIONS, PT EVAL
balance training/bed mobility training/gait training/neuromuscular re-education/postural re-education/ROM/strengthening/transfer training/wheelchair management/propulsion training
balance training/bed mobility training/gait training/postural re-education/strengthening/transfer training

## 2022-03-30 NOTE — PHYSICAL THERAPY INITIAL EVALUATION ADULT - ACTIVE RANGE OF MOTION EXAMINATION, REHAB EVAL
except LT Shoulder only assessed to 90 deg flx/abd secondary to picc. b/l hips ~1/5 range antigravity/bilateral upper extremity Active ROM was WFL (within functional limits)
no Active ROM deficits were identified

## 2022-03-30 NOTE — PROGRESS NOTE ADULT - ASSESSMENT
89 year old female S/p diagnostic laparoscopy, ileocolic bypass POD#19  Tolerating clear liquid diet and passing some flatus. On TPN via PICC line  Urinary retention requiring reinsertion of wilson  Afib on digoxin and metoprolol    - continue clear liquid diet  - continue enemas  - monitor bowel function   - digoxin converted to oral  - nephrology follow up regarding TPN   - continue wilson, will need to be discharged with wilson   - may attempt TOV again when patient has improved ambulatory status  - discuss with Dr. Cleveland 89 year old female S/p diagnostic laparoscopy, ileocolic bypass POD#19  Tolerating clear liquid diet and passing some flatus. On TPN via PICC line  Urinary retention requiring reinsertion of wilson  Afib on digoxin and metoprolol  CML/MDS with leukocytosis, not resulted yet today. Hypomagnesemia and hypokalemia    - continue clear liquid diet  - continue enemas  - monitor bowel function   - digoxin converted to oral  - nephrology follow up regarding TPN, electrolytes to be repleted in TPN  - continue wilson, will need to be discharged with wilson   - may attempt TOV again when patient has improved ambulatory status  - discuss with Dr. Cleveland

## 2022-03-30 NOTE — PROGRESS NOTE ADULT - NS ATTEND AMEND GEN_ALL_CORE FT
No acute change. +BM with enemas. No N/V. TPN going.     Plan:   d/c planning for Rehab on TPN and clear liquid diet    Plan and prognosis d/w patient and her daughter Zach over the phone.

## 2022-03-31 NOTE — CONSULT NOTE ADULT - PROBLEM SELECTOR RECOMMENDATION 4
2/2 prolonged hospitalization, poor nutritional status. Bedbound at present, req assist w ADLs. Pt previously living alone, pt/daughter requesting LTC w hospice in [place]

## 2022-03-31 NOTE — PROGRESS NOTE ADULT - SUBJECTIVE AND OBJECTIVE BOX
INTERVAL HPI/OVERNIGHT EVENTS:    Pt with acute onset of SOB  no n/v  afeb        MEDICATIONS  (STANDING):  anastrozole 1 milliGRAM(s) Oral daily  chlorhexidine 2% Cloths 1 Application(s) Topical <User Schedule>  dextrose 5%. 1000 milliLiter(s) (50 mL/Hr) IV Continuous <Continuous>  dextrose 5%. 1000 milliLiter(s) (100 mL/Hr) IV Continuous <Continuous>  dextrose 50% Injectable 25 Gram(s) IV Push once  dextrose 50% Injectable 12.5 Gram(s) IV Push once  dextrose 50% Injectable 25 Gram(s) IV Push once  digoxin     Tablet 125 MICROGram(s) Oral daily  fat emulsion (Fish Oil and Plant Based) 20% Infusion 0.7634 Gm/kG/Day (20.8 mL/Hr) IV Continuous <Continuous>  glucagon  Injectable 1 milliGRAM(s) IntraMuscular once  heparin   Injectable 5000 Unit(s) SubCutaneous every 12 hours  insulin lispro (ADMELOG) corrective regimen sliding scale   SubCutaneous every 6 hours  metoprolol succinate ER 25 milliGRAM(s) Oral daily  pantoprazole    Tablet 40 milliGRAM(s) Oral before breakfast  Parenteral Nutrition - Adult 1 Each TPN Continuous <Continuous>    MEDICATIONS  (PRN):  acetaminophen    Suspension .. 650 milliGRAM(s) Oral every 6 hours PRN Temp greater or equal to 38C (100.4F), Mild Pain (1 - 3)  dextrose Oral Gel 15 Gram(s) Oral once PRN Blood Glucose LESS THAN 70 milliGRAM(s)/deciliter  ondansetron Injectable 4 milliGRAM(s) IV Push every 6 hours PRN Nausea and/or Vomiting  sodium chloride 0.9% lock flush 10 milliLiter(s) IV Push every 1 hour PRN Pre/post blood products, medications, blood draw, and to maintain line patency      Vital Signs Last 24 Hrs  T(C): 36.8 (31 Mar 2022 05:46), Max: 36.8 (31 Mar 2022 05:46)  T(F): 98.2 (31 Mar 2022 05:46), Max: 98.2 (31 Mar 2022 05:46)  HR: 88 (31 Mar 2022 05:46) (85 - 96)  BP: 129/66 (31 Mar 2022 05:46) (100/49 - 129/66)  BP(mean): --  RR: 18 (31 Mar 2022 05:46) (18 - 20)  SpO2: 97% (31 Mar 2022 05:46) (91% - 97%)      PHYSICAL EXAM  General: in mild resp distress, O2 stable with NC  Resp: Breathing stable on NC  Abdomen: Soft, nondistended, nontender  : clear urine, wilson  Extremities: No pedal edema    I&O's Detail    30 Mar 2022 07:01  -  31 Mar 2022 07:00  --------------------------------------------------------  IN:    TPN (Total Parenteral Nutrition): 1290 mL  Total IN: 1290 mL    OUT:    Indwelling Catheter - Urethral (mL): 1450 mL  Total OUT: 1450 mL    Total NET: -160 mL          LABS:                        7.1    50.43 )-----------( 213      ( 31 Mar 2022 07:15 )             22.5             03-31    137  |  105  |  27<H>  ----------------------------<  133<H>  4.3   |  25  |  0.58    Ca    7.9<L>      31 Mar 2022 07:15  Phos  2.9     03-31  Mg     1.6     03-31    TPro  6.1  /  Alb  1.7<L>  /  TBili  0.6  /  DBili  x   /  AST  131<H>  /  ALT  58  /  AlkPhos  146<H>  03-31

## 2022-03-31 NOTE — PROGRESS NOTE ADULT - ASSESSMENT
88 year old female with PMH of Chronic myelomonocytic leukemia (CMML), Afib on metoprolol (no A/C), uterine CA s/p radiation and hysterectomy, breast cancer s/p chemo and bilateral mastectomy presents with lower abdominal pain with nausea and vomiting. Pt a/w small bowel obstruction and Afib with RVR. s/p  Laparoscopic WILLIAM with ileocolic bypass on 3/11.  Complicated by early SBO. s/p RRT on 3/24 for chest pain found to have hypoxia. Nephrology consulted for TPN    1. Severe PCM- prolonged NPO (since 3/8) status due to SBO with edema; unable to tolerate FLD at the time. Pt unaware of weight loss. Leukocytosis due to h/o CMML. BCX 3/17 NG.   s/p LUE PICC line placement 3/25 and initiated on TPN on 3/26. c/w TPN with lipids @ 1.5L (reduced volume), cycled over 12 hrs. Will adjust electrolytes accordingly (calcium unavailable in TPN, however corrected Ca wnl; no need for peripheral IV calcium at this time). FS acceptable. Check FS q6h. c/w ISS HbA1C 5.5. Increase LFTs likely due to TPN- if worsening will increase protein to 114g and decrease dextrose to 186g. Monitor LFTs  TG 48. Check CMP/Mg/Phos/ Ionized calcium/ TG in am.   Daily weights. Strict I/O. Hold TPN and check blood cultures X 2 if pt spikes fever.    2. MERCEDEZ- likely hemodynamically mediated in the setting of hypotension with n/v. MERCEDEZ resolved. CT abd pelvis with no hydro. Strict I/Os. Avoid nephrotoxins/ NSAIDs/ RCA. Monitor BMP.    3. Small bowel obstruction- Currently on CLD, tolerating well. Encourage PO intake. Advance diet as per Surgery. Plan as per Surgery    4. LE edema- Dopplers 3/17 neg for LE DVT b/l and Dopplers 3/25 neg for LUE DVT.  Likely 3rd spacing from hypoalbuminemia; will improve with TPN. Agree with Lasix 20mg IV x1. f/u CXR. Plan as per primary team/medicine.     [facility] NEPHROLOGY  Yinka Davey M.D.  Dominick Gold D.O.  Jalyn Quintana M.D.  Liliana Samayoa, MSN, ANP-C  [phone]    [address] [place] [address]

## 2022-03-31 NOTE — PROGRESS NOTE ADULT - ATTENDING COMMENTS
+BM and flatus, minimal NG tube output today - tube was removed.  Advance to low fiber diet tomorrow  Inguinal hernia repair as a likely cause of the SBO discussed with the patient and daughter, I offered repair during this admission.   They wish to discuss surgery and will let me know tomorrow.
89yo F PMHx of Uterine CA s/p hysterectomy, A. Fib, breast CA s/p b/l mastectomy, recent MDS diagnosis who presented with SBO s/p ileocolic bypass on 3/11. Course complicated by recurrent SBO.    Patient was seen and examined, tolerating clear liquid diet. HR better controlled.     #SBO s/p ileocolic bypass  #Chronic Atrial Fibrillation  #Severe Protein Calorie Malnutrition  #CMML  #Acute Kidney Injury- resolved     -management of SBO as per surgery  -A. Fib rate controlled on metoprolol and digoxin, can switch to oral.   -TPN for nutritional support  -patient to follow-up outpatient with hematologist  -Cr improved back to baseline  -continue PT
IMP: This is an 88 year old woman with  Afib not on anticaog due to bleeding risk ,  on metoprolol, uterine CA s/p radiation and hysterectomy, breast cancer s/p chemo and bilateral mastectomy presents to the ED complaining of lower abdominal pain with nausea and vomiting. Pt admitted for SBO. Pt had laparoscopic ileocolic bypass 3/12 for SBO.     #SBO s/p laparoscopic ileocolic bypass 3/12   #Hypotension  #Rapid Afib  #MERCEDEZ  #Myelodysplastic syndrome  #H/O Breast Cancer   #H/O Uterine Cancer         Plan   - O2 supp as needed   - Morphine / fentanyl for pain  ( pat is not allergic to morphine .. rxn was vomiting )   - Bolus 1 L LR  - Maintenance ivf 80 ml /hr   - Hemodynamic monitoring   - NPO   - Ice chips   - OOB to chair   - PT   - Incentive spirometry   - NGT to suction   - IV antibx   - ID eval  - Wound care as per surgery
IMP: This is an 88 year old woman with  Afib not on anticoagulation due to bleeding risk, uterine CA s/p radiation and hysterectomy, breast cancer s/p chemo and bilateral mastectomy presents to the ED complaining of lower abdominal pain with nausea and vomiting. Pt admitted for SBO. Pt had laparoscopic ileocolic bypass 3/12 for SBO.     #SBO s/p laparoscopic ileocolic bypass 3/12   #Shock - likely distrbutive   #Rapid Afib  #MERCEDEZ  #Myelodysplastic syndrome  #H/O Breast Cancer   #H/O Uterine Cancer     Plan:  - O2 supp as needed   - Hemodynamic monitoring   - Off vasopressors since yesterday   - Cont. Digoxin for rate control of Afib  - Not given AC given history of bleeding and recent surgery  - NGT discontinued by surgery   - General surgery follow up regarding initiating diet   - Ice chips   - Lasix 20 mg IVP to aim for euvolemic volume status  - OOB to chair   - PT   - Incentive spirometry   - ID eval appreciated  - Wound care as per surgery  - Transfer to surgical service, discussed with Dr. Cleveland
IMP: This is an 88 year old woman with  Afib not on anticoagulation due to bleeding risk, uterine CA s/p radiation and hysterectomy, breast cancer s/p chemo and bilateral mastectomy presents to the ED complaining of lower abdominal pain with nausea and vomiting. Pt admitted for SBO. Pt had laparoscopic ileocolic bypass 3/12 for SBO.     #SBO s/p laparoscopic ileocolic bypass 3/12   #Shock - likely distrbutive   #Rapid Afib  #MERCEDEZ  #Myelodysplastic syndrome  #H/O Breast Cancer   #H/O Uterine Cancer     Plan:  - O2 supp as needed   - Hemodynamic monitoring   - Taper vasopressors as tolerated to maintain MAP > 65  - Cont. Digoxin for rate control of Afib  - Not given AC given history of bleeding and recent surgery  - IV fluid hydration   - NPO with NGT to intermittent suctioning   - General surgery follow up regarding initiating diet   - Ice chips   - OOB to chair   - PT   - Incentive spirometry   - ID eval appreciated  - Wound care as per surgery  - COnt. ICU care while on vasopressors
NG tube clamped for 7.5 hours - no residual, including after irrigation. Abdomen - soft, non-distended. +flatus    Plan:  NPO, ice chips  Advance to clears tomorrow if no N/V
Persistent ileus, NG tube re-inserted. No bowel function. Abdomen - soft, non-tender.   Plan - continue NG to suction
+flatus, no BM. No N/V with the NG tube clamped.   CT shows ileus vs early SBO, contrast hasn't reached the anastomosis. No intra-abdominal abscess.     Plan:  Continue NG to low suction, clamp for 8 hours tomorrow and check residual.   Abdominal X-ray tomorrow to follow the contrast  Continue diuresis - Lasix 20 mg IV x 1 tomorrow, then d/c Kinsey after response
87yo F PMHx of Breast CA s/p bilateral mastectomy, Uterine CA, Atrial Fibrillation (not on AC due to bleeding) who presented with nausea and vomiting, found to have SBO. Patient admitted to surgical service, planned for conservative management for now. Medicine consulted for management of A. Fib with RVR. Patient tachycardic to 120s on admission, which improved with IV metoprolol. Patient off telemetry now, heart rate appears controlled on metoprolol. However patient complaining of inability to have bowel movements and nausea, unclear if patient constipated or recurrence of SBO. Surgical team planning for repeat imaging. Patient received Metoprolol 25mg XL dose this morning. If patient remains NPO, would restart metoprolol IV 2.5mg q6h tomorrow morning.
89yo F PMHx of Breast CA s/p bilateral mastectomy, Uterine CA, Atrial Fibrillation (not on AC due to bleeding) who presented with nausea and vomiting, found to have SBO. Patient admitted to surgical service, planned for conservative management for now. Medicine consulted for management of A. Fib with RVR. Patient tachycardic to 120s on admission, which improved with IV metoprolol. On telemetry patient had some episodes of tachycardia to the 160s, however when discussing with nursing staff this occurred while patient was walking in the [place] and patient was asymptomatic. At rest heart rate has been between 80 to a 100. Would maintain potassium>4 and magnesium>2. If heart rate persistently elevated can increase metoprolol IV to 5mg q6h as BP allows, or give additional 2.5mg PRN. Resume oral medications when tolerating PO.
Continue current management.  Keep NG tube to low continuous suction, not high intermittent.   OK to have ice chips   OOB to chair / ambulate with assistance
Resolved SBO, due to adhesions VS inguinal hernia s/p incarceration and spontaneous reduction.  Patient is unwilling to undergo repair, she is aware of the risk of obstruction and strangulation.  Plan to dc home tomorrow
89yo F PMHx of Uterine CA s/p hysterectomy, A. Fib, breast CA s/p b/l mastectomy, recent MDS diagnosis who presented with SBO s/p ileocolic bypass on 3/11. Course complicated by recurrent SBO.    #SBO s/p ileocolic bypass  #Chronic Atrial Fibrillation  #Severe Protein Calorie Malnutrition  #CMML  #Acute Kidney Injury- resolved   -TPN for nutritional support - Started on CLD, monitor for tolerance and BM  -management of SBO as per surgery  -A. Fib rate controlled on PO metoprolol and digoxin  - Not on AC due to history of bleeding in past  -patient to follow-up outpatient with hematologist  -MERCEDEZ resolved
89yo F PMHx of Uterine CA s/p hysterectomy, A. Fib, breast CA s/p b/l mastectomy, recent MDS diagnosis who presented with SBO. Patient went for ex-lap with lysis of adhesions and ileocolic bypass with anastomosis on 3/11/22. Medicine initially following for A. Fib with RVR, re-consulted for bilateral LE edema. LE edema improved with diuresis. Patient had rapid response overnight due to chest pain with hypoxia. CXR shows bilateral pleural effusions. Will hold off on diuresis for now given MERCEDEZ and soft blood pressures requiring bolus overnight. Abdominal pain improved today. AXR with ileus, but shows stool in lower rectum. Consider suppository or enema. Patient has persistently elevated WBC, with no other signs of infections. Would obtain hematology consult given history of CMML, check CBC with Diffs.
89yo F PMHx of Uterine CA s/p hysterectomy, A. Fib, breast CA s/p b/l mastectomy, recent MDS diagnosis who presented with SBO. Patient went for ex-lap with lysis of adhesions and ileocolic bypass with anastomosis on 3/11/22. Medicine initially following for A. Fib with RVR, re-consulted for bilateral LE edema. Patient given Lasix 20mg for the past 2 days and LE edema looks much improved. NGT removed and patient tolerating PO intake. Would hold off on further diuresis at this time, and assess volume status daily.
88yo F PMHx of Uterine CA s/p hysterectomy, A. Fib, breast CA s/p b/l mastectomy, recent MDS diagnosis who presented with SBO. Patient went for ex-lap with lysis of adhesions and ileocolic bypass with anastomosis on 3/11/22. Medicine initially following for A. Fib with RVR, re-consulted for bilateral LE edema. LE edema improved with diuresis. Today patient complaining of additional abdominal pain, abdomen diffusely tender, however patient not nauseous. Would consider repeat abdominal xray to assess stool burden as patient has a history of constipation as well. Kinsey removed, follow TOV.
88yo F PMHx of Uterine CA s/p hysterectomy, A. Fib, breast CA s/p b/l mastectomy, recent MDS diagnosis who presented with SBO s/p ileocolic bypass on 3/11. Course complicated by recurrent SBO.    #Comfort Care  #SBO s/p ileocolic bypass  #Chronic Atrial Fibrillation  #Severe Protein Calorie Malnutrition  #CMML  #Acute Kidney Injury- resolved   GOC discussion had with palliative care team and family and patient. Agreed upon comfort care measures. They would like to receive hospice services at a LTC facility near her home on [place].  - DC TPN  - CLD, monitor for tolerance and BM  - Lasix PRN for SOB  - remeron 15 qhs   - A. Fib rate controlled on PO metoprolol and digoxin  - Not on AC due to history of bleeding in past  - MERCEDEZ resolved  - DC labwork  - f/u SW regarding LTC hospice  - Transfer to medicine for comfort care
Patient having abdominal pain and episode of emesis. Refusing further NGT. D-Dimer elevated to 8000, possibly related to underlying CMML/inflammatory condition. Patient off oxygen now, less likely PE, but can repeat LE dopplers to ensure no DVT. CT Abdomen, still shows obstruction. Plan for TPN per surgical team. Agree with nephrology recommendations for albumin suplementation with diuresis.

## 2022-03-31 NOTE — PROGRESS NOTE ADULT - ATTENDING SUPERVISION STATEMENT
ACP
ACP
Resident
ACP
ACP/Resident
Resident
Resident
ACP
Resident
Resident
ACP/Resident
ACP/Resident
Resident

## 2022-03-31 NOTE — CHART NOTE - NSCHARTNOTEFT_GEN_A_CORE
Assessment:   89yFemalePatient is a 89y old  Female who presents with a chief complaint of small bowel obstruction (31 Mar 2022 10:55)      Factors impacting intake: [ ] none [ ] nausea  [ ] vomiting [ ] diarrhea [ ] constipation  [ ]chewing problems [ ] swallowing issues  [x ] other: visited patient in room. Reports taking liquids and also drinking ensure clear. reports strawberry allergy, communicated with RN and kitchen. also receiving cycled TPN for 12hrs at first and last hour of 75ml/h and remaining time rate of 135ml/h. also receiving fat emulsion at 50g/d. TPN solution provides: 1586kcal and 100g protein which meets needs. suggest To advance diet per surgical team and Provide TPN as per JOLANTA MD.       Diet Presciption: Diet, Clear Liquid:   Supplement Feeding Modality:  Oral  Ensure Clear Cans or Servings Per Day:  1       Frequency:  Three Times a day (03-26-22 @ 11:37)    Intake: meeting needs with TPN    Current Weight: 69kg(3/31)  % Weight Change    Pertinent Medications: MEDICATIONS  (STANDING):  anastrozole 1 milliGRAM(s) Oral daily  chlorhexidine 2% Cloths 1 Application(s) Topical <User Schedule>  dextrose 5%. 1000 milliLiter(s) (50 mL/Hr) IV Continuous <Continuous>  dextrose 5%. 1000 milliLiter(s) (100 mL/Hr) IV Continuous <Continuous>  dextrose 50% Injectable 25 Gram(s) IV Push once  dextrose 50% Injectable 12.5 Gram(s) IV Push once  dextrose 50% Injectable 25 Gram(s) IV Push once  digoxin     Tablet 125 MICROGram(s) Oral daily  fat emulsion (Fish Oil and Plant Based) 20% Infusion 0.7634 Gm/kG/Day (20.8 mL/Hr) IV Continuous <Continuous>  fat emulsion (Fish Oil and Plant Based) 20% Infusion 0.7634 Gm/kG/Day (20.8 mL/Hr) IV Continuous <Continuous>  glucagon  Injectable 1 milliGRAM(s) IntraMuscular once  heparin   Injectable 5000 Unit(s) SubCutaneous every 12 hours  insulin lispro (ADMELOG) corrective regimen sliding scale   SubCutaneous every 6 hours  metoprolol succinate ER 25 milliGRAM(s) Oral daily  pantoprazole    Tablet 40 milliGRAM(s) Oral before breakfast  Parenteral Nutrition - Adult 1 Each TPN Continuous <Continuous>  Parenteral Nutrition - Adult 1 Each TPN Continuous <Continuous>    MEDICATIONS  (PRN):  acetaminophen    Suspension .. 650 milliGRAM(s) Oral every 6 hours PRN Temp greater or equal to 38C (100.4F), Mild Pain (1 - 3)  dextrose Oral Gel 15 Gram(s) Oral once PRN Blood Glucose LESS THAN 70 milliGRAM(s)/deciliter  ondansetron Injectable 4 milliGRAM(s) IV Push every 6 hours PRN Nausea and/or Vomiting  sodium chloride 0.9% lock flush 10 milliLiter(s) IV Push every 1 hour PRN Pre/post blood products, medications, blood draw, and to maintain line patency    Pertinent Labs: 03-31 Na137 mmol/L Glu 133 mg/dL<H> K+ 4.3 mmol/L Cr  0.58 mg/dL BUN 27 mg/dL<H> 03-31 Phos 2.9 mg/dL 03-31 Alb 1.7 g/dL<L> 03-29 Chol --    LDL --    HDL --    Trig 48 mg/dL     CAPILLARY BLOOD GLUCOSE      POCT Blood Glucose.: 159 mg/dL (31 Mar 2022 06:00)  POCT Blood Glucose.: 144 mg/dL (30 Mar 2022 23:38)  POCT Blood Glucose.: 139 mg/dL (30 Mar 2022 19:02)    Skin: No pressure injury     Estimated Needs:   [ x] no change since previous assessment  [ ] recalculated:     Previous Nutrition Diagnosis:   [ ] Inadequate Energy Intake [ ]Inadequate Oral Intake [ ] Excessive Energy Intake   [ ] Underweight [ ] Increased Nutrient Needs [ ] Overweight/Obesity   [ ] Altered GI Function [ ] Unintended Weight Loss [ ] Food & Nutrition Related Knowledge Deficit [ x] Malnutrition , severe     Nutrition Diagnosis is [x ] ongoing  [ ] resolved [ ] not applicable     New Nutrition Diagnosis: [ ] not applicable       Interventions:   Recommend  [ ] Change Diet To:  [ ] Nutrition Supplement  [ ] Nutrition Support  [x ] Other: suggest To advance diet per surgical team and Provide TPN as per JOLANTA MD.     Monitoring and Evaluation:   [ ] PO intake [ x ] Tolerance to diet prescription [ x ] weights [ x ] labs[ x ] follow up per protocol  [ ] other:

## 2022-03-31 NOTE — CONSULT NOTE ADULT - PROBLEM SELECTOR RECOMMENDATION 3
Clinical evidence indicates that the patient has Severe protein calorie malnutrition/ 3rd degree    In context of     Acute Illness/Injury (>7days)      Energy/Food intake <50% of estimated energy requirement >5 days  Weight loss: Moderate - severe (lbs lost recently)   Fluid Accumulation: Severe (Fluid overload, ascites, pleural effusions)   Strength: weakened severe (bedbound)    Recommend:   pleasure feeds as tolerated    nutritional supplements as tolerated, nutrition consult    Pt/daughter do not want feeding tubes or any further TPN Clinical evidence indicates that the patient has Severe protein calorie malnutrition/ 3rd degree    In context of     Acute Illness/Injury (>7days)      Energy/Food intake <50% of estimated energy requirement >5 days  Weight loss: Moderate - severe (lbs lost recently)   Fluid Accumulation: Severe (Fluid overload, ascites, pleural effusions)   Strength: weakened severe (bedbound)    Recommend:   pleasure feeds as tolerated    nutritional supplements as tolerated, nutrition consult    Pt/daughter do not want feeding tubes or any further TPN    trial of remeron 15 qhs which may aid mood, appetite and insomnia

## 2022-03-31 NOTE — PROGRESS NOTE ADULT - ASSESSMENT
88yo F PMHx of Uterine CA s/p hysterectomy, A. Fib, breast CA s/p b/l mastectomy, recent MDS diagnosis who presented with SBO. Patient went for ex-lap with lysis of adhesions and ileocolic bypass with anastomosis on 3/11/22. Medicine initially following for A. Fib with RVR, re-consulted for bilateral LE edema.        #SBO   ilius on xray abd, on exam abd distended  SBO s/p ileocolic bypas POD #19  -management of SBO as per surgery  -TPN for nutritional support, Patient started on clear liquids as per surgery.  no BM yet.      B/L Pleural effusions:  Patient complained of shortness of breath overnight.  CXR was done this am showed B/L pleural effusions, R>L.  agree with 20 IV lasix given by surgery.  Can give 20 IV more if blood pressure allows.  Keep net negative.      #Afib  Patient was on IV dig, now switched to PO dig.  continue Digoxin and metoprolol.  Patient not on AC due to history of bleeding in past.        #MERCEDEZ  most likely pre-renal decreased intravascular vol   Resolved.  monitor creatinine    #Elevated wbc   WBC in setting of CMML   patient to follow-up outpatient with hematologist  WBC uptrending. out patient follow up with heme/oncologist. 89yo F PMHx of Uterine CA s/p hysterectomy, A. Fib, breast CA s/p b/l mastectomy, recent MDS diagnosis who presented with SBO. Patient went for ex-lap with lysis of adhesions and ileocolic bypass with anastomosis on 3/11/22. Medicine initially following for A. Fib with RVR, re-consulted for bilateral LE edema.        #SBO   ilius on xray abd, on exam abd distended  SBO s/p ileocolic bypas POD #19  -management of SBO as per surgery  -TPN for nutritional support, Patient started on clear liquids as per surgery.  no BM yet.      #B/L Pleural effusions:  Patient complained of shortness of breath overnight.  CXR was done this am showed B/L pleural effusions, R>L.  agree with 20 IV lasix given by surgery.  Can give 20 IV more if blood pressure allows.  Keep net negative.      #Afib  Patient was on IV dig, now switched to PO dig.  continue Digoxin and metoprolol.  Patient not on AC due to history of bleeding in past.        #MERCEDEZ  most likely pre-renal decreased intravascular vol   Resolved.  monitor creatinine    #Elevated wbc   WBC in setting of CMML   patient to follow-up outpatient with hematologist  WBC uptrending.   if Patient spikes fever, please send cultures, patient should be low threshold to start antibiotics.

## 2022-03-31 NOTE — PROGRESS NOTE ADULT - ASSESSMENT
89F s/p lap WILLIAM, ileocolic bypass, pod#20    SOB, CXR ordered; 95% O2 on 3L  NC, ; BP wnl  afeb, VSS for past 24hrs    1. s/p ileocolic bypass  - clrs with ensure, will be discharged on clears  - TPN  - enema    2. leukocytosis  - uptrending, cannot rule out infectious etiology; further work up to be discussed with attending  - 2/2 CMML, resumed anastrozole  - f/u with hematologist, Dr. Neal on d/c    3. SOB  - suspected recurrent pleural effusion given TPN  - f/u immediate CXR, lasix if indicated    4. a fib  - hepSQ q12 for ppx only d/t bleeding risk, reeval for further need for tx AC  - digoxin/metop as tolerated    5. urinary retention  - likely due to immobilization, wilson reinserted -to be d/c'ed with wilson for outpt TOV trial    6. transaminitis   - likely due to TPN, trend LFT  - f/u with neph 89F s/p lap WILLIAM, ileocolic bypass, pod#20    SOB, CXR ordered; 95% O2 on 3L  NC, ; BP wnl  afeb, VSS for past 24hrs  per RN, pt refusing tele/pulse ox    1. s/p ileocolic bypass  - clrs with ensure, will be discharged on clears  - TPN  - enema    2. leukocytosis  - uptrending, cannot rule out infectious etiology; further work up to be discussed with attending  - 2/2 CMML, resumed anastrozole  - f/u with hematologist, Dr. Neal on d/c    3. SOB  - suspected recurrent pleural effusion given TPN  - f/u immediate CXR, lasix if indicated    4. a fib  - hepSQ q12 for ppx only d/t bleeding risk, reeval for further need for tx AC  - digoxin/metop as tolerated    5. urinary retention  - likely due to immobilization, wilson reinserted -to be d/c'ed with wilson for outpt TOV trial    6. transaminitis   - likely due to TPN, trend LFT  - f/u with neph

## 2022-03-31 NOTE — CONSULT NOTE ADULT - CONVERSATION DETAILS
Met with patient, daughter and son in law at bedside regarding current condition, GOC. Patient expressed that she feels like she is suffering, does not see herself getting better and does not want to continue w TPN. She stated she became very SOB overnight and feels like she continues to have setbacks and is tired of continuing with all these treatments that are not making her better. Daughter also expressed that she feels her mother is suffering. Offered psych eval if patient feels depressed, she declined, stating she doesn't need it.  She wishes to focus on being kept comfortable at this time, inquiring about hospice services. Hospice philosophy discussed, all questions answered. Discussed hospice option vs JANEEN w TPN to provide more time for recovery. She does not want to be returning to the hospital or to continue w TPN at this time, understands she will not be able to meet her caloric needs w po alone at this time given minimal po intake. Discussed options for home vs LTC w hospice, patient was previously living alone and daughter unable to have her in her home so they are [place] to [place],  but made aware that she currently does not have LTC coverage, will need SW f/u.     Regarding advance directives, the patient wishes to allow for natural death, she does not want CPR or intubation, no aggressive measures. She does not want rehospitalization or feeding tubes. Patient deferred to daughter to sign MOLSt. MOLST drafted and placed in chart.     Support provided. Palliative SW eval requested as well.

## 2022-03-31 NOTE — CONSULT NOTE ADULT - PROBLEM SELECTOR RECOMMENDATION 2
2/2 pleural effusions, on TPN, with severe hypoalbuminemia. Echo showed pEF, mod MR, severe pulm HTN. O2 support via NC. Lasix prn. No further TPN per pt wishes. DNR/DNI.

## 2022-03-31 NOTE — PROGRESS NOTE ADULT - SUBJECTIVE AND OBJECTIVE BOX
PGY 1 Note discussed with supervising resident and primary attending    Patient is a 89y old  Female who presents with a chief complaint of small bowel obstruction (31 Mar 2022 08:48)      INTERVAL HPI/OVERNIGHT EVENTS: Patient complained of shortness of breath over night, CXR was done this am showed B/L pleural effusions. was given IV 20 lasix.  MEDICATIONS  (STANDING):  anastrozole 1 milliGRAM(s) Oral daily  chlorhexidine 2% Cloths 1 Application(s) Topical <User Schedule>  dextrose 5%. 1000 milliLiter(s) (50 mL/Hr) IV Continuous <Continuous>  dextrose 5%. 1000 milliLiter(s) (100 mL/Hr) IV Continuous <Continuous>  dextrose 50% Injectable 25 Gram(s) IV Push once  dextrose 50% Injectable 12.5 Gram(s) IV Push once  dextrose 50% Injectable 25 Gram(s) IV Push once  digoxin     Tablet 125 MICROGram(s) Oral daily  fat emulsion (Fish Oil and Plant Based) 20% Infusion 0.7634 Gm/kG/Day (20.8 mL/Hr) IV Continuous <Continuous>  fat emulsion (Fish Oil and Plant Based) 20% Infusion 0.7634 Gm/kG/Day (20.8 mL/Hr) IV Continuous <Continuous>  furosemide   Injectable 20 milliGRAM(s) IV Push once  glucagon  Injectable 1 milliGRAM(s) IntraMuscular once  heparin   Injectable 5000 Unit(s) SubCutaneous every 12 hours  insulin lispro (ADMELOG) corrective regimen sliding scale   SubCutaneous every 6 hours  metoprolol succinate ER 25 milliGRAM(s) Oral daily  pantoprazole    Tablet 40 milliGRAM(s) Oral before breakfast  Parenteral Nutrition - Adult 1 Each TPN Continuous <Continuous>  Parenteral Nutrition - Adult 1 Each TPN Continuous <Continuous>    MEDICATIONS  (PRN):  acetaminophen    Suspension .. 650 milliGRAM(s) Oral every 6 hours PRN Temp greater or equal to 38C (100.4F), Mild Pain (1 - 3)  dextrose Oral Gel 15 Gram(s) Oral once PRN Blood Glucose LESS THAN 70 milliGRAM(s)/deciliter  ondansetron Injectable 4 milliGRAM(s) IV Push every 6 hours PRN Nausea and/or Vomiting  sodium chloride 0.9% lock flush 10 milliLiter(s) IV Push every 1 hour PRN Pre/post blood products, medications, blood draw, and to maintain line patency      __________________________________________________  REVIEW OF SYSTEMS:    CONSTITUTIONAL: No fever,   EYES: no acute visual disturbances  NECK: No pain or stiffness  RESPIRATORY: Shortness of breath +ve.  CARDIOVASCULAR: No chest pain, no palpitations  GASTROINTESTINAL: No pain. No nausea or vomiting; No diarrhea   NEUROLOGICAL: No headache or numbness, no tremors  MUSCULOSKELETAL: No joint pain, no muscle pain  GENITOURINARY: no dysuria, no frequency, no hesitancy  PSYCHIATRY: no depression , no anxiety  ALL OTHER  ROS negative        Vital Signs Last 24 Hrs  T(C): 36.8 (31 Mar 2022 05:46), Max: 36.8 (31 Mar 2022 05:46)  T(F): 98.2 (31 Mar 2022 05:46), Max: 98.2 (31 Mar 2022 05:46)  HR: 88 (31 Mar 2022 05:46) (86 - 96)  BP: 129/66 (31 Mar 2022 05:46) (100/49 - 129/66)  BP(mean): --  RR: 18 (31 Mar 2022 05:46) (18 - 20)  SpO2: 97% (31 Mar 2022 05:46) (95% - 97%)    ________________________________________________  PHYSICAL EXAM:  GENERAL: Elderly female in mils respiratory distress  HEENT: Normocephalic;  conjunctivae and sclerae clear; moist mucous membranes;   NECK : supple  CHEST/LUNG: Clear to auscultation bilaterally with good air entry   HEART: S1 S2  regular; no murmurs, gallops or rubs  ABDOMEN: Soft, Nontender, Nondistended; Bowel sounds present  EXTREMITIES: no cyanosis;  edema +2 on B/L lower extremities.; no calf tenderness  SKIN: warm and dry; no rash  NERVOUS SYSTEM:  Awake and alert; Oriented  to place, person and time ; no new deficits    _________________________________________________  LABS:                        7.1    50.43 )-----------( 213      ( 31 Mar 2022 07:15 )             22.5     03-31    137  |  105  |  27<H>  ----------------------------<  133<H>  4.3   |  25  |  0.58    Ca    7.9<L>      31 Mar 2022 07:15  Phos  2.9     03-31  Mg     1.6     03-31    TPro  6.1  /  Alb  1.7<L>  /  TBili  0.6  /  DBili  x   /  AST  131<H>  /  ALT  58  /  AlkPhos  146<H>  03-31        CAPILLARY BLOOD GLUCOSE      POCT Blood Glucose.: 159 mg/dL (31 Mar 2022 06:00)  POCT Blood Glucose.: 144 mg/dL (30 Mar 2022 23:38)  POCT Blood Glucose.: 139 mg/dL (30 Mar 2022 19:02)  POCT Blood Glucose.: 127 mg/dL (30 Mar 2022 11:28)        RADIOLOGY & ADDITIONAL TESTS:    Imaging Personally Reviewed:  YES/NO    Consultant(s) Notes Reviewed:   YES/ No    Care Discussed with Consultants :     Plan of care was discussed with patient and /or primary care giver; all questions and concerns were addressed and care was aligned with patient's wishes.

## 2022-03-31 NOTE — PROGRESS NOTE ADULT - SUBJECTIVE AND OBJECTIVE BOX
[facility]PH[facility]- PROGRESS NOTE    88 year old female with PMH of Chronic myelomonocytic leukemia (CMML), Afib on metoprolol (no A/C), uterine CA s/p radiation and hysterectomy, breast cancer s/p chemo and bilateral mastectomy presents with lower abdominal pain with nausea and vomiting. Pt a/w small bowel obstruction and Afib with RVR. s/p  Laparoscopic WILLIAM with ileocolic bypass on 3/11.  Complicated by early SBO. s/p RRT on 3/24 for chest pain found to have hypoxia. Nephrology consulted for TPN.      REVIEW OF SYSTEMS:  Gen: no changes in weight  Cards: no chest pain  Resp: +dyspnea this am.    GI: no nausea, no vomiting, +BM  post enema yesterday; no BM yet today, + tolerating liquid diet (poor appetite- did not touch breakfast)  Vascular: + LE edema, + LUE edema    contrast media (iodine-based) (Short breath)  iv  contrast (Unknown)  penicillin (Hives)  penicillin (Rash)  sulfa drugs (Short breath)  Sulfacetamide Sodium (Rash)      Hospital Medications: Medications reviewed    VITALS:  T(F): 98.2 (03-31-22 @ 05:46), Max: 98.2 (03-31-22 @ 05:46)  HR: 88 (03-31-22 @ 05:46)  BP: 129/66 (03-31-22 @ 05:46)  RR: 18 (03-31-22 @ 05:46)  SpO2: 97% (03-31-22 @ 05:46)  Wt(kg): --    03-30 @ 07:01  -  03-31 @ 07:00  --------------------------------------------------------  IN: 1290 mL / OUT: 1450 mL / NET: -160 mL    03-31 @ 07:01  -  03-31 @ 12:44  --------------------------------------------------------  IN: 0 mL / OUT: 1000 mL / NET: -1000 mL        PHYSICAL EXAM:  Gen: NAD, calm  Cards: RRR, +S1/S2, no M/G/R  Resp: Decreased BS @ bases B/L  GI: soft, NT +BS  : + wilson  Vascular: 1-2+ LE edema B/L, LUE edema with PICC intact    LABS:  03-31    137  |  105  |  27<H>  ----------------------------<  133<H>  4.3   |  25  |  0.58    Ca    7.9<L>      31 Mar 2022 07:15  Phos  2.9     03-31  Mg     1.6     03-31    TPro  6.1  /  Alb  1.7<L>  /  TBili  0.6  /  DBili      /  AST  131<H>  /  ALT  58  /  AlkPhos  146<H>  03-31    Creatinine Trend: 0.58 <--, 0.50 <--, 0.64 <--, 0.67 <--, 0.80 <--, 1.22 <--, 1.76 <--                        7.1    50.43 )-----------( 213      ( 31 Mar 2022 07:15 )             22.5     Urine Studies:

## 2022-03-31 NOTE — CONSULT NOTE ADULT - SUBJECTIVE AND OBJECTIVE BOX
HPI:  88 year old female with PMH of Afib on metoprolol, uterine CA s/p radiation and hysterectomy, breast cancer s/p chemo and bilateral mastectomy presents to the ED complaining of lower abdominal pain with nausea and vomiting. She thinks her symptoms are from lactulose or from a chicken sandwich she has prior to onset of symptoms. Patient states that she suffers from constipation and takes multiple different over the counter the drugs, she has been on lactulose for the past 2 days from her PMD and states she is still passing stool which is well formed. States she used to be on a blood thinner, but she had too much bleeding so it was stopped. Denies chest pain, worsening shortness of breath, dysuria or difficulty urinating. (08 Mar 2022 11:15)    Interval history: prolonged hospitalization s/p WILLIAM w ileocolic bypass for SBO 3/11, c/b afib w rvr, recurrent sbo/ileus, started on TPN 3/26, deconditioned. Episode of SOB overnight,  cxr w pleural effusions s/p lasix. TPN held.   Daughter and son in law at bedside. Pt denies pain, less SOB. Full code on file.       PAST MEDICAL & SURGICAL HISTORY:  Breast cancer    Uterine cancer    Atrial fibrillation    Breast cancer  s/p chemo and mammogram    Uterine cancer  s/p radiation and hysterectomy    Afib    H/O mastectomy    H/O: hysterectomy    S/P bilateral mastectomy    History of hysterectomy        SOCIAL HISTORY:  has daughter  Admitted from:  home          Surrogate/HCP/Guardian:   Zach Snow daughter        Phone#: [phone]    FAMILY HISTORY:  No pertinent family history in first degree relatives      Baseline ADLs (prior to admission): alert, ambulatory    Allergies    contrast media (iodine-based) (Short breath)  iv  contrast (Unknown)  penicillin (Hives)  penicillin (Rash)  strawberry (Hives)  sulfa drugs (Short breath)  Sulfacetamide Sodium (Rash)    Intolerances      Present Symptoms:   Dyspnea: mild  Nausea/Vomiting: denies  Anxiety: denies  Depressed denies  Fatigue: +  Loss of appetite: +  Pain:            denies                    location:    Constipation/diarrhea: denies, +BM after enema        Review of Systems:  All others negative      MEDICATIONS  (STANDING):  anastrozole 1 milliGRAM(s) Oral daily  chlorhexidine 2% Cloths 1 Application(s) Topical <User Schedule>  dextrose 5%. 1000 milliLiter(s) (50 mL/Hr) IV Continuous <Continuous>  dextrose 5%. 1000 milliLiter(s) (100 mL/Hr) IV Continuous <Continuous>  dextrose 50% Injectable 25 Gram(s) IV Push once  dextrose 50% Injectable 12.5 Gram(s) IV Push once  dextrose 50% Injectable 25 Gram(s) IV Push once  digoxin     Tablet 125 MICROGram(s) Oral daily  glucagon  Injectable 1 milliGRAM(s) IntraMuscular once  heparin   Injectable 5000 Unit(s) SubCutaneous every 12 hours  insulin lispro (ADMELOG) corrective regimen sliding scale   SubCutaneous every 6 hours  metoprolol succinate ER 25 milliGRAM(s) Oral daily  mirtazapine 15 milliGRAM(s) Oral at bedtime  pantoprazole    Tablet 40 milliGRAM(s) Oral before breakfast    MEDICATIONS  (PRN):  acetaminophen    Suspension .. 650 milliGRAM(s) Oral every 6 hours PRN Temp greater or equal to 38C (100.4F), Mild Pain (1 - 3)  dextrose Oral Gel 15 Gram(s) Oral once PRN Blood Glucose LESS THAN 70 milliGRAM(s)/deciliter  ondansetron Injectable 4 milliGRAM(s) IV Push every 6 hours PRN Nausea and/or Vomiting  sodium chloride 0.9% lock flush 10 milliLiter(s) IV Push every 1 hour PRN Pre/post blood products, medications, blood draw, and to maintain line patency      PHYSICAL EXAM:    Vital Signs Last 24 Hrs  T(C): 36.8 (31 Mar 2022 05:46), Max: 36.8 (31 Mar 2022 05:46)  T(F): 98.2 (31 Mar 2022 05:46), Max: 98.2 (31 Mar 2022 05:46)  HR: 88 (31 Mar 2022 05:46) (86 - 88)  BP: 129/66 (31 Mar 2022 05:46) (100/49 - 129/66)  BP(mean): --  RR: 18 (31 Mar 2022 05:46) (18 - 20)  SpO2: 97% (31 Mar 2022 05:46) (97% - 97%)     Karnofsky Performance Score/Palliative Performance Status Version2:  40   %     GENERAL: alert, frail, on NC, NAD  HEENT: Atraumatic, oropharynx clear, neck supple  CHEST/LUNG: unlabored, dec BS bases  HEART: Regular rate and rhythm    ABDOMEN: Soft, Nontender, Nondistended   MUSCULOSKELETAL: ++ edema,  bedbound  NERVOUS SYSTEM:  Alert & Oriented X3,  follows commands  SKIN: No rashes or lesions noted  Oral intake: poor       LABS:                        7.1    50.43 )-----------( 213      ( 31 Mar 2022 07:15 )             22.5     03-31    137  |  105  |  27<H>  ----------------------------<  133<H>  4.3   |  25  |  0.58    Ca    7.9<L>      31 Mar 2022 07:15  Phos  2.9     03-31  Mg     1.6     03-31    TPro  6.1  /  Alb  1.7<L>  /  TBili  0.6  /  DBili  x   /  AST  131<H>  /  ALT  58  /  AlkPhos  146<H>  03-31        RADIOLOGY & ADDITIONAL STUDIES:  < from: Xray Chest 1 View-PORTABLE IMMEDIATE (Xray Chest 1 View-PORTABLE IMMEDIATE .) (03.31.22 @ 09:47) >  ACC: [id number] EXAM:  XR CHEST PORTABLE IMMED 1V                          PROCEDURE DATE:  03/31/2022          INTERPRETATION:  Clinical history: 89-year-old female, shortness of   breath.    Portable view of the chest is compared to 3/24/2022 and is correlated   with the chest CT of 3/25/2022.    FINDINGS: Moderate bilateral pleural effusions/adjacent   atelectasis/consolidation, grossly unchanged.    Mild cardiomegaly and normal pulmonary vasculature with no pneumothorax   or acute osseous finding.    IMPRESSION:  Moderate bilateral pleural effusions/adjacent atelectasis/consolidation,   unchanged.    --- End of Report ---            LETY RUIZ DO; Attending Radiologist  This document has been electronically signed. Mar 31 2022  2:49PM    < end of copied text >  < from: Transthoracic Echocardiogram (03.14.22 @ 13:08) >  Patient name: NICK OBRIEN  YOB: 1933   Age: 88 (F)   MR#: [id number]  Study Date: 3/14/2022  Location: [place] [id number]: Santana Love RDCS  Study quality: Technically good  Referring Physician:  AGUILA RICARDO MD  Blood Pressure: 90/56 mmHg  Height: 172 cm  Weight: 65 kg  BSA: 1.8 m2  ------------------------------------------------------------------------    PROCEDURE: Transthoracic echocardiogram with 2-D, M-Mode  and complete spectral and color flow Doppler.  INDICATION:Cardiomyopathy, unspecified (I42.9)  HISTORY:  ------------------------------------------------------------------------  DIMENSIONS:  Dimensions:     Normal Values:  LA:     5.0 cm    2.0 - 4.0 cm  Ao:     3.0 cm    2.0 - 3.8 cm  SEPTUM: 1.0 cm    0.6 - 1.2 cm  PWT:    1.0 cm    0.6 - 1.1 cm  LVIDd:  3.3 cm    3.0 - 5.6 cm  LVIDs:  2.4 cm    1.8 - 4.0 cm      Derived Variables:  LVMI: 53 g/m2  RWT: 0.60  Ejection Fraction Visual Estimate: 60-65 %    ------------------------------------------------------------------------  OBSERVATIONS:  Mitral Valve: Normal mitral valve. Moderate posterior  mitral annular calcification. Moderate mitral  regurgitation.  Aortic Root: Aortic Root: 3.0 cm. Calcification noted in  proximal ascending aorta; ascending aorta appears dilated  on limited views. Recommend CT chest for further evaluation  of aorta as clinically indicated.  Aortic Valve: Calcified trileaflet aortic valve.  Aortic  sclerosis  Mild to moderate aortic regurgitation.  Left Atrium: Severely dilated left atrium.  LA volume index  = 53 cc/m2.  Left Ventricle: Normal Left Ventricular Systolic Function,  (EF = 60-65% by visual estimation) No regional wall motion  abnormalities. Normal left ventricular internal dimensions  and wall thicknesses. Unable to adequately assess diastolic  function due to presence of arrythmia.  Right Heart: Severe right atrial enlargement. Normal right  ventricular size and function. Normal tricuspid valve.  Severe tricuspid regurgitation. Normal pulmonic valve.  Trace pulmonic insufficiency is noted.  Pericardium/PleuraNormal pericardium with no pericardial  effusion.  Hemodynamic: RA Pressure is 8 mm Hg. RV systolic pressure  is severely increased at  60 mm Hg.  ------------------------------------------------------------------------  CONCLUSIONS:  1. Normal mitral valve. Moderate posterior mitral annular  calcification. Moderate mitral regurgitation.  2. Calcified trileaflet aortic valve.  Aortic sclerosis  Mild to moderate aortic regurgitation.  3. Aortic Root: 3.0 cm. Calcification noted in proximal  ascending aorta; ascending aorta appears dilated on limited  views. Recommend CT chest for further evaluation of aorta  as clinically indicated.  4. Severely dilated left atrium.  LA volume index= 53  cc/m2.  5. Normal Left Ventricular Systolic Function,  (EF = 60-65%  by visual estimation)  6. Unable to adequately assess diastolic function due to  presence of arrythmia.  7. Severe right atrial enlargement.  8. Normal right ventricular size and function.  9. RA Pressure is 8 mm Hg.  10. RV systolic pressure is severely increased at  60 mm  Hg.  11. Normal tricuspid valve. Severe tricuspid regurgitation.    *** No previous Echo exam.  ------------------------------------------------------------------------  Confirmed on  3/15/2022 - 12:02:38 by Margarita Whitney MD  ------------------------------------------------------------------------    < end of copied text >  < from: CT Abdomen and Pelvis No Cont (03.25.22 @ 09:22) >    ACC: [id number] EXAM:  CT ABDOMEN AND PELVIS                        ACC: [id number] EXAM:  CT CHEST                          PROCEDURE DATE:  03/25/2022          INTERPRETATION:  CLINICAL INFORMATION: Evaluate pleural effusion.   Evaluate ileus and bypass.    COMPARISON: CT abdomen pelvis 3/18/2022, 3/11/2022, 3/8/2022    CONTRAST/COMPLICATIONS:  IV Contrast: NONE  Oral Contrast: NONE  Complications: None reported at time of study completion    PROCEDURE:  CT of the Chest, Abdomen and Pelvis was performed.  Sagittal and coronal reformats were performed.    FINDINGS:    CHEST:  LUNGS AND LARGE AIRWAYS: Central airways are patent. Biapical scarring.   Compressive atelectasis of the lower lobes.  PLEURA: Moderate pleural effusions, right greater than left.  VESSELS: Atheromatous change. Main pulmonary artery enlargement, which   may reflect pulmonary arterial hypertension.  HEART: Heart size is enlarged, particularly the right atrium. Aortic   valve and coronary calcification. Trace pericardial fluid.  MEDIASTINUM AND VIOLET: Small volume nodes.  CHEST WALL AND LOWER NECK: Soft tissue edema.    ABDOMEN AND PELVIS:    Solid organ and bowel evaluation is limited due to lack of contrast.    LIVER: Normal size  BILE DUCTS: No ductal dilatation  GALLBLADDER: Distended  SPLEEN: Somewhat atrophic with few coarse calcifications  PANCREAS: No main ductal dilatation  ADRENALS: Unremarkable  KIDNEYS/URETERS: No hydronephrosis. Left renal cysts, one with peripheral   calcification.    BLADDER: Foleycatheter.  REPRODUCTIVE ORGANS: Hysterectomy.    BOWEL: Persistent distention proximal to mid small bowel with transition   in the left paracentral region, series 2 image 204. Distal small bowel   loops in the pelvis are relatively underdistended, including the bowel   associated with bypass. Findings may reflect persistent small bowel   obstruction. Slight mural thickening versus underdistention of distal   small bowel loops in the pelvis, for example on images 231-240 series 2.   Question enteritis. Colonic diverticulosis, without diverticulitis.  PERITONEUM: Small ascites and mesenteric edema. Few pockets of air are   identified in the right lower quadrant, images 219-223, which could be   post operative.  RETROPERITONEUM/LYMPH NODES: Small volume nodes.  ABDOMINAL WALL: Prominent diffuse soft tissue edema. Anterior superficial   soft tissue density versus complex fluid measures 3.5 x 1.7 cm, image 173   series 2, increased in size and more well-defined in comparison with   3/18/2022, of unclear significance. Correlate with dedicated ultrasound   for characterization.  BONES: Degenerative changes and osseous mineralization. Redemonstrated L1   and L2 compression deformities and osseous retropulsion at L2.    IMPRESSION:    Limited noncontrast study.    CHEST:  1. Moderate pleural effusions, right greater than left.    ABDOMEN/PELVIS:  1. Persistent small bowel obstruction. Small ascites and mesenteric edema.  2. Slight mural thickening versus underdistention of distal small bowel   loops in the pelvis, for example on images 231-240 series 2. Question   enteritis. Correlate with lactate level.  3. Anterior superficial soft tissue density versus complex fluid measures   3.5 x 1.7 cm, image 173 series 2, increased in size and more well-defined   in comparison with 3/18/2022, of unclear significance. Correlate with   dedicated ultrasound for characterization.    --- End of Report ---    < end of copied text >      ADVANCE DIRECTIVES:

## 2022-03-31 NOTE — CONSULT NOTE ADULT - PROBLEM SELECTOR RECOMMENDATION 5
GOC discussion as above. Now DNR/DNI/DNH, comfort measures, no further TPN. Prefers to avoid frequent labwork. [place] to [place] however does not have LTC coverage, home hospice not an option per daughter. SW f/u. Palliative will follow.

## 2022-04-01 NOTE — PROGRESS NOTE ADULT - SUBJECTIVE AND OBJECTIVE BOX
follow up on:  complex medical decision making related to goals of care    OVERNIGHT EVENTS: no overnight events. Patient denies pain or SOB,  slept better. tolerating clears. Appears in better spirits today.     Present Symptoms:   Dyspnea: denies  Nausea/Vomiting: denies  Anxiety: denies  Depressed denies  Fatigue: +  Loss of appetite: +  Pain:            denies                    location:    Constipation/diarrhea: denies, +BM after enema 2 d ago         Review of Systems:  All others negative    MEDICATIONS  (STANDING):  anastrozole 1 milliGRAM(s) Oral daily  dextrose 50% Injectable 12.5 Gram(s) IV Push once  digoxin     Tablet 125 MICROGram(s) Oral daily  metoprolol succinate ER 25 milliGRAM(s) Oral daily  mirtazapine 15 milliGRAM(s) Oral at bedtime  pantoprazole    Tablet 40 milliGRAM(s) Oral before breakfast  polyethylene glycol 3350 17 Gram(s) Oral daily    MEDICATIONS  (PRN):  acetaminophen    Suspension .. 650 milliGRAM(s) Oral every 6 hours PRN Temp greater or equal to 38C (100.4F), Mild Pain (1 - 3)  ondansetron Injectable 4 milliGRAM(s) IV Push every 6 hours PRN Nausea and/or Vomiting  sodium chloride 0.9% lock flush 10 milliLiter(s) IV Push every 1 hour PRN Pre/post blood products, medications, blood draw, and to maintain line patency      PHYSICAL EXAM:  Vital Signs Last 24 Hrs  T(C): 36.3 (01 Apr 2022 14:54), Max: 36.8 (01 Apr 2022 05:32)  T(F): 97.4 (01 Apr 2022 14:54), Max: 98.2 (01 Apr 2022 05:32)  HR: 92 (01 Apr 2022 14:54) (80 - 94)  BP: 100/59 (01 Apr 2022 14:54) (97/46 - 104/49)  BP(mean): 59 (31 Mar 2022 21:52) (59 - 59)  RR: 18 (01 Apr 2022 14:54) (18 - 18)  SpO2: 97% (01 Apr 2022 14:54) (92% - 99%)    Karnofsky Performance Score/Palliative Performance Status Version2:  40   %     GENERAL: alert, frail, on NC, NAD  HEENT: Atraumatic, oropharynx clear, neck supple  CHEST/LUNG: unlabored   HEART: Regular rate and rhythm    ABDOMEN: Soft, Nontender, Nondistended   MUSCULOSKELETAL: ++ edema,  bedbound  NERVOUS SYSTEM:  Alert & Oriented X3,  follows commands  SKIN: No rashes or lesions noted  Oral intake: poor    LABS:                          7.1    50.43 )-----------( 213      ( 31 Mar 2022 07:15 )             22.5     03-31    137  |  105  |  27<H>  ----------------------------<  133<H>  4.3   |  25  |  0.58    Ca    7.9<L>      31 Mar 2022 07:15  Phos  2.9     03-31  Mg     1.6     03-31    TPro  6.1  /  Alb  1.7<L>  /  TBili  0.6  /  DBili  x   /  AST  131<H>  /  ALT  58  /  AlkPhos  146<H>  03-31        RADIOLOGY & ADDITIONAL STUDIES:

## 2022-04-01 NOTE — PROGRESS NOTE ADULT - SUBJECTIVE AND OBJECTIVE BOX
89y Female is under our care for     REVIEW OF SYSTEMS:  [  ] Not able to elicit  General:	  Chest:	  GI:	  :  Skin:	  Musculoskeletal:	  Neuro:	    MEDS:    ALLERGIES: Allergies    contrast media (iodine-based) (Short breath)  iv  contrast (Unknown)  penicillin (Hives)  penicillin (Rash)  strawberry (Hives)  sulfa drugs (Short breath)  Sulfacetamide Sodium (Rash)    Intolerances        VITALS:  Vital Signs Last 24 Hrs  T(C): 36.8 (01 Apr 2022 05:32), Max: 36.8 (01 Apr 2022 05:32)  T(F): 98.2 (01 Apr 2022 05:32), Max: 98.2 (01 Apr 2022 05:32)  HR: 94 (01 Apr 2022 09:12) (80 - 94)  BP: 102/58 (01 Apr 2022 09:12) (97/41 - 104/49)  BP(mean): 59 (31 Mar 2022 21:52) (59 - 59)  RR: 18 (01 Apr 2022 05:32) (16 - 18)  SpO2: 92% (01 Apr 2022 09:12) (92% - 99%)      PHYSICAL EXAM:  HEENT:  Neck:  Respiratory:  Cardiovascular:  Gastrointestinal:  Extremities:  Skin:  Ortho:  Neuro:    LABS/DIAGNOSTIC TESTS:                        7.1    50.43 )-----------( 213      ( 31 Mar 2022 07:15 )             22.5     WBC Count: 50.43 K/uL (03-31 @ 07:15)  WBC Count: 44.43 K/uL (03-30 @ 07:27)  WBC Count: 39.32 K/uL (03-29 @ 06:44)  WBC Count: 34.98 K/uL (03-28 @ 05:41)    03-31    137  |  105  |  27<H>  ----------------------------<  133<H>  4.3   |  25  |  0.58    Ca    7.9<L>      31 Mar 2022 07:15  Phos  2.9     03-31  Mg     1.6     03-31    TPro  6.1  /  Alb  1.7<L>  /  TBili  0.6  /  DBili  x   /  AST  131<H>  /  ALT  58  /  AlkPhos  146<H>  03-31      CULTURES:   .Blood Blood-Peripheral  03-13 @ 08:29   No Growth Final  --  --      Clean Catch Clean Catch (Midstream)  03-13 @ 08:24   No growth  --  --      Clean Catch Clean Catch (Midstream)  03-08 @ 18:19   <10,000 CFU/mL Normal Urogenital Ira  --  --        RADIOLOGY:  no new studies 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  Patient was seen laying comfortably in the chair with no acute distress on room air with daughter at the bedside.  Patients WBC count continues to uptrend, however she remains afebrile at this time.  Patient and family also opted for DNR/DNI with hospice, and has requested to stop TPN and remove PICC line.      REVIEW OF SYSTEMS:  [  ] Not able to elicit  General: no fevers no malaise  Chest: no cough no sob  GI: no nvd  : no urinary sxs   Skin: no rashes  Musculoskeletal: no trauma no LBP  Neuro: no ha's no dizziness     MEDS:    ALLERGIES: Allergies    contrast media (iodine-based) (Short breath)  iv  contrast (Unknown)  penicillin (Hives)  penicillin (Rash)  strawberry (Hives)  sulfa drugs (Short breath)  Sulfacetamide Sodium (Rash)    Intolerances        VITALS:  Vital Signs Last 24 Hrs  T(C): 36.8 (01 Apr 2022 05:32), Max: 36.8 (01 Apr 2022 05:32)  T(F): 98.2 (01 Apr 2022 05:32), Max: 98.2 (01 Apr 2022 05:32)  HR: 94 (01 Apr 2022 09:12) (80 - 94)  BP: 102/58 (01 Apr 2022 09:12) (97/41 - 104/49)  BP(mean): 59 (31 Mar 2022 21:52) (59 - 59)  RR: 18 (01 Apr 2022 05:32) (16 - 18)  SpO2: 92% (01 Apr 2022 09:12) (92% - 99%)      PHYSICAL EXAM:  HEENT: n/a  Neck: supple no LN's   Respiratory: lungs clear no rales  Cardiovascular: S1 S2 reg no murmurs  Gastrointestinal: +BS with soft, nondistended abdomen; RUQ abdominal tenderness  : Kinsey catheter in place  Extremities: no edema, left basilic PICC line  Skin: no rashes  Ortho: n/a  Neuro: AAO x 3    LABS/DIAGNOSTIC TESTS:                        7.1    50.43 )-----------( 213      ( 31 Mar 2022 07:15 )             22.5     WBC Count: 50.43 K/uL (03-31 @ 07:15)  WBC Count: 44.43 K/uL (03-30 @ 07:27)  WBC Count: 39.32 K/uL (03-29 @ 06:44)  WBC Count: 34.98 K/uL (03-28 @ 05:41)    03-31    137  |  105  |  27<H>  ----------------------------<  133<H>  4.3   |  25  |  0.58    Ca    7.9<L>      31 Mar 2022 07:15  Phos  2.9     03-31  Mg     1.6     03-31    TPro  6.1  /  Alb  1.7<L>  /  TBili  0.6  /  DBili  x   /  AST  131<H>  /  ALT  58  /  AlkPhos  146<H>  03-31      CULTURES:   .Blood Blood-Peripheral  03-13 @ 08:29   No Growth Final  --  --      Clean Catch Clean Catch (Midstream)  03-13 @ 08:24   No growth  --  --      Clean Catch Clean Catch (Midstream)  03-08 @ 18:19   <10,000 CFU/mL Normal Urogenital Ira  --  --        RADIOLOGY:  no new studies

## 2022-04-01 NOTE — PROGRESS NOTE ADULT - PROBLEM SELECTOR PLAN 1
s/p WILLIAM w ileocolic bypass 3/11, w recurrent SBO, on TPN since 3/26. Mgmt per surgery. +BM foll enema, on clear liquids. Pt w poor po intake.  DNR/DNI, pt does not want to continue w TPN, requesting LTC w hospice.

## 2022-04-01 NOTE — PROGRESS NOTE ADULT - PROBLEM SELECTOR PLAN 6
palliative following  pt is JANEEN to LTC with hospice palliative following  pt is JANEEN to LTC with hospice  PICC - hard to have peripheral access, pt requests pain or antiemesis meds at times   will remove PICC line before d/c to rehab palliative following  pt is JANEEN to LTC with hospice  PICC - removed by surgical PA on 4/1 without complications

## 2022-04-01 NOTE — PROGRESS NOTE ADULT - ASSESSMENT
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  Early CMML (as per patient)    Plan:                        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  Early CMML (as per patient)    Plan:  Patient is requesting LTC with hospice  Increased WBC count is likely secondary to CMML, and patient remains afebrile  At this time, there is no indication for treatment   Reconsult prn                     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  Early CMML (as per patient)    Plan:  Patient is requesting LTC with hospice  Increased WBC count is likely secondary to CMML, and patient remains afebrile  At this time, there is no indication for any antibiotic treatment   Reconsult prn

## 2022-04-01 NOTE — PROGRESS NOTE ADULT - ASSESSMENT
89 yoF s/p lap WILLIAM, ileocolic bypass, pod#[id number]    upon extensive discussion btw family/palliative/surgical team, pt/family agrees to proceed with palliative care/hospice    SOB improved, on NC for comfort  d/c'ed TPN    - clrs with ensure for comfort  - stopped TPN  - remove PICC  - pending transfer to medicine  - palliative SW per disposition

## 2022-04-01 NOTE — PROGRESS NOTE ADULT - ASSESSMENT
88yo F PMHx of Uterine CA s/p hysterectomy, A. Fib, breast CA s/p b/l mastectomy, recent MDS diagnosis who presented with SBO. Patient went for ex-lap with lysis of adhesions and ileocolic bypass with anastomosis on 3/11/22. Medicine initially following for A. Fib with RVR, re-consulted for bilateral LE edema  due to pleural effusion which improving with  diuresis. Hospital course complicated by recurrent SBO, pt was NPO mostly till 3/25, PICC inserted for nutiritional support via TPN then on 3/31 palliative care consulted as pt requested to be comfort care, as per discussion with palliative pt requested LTC with hospice, due to PT eval, pt will be JANEEN to LTC with hospice. d/to TPN and advancing diet as tolerated till now, full liquids today.

## 2022-04-01 NOTE — PROGRESS NOTE ADULT - NS ATTEND AMEND GEN_ALL_CORE FT
TPN stopped due to patient wishes. Awaiting hospice.     No N/V, tolerating full liquid diet.     Plan:   d/c PICC  Per Medicine/Palliative team  Diet can be advanced slowly over the next few weeks as tolerated  Add Miralax

## 2022-04-01 NOTE — PROGRESS NOTE ADULT - SUBJECTIVE AND OBJECTIVE BOX
INTERVAL HPI/OVERNIGHT EVENTS:    No acute events overnight.   Pt resting comfortably. No acute complaints.       MEDICATIONS  (STANDING):  anastrozole 1 milliGRAM(s) Oral daily  chlorhexidine 2% Cloths 1 Application(s) Topical <User Schedule>  dextrose 5%. 1000 milliLiter(s) (50 mL/Hr) IV Continuous <Continuous>  dextrose 5%. 1000 milliLiter(s) (100 mL/Hr) IV Continuous <Continuous>  dextrose 50% Injectable 25 Gram(s) IV Push once  dextrose 50% Injectable 12.5 Gram(s) IV Push once  dextrose 50% Injectable 25 Gram(s) IV Push once  digoxin     Tablet 125 MICROGram(s) Oral daily  glucagon  Injectable 1 milliGRAM(s) IntraMuscular once  heparin   Injectable 5000 Unit(s) SubCutaneous every 12 hours  insulin lispro (ADMELOG) corrective regimen sliding scale   SubCutaneous every 6 hours  metoprolol succinate ER 25 milliGRAM(s) Oral daily  mirtazapine 15 milliGRAM(s) Oral at bedtime  pantoprazole    Tablet 40 milliGRAM(s) Oral before breakfast    MEDICATIONS  (PRN):  acetaminophen    Suspension .. 650 milliGRAM(s) Oral every 6 hours PRN Temp greater or equal to 38C (100.4F), Mild Pain (1 - 3)  dextrose Oral Gel 15 Gram(s) Oral once PRN Blood Glucose LESS THAN 70 milliGRAM(s)/deciliter  ondansetron Injectable 4 milliGRAM(s) IV Push every 6 hours PRN Nausea and/or Vomiting  sodium chloride 0.9% lock flush 10 milliLiter(s) IV Push every 1 hour PRN Pre/post blood products, medications, blood draw, and to maintain line patency      Vital Signs Last 24 Hrs  T(C): 36.8 (01 Apr 2022 05:32), Max: 36.8 (01 Apr 2022 05:32)  T(F): 98.2 (01 Apr 2022 05:32), Max: 98.2 (01 Apr 2022 05:32)  HR: 81 (01 Apr 2022 05:32) (80 - 84)  BP: 99/61 (01 Apr 2022 05:32) (97/41 - 99/61)  BP(mean): 59 (31 Mar 2022 21:52) (59 - 59)  RR: 18 (01 Apr 2022 05:32) (16 - 18)  SpO2: 96% (01 Apr 2022 05:32) (96% - 96%)      PHYSICAL EXAM  General: Alert and oriented, not in acute distress  Resp: Breathing unlabored  Abdomen: Soft, nondistended, nontender  : wilson clear  Extremities: No pedal edema    I&O's Detail    31 Mar 2022 07:01  -  01 Apr 2022 07:00  --------------------------------------------------------  IN:  Total IN: 0 mL    OUT:    Indwelling Catheter - Urethral (mL): 1850 mL  Total OUT: 1850 mL    Total NET: -1850 mL          LABS:                        7.1    50.43 )-----------( 213      ( 31 Mar 2022 07:15 )             22.5             03-31    137  |  105  |  27<H>  ----------------------------<  133<H>  4.3   |  25  |  0.58    Ca    7.9<L>      31 Mar 2022 07:15  Phos  2.9     03-31  Mg     1.6     03-31    TPro  6.1  /  Alb  1.7<L>  /  TBili  0.6  /  DBili  x   /  AST  131<H>  /  ALT  58  /  AlkPhos  146<H>  03-31

## 2022-04-01 NOTE — PROGRESS NOTE ADULT - PROBLEM SELECTOR PLAN 1
SBO s/p ileocolic bypas POD #21  NPO till 3/25- d/to TPN   surgery is following  advanced diet as tolerated - full liquids 4/1

## 2022-04-01 NOTE — PROGRESS NOTE ADULT - SUBJECTIVE AND OBJECTIVE BOX
NP Note discussed with  Primary Attending    Patient is a 89y old  Female who presents with a chief complaint of small bowel obstruction (01 Apr 2022 11:36)      INTERVAL HPI/OVERNIGHT EVENTS: no new complaints    MEDICATIONS  (STANDING):  anastrozole 1 milliGRAM(s) Oral daily  chlorhexidine 2% Cloths 1 Application(s) Topical <User Schedule>  dextrose 50% Injectable 12.5 Gram(s) IV Push once  digoxin     Tablet 125 MICROGram(s) Oral daily  metoprolol succinate ER 25 milliGRAM(s) Oral daily  mirtazapine 15 milliGRAM(s) Oral at bedtime  pantoprazole    Tablet 40 milliGRAM(s) Oral before breakfast    MEDICATIONS  (PRN):  acetaminophen    Suspension .. 650 milliGRAM(s) Oral every 6 hours PRN Temp greater or equal to 38C (100.4F), Mild Pain (1 - 3)  ondansetron Injectable 4 milliGRAM(s) IV Push every 6 hours PRN Nausea and/or Vomiting  sodium chloride 0.9% lock flush 10 milliLiter(s) IV Push every 1 hour PRN Pre/post blood products, medications, blood draw, and to maintain line patency      __________________________________________________  REVIEW OF SYSTEMS:    CONSTITUTIONAL: No fever,   EYES: no acute visual disturbances  NECK: No pain or stiffness  RESPIRATORY: No cough; No shortness of breath on oxygen support via N-C   CARDIOVASCULAR: No chest pain, no palpitations  GASTROINTESTINAL: No pain. No nausea or vomiting; No diarrhea   NEUROLOGICAL: No headache or numbness, no tremors  MUSCULOSKELETAL: No joint pain, no muscle pain  GENITOURINARY:+ Wilson catheter without pain   PSYCHIATRY: no depression , no anxiety  ALL OTHER  ROS negative        Vital Signs Last 24 Hrs  T(C): 36.8 (01 Apr 2022 05:32), Max: 36.8 (01 Apr 2022 05:32)  T(F): 98.2 (01 Apr 2022 05:32), Max: 98.2 (01 Apr 2022 05:32)  HR: 94 (01 Apr 2022 09:12) (80 - 94)  BP: 102/58 (01 Apr 2022 09:12) (97/41 - 104/49)  BP(mean): 59 (31 Mar 2022 21:52) (59 - 59)  RR: 18 (01 Apr 2022 05:32) (16 - 18)  SpO2: 92% (01 Apr 2022 09:12) (92% - 99%)    ________________________________________________  PHYSICAL EXAM:  GENERAL: NAD  HEENT: Normocephalic;  conjunctivae and sclerae clear; moist mucous membranes;   NECK : supple  CHEST/LUNG: Clear to auscultation bilaterally with good air entry   HEART: S1 S2  regular; no murmurs, gallops or rubs  ABDOMEN: Soft, Nontender, Nondistended; Bowel sounds present + steri-strips   EXTREMITIES: no cyanosis; no edema; no calf tenderness  SKIN: warm and dry; no rash  NERVOUS SYSTEM:  Awake and alert; Oriented  to place, person and time ; no new deficits   + wilson catheter   _________________________________________________  LABS:                        7.1    50.43 )-----------( 213      ( 31 Mar 2022 07:15 )             22.5     03-31    137  |  105  |  27<H>  ----------------------------<  133<H>  4.3   |  25  |  0.58    Ca    7.9<L>      31 Mar 2022 07:15  Phos  2.9     03-31  Mg     1.6     03-31    TPro  6.1  /  Alb  1.7<L>  /  TBili  0.6  /  DBili  x   /  AST  131<H>  /  ALT  58  /  AlkPhos  146<H>  03-31        CAPILLARY BLOOD GLUCOSE            RADIOLOGY & ADDITIONAL TESTS:  < from: Xray Chest 1 View-PORTABLE IMMEDIATE (Xray Chest 1 View-PORTABLE IMMEDIATE .) (03.31.22 @ 09:47) >  ACC: [id number] EXAM:  XR CHEST PORTABLE IMMED 1V                          PROCEDURE DATE:  03/31/2022          INTERPRETATION:  Clinical history: 89-year-old female, shortness of   breath.    Portable view of the chest is compared to 3/24/2022 and is correlated   with the chest CT of 3/25/2022.    FINDINGS: Moderate bilateral pleural effusions/adjacent   atelectasis/consolidation, grossly unchanged.    Mild cardiomegaly and normal pulmonary vasculature with no pneumothorax   or acute osseous finding.    IMPRESSION:  Moderate bilateral pleural effusions/adjacent atelectasis/consolidation,   unchanged.    --- End of Report ---            LETY RUIZ DO; Attending Radiologist  This document has been electronically signed. Mar 31 2022  2:49PM    < end of copied text >  < from: US Duplex Venous Upper Ext Ltd, Left (03.25.22 @ 18:07) >    ACC: [id number] EXAM:  US DPLX UPR EXT VEINS LTD LT                          PROCEDURE DATE:  03/25/2022          INTERPRETATION:  CLINICAL INFORMATION: Left arm swelling    COMPARISON: None available.    TECHNIQUE: Duplex sonography of the LEFT UPPER extremity veins with color   and spectral Doppler, with and without compression.    FINDINGS:    PICC line noted.    The left internal jugular, subclavian, axillary and brachial veins are   patent and compressible where applicable.  The basilic and cephalic veins   (superficial veins) are patent and without thrombus.    Doppler examination shows normal spontaneous and phasic flow.    IMPRESSION:  No evidence of left upper extremity deep venous thrombosis.        --- End of Report ---            LIZZ ROY MD; Attending Radiologist  This document has been electronically signed. Mar 25 2022  6:18PM    < end of copied text >  < from: CT Abdomen and Pelvis No Cont (03.25.22 @ 09:22) >    ACC: [id number] EXAM:  CT ABDOMEN AND PELVIS                        ACC: [id number] EXAM:  CT CHEST                          PROCEDURE DATE:  03/25/2022          INTERPRETATION:  CLINICAL INFORMATION: Evaluate pleural effusion.   Evaluate ileus and bypass.    COMPARISON: CT abdomen pelvis 3/18/2022, 3/11/2022, 3/8/2022    CONTRAST/COMPLICATIONS:  IV Contrast: NONE  Oral Contrast: NONE  Complications: None reported at time of study completion    PROCEDURE:  CT of the Chest, Abdomen and Pelvis was performed.  Sagittal and coronal reformats were performed.    FINDINGS:    CHEST:  LUNGS AND LARGE AIRWAYS: Central airways are patent. Biapical scarring.   Compressive atelectasis of the lower lobes.  PLEURA: Moderate pleural effusions, right greater than left.  VESSELS: Atheromatous change. Main pulmonary artery enlargement, which   may reflect pulmonary arterial hypertension.  HEART: Heart size is enlarged, particularly the right atrium. Aortic   valve and coronary calcification. Trace pericardial fluid.  MEDIASTINUM AND VIOLET: Small volume nodes.  CHEST WALL AND LOWER NECK: Soft tissue edema.    ABDOMEN AND PELVIS:    Solid organ and bowel evaluation is limited due to lack of contrast.    LIVER: Normal size  BILE DUCTS: No ductal dilatation  GALLBLADDER: Distended  SPLEEN: Somewhat atrophic with few coarse calcifications  PANCREAS: No main ductal dilatation  ADRENALS: Unremarkable  KIDNEYS/URETERS: No hydronephrosis. Left renal cysts, one with peripheral   calcification.    BLADDER: Foleycatheter.  REPRODUCTIVE ORGANS: Hysterectomy.    BOWEL: Persistent distention proximal to mid small bowel with transition   in the left paracentral region, series 2 image 204. Distal small bowel   loops in the pelvis are relatively underdistended, including the bowel   associated with bypass. Findings may reflect persistent small bowel   obstruction. Slight mural thickening versus underdistention of distal   small bowel loops in the pelvis, for example on images 231-240 series 2.   Question enteritis. Colonic diverticulosis, without diverticulitis.  PERITONEUM: Small ascites and mesenteric edema. Few pockets of air are   identified in the right lower quadrant, images 219-223, which could be   post operative.  RETROPERITONEUM/LYMPH NODES: Small volume nodes.  ABDOMINAL WALL: Prominent diffuse soft tissue edema. Anterior superficial   soft tissue density versus complex fluid measures 3.5 x 1.7 cm, image 173   series 2, increased in size and more well-defined in comparison with   3/18/2022, of unclear significance. Correlate with dedicated ultrasound   for characterization.  BONES: Degenerative changes and osseous mineralization. Redemonstrated L1   and L2 compression deformities and osseous retropulsion at L2.    IMPRESSION:    Limited noncontrast study.    CHEST:  1. Moderate pleural effusions, right greater than left.    ABDOMEN/PELVIS:  1. Persistent small bowel obstruction. Small ascites and mesenteric edema.  2. Slight mural thickening versus underdistention of distal small bowel   loops in the pelvis, for example on images 231-240 series 2. Question   enteritis. Correlate with lactate level.  3. Anterior superficial soft tissue density versus complex fluid measures   3.5 x 1.7 cm, image 173 series 2, increased in size and more well-defined   in comparison with 3/18/2022, of unclear significance. Correlate with   dedicated ultrasound for characterization.    --- End of Report ---            SADIE BARRON M.D., ATTENDING RADIOLOGIST  This document has been electronically signed. Mar 25 2022 10:32AM    < end of copied text >    Imaging Personally Reviewed:  YES    Consultant(s) Notes Reviewed:   YES  Care Discussed with Consultants : ID/ surgery  Plan of care was discussed with patient and /or primary care giver; all questions and concerns were addressed and care was aligned with patient's wishes.     NP Note discussed with  Primary Attending    Patient is a 89y old  Female who presents with a chief complaint of small bowel obstruction (01 Apr 2022 11:36)      INTERVAL HPI/OVERNIGHT EVENTS: no new complaints    MEDICATIONS  (STANDING):  anastrozole 1 milliGRAM(s) Oral daily  chlorhexidine 2% Cloths 1 Application(s) Topical <User Schedule>  dextrose 50% Injectable 12.5 Gram(s) IV Push once  digoxin     Tablet 125 MICROGram(s) Oral daily  metoprolol succinate ER 25 milliGRAM(s) Oral daily  mirtazapine 15 milliGRAM(s) Oral at bedtime  pantoprazole    Tablet 40 milliGRAM(s) Oral before breakfast    MEDICATIONS  (PRN):  acetaminophen    Suspension .. 650 milliGRAM(s) Oral every 6 hours PRN Temp greater or equal to 38C (100.4F), Mild Pain (1 - 3)  ondansetron Injectable 4 milliGRAM(s) IV Push every 6 hours PRN Nausea and/or Vomiting  sodium chloride 0.9% lock flush 10 milliLiter(s) IV Push every 1 hour PRN Pre/post blood products, medications, blood draw, and to maintain line patency      __________________________________________________  REVIEW OF SYSTEMS:    CONSTITUTIONAL: No fever,   EYES: no acute visual disturbances  NECK: No pain or stiffness  RESPIRATORY: No cough; No shortness of breath on oxygen support via N-C   CARDIOVASCULAR: No chest pain, no palpitations  GASTROINTESTINAL: No pain. No nausea or vomiting; No diarrhea   NEUROLOGICAL: No headache or numbness, no tremors  MUSCULOSKELETAL: No joint pain, no muscle pain  GENITOURINARY:+ Wilson catheter without pain   PSYCHIATRY: no depression , no anxiety  ALL OTHER  ROS negative        Vital Signs Last 24 Hrs  T(C): 36.8 (01 Apr 2022 05:32), Max: 36.8 (01 Apr 2022 05:32)  T(F): 98.2 (01 Apr 2022 05:32), Max: 98.2 (01 Apr 2022 05:32)  HR: 94 (01 Apr 2022 09:12) (80 - 94)  BP: 102/58 (01 Apr 2022 09:12) (97/41 - 104/49)  BP(mean): 59 (31 Mar 2022 21:52) (59 - 59)  RR: 18 (01 Apr 2022 05:32) (16 - 18)  SpO2: 92% (01 Apr 2022 09:12) (92% - 99%)    ________________________________________________  PHYSICAL EXAM:  GENERAL: NAD  HEENT: Normocephalic;  conjunctivae and sclerae clear; moist mucous membranes;   NECK : supple  CHEST/LUNG: Clear to auscultation bilaterally with good air entry   HEART: S1 S2  regular; no murmurs, gallops or rubs  ABDOMEN: Soft, Nontender, Nondistended; Bowel sounds present + steri-strips   EXTREMITIES: no cyanosis; no edema; no calf tenderness LUE - PICC (+)   SKIN: warm and dry; no rash  NERVOUS SYSTEM:  Awake and alert; Oriented  to place, person and time ; no new deficits   + wilson catheter   _________________________________________________  LABS:                        7.1    50.43 )-----------( 213      ( 31 Mar 2022 07:15 )             22.5     03-31    137  |  105  |  27<H>  ----------------------------<  133<H>  4.3   |  25  |  0.58    Ca    7.9<L>      31 Mar 2022 07:15  Phos  2.9     03-31  Mg     1.6     03-31    TPro  6.1  /  Alb  1.7<L>  /  TBili  0.6  /  DBili  x   /  AST  131<H>  /  ALT  58  /  AlkPhos  146<H>  03-31        CAPILLARY BLOOD GLUCOSE            RADIOLOGY & ADDITIONAL TESTS:  < from: Xray Chest 1 View-PORTABLE IMMEDIATE (Xray Chest 1 View-PORTABLE IMMEDIATE .) (03.31.22 @ 09:47) >  ACC: [id number] EXAM:  XR CHEST PORTABLE IMMED 1V                          PROCEDURE DATE:  03/31/2022          INTERPRETATION:  Clinical history: 89-year-old female, shortness of   breath.    Portable view of the chest is compared to 3/24/2022 and is correlated   with the chest CT of 3/25/2022.    FINDINGS: Moderate bilateral pleural effusions/adjacent   atelectasis/consolidation, grossly unchanged.    Mild cardiomegaly and normal pulmonary vasculature with no pneumothorax   or acute osseous finding.    IMPRESSION:  Moderate bilateral pleural effusions/adjacent atelectasis/consolidation,   unchanged.    --- End of Report ---            LETY RUIZ DO; Attending Radiologist  This document has been electronically signed. Mar 31 2022  2:49PM    < end of copied text >  < from: US Duplex Venous Upper Ext Ltd, Left (03.25.22 @ 18:07) >    ACC: [id number] EXAM:  US DPLX UPR EXT VEINS LTD LT                          PROCEDURE DATE:  03/25/2022          INTERPRETATION:  CLINICAL INFORMATION: Left arm swelling    COMPARISON: None available.    TECHNIQUE: Duplex sonography of the LEFT UPPER extremity veins with color   and spectral Doppler, with and without compression.    FINDINGS:    PICC line noted.    The left internal jugular, subclavian, axillary and brachial veins are   patent and compressible where applicable.  The basilic and cephalic veins   (superficial veins) are patent and without thrombus.    Doppler examination shows normal spontaneous and phasic flow.    IMPRESSION:  No evidence of left upper extremity deep venous thrombosis.        --- End of Report ---            LIZZ ROY MD; Attending Radiologist  This document has been electronically signed. Mar 25 2022  6:18PM    < end of copied text >  < from: CT Abdomen and Pelvis No Cont (03.25.22 @ 09:22) >    ACC: [id number] EXAM:  CT ABDOMEN AND PELVIS                        ACC: [id number] EXAM:  CT CHEST                          PROCEDURE DATE:  03/25/2022          INTERPRETATION:  CLINICAL INFORMATION: Evaluate pleural effusion.   Evaluate ileus and bypass.    COMPARISON: CT abdomen pelvis 3/18/2022, 3/11/2022, 3/8/2022    CONTRAST/COMPLICATIONS:  IV Contrast: NONE  Oral Contrast: NONE  Complications: None reported at time of study completion    PROCEDURE:  CT of the Chest, Abdomen and Pelvis was performed.  Sagittal and coronal reformats were performed.    FINDINGS:    CHEST:  LUNGS AND LARGE AIRWAYS: Central airways are patent. Biapical scarring.   Compressive atelectasis of the lower lobes.  PLEURA: Moderate pleural effusions, right greater than left.  VESSELS: Atheromatous change. Main pulmonary artery enlargement, which   may reflect pulmonary arterial hypertension.  HEART: Heart size is enlarged, particularly the right atrium. Aortic   valve and coronary calcification. Trace pericardial fluid.  MEDIASTINUM AND VIOLET: Small volume nodes.  CHEST WALL AND LOWER NECK: Soft tissue edema.    ABDOMEN AND PELVIS:    Solid organ and bowel evaluation is limited due to lack of contrast.    LIVER: Normal size  BILE DUCTS: No ductal dilatation  GALLBLADDER: Distended  SPLEEN: Somewhat atrophic with few coarse calcifications  PANCREAS: No main ductal dilatation  ADRENALS: Unremarkable  KIDNEYS/URETERS: No hydronephrosis. Left renal cysts, one with peripheral   calcification.    BLADDER: Foleycatheter.  REPRODUCTIVE ORGANS: Hysterectomy.    BOWEL: Persistent distention proximal to mid small bowel with transition   in the left paracentral region, series 2 image 204. Distal small bowel   loops in the pelvis are relatively underdistended, including the bowel   associated with bypass. Findings may reflect persistent small bowel   obstruction. Slight mural thickening versus underdistention of distal   small bowel loops in the pelvis, for example on images 231-240 series 2.   Question enteritis. Colonic diverticulosis, without diverticulitis.  PERITONEUM: Small ascites and mesenteric edema. Few pockets of air are   identified in the right lower quadrant, images 219-223, which could be   post operative.  RETROPERITONEUM/LYMPH NODES: Small volume nodes.  ABDOMINAL WALL: Prominent diffuse soft tissue edema. Anterior superficial   soft tissue density versus complex fluid measures 3.5 x 1.7 cm, image 173   series 2, increased in size and more well-defined in comparison with   3/18/2022, of unclear significance. Correlate with dedicated ultrasound   for characterization.  BONES: Degenerative changes and osseous mineralization. Redemonstrated L1   and L2 compression deformities and osseous retropulsion at L2.    IMPRESSION:    Limited noncontrast study.    CHEST:  1. Moderate pleural effusions, right greater than left.    ABDOMEN/PELVIS:  1. Persistent small bowel obstruction. Small ascites and mesenteric edema.  2. Slight mural thickening versus underdistention of distal small bowel   loops in the pelvis, for example on images 231-240 series 2. Question   enteritis. Correlate with lactate level.  3. Anterior superficial soft tissue density versus complex fluid measures   3.5 x 1.7 cm, image 173 series 2, increased in size and more well-defined   in comparison with 3/18/2022, of unclear significance. Correlate with   dedicated ultrasound for characterization.    --- End of Report ---            SADIE BARRON M.D., ATTENDING RADIOLOGIST  This document has been electronically signed. Mar 25 2022 10:32AM    < end of copied text >    Imaging Personally Reviewed:  YES    Consultant(s) Notes Reviewed:   YES  Care Discussed with Consultants : ID/ surgery  Plan of care was discussed with patient and /or primary care giver; all questions and concerns were addressed and care was aligned with patient's wishes.

## 2022-04-01 NOTE — PROGRESS NOTE ADULT - PROBLEM SELECTOR PLAN 5
#B/L Pleural effusions:  Patient complained of shortness of breath overnight.  CXR was done this am showed B/L pleural effusions, R>L.  agree with 20 IV lasix given by surgery.  Can give 20 IV more if blood pressure allows.  Keep net negative.

## 2022-04-01 NOTE — PROGRESS NOTE ADULT - NS ATTEND AMEND GEN_ALL_CORE FT
88yo F PMHx of Uterine CA s/p hysterectomy, A. Fib, breast CA s/p b/l mastectomy, recent MDS diagnosis who presented with SBO s/p ileocolic bypass on 3/11. Course complicated by recurrent SBO. Decided on Comfort Care    #Comfort Care  #SBO s/p ileocolic bypass  #Chronic Atrial Fibrillation  #Severe Protein Calorie Malnutrition  #CMML  #Acute Kidney Injury- resolved   GOC discussion had with palliative care team and family and patient. Agreed upon comfort care measures. They would like to receive hospice services at a LTC facility near her home [place] [address] [place].  - DC TPN  - CLD, monitor for tolerance and BM  - Lasix PRN for SOB  - remeron 15 qhs   - A. Fib rate controlled on PO metoprolol and digoxin  - Not on AC due to history of bleeding in past  - No labwork  - DC HepSQ and insulin  - f/u SW regarding LTC hospice

## 2022-04-01 NOTE — CHART NOTE - NSCHARTNOTEFT_GEN_A_CORE
PICC removed, tip at 46cm  hemostasis  dressing applied    length compared/confirmed with PICC placement note per IR

## 2022-04-02 NOTE — PROGRESS NOTE ADULT - SUBJECTIVE AND OBJECTIVE BOX
INTERVAL HPI/OVERNIGHT EVENTS:    No acute events overnight.   Pt resting comfortably. No acute complaints.   Tolerating FLD diet.       MEDICATIONS  (STANDING):  anastrozole 1 milliGRAM(s) Oral daily  dextrose 50% Injectable 12.5 Gram(s) IV Push once  digoxin     Tablet 125 MICROGram(s) Oral daily  metoprolol succinate ER 25 milliGRAM(s) Oral daily  mirtazapine 15 milliGRAM(s) Oral at bedtime  pantoprazole    Tablet 40 milliGRAM(s) Oral before breakfast  polyethylene glycol 3350 17 Gram(s) Oral daily    MEDICATIONS  (PRN):  acetaminophen    Suspension .. 650 milliGRAM(s) Oral every 6 hours PRN Temp greater or equal to 38C (100.4F), Mild Pain (1 - 3)  ondansetron Injectable 4 milliGRAM(s) IV Push every 6 hours PRN Nausea and/or Vomiting  sodium chloride 0.9% lock flush 10 milliLiter(s) IV Push every 1 hour PRN Pre/post blood products, medications, blood draw, and to maintain line patency      Vital Signs Last 24 Hrs  T(C): 36.3 (02 Apr 2022 06:17), Max: 36.3 (01 Apr 2022 14:54)  T(F): 97.3 (02 Apr 2022 06:17), Max: 97.4 (01 Apr 2022 14:54)  HR: 85 (02 Apr 2022 06:17) (85 - 92)  BP: 116/57 (02 Apr 2022 06:17) (100/59 - 122/64)  BP(mean): --  RR: 18 (02 Apr 2022 06:17) (18 - 18)  SpO2: 100% (02 Apr 2022 06:17) (96% - 100%)      PHYSICAL EXAM  General: Alert and oriented, not in acute distress  Resp: Breathing unlabored  Abdomen: Soft, nondistended, nontender  : wilson cloudy and concentrated  Extremities: No pedal edema    I&O's Detail    01 Apr 2022 07:01  -  02 Apr 2022 07:00  --------------------------------------------------------  IN:    Oral Fluid: 8 mL  Total IN: 8 mL    OUT:    Indwelling Catheter - Urethral (mL): 1000 mL  Total OUT: 1000 mL    Total NET: -992 mL          LABS:

## 2022-04-02 NOTE — PROGRESS NOTE ADULT - ASSESSMENT
88yo F PMHx of Uterine CA s/p hysterectomy, A. Fib, breast CA s/p b/l mastectomy, recent MDS diagnosis who presented with SBO s/p ileocolic bypass on 3/11. Course complicated by recurrent SBO. Decided on Comfort Care    #SBO s/p ileocolic bypass  #Chronic Atrial Fibrillation  #Severe Protein Calorie Malnutrition  #CMML  #Acute Kidney Injury- resolved   GOC discussion had with palliative care team and family and patient previously and prviously agreed upon comfort care measures. However, the family and patient discussed again and decided against comfort care. They would like to transition back to full care but continue DNR/DNI. Family and patient willing to discuss with palliative care again.  - FLD monitor for tolerance and BM - now off TPN  - Bloodwork in AM  - TOV tomorrow  - Lasix PRN for SOB  - remeron 15 qhs   - A. Fib rate controlled on PO metoprolol and digoxin  - Not on AC due to history of bleeding in past  - Resume HepSQ and SSI    - f/u with Palliative care for repeat discussion regarding GOC  - DNR DNI

## 2022-04-02 NOTE — PROGRESS NOTE ADULT - SUBJECTIVE AND OBJECTIVE BOX
[facility] Medicine  Patient is a 89y old  Female who presents with a chief complaint of small bowel obstruction (02 Apr 2022 11:08)      SUBJECTIVE / OVERNIGHT EVENTS:  No acute events over night. Patient reports having 1 small BM and able to tolerate FLD. Continues to have LE edema. Denies any fevers/chills, headache, CP, SOB, abd pain, N/V/D, constipation.    MEDICATIONS  (STANDING):  anastrozole 1 milliGRAM(s) Oral daily  dextrose 50% Injectable 12.5 Gram(s) IV Push once  digoxin     Tablet 125 MICROGram(s) Oral daily  metoprolol succinate ER 25 milliGRAM(s) Oral daily  mirtazapine 15 milliGRAM(s) Oral at bedtime  pantoprazole    Tablet 40 milliGRAM(s) Oral before breakfast  polyethylene glycol 3350 17 Gram(s) Oral daily    MEDICATIONS  (PRN):  acetaminophen    Suspension .. 650 milliGRAM(s) Oral every 6 hours PRN Temp greater or equal to 38C (100.4F), Mild Pain (1 - 3)  ondansetron Injectable 4 milliGRAM(s) IV Push every 6 hours PRN Nausea and/or Vomiting  sodium chloride 0.9% lock flush 10 milliLiter(s) IV Push every 1 hour PRN Pre/post blood products, medications, blood draw, and to maintain line patency          OBJECTIVE:  Vital Signs Last 24 Hrs  T(C): 36.4 (02 Apr 2022 13:19), Max: 36.4 (02 Apr 2022 13:19)  T(F): 97.6 (02 Apr 2022 13:19), Max: 97.6 (02 Apr 2022 13:19)  HR: 83 (02 Apr 2022 13:19) (83 - 87)  BP: 97/38 (02 Apr 2022 13:19) (97/38 - 122/64)  BP(mean): --  RR: 16 (02 Apr 2022 13:19) (16 - 18)  SpO2: 96% (02 Apr 2022 13:19) (96% - 100%)    PHYSICAL EXAM:  GENERAL: NAD  HEENT: Normocephalic;  conjunctivae and sclerae clear; moist mucous membranes;   NECK : supple  CHEST/LUNG: Clear to auscultation bilaterally with good air entry   HEART: S1 S2  regular; no murmurs, gallops or rubs  ABDOMEN: Soft, Nontender, Nondistended; Bowel sounds present + steri-strips   EXTREMITIES: bilateral 2+ pitting LE edema, no cyanosis; no edema; no calf tenderness  SKIN: warm and dry; no rash  NERVOUS SYSTEM:  Awake and alert; Oriented  to place, person and time ; no new deficits   + wilson catheter     CAPILLARY BLOOD GLUCOSE        I&O's Summary    01 Apr 2022 07:01  -  02 Apr 2022 07:00  --------------------------------------------------------  IN: 8 mL / OUT: 1000 mL / NET: -992 mL    02 Apr 2022 07:01  -  02 Apr 2022 18:28  --------------------------------------------------------  IN: 0 mL / OUT: 300 mL / NET: -300 mL              LABS:                        RADIOLOGY & ADDITIONAL TESTS:

## 2022-04-02 NOTE — PROGRESS NOTE ADULT - ASSESSMENT
89 yoF s/p lap WILLIAM, ileocolic bypass, pod#[id number]    on palliative support  urine appears cloudy, rec UA/Ucx pt amenable for abx therapy if needed  BLE swelling, elevation/supportive measures; SCDs  pending SW/CM for disposition

## 2022-04-03 NOTE — CHART NOTE - NSCHARTNOTEFT_GEN_A_CORE
family expressed concern for bilateral lower leg edema and moderate pleural effusion, last CXR 3/31  pt noted with blood pressure 100/47, valerie resolved and has previously been receiving lasix IV push no allergic reaction, bilateral leg edema +1, currently 98% on room air    plan:  will trial 20 mg lasix po daily  f/u repeat CXR family expressed concern for bilateral lower leg edema and moderate pleural effusion, last CXR 3/31  pt noted with blood pressure 100/47, valerie resolved and has previously been receiving lasix IV push no allergic reaction, bilateral leg edema +1, currently 98% on room air    wilson removed and passed trial of void on 4/3    plan:  will trial 20 mg lasix po daily  f/u repeat CXR

## 2022-04-03 NOTE — PROGRESS NOTE ADULT - ASSESSMENT
88yo F PMHx of Uterine CA s/p hysterectomy, A. Fib, breast CA s/p b/l mastectomy, recent MDS diagnosis who presented with SBO s/p ileocolic bypass on 3/11. Course complicated by recurrent SBO. Decided on Comfort Care    #SBO s/p ileocolic bypass  #Chronic Atrial Fibrillation  #Severe Protein Calorie Malnutrition  #CMML  #Acute Kidney Injury- resolved   GOC discussion had with palliative care team and family and patient previously and prviously agreed upon comfort care measures. However, the family and patient discussed again and decided against comfort care. They would like to transition back to full care but continue DNR/DNI. Family and patient willing to discuss with palliative care again.  - Hgb 7.0, monitor  - FLD monitor for tolerance and BM - now off TPN  - TOV today  - Lasix PRN for SOB  - remeron 15 qhs   - A. Fib rate controlled on PO metoprolol and digoxin  - Not on AC due to history of bleeding in past  - Resume HepSQ and SSI    - f/u with Palliative care for repeat discussion regarding GOC  - DNR DNI

## 2022-04-03 NOTE — CHART NOTE - NSCHARTNOTEFT_GEN_A_CORE
Received call from nurse for Hgb 7.0, it was 7.1 on 3/31/22.    Endorsed to oncoming nurse.  Will monitor CBC.

## 2022-04-03 NOTE — PROGRESS NOTE ADULT - SUBJECTIVE AND OBJECTIVE BOX
[facility] Medicine  Patient is a 89y old  Female who presents with a chief complaint of small bowel obstruction (02 Apr 2022 11:08)      SUBJECTIVE / OVERNIGHT EVENTS:  No acute events over night. Patient has not had BM since yesterday AM. Continues to tolerate FLD. Continues to have LE edema. Denies any fevers/chills, headache, CP, SOB, abd pain, N/V/D, constipation.    MEDICATIONS  (STANDING):  anastrozole 1 milliGRAM(s) Oral daily  dextrose 50% Injectable 12.5 Gram(s) IV Push once  digoxin     Tablet 125 MICROGram(s) Oral daily  metoprolol succinate ER 25 milliGRAM(s) Oral daily  mirtazapine 15 milliGRAM(s) Oral at bedtime  pantoprazole    Tablet 40 milliGRAM(s) Oral before breakfast  polyethylene glycol 3350 17 Gram(s) Oral daily    MEDICATIONS  (PRN):  acetaminophen    Suspension .. 650 milliGRAM(s) Oral every 6 hours PRN Temp greater or equal to 38C (100.4F), Mild Pain (1 - 3)  ondansetron Injectable 4 milliGRAM(s) IV Push every 6 hours PRN Nausea and/or Vomiting  sodium chloride 0.9% lock flush 10 milliLiter(s) IV Push every 1 hour PRN Pre/post blood products, medications, blood draw, and to maintain line patency          OBJECTIVE:  Vital Signs Last 24 Hrs  T(C): 36.4 (02 Apr 2022 13:19), Max: 36.4 (02 Apr 2022 13:19)  T(F): 97.6 (02 Apr 2022 13:19), Max: 97.6 (02 Apr 2022 13:19)  HR: 83 (02 Apr 2022 13:19) (83 - 87)  BP: 97/38 (02 Apr 2022 13:19) (97/38 - 122/64)  BP(mean): --  RR: 16 (02 Apr 2022 13:19) (16 - 18)  SpO2: 96% (02 Apr 2022 13:19) (96% - 100%)    PHYSICAL EXAM:  GENERAL: NAD  HEENT: Normocephalic;  conjunctivae and sclerae clear; moist mucous membranes;   NECK : supple  CHEST/LUNG: Clear to auscultation bilaterally with good air entry   HEART: S1 S2  regular; no murmurs, gallops or rubs  ABDOMEN: Soft, Nontender, Nondistended; Bowel sounds present + steri-strips   EXTREMITIES: bilateral 2+ pitting LE edema, no cyanosis; no edema; no calf tenderness  SKIN: warm and dry; no rash  NERVOUS SYSTEM:  Awake and alert; Oriented  to place, person and time ; no new deficits   + wilson catheter     CAPILLARY BLOOD GLUCOSE        I&O's Summary    01 Apr 2022 07:01  -  02 Apr 2022 07:00  --------------------------------------------------------  IN: 8 mL / OUT: 1000 mL / NET: -992 mL    02 Apr 2022 07:01  -  02 Apr 2022 18:28  --------------------------------------------------------  IN: 0 mL / OUT: 300 mL / NET: -300 mL              LABS:                        RADIOLOGY & ADDITIONAL TESTS:

## 2022-04-04 NOTE — PROGRESS NOTE ADULT - NS ATTEND AMEND GEN_ALL_CORE FT
88yo F PMHx of Uterine CA s/p hysterectomy, A. Fib, breast CA s/p b/l mastectomy, recent MDS diagnosis who presented with SBO s/p ileocolic bypass on 3/11. Course complicated by recurrent SBO.     #SBO s/p ileocolic bypass  #Chronic Atrial Fibrillation  #Severe Protein Calorie Malnutrition  #CMML  #Acute Kidney Injury- resolved   GOC discussion had with palliative care team and family and patient previously and prviously agreed upon comfort care measures. However, the family and patient discussed again and decided against comfort care. They would like to transition back to full care but continue DNR/DNI. Family and patient willing to discuss with palliative care again.  - Start lasix 20mg PO for pleural effusions and LE edema.  - FLD monitor for tolerance and BM - now off TPN  - Enema today  - Passed TOV  - remeron 15 qhs   - A. Fib rate controlled on PO metoprolol and digoxin  - Not on AC due to history of bleeding in past  - cw HepSQ and SSI  - f/u with Palliative care for repeat discussion regarding GOC  - PT  - DNR DNI

## 2022-04-04 NOTE — PROGRESS NOTE ADULT - ASSESSMENT
89y.o. Female s/p dx lap, ileocolic bypass 3/11    -con't fulls with ensure  -Primafit care  -OOB to chair with PT    Afib  -Rate control  -Hold AC per med

## 2022-04-04 NOTE — PROGRESS NOTE ADULT - PROBLEM SELECTOR PLAN 1
s/p iliocolic bypass s/p ileocolic bypass  Continue with full liquid with Ensure  TPN discontinued  PICC removed 4/01  Bowel regimen   TWE for constipation  Continue antiemetics   Surgery following

## 2022-04-04 NOTE — CHART NOTE - NSCHARTNOTESELECT_GEN_ALL_CORE
Event Note
Nutrition Services
b/l LE edema/Event Note
Event Note
Nutrition Services
PICC removal/Event Note
Surgery PA/Event Note
update

## 2022-04-04 NOTE — CHART NOTE - NSCHARTNOTEFT_GEN_A_CORE
Assessment:   Patient is a 89y old  Female who presents with a chief complaint of small bowel obstruction (2022 16:52) 89y.o. Female s/p dx lap, ileocolic bypass 3/11. Pt's TPN D/C'ed 3/31 (pt did not want to continue) Pt/ family noted as declining Palliative care placement, now for rehab. Awaiting acceptance and auth. Pt continues on full liquids (DM) w/ Enlive BID. Pt visited, some Enlives at bedside. Pt reports she takes some when her daughter pours it over ice, Pt sitting w/ 2 cups of ice. No requests. Pt may be for D/C        Factors impacting intake: [ ] none [ ] nausea  [ ] vomiting [ ] diarrhea [ ] constipation  [ ]chewing problems [ ] swallowing issues  [x ] other: altered GI fx/ structure.     Diet Prescription: Diet, Full Liquid:   Consistent Carbohydrate {Evening Snacks}  Supplement Feeding Modality:  Oral  Ensure Enlive Cans or Servings Per Day:  1       Frequency:  Two Times a day (22 @ 11:31)        Daily     Daily Weight in k (31 Mar 2022 05:46)  Weight in k.9 (29 Mar 2022 08:00)  Weight in k.2 (26 Mar 2022 06:25)  Weight in k.4 (25 Mar 2022 14:00)        Pertinent Medications: MEDICATIONS  (STANDING):  anastrozole 1 milliGRAM(s) Oral daily  dextrose 50% Injectable 12.5 Gram(s) IV Push once  digoxin     Tablet 125 MICROGram(s) Oral daily  furosemide    Tablet 20 milliGRAM(s) Oral daily  heparin   Injectable 5000 Unit(s) SubCutaneous every 12 hours  metoprolol succinate ER 25 milliGRAM(s) Oral daily  mirtazapine 15 milliGRAM(s) Oral at bedtime  pantoprazole    Tablet 40 milliGRAM(s) Oral before breakfast  polyethylene glycol 3350 17 Gram(s) Oral daily    MEDICATIONS  (PRN):  acetaminophen    Suspension .. 650 milliGRAM(s) Oral every 6 hours PRN Temp greater or equal to 38C (100.4F), Mild Pain (1 - 3)  ondansetron Injectable 4 milliGRAM(s) IV Push every 6 hours PRN Nausea and/or Vomiting  sodium chloride 0.9% lock flush 10 milliLiter(s) IV Push every 1 hour PRN Pre/post blood products, medications, blood draw, and to maintain line patency    Pertinent Labs:  Na136 mmol/L Glu 104 mg/dL<H> K+ 3.9 mmol/L Cr  0.85 mg/dL BUN 18 mg/dL  Phos 2.9 mg/dL  Alb 1.7 g/dL<L>  Chol --    LDL --    HDL --    Trig 48 mg/dL     CAPILLARY BLOOD GLUCOSE      Previous Nutrition Diagnosis:   [ ] Altered GI function  [ ]Inadequate Oral Intake [ ] Swallowing Difficulty   [ ] Altered nutrition related labs [ ] Increased Nutrient Needs [ ] Overweight/Obesity   [ ] Unintended Weight Loss [ ] Food & Nutrition Related Knowledge Deficit [x ] Malnutrition (severe PCM)  [ ] Other:     Nutrition Diagnosis is [x ] ongoing  [ ] resolved [ ] not applicable            Interventions:   Recommend  [x ] Change Diet To: least restrictive diet pt able to tolerate (such as remove diabetic control).   [x ] Nutrition Supplement: On Enlive BID (does not appear to be able/ wanting to consume more  [ ] Nutrition Support  [ ] Other:     Monitoring and Evaluation:   [ x ] follow up per protocol  [ ] other:

## 2022-04-04 NOTE — PROGRESS NOTE ADULT - SUBJECTIVE AND OBJECTIVE BOX
INTERVAL HPI/OVERNIGHT EVENTS:  Pt resting comfortably. No acute complaints.   Tolerating diet.   Kinsey removed, states voided a lot overnight. Primafit placed.  Denies N/V, abd pain.  Medicine note acknowledged regarding comfort measures vs full code.    MEDICATIONS  (STANDING):  anastrozole 1 milliGRAM(s) Oral daily  dextrose 50% Injectable 12.5 Gram(s) IV Push once  digoxin     Tablet 125 MICROGram(s) Oral daily  furosemide    Tablet 20 milliGRAM(s) Oral daily  heparin   Injectable 5000 Unit(s) SubCutaneous every 12 hours  insulin lispro (ADMELOG) corrective regimen sliding scale   SubCutaneous every 6 hours  metoprolol succinate ER 25 milliGRAM(s) Oral daily  mirtazapine 15 milliGRAM(s) Oral at bedtime  pantoprazole    Tablet 40 milliGRAM(s) Oral before breakfast  polyethylene glycol 3350 17 Gram(s) Oral daily    MEDICATIONS  (PRN):  acetaminophen    Suspension .. 650 milliGRAM(s) Oral every 6 hours PRN Temp greater or equal to 38C (100.4F), Mild Pain (1 - 3)  ondansetron Injectable 4 milliGRAM(s) IV Push every 6 hours PRN Nausea and/or Vomiting  sodium chloride 0.9% lock flush 10 milliLiter(s) IV Push every 1 hour PRN Pre/post blood products, medications, blood draw, and to maintain line patency    Vital Signs Last 24 Hrs  T(C): 36.3 (04 Apr 2022 05:05), Max: 36.8 (03 Apr 2022 13:01)  T(F): 97.4 (04 Apr 2022 05:05), Max: 98.2 (03 Apr 2022 13:01)  HR: 97 (04 Apr 2022 05:05) (90 - 97)  BP: 110/55 (04 Apr 2022 05:05) (100/47 - 110/55)  BP(mean): --  RR: 18 (04 Apr 2022 05:05) (17 - 18)  SpO2: 96% (04 Apr 2022 05:05) (96% - 98%)    Physical:  General: Awake, alert. NAD.  Abdomen: Soft nondistended, nontender. Incisions well approximated, clean, dry.    I&O's Detail    03 Apr 2022 07:01  -  04 Apr 2022 07:00  --------------------------------------------------------  IN:  Total IN: 0 mL    OUT:    Indwelling Catheter - Urethral (mL): 250 mL  Total OUT: 250 mL    Total NET: -250 mL    LABS:                        7.2    38.57 )-----------( 209      ( 04 Apr 2022 06:12 )             23.0             04-04    136  |  105  |  18  ----------------------------<  104<H>  3.9   |  26  |  0.85    Ca    8.3<L>      04 Apr 2022 06:12  Mg     1.6     04-03    TPro  6.2  /  Alb  1.7<L>  /  TBili  1.0  /  DBili  x   /  AST  48<H>  /  ALT  36  /  AlkPhos  145<H>  04-04

## 2022-04-04 NOTE — PROGRESS NOTE ADULT - SUBJECTIVE AND OBJECTIVE BOX
NP Note discussed with  Primary Attending    Patient is a 89y old  Female who presents with a chief complaint of small bowel obstruction (03 Apr 2022 16:52)    HPI  88 year old female with PMH of Afib on metoprolol, uterine CA s/p radiation and hysterectomy, breast cancer s/p chemo and bilateral mastectomy presents to the ED complaining of lower abdominal pain with nausea and vomiting.   Patient was found to have small bowel obstruction and small right inguinal Hernia. Patient had NGT placed and was admitted for conservative management. Hernia was reduced. Surgery was offered and recommended to the patient but patient declined initially due to concerns about comorbidities; however given delayed improvement, pt was taken to the OR and underwent diagnostic laparoscopy where she was found to have RLQ ileocolic adhesion with inguinal hernia; therefore underwent IHR and also extensive lysis of adhesion in which could not be safely fully mobilized so ileocolic bypass was performed. Pt postoperatively was medically unstable with hypotension and afib with RVR, transferred to ICU for further management. Pt was resuscitated with fluid and BP support, rate controlled with digoxin/betablockers. Once pt became optimized, was downgraded to med/surg. Cardiology and medicine kept following for MERCEDEZ and pleural effusion due to fluid shift, resolved with medical management. 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 Over the course, pt waxed and waned in terms of bowel function recovery, requiring prolonged nutrition -PICC placed and TPN started. Pt fully tolerated clear liquid diet with return of bowel function at the time of disposition. Pt had urinary retention, wilson catheter inserted. PT evaluated pt and recommended JANEEN, referrals made by CM, and pt discharged to [facility] with follow up instructions.     INTERVAL HPI/OVERNIGHT EVENTS: Passed TOV . Pt seen and examined this morning. Pt awake/alert, endorses ready to be discharged to rehab. Her daughter is looking into choices in [place].   Pt c/o constipation, denies N/V/ abdominal discomfort.  Will administer enema as this has worked for her in the past.       MEDICATIONS  (STANDING):  anastrozole 1 milliGRAM(s) Oral daily  dextrose 50% Injectable 12.5 Gram(s) IV Push once  digoxin     Tablet 125 MICROGram(s) Oral daily  furosemide    Tablet 20 milliGRAM(s) Oral daily  heparin   Injectable 5000 Unit(s) SubCutaneous every 12 hours  metoprolol succinate ER 25 milliGRAM(s) Oral daily  mirtazapine 15 milliGRAM(s) Oral at bedtime  pantoprazole    Tablet 40 milliGRAM(s) Oral before breakfast  polyethylene glycol 3350 17 Gram(s) Oral daily    MEDICATIONS  (PRN):  acetaminophen    Suspension .. 650 milliGRAM(s) Oral every 6 hours PRN Temp greater or equal to 38C (100.4F), Mild Pain (1 - 3)  ondansetron Injectable 4 milliGRAM(s) IV Push every 6 hours PRN Nausea and/or Vomiting  sodium chloride 0.9% lock flush 10 milliLiter(s) IV Push every 1 hour PRN Pre/post blood products, medications, blood draw, and to maintain line patency      __________________________________________________  REVIEW OF SYSTEMS:    Patient denied fever, chills, headache, cough, SOB, chest discomfort, N/V, abd discomfort,  dysuria, urinary frequency/urgency, joint/muscle discomfort.        Vital Signs Last 24 Hrs  T(C): 36.6 (04 Apr 2022 13:20), Max: 36.6 (04 Apr 2022 13:20)  T(F): 97.8 (04 Apr 2022 13:20), Max: 97.8 (04 Apr 2022 13:20)  HR: 110 (04 Apr 2022 15:05) (90 - 110)  BP: 91/62 (04 Apr 2022 15:05) (91/62 - 110/55)  BP(mean): --  RR: 20 (04 Apr 2022 13:20) (18 - 20)  SpO2: 97% (04 Apr 2022 15:05) (95% - 98%)    ________________________________________________  PHYSICAL EXAM:  GENERAL: NAD  HEAD:  Atraumatic, Normocephalic  EYES: EOMI, PERRLA, conjunctiva and sclera clear  ENMT: No tonsillar erythema, exudates, or enlargement; Moist mucous membranes, Good dentition, No lesions  NECK: Supple, No JVD, Normal thyroid  NERVOUS SYSTEM:  Alert & Oriented X3, Good concentration; Motor Strength 5/5 B/L upper and lower extremities; DTRs 2+ intact and symmetric  CHEST/LUNG: Clear to percussion bilaterally; No rales, rhonchi, wheezing, or rubs  HEART: Regular rate and rhythm; No murmurs, rubs, or gallops  ABDOMEN: Soft, Nontender, Nondistended; Bowel sounds present  EXTREMITIES:  2+ Peripheral Pulses, No clubbing, cyanosis, or edema  LYMPH: No lymphadenopathy noted  SKIN: No rashes or lesions      _________________________________________________  LABS:                        7.2    38.57 )-----------( 209      ( 04 Apr 2022 06:12 )             23.0     04-04    136  |  105  |  18  ----------------------------<  104<H>  3.9   |  26  |  0.85    Ca    8.3<L>      04 Apr 2022 06:12  Mg     1.6     04-03    TPro  6.2  /  Alb  1.7<L>  /  TBili  1.0  /  DBili  x   /  AST  48<H>  /  ALT  36  /  AlkPhos  145<H>  04-04    CAPILLARY BLOOD GLUCOSE    COVID-19 PCR: NotDetec (04 Apr 2022 05:58)  COVID-19 PCR: NotDetec (01 Apr 2022 18:49)  COVID-19 PCR: NotDetec (27 Mar 2022 06:46)  COVID-19 PCR: NotDetec (21 Mar 2022 06:40)  COVID-19 PCR: NotDetec (15 Mar 2022 03:52)  COVID-19 PCR: NotDetec (08 Mar 2022 08:47)      RADIOLOGY & ADDITIONAL TESTS:  < from: Xray Chest 1 View-PORTABLE IMMEDIATE (Xray Chest 1 View-PORTABLE IMMEDIATE .) (03.31.22 @ 09:47) >    FINDINGS: Moderate bilateral pleural effusions/adjacent   atelectasis/consolidation, grossly unchanged.    Mild cardiomegaly and normal pulmonary vasculature with no pneumothorax   or acute osseous finding.    IMPRESSION:  Moderate bilateral pleural effusions/adjacent atelectasis/consolidation,   unchanged.    < end of copied text >  < from: US Duplex Venous Upper Ext Ltd, Left (03.25.22 @ 18:07) >    IMPRESSION:  No evidence of left upper extremity deep venous thrombosis.    < end of copied text >  < from: CT Abdomen and Pelvis No Cont (03.25.22 @ 09:22) >  IMPRESSION:    Limited noncontrast study.    CHEST:  1. Moderate pleural effusions, right greater than left.    ABDOMEN/PELVIS:  1. Persistent small bowel obstruction. Small ascites and mesenteric edema.  2. Slight mural thickening versus underdistention of distal small bowel   loops in the pelvis, for example on images 231-240 series 2. Question   enteritis. Correlate with lactate level.  3. Anterior superficial soft tissue density versus complex fluid measures   3.5 x 1.7 cm, image 173 series 2, increased in size and more well-defined   in comparison with 3/18/2022, of unclear significance. Correlate with   dedicated ultrasound for characterization.    < end of copied text >  < from: Xray Abdomen 2 View PORTABLE -Urgent (Xray Abdomen 2 View PORTABLE -Urgent .) (03.24.22 @ 11:34) >    IMPRESSION:  Mild upper abdominal small bowel ileus with gas and feces throughout the   colon to the rectum. Findings consistent with ileus in the correct   clinical context, grossly unchanged.    NG tube removed    < end of copied text >      Consultant(s) Notes Reviewed:   YES    Care Discussed with Consultants :     Plan of care was discussed with patient and /or primary care giver; all questions and concerns were addressed and care was aligned with patient's wishes.     NP Note discussed with  Primary Attending    Patient is a 89y old  Female who presents with a chief complaint of small bowel obstruction (03 Apr 2022 16:52)    HPI  88 year old female with PMH of Afib on metoprolol, uterine CA s/p radiation and hysterectomy, breast cancer s/p chemo and bilateral mastectomy presents to the ED complaining of lower abdominal pain with nausea and vomiting.   Patient was found to have small bowel obstruction and small right inguinal Hernia. Patient had NGT placed and was admitted for conservative management. Hernia was reduced. Surgery was offered and recommended to the patient but patient declined initially due to concerns about comorbidities; however given delayed improvement, pt was taken to the OR and underwent diagnostic laparoscopy where she was found to have RLQ ileocolic adhesion with inguinal hernia; therefore underwent IHR and also extensive lysis of adhesion in which could not be safely fully mobilized so ileocolic bypass was performed. Pt postoperatively was medically unstable with hypotension and afib with RVR, transferred to ICU for further management. Pt was resuscitated with fluid and BP support, rate controlled with digoxin/betablockers. Once pt became optimized, was downgraded to med/surg. Cardiology and medicine kept following for MERCEDEZ and pleural effusion due to fluid shift, resolved with medical management. 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 Over the course, pt waxed and waned in terms of bowel function recovery, requiring prolonged nutrition -PICC placed and TPN started. Pt fully tolerated clear liquid diet with return of bowel function at the time of disposition. Pt had urinary retention, wilson catheter inserted. PT evaluated pt and recommended JANEEN, referrals made by CM, and pt discharged to [facility] with follow up instructions.   Pt and  daughter decided against comfort care. They would like to transition back to full care but continue DNR/DNI    INTERVAL HPI/OVERNIGHT EVENTS: Passed TOV . Pt seen and examined this morning. Pt awake/alert, endorses ready to be discharged to rehab. Her daughter is looking into choices in [place].   Pt c/o constipation, denies N/V/ abdominal discomfort.  Will administer enema as this has worked for her in the past.       MEDICATIONS  (STANDING):  anastrozole 1 milliGRAM(s) Oral daily  dextrose 50% Injectable 12.5 Gram(s) IV Push once  digoxin     Tablet 125 MICROGram(s) Oral daily  furosemide    Tablet 20 milliGRAM(s) Oral daily  heparin   Injectable 5000 Unit(s) SubCutaneous every 12 hours  metoprolol succinate ER 25 milliGRAM(s) Oral daily  mirtazapine 15 milliGRAM(s) Oral at bedtime  pantoprazole    Tablet 40 milliGRAM(s) Oral before breakfast  polyethylene glycol 3350 17 Gram(s) Oral daily    MEDICATIONS  (PRN):  acetaminophen    Suspension .. 650 milliGRAM(s) Oral every 6 hours PRN Temp greater or equal to 38C (100.4F), Mild Pain (1 - 3)  ondansetron Injectable 4 milliGRAM(s) IV Push every 6 hours PRN Nausea and/or Vomiting  sodium chloride 0.9% lock flush 10 milliLiter(s) IV Push every 1 hour PRN Pre/post blood products, medications, blood draw, and to maintain line patency      __________________________________________________  REVIEW OF SYSTEMS:    Patient denied fever, chills, headache, cough, SOB, chest discomfort, N/V, abd discomfort,  dysuria, urinary frequency/urgency, joint/muscle discomfort.        Vital Signs Last 24 Hrs  T(C): 36.6 (04 Apr 2022 13:20), Max: 36.6 (04 Apr 2022 13:20)  T(F): 97.8 (04 Apr 2022 13:20), Max: 97.8 (04 Apr 2022 13:20)  HR: 110 (04 Apr 2022 15:05) (90 - 110)  BP: 91/62 (04 Apr 2022 15:05) (91/62 - 110/55)  BP(mean): --  RR: 20 (04 Apr 2022 13:20) (18 - 20)  SpO2: 97% (04 Apr 2022 15:05) (95% - 98%)    ________________________________________________  PHYSICAL EXAM:  GENERAL: NAD  HEAD:  Atraumatic, Normocephalic  EYES: EOMI, PERRLA, conjunctiva and sclera clear  ENMT: No tonsillar erythema, exudates, or enlargement; Moist mucous membranes, Good dentition, No lesions  NECK: Supple, No JVD, Normal thyroid  NERVOUS SYSTEM:  Alert & Oriented X3, Good concentration; Motor Strength 5/5 B/L upper and lower extremities; DTRs 2+ intact and symmetric  CHEST/LUNG: Clear to percussion bilaterally; No rales, rhonchi, wheezing, or rubs  HEART: Regular rate and rhythm; No murmurs, rubs, or gallops  ABDOMEN: Soft, Nontender, Nondistended; Bowel sounds present  EXTREMITIES:  2+ Peripheral Pulses, No clubbing, cyanosis, or edema  LYMPH: No lymphadenopathy noted  SKIN: No rashes or lesions      _________________________________________________  LABS:                        7.2    38.57 )-----------( 209      ( 04 Apr 2022 06:12 )             23.0     04-04    136  |  105  |  18  ----------------------------<  104<H>  3.9   |  26  |  0.85    Ca    8.3<L>      04 Apr 2022 06:12  Mg     1.6     04-03    TPro  6.2  /  Alb  1.7<L>  /  TBili  1.0  /  DBili  x   /  AST  48<H>  /  ALT  36  /  AlkPhos  145<H>  04-04    CAPILLARY BLOOD GLUCOSE    COVID-19 PCR: NotDetec (04 Apr 2022 05:58)  COVID-19 PCR: NotDetec (01 Apr 2022 18:49)  COVID-19 PCR: NotDetec (27 Mar 2022 06:46)  COVID-19 PCR: NotDetec (21 Mar 2022 06:40)  COVID-19 PCR: NotDetec (15 Mar 2022 03:52)  COVID-19 PCR: NotDetec (08 Mar 2022 08:47)      RADIOLOGY & ADDITIONAL TESTS:  < from: Xray Chest 1 View-PORTABLE IMMEDIATE (Xray Chest 1 View-PORTABLE IMMEDIATE .) (03.31.22 @ 09:47) >    FINDINGS: Moderate bilateral pleural effusions/adjacent   atelectasis/consolidation, grossly unchanged.    Mild cardiomegaly and normal pulmonary vasculature with no pneumothorax   or acute osseous finding.    IMPRESSION:  Moderate bilateral pleural effusions/adjacent atelectasis/consolidation,   unchanged.    < end of copied text >  < from: US Duplex Venous Upper Ext Ltd, Left (03.25.22 @ 18:07) >    IMPRESSION:  No evidence of left upper extremity deep venous thrombosis.    < end of copied text >  < from: CT Abdomen and Pelvis No Cont (03.25.22 @ 09:22) >  IMPRESSION:    Limited noncontrast study.    CHEST:  1. Moderate pleural effusions, right greater than left.    ABDOMEN/PELVIS:  1. Persistent small bowel obstruction. Small ascites and mesenteric edema.  2. Slight mural thickening versus underdistention of distal small bowel   loops in the pelvis, for example on images 231-240 series 2. Question   enteritis. Correlate with lactate level.  3. Anterior superficial soft tissue density versus complex fluid measures   3.5 x 1.7 cm, image 173 series 2, increased in size and more well-defined   in comparison with 3/18/2022, of unclear significance. Correlate with   dedicated ultrasound for characterization.    < end of copied text >  < from: Xray Abdomen 2 View PORTABLE -Urgent (Xray Abdomen 2 View PORTABLE -Urgent .) (03.24.22 @ 11:34) >    IMPRESSION:  Mild upper abdominal small bowel ileus with gas and feces throughout the   colon to the rectum. Findings consistent with ileus in the correct   clinical context, grossly unchanged.    NG tube removed    < end of copied text >      Consultant(s) Notes Reviewed:   YES    Care Discussed with Consultants :     Plan of care was discussed with patient and /or primary care giver; all questions and concerns were addressed and care was aligned with patient's wishes.

## 2022-04-05 NOTE — CONSULT NOTE ADULT - SUBJECTIVE AND OBJECTIVE BOX
HPI:  88 year old female with PMH of Afib on metoprolol, uterine CA s/p radiation and hysterectomy, breast cancer s/p chemo and bilateral mastectomy presents to the ED complaining of lower abdominal pain with nausea and vomiting. She thinks her symptoms are from lactulose or from a chicken sandwich she has prior to onset of symptoms. Patient states that she suffers from constipation and takes multiple different over the counter the drugs, she has been on lactulose for the past 2 days from her PMD and states she is still passing stool which is well formed. States she used to be on a blood thinner, but she had too much bleeding so it was stopped. Denies chest pain, worsening shortness of breath, dysuria or difficulty urinating. (08 Mar 2022 11:15)      BRIEF HOSPITAL COURSE: Pt had laparoscopic, ileocolic bypass 3/11 for SBO. Pt noted to be hypotensive with SBP in 70-80s and rapid Afib with HR in 150s.    REVIEW OF SYSTEMS:    CONSTITUTIONAL: No fever, + loss of appetite. no chills, no weight loss, + weakness   EYES: no acute visual disturbances  NECK: No pain or stiffness  RESPIRATORY: No cough; No shortness of breath  CARDIOVASCULAR: No chest pain, no palpitations  GASTROINTESTINAL: No pain. No nausea or vomiting; No diarrhea   NEUROLOGICAL: +dizziness when sitting, No headache or numbness, no tremors  MUSCULOSKELETAL: No joint pain, no muscle pain  GENITOURINARY: no dysuria, no frequency, no hesitancy  PSYCHIATRY: no depression, no anxiety  ALL OTHER  ROS negative    PAST MEDICAL & SURGICAL HISTORY:  Breast cancer    Uterine cancer    Atrial fibrillation    Breast cancer  s/p chemo and mammogram    Uterine cancer  s/p radiation and hysterectomy    Afib    H/O mastectomy    H/O: hysterectomy    S/P bilateral mastectomy    History of hysterectomy      Allergies    contrast media (iodine-based) (Short breath)  iv  contrast (Unknown)  morphine (Rash)  penicillin (Hives)  penicillin (Rash)  Percocet 5/325 (Unknown)  sulfa drugs (Short breath)  Sulfacetamide Sodium (Rash)    Intolerances      FAMILY HISTORY:  No pertinent family history in first degree relatives      Medications:  acetaminophen   IVPB .. 1000 milliGRAM(s) IV Intermittent every 6 hours  ondansetron Injectable 4 milliGRAM(s) IV Push every 6 hours PRN  sodium chloride 0.9%. 1000 milliLiter(s) IV Continuous <Continuous>        MEDICATIONS  (STANDING):  anastrozole 1 milliGRAM(s) Oral daily  dextrose 50% Injectable 12.5 Gram(s) IV Push once  digoxin     Tablet 125 MICROGram(s) Oral daily  furosemide    Tablet 20 milliGRAM(s) Oral daily  metoprolol succinate ER 25 milliGRAM(s) Oral daily  mirtazapine 15 milliGRAM(s) Oral at bedtime  pantoprazole    Tablet 40 milliGRAM(s) Oral before breakfast  polyethylene glycol 3350 17 Gram(s) Oral daily  senna 2 Tablet(s) Oral at bedtime    MEDICATIONS  (PRN):  acetaminophen    Suspension .. 650 milliGRAM(s) Oral every 6 hours PRN Temp greater or equal to 38C (100.4F), Mild Pain (1 - 3)  ondansetron Injectable 4 milliGRAM(s) IV Push every 6 hours PRN Nausea and/or Vomiting  sodium chloride 0.9% lock flush 10 milliLiter(s) IV Push every 1 hour PRN Pre/post blood products, medications, blood draw, and to maintain line patency        PHYSICAL EXAM:  Vital Signs Last 24 Hrs  T(C): 36.7 (05 Apr 2022 12:48), Max: 36.8 (05 Apr 2022 05:46)  T(F): 98.1 (05 Apr 2022 12:48), Max: 98.2 (05 Apr 2022 05:46)  HR: 89 (05 Apr 2022 12:48) (79 - 104)  BP: 92/60 (05 Apr 2022 12:48) (92/60 - 103/57)  BP(mean): 62 (04 Apr 2022 21:16) (62 - 62)  RR: 18 (05 Apr 2022 12:48) (18 - 18)  SpO2: 100% (05 Apr 2022 12:48) (95% - 100%)    GEN: NAD; A and O x 3, slender  LUNGS: CTA B/L  HEART: S1 S2  ABDOMEN: soft, non-tender, non-distended, slightly tense, + BS  EXTREMITIES: + B/L LE pitting edema, non-erythematous, non-tender, no warmth  NERVOUS SYSTEM:  Awake and alert; no focal neuro deficits    LABS:                        6.5    39.21 )-----------( 206      ( 05 Apr 2022 06:40 )             20.8     04-05    136  |  104  |  19<H>  ----------------------------<  90  4.2   |  26  |  0.85    Ca    7.8<L>      05 Apr 2022 06:40    TPro  6.2  /  Alb  1.7<L>  /  TBili  1.0  /  DBili  x   /  AST  48<H>  /  ALT  36  /  AlkPhos  145<H>  04-04          COVID-19 PCR: NotDetec (04 Apr 2022 05:58)  COVID-19 PCR: NotDetec (01 Apr 2022 18:49)  COVID-19 PCR: NotDetec (27 Mar 2022 06:46)  COVID-19 PCR: NotDetec (21 Mar 2022 06:40)  COVID-19 PCR: NotDetec (15 Mar 2022 03:52)  COVID-19 PCR: NotDetec (08 Mar 2022 08:47)      RADIOLOGY & ADDITIONAL TESTS:  < from: Xray Chest 1 View- PORTABLE-Routine (Xray Chest 1 View- PORTABLE-Routine .) (04.04.22 @ 11:32) >    IMPRESSION:  Small to moderate right and small left pleural effusions   with likely associated passive atelectasis, either redistributed due to   different patient position or slightly decreased in size.    < end of copied text >  < from: CT Abdomen and Pelvis No Cont (03.25.22 @ 09:22) >  CHEST:  1. Moderate pleural effusions, right greater than left.    ABDOMEN/PELVIS:  1. Persistent small bowel obstruction. Small ascites and mesenteric edema.  2. Slight mural thickening versus underdistention of distal small bowel   loops in the pelvis, for example on images 231-240 series 2. Question   enteritis. Correlate with lactate level.  3. Anterior superficial soft tissue density versus complex fluid measures   3.5 x 1.7 cm, image 173 series 2, increased in size and more well-defined   in comparison with 3/18/2022, of unclear significance. Correlate with   dedicated ultrasound for characterization.    < end of copied text >  < from: US Duplex Venous Upper Ext Ltd, Left (03.25.22 @ 18:07) >  IMPRESSION:  No evidence of left upper extremity deep venous thrombosis.    < end of copied text >

## 2022-04-05 NOTE — CONSULT NOTE ADULT - CONSULT REQUESTED DATE/TIME
12-Mar-2022 22:57
08-Mar-2022 12:22
14-Mar-2022 13:37
05-Apr-2022 15:45
25-Mar-2022 14:07
31-Mar-2022 14:47

## 2022-04-05 NOTE — PROGRESS NOTE ADULT - PROBLEM SELECTOR PLAN 6
PT   Safety measures  supportive measures  Dispo to [facility] OOB to chair with PT  Safety measures  supportive measures  Dispo to [facility]

## 2022-04-05 NOTE — PROGRESS NOTE ADULT - PROBLEM SELECTOR PLAN 5
CXR on 4/4 shows Small to moderate right and small left pleural effusions   with likely associated passive atelectasis  Continue with Lasix 20 mg daily  Tolerating RA. SpO2 >95%   Monitor oxygenation  Keep net negative CXR on 4/4 shows Small to moderate right and small left pleural effusions   with likely associated passive atelectasis  Continue with Lasix 20 mg daily  Continue incentive spirometer 10x/hr  Tolerating RA. SpO2 >95%   Monitor oxygenation  Keep net negative

## 2022-04-05 NOTE — PROGRESS NOTE ADULT - ASSESSMENT
88 year old female with PMH of Afib on metoprolol, uterine CA s/p radiation and hysterectomy, breast cancer s/p chemo and bilateral mastectomy presents to the ED complaining of lower abdominal pain with nausea and vomiting.   Patient was found to have small bowel obstruction and small right inguinal Hernia. Patient had NGT placed and was admitted for conservative management. Hernia was reduced. Surgery was offered and recommended to the patient but patient declined initially due to concerns about comorbidities; however given delayed improvement, pt was taken to the OR and underwent diagnostic laparoscopy where she was found to have RLQ ileocolic adhesion with inguinal hernia; therefore underwent IHR and also extensive lysis of adhesion in which could not be safely fully mobilized so ileocolic bypass was performed. Pt postoperatively was medically unstable with hypotension and afib with RVR, transferred to ICU for further management. Pt was resuscitated with fluid and BP support, rate controlled with digoxin/betablockers. Once pt became optimized, was downgraded to med/surg. Cardiology and medicine kept following for MERCEDEZ and pleural effusion due to fluid shift, resolved with medical management. 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 Over the course, pt waxed and waned in terms of bowel function recovery, requiring prolonged nutrition -PICC placed and TPN started. Pt fully tolerated clear liquid diet with return of bowel function at the time of disposition. Pt had urinary retention, wilson catheter now removed, Passed TOV on 4/3.   Pt and  daughter decided against comfort care. They would like to transition back to full care but continue DNR/DNI  PT evaluated pt and recommended [facility], referrals made by CM, and pt discharged to [facility] with follow up instructions.     Pt is aox3, afebrile c/o constipation and mild RLQ abdominal pain unchanged since admission. Was also found to have hgb 6.5 and will transfuse 1 unit of PRBC today. Denies any nausea, vomiting or diarrhea. Pt is to continue daily enemas today.     88 year old female with PMH of Afib on metoprolol, uterine CA s/p radiation and hysterectomy, breast cancer s/p chemo and bilateral mastectomy presents to the ED complaining of lower abdominal pain with nausea and vomiting.   Patient was found to have small bowel obstruction and small right inguinal Hernia. Patient had NGT placed and was admitted for conservative management. Hernia was reduced. Surgery was offered and recommended to the patient but patient declined initially due to concerns about comorbidities; however given delayed improvement, pt was taken to the OR and underwent diagnostic laparoscopy where she was found to have RLQ ileocolic adhesion with inguinal hernia; therefore underwent IHR and also extensive lysis of adhesion in which could not be safely fully mobilized so ileocolic bypass was performed. Pt postoperatively was medically unstable with hypotension and afib with RVR, transferred to ICU for further management. Pt was resuscitated with fluid and BP support, rate controlled with digoxin/betablockers. Once pt became optimized, was downgraded to med/surg. Cardiology and medicine kept following for MERCEDEZ and pleural effusion due to fluid shift, resolved with medical management. 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 Over the course, pt waxed and waned in terms of bowel function recovery, requiring prolonged nutrition, s/p picc line. Pt fully tolerated clear liquid diet with return of bowel function at the time of disposition. Pt had urinary retention, wilson catheter now removed, Passed TOV on 4/3.   Pt and  daughter decided against comfort care. They would like to transition back to full care but continue DNR/DNI  PT evaluated pt and recommended [facility], referrals made by CM, and pt discharged to [facility] with follow up instructions.     Pt is aox3, afebrile c/o constipation and mild RLQ abdominal pain unchanged since admission. Was also found to have hgb 6.5 and will transfuse 1 unit of PRBC today. Pt is to also continue daily enemas today. Denies any nausea, vomiting or diarrhea.

## 2022-04-05 NOTE — PROGRESS NOTE ADULT - SUBJECTIVE AND OBJECTIVE BOX
Patient is a 89y old  Female who presents with a chief complaint of small bowel obstruction (04 Apr 2022 15:47)    OVERNIGHT EVENTS: no acute changes    REVIEW OF SYSTEMS:  CONSTITUTIONAL: No fever, chills  NECK: No pain or stiffness  RESPIRATORY: No cough, SOB  CARDIOVASCULAR: No chest pain, palpitations  GASTROINTESTINAL: +constipation. +rlq abd pain.  No nausea, vomiting, or diarrhea  GENITOURINARY: No dysuria  NEUROLOGICAL: No HA  SKIN: No itching, burning, rashes, or lesions   MUSCULOSKELETAL: No joint pain or swelling; No muscle, back, or extremity pain  HEME/LYMPH: No easy bruising, or bleeding gums    T(C): 36.7 (04-05-22 @ 08:22), Max: 36.8 (04-05-22 @ 05:46)  HR: 79 (04-05-22 @ 08:22) (79 - 110)  BP: 92/62 (04-05-22 @ 08:22) (91/62 - 103/57)  RR: 18 (04-05-22 @ 08:22) (18 - 20)  SpO2: 97% (04-05-22 @ 08:22) (95% - 97%)  Wt(kg): --Vital Signs Last 24 Hrs  T(C): 36.7 (05 Apr 2022 08:22), Max: 36.8 (05 Apr 2022 05:46)  T(F): 98 (05 Apr 2022 08:22), Max: 98.2 (05 Apr 2022 05:46)  HR: 79 (05 Apr 2022 08:22) (79 - 110)  BP: 92/62 (05 Apr 2022 08:22) (91/62 - 103/57)  BP(mean): 62 (04 Apr 2022 21:16) (62 - 62)  RR: 18 (05 Apr 2022 08:22) (18 - 20)  SpO2: 97% (05 Apr 2022 08:22) (95% - 97%)    MEDICATIONS  (STANDING):  anastrozole 1 milliGRAM(s) Oral daily  dextrose 50% Injectable 12.5 Gram(s) IV Push once  digoxin     Tablet 125 MICROGram(s) Oral daily  furosemide    Tablet 20 milliGRAM(s) Oral daily  heparin   Injectable 5000 Unit(s) SubCutaneous every 12 hours  metoprolol succinate ER 25 milliGRAM(s) Oral daily  mirtazapine 15 milliGRAM(s) Oral at bedtime  pantoprazole    Tablet 40 milliGRAM(s) Oral before breakfast  polyethylene glycol 3350 17 Gram(s) Oral daily    MEDICATIONS  (PRN):  acetaminophen    Suspension .. 650 milliGRAM(s) Oral every 6 hours PRN Temp greater or equal to 38C (100.4F), Mild Pain (1 - 3)  ondansetron Injectable 4 milliGRAM(s) IV Push every 6 hours PRN Nausea and/or Vomiting  sodium chloride 0.9% lock flush 10 milliLiter(s) IV Push every 1 hour PRN Pre/post blood products, medications, blood draw, and to maintain line patency    PHYSICAL EXAM:  GENERAL: thin, weak appearing  EYES: clear conjunctiva  ENMT: Moist mucous membranes  NECK: Supple, No JVD  CHEST/LUNG: Clear to auscultation bilaterally; No rales, rhonchi, wheezing, or rubs  HEART: S1, S2, Regular rate and rhythm  ABDOMEN: +abd surgical scars. Soft, Nontender, Nondistended; Bowel sounds present  NEURO: Alert & Oriented X3  EXTREMITIES: No LE edema, no calf tenderness  SKIN: No rashes or lesions    Consultant(s) Notes Reviewed:  [x ] YES  [ ] NO  Care Discussed with Consultants/Other Providers [ x] YES  [ ] NO    LABS:                        6.5    39.21 )-----------( 206      ( 05 Apr 2022 06:40 )             20.8     04-05    136  |  104  |  19<H>  ----------------------------<  90  4.2   |  26  |  0.85    Ca    7.8<L>      05 Apr 2022 06:40    TPro  6.2  /  Alb  1.7<L>  /  TBili  1.0  /  DBili  x   /  AST  48<H>  /  ALT  36  /  AlkPhos  145<H>  04-04      CAPILLARY BLOOD GLUCOSE                RADIOLOGY & ADDITIONAL TESTS:  < from: Xray Chest 1 View- PORTABLE-Routine (Xray Chest 1 View- PORTABLE-Routine .) (04.04.22 @ 11:32) >    ACC: [id number] EXAM:  XR CHEST PORTABLE ROUTINE 1V                          PROCEDURE DATE:  04/04/2022          INTERPRETATION:  TIME OF EXAM: April 4, 2022 at 10:05 AM.    CLINICAL INFORMATION: Monitor pleural effusion. Bilateral leg edema.    COMPARISON:  March 31, 2022.    TECHNIQUE:   AP Portable chest x-ray.    INTERPRETATION:    Heart size and the mediastinum cannot be accurately evaluated on this   projection. Calcified thoracic aorta.  Small to moderate right and small left pleural effusions with likely   associated passive atelectasis, appear either redistributed due to   different patient position or slightly decreased in size.  No pneumothorax seen.  No acute bony abnormality.      IMPRESSION:  Small to moderate right and small left pleural effusions   with likely associated passive atelectasis, either redistributed due to   different patient position or slightly decreased in size.    --- End of Report ---            LISE THOMSON MD; Attending Radiologist  This document has been electronically signed. Apr 4 2022  5:04PM    < end of copied text >      Imaging Personally Reviewed:  [ ] YES  [ ] NO   Patient is a 89y old  Female who presents with a chief complaint of small bowel obstruction (04 Apr 2022 15:47)    OVERNIGHT EVENTS: no acute changes    REVIEW OF SYSTEMS:  CONSTITUTIONAL: No fever, chills  NECK: No pain or stiffness  RESPIRATORY: No cough, SOB  CARDIOVASCULAR: No chest pain, palpitations  GASTROINTESTINAL: +constipation. +rlq abd pain.  No nausea, vomiting, or diarrhea  GENITOURINARY: No dysuria  NEUROLOGICAL: No HA  SKIN: No itching, burning, rashes, or lesions   MUSCULOSKELETAL: No joint pain or swelling; No muscle, back, or extremity pain  HEME/LYMPH: No easy bruising, or bleeding gums    T(C): 36.7 (04-05-22 @ 08:22), Max: 36.8 (04-05-22 @ 05:46)  HR: 79 (04-05-22 @ 08:22) (79 - 110)  BP: 92/62 (04-05-22 @ 08:22) (91/62 - 103/57)  RR: 18 (04-05-22 @ 08:22) (18 - 20)  SpO2: 97% (04-05-22 @ 08:22) (95% - 97%)  Wt(kg): --Vital Signs Last 24 Hrs  T(C): 36.7 (05 Apr 2022 08:22), Max: 36.8 (05 Apr 2022 05:46)  T(F): 98 (05 Apr 2022 08:22), Max: 98.2 (05 Apr 2022 05:46)  HR: 79 (05 Apr 2022 08:22) (79 - 110)  BP: 92/62 (05 Apr 2022 08:22) (91/62 - 103/57)  BP(mean): 62 (04 Apr 2022 21:16) (62 - 62)  RR: 18 (05 Apr 2022 08:22) (18 - 20)  SpO2: 97% (05 Apr 2022 08:22) (95% - 97%)    MEDICATIONS  (STANDING):  anastrozole 1 milliGRAM(s) Oral daily  dextrose 50% Injectable 12.5 Gram(s) IV Push once  digoxin     Tablet 125 MICROGram(s) Oral daily  furosemide    Tablet 20 milliGRAM(s) Oral daily  heparin   Injectable 5000 Unit(s) SubCutaneous every 12 hours  metoprolol succinate ER 25 milliGRAM(s) Oral daily  mirtazapine 15 milliGRAM(s) Oral at bedtime  pantoprazole    Tablet 40 milliGRAM(s) Oral before breakfast  polyethylene glycol 3350 17 Gram(s) Oral daily    MEDICATIONS  (PRN):  acetaminophen    Suspension .. 650 milliGRAM(s) Oral every 6 hours PRN Temp greater or equal to 38C (100.4F), Mild Pain (1 - 3)  ondansetron Injectable 4 milliGRAM(s) IV Push every 6 hours PRN Nausea and/or Vomiting  sodium chloride 0.9% lock flush 10 milliLiter(s) IV Push every 1 hour PRN Pre/post blood products, medications, blood draw, and to maintain line patency    PHYSICAL EXAM:  GENERAL: thin, weak appearing  EYES: clear conjunctiva  ENMT: Moist mucous membranes  NECK: Supple, No JVD  CHEST/LUNG: Crackles in b/l lungs  HEART: S1, S2, Regular rate and rhythm  ABDOMEN: +abd surgical scars. Soft, Nontender, Nondistended; Bowel sounds present  NEURO: Alert & Oriented X3  EXTREMITIES: Bilateral LE edema 2+, no calf tenderness  SKIN: No rashes or lesions    Consultant(s) Notes Reviewed:  [x ] YES  [ ] NO  Care Discussed with Consultants/Other Providers [ x] YES  [ ] NO    LABS:                        6.5    39.21 )-----------( 206      ( 05 Apr 2022 06:40 )             20.8     04-05    136  |  104  |  19<H>  ----------------------------<  90  4.2   |  26  |  0.85    Ca    7.8<L>      05 Apr 2022 06:40    TPro  6.2  /  Alb  1.7<L>  /  TBili  1.0  /  DBili  x   /  AST  48<H>  /  ALT  36  /  AlkPhos  145<H>  04-04      CAPILLARY BLOOD GLUCOSE                RADIOLOGY & ADDITIONAL TESTS:  < from: Xray Chest 1 View- PORTABLE-Routine (Xray Chest 1 View- PORTABLE-Routine .) (04.04.22 @ 11:32) >    ACC: [id number] EXAM:  XR CHEST PORTABLE ROUTINE 1V                          PROCEDURE DATE:  04/04/2022          INTERPRETATION:  TIME OF EXAM: April 4, 2022 at 10:05 AM.    CLINICAL INFORMATION: Monitor pleural effusion. Bilateral leg edema.    COMPARISON:  March 31, 2022.    TECHNIQUE:   AP Portable chest x-ray.    INTERPRETATION:    Heart size and the mediastinum cannot be accurately evaluated on this   projection. Calcified thoracic aorta.  Small to moderate right and small left pleural effusions with likely   associated passive atelectasis, appear either redistributed due to   different patient position or slightly decreased in size.  No pneumothorax seen.  No acute bony abnormality.      IMPRESSION:  Small to moderate right and small left pleural effusions   with likely associated passive atelectasis, either redistributed due to   different patient position or slightly decreased in size.    --- End of Report ---            LISE THOMSON MD; Attending Radiologist  This document has been electronically signed. Apr 4 2022  5:04PM    < end of copied text >      Imaging Personally Reviewed:  [ ] YES  [ ] NO

## 2022-04-05 NOTE — PROGRESS NOTE ADULT - NS ATTEND AMEND GEN_ALL_CORE FT
88yo F PMHx of Uterine CA s/p hysterectomy, A. Fib, breast CA s/p b/l mastectomy, recent MDS diagnosis who presented with SBO s/p ileocolic bypass on 3/11. Course complicated by recurrent SBO. Patient reports general felling of unwell and weakness. Denies nausea or vomiting. Reports that she is sick of CLD and would like to try solid foods. Last BM 3 days ago, requesting enema. Hgb mildly downtrending, 6.5 today. Transfuse 1u PRBC. Oncology consult given history of CMML. Continue Lasix prn, and monitor Cr closely. Discussed hospice philosophy with patient given discomfort, will have palliative follow-up with patient and family. Discharge planning when hemoglobin stable.

## 2022-04-05 NOTE — CONSULT NOTE ADULT - ASSESSMENT
complete note to follow    #CMML  s/p SBO requiring ileocolic bypass on 3/11  pt states she follows with primary Oncologist Dr. Sampson at [facility] for Hx Uterine CA and Breast CA, s/p chemo/RT and B/L mastectomy. 02/2022 had a BMBx which showed CMML not on active tx  on admit WBC was normal and Hgb=9.8  now with leukocytosis WBC=39k with 20% monocytes; however is trending down, afebrile  Hgb today 6.5 with normal mcv  developed sepsis post-op, completed abx  Albumin 3.7 on admit, now 1.7, s/p TPN now discontinued  Rec's:  -drop in H/H transfuse PRBC  -check retic, occult, anemia panel  -LE edema multifactorial, profound anemia, hypoalbuminemia  -PRBC transfusion if Hgb <7.0 or symptomatic  -Daily CBC  -will reach out to pt's primary Oncologist for addt'l info   -continue arimidex  -bowel regimen for constipation    Thank you for the referral. Will continue to monitor the patient.  Please call with any questions [phone]  Above reviewed with Attending Dr. Matamoros  [facility]  [address], [address], [place], [place]  [phone]  *Note not finalized until signed by Attending Physician           88 year old female with PMH of Afib on metoprolol, uterine CA s/p radiation and hysterectomy, breast cancer s/p chemo and bilateral mastectomy presents to the ED complaining of lower abdominal pain with nausea and vomiting. She thinks her symptoms are from lactulose or from a chicken sandwich she has prior to onset of symptoms. Patient states that she suffers from constipation and takes multiple different over the counter the drugs, she has been on lactulose for the past 2 days from her PMD and states she is still passing stool which is well formed. States she used to be on a blood thinner, but she had too much bleeding so it was stopped. Denies chest pain, worsening shortness of breath, dysuria or difficulty urinating.    #CMML  s/p SBO requiring ileocolic bypass on 3/11  pt states she follows with primary Oncologist Dr. Sampson at [facility] for Hx Uterine CA and Breast CA, s/p chemo/RT and B/L mastectomy. 02/2022 had a BMBx which showed CMML not on active tx  on admit WBC was normal and Hgb=9.8  now with leukocytosis WBC=39k with 20% monocytes; however is trending down, afebrile  Hgb today 6.5 with normal mcv  developed sepsis post-op, completed abx  Albumin 3.7 on admit, now 1.7, s/p TPN now discontinued  Rec's:  -drop in H/H transfuse PRBC  -check retic, occult, anemia panel  -LE edema multifactorial, profound anemia, hypoalbuminemia  -PRBC transfusion if Hgb <7.0 or symptomatic  -Daily CBC  -will reach out to pt's primary Oncologist for addt'l info   -continue arimidex  -bowel regimen for constipation    Thank you for the referral. Will continue to monitor the patient.  Please call with any questions [phone]  Above reviewed with Attending Dr. Matamoros  [facility]  [address] [facility], [address], [place], [place]  [phone]  *Note not finalized until signed by Attending Physician

## 2022-04-05 NOTE — PROGRESS NOTE ADULT - PROBLEM SELECTOR PLAN 8
DVT ppx: Heparin SC q12h  GI: tolerating PO DVT ppx: heparin on hold due to low hgb, continue scds  GI: tolerating PO DVT ppx: heparin on hold due to low hgb, continue scds  GI: ppi daily

## 2022-04-05 NOTE — PROGRESS NOTE ADULT - PROBLEM SELECTOR PLAN 1
s/p ileocolic bypass  Continue with full liquid with Ensure  TPN discontinued  PICC removed 4/01  Bowel regimen   TWE for constipation  Continue antiemetics   Surgery following s/p ileocolic bypass  Continue with full liquid with Ensure  Continue Bowel regimen: miralax, senna and daily enemas  Continue antiemetics   Continue pain control tylenol PRN  TPN discontinued  PICC removed 4/01 TWE for constipation  Surgery following

## 2022-04-05 NOTE — PROVIDER CONTACT NOTE (CRITICAL VALUE NOTIFICATION) - ACTION/TREATMENT ORDERED:
NP put in an order for 1 Unit of PRBC.
No order at present.  will continue to monitor closely. Comfort measures /DNR

## 2022-04-05 NOTE — CONSULT NOTE ADULT - NS ATTEND AMEND GEN_ALL_CORE FT
I have examined the patient at bedside and reviewed patient's data and participated in the management of the patient along with Kinza ZAZUETA as well as hemotology/med oncology faculty consisting of Dr. Edwin Holguin, Dr. GARCÍA Massey, Dr. PETRA Dean, Dr. Oralia Cruz, Dr. Ashley Howard, Dr. Clayton Aguirre as well as myself during the daily heme/onc case review. I reviewed pertinent clinical information, PE,  labs as well as A/P as outline above.     Pt with incidental finding of CMML s/p chemotherapy for breast cancer and radiation for uterine cancer. Pt on surveillance with primary oncologist   Will recommend tx for Hgb < 7.

## 2022-04-05 NOTE — CONSULT NOTE ADULT - REASON FOR ADMISSION
small bowel obstruction

## 2022-04-06 NOTE — PROGRESS NOTE ADULT - PROBLEM SELECTOR PROBLEM 7
Discharge planning issues
Severe protein-calorie malnutrition
Severe protein-calorie malnutrition
Prophylactic measure

## 2022-04-06 NOTE — PROGRESS NOTE ADULT - PROBLEM SELECTOR PLAN 4
likely due to CMML  iron studies noted  s/p ileocolic bypass  s/p 1 unit PRBC  hgb improved from 6.5 to 8.2   no signs of active bleeding  f/u cbc
likely due to MDS  s/p ileocolic bypass  hgb today 6.5, has been in around 7 for the last 3 days  no signs of active bleeding  transfuse 1 unit PRBC today  f/u cbc am
2/2 prolonged hospitalization, poor nutritional status. Bedbound at present, req assist w ADLs. Pt previously living alone, pt/daughter now agreeable for [facility] in [place]
2/2 prolonged hospitalization, poor nutritional status. Bedbound at present, req assist w ADLs. Pt previously living alone, pt/daughter requesting LTC w hospice in .
NPO till 3/25 since admission due to SBO  muscle wasting noted   pt was on albumin   continue P.O diet and supplement as tolerated  appreciated dietitian consult
CXR on result noted as above.   Continue with Lasix   Tolerating RA. SpO2 >95%   Monitor oxygenation  Keep net negative

## 2022-04-06 NOTE — PROGRESS NOTE ADULT - SUBJECTIVE AND OBJECTIVE BOX
follow up on:  complex medical decision making related to goals of care    OVERNIGHT EVENTS: no overnight events. advanced to soft diet, tolerating so far, though not much appetite. Feels discouraged.     Present Symptoms:   Dyspnea: denies  Nausea/Vomiting: denies  Anxiety: denies  Depressed denies  Fatigue: +  Loss of appetite: +  Pain:            denies                    location:    Constipation/diarrhea: denies      MEDICATIONS  (STANDING):  anastrozole 1 milliGRAM(s) Oral daily  dextrose 50% Injectable 12.5 Gram(s) IV Push once  digoxin     Tablet 125 MICROGram(s) Oral daily  metoprolol succinate ER 25 milliGRAM(s) Oral daily  mirtazapine 15 milliGRAM(s) Oral at bedtime  pantoprazole    Tablet 40 milliGRAM(s) Oral before breakfast  polyethylene glycol 3350 17 Gram(s) Oral daily  senna 2 Tablet(s) Oral at bedtime    MEDICATIONS  (PRN):  acetaminophen    Suspension .. 650 milliGRAM(s) Oral every 6 hours PRN Temp greater or equal to 38C (100.4F), Mild Pain (1 - 3)  ondansetron Injectable 4 milliGRAM(s) IV Push every 6 hours PRN Nausea and/or Vomiting  sodium chloride 0.9% lock flush 10 milliLiter(s) IV Push every 1 hour PRN Pre/post blood products, medications, blood draw, and to maintain line patency      PHYSICAL EXAM:  Vital Signs Last 24 Hrs  T(C): 36.4 (06 Apr 2022 13:21), Max: 36.8 (05 Apr 2022 23:45)  T(F): 97.5 (06 Apr 2022 13:21), Max: 98.2 (05 Apr 2022 23:45)  HR: 98 (06 Apr 2022 13:21) (89 - 101)  BP: 115/75 (06 Apr 2022 13:21) (100/64 - 115/75)  BP(mean): 75 (06 Apr 2022 03:00) (75 - 77)  RR: 20 (06 Apr 2022 13:21) (17 - 20)  SpO2: 94% (06 Apr 2022 13:21) (94% - 100%)    Karnofsky Performance Score/Palliative Performance Status Version2:  40   %     GENERAL: alert, frail, on NC, sitting in chair,  NAD  HEENT: Atraumatic, oropharynx clear, neck supple  CHEST/LUNG: unlabored   HEART: Regular rate and rhythm    ABDOMEN: Soft, Nontender, Nondistended   MUSCULOSKELETAL:  no edema   NERVOUS SYSTEM:  Alert & Oriented X3,  follows commands  SKIN: No rashes or lesions noted  Oral intake: tolerating, poor appetite    LABS:                          8.2    36.06 )-----------( 208      ( 06 Apr 2022 07:15 )             25.6     04-06    137  |  104  |  19<H>  ----------------------------<  97  4.0   |  24  |  0.88    Ca    8.2<L>      06 Apr 2022 07:15  Phos  3.5     04-06  Mg     1.7     04-06          RADIOLOGY & ADDITIONAL STUDIES:

## 2022-04-06 NOTE — PROGRESS NOTE ADULT - PROBLEM SELECTOR PLAN 5
Patient:   LUIS A KOENIG            MRN: [id number]            FIN: [id number]              Age:   30 years     Sex:  FEMALE     :  88   Associated Diagnoses:   None   Author:   CRISTI GRAHAM     Chief Complaint   c/o seizure      History of Present Illness             The patient presents with 31 y/o female with history meningitis, seizures , UTI. patient was recently seen in the ED for c/o headaches, fever abd chills. she was discharged and redirected to come in due to abnormal urinalysis with e.coli and positive blood culture for neisseria meningitis.  Patient also reports having seizures while asleep.  Noted sometimes she wakes up in the morning with sore tongue and  achy body.  boyfriend was in  the room, they shear a bed and he states he has not noted any seizure activity in the past 3-4  weeks.  According to patient she fell she had a seizure overnight complaining of waking up with some abdominal and muscle ache to lower extremities.  Patient denies headaches, dizziness, diplopia, slurred speech, denies numbness tingling and weakness to all extremities.       .       Review of Systems   Constitutional:  Fever, Chills.    Eye:  Negative.    Ear/Nose/Mouth/Throat:  Negative.    Cardiovascular:  Negative.    Respiratory:  Negative.    Gastrointestinal:  Negative.    Genitourinary:  Negative.    Musculoskeletal:  Muscle pain.    Integumentary:  Negative,  .    Hematology/Lymphatics:  Negative.    Neurologic:  Alert and oriented X4.    Endocrine:  Negative.    Allergy/Immunologic:  Negative.      Histories   Past Med History: Seizure  Acute UTI  Anxiety      Family History: MOTHER: Stroke  FATHER: Diabetes mellitus type 1  GRANDMOTHER: CA - Cancer  SISTER: Sickle cell anemia  BROTHER: Sickle cell anemia      Procedure History: No qualifying data available.      Social History       Alcohol  Details: Alcohol Abuse in Household: No.  Details: Use: Current.  Frequency: 1-2 times per  CXR on 4/4 shows Small to moderate right and small left pleural effusions   with likely associated passive atelectasis  Continue with Lasix 20 mg daily  Continue incentive spirometer 10x/hr  Tolerating RA. SpO2 >95%   Monitor oxygenation  Keep net negative month.  Exercise  Details: Exercise: Occasionally.  Duration (mins): 30.  Times Per Week: 3-4 times/week.  Type: Running.  Home/Environment  Details: Alcohol abuse in household: No.  Substance abuse in household: No.  Smoker in household: No.  Sexual  Details: Sexual orientation: Straight or heterosexual.  Gender Identity: Identifies as female.  Gender on Ins: Female.  Substance Abuse  Details: Substance Abuse in Household: No.  Details: None  Tobacco  Details: Smoker in Houshold: No.  Smoked/Smokeless Tobacco Last 30 Days: No.  Smoking Tobacco Use: Never smoker.  Smokeless Tobacco Use Never.  Details: No, Never smoker  .       Health Status   Allergies: Allergies (ST)   Allergies (1) Active Reaction  NKA None Documented    Current medications: Medications (15) Active  Scheduled: (8)  cefTRIAXone  2,000 mg 20 mL, Slow IV Push, Q12H (variable)  Docusate sodium 100 mg cap  100 mg 1 cap, Oral, BID  Famotidine 20 mg/2 mL inj  20 mg 2 mL, Slow IV Push, Q12H  Heparin 5,000 unit/1 mL inj  5,000 unit 1 mL, Subcutaneous, Q8H  MetoCLOPramide 10 mg/2 mL inj SDV  5 mg 1 mL, Slow IV Push, Q6H  OXcarbazepine 300 mg tab  1,200 mg 4 tab, Oral, Q12H  Senna-docusate sodium 8.6-50 mg tab  1 tab, Oral, Daily  Sodium chloride PF 0.9% flush inj 10 mL  20 mL, Flush, Q12H  Continuous: (1)  Sodium Chloride 0.9% 1,000 mL + potassium CHLORIDE 10 mEq  1,000 mL, IV, 80 mL/hr  PRN: (6)  Acetaminophen 500 mg tab  500 mg 1 tab, Oral, Q4H  Hydrocodone-acetaminophen 5-325 mg tab  1 tab, Oral, Q6H  LORazepam 1 mg tab  0.5 mg 0.5 tab, Oral, BID  Ondansetron 4 mg/2 mL inj SDV  4 mg 2 mL, Slow IV Push, Q6H  Sodium chloride PF 0.9% flush inj 10 mL  10 mL, Flush, As Directed PRN  Sodium chloride PF 0.9% flush inj 10 mL  20 mL, Flush, As Directed PRN       Physical Examination   VS/Measurements       Vitals between:   03-MAR-2019 12:35:19   TO   04-MAR-2019 12:35:19                   LAST RESULT MINIMUM MAXIMUM  Temperature 36.8 36.6 37  Heart Rate 60 60  83  Respiratory Rate 16 16 18  NISBP           154 116 168  NIDBP           95 74 105  NIMBP           101 101 101  SpO2                    100 99 100    General:  Alert and oriented, No acute distress.    Eye:  Pupils are equal, round and reactive to light.    HENT:  Normocephalic, Normal hearing, Oral mucosa is moist.    Neck:  Supple, Non-tender.    Respiratory:  Respirations are non-labored, Symmetrical chest wall expansion.   Gastrointestinal:  Soft, Non-tender, Non-distended.    Musculoskeletal:  Normal range of motion, Normal strength, No tenderness, No swelling, No deformity.   Integumentary:  Warm, Dry.    Neurologic:  Alert, Oriented, Normal sensory, Normal motor function, No focal deficits, Cranial Nerves II-XII are grossly intact, Gag reflex normal, Normal deep tendon reflexes, Mental Status: alert & oriented x3. Following simple commands. Dec blink to threat on right side  Language: Fluent, spontaneous speech, naming, repetition. Mild dysarthria.  Cranial Nerves:  II, III: PERRL 2-->3mm OU, VFF to FC bilaterally.  III, IV, VI: EOMI, no nystagmus.  V: Sensation intact to LT V1-V3.  VII: facial symetry.  VIII: Hearing intact bilaterally.  IX, X: Palate/uvula elevate symmetrically.  XI: 5/5 Trapezius & SCM bilaterally.  XII: Tongue protrudes midline.  Motor: Normal bulk and tone. No pronator drift. Normal fine finger movements. No fine tremor bilateral. 5/5 motor on left and 5/5 on RUE and 5/5 RLE grossly.  Sensation: Sensation to LT in all four extremities. No extinction to double simultaneous touch.  Coordination: Finger-to-nose and heel-to-shin with no ataxia on left.  Reflexes: +2 reflexes with up toe on right and mute on left toe.  Gait: steady without assistance.   .    Cognition and Speech:  Oriented, Speech clear and coherent.    Psychiatric:  Cooperative, Appropriate mood & affect.      Review / Management   Laboratory results:    No Qualifying Labs are resulted on this patient in the last 24  hours  Medications (15) Active  Scheduled: (8)  cefTRIAXone  2,000 mg 20 mL, Slow IV Push, Q12H (variable)  Docusate sodium 100 mg cap  100 mg 1 cap, Oral, BID  Famotidine 20 mg/2 mL inj  20 mg 2 mL, Slow IV Push, Q12H  Heparin 5,000 unit/1 mL inj  5,000 unit 1 mL, Subcutaneous, Q8H  MetoCLOPramide 10 mg/2 mL inj SDV  5 mg 1 mL, Slow IV Push, Q6H  OXcarbazepine 300 mg tab  1,200 mg 4 tab, Oral, Q12H  Senna-docusate sodium 8.6-50 mg tab  1 tab, Oral, Daily  Sodium chloride PF 0.9% flush inj 10 mL  20 mL, Flush, Q12H  Continuous: (1)  Sodium Chloride 0.9% 1,000 mL + potassium CHLORIDE 10 mEq  1,000 mL, IV, 80 mL/hr  PRN: (6)  Acetaminophen 500 mg tab  500 mg 1 tab, Oral, Q4H  Hydrocodone-acetaminophen 5-325 mg tab  1 tab, Oral, Q6H  LORazepam 1 mg tab  0.5 mg 0.5 tab, Oral, BID  Ondansetron 4 mg/2 mL inj SDV  4 mg 2 mL, Slow IV Push, Q6H  Sodium chloride PF 0.9% flush inj 10 mL  10 mL, Flush, As Directed PRN  Sodium chloride PF 0.9% flush inj 10 mL  20 mL, Flush, As Directed PRN  .    Radiology results                       Impression and Plan   Dx and Plan:       Course: This is a 30-year-old female being seen for history of seizure.  Patient is alert and oriented x4, all information is obtained from patient and spouse at bedside.  According to patient she believes she has been waking up recently with a sore tongue and achy muscles.  She has been having seizures while sleeping.  Patient she had a bed with the boyfriend, the boyfriend was present and did state he has not noticed any  seizure activity in the past 3-4 weeks.  On evaluation no focal neurologic deficit was noted.  Patient has no bite marks or bruises on her tongue.  Patient is currently on oxcarbazepine 1200 mg twice a day, patient noted that she does see a neurologist outpatient and has an appointment in the next few weeks.  Will recommend patient to follow-up with outpatient neurology, continue oxcarbazepine 1200 mg twice daily.  No need for EEG due to   patient has known history of seizure activity.  Is currently being treated for UTI and Neisseria meningitis in the blood.  We will continue to follow..

## 2022-04-06 NOTE — PROGRESS NOTE ADULT - PROBLEM SELECTOR PLAN 7
moved to medicine today from surgery   pending placement, likely [facility] to LTC with hospice  SW is following   Latest COVID 3/27 negative
Continue full liquid with Ensure supplement  Nutrition following
DVT ppx: Heparin SC q12h
Continue full liquid with Ensure supplement  Nutrition following

## 2022-04-06 NOTE — PROGRESS NOTE ADULT - NS ATTEND AMEND GEN_ALL_CORE FT
88yo F PMHx of Uterine CA s/p hysterectomy, A. Fib, breast CA s/p b/l mastectomy, recent MDS diagnosis who presented with SBO s/p ileocolic bypass on 3/11. Course complicated by recurrent SBO. Patient reports general felling of unwell and weakness. Denies nausea or vomiting. Hgb corrected appropriately to 8.2. Reports that she is sick of CLD and would like to try solid foods. Advanced diet to soft foods and patient tolerating so far. Give Enema as patient has not had a BM. Palliative followed up with patient and daughter and patient would like to continue with discharge with [facility], not interested in hospice at this time. Discharge planning to [facility] if tolerates diet.

## 2022-04-06 NOTE — PROGRESS NOTE ADULT - PROBLEM SELECTOR PLAN 2
2/2 pleural effusions, on TPN, with severe hypoalbuminemia. Echo showed pEF, mod MR, severe pulm HTN. O2 support via NC. Lasix prn. No further TPN per pt wishes. DNR/DNI.
2/2 pleural effusions, on TPN, with severe hypoalbuminemia. Echo showed pEF, mod MR, severe pulm HTN. O2 support via NC. Lasix prn. No further TPN per pt wishes. DNR/DNI. resolved, O2 stable on NC, appears comfortable.
rate controlled on current regimen of Digoxin and metoprolol   Patient not on AC due to history of bleeding in past.   d/c tele today - pt is comfort measures only
Ventricular rate controlled  Continue digoxin, metoprolol  Continue lasix  Not on AC due to history of bleeding in past
Ventricular rate controlled  Continue digoxin, metoprolol  Continue lasix  Not on AC due to history of bleeding in past
Ventricular rate controlled  Continue digoxin, metoprolol  Continue lasix 20 mg daily  Not on AC due to history of bleeding in past

## 2022-04-06 NOTE — PROGRESS NOTE ADULT - PROBLEM SELECTOR PROBLEM 3
Severe protein-calorie malnutrition
Myelodysplastic syndrome
CMML (chronic myelomonocytic leukemia)
Severe protein-calorie malnutrition
Myelodysplastic syndrome
Myelodysplastic syndrome

## 2022-04-06 NOTE — PROGRESS NOTE ADULT - PROBLEM SELECTOR PLAN 5
[facility] on file for DNR/DNI/DNH, comfort measures. Pt/family requesting previously requesting LTC w hospice, now agreeable for [facility] in . Off TPN, tolerating diet, advanced per surgery team, denies pain. Feels discouraged over her prolonged hospitalization and complications, now w urinary incontinence since wilson removed, not much appetite though tolerating diet. Provided encouragement and support, acknowledges that she is making some strides. Her main goal is to be somewhere that can care for her and try to get stronger. Also spoke w pt's daughter by phone. They remain in agreement to pursue [facility] in [place] to help improve mobility. Support provided. Palliative will follow.

## 2022-04-06 NOTE — PROGRESS NOTE ADULT - PROBLEM SELECTOR PLAN 1
s/p WILLIAM w ileocolic bypass 3/11, w recurrent SBO, on TPN since 3/26. Mgmt per surgery. +BM foll enema     Off TPN per pt wishes. Tolerating diet, advanced to soft diet per surgery, tolerating so far.  DNR/DNI, pt/family now agreeable for [facility].

## 2022-04-06 NOTE — PROGRESS NOTE ADULT - SUBJECTIVE AND OBJECTIVE BOX
Patient is a 89y old  Female who presents with a chief complaint of small bowel obstruction (05 Apr 2022 15:44)    OVERNIGHT EVENTS: no acute changes    REVIEW OF SYSTEMS:  CONSTITUTIONAL: No fever, chills  NECK: No pain or stiffness  RESPIRATORY: No cough, SOB  CARDIOVASCULAR: No chest pain, palpitations  GASTROINTESTINAL: No abdominal pain. No nausea, vomiting, or diarrhea  GENITOURINARY: No dysuria  NEUROLOGICAL: No HA  SKIN: No itching, burning, rashes, or lesions   MUSCULOSKELETAL: No joint pain or swelling; No muscle, back, or extremity pain    T(C): 36.4 (04-06-22 @ 07:00), Max: 36.8 (04-05-22 @ 23:45)  HR: 89 (04-06-22 @ 07:00) (89 - 101)  BP: 100/64 (04-06-22 @ 07:00) (92/60 - 111/59)  RR: 20 (04-06-22 @ 12:25) (17 - 20)  SpO2: 98% (04-06-22 @ 12:25) (95% - 100%)  Wt(kg): --Vital Signs Last 24 Hrs  T(C): 36.4 (06 Apr 2022 07:00), Max: 36.8 (05 Apr 2022 23:45)  T(F): 97.6 (06 Apr 2022 07:00), Max: 98.2 (05 Apr 2022 23:45)  HR: 89 (06 Apr 2022 07:00) (89 - 101)  BP: 100/64 (06 Apr 2022 07:00) (92/60 - 111/59)  BP(mean): 75 (06 Apr 2022 03:00) (75 - 77)  RR: 20 (06 Apr 2022 12:25) (17 - 20)  SpO2: 98% (06 Apr 2022 12:25) (95% - 100%)    MEDICATIONS  (STANDING):  anastrozole 1 milliGRAM(s) Oral daily  dextrose 50% Injectable 12.5 Gram(s) IV Push once  digoxin     Tablet 125 MICROGram(s) Oral daily  metoprolol succinate ER 25 milliGRAM(s) Oral daily  mirtazapine 15 milliGRAM(s) Oral at bedtime  pantoprazole    Tablet 40 milliGRAM(s) Oral before breakfast  polyethylene glycol 3350 17 Gram(s) Oral daily  senna 2 Tablet(s) Oral at bedtime    MEDICATIONS  (PRN):  acetaminophen    Suspension .. 650 milliGRAM(s) Oral every 6 hours PRN Temp greater or equal to 38C (100.4F), Mild Pain (1 - 3)  ondansetron Injectable 4 milliGRAM(s) IV Push every 6 hours PRN Nausea and/or Vomiting  sodium chloride 0.9% lock flush 10 milliLiter(s) IV Push every 1 hour PRN Pre/post blood products, medications, blood draw, and to maintain line patency    PHYSICAL EXAM:  GENERAL: thin, weak appearing  EYES: clear conjunctiva  ENMT: Moist mucous membranes  NECK: Supple, No JVD  CHEST/LUNG: Crackles in b/l lungs  HEART: S1, S2, Regular rate and rhythm  ABDOMEN: +abd surgical scars. Soft, Nontender, Nondistended; Bowel sounds present  NEURO: Alert & Oriented X3  EXTREMITIES: Bilateral LE edema 2+, no calf tenderness  SKIN: No rashes or lesions    Consultant(s) Notes Reviewed:  [x ] YES  [ ] NO  Care Discussed with Consultants/Other Providers [ x] YES  [ ] NO    LABS:                        8.2    36.06 )-----------( 208      ( 06 Apr 2022 07:15 )             25.6     04-06    137  |  104  |  19<H>  ----------------------------<  97  4.0   |  24  |  0.88    Ca    8.2<L>      06 Apr 2022 07:15  Phos  3.5     04-06  Mg     1.7     04-06        CAPILLARY BLOOD GLUCOSE                RADIOLOGY & ADDITIONAL TESTS:  < from: Xray Chest 1 View- PORTABLE-Routine (Xray Chest 1 View- PORTABLE-Routine .) (04.04.22 @ 11:32) >    ACC: [id number] EXAM:  XR CHEST PORTABLE ROUTINE 1V                          PROCEDURE DATE:  04/04/2022          INTERPRETATION:  TIME OF EXAM: April 4, 2022 at 10:05 AM.    CLINICAL INFORMATION: Monitor pleural effusion. Bilateral leg edema.    COMPARISON:  March 31, 2022.    TECHNIQUE:   AP Portable chest x-ray.    INTERPRETATION:    Heart size and the mediastinum cannot be accurately evaluated on this   projection. Calcified thoracic aorta.  Small to moderate right and small left pleural effusions with likely   associated passive atelectasis, appear either redistributed due to   different patient position or slightly decreased in size.  No pneumothorax seen.  No acute bony abnormality.      IMPRESSION:  Small to moderate right and small left pleural effusions   with likely associated passive atelectasis, either redistributed due to   different patient position or slightly decreased in size.    --- End of Report ---            LISE THOMSON MD; Attending Radiologist  This document has been electronically signed. Apr 4 2022  5:04PM

## 2022-04-06 NOTE — PROGRESS NOTE ADULT - SUBJECTIVE AND OBJECTIVE BOX
Patient is a 89y old  Female who presents with a chief complaint of small bowel obstruction (06 Apr 2022 12:30)    SUBJECTIVE / OVERNIGHT EVENTS:      MEDICATIONS (STANDING):  anastrozole 1 milliGRAM(s) Oral daily  dextrose 50% Injectable 12.5 Gram(s) IV Push once  digoxin     Tablet 125 MICROGram(s) Oral daily  metoprolol succinate ER 25 milliGRAM(s) Oral daily  mirtazapine 15 milliGRAM(s) Oral at bedtime  pantoprazole    Tablet 40 milliGRAM(s) Oral before breakfast  polyethylene glycol 3350 17 Gram(s) Oral daily  senna 2 Tablet(s) Oral at bedtime    MEDICATIONS  (PRN):  acetaminophen    Suspension .. 650 milliGRAM(s) Oral every 6 hours PRN Temp greater or equal to 38C (100.4F), Mild Pain (1 - 3)  ondansetron Injectable 4 milliGRAM(s) IV Push every 6 hours PRN Nausea and/or Vomiting  sodium chloride 0.9% lock flush 10 milliLiter(s) IV Push every 1 hour PRN Pre/post blood products, medications, blood draw, and to maintain line patency            PHYSICAL EXAM:  Vital Signs Last 24 Hrs  T(C): 36.4 (06 Apr 2022 13:21), Max: 36.8 (05 Apr 2022 23:45)  T(F): 97.5 (06 Apr 2022 13:21), Max: 98.2 (05 Apr 2022 23:45)  HR: 98 (06 Apr 2022 13:21) (89 - 101)  BP: 115/75 (06 Apr 2022 13:21) (100/64 - 115/75)  BP(mean): 75 (06 Apr 2022 03:00) (75 - 77)  RR: 20 (06 Apr 2022 13:21) (17 - 20)  SpO2: 94% (06 Apr 2022 13:21) (94% - 100%)    LABS:                        8.2    36.06 )-----------( 208      ( 06 Apr 2022 07:15 )             25.6     04-06    137  |  104  |  19<H>  ----------------------------<  97  4.0   |  24  |  0.88    Ca    8.2<L>      06 Apr 2022 07:15  Phos  3.5     04-06  Mg     1.7     04-06                COVID-19 PCR: NotDetec (04 Apr 2022 05:58)  COVID-19 PCR: NotDetec (01 Apr 2022 18:49)  COVID-19 PCR: NotDetec (27 Mar 2022 06:46)  COVID-19 PCR: NotDetec (21 Mar 2022 06:40)  COVID-19 PCR: NotDetec (15 Mar 2022 03:52)  COVID-19 PCR: NotDetec (08 Mar 2022 08:47)           Patient is a 89y old  Female who presents with a chief complaint of small bowel obstruction     SUBJECTIVE / OVERNIGHT EVENTS: events noted. Pt c/o constipation, asking about enema for today. Asvised her that her TSH is elevated which may contribute to her constipation, fatigue and anemia, will discuss with Medicine Team      MEDICATIONS (STANDING):  anastrozole 1 milliGRAM(s) Oral daily  dextrose 50% Injectable 12.5 Gram(s) IV Push once  digoxin     Tablet 125 MICROGram(s) Oral daily  metoprolol succinate ER 25 milliGRAM(s) Oral daily  mirtazapine 15 milliGRAM(s) Oral at bedtime  pantoprazole    Tablet 40 milliGRAM(s) Oral before breakfast  polyethylene glycol 3350 17 Gram(s) Oral daily  senna 2 Tablet(s) Oral at bedtime    MEDICATIONS  (PRN):  acetaminophen    Suspension .. 650 milliGRAM(s) Oral every 6 hours PRN Temp greater or equal to 38C (100.4F), Mild Pain (1 - 3)  ondansetron Injectable 4 milliGRAM(s) IV Push every 6 hours PRN Nausea and/or Vomiting  sodium chloride 0.9% lock flush 10 milliLiter(s) IV Push every 1 hour PRN Pre/post blood products, medications, blood draw, and to maintain line patency    PHYSICAL EXAM:  Vital Signs Last 24 Hrs  T(C): 36.4 (06 Apr 2022 13:21), Max: 36.8 (05 Apr 2022 23:45)  T(F): 97.5 (06 Apr 2022 13:21), Max: 98.2 (05 Apr 2022 23:45)  HR: 98 (06 Apr 2022 13:21) (89 - 101)  BP: 115/75 (06 Apr 2022 13:21) (100/64 - 115/75)  BP(mean): 75 (06 Apr 2022 03:00) (75 - 77)  RR: 20 (06 Apr 2022 13:21) (17 - 20)  SpO2: 94% (06 Apr 2022 13:21) (94% - 100%)      GEN: NAD; A and O x 3, slender, OOB to chair  LUNGS: CTA B/L  HEART: S1 S2  ABDOMEN: soft, non-tender, non-distended, slightly tense, + BS  EXTREMITIES: + B/L LE pitting edema, non-erythematous, non-tender, no warmth  NERVOUS SYSTEM:  Awake and alert; no focal neuro deficits        LABS:                        8.2    36.06 )-----------( 208      ( 06 Apr 2022 07:15 )             25.6     04-06    137  |  104  |  19<H>  ----------------------------<  97  4.0   |  24  |  0.88    Ca    8.2<L>      06 Apr 2022 07:15  Phos  3.5     04-06  Mg     1.7     04-06                COVID-19 PCR: NotDetec (04 Apr 2022 05:58)  COVID-19 PCR: NotDetec (01 Apr 2022 18:49)  COVID-19 PCR: NotDetec (27 Mar 2022 06:46)  COVID-19 PCR: NotDetec (21 Mar 2022 06:40)  COVID-19 PCR: NotDetec (15 Mar 2022 03:52)  COVID-19 PCR: NotDetec (08 Mar 2022 08:47)

## 2022-04-06 NOTE — PROGRESS NOTE ADULT - PROBLEM SELECTOR PLAN 9
Dispo to [facility]  COVID neg 4/4  CM following Dispo to [facility]  COVID neg 4/4  CM following  f/u tolerating soft diet

## 2022-04-06 NOTE — PROGRESS NOTE ADULT - PROBLEM SELECTOR PLAN 1
s/p ileocolic bypass  started on soft diet with Ensure Enlive  Continue Bowel regimen: miralax, senna and daily enemas  Continue antiemetics   Continue pain control tylenol PRN  TPN discontinued  PICC removed 4/01 TWE for constipation  Surgery following

## 2022-04-06 NOTE — PROGRESS NOTE ADULT - PROBLEM SELECTOR PROBLEM 4
Pleural effusion
Debility
Debility
Severe protein-calorie malnutrition
Anemia of chronic disease
Anemia of chronic disease

## 2022-04-06 NOTE — PROGRESS NOTE ADULT - ASSESSMENT
88 year old female with PMH of Afib on metoprolol, uterine CA s/p radiation and hysterectomy, breast cancer s/p chemo and bilateral mastectomy presents to the ED complaining of lower abdominal pain with nausea and vomiting.   Patient was found to have small bowel obstruction and small right inguinal Hernia. Patient had NGT placed and was admitted for conservative management. Hernia was reduced. Surgery was offered and recommended to the patient but patient declined initially due to concerns about comorbidities; however given delayed improvement, pt was taken to the OR and underwent diagnostic laparoscopy where she was found to have RLQ ileocolic adhesion with inguinal hernia; therefore underwent IHR and also extensive lysis of adhesion in which could not be safely fully mobilized so ileocolic bypass was performed. Pt postoperatively was medically unstable with hypotension and afib with RVR, transferred to ICU for further management. Pt was resuscitated with fluid and BP support, rate controlled with digoxin/betablockers. Once pt became optimized, was downgraded to med/surg. Cardiology and medicine kept following for MERCEDEZ and pleural effusion due to fluid shift, resolved with medical management. 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 Over the course, pt waxed and waned in terms of bowel function recovery, requiring prolonged nutrition, s/p picc line. Pt fully tolerated clear liquid diet with return of bowel function at the time of disposition. Pt had urinary retention, wilson catheter now removed, Passed TOV on 4/3.   Pt and  daughter decided against comfort care. They would like to transition back to full care but continue DNR/DNI  PT evaluated pt and recommended [facility], referrals made by CM, and pt discharged to [facility] with follow up instructions.     Pt is aox3, afebrile c/o constipation and mild RLQ abdominal pain now improved. Hgb improved from 6.5 to 8.2 s/p 1 unit prbc. Pt is to also continue daily enemas today. Denies any nausea, vomiting or diarrhea.      88 year old female with PMH of Afib on metoprolol, uterine CA s/p radiation and hysterectomy, breast cancer s/p chemo and bilateral mastectomy presents to the ED complaining of lower abdominal pain with nausea and vomiting.   Patient was found to have small bowel obstruction and small right inguinal Hernia. Patient had NGT placed and was admitted for conservative management. Hernia was reduced. Surgery was offered and recommended to the patient but patient declined initially due to concerns about comorbidities; however given delayed improvement, pt was taken to the OR and underwent diagnostic laparoscopy where she was found to have RLQ ileocolic adhesion with inguinal hernia; therefore underwent IHR and also extensive lysis of adhesion in which could not be safely fully mobilized so ileocolic bypass was performed. Pt postoperatively was medically unstable with hypotension and afib with RVR, transferred to ICU for further management. Pt was resuscitated with fluid and BP support, rate controlled with digoxin/betablockers. Once pt became optimized, was downgraded to med/surg. Cardiology and medicine kept following for MERCEDEZ and pleural effusion due to fluid shift, resolved with medical management. 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 Over the course, pt waxed and waned in terms of bowel function recovery, requiring prolonged nutrition, s/p picc line. Pt fully tolerated clear liquid diet with return of bowel function at the time of disposition. Pt had urinary retention, wilson catheter now removed, Passed TOV on 4/3. [facility] consulted for leukocytosis.   Pt and daughter decided against comfort care. They would like to transition back to full care but continue DNR/DNI  PT evaluated pt and recommended [facility], referrals made by CM, and pt discharged to [facility] with follow up instructions.     Pt is aox3, afebrile c/o constipation and mild RLQ abdominal pain now improved. Hgb improved from 6.5 to 8.2 s/p 1 unit prbc. Pt is to also continue daily enemas today. Denies any nausea, vomiting or diarrhea.

## 2022-04-06 NOTE — PROGRESS NOTE ADULT - PROBLEM SELECTOR PROBLEM 5
Palliative care encounter
Pleural effusion
Pleural effusion
No
Debility
Palliative care encounter
Pleural effusion

## 2022-04-06 NOTE — PROGRESS NOTE ADULT - NS ATTEND AMEND GEN_ALL_CORE FT
I have examined the patient at bedside and reviewed patient's data and participated in the management of the patient along with Kinza ZAZUETA as well as hemotology/med oncology faculty consisting of Dr. Edwin Holguin, Dr. GARCÍA Massey, Dr. PETRA Dean, Dr. Oralia Cruz, Dr. Ashley Howard, Dr. Clayton Aguirre as well as myself during the daily heme/onc case review. I reviewed pertinent clinical information, PE,  labs as well as A/P as outline above.     Feels better after tx 2 u PRBC To continue monitoring and tx Hgb <7 I have examined the patient at bedside and reviewed patient's data and participated in the management of the patient along with Kinza ZAZUETA as well as hemotology/med oncology faculty consisting of Dr. Edwin Holguin, Dr. GARCÍA Massey, Dr. PETRA Dean, Dr. Oralia Cruz, Dr. Ashley Howard, Dr. Clayton Aguirre as well as myself during the daily heme/onc case review. I reviewed pertinent clinical information, PE,  labs as well as A/P as outline above.     Feels better after tx 2 u PRBC To continue monitoring and tx Hgb <7. No evidence of hemolysis

## 2022-04-06 NOTE — PROGRESS NOTE ADULT - ASSESSMENT
complete note to follow   Assessment and Recommendation:   · Assessment	  88 year old female with PMH of Afib on metoprolol, uterine CA s/p radiation and hysterectomy, breast cancer s/p chemo and bilateral mastectomy presents to the ED complaining of lower abdominal pain with nausea and vomiting. She thinks her symptoms are from lactulose or from a chicken sandwich she has prior to onset of symptoms. Patient states that she suffers from constipation and takes multiple different over the counter the drugs, she has been on lactulose for the past 2 days from her PMD and states she is still passing stool which is well formed. States she used to be on a blood thinner, but she had too much bleeding so it was stopped. Denies chest pain, worsening shortness of breath, dysuria or difficulty urinating.    #CMML  #Anemia  s/p SBO requiring ileocolic bypass on 3/11  pt states she follows with primary Oncologist Dr. Sampson at [facility] for Hx Uterine CA and Breast CA, s/p chemo/RT and B/L mastectomy. 02/2022 had a BMBx which showed CMML not on active tx  on admit WBC was normal and Hgb=9.8  now with leukocytosis WBC=39k with 20% monocytes; however is trending down, afebrile  Hgb today 6.5 with normal mcv  developed sepsis post-op, completed abx  Albumin 3.7 on admit, now 1.7, s/p TPN now discontinued  Rec's:  -Hgb improved s/p transfusion PRBC  -LE edema multifactorial, profound anemia, hypoalbuminemia  -PRBC transfusion if Hgb <7.0 or symptomatic  -Daily CBC  -left message for pt's primary Oncologist for addt'l info, await return call  -continue arimidex  -bowel regimen for constipation, enema planned for today  -Elevated TSH=7.6, mgmt as per Medicine Team  -B12/folate/retic/iron studies nl  -LDH/hapto not c/w with hemolysis and Malka (-)    Thank you for the referral. Will continue to monitor the patient.  Please call with any questions [phone]  Above reviewed with Attending Dr. Matamoros  [facility]  [address], [address], [place], [place]  [phone]  *Note not finalized until signed by Attending Physician

## 2022-04-06 NOTE — PROGRESS NOTE ADULT - PROBLEM SELECTOR PROBLEM 2
Chronic atrial fibrillation with RVR
Chronic atrial fibrillation with RVR
Dyspnea
Dyspnea
Chronic atrial fibrillation with RVR
Chronic atrial fibrillation with RVR

## 2022-04-06 NOTE — PROGRESS NOTE ADULT - PROBLEM SELECTOR PLAN 3
Clinical evidence indicates that the patient has Severe protein calorie malnutrition/ 3rd degree    In context of     Acute Illness/Injury (>7days)      Energy/Food intake <50% of estimated energy requirement >5 days  Weight loss: Moderate - severe (lbs lost recently)   Fluid Accumulation: Severe (Fluid overload, ascites, pleural effusions)   Strength: weakened severe (bedbound)    Recommend:   Diet as tolerated, advanced to soft per surgery team    nutritional supplements as tolerated, nutrition following    Pt/daughter do not want feeding tubes or any further TPN    Continue remeron 15 qhs which may aid mood, appetite and insomnia.
leukocytosis in setting of CMML  afebrile, no s/s infection  Continue antiemetics   Outpt f/u with Heme/Onc
Clinical evidence indicates that the patient has Severe protein calorie malnutrition/ 3rd degree    In context of     Acute Illness/Injury (>7days)      Energy/Food intake <50% of estimated energy requirement >5 days  Weight loss: Moderate - severe (lbs lost recently)   Fluid Accumulation: Severe (Fluid overload, ascites, pleural effusions)   Strength: weakened severe (bedbound)    Recommend:   pleasure feeds as tolerated    nutritional supplements as tolerated, nutrition consult    Pt/daughter do not want feeding tubes or any further TPN    trial of remeron 15 qhs which may aid mood, appetite and insomnia.
leukocytosis  in setting of CMML   patient to follow-up outpatient with hematologist  WBC uptrending - per discussion with palliative, pt is comfort measures only
leukocytosis in setting of CMML  afebrile, no s/s infection  Continue antiemetics   Continue arimidex  Outpt f/u with Heme/Onc [facility] Dr. Anderson
leukocytosis in setting of CMML  afebrile, no s/s infection  Continue antiemetics   Outpt f/u with Heme/Onc

## 2022-04-06 NOTE — PROGRESS NOTE ADULT - NS ATTEND OPT1A GEN_ALL_CORE
History/Exam/Medical decision making
Medical decision making
Medical decision making

## 2022-04-07 NOTE — PROGRESS NOTE ADULT - NS ATTEND OPT1 GEN_ALL_CORE
I independently performed the documented:
I attest my time as attending is greater than 50% of the total combined time spent on qualifying patient care activities by the PA/NP and attending.
I independently performed the documented:
I attest my time as attending is greater than 50% of the total combined time spent on qualifying patient care activities by the PA/NP and attending.
I attest my time as attending is greater than 50% of the total combined time spent on qualifying patient care activities by the PA/NP and attending.
I independently performed the documented:
I attest my time as attending is greater than 50% of the total combined time spent on qualifying patient care activities by the PA/NP and attending.
I independently performed the documented:
I attest my time as attending is greater than 50% of the total combined time spent on qualifying patient care activities by the PA/NP and attending.
I attest my time as attending is greater than 50% of the total combined time spent on qualifying patient care activities by the PA/NP and attending.
I independently performed the documented:
I independently performed the documented:

## 2022-04-07 NOTE — PROGRESS NOTE ADULT - NUTRITIONAL ASSESSMENT
This patient has been assessed with a concern for Malnutrition and has been determined to have a diagnosis/diagnoses of Severe protein-calorie malnutrition.    This patient is being managed with:   Diet Full Liquid-  Consistent Carbohydrate {Evening Snacks}  Supplement Feeding Modality:  Oral  Ensure Enlive Cans or Servings Per Day:  1       Frequency:  Two Times a day  Entered: Apr 1 2022 11:31AM      This patient has been assessed with a concern for Malnutrition and has been determined to have a diagnosis/diagnoses of Severe protein-calorie malnutrition.    This patient is being managed with:   Diet Full Liquid-  Consistent Carbohydrate {Evening Snacks}  Supplement Feeding Modality:  Oral  Ensure Enlive Cans or Servings Per Day:  1       Frequency:  Two Times a day  Entered: Apr 1 2022 11:31AM    
This patient has been assessed with a concern for Malnutrition and has been determined to have a diagnosis/diagnoses of Severe protein-calorie malnutrition.    This patient is being managed with:   Diet Soft and Bite Sized-  Fiber/Residue Restricted  Supplement Feeding Modality:  Oral  Ensure Enlive Cans or Servings Per Day:  3       Frequency:  Three Times a day  Entered: Apr 6 2022 12:44PM    
This patient has been assessed with a concern for Malnutrition and has been determined to have a diagnosis/diagnoses of Severe protein-calorie malnutrition.    This patient is being managed with:   Diet Full Liquid-  Consistent Carbohydrate {Evening Snacks}  Supplement Feeding Modality:  Oral  Ensure Enlive Cans or Servings Per Day:  1       Frequency:  Two Times a day  Entered: Apr 1 2022 11:31AM    
This patient has been assessed with a concern for Malnutrition and has been determined to have a diagnosis/diagnoses of Severe protein-calorie malnutrition.    This patient is being managed with:   Diet Full Liquid-  Consistent Carbohydrate {Evening Snacks}  Supplement Feeding Modality:  Oral  Ensure Enlive Cans or Servings Per Day:  1       Frequency:  Two Times a day  Entered: Apr 1 2022 11:31AM    
This patient has been assessed with a concern for Malnutrition and has been determined to have a diagnosis/diagnoses of Severe protein-calorie malnutrition.    This patient is being managed with:   Parenteral Nutrition - Adult-  Entered: Mar 29 2022 10:00PM    fat emulsion (Fish Oil and Plant Based) 20% Infusion-[Known as SMOFLIPID 20% Infusion]  50 Gram(s) in IV Solution 250 milliLiter(s) infuse at 5.21 mL/Hr  Dose Rate: 0.3817 Gm/kG/Day Infuse Over: 24 Hours; Stop After 24 Hours  Administration Instructions: Use 1.2 micron in-line filter  Entered: Mar 29 2022 10:00PM    Diet Clear Liquid-  Supplement Feeding Modality:  Oral  Ensure Clear Cans or Servings Per Day:  1       Frequency:  Three Times a day  Entered: Mar 26 2022 11:37AM    
This patient has been assessed with a concern for Malnutrition and has been determined to have a diagnosis/diagnoses of Severe protein-calorie malnutrition.    This patient is being managed with:   Diet Full Liquid-  Consistent Carbohydrate {Evening Snacks}  Supplement Feeding Modality:  Oral  Ensure Enlive Cans or Servings Per Day:  1       Frequency:  Two Times a day  Entered: Apr 1 2022 11:31AM    
This patient has been assessed with a concern for Malnutrition and has been determined to have a diagnosis/diagnoses of Severe protein-calorie malnutrition.    This patient is being managed with:   Parenteral Nutrition - Adult-  Entered: Mar 29 2022 10:00PM    fat emulsion (Fish Oil and Plant Based) 20% Infusion-[Known as SMOFLIPID 20% Infusion]  50 Gram(s) in IV Solution 250 milliLiter(s) infuse at 5.21 mL/Hr  Dose Rate: 0.3817 Gm/kG/Day Infuse Over: 24 Hours; Stop After 24 Hours  Administration Instructions: Use 1.2 micron in-line filter  Entered: Mar 29 2022 10:00PM    Diet Clear Liquid-  Supplement Feeding Modality:  Oral  Ensure Clear Cans or Servings Per Day:  1       Frequency:  Three Times a day  Entered: Mar 26 2022 11:37AM    
This patient has been assessed with a concern for Malnutrition and has been determined to have a diagnosis/diagnoses of Severe protein-calorie malnutrition.    This patient is being managed with:   Parenteral Nutrition - Adult-  Entered: Mar 31 2022 10:00PM    fat emulsion (Fish Oil and Plant Based) 20% Infusion-[Known as SMOFLIPID 20% Infusion]  50 Gram(s) in IV Solution 250 milliLiter(s) infuse at 20.8 mL/Hr  Dose Rate: 0.7634 Gm/kG/Day Infuse Over: 12 Hours; Stop After 12 Hours  Administration Instructions: Use 1.2 micron in-line filter  Special Instructions: cycle over 12 hrs from 10pm to 10 am the next day  OFF from 10 am -10 pm  Entered: Mar 31 2022 10:00PM    Parenteral Nutrition - Adult-  Entered: Mar 30 2022 10:00PM    fat emulsion (Fish Oil and Plant Based) 20% Infusion-[Known as SMOFLIPID 20% Infusion]  50 Gram(s) in IV Solution 250 milliLiter(s) infuse at 20.8 mL/Hr  Dose Rate: 0.7634 Gm/kG/Day Infuse Over: 12 Hours; Stop After 12 Hours  Administration Instructions: Use 1.2 micron in-line filter  Special Instructions: cycle over 12 hrs from 3/30 10pm to 3/31 10 am  OFF from 3/31 10 am -3/31 10 pm  Entered: Mar 30 2022 10:00PM    Diet Clear Liquid-  Supplement Feeding Modality:  Oral  Ensure Clear Cans or Servings Per Day:  1       Frequency:  Three Times a day  Entered: Mar 26 2022 11:37AM    
This patient has been assessed with a concern for Malnutrition and has been determined to have a diagnosis/diagnoses of Severe protein-calorie malnutrition.    This patient is being managed with:   Diet Full Liquid-  Consistent Carbohydrate {Evening Snacks}  Supplement Feeding Modality:  Oral  Ensure Enlive Cans or Servings Per Day:  1       Frequency:  Two Times a day  Entered: Apr 1 2022 11:31AM    
This patient has been assessed with a concern for Malnutrition and has been determined to have a diagnosis/diagnoses of Severe protein-calorie malnutrition.    This patient is being managed with:   Diet Soft and Bite Sized-  Fiber/Residue Restricted  Entered: Apr 6 2022 12:26PM

## 2022-04-07 NOTE — PROGRESS NOTE ADULT - PROVIDER SPECIALTY LIST ADULT
Critical Care
Internal Medicine
Surgery
Critical Care
Infectious Disease
Internal Medicine
Nutrition Support
Nutrition Support
Surgery
Heme/Onc
Heme/Onc
Infectious Disease
Internal Medicine
Nutrition Support
Surgery
Critical Care
Infectious Disease
Infectious Disease
Internal Medicine
Nutrition Support
Surgery
Internal Medicine
Surgery
Palliative Care
Internal Medicine
Palliative Care
Internal Medicine
Internal Medicine

## 2022-04-07 NOTE — PROGRESS NOTE ADULT - ASSESSMENT
#Small Bowel Obstruction s/p Ileocolic Bypass with Lysis of Adhesions  #Chronic Atrial Fibrillation  #Bilateral Pleural Effusions  #Acute Hypoxic Respiratory Failure, resolved  #CMML  #Severe Protein Calorie Malnutrition    -continue soft diet with ensure supplementation  -continue metoprolol and digoxin for A. Fib  -discharge to [facility], accepted pending transportation  -Hgb stable

## 2022-04-07 NOTE — PROGRESS NOTE ADULT - SUBJECTIVE AND OBJECTIVE BOX
Patient is a 89y old  Female who presents with a chief complaint of small bowel obstruction (06 Apr 2022 13:46)      SUBJECTIVE / OVERNIGHT EVENTS:      MEDICATIONS  (STANDING):  anastrozole 1 milliGRAM(s) Oral daily  dextrose 50% Injectable 12.5 Gram(s) IV Push once  digoxin     Tablet 125 MICROGram(s) Oral daily  metoprolol succinate ER 25 milliGRAM(s) Oral daily  mirtazapine 15 milliGRAM(s) Oral at bedtime  pantoprazole    Tablet 40 milliGRAM(s) Oral before breakfast  polyethylene glycol 3350 17 Gram(s) Oral daily  senna 2 Tablet(s) Oral at bedtime    MEDICATIONS  (PRN):  acetaminophen    Suspension .. 650 milliGRAM(s) Oral every 6 hours PRN Temp greater or equal to 38C (100.4F), Mild Pain (1 - 3)  ondansetron Injectable 4 milliGRAM(s) IV Push every 6 hours PRN Nausea and/or Vomiting  sodium chloride 0.9% lock flush 10 milliLiter(s) IV Push every 1 hour PRN Pre/post blood products, medications, blood draw, and to maintain line patency          PHYSICAL EXAM:  Vital Signs Last 24 Hrs  T(C): 36.6 (07 Apr 2022 12:56), Max: 36.6 (07 Apr 2022 05:10)  T(F): 97.8 (07 Apr 2022 12:56), Max: 97.9 (07 Apr 2022 05:10)  HR: 108 (07 Apr 2022 12:56) (91 - 110)  BP: 92/58 (07 Apr 2022 12:56) (92/58 - 105/63)  BP(mean): --  RR: 18 (07 Apr 2022 12:56) (17 - 18)  SpO2: 95% (07 Apr 2022 12:56) (94% - 100%)      LABS:                        7.6    37.94 )-----------( 199      ( 07 Apr 2022 06:05 )             23.0     04-07    135  |  103  |  20<H>  ----------------------------<  88  3.8   |  23  |  0.81    Ca    8.4      07 Apr 2022 06:05  Phos  3.5     04-06  Mg     1.7     04-06                COVID-19 PCR: NotDetec (07 Apr 2022 05:54)  COVID-19 PCR: NotDetec (04 Apr 2022 05:58)  COVID-19 PCR: NotDetec (01 Apr 2022 18:49)  COVID-19 PCR: NotDetec (27 Mar 2022 06:46)  COVID-19 PCR: NotDetec (21 Mar 2022 06:40)  COVID-19 PCR: NotDetec (15 Mar 2022 03:52)  COVID-19 PCR: NotDetec (08 Mar 2022 08:47)           Patient is a 89y old  Female who presents with a chief complaint of small bowel obstruction      SUBJECTIVE / OVERNIGHT EVENTS: events noted. Feels better, anxious to be discharged to [facility]. OOB to chair      MEDICATIONS  (STANDING):  anastrozole 1 milliGRAM(s) Oral daily  dextrose 50% Injectable 12.5 Gram(s) IV Push once  digoxin     Tablet 125 MICROGram(s) Oral daily  metoprolol succinate ER 25 milliGRAM(s) Oral daily  mirtazapine 15 milliGRAM(s) Oral at bedtime  pantoprazole    Tablet 40 milliGRAM(s) Oral before breakfast  polyethylene glycol 3350 17 Gram(s) Oral daily  senna 2 Tablet(s) Oral at bedtime    MEDICATIONS  (PRN):  acetaminophen    Suspension .. 650 milliGRAM(s) Oral every 6 hours PRN Temp greater or equal to 38C (100.4F), Mild Pain (1 - 3)  ondansetron Injectable 4 milliGRAM(s) IV Push every 6 hours PRN Nausea and/or Vomiting  sodium chloride 0.9% lock flush 10 milliLiter(s) IV Push every 1 hour PRN Pre/post blood products, medications, blood draw, and to maintain line patency      PHYSICAL EXAM:  Vital Signs Last 24 Hrs  T(C): 36.6 (07 Apr 2022 12:56), Max: 36.6 (07 Apr 2022 05:10)  T(F): 97.8 (07 Apr 2022 12:56), Max: 97.9 (07 Apr 2022 05:10)  HR: 108 (07 Apr 2022 12:56) (91 - 110)  BP: 92/58 (07 Apr 2022 12:56) (92/58 - 105/63)  BP(mean): --  RR: 18 (07 Apr 2022 12:56) (17 - 18)  SpO2: 95% (07 Apr 2022 12:56) (94% - 100%)    GEN: NAD; A and O x 3, slender, OOB to chair  LUNGS: CTA B/L  HEART: S1 S2  ABDOMEN: soft, non-tender, non-distended, + BS  EXTREMITIES: + B/L LE pitting edema, non-erythematous, non-tender, no warmth  NERVOUS SYSTEM:  Awake and alert; no focal neuro deficits    LABS:                        7.6    37.94 )-----------( 199      ( 07 Apr 2022 06:05 )             23.0     04-07    135  |  103  |  20<H>  ----------------------------<  88  3.8   |  23  |  0.81    Ca    8.4      07 Apr 2022 06:05  Phos  3.5     04-06  Mg     1.7     04-06                COVID-19 PCR: NotDetec (07 Apr 2022 05:54)  COVID-19 PCR: NotDetec (04 Apr 2022 05:58)  COVID-19 PCR: NotDetec (01 Apr 2022 18:49)  COVID-19 PCR: NotDetec (27 Mar 2022 06:46)  COVID-19 PCR: NotDetec (21 Mar 2022 06:40)  COVID-19 PCR: NotDetec (15 Mar 2022 03:52)  COVID-19 PCR: NotDetec (08 Mar 2022 08:47)

## 2022-04-07 NOTE — GOALS OF CARE CONVERSATION - ADVANCED CARE PLANNING - CONVERSATION DETAILS
Palliative care SW met with pt at bedside to provide emotional support. Pt reported that she was having a difficult time adjusting to her hospitalization. Pt shared that she was eager to be discharged to a facility for rehab, as she feels that over the past month she has found herself being less mobile and she felt she could benefit from rehabilitation. Pt reflected on her medical history and expressed feelings of optimism around her ability to return to baseline. Pt expressed feelings of appreciation around her family, sharing that her daughter has been very supportive throughout this hospitalization. Pt discussed the impact that her hospitalization has had on her wellbeing, as well as that of her families. Palliative care SW provided empathetic listening, emotional support, and normalization/validation. Pt agreed to reach out as needed.    Keegan Alfredo  [phone]
Palliative care SW met with pt, her daughter Zach, and her son-in-law Antonio at bedside. Pt deferred conversation to her daughter. Pt's daughter reported that she is having a difficult time adjusting to the pt's recent decline. Pt's daughter reflected on the pt's hospitalization, discussing her surgery, the support she received from the medical team, and her concerns moving forward. Pt's daughter explained that they want the pt to be as comfortable as possible, and don't want her to receive rehabilitation due to the extent of her illness. Pt's daughter shared that the pt is well supported by her family, grandchildren, and friends. Pt's daughter discussed the importance of the pt to their family, continuing that the pt is the only living grandparent figure that her children have. Pt's daughter reflected on the impact the pt's poor prognosis has had on herself, expressing feelings of anxiety and anticipatory grief. Pt's daughter reviewed the support provided by hospice services, as well as the settings in which hospice services can take place. Pt's daughter explained that while they would like the pt to be comfortable at home, it would be too emotionally taxing on her if her mother were to pass in her home. Pt's daughter shared that she would like the pt to receive hospice services at a LTC facility near her home on [place]. Palliative care SW provided empathic listening, emotional support, and normalization/validation. Pt's daughter agreed to reach out as needed.    Keegan Alfredo  [phone]

## 2022-04-07 NOTE — PROGRESS NOTE ADULT - SUBJECTIVE AND OBJECTIVE BOX
S: No acute overnight events. Patient tolerating soft diet. Had BM after enema yesterday.    O:  Vital Signs Last 24 Hrs  T(C): 36.6 (07 Apr 2022 12:56), Max: 36.6 (07 Apr 2022 05:10)  T(F): 97.8 (07 Apr 2022 12:56), Max: 97.9 (07 Apr 2022 05:10)  HR: 108 (07 Apr 2022 12:56) (91 - 110)  BP: 92/58 (07 Apr 2022 12:56) (92/58 - 105/63)  BP(mean): --  RR: 18 (07 Apr 2022 12:56) (17 - 18)  SpO2: 95% (07 Apr 2022 12:56) (94% - 100%)    GENERAL: NAD, well-developed  HEAD:  Atraumatic, Normocephalic  EYES: EOMI, PERRLA, conjunctiva and sclera clear  NECK: Supple, No JVD  CHEST/LUNG: Clear to auscultation bilaterally; No wheeze  HEART: irregular rate and rhythm; No murmurs, rubs, or gallops  ABDOMEN: Soft, Nontender, mildly distended; Bowel sounds present  EXTREMITIES:  2+ Peripheral Pulses, No clubbing, cyanosis, 1+ LE edema bilaterally  PSYCH: AAOx3  NEUROLOGY: non-focal  SKIN: No rashes or lesions    acetaminophen    Suspension .. 650 milliGRAM(s) Oral every 6 hours PRN  anastrozole 1 milliGRAM(s) Oral daily  dextrose 50% Injectable 12.5 Gram(s) IV Push once  digoxin     Tablet 125 MICROGram(s) Oral daily  metoprolol succinate ER 25 milliGRAM(s) Oral daily  mirtazapine 15 milliGRAM(s) Oral at bedtime  nystatin Powder 1 Application(s) Topical every 8 hours  ondansetron Injectable 4 milliGRAM(s) IV Push every 6 hours PRN  pantoprazole    Tablet 40 milliGRAM(s) Oral before breakfast  polyethylene glycol 3350 17 Gram(s) Oral daily  senna 2 Tablet(s) Oral at bedtime  sodium chloride 0.9% lock flush 10 milliLiter(s) IV Push every 1 hour PRN                            7.6    37.94 )-----------( 199      ( 07 Apr 2022 06:05 )             23.0       04-07    135  |  103  |  20<H>  ----------------------------<  88  3.8   |  23  |  0.81    Ca    8.4      07 Apr 2022 06:05  Phos  3.5     04-06  Mg     1.7     04-06

## 2022-04-07 NOTE — PROGRESS NOTE ADULT - ASSESSMENT
complete note to follow   Assessment and Plan:   · Assessment	  	  88 year old female with PMH of Afib on metoprolol, uterine CA s/p radiation and hysterectomy, breast cancer s/p chemo and bilateral mastectomy presents to the ED complaining of lower abdominal pain with nausea and vomiting. She thinks her symptoms are from lactulose or from a chicken sandwich she has prior to onset of symptoms. Patient states that she suffers from constipation and takes multiple different over the counter the drugs, she has been on lactulose for the past 2 days from her PMD and states she is still passing stool which is well formed. States she used to be on a blood thinner, but she had too much bleeding so it was stopped. Denies chest pain, worsening shortness of breath, dysuria or difficulty urinating.    #CMML  #Anemia  s/p SBO requiring ileocolic bypass on 3/11  pt states she follows with primary Oncologist Dr. Sampson at [facility] for Hx Uterine CA and Breast CA, s/p chemo/RT and B/L mastectomy. 02/2022 had a BMBx which showed CMML not on active tx  on admit WBC was normal and Hgb=9.8  leukocytosis  trending down, monocytes today 5%, afebrile  Hgb today 6.5 with normal mcv  developed sepsis post-op, completed abx  Albumin 3.7 on admit, now 1.7, s/p TPN now discontinued  Rec's:  -Hgb stable s/p transfusion PRBC  -LE edema multifactorial, profound anemia, hypoalbuminemia  -PRBC transfusion if Hgb <7.0 or symptomatic  -Daily CBC  -I spoke with pt's primary Oncologist and pt dx with early phase CMML, no tx indicated, symptom mgmt only  -continue arimidex  -bowel regimen for constipation, enema prn  -Elevated TSH=7.6, mgmt as per Medicine Team  -B12/folate/retic/iron studies nl  -LDH/hapto not c/w with hemolysis and Malka (-)  -upon d/c pt to F/U with Dr. Neal    At this time will sign-off  Thank you for the referral. Will continue to monitor the patient.  Please call with any questions [phone]  Above reviewed with Attending Dr. Matamoros  [facility]  [address], [address], [place], [place]  [phone]  *Note not finalized until signed by Attending Physician

## 2022-04-07 NOTE — PROGRESS NOTE ADULT - NS ATTEND AMEND GEN_ALL_CORE FT
I have examined the patient at bedside and reviewed patient's data and participated in the management of the patient along with Kinza ZAZUETA as well as hemotology/med oncology faculty consisting of Dr. Edwin Holguin, Dr. GARCÍA Massey, Dr. PETRA Dean, Dr. Oralia Cruz, Dr. Ashley Howard, Dr. Clayton Aguirre as well as myself during the daily heme/onc case review. I reviewed pertinent clinical information, PE,  labs as well as A/P as outline above.     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 No active treatment recommended for CMMoL

## 2022-04-22 NOTE — H&P ADULT - PROBLEM SELECTOR PLAN 4
- hx of CMML  - continue outpatient management as necessary - hx of CMML  - WBC 15.04 on admission. Per chart review, WBC has been 30-40s   - continue outpatient management as necessary

## 2022-04-22 NOTE — PROCEDURE NOTE - PROCEDURE FINDINGS AND DETAILS
20cm bard power midline inserted into patent left basilic vein under sterile conditions and ultrasound guidance.

## 2022-04-22 NOTE — H&P ADULT - ATTENDING COMMENTS
Resident history as documented below reviewed. Please see resident H&P for full details regarding the service. Patient seen and examined at the bedside.     ICU Vital Signs Last 24 Hrs  T(C): 35.6 (22 Apr 2022 12:30), Max: 35.6 (22 Apr 2022 12:30)  T(F): 96 (22 Apr 2022 12:30), Max: 96 (22 Apr 2022 12:30)  HR: 69 (22 Apr 2022 12:30) (69 - 69)  BP: 116/63 (22 Apr 2022 12:30) (116/63 - 116/63)  RR: 18 (22 Apr 2022 12:30) (18 - 18)  SpO2: 93% (22 Apr 2022 12:30) (93% - 93%)    General: Awake and alert, cooperative with exam. No acute distress.   Cardiology: Normal S1, S2. No murmurs. Irregular rhythm.   Respiratory: Lungs CTABL. No wheezes, rales, or rhonchi.   Gastrointestinal: + BS. Soft. NT. ND. No guarding, rigidity, or rebound tenderness.  Extremities: 2+ peripheral edema bilaterally.  Neurological: A+Ox3. CN 2-12 intact. No FND. Normal speech. No facial droop.   Psychiatric: Normal affect. Normal mood.     Vitals: Temp 96. Labs: WBC 15.04, Hb 7.1.     89 y/o F presented for outpt blood transfusion. We were unable to get IV access and the pt is being admitted for midline or PICC placement and blood transfusion.     1. Acute symptomatic anemia    - Hb 7.1, will trend    - Ordered H+H 2 hours post transfusion   - Type and screen ordered    - Blood consent signed and in chart    - Transfuse 2 units of PRBCs    - If Hb < 7, will transfuse PRBCs    - PICC team consulted for midline or PICC placement  - Ordered fecal occult, B12, folate, iron studies, LDH, haptoglobin, CMP     2. Leukocytosis and hypothermia   - Leukocytosis likely secondary to CML   - Ordered UA   - Warming blanket   - If pt spikes a temperature or is persistently hypothermic will pan culture     3. History of chronic a fib (not on a/c due to hx bleeding in past), breast cancer (s/p b/l mastectomy), uterine cancer (s/p hysterectomy), SBO (s/p ileocolic bypass on 3/11/21), CML  - c/w home medications     DVT ppx: SCDs   Code status: DNR/DNI (MOLST form reviewed and in chart).

## 2022-04-22 NOTE — SWALLOW BEDSIDE ASSESSMENT ADULT - ORAL PREPARATORY PHASE
mildly prolonged mastication, though complete and functional Within functional limits Decreased mastication ability

## 2022-04-22 NOTE — PATIENT PROFILE ADULT - NSPROPOAURINARYCATHETER_GEN_A_NUR
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  UA bland, urine sodium less than 10.  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 May also have been affected by sepsis/bacteremia.       3) Ascites: He has needed weekly paracenteses.  Hemoglobin further down to 7.2 today.     4) Pancytopenia:  Due to portal HTN and hypersplenism.     Plan:    Continue IV albumin today.  On midodrine.       Alpesh Cifuentes MD  [facility] - Nephrology   [phone]      Interval History     No acute issues overnight.  Abdomen is getting distended and getting slightly uncomfortable.  Getting IV albumin since yesterday.  Urine output of 1200 cc.  Creatinine slightly better.    ROS-no fever, no nausea vomiting, no chest pain.  No headache or visual changes.      Physical Exam   Temp: 97.8  F (36.6  C) Temp src: Oral BP: (!) 140/66 Pulse: 63   Resp: 16 SpO2: 94 % O2 Device: None (Room air)    Vitals:    07/08/21 0700 07/09/21 0500 07/10/21 0708   Weight: 75.4 kg (166 lb 3.2 oz) 78.1 kg (172 lb 1.6 oz) 79.1 kg (174 lb 6.4 oz)     Vital Signs with Ranges  Temp:  [97.4  F (36.3  C)-97.8  F (36.6  C)] 97.8  F (36.6  C)  Pulse:  [63-64] 63  Resp:  [16] 16  BP: (119-140)/(64-66) 140/66  SpO2:  [94 %-95 %] 94 %  I/O last 3 completed shifts:  In: 580 [P.O.:580]  Out: 1200 [Urine:1200]    GENERAL APPEARANCE: pleasant, NAD, a & o  HEENT:  Eyes/ears/nose/neck grossly normal  RESP: lungs cta bilateral  CV: RRR, nl S1/S2, no m/r/g   ABDOMEN: distended, soft, nontender  EXTREMITIES/SKIN: no rashes/lesions;  Tr edema    Medications       albumin human  12.5 g Intravenous Q8H     ampicillin  2 g Intravenous Q8H     finasteride  5 mg Oral Daily     folic acid  1 mg Oral Daily     gabapentin  200 mg Oral TID     lactulose  20 g Oral BID     magnesium  oxide  400 mg Oral Daily     miconazole   Topical BID     midodrine  10 mg Oral TID w/meals     mirtazapine  30 mg Oral At Bedtime     multivitamin w/minerals  1 tablet Oral Daily     nicotine  1 patch Transdermal Q24H     nicotine   Transdermal Q8H     pantoprazole  40 mg Oral BID     QUEtiapine  200 mg Oral At Bedtime     sodium chloride (PF)  3 mL Intracatheter Q8H     tamsulosin  0.8 mg Oral Daily     thiamine  100 mg Oral Daily     traZODone  100 mg Oral At Bedtime     vortioxetine  10 mg Oral BID       Data   BMP  Recent Labs   Lab 07/10/21  0714 07/09/21  0828 07/08/21  1057 07/07/21  0827    135 133 134   POTASSIUM 4.3 4.2 3.8 4.0   CHLORIDE 105 104 103 103   KEV 8.1* 8.5 8.1* 8.4*   CO2 24 25 23 26   BUN 32* 32* 33* 32*   CR 2.83* 2.91* 2.82* 2.84*   GLC 81 95 134* 108*     Phos@[id number](phos:4)  CBC)  Recent Labs   Lab 07/08/21  1057 07/06/21  0732 07/04/21  0809   WBC 2.9* 2.9* 3.5*   HGB 7.2* 8.4* 8.0*   HCT 22.2* 26.3* 25.7*   MCV 95 93 97   * 134* 129*     Recent Labs   Lab 07/06/21  0732   AST 21   ALT 20   ALKPHOS 181*   BILITOTAL 0.5       Attestation:   I have reviewed today's relevant vital signs, notes, medications, labs and imaging.     no

## 2022-04-22 NOTE — H&P ADULT - HISTORY OF PRESENT ILLNESS
Patient is a 89 y/o F with pmhx chronic a fib (not on a/c due to hx bleeding in past), breast cancer (s/p b/l mastectomy), uterine cancer (s/p hysterectomy), SBO (s/p ileocolic bypass on 3/11/21), CML, who presents with anemia from [facility]. The patient admits occasional dizziness upon sitting up in bed. States she was at [facility] earlier in April for blood transfusion due to anemia. The patient admits to chronic L wrist pain and generalized R leg pain as well. Is wheelchair bound at baseline with chronic LE weakness. The patient denies focal weakness, dizziness or headaches at this time, chest pain, SOB, palpitations, fevers, chills, n/v/d/c.    On admission:  - H/H 7.1/23.4

## 2022-04-22 NOTE — H&P ADULT - PROBLEM SELECTOR PLAN 7
- hold off on chemical dvt ppx in setting of anemia  - start SCDs - continue santyl ointment 1 dank bid to wound

## 2022-04-22 NOTE — H&P ADULT - ASSESSMENT
Patient is a 89 y/o F with pmhx chronic a fib (not on a/c due to hx bleeding in past), breast cancer (s/p b/l mastectomy), uterine cancer (s/p hysterectomy), SBO (s/p ileocolic bypass on 3/11/21), CML, who presents with anemia from [facility]. Admit for anemia and possible transfusion.

## 2022-04-22 NOTE — H&P ADULT - NSHPREVIEWOFSYSTEMS_GEN_ALL_CORE
REVIEW OF SYSTEMS:  Constitutional: denies fever, chills, diaphoresis  HEENT: denies sore throat, runny nose, blurry vision, double vision    Respiratory: denies SOB, RUIZ, cough, wheezing, hemoptysis  Cardiovascular: ADMITS LE edema. denies CP, palpitations   Gastrointestinal:  denies nausea, vomiting, diarrhea, constipation, abdominal pain, melena, hematochezia   Genitourinary: denies dysuria, hematuria  Skin/Breast: denies rash, hives, itching   Musculoskeletal: denies myalgias, arthritis, joint swelling, muscle weakness  Neurologic: ADMITS intermittent dizziness. denies syncope, LOC, headache, weakness

## 2022-04-22 NOTE — SWALLOW BEDSIDE ASSESSMENT ADULT - COMMENTS
Pt received upright in bed, awake and cooperative, on room air. RN from [facility] present at bedside. RN reported pt's baseline diet consists of soft solids with thin liquids and tolerates w/o difficulty. Pt was agreeable to assessment. Pt followed simple commands independently. Pt's voice was hoarse. Pt reported generalized pain, RN made aware.    No CXR has been administered. Pt's WBC is elevated, hypothermic.

## 2022-04-22 NOTE — H&P ADULT - PROBLEM SELECTOR PLAN 6
- hx of breast ca s/p mastectomy  - continue anastrozole 1mg po qd   - continue outpatient management as necessary

## 2022-04-22 NOTE — H&P ADULT - PROBLEM SELECTOR PLAN 8
- hold off on chemical dvt ppx in setting of anemia  - start SCDs    #GI ppx  - continue omeprazole 20mg po qd w pantoprazole 40mg po qd therapeutic interchange    #mood disorder  - continue escitalopram 10mg po qd and mirtazapine 15mg po qhs

## 2022-04-22 NOTE — H&P ADULT - PROBLEM SELECTOR PLAN 2
Chronic, currently rate controlled  - On tele monitoring  - Continue home metoprolol 25mg po qd with hold parameters and digoxin 125mcg po qd  - [id number] score 3 Chronic, currently rate controlled  - On tele monitoring  - Continue home metoprolol 25mg po qd with hold parameters and digoxin 125mcg po qd  - [id number] score 3  - TTE 3/2021 EF 60-65%, dilated asc aorta, will rec CT chest as outpatient   - continue to monitor LUE in setting of PICC line placement on that arm and hx ca with inc risk DVT

## 2022-04-22 NOTE — PATIENT PROFILE ADULT - FALL HARM RISK - HARM RISK INTERVENTIONS

## 2022-04-22 NOTE — H&P ADULT - PROBLEM SELECTOR PLAN 1
- per chart review, baseline hg ~11  - H/H 7.1/23.4 on admission, hemodynamics significant for T 96F rectal  - pt asymptomatic without signs or symptoms acute bleed, normal BMs  - obtain iron studies, vitamin B12, folate levels  - check FOBT   - type and screen obtained, order q72hrs while inpatient  - monitor H&H q6hrs, tranfuse hg <7 or if symptomatic - per chart review, baseline hg ~11  - H/H 7.1/23.4 on admission, hemodynamics significant for T 96F rectal  - repeat H/H 6.9/22.8  - pt asymptomatic without signs or symptoms acute bleed, normal BMs  - obtain iron studies, vitamin B12, folate levels  - check FOBT   - type and screen obtained, order q72hrs while inpatient  - monitor H&H q6hrs, tranfuse hg <7 or if symptomatic

## 2022-04-22 NOTE — H&P ADULT - NSICDXPASTMEDICALHX_GEN_ALL_CORE_FT
PAST MEDICAL HISTORY:  Chronic atrial fibrillation not on a/c d/t hx bleeding    CMML (chronic myelomonocytic leukemia)     H/O small bowel obstruction hx ileocecal bypass 3/11/2021    History of breast cancer     History of uterine cancer

## 2022-04-22 NOTE — H&P ADULT - NSHPSOCIALHISTORY_GEN_ALL_CORE
Lives: [facility]  ADLs: wheelchair dependent  Tobacco/EtOH: denies  Diet: soft, bite size

## 2022-04-22 NOTE — H&P ADULT - PROBLEM SELECTOR PLAN 3
- s/p ileocecal bypass (3/2021)  - benign abdominal exam on admission, tolerating po, continue to monitor

## 2022-04-22 NOTE — SWALLOW BEDSIDE ASSESSMENT ADULT - SWALLOW EVAL: DIAGNOSIS
Pt p/w a mild oral dysphagia when given regular solids marked by adequate retrieval and containment, prolonged mastication/manipulation and transfer requiring self-incorporated liquid wash for improvement, and adequate clearance post liquid wash. Functional oral phase when given soft and bite sized, puree, and thin liquids marked by adequate retrieval and containment, timely manipulation and transfer, and adequate clearance post swallow. Mastication of soft solids was mildly prolonged, though complete and functional. Pharyngeal phase marked by suspected timely swallow onset, +laryngeal elevation to palpation, and no overt s/sx of aspiration. Recommend soft and bite sized with thin liquids +aspiration precautions. Discussed with RN, called out to MD.

## 2022-04-22 NOTE — PATIENT PROFILE ADULT - BRAND OF COVID-19 VACCINATION
July 11, 2018    Jillianshanita Murillo  [address] [place] [id number]             [facility]  [address]  [facility] [address]  Phone: [phone] Dear Mrs. Lidia:    [facility] is committed to your overall health.  To help you get the most out of each of your visits, we will review your information to make sure you are up to date on all of your recommended tests and/or procedures.      As a new patient to Dr. Romana Gomez, we may not have your complete medical records.  She has found that your chart shows you may be due for fasting cholesterol labs, mammogram and pap smear.     If you have had any of the above done at another facility, please bring the records or information with you so that your record at [facility] will be complete.      If you are currently taking medication, please bring it with you to your appointment for review.            If you have any questions or concerns, please don't hesitate to call.    Sincerely,        Abena White LPN Clinical Care Coordinator  [facility] [address] [facility] [address]  [place] [address]  [phone] (p)   [phone] (f)      Moderna dose 1, 2, and 3

## 2022-04-22 NOTE — SWALLOW BEDSIDE ASSESSMENT ADULT - SLP PERTINENT HISTORY OF CURRENT PROBLEM
Per charting, "89 y/o F with pmhx chronic a fib (not on a/c due to hx bleeding in past), breast cancer (s/p b/l mastectomy), uterine cancer (s/p hysterectomy), SBO (s/p ileocolic bypass on 3/11/21), CML, who presents with anemia from [facility]. The patient admits occasional dizziness upon sitting up in bed. States she was at [facility] earlier in April for blood transfusion due to anemia. The patient admits to chronic L wrist pain and generalized R leg pain as well. Is wheelchair bound at baseline with chronic LE weakness. The patient denies focal weakness, dizziness or headaches at this time, chest pain, SOB, palpitations, fevers, chills, n/v/d/c."

## 2022-04-22 NOTE — H&P ADULT - NSHPPHYSICALEXAM_GEN_ALL_CORE
Physical Exam:   GENERAL: appears chronically ill, nontoxic appearing, fatigued  HEENT: head NC/AT; EOM intact, conjunctiva & sclera clear   NECK: supple, no JVD  RESPIRATORY: decreased air movement throughout but no appreciable wheezing or rales   CARDIOVASCULAR: S1&S2, irregular rhythm, regular rate, no murmurs   ABDOMEN: soft, non-tender, non-distended, + Bowel sounds x4 quadrants, no guarding, rebound or rigidity  MUSCULOSKELETAL:  +1 pitting edema LE b/l, L > R  SKIN: warm and dry, color normal, +sacral wound  NEUROLOGIC: AA&O X3, muscle strength 5/5 UE. muscle strength unable to be assessed b/l LE due to chronic weakness. no focal deficits

## 2022-04-23 NOTE — PROGRESS NOTE ADULT - SUBJECTIVE AND OBJECTIVE BOX
MEDICAL ATTENDING NOTE    Patient is a 89y old  Female who presents with a chief complaint of anemia (22 Apr 2022 13:19)      INTERVAL HPI/OVERNIGHT EVENTS: offers no new complaints today    MEDICATIONS  (STANDING):  anastrozole 1 milliGRAM(s) Oral daily  ascorbic acid 500 milliGRAM(s) Oral daily  collagenase Ointment 1 Application(s) Topical two times a day  digoxin     Tablet 125 MICROGram(s) Oral daily  escitalopram 10 milliGRAM(s) Oral daily  gabapentin 200 milliGRAM(s) Oral at bedtime  metoprolol tartrate 25 milliGRAM(s) Oral daily  mirtazapine 15 milliGRAM(s) Oral at bedtime  multivitamin 1 Tablet(s) Oral daily  nystatin Powder 1 Application(s) Topical two times a day  pantoprazole    Tablet 40 milliGRAM(s) Oral before breakfast    MEDICATIONS  (PRN):  acetaminophen     Tablet .. 650 milliGRAM(s) Oral every 6 hours PRN Temp greater or equal to 38C (100.4F), Mild Pain (1 - 3)  aluminum hydroxide/magnesium hydroxide/simethicone Suspension 30 milliLiter(s) Oral every 4 hours PRN Dyspepsia  bisacodyl Suppository 10 milliGRAM(s) Rectal daily PRN Constipation  magnesium hydroxide Suspension 15 milliLiter(s) Oral at bedtime PRN Constipation  melatonin 3 milliGRAM(s) Oral at bedtime PRN Insomnia  ondansetron Injectable 4 milliGRAM(s) IV Push every 8 hours PRN Nausea and/or Vomiting      __________________________________________________  ----------------------------------------------------------------------------------  REVIEW OF SYSTEMS: no fever, no chest pain, feels cold, no palpitations      Vital Signs Last 24 Hrs  T(C): 35.3 (23 Apr 2022 10:37), Max: 35.6 (22 Apr 2022 12:30)  T(F): 95.6 (23 Apr 2022 10:37), Max: 96 (22 Apr 2022 12:30)  HR: 67 (23 Apr 2022 05:18) (64 - 78)  BP: 131/71 (23 Apr 2022 05:18) (93/56 - 131/71)  RR: 17 (23 Apr 2022 05:18) (17 - 18)  SpO2: 92% (23 Apr 2022 05:18) (91% - 93%)    _________________  PHYSICAL EXAM:  ---------------------------  NAD; Normocephalic; frail+  LUNGS - no wheezing, fair bilateral air entry  HEART: S1 S2+   ABDOMEN: Soft, Nontender, non distended, BS+  EXTREMITIES: no cyanosis; no edema; no calf tenderness  NERVOUS SYSTEM:  Awake and alert; generalized muscle weakness    _________________________________________________  LABS:                        10.1   x     )-----------( x        ( 23 Apr 2022 09:29 )             31.6     04-23    138  |  108  |  44<H>  ----------------------------<  82  4.2   |  22  |  1.10    Ca    8.3<L>      23 Apr 2022 03:08  Phos  3.4     04-23  Mg     1.9     04-23    TPro  5.9<L>  /  Alb  1.5<L>  /  TBili  0.6  /  DBili  x   /  AST  15  /  ALT  9<L>  /  AlkPhos  86  04-23    PT/INR - ( 22 Apr 2022 14:46 )   PT: 14.8 sec;   INR: 1.26 ratio                               Plan of care was discussed with patient and daughter; all questions and concerns were addressed and care was aligned with patient's wishes.             MEDICAL ATTENDING NOTE    Patient is a 89y old  Female who presents with a chief complaint of anemia (22 Apr 2022 13:19)      INTERVAL HPI/OVERNIGHT EVENTS: offers no new complaints today    MEDICATIONS  (STANDING):  anastrozole 1 milliGRAM(s) Oral daily  ascorbic acid 500 milliGRAM(s) Oral daily  collagenase Ointment 1 Application(s) Topical two times a day  digoxin     Tablet 125 MICROGram(s) Oral daily  escitalopram 10 milliGRAM(s) Oral daily  gabapentin 200 milliGRAM(s) Oral at bedtime  metoprolol tartrate 25 milliGRAM(s) Oral daily  mirtazapine 15 milliGRAM(s) Oral at bedtime  multivitamin 1 Tablet(s) Oral daily  nystatin Powder 1 Application(s) Topical two times a day  pantoprazole    Tablet 40 milliGRAM(s) Oral before breakfast    MEDICATIONS  (PRN):  acetaminophen     Tablet .. 650 milliGRAM(s) Oral every 6 hours PRN Temp greater or equal to 38C (100.4F), Mild Pain (1 - 3)  aluminum hydroxide/magnesium hydroxide/simethicone Suspension 30 milliLiter(s) Oral every 4 hours PRN Dyspepsia  bisacodyl Suppository 10 milliGRAM(s) Rectal daily PRN Constipation  magnesium hydroxide Suspension 15 milliLiter(s) Oral at bedtime PRN Constipation  melatonin 3 milliGRAM(s) Oral at bedtime PRN Insomnia  ondansetron Injectable 4 milliGRAM(s) IV Push every 8 hours PRN Nausea and/or Vomiting      __________________________________________________  ----------------------------------------------------------------------------------  REVIEW OF SYSTEMS: no fever, no chest pain, feels cold, no palpitations      Vital Signs Last 24 Hrs  T(C): 35.3 (23 Apr 2022 10:37), Max: 35.6 (22 Apr 2022 12:30)  T(F): 95.6 (23 Apr 2022 10:37), Max: 96 (22 Apr 2022 12:30)  HR: 67 (23 Apr 2022 05:18) (64 - 78)  BP: 131/71 (23 Apr 2022 05:18) (93/56 - 131/71)  RR: 17 (23 Apr 2022 05:18) (17 - 18)  SpO2: 92% (23 Apr 2022 05:18) (91% - 93%)    _________________  PHYSICAL EXAM:  ---------------------------  NAD; Normocephalic; frail+  LUNGS - no wheezing, fair bilateral air entry  HEART: S1 S2+   ABDOMEN: Soft, Nontender, non distended, BS+  EXTREMITIES: no cyanosis; left forearm edema+; no calf tenderness  NERVOUS SYSTEM:  Awake and alert; generalized muscle weakness    _________________________________________________  LABS:                        10.1   x     )-----------( x        ( 23 Apr 2022 09:29 )             31.6     04-23    138  |  108  |  44<H>  ----------------------------<  82  4.2   |  22  |  1.10    Ca    8.3<L>      23 Apr 2022 03:08  Phos  3.4     04-23  Mg     1.9     04-23    TPro  5.9<L>  /  Alb  1.5<L>  /  TBili  0.6  /  DBili  x   /  AST  15  /  ALT  9<L>  /  AlkPhos  86  04-23    PT/INR - ( 22 Apr 2022 14:46 )   PT: 14.8 sec;   INR: 1.26 ratio                               Plan of care was discussed with patient and daughter; all questions and concerns were addressed and care was aligned with patient's wishes.

## 2022-04-23 NOTE — DIETITIAN INITIAL EVALUATION ADULT - PERTINENT MEDS FT
MEDICATIONS  (STANDING):  anastrozole 1 milliGRAM(s) Oral daily  ascorbic acid 500 milliGRAM(s) Oral daily  collagenase Ointment 1 Application(s) Topical two times a day  digoxin     Tablet 125 MICROGram(s) Oral daily  escitalopram 10 milliGRAM(s) Oral daily  gabapentin 200 milliGRAM(s) Oral at bedtime  metoprolol tartrate 25 milliGRAM(s) Oral daily  mirtazapine 15 milliGRAM(s) Oral at bedtime  multivitamin 1 Tablet(s) Oral daily  nystatin Powder 1 Application(s) Topical two times a day  pantoprazole    Tablet 40 milliGRAM(s) Oral before breakfast    MEDICATIONS  (PRN):  acetaminophen     Tablet .. 650 milliGRAM(s) Oral every 6 hours PRN Temp greater or equal to 38C (100.4F), Mild Pain (1 - 3)  aluminum hydroxide/magnesium hydroxide/simethicone Suspension 30 milliLiter(s) Oral every 4 hours PRN Dyspepsia  bisacodyl Suppository 10 milliGRAM(s) Rectal daily PRN Constipation  magnesium hydroxide Suspension 15 milliLiter(s) Oral at bedtime PRN Constipation  melatonin 3 milliGRAM(s) Oral at bedtime PRN Insomnia  ondansetron Injectable 4 milliGRAM(s) IV Push every 8 hours PRN Nausea and/or Vomiting

## 2022-04-23 NOTE — DIETITIAN INITIAL EVALUATION ADULT - REASON FOR ADMISSION
88yo Female with PMH of A-fib, CML, uterine CA, breast CA, wheelchair bound. Pt admitted from [facility] on 4/22 with Anemia likely 2/2 chronic disease.

## 2022-04-23 NOTE — DIETITIAN INITIAL EVALUATION ADULT - PROBLEM/PLAN-2
This note was completed with dictation software and grammatical errors may exist.    Referring Physician: Jennifer Black PA    PCP: Adelaida Mckoy MD      CC: low back pain    HPI:   Myron Noel is a 70 y.o. male with PMHx of HTN, HLD, DM II, GERD, and chronic pain who presents with complaint of low back pain.  He states his pain began insidiously approximately 1 year that has become progressively worse, particularly over the past 2 months. He describes his pain as an intermittent lower lumbar pain with occasional radiation down the side of his leg to his ankle (R>L). He states his pain is exacerbated by standing from a seated position as well as sitting from a standing position, and spine extension. He says his pain is alleviated by spine flexion, rest, and lying on his side. He says he takes gabapentin, Robaxin, and Percocet 5-325 mg which he states does provide relief. He has also been prescribed Voltaren gel and Lidocaine patches, however, he has been unable to fill his prescription 2/2 insurance issues. He states he has never had spine surgery nor Pain Management interventions in the past. He also denies having seen Physical Therapy for his back pain in the past.     ROS:  CONSTITUTIONAL: No fevers, chills, night sweats, wt. loss, appetite changes  SKIN: no rashes or itching  ENT: No headaches, head trauma, vision changes, or eye pain  LYMPH NODES: None noticed   CV: No chest pain, palpitations.   RESP: No shortness of breath, dyspnea on exertion, cough, wheezing, or hemoptysis  GI: No nausea, emesis, diarrhea, constipation, melena, hematochezia, pain.    : No dysuria, hematuria, urgency, or frequency   HEME: No easy bruising, bleeding problems  PSYCHIATRIC: No depression, anxiety, psychosis, hallucinations.  NEURO: No seizures, memory loss, dizziness or difficulty sleeping  MSK: lumbar joint pain      Past Medical History:   Diagnosis Date    Cataract     ou done//    Chronic kidney disease 2001     nephrectomy for obstructive stones    CKD (chronic kidney disease) stage 3, GFR 30-59 ml/min 5/12/2017    Dr. De Anda    Corneal abrasion, left     With leaf 40 yrs ago    Diabetes mellitus     Gout, chronic     Hypertension     Knee osteoarthritis 12/20/2013    Retinal detachment of left eye with multiple breaks 6/18/2018     Past Surgical History:   Procedure Laterality Date    APPENDECTOMY      CATARACT EXTRACTION  08/06/2014    od    CATARACT EXTRACTION W/  INTRAOCULAR LENS IMPLANT Left 11/5/14    ESOPHAGOGASTRODUODENOSCOPY (EGD) N/A 6/23/2015    [facility]    EXCISION-PTERYGIUM Left 10/15/2014    [facility]    EYE SURGERY      NEPHRECTOMY      phaco/pciol Left 11/5/2014    [facility] at [facility] OR    phaco/pciol  Right 8/6/2014    [facility] at [facility] OR    REPAIR, RETINAL DETACHMENT, WITH VITRECTOMY Left 4/10/2019    [facility] 1ST FLR    REPAIR, RETINAL DETACHMENT, WITH VITRECTOMY Left 3/20/2019    Per[facility] by [facility] at [facility] OR 1ST FLR    REPAIR, RETINAL DETACHMENT, WITH VITRECTOMY Left 6/20/2018    Performed by [facility] at [facility] OR 1ST FLR    SCLERAL BUCKLING Left 8/15/2018    Per[facility] by [facility] at [facility] OR 1ST FLR    TONSILLECTOMY       Family History   Problem Relation Age of Onset    Heart defect Father     Alzheimer's disease Maternal Aunt     Diabetes Maternal Grandmother     Melanoma Neg Hx     Psoriasis Neg Hx     Lupus Neg Hx     Eczema Neg Hx      Social History     Socioeconomic History    Marital status: Single     Spouse name: Not on file    Number of children: Not on file    Years of education: Not on file    Highest education level: Not on file   Occupational History    Not on file   Social Needs    Financial resource strain: Not on file    Food  "insecurity:     Worry: Not on file     Inability: Not on file    Transportation needs:     Medical: Not on file     Non-medical: Not on file   Tobacco Use    Smoking status: Former Smoker     Types: Pipe    Smokeless tobacco: Never Used   Substance and Sexual Activity    Alcohol use: No     Alcohol/week: 0.0 oz    Drug use: No    Sexual activity: Yes     Partners: Female   Lifestyle    Physical activity:     Days per week: Not on file     Minutes per session: Not on file    Stress: Not on file   Relationships    Social connections:     Talks on phone: Not on file     Gets together: Not on file     Attends Worship service: Not on file     Active member of club or organization: Not on file     Attends meetings of clubs or organizations: Not on file     Relationship status: Not on file   Other Topics Concern    Not on file   Social History Narrative    Not on file         Medications/Allergies: See med card    Vitals:    04/22/19 0943   BP: 122/71   Pulse: 62   Weight: 124.3 kg (274 lb)   Height: 5' 9" (1.753 m)   PainSc:   2   PainLoc: Back         Physical exam:    GENERAL: A and O x3, the patient appears well groomed and is in no acute distress.  Skin: No rashes or obvious lesions  HEENT: normocephalic, atraumatic  CARDIOVASCULAR:  Palpable peripheral pulses  LUNGS: easy work of breathing  ABDOMEN: soft, nontender   UPPER EXTREMITIES: Normal alignment, normal range of motion, no atrophy, no skin changes,  hair growth and nail growth normal and equal bilaterally. No swelling, no tenderness.    LOWER EXTREMITIES:  Normal alignment, normal range of motion, no atrophy, no skin changes,  hair growth and nail growth normal and equal bilaterally. No swelling, no tenderness.    LUMBAR SPINE  Lumbar spine: ROM is full with flexion with no increased pain. ROM full with extension and oblique extension, however with moderate increased pain.    KEV and FADER negative bilaterally.   Supine straight leg raise is " negative bilaterally.    Positive axial loading.   Internal and external rotation of the hip causes no increased pain on either side.  Pain to palpation over GT bursa on right.   Myofascial exam: No tenderness to palpation across lumbar paraspinous muscles.      MENTAL STATUS: normal orientation, speech, language, and fund of knowledge for social situation.  Emotional state appropriate.    CRANIAL NERVES:  II:  PERRL bilaterally,   III,IV,VI: EOMI.    V:  Facial sensation equal bilaterally  VII:  Facial motor function normal.  VIII:  Hearing equal to finger rub bilaterally  IX/X: Gag normal, palate symmetric  XI:  Shoulder shrug equal, head turn equal  XII:  Tongue midline without fasciculations      MOTOR: Tone and bulk: normal bilateral upper and lower Strength: normal   Delt Bi Tri WE WF     R 5 5 5 5 5 5   L 5 5 5 5 5 5     IP ADD ABD Quad TA Gas HAM  R 5 5 5 5 5 5 5  L 5 5 5 5 5 5 5    SENSATION: Light touch and pinprick intact bilaterally  REFLEXES: normal, symmetric, nonbrisk.  Toes down, no clonus. No hoffmans.  GAIT: Antalgic       Imaging:  MRI Lumbar Spine  FINDINGS:  Vertebral column: As seen on comparison plain films, there is extensive multilevel degenerative change including degenerative disc and facet arthropathy.  There is no fracture.  There is large anterior osteophyte formation throughout the lumbar spine.  There is marked disc space narrowing from L2-3 through the L5-S1 levels with mild disc space narrowing at the L1-2 level.  Extensive degenerative endplate signal changes most apparent at the L3-4 level where there was sclerosis on plain films.  There is 3 mm retrolisthesis of L5 on S1 with 3 mm anterolisthesis of L4 on L5.  Baseline marrow signal is normal.    Spinal canal, conus, epidural space: The spinal canal may be borderline small on a developmental basis.  The conus terminates at the level of L1 and is normal in contour and signal intensity.    Findings by level:    On the sagittal  images, there is no spinal stenosis or cord compression at the T10-11 or T11-12 levels.    T12-L1: There is facet joint arthropathy.  There is no spinal canal or significant foraminal stenosis.    L1-2: There is mild disc space narrowing.  There is a diffuse disc bulge with osteophytic ridging and there is mild-to-moderate facet joint arthropathy.  There is no significant spinal stenosis.  There is moderate right and mild left foraminal stenosis.    L2-3: There is marked disc space narrowing.  There is a diffuse disc bulge with osteophytic ridging and there is mild-to-moderate facet joint arthropathy.  There is flattening of the ventral dural sac.  There is borderline spinal stenosis with moderate to marked right and moderate left foraminal stenosis.    L3-4: There is marked disc space narrowing.  There is a moderate to marked diffuse disc bulge with osteophytic ridging and superimposed broad central disc protrusion with annular fissure.  There is marked facet joint arthropathy with ligamentum flavum thickening.  The result is severe spinal canal, left greater than right lateral recess stenosis as well as severe right and moderate to severe left foraminal stenosis.    L4-5: There is mild anterolisthesis of L4 on L5.  There is disc space narrowing, diffuse disc bulge with osteophytic ridging and superimposed broad left paracentral disc protrusion.  There is marked facet joint arthropathy with ligamentum flavum thickening.  There are facet joint effusions.  The result is severe spinal canal, left greater than right lateral recess stenosis with severe left and moderate right foraminal stenosis.    L5-S1: There is marked disc space narrowing, mild retrolisthesis of L5 on S1, moderate to marked bilateral facet joint arthropathy, diffuse disc bulge with osteophytic ridging.  There is no significant spinal stenosis but there is severe right and moderate to severe left foraminal stenosis.    Soft tissues, other: The  prevertebral soft tissues are grossly normal.  The posterior paraspinous muscles are atrophic.    Assessment:  Myron Noel is a 70 y.o. male with PMHx of HTN, HLD, DM II, GERD, and chronic pain who presents with complaint of low back pain consistent with DDD and lumbar radiculopathy.  1. DDD (degenerative disc disease), lumbar    2. Spinal stenosis of lumbar region with neurogenic claudication    3. Other spondylosis, lumbar region        Plan:  1. I have stressed the importance of physical activity and exercise to improve overall health  2. Reviewed pertinent imaging and records with patient  3. I think that the patient's back pain and radicular leg symptoms are due to degenerative disc disease and have recommended a lumbar epidural steroid injection to the Right L3-4 and L4-5 level(s).  4. Follow up after procedure    Thank you for referring this interesting patient, and I look forward to continuing to collaborate in his care.     DISPLAY PLAN FREE TEXT

## 2022-04-23 NOTE — PROGRESS NOTE ADULT - PROBLEM SELECTOR PLAN 1
Found to have hb 7 on admission- anemia likely multifactorial- malignancy, slow GI loss, nutirtional  FOBT positive- GI consulted; Continue PPI  s/p PRBC transfusion  Keep hb>8.5  Monitor CBC  Prognosis is guarded Found to have hb 7 on admission- anemia likely multifactorial- malignancy, slow GI loss, nutritional  FOBT positive- GI consulted; Continue PPI  s/p PRBC transfusion hb now 10 post 2 units  Keep hb>8.5  Monitor CBC  Prognosis is guarded

## 2022-04-23 NOTE — CONSULT NOTE ADULT - SUBJECTIVE AND OBJECTIVE BOX
Chief Complaint:  Patient is a 89y old  Female who presents with a chief complaint of 89yo Female with PMH of A-fib, CML, uterine CA, breast CA, wheelchair bound. Pt admitted from [facility] on 4/22 with Anemia likely 2/2 chronic disease.      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 The patient admits occasional dizziness upon sitting up in bed. States she was at [place] earlier in April for blood transfusion due to anemia. The patient admits to chronic L wrist pain and generalized R leg pain as well. Is wheelchair bound at baseline with chronic LE weakness. The patient denies focal weakness, dizziness or headaches at this time, chest pain, SOB, palpitations, fevers, chills, n/v/d/c.    On admission:  - H/H 7.1/23.4     (22 Apr 2022 13:19)      contrast media (iodine-based) (Unknown)  iodine (Short breath)  morphine (Unknown)  penicillins (Unknown)  strawberry (Hives)  Sulfacetamide Sodium (Rash)      acetaminophen     Tablet .. 650 milliGRAM(s) Oral every 6 hours PRN  aluminum hydroxide/magnesium hydroxide/simethicone Suspension 30 milliLiter(s) Oral every 4 hours PRN  anastrozole 1 milliGRAM(s) Oral daily  ascorbic acid 500 milliGRAM(s) Oral daily  bisacodyl Suppository 10 milliGRAM(s) Rectal daily PRN  collagenase Ointment 1 Application(s) Topical two times a day  digoxin     Tablet 125 MICROGram(s) Oral daily  escitalopram 10 milliGRAM(s) Oral daily  gabapentin 200 milliGRAM(s) Oral at bedtime  magnesium hydroxide Suspension 15 milliLiter(s) Oral at bedtime PRN  melatonin 3 milliGRAM(s) Oral at bedtime PRN  metoprolol tartrate 25 milliGRAM(s) Oral daily  mirtazapine 15 milliGRAM(s) Oral at bedtime  multivitamin 1 Tablet(s) Oral daily  nystatin Powder 1 Application(s) Topical two times a day  ondansetron Injectable 4 milliGRAM(s) IV Push every 8 hours PRN  pantoprazole    Tablet 40 milliGRAM(s) Oral before breakfast        FAMILY HISTORY:        Review of Systems:    General:  No wt loss, fevers, chills, night sweats,fatigue,   Eyes:  Good vision, no reported pain  ENT:  No sore throat, pain, runny nose, dysphagia  CV:  No pain, palpitatioins, hypo/hypertension  Resp:  No dyspnea, cough, tachypnea, wheezing  :  No pain, bleeding, incontinence, nocturia  Muscle:  No pain, weakness  Neuro:  No weakness, tingling, memory problems  Psych:  No fatigue, insomnia, mood problems, depression  Endocrine:  No polyuria, polydypsia, cold/heat intolerance  Heme:  No petechiae, ecchymosis, easy bruisability  Skin:  No rash, tattoos, scars, edema    Relevant Family History:       Relevant Social History:       Physical Exam:    Vital Signs:  Vital Signs Last 24 Hrs  T(C): 35.8 (23 Apr 2022 16:32), Max: 35.8 (23 Apr 2022 16:32)  T(F): 96.5 (23 Apr 2022 16:32), Max: 96.5 (23 Apr 2022 16:32)  HR: 61 (23 Apr 2022 13:31) (61 - 78)  BP: 105/68 (23 Apr 2022 13:31) (93/56 - 131/71)  BP(mean): --  RR: 17 (23 Apr 2022 13:31) (17 - 18)  SpO2: 91% (23 Apr 2022 13:31) (91% - 93%)  Daily Height in cm: 175.26 (23 Apr 2022 13:31)    Daily     General:  Appears stated age, well-groomed, well-nourished, no distress  HEENT:  NC/AT,  conjunctivae clear and pink, no thyromegaly, nodules, adenopathy, no JVD  Chest:  Full & symmetric excursion, no increased effort, breath sounds clear  Cardiovascular:  Regular rhythm, S1, S2, no murmur/rub/S3/S4, no abdominal bruit, no edema  Abdomen:  Soft, non-tender, non-distended, normoactive bowel sounds,  no masses ,no hepatosplenomeagaly, no signs of chronic liver disease  Extremities:  no cyanosis,clubbing or edema  Skin:  No rash/erythema/ecchymoses/petechiae/wounds/abscess/warm/dry  Neuro/Psych:  Alert, oriented, no asterixis, no tremor, no encephalopathy    Laboratory:                            10.1   x     )-----------( x        ( 23 Apr 2022 09:29 )             31.6     04-23    138  |  108  |  44<H>  ----------------------------<  82  4.2   |  22  |  1.10    Ca    8.3<L>      23 Apr 2022 03:08  Phos  3.4     04-23  Mg     1.9     04-23    TPro  5.9<L>  /  Alb  1.5<L>  /  TBili  0.6  /  DBili  x   /  AST  15  /  ALT  9<L>  /  AlkPhos  86  04-23    LIVER FUNCTIONS - ( 23 Apr 2022 03:08 )  Alb: 1.5 g/dL / Pro: 5.9 g/dL / ALK PHOS: 86 U/L / ALT: 9 U/L / AST: 15 U/L / GGT: x           PT/INR - ( 22 Apr 2022 14:46 )   PT: 14.8 sec;   INR: 1.26 ratio               Imaging:

## 2022-04-23 NOTE — PHYSICAL THERAPY INITIAL EVALUATION ADULT - ADDITIONAL COMMENTS
PTA to JANEEN, pt lived alone and used [facility] for DME. pt stated that she will be able to stay with daughter. Daughter lives in [facility] with 3 steps to enter.

## 2022-04-23 NOTE — PROGRESS NOTE ADULT - CONVERSATION DETAILS
ADVANCED CARE PLANNING NOTE:  I met with patient and discussed with patients primary care giver and designated health care proxy- daughter Zach Snow.  Discussed in details about current diagnosis of CMML, recurrent symptomatic anemia requiring PRBC transfusion and h/o multiple malignancies, treatment options, prognosis and estimated life expectancy based on current diagnosis and potential disease trajectory.   Wishes regarding extent of and possible escalation of medical care was discussed in details including Cardiac and respiratory resuscitative measures including CPR, vasopressor support, mechanical ventilation and ICU care. ADVANCED CARE PLANNING NOTE:  I met with patient and discussed with patients primary care giver and designated health care proxy- daughter Zach Snow.  Discussed in details about current diagnosis of CMML, recurrent symptomatic anemia requiring PRBC transfusion and h/o multiple malignancies, treatment options, prognosis and estimated life expectancy based on current diagnosis and potential disease trajectory.   Wishes regarding extent of and possible escalation of medical care was discussed in details including Cardiac and respiratory resuscitative measures including CPR, vasopressor support, mechanical ventilation and ICU care. Per patients daughter, patient does not want any extreme interventions and is clear that she does not want to be resuscitated or placed on a life support.   What matters to her - no suffering

## 2022-04-23 NOTE — PROGRESS NOTE ADULT - TIME BILLING
direct patient care including but not limited to reviewing chart, medications ,laboratory data, imaging reports, discussion of plan of care with consultants on the case, coordination of care with multidisciplinary team involved in the case and discussion of plan with patient.  Patient /family receptive and agreeable to plan of care and verbalized understanding the anticipated hospital course and treatment plan.    Called daughter who is aZch Snow- plan of care discussed; all questions and concerns addressed. direct patient care including but not limited to reviewing chart, medications ,laboratory data, imaging reports, discussion of plan of care with consultants on the case, coordination of care with multidisciplinary team involved in the case and discussion of plan with patient.  Patient /family receptive and agreeable to plan of care and verbalized understanding the anticipated hospital course and treatment plan.    Called daughter who is Zach Snow- plan of care discussed; all questions and concerns addressed.  Discussed with GO attending

## 2022-04-23 NOTE — CHART NOTE - NSCHARTNOTEFT_GEN_A_CORE
Called by RN for patient with temperature 94.6F  - patient admitted for symptomatic anemia with positive FOBT and  hypothermia  - patient is s/p 2 units rbcs  - will order bear hugger and blood cultures for 9am to be taken with 9am H/H (q6h h/h) Called by RN for patient with temperature 94.6F  - patient admitted for symptomatic anemia with positive FOBT and  hypothermia  - patient is s/p 2 units rbcs with H/H checks q6h  - will order bear hugger and blood cultures for 9am to be taken with 9am H/H

## 2022-04-23 NOTE — DIETITIAN INITIAL EVALUATION ADULT - OTHER INFO
GI/Intake:   -No BM documented thus far; bowel regimen ordered (Dulcolax)   -Pt reports poor intake in setting of diet consistency   -Per SLP, recommend soft and bite size diet (4/22); pt clarifies she does not experience any difficulty chewing or swallowing  -Left message for MD regarding the possibility of liberalizing diet texture as pt's current medical condition warrants increased protein-energy needs    Heme:  -Presenting with aemia likely 2/2 chronic disease  -Received x2 units PRBC    Onc:   -Hx of uterine CA, breast CA and CML  -Oral chemo ordered     Weight/Height:  -Pt states height: 5'8   -Pt states UBW: 128-130 pounds; endorses ~10 pound weight loss, time frame unclear   -Per Bed scale: 156 pounds (4/23); ? accuracy in setting of additional items on bed vs scale accuracy vs fluid retention

## 2022-04-23 NOTE — DIETITIAN INITIAL EVALUATION ADULT - ORAL INTAKE PTA/DIET HISTORY
Pt reports consuming regular textured foods at rehab facility. Poor-moderate intake in setting of food preferences at facility. Typically has a good appetite.

## 2022-04-23 NOTE — DIETITIAN INITIAL EVALUATION ADULT - ETIOLOGY
related to inadequate protein-energy intake and increased physiological demand in the setting of breast CA, uterine CA and CML

## 2022-04-23 NOTE — DIETITIAN INITIAL EVALUATION ADULT - PERTINENT LABORATORY DATA
04-23    138  |  108  |  44<H>  ----------------------------<  82  4.2   |  22  |  1.10    Ca    8.3<L>      23 Apr 2022 03:08  Phos  3.4     04-23  Mg     1.9     04-23    TPro  5.9<L>  /  Alb  1.5<L>  /  TBili  0.6  /  DBili  x   /  AST  15  /  ALT  9<L>  /  AlkPhos  86  04-23

## 2022-04-23 NOTE — DIETITIAN INITIAL EVALUATION ADULT - PHYSCIAL ASSESSMENT
Stated UBW used to physical assessment.    Overt signs of physical exam obtained as pt was c/o being uncomfortable.     Pt noted to be wheelchair bound./underweight

## 2022-04-23 NOTE — DIETITIAN INITIAL EVALUATION ADULT - ADD RECOMMEND
1) Recommend liberalizing diet to regular diet and consistency; honor all food preferences   2) Add Bay BID   3) Add Ensure x1/day - pt "wants to try"  4) Monitor PO intake, diet tolerance, weight trends, labs, GI function, and skin integrity

## 2022-04-24 NOTE — CONSULT NOTE ADULT - SUBJECTIVE AND OBJECTIVE BOX
All records reviewed.  additiional hx from pt    HPI:  88 y/o woman from [facility] for PRBC tx.  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  Labs here-->4/22 H/H 7.1/33.4, repeat wbc 15.04, hgb 6.9 hct 22.6 plts 171, post tx 2U PRBC, 4/23 wbc 14.05 Hgb 9.4 plts 132  Today wbc 15.42 hgb 9.6 plts 139  Occult blood positive, seen by GI who recommended Heme consult  eGFR 48  iron 66 TIBC 139 sat 48 Ferritin 493  haptoglobin 230       PAST MEDICAL & SURGICAL HISTORY:  History of breast cancer    History of uterine cancer    H/O small bowel obstruction  hx ileocecal bypass 3/11/2021    CMML (chronic myelomonocytic leukemia)    Chronic atrial fibrillation  not on a/c d/t hx bleeding        Review of System:  mild fatigue, no gross bleeds  in wheelchair    MEDICATIONS  (STANDING):  anastrozole 1 milliGRAM(s) Oral daily  ascorbic acid 500 milliGRAM(s) Oral daily  collagenase Ointment 1 Application(s) Topical two times a day  digoxin     Tablet 125 MICROGram(s) Oral daily  escitalopram 10 milliGRAM(s) Oral daily  gabapentin 200 milliGRAM(s) Oral at bedtime  metoprolol tartrate 25 milliGRAM(s) Oral daily  mirtazapine 15 milliGRAM(s) Oral at bedtime  multivitamin 1 Tablet(s) Oral daily  nystatin Powder 1 Application(s) Topical two times a day  pantoprazole    Tablet 40 milliGRAM(s) Oral before breakfast    MEDICATIONS  (PRN):  acetaminophen     Tablet .. 650 milliGRAM(s) Oral every 6 hours PRN Temp greater or equal to 38C (100.4F), Mild Pain (1 - 3)  aluminum hydroxide/magnesium hydroxide/simethicone Suspension 30 milliLiter(s) Oral every 4 hours PRN Dyspepsia  bisacodyl Suppository 10 milliGRAM(s) Rectal daily PRN Constipation  magnesium hydroxide Suspension 15 milliLiter(s) Oral at bedtime PRN Constipation  melatonin 3 milliGRAM(s) Oral at bedtime PRN Insomnia  ondansetron Injectable 4 milliGRAM(s) IV Push every 8 hours PRN Nausea and/or Vomiting      Allergies    contrast media (iodine-based) (Unknown)  iodine (Short breath)  morphine (Unknown)  penicillins (Unknown)  strawberry (Hives)  Sulfacetamide Sodium (Rash)    Intolerances        SOCIAL HISTORY:  negative Etoh or tobacco    FAMILY HISTORY:  not contributory    Vital Signs Last 24 Hrs  T(C): 36 (24 Apr 2022 12:21), Max: 36.7 (23 Apr 2022 21:35)  T(F): 96.8 (24 Apr 2022 12:21), Max: 98.1 (23 Apr 2022 21:35)  HR: 62 (24 Apr 2022 12:21) (62 - 82)  BP: 109/75 (24 Apr 2022 12:21) (99/63 - 109/75)  BP(mean): --  RR: 17 (24 Apr 2022 12:21) (17 - 17)  SpO2: 92% (24 Apr 2022 12:21) (92% - 92%)    PHYSICAL EXAM:      General:well developed but thin frail elderly woman sitting uop in bed,, in no acute distress  Eyes:sclera anicteric, pupils equal, EOMI  ENMT:buccal mucosa moist  Neck:supple, trachea midline  Lungs:clear, no wheeze/rhonchi  Cardiovascular:regular rate and rhythm, S1 S2  Abdomen:soft, nontender, no organomegaly present, bowel sounds normal  Neurological:alert and oriented x3, Cranial Nerves II-XII grossly intact  Skin:no increased ecchymosis/petechiae/purpura  Lymph Nodes:no palpable cervical/supraclavicular lymph nodes enlargements  Extremities:no cyanosis/clubbing/edema        LABS:                        9.6    15.42 )-----------( 139      ( 24 Apr 2022 09:46 )             30.4     04-24 @ 09:46  WBC15.42  RBC3.30 Hgb9.6 Hct30.4  MCV92.1  [id number]  Auto Neutro-- Band-- Auto Lymph-- Atypical Lymph-- Reactive Lymph-- Auto Mono-- Auto Eos-- Auto Baso--        04-23 @ 09:29  WBC--  RBC-- Hgb10.1 Hct31.6  MCV--  Plts--  Auto Neutro-- Band-- Auto Lymph-- Atypical Lymph-- Reactive Lymph-- Auto Mono-- Auto Eos-- Auto Baso--        04-23 @ 03:08  WBC14.05  RBC3.25 Hgb9.4 Hct29.5  MCV90.8  [id number]  Auto Febahg48.0 Band-- Auto Lymph5.5 Atypical Lymph-- Reactive Lymph-- Auto Mono19.4 Auto Eos0.1 Auto Baso0.2        04-22 @ 14:46  WBC15.04  RBC2.42 Hgb6.9 Hct22.8  MCV94.2  [id number]  Auto Jabgqp83.0 Band1.0 Auto Lymph7.0 Atypical Lymph-- Reactive Lymph-- Auto Mono12.0 Auto Eos0.0 Auto Baso0.0        04-22 @ 10:03  WBC--  RBC-- Hgb7.1 Hct23.4  MCV--  Plts--  Auto Neutro-- Band-- Auto Lymph-- Atypical Lymph-- Reactive Lymph-- Auto Mono-- Auto Eos-- Auto Baso--          04-24    136  |  106  |  42<H>  ----------------------------<  106<H>  4.1   |  22  |  1.10    Ca    8.7      24 Apr 2022 09:46  Phos  3.4     04-23  Mg     1.9     04-23    TPro  6.5  /  Alb  1.8<L>  /  TBili  0.5  /  DBili  0.2  /  AST  17  /  ALT  12  /  AlkPhos  94  04-24 04-22 @ 14:46  PT14.8 INR1.26  PTT--        PERIPHERAL BLOOD SMEAR REVIEW:  RBC-normocytic, normochromic, no significant anisocytosis or poikilocytosis. No rouleaux/schistocytes or increased polychromasia.  WBC-slight increased in number, mostly neutrophils but dysplastiv appearing w some large bands hyposegmented,and rare bilobed forms,, no immature or abnormal cells seen.  Platelets-adequate on smear, no platelets clumping or giant platelets.       RADIOLOGY & ADDITIONAL STUDIES:

## 2022-04-24 NOTE — CONSULT NOTE ADULT - ASSESSMENT
89 y/o woman from [facility] for PRBC tx.  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  Labs here-->4/22 H/H 7.1/33.4, repeat wbc 15.04, hgb 6.9 hct 22.6 plts 171, post tx 2U PRBC, 4/23 wbc 14.05 Hgb 9.4 plts 132  Today wbc 15.42 hgb 9.6 plts 139  Occult blood positive, seen by GI who recommended Heme consult  eGFR 48  iron 66 TIBC 139 sat 48 Ferritin 493  haptoglobin 230     -anemia-due to known bone marrow disease of Chronic Myelomonocytic Leukemia, w additional factors of renal insuff, chronic ds, blood loss  -iron studies c/w chronic disease  -will check B12, folate, also thyroid function tests(radha since nursing staff reports hypothermic temp), to r/o treatable/reversable causes. Otherwise, supportive care from heme standpoint w transfusion as clinically indicated    thank you, will follow w you  discussed w pt  discussed w Medicine 88 y/o woman from [facility] for PRBC tx.  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  Labs here-->4/22 H/H 7.1/33.4, repeat wbc 15.04, hgb 6.9 hct 22.6 plts 171, post tx 2U PRBC, 4/23 wbc 14.05 Hgb 9.4 plts 132  Today wbc 15.42 hgb 9.6 plts 139  Occult blood positive, seen by GI who recommended Heme consult  eGFR 48  iron 66 TIBC 139 sat 48 Ferritin 493  haptoglobin 230     -anemia-due to known bone marrow disease of Chronic Myelomonocytic Leukemia, w additional factors of renal insuff, chronic ds, blood loss  -iron studies c/w chronic disease  -will check B12, folate, also thyroid function tests(radha since nursing staff reports hypothermic temp), to r/o treatable/reversible causes. Otherwise, supportive care from heme standpoint w transfusion as clinically indicated    thank you, will follow w you  discussed w pt  discussed w Medicine

## 2022-04-24 NOTE — DISCHARGE NOTE PROVIDER - NSDCCPCAREPLAN_GEN_ALL_CORE_FT
PRINCIPAL DISCHARGE DIAGNOSIS  Diagnosis: Anemia  Assessment and Plan of Treatment: You were hospitalized due to symptomatic anemia and required multiple PRBC transfusion. Etiology of anemia is multifactorial in nature with your underlying comorbid conditions. You were also treated with Venofer.  You were also noted with low platelet- likely related to underlying chronic disease state,. No signs of active bleeding.   Your symptoms improved significantly post transfusion. You are being discharged back to rehab although you opted to go to a different facility where you will continue with muscle strenthening exercises and then eventually be discharged home.   Please follow up with your PCP for continued care and monitoring.      SECONDARY DISCHARGE DIAGNOSES  Diagnosis: Hypothyroidism  Assessment and Plan of Treatment: You were found to have elevated TSH and newly diagnosed with hypothyroidism. you have been started on treatmenty with Sythroid. Please fopllow up with your PCP within 1 week for continued monitoring, follow up thyroid function tets and medication adjustments as needed.      Diagnosis: Chronic atrial fibrillation  Assessment and Plan of Treatment: Your heart rate has remained relatively controlled.  Due to intermittently low BP, the dose of Metoprolol was decreased to 12.5mg instead of 25mg.  You were taken off blood thinners a few years ago due to severe bleeding and due to continued high risk of bleeding, you are not o any blood thinners at this time.    Diagnosis: Sacral wound  Assessment and Plan of Treatment: On admission to the hospital, you were found to have unstageable  sacral pressure ulcer and you were seen by wound cvcare team and treated accordingly. Wound care to continue per recommendations.    Diagnosis: Severe protein-calorie malnutrition  Assessment and Plan of Treatment: You were diagnosed with severe protein calorie malnutrition- You are strongly encouraged to continue nutritional supplements as recommended.     PRINCIPAL DISCHARGE DIAGNOSIS  Diagnosis: Anemia  Assessment and Plan of Treatment: You were hospitalized due to symptomatic anemia and required multiple PRBC transfusion. Etiology of anemia is multifactorial in nature with your underlying comorbid conditions. You were also treated with Venofer.  You were also noted with low platelet- likely related to underlying chronic disease state,. No signs of active bleeding.   Your symptoms improved significantly post transfusion. You are being discharged back to rehab although you opted to go to a different facility where you will continue with muscle strenthening exercises and then eventually be discharged home.   Please follow up with your PCP for continued care and monitoring.      SECONDARY DISCHARGE DIAGNOSES  Diagnosis: Hypothyroidism  Assessment and Plan of Treatment: You were found to have elevated TSH and newly diagnosed with hypothyroidism. you have been started on treatmenty with Sythroid. Please fopllow up with your PCP within 1 week for continued monitoring, follow up thyroid function tets and medication adjustments as needed.      Diagnosis: Chronic atrial fibrillation  Assessment and Plan of Treatment: Your heart rate has remained relatively controlled.  Due to intermittently low BP, the dose of Metoprolol was decreased to 12.5mg instead of 25mg.  You were taken off blood thinners a few years ago due to severe bleeding and due to continued high risk of bleeding, you are not o any blood thinners at this time.    Diagnosis: Sacral wound  Assessment and Plan of Treatment: On admission to the hospital, you were found to have unstageable  sacral pressure ulcer and you were seen by wound cvcare team and treated accordingly. Wound care to continue per recommendations.    Diagnosis: Severe protein-calorie malnutrition  Assessment and Plan of Treatment: You were diagnosed with severe protein calorie malnutrition- You are strongly encouraged to continue nutritional supplements as recommended.    Diagnosis: Acute UTI  Assessment and Plan of Treatment: Complete 2 more days of IV Rocephin via midline on left arm ( 4/29 and 4/30)

## 2022-04-24 NOTE — DISCHARGE NOTE PROVIDER - HOSPITAL COURSE
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 The patient admits occasional dizziness upon sitting up in bed. States she was at [place] earlier in April for blood transfusion due to anemia. The patient admits to chronic L wrist pain and generalized R leg pain as well. Is wheelchair bound at baseline with chronic LE weakness. The patient denies focal weakness, dizziness or headaches at this time, chest pain, SOB, palpitations, fevers, chills, n/v/d/c.    On admission:  - H/H 7.1/23.4     (22 Apr 2022 13:19)      ---  HOSPITAL COURSE/PERTINENT LABS/PROCEDURES PERFORMED/PENDING TESTS: Patient admitted for an anemia workup. Patient was given 2 units pRBC on 4/22, hgb hayley appropriately. FOBT was positive. However GI examined the patient and assessed that there was no GI bleed present. CT A/P was performed to rule out other sources of bleed which was ***. Iron studies revealed an anemia of chronic disease pattern , with elevated ferritin and haptoglobin, decreased transferring, and normal iron saturation. Bilirubin levels were wnl, excluding hemolytic anemias from the differential. Heme/ Onc saw the patient and assessed ****    ---  PATIENT CONDITION:  - stable    ---  PHYSICAL EXAM ON DAY OF DISCHARGE:    ---  CONSULTANTS:   GI: Dr. Hodgson  Heme/Onc:   ---  ADVANCED CARE PLANNING:  - Code status:      - MOLST completed:      [  ] NO     [  ] YES    ---  TIME SPENT:  I, the attending physician, was physically present for the key portions of the evaluation and management (E/M) service provided. 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 The patient admits occasional dizziness upon sitting up in bed. States she was at [place] earlier in April for blood transfusion due to anemia. The patient admits to chronic L wrist pain and generalized R leg pain as well. Is wheelchair bound at baseline with chronic LE weakness. The patient denies focal weakness, dizziness or headaches at this time, chest pain, SOB, palpitations, fevers, chills, n/v/d/c.    On admission:  - H/H 7.1/23.4     (22 Apr 2022 13:19)      ---  HOSPITAL COURSE/PERTINENT LABS/PROCEDURES PERFORMED/PENDING TESTS: Patient admitted for an anemia workup. Patient was given 2 units pRBC on 4/22, hgb hayley appropriately. FOBT was positive. However GI examined the patient and assessed that there was no GI bleed present. CT A/P was performed to rule out other sources of bleed which was showed no active bleed, but showed lucency of L2 concerning for pathologic fracture and questionable stricturing in the region of the signoid colon. Iron studies revealed an anemia of chronic disease pattern , with elevated ferritin and haptoglobin, decreased transferring, and normal iron saturation. Bilirubin levels were wnl, excluding hemolytic anemias from the differential. Heme/ Onc was consulted and recommended no acute intervention. GI was consulted for CT findings of possible sigmoidal stricturing which deemed unremarkable. Xray of LS was obtained to further evaluate for L2 fracture and showed _____. During hospitalization, patient developed urinary symptoms; UA was grossly positive. Urine culture was obtained and patient started on ceftriaxone. Wound care consulted for unstageable sacral ulcer and recommended santyl. PT consulted and recommended DC to [facility].     ---  PATIENT CONDITION:  - stable    ---  PHYSICAL EXAM ON DAY OF DISCHARGE:    ---  CONSULTANTS:   GI: Dr. Hodgson  Heme/Onc: Dr. Lincoln  Surgery Dr. Ireland   ID Dr. Amaya  Wound care  ---  ADVANCED CARE PLANNING:  - Code status:      - MOLST completed:      [  ] NO     [  ] YES    ---  TIME SPENT:  I, the attending physician, was physically present for the key portions of the evaluation and management (E/M) service provided. 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 The patient admits occasional dizziness upon sitting up in bed. States she was at [place] earlier in April for blood transfusion due to anemia. The patient admits to chronic L wrist pain and generalized R leg pain as well. Is wheelchair bound at baseline with chronic LE weakness. The patient denies focal weakness, dizziness or headaches at this time, chest pain, SOB, palpitations, fevers, chills, n/v/d/c.    On admission:  - H/H 7.1/23.4     (22 Apr 2022 13:19)      ---  HOSPITAL COURSE/PERTINENT LABS/PROCEDURES PERFORMED/PENDING TESTS: Patient admitted for an anemia workup. Patient was given 2 units pRBC on 4/22, hgb hayley appropriately. FOBT was positive. However GI examined the patient and assessed that there was no GI bleed present. CT A/P was performed to rule out other sources of bleed which was showed no active bleed, but showed lucency of L2 concerning for pathologic fracture and questionable stricturing in the region of the signoid colon. Iron studies revealed an anemia of chronic disease pattern , with elevated ferritin and haptoglobin, decreased transferring, and normal iron saturation. Bilirubin levels were wnl, excluding hemolytic anemias from the differential. Heme/ Onc was consulted and recommended no acute intervention. GI was consulted for CT findings of possible sigmoidal stricturing which deemed unremarkable. Xray of LS was obtained to further evaluate for L2 fracture and showed _____. During hospitalization, patient developed urinary symptoms; UA was grossly positive. Urine culture was obtained and patient started on ceftriaxone. Wound care consulted for unstageable sacral ulcer and recommended santyl. PT consulted and recommended DC to [facility].     ---  PATIENT CONDITION:  - stable but guarded    ---  PHYSICAL EXAM ON DAY OF DISCHARGE:    ---  CONSULTANTS:   GI: Dr. Hodgson  Heme/Onc: Dr. Lincoln  Surgery Dr. Ireland   ID Dr. 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 The patient admits occasional dizziness upon sitting up in bed. States she was at [place] earlier in April for blood transfusion due to anemia. The patient admits to chronic L wrist pain and generalized R leg pain as well. Is wheelchair bound at baseline with chronic LE weakness. The patient denies focal weakness, dizziness or headaches at this time, chest pain, SOB, palpitations, fevers, chills, n/v/d/c.    On admission:  - H/H 7.1/23.4     (22 Apr 2022 13:19)      ---  HOSPITAL COURSE/PERTINENT LABS/PROCEDURES PERFORMED/PENDING TESTS: Patient admitted for an anemia workup. Patient was given 2 units pRBC on 4/22, hgb hayley appropriately. FOBT was positive. However GI examined the patient and assessed that there was no GI bleed present. CT A/P was performed to rule out other sources of bleed which was showed no active bleed, but showed lucency of L2 concerning for pathologic fracture and questionable stricturing in the region of the signoid colon. Iron studies revealed an anemia of chronic disease pattern , with elevated ferritin and haptoglobin, decreased transferring, and normal iron saturation. Bilirubin levels were wnl, excluding hemolytic anemias from the differential. Heme/ Onc was consulted and recommended no acute intervention. GI was consulted for CT findings of possible sigmoidal stricturing which deemed unremarkable. Xray of LS was obtained to further evaluate for L2 fracture and showed _____. During hospitalization, patient developed urinary symptoms; UA was grossly positive. Urine culture was obtained and patient started on ceftriaxone. Wound care consulted for unstageable sacral ulcer and recommended santyl. PT consulted and recommended DC to [facility].     ---  PATIENT CONDITION:  - stable but guarded      ---  CONSULTANTS:   GI: Dr. Hodgson  Heme/Onc: Dr. Linocln  Surgery Dr. Ireland   ID Dr. Amaya  Wound care

## 2022-04-24 NOTE — PROGRESS NOTE ADULT - ATTENDING COMMENTS
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 Admitted for anemia and possible transfusion.  - Midline placed 4/23 on   - Anemia, broad ddx. S/p 2 units pRBC (Hgb 6.9 -->9.6 post transfusion). In [facility], pt was there about 2 weeks ago-> hgb ranged 6.5 - 7.0. FOBT positive, GI consulted, does not suspect active bleed. Check CT abd/pelvis, reticulocyte count. Vaginal bleed ___?   - Heme/onc consulted- Dr Duarte.  - Afib: on digoxin and metoprolol, not on AC chronically.   - Called by RN - pt has hx of b/l mastectomy. She had midline in left arm. Given this hx, RN wanted to know which arm to use for blood collection, she has discussed that in the past right arm was used for blood draw collection. For now, will use right arm for blood draw. 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 Admitted for anemia and possible transfusion.  - Midline placed 4/23 on   - Anemia, broad ddx. S/p 2 units pRBC (Hgb 6.9 -->9.6 post transfusion). In [facility], pt was there about 2 weeks ago-> hgb ranged 6.5 - 7.0. FOBT positive, GI consulted, does not suspect active bleed. Check CT abd/pelvis, reticulocyte count. Hx of hysterectomy.   - Heme/onc consulted- Dr Duarte.  - Afib: on digoxin and metoprolol, not on AC chronically.   - Called by RN - pt has hx of b/l mastectomy. She had midline in left arm. Given this hx, RN wanted to know which arm to use for blood collection, she has discussed that in the past right arm was used for blood draw collection. For now, will use right arm for blood draw. 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 Admitted for anemia and possible transfusion.  - Midline placed 4/23 on   - Anemia, broad ddx. S/p 2 units pRBC (Hgb 6.9 -->9.6 post transfusion). In [facility], pt was there about 2 weeks ago-> hgb ranged 6.5 - 7.0. FOBT positive, GI consulted, does not suspect active bleed. Hx of hysterectomy.   Check CT abd/pelvis, reticulocyte count.   - Heme/onc consulted- Dr Duarte.  - Afib: on digoxin and metoprolol, not on AC chronically.   - Called by RN - pt has hx of b/l mastectomy. She had midline in left arm. Given this hx, RN wanted to know which arm to use for blood collection, she has discussed that in the past right arm was used for blood draw collection. For now, will use right arm for blood draw.

## 2022-04-24 NOTE — PROGRESS NOTE ADULT - PROBLEM SELECTOR PLAN 1
- Found to have hb 7 on admission- anemia likely multifactorial- malignancy, slow GI loss, nutritional  - FOBT positive- GI consulted; shows no signs of GIB  - s/p 2 untis pRBC on 4/22, hgb stable  - iron studies- elevated ferritin, low tranferrin, normal iron saturation appears to be anemia of chronic disease  - hx of malignancy, will consult heme/onc  - reticulocyte count ordered  - CT A/P ordered to rule out other sources of bleed  - Keep hb>8.5  - Monitor CBC

## 2022-04-24 NOTE — DISCHARGE NOTE PROVIDER - NSDCMRMEDTOKEN_GEN_ALL_CORE_FT
anastrozole 1 mg oral tablet: 1 tab(s) orally once a day at 9:00am  Digitek 125 mcg (0.125 mg) oral tablet: 1 tab(s) orally once a day  Dulcolax Laxative 10 mg rectal suppository: 1 suppository(ies) rectal once a day, As Needed  escitalopram 10 mg oral tablet: 1 tab(s) orally once a day  Fleet Enema 19 g-7 g rectal enema: 133 milliliter(s) rectal once, As Needed  gabapentin 100 mg oral capsule: 2 cap(s) orally once a day (at bedtime)  Metoprolol Tartrate 25 mg oral tablet: 1 tab(s) orally once a day  Milk of Magnesia 8% oral suspension: 15 milliliter(s) orally once a day (at bedtime), As Needed  mirtazapine 15 mg oral tablet: 1 tab(s) orally once a day (at bedtime)  Multiple Vitamins oral capsule: 1 cap(s) orally once a day  nystatin 100,000 units/g topical powder (obsolete): Apply topically to affected area 2 times a day to b/l groin region  omeprazole 20 mg oral delayed release tablet: 1 tab(s) orally once a day  Santyl 250 units/g topical ointment: Apply topically to affected area 2 times a day  Stress Formula with Zinc oral tablet: 1 tab(s) orally once a day  Vitamin C 1000 mg oral tablet: 1 tab(s) orally once a day  zinc sulfate 66 mg oral tablet: 1 tab(s) orally once a day   anastrozole 1 mg oral tablet: 1 tab(s) orally once a day at 9:00am  cefTRIAXone: intravenously via midline once a day for 3 more days  Digitek 125 mcg (0.125 mg) oral tablet: 1 tab(s) orally once a day  Dulcolax Laxative 10 mg rectal suppository: 1 suppository(ies) rectal once a day, As Needed  escitalopram 10 mg oral tablet: 1 tab(s) orally once a day  gabapentin 100 mg oral capsule: 2 cap(s) orally once a day (at bedtime)  levothyroxine 25 mcg (0.025 mg) oral tablet: 1 tab(s) orally once a day  melatonin 3 mg oral tablet: 1 tab(s) orally once a day (at bedtime), As needed, Insomnia  Metoprolol Tartrate 25 mg oral tablet: 1 tab(s) orally once a day  Milk of Magnesia 8% oral suspension: 15 milliliter(s) orally once a day (at bedtime), As Needed  mirtazapine 15 mg oral tablet: 1 tab(s) orally once a day (at bedtime)  Multiple Vitamins oral capsule: 1 cap(s) orally once a day  nystatin 100,000 units/g topical powder: 1 application topically 2 times a day  omeprazole 20 mg oral delayed release tablet: 1 tab(s) orally once a day  polyethylene glycol 3350 oral powder for reconstitution: 17 gram(s) orally once a day  Santyl 250 units/g topical ointment: Apply topically to affected area 2 times a day  senna oral tablet: 2 tab(s) orally once a day (at bedtime)  Stress Formula with Zinc oral tablet: 1 tab(s) orally once a day  Vitamin C 1000 mg oral tablet: 1 tab(s) orally once a day  zinc sulfate 66 mg oral tablet: 1 tab(s) orally once a day   anastrozole 1 mg oral tablet: 1 tab(s) orally once a day at 9:00am  cefTRIAXone: intravenously via midline once a day for 3 more days  Digitek 125 mcg (0.125 mg) oral tablet: 1 tab(s) orally once a day  Dulcolax Laxative 10 mg rectal suppository: 1 suppository(ies) rectal once a day, As Needed  escitalopram 10 mg oral tablet: 1 tab(s) orally once a day  gabapentin 100 mg oral capsule: 2 cap(s) orally once a day (at bedtime)  levothyroxine 25 mcg (0.025 mg) oral tablet: 1 tab(s) orally once a day  melatonin 3 mg oral tablet: 1 tab(s) orally once a day (at bedtime), As needed, Insomnia  Metoprolol Tartrate 25 mg oral tablet: 1 tab(s) orally once a day  Milk of Magnesia 8% oral suspension: 15 milliliter(s) orally once a day (at bedtime), As Needed  mirtazapine 15 mg oral tablet: 1 tab(s) orally once a day (at bedtime)  Multiple Vitamins oral capsule: 1 cap(s) orally once a day  nystatin 100,000 units/g topical powder: 1 application topically 2 times a day  omeprazole 20 mg oral delayed release tablet: 1 tab(s) orally once a day  polyethylene glycol 3350 oral powder for reconstitution: 17 gram(s) orally once a day  Santyl 250 units/g topical ointment: Apply topically to affected area 2 times a day  senna oral tablet: 2 tab(s) orally once a day (at bedtime)  Vitamin C 1000 mg oral tablet: 1 tab(s) orally once a day  zinc sulfate 66 mg oral tablet: 1 tab(s) orally once a day   anastrozole 1 mg oral tablet: 1 tab(s) orally once a day at 9:00am  cefTRIAXone: intravenously via midline once a day for 2 more days  Digitek 125 mcg (0.125 mg) oral tablet: 1 tab(s) orally once a day  Dulcolax Laxative 10 mg rectal suppository: 1 suppository(ies) rectal once a day, As Needed  escitalopram 10 mg oral tablet: 1 tab(s) orally once a day  ferrous sulfate (as elemental iron) 45 mg oral tablet, extended release: 1 tab(s) orally 3 times a week on Mondays, Wednesdays and Fridays  gabapentin 100 mg oral capsule: 2 cap(s) orally once a day (at bedtime)  levothyroxine 25 mcg (0.025 mg) oral tablet: 1 tab(s) orally once a day  melatonin 3 mg oral tablet: 1 tab(s) orally once a day (at bedtime), As needed, Insomnia  Metoprolol Tartrate 25 mg oral tablet: 0.5 tab(s) orally once a day  Milk of Magnesia 8% oral suspension: 15 milliliter(s) orally once a day (at bedtime), As Needed  mirtazapine 15 mg oral tablet: 1 tab(s) orally once a day (at bedtime)  Multiple Vitamins oral capsule: 1 cap(s) orally once a day  nystatin 100,000 units/g topical powder: 1 application topically 2 times a day  omeprazole 20 mg oral delayed release tablet: 1 tab(s) orally once a day  polyethylene glycol 3350 oral powder for reconstitution: 17 gram(s) orally once a day  Santyl 250 units/g topical ointment: Apply topically to affected area 2 times a day  senna oral tablet: 2 tab(s) orally once a day (at bedtime)  Vitamin C 1000 mg oral tablet: 1 tab(s) orally once a day  zinc sulfate 66 mg oral tablet: 1 tab(s) orally once a day

## 2022-04-24 NOTE — DISCHARGE NOTE PROVIDER - NSDCFUADDINST_GEN_ALL_CORE_FT
Regular diet as tolerated  Supplement- recommend continue Bay  2 x daily and Ensure Enlive 1 can daily as tolerated

## 2022-04-24 NOTE — PROGRESS NOTE ADULT - SUBJECTIVE AND OBJECTIVE BOX
INCOMPLETE Patient is a 89y old  Female who presents with a chief complaint of anemia (24 Apr 2022 10:13)      INTERVAL HPI/OVERNIGHT EVENTS: Patient seen and examined at bedside no acute events overnight. Patient denies fevers, chills, chest pain, sob, nausea, vomiting, constipation, diarrhea. +midline L arm    MEDICATIONS  (STANDING):  anastrozole 1 milliGRAM(s) Oral daily  ascorbic acid 500 milliGRAM(s) Oral daily  collagenase Ointment 1 Application(s) Topical two times a day  digoxin     Tablet 125 MICROGram(s) Oral daily  escitalopram 10 milliGRAM(s) Oral daily  gabapentin 200 milliGRAM(s) Oral at bedtime  metoprolol tartrate 25 milliGRAM(s) Oral daily  mirtazapine 15 milliGRAM(s) Oral at bedtime  multivitamin 1 Tablet(s) Oral daily  nystatin Powder 1 Application(s) Topical two times a day  pantoprazole    Tablet 40 milliGRAM(s) Oral before breakfast    MEDICATIONS  (PRN):  acetaminophen     Tablet .. 650 milliGRAM(s) Oral every 6 hours PRN Temp greater or equal to 38C (100.4F), Mild Pain (1 - 3)  aluminum hydroxide/magnesium hydroxide/simethicone Suspension 30 milliLiter(s) Oral every 4 hours PRN Dyspepsia  bisacodyl Suppository 10 milliGRAM(s) Rectal daily PRN Constipation  magnesium hydroxide Suspension 15 milliLiter(s) Oral at bedtime PRN Constipation  melatonin 3 milliGRAM(s) Oral at bedtime PRN Insomnia  ondansetron Injectable 4 milliGRAM(s) IV Push every 8 hours PRN Nausea and/or Vomiting      Allergies    contrast media (iodine-based) (Unknown)  iodine (Short breath)  morphine (Unknown)  penicillins (Unknown)  strawberry (Hives)  Sulfacetamide Sodium (Rash)    Intolerances        REVIEW OF SYSTEMS:  CONSTITUTIONAL: No fever or chills +Fatigue  HEENT:  No headache, no sore throat  RESPIRATORY: No cough, wheezing, or shortness of breath  CARDIOVASCULAR: No chest pain, palpitations  GASTROINTESTINAL: No abd pain, nausea, vomiting, or diarrhea  GENITOURINARY: No dysuria, frequency, or hematuria  NEUROLOGICAL: no focal weakness or dizziness  MUSCULOSKELETAL: no myalgias     Vital Signs Last 24 Hrs  T(C): 36.5 (24 Apr 2022 05:49), Max: 36.7 (23 Apr 2022 21:35)  T(F): 97.7 (24 Apr 2022 05:49), Max: 98.1 (23 Apr 2022 21:35)  HR: 82 (24 Apr 2022 05:49) (61 - 82)  BP: 109/63 (24 Apr 2022 05:49) (99/63 - 109/63)  BP(mean): --  RR: 17 (24 Apr 2022 05:49) (17 - 17)  SpO2: 92% (24 Apr 2022 05:49) (91% - 92%)    PHYSICAL EXAM:  GENERAL: NAD, frail appearing  HEENT:  anicteric, moist mucous membranes, no conjunctival pallor  CHEST/LUNG:  CTA b/l, no rales, wheezes, or rhonchi  HEART:  RRR, S1, S2  ABDOMEN:  BS+, soft, nontender, nondistended  EXTREMITIES: no edema, cyanosis, or calf tenderness +midline L arm  NERVOUS SYSTEM: answers questions and follows commands appropriately    LABS:                        9.6    15.42 )-----------( 139      ( 24 Apr 2022 09:46 )             30.4     CBC Full  -  ( 24 Apr 2022 09:46 )  WBC Count : 15.42 K/uL  Hemoglobin : 9.6 g/dL  Hematocrit : 30.4 %  Platelet Count - Automated : 139 K/uL  Mean Cell Volume : 92.1 fl  Mean Cell Hemoglobin : 29.1 pg  Mean Cell Hemoglobin Concentration : 31.6 gm/dL  Auto Neutrophil # : x  Auto Lymphocyte # : x  Auto Monocyte # : x  Auto Eosinophil # : x  Auto Basophil # : x  Auto Neutrophil % : x  Auto Lymphocyte % : x  Auto Monocyte % : x  Auto Eosinophil % : x  Auto Basophil % : x    24 Apr 2022 09:46    136    |  106    |  42     ----------------------------<  106    4.1     |  22     |  1.10     Ca    8.7        24 Apr 2022 09:46    TPro  6.5    /  Alb  1.8    /  TBili  0.5    /  DBili  0.2    /  AST  17     /  ALT  12     /  AlkPhos  94     24 Apr 2022 09:46    PT/INR - ( 22 Apr 2022 14:46 )   PT: 14.8 sec;   INR: 1.26 ratio             CAPILLARY BLOOD GLUCOSE              RADIOLOGY & ADDITIONAL TESTS:    Personally reviewed.     Consultant(s) Notes Reviewed:  [x] YES  [ ] NO         Patient is a 89y old  Female who presents with a chief complaint of anemia (24 Apr 2022 10:13)      INTERVAL HPI/OVERNIGHT EVENTS: Patient seen and examined at bedside no acute events overnight. Patient denies fevers, chills, chest pain, sob, nausea, vomiting, constipation, diarrhea. +midline L arm    MEDICATIONS  (STANDING):  anastrozole 1 milliGRAM(s) Oral daily  ascorbic acid 500 milliGRAM(s) Oral daily  collagenase Ointment 1 Application(s) Topical two times a day  digoxin     Tablet 125 MICROGram(s) Oral daily  escitalopram 10 milliGRAM(s) Oral daily  gabapentin 200 milliGRAM(s) Oral at bedtime  metoprolol tartrate 25 milliGRAM(s) Oral daily  mirtazapine 15 milliGRAM(s) Oral at bedtime  multivitamin 1 Tablet(s) Oral daily  nystatin Powder 1 Application(s) Topical two times a day  pantoprazole    Tablet 40 milliGRAM(s) Oral before breakfast    MEDICATIONS  (PRN):  acetaminophen     Tablet .. 650 milliGRAM(s) Oral every 6 hours PRN Temp greater or equal to 38C (100.4F), Mild Pain (1 - 3)  aluminum hydroxide/magnesium hydroxide/simethicone Suspension 30 milliLiter(s) Oral every 4 hours PRN Dyspepsia  bisacodyl Suppository 10 milliGRAM(s) Rectal daily PRN Constipation  magnesium hydroxide Suspension 15 milliLiter(s) Oral at bedtime PRN Constipation  melatonin 3 milliGRAM(s) Oral at bedtime PRN Insomnia  ondansetron Injectable 4 milliGRAM(s) IV Push every 8 hours PRN Nausea and/or Vomiting      Allergies    contrast media (iodine-based) (Unknown)  iodine (Short breath)  morphine (Unknown)  penicillins (Unknown)  strawberry (Hives)  Sulfacetamide Sodium (Rash)    Intolerances    REVIEW OF SYSTEMS:  CONSTITUTIONAL: No fever or chills +Fatigue  HEENT:  No headache, no sore throat  RESPIRATORY: No cough, wheezing, or shortness of breath  CARDIOVASCULAR: No chest pain, palpitations  GASTROINTESTINAL: No abd pain, nausea, vomiting, or diarrhea  GENITOURINARY: No dysuria, frequency, or hematuria  NEUROLOGICAL: no focal weakness or dizziness  MUSCULOSKELETAL: no myalgias     Vital Signs Last 24 Hrs  T(C): 36.5 (24 Apr 2022 05:49), Max: 36.7 (23 Apr 2022 21:35)  T(F): 97.7 (24 Apr 2022 05:49), Max: 98.1 (23 Apr 2022 21:35)  HR: 82 (24 Apr 2022 05:49) (61 - 82)  BP: 109/63 (24 Apr 2022 05:49) (99/63 - 109/63)  BP(mean): --  RR: 17 (24 Apr 2022 05:49) (17 - 17)  SpO2: 92% (24 Apr 2022 05:49) (91% - 92%)    PHYSICAL EXAM:  GENERAL: NAD, frail appearing  HEENT:  anicteric, moist mucous membranes, no conjunctival pallor  CHEST/LUNG:  CTA b/l, no rales, wheezes, or rhonchi  HEART:  RRR, S1, S2  ABDOMEN:  BS+, soft, nontender, nondistended  EXTREMITIES: no edema, cyanosis, or calf tenderness +midline L arm  NERVOUS SYSTEM: answers questions and follows commands appropriately    LABS:                        9.6    15.42 )-----------( 139      ( 24 Apr 2022 09:46 )             30.4     CBC Full  -  ( 24 Apr 2022 09:46 )  WBC Count : 15.42 K/uL  Hemoglobin : 9.6 g/dL  Hematocrit : 30.4 %  Platelet Count - Automated : 139 K/uL  Mean Cell Volume : 92.1 fl  Mean Cell Hemoglobin : 29.1 pg  Mean Cell Hemoglobin Concentration : 31.6 gm/dL  Auto Neutrophil # : x  Auto Lymphocyte # : x  Auto Monocyte # : x  Auto Eosinophil # : x  Auto Basophil # : x  Auto Neutrophil % : x  Auto Lymphocyte % : x  Auto Monocyte % : x  Auto Eosinophil % : x  Auto Basophil % : x    24 Apr 2022 09:46    136    |  106    |  42     ----------------------------<  106    4.1     |  22     |  1.10     Ca    8.7        24 Apr 2022 09:46    TPro  6.5    /  Alb  1.8    /  TBili  0.5    /  DBili  0.2    /  AST  17     /  ALT  12     /  AlkPhos  94     24 Apr 2022 09:46    PT/INR - ( 22 Apr 2022 14:46 )   PT: 14.8 sec;   INR: 1.26 ratio             CAPILLARY BLOOD GLUCOSE              RADIOLOGY & ADDITIONAL TESTS:    Personally reviewed.     Consultant(s) Notes Reviewed:  [x] YES  [ ] NO

## 2022-04-24 NOTE — DISCHARGE NOTE PROVIDER - CARE PROVIDER_API CALL
Martín Neal  HEMATOLOGY  [address] [facility] [address], [address]  [place] [address]  Phone: [phone]  Fax: [phone]  Follow Up Time:

## 2022-04-24 NOTE — DISCHARGE NOTE PROVIDER - ATTENDING DISCHARGE PHYSICAL EXAMINATION:
n/a   MEDICAL ATTENDING DISCHARGE NOTE :    Patient is a 89y old  Female who presents with a chief complaint of anemia (27 Apr 2022 13:15)      INTERVAL HPI / OVERNIGHT EVENTS: patient with uneventful night and offers no new complaints    ----------------------------------------------------------------------------------  REVIEW OF SYSTEMS: no fever; no SOB      Vital Signs Last 24 Hrs  T(C): 35.6 (27 Apr 2022 12:14), Max: 36.4 (26 Apr 2022 22:10)  T(F): 96.1 (27 Apr 2022 12:14), Max: 97.6 (26 Apr 2022 22:10)  HR: 58 (27 Apr 2022 12:14) (58 - 60)  BP: 106/58 (27 Apr 2022 12:14) (106/58 - 110/64)  BP(mean): --  RR: 17 (27 Apr 2022 12:14) (17 - 18)  SpO2: 91% (27 Apr 2022 12:14) (91% - 93%)    _________________  PHYSICAL EXAM:  ---------------------------   NAD; Normocephalic  LUNGS - no wheezing  HEART: S1 S2+   ABDOMEN: Soft, Nontender, non distended  EXTREMITIES: no cyanosis; no edema      _________________________________________________  LABS:                        9.7    17.72 )-----------( 114      ( 27 Apr 2022 13:00 )             31.3     04-27    139  |  109<H>  |  29<H>  ----------------------------<  82  4.2   |  23  |  0.69    Ca    8.4<L>      27 Apr 2022 08:31    TPro  6.9  /  Alb  1.6<L>  /  TBili  0.5  /  DBili  x   /  AST  30  /  ALT  12  /  AlkPhos  100  04-26    A/P - ...        Plan of care, test results and findings were  discussed with patient; all questions and concerns were addressed and care was aligned with patient's wishes.  PMD follow up after discharge from the hospital for continued care and outpatient monitoring was advised.  Discharge plans was discussed with care team including the nursing staff  and unit discharge planner.             MEDICAL ATTENDING DISCHARGE NOTE :    Patient is a 89y old  Female who presents with a chief complaint of anemia (27 Apr 2022 13:15)      INTERVAL HPI / OVERNIGHT EVENTS: patient with uneventful night and offers no new complaints    ----------------------------------------------------------------------------------  REVIEW OF SYSTEMS: no fever; no SOB      Vital Signs Last 24 Hrs  T(C): 36.3 (28 Apr 2022 13:48), Max: 37.1 (28 Apr 2022 05:00)  T(F): 97.4 (28 Apr 2022 13:48), Max: 98.8 (28 Apr 2022 05:00)  HR: 71 (28 Apr 2022 13:48) (69 - 75)  BP: 101/60 (28 Apr 2022 13:48) (96/60 - 107/69)  RR: 16 (28 Apr 2022 13:48) (16 - 18)  SpO2: 90% (28 Apr 2022 13:48) (90% - 92%)    _________________  PHYSICAL EXAM:  ---------------------------   NAD; Normocephalic  LUNGS - no wheezing  HEART: S1 S2+   ABDOMEN: Soft, Nontender, non distended, BS+  EXTREMITIES: no cyanosis; no edema      _________________________________________________  LABS:                        9.7    17.72 )-----------( 114      ( 27 Apr 2022 13:00 )             31.3     04-27    139  |  109<H>  |  29<H>  ----------------------------<  82  4.2   |  23  |  0.69    Ca    8.4<L>      27 Apr 2022 08:31    TPro  6.9  /  Alb  1.6<L>  /  TBili  0.5  /  DBili  x   /  AST  30  /  ALT  12  /  AlkPhos  100  04-26    A/P - 89 F with pmhx chronic a fib (not on a/c due to hx bleeding in past), breast cancer (s/p b/l mastectomy), uterine cancer (s/p hysterectomy), 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   1. Anemia- multifactorial etiology. Hb improved after PRBC- received 3 units total and 1 dose of Venofer  2. Suspected UTI- treated with Rocephin; 2 more days to be completed in the rehab  3. Poor vein- patients has bilateral mastectomy with limited veins for blood draw, transfusions etc; a midline was placed on admission. She will be discharged with midline due to high likelihood of transfusion dependence and need for recurrent PRBC.  4. Hypothyroidism- started on Synthroid. Outpatient follow up with PCP and endocrinologist for repeat TFTs adjustments of dose as needed.  5. Thrombocytopenia- noted with low platelet- likely related to her underlying chronic disease state,. No signs of active bleeding.   6. Comorbidities- patient with h/o CMML, h/o breast cancer post bilateral mastectomy,  and h/o uterine cancer. She also has severe protein calorie malnutrition and generalized muscle weakness.  Her prognosis is poor and she remains at high risk for hospital readmission. She will benefit from hospice care but she and her daughter decline at this time. She wishes to pursue shor term rehab first.     Plan of care, test results and findings were  discussed with patient and daughter; all questions and concerns were addressed and care was aligned with patient's wishes.  PMD follow up after discharge from the hospital for continued care and outpatient monitoring was advised.  Discharge plans was discussed with care team including the nursing staff  and unit discharge planner.             MEDICAL ATTENDING DISCHARGE NOTE :    Patient is a 89y old  Female who presents with a chief complaint of anemia (27 Apr 2022 13:15)      INTERVAL HPI / OVERNIGHT EVENTS: patient with uneventful night and offers no new complaints    ----------------------------------------------------------------------------------  REVIEW OF SYSTEMS: no fever; no SOB      Vital Signs Last 24 Hrs  T(C): 36.3 (28 Apr 2022 13:48), Max: 37.1 (28 Apr 2022 05:00)  T(F): 97.4 (28 Apr 2022 13:48), Max: 98.8 (28 Apr 2022 05:00)  HR: 71 (28 Apr 2022 13:48) (69 - 75)  BP: 101/60 (28 Apr 2022 13:48) (96/60 - 107/69)  RR: 16 (28 Apr 2022 13:48) (16 - 18)  SpO2: 90% (28 Apr 2022 13:48) (90% - 92%)    _________________  PHYSICAL EXAM:  ---------------------------   NAD; Normocephalic  LUNGS - no wheezing  HEART: S1 S2+   ABDOMEN: Soft, Nontender, non distended, BS+  EXTREMITIES: no cyanosis; no edema      _________________________________________________  LABS:                        9.7    17.72 )-----------( 114      ( 27 Apr 2022 13:00 )             31.3     04-27    139  |  109<H>  |  29<H>  ----------------------------<  82  4.2   |  23  |  0.69    Ca    8.4<L>      27 Apr 2022 08:31    TPro  6.9  /  Alb  1.6<L>  /  TBili  0.5  /  DBili  x   /  AST  30  /  ALT  12  /  AlkPhos  100  04-26    A/P - 89 F with pmhx chronic a fib (not on a/c due to hx bleeding in past), breast cancer (s/p b/l mastectomy), uterine cancer (s/p hysterectomy), 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   1. Anemia- multifactorial etiology. Hb improved after PRBC- received 3 units total and 1 dose of Venofer  2. Suspected UTI- treated with Rocephin; 2 more days to be completed in the rehab  3. Poor vein- patients has bilateral mastectomy with limited veins for blood draw, transfusions etc; a midline was placed on admission. She will be discharged with midline due to high likelihood of transfusion dependence and need for recurrent PRBC.  4. Hypothyroidism- started on Synthroid. Outpatient follow up with PCP and endocrinologist for repeat TFTs adjustments of dose as needed.  5. Thrombocytopenia- noted with low platelet- likely related to her underlying chronic disease state,. No signs of active bleeding.   6. Comorbidities- patient with h/o CMML, h/o breast cancer post bilateral mastectomy,  and h/o uterine cancer. She also has severe protein calorie malnutrition and generalized muscle weakness.  Her prognosis is poor and she remains at high risk for hospital readmission. She will benefit from hospice care but she and her daughter decline at this time. She wishes to pursue shor term rehab first.     Plan of care, test results and findings were  discussed with patient and daughter; all questions and concerns were addressed and care was aligned with patient's wishes.  PMD follow up after discharge from the hospital for continued care and outpatient monitoring was advised.  Discharge plans was discussed with care team including the nursing staff  and unit discharge planner.  Discussed with patients daughter at length. She verbalized understanding that patient's overall prognosis is poor, and that she is at high risk of rehospitalization, continued clinical decline  and that patient would benefit from hospice care. She did let me know that both her and the patient have discussed and if patient declines and does not do well at the rehab, she will be open to considering hospice at that time but for now, she is opting for short term rehab.

## 2022-04-24 NOTE — PROGRESS NOTE ADULT - SUBJECTIVE AND OBJECTIVE BOX
INTERVAL HPI/OVERNIGHT EVENTS:  pt seen and examined  tolerating diet   no GI complaints       MEDICATIONS  (STANDING):  anastrozole 1 milliGRAM(s) Oral daily  ascorbic acid 500 milliGRAM(s) Oral daily  collagenase Ointment 1 Application(s) Topical two times a day  digoxin     Tablet 125 MICROGram(s) Oral daily  escitalopram 10 milliGRAM(s) Oral daily  gabapentin 200 milliGRAM(s) Oral at bedtime  metoprolol tartrate 25 milliGRAM(s) Oral daily  mirtazapine 15 milliGRAM(s) Oral at bedtime  multivitamin 1 Tablet(s) Oral daily  nystatin Powder 1 Application(s) Topical two times a day  pantoprazole    Tablet 40 milliGRAM(s) Oral before breakfast    MEDICATIONS  (PRN):  acetaminophen     Tablet .. 650 milliGRAM(s) Oral every 6 hours PRN Temp greater or equal to 38C (100.4F), Mild Pain (1 - 3)  aluminum hydroxide/magnesium hydroxide/simethicone Suspension 30 milliLiter(s) Oral every 4 hours PRN Dyspepsia  bisacodyl Suppository 10 milliGRAM(s) Rectal daily PRN Constipation  magnesium hydroxide Suspension 15 milliLiter(s) Oral at bedtime PRN Constipation  melatonin 3 milliGRAM(s) Oral at bedtime PRN Insomnia  ondansetron Injectable 4 milliGRAM(s) IV Push every 8 hours PRN Nausea and/or Vomiting      Allergies    contrast media (iodine-based) (Unknown)  iodine (Short breath)  morphine (Unknown)  penicillins (Unknown)  strawberry (Hives)  Sulfacetamide Sodium (Rash)    Intolerances      REVIEW OF SYSTEMS:  CONSTITUTIONAL: No fever or chills  HEENT:  No headache, no sore throat  RESPIRATORY: No cough, wheezing, or shortness of breath  CARDIOVASCULAR: No chest pain, palpitations, or leg swelling  GASTROINTESTINAL: No abd pain, nausea, vomiting, or diarrhea  GENITOURINARY: No dysuria, frequency, or hematuria  NEUROLOGICAL: no focal weakness or dizziness  MUSCULOSKELETAL: no myalgias         PHYSICAL EXAM:   Vital Signs:  Vital Signs Last 24 Hrs  T(C): 36.5 (24 Apr 2022 05:49), Max: 36.7 (23 Apr 2022 21:35)  T(F): 97.7 (24 Apr 2022 05:49), Max: 98.1 (23 Apr 2022 21:35)  HR: 82 (24 Apr 2022 05:49) (61 - 82)  BP: 109/63 (24 Apr 2022 05:49) (99/63 - 109/63)  BP(mean): --  RR: 17 (24 Apr 2022 05:49) (17 - 17)  SpO2: 92% (24 Apr 2022 05:49) (91% - 92%)  Daily Height in cm: 175.26 (23 Apr 2022 13:31)    Daily       PHYSICAL EXAM:  GENERAL: NAD  HEENT:  NC/AT, EOMI, moist mucous membranes  CHEST/LUNG:  CTA b/l, no rales, wheezes, or rhonchi  HEART:  RRR, S1, S2  ABDOMEN:  BS+, soft, nontender, nondistended  EXTREMITIES: no edema, cyanosis, or calf tenderness  NERVOUS SYSTEM: AA&Ox3      LABS:                        9.6    15.42 )-----------( 139      ( 24 Apr 2022 09:46 )             30.4     04-24    136  |  106  |  42<H>  ----------------------------<  106<H>  4.1   |  22  |  1.10    Ca    8.7      24 Apr 2022 09:46  Phos  3.4     04-23  Mg     1.9     04-23    TPro  6.5  /  Alb  1.8<L>  /  TBili  0.5  /  DBili  0.2  /  AST  17  /  ALT  12  /  AlkPhos  94  04-24    PT/INR - ( 22 Apr 2022 14:46 )   PT: 14.8 sec;   INR: 1.26 ratio

## 2022-04-24 NOTE — DISCHARGE NOTE PROVIDER - INSTRUCTIONS
Continue regular diet as tolerated.  continue [place] 1 pkt 2 x daily and Ensure Enlive daily as tolerated

## 2022-04-25 NOTE — PROGRESS NOTE ADULT - NSPROGADDITIONALINFOA_GEN_ALL_CORE
patient s/e  records reviewed from [facility]  doubt stricture  increase miralax bid
Hypothermia- check TSH  Warming blanket to keep at about 97  Hypoalbuminemia- nutritional supplements

## 2022-04-25 NOTE — PROGRESS NOTE ADULT - PROBLEM SELECTOR PLAN 1
- Found to have hb 7 on admission- anemia likely multifactorial- malignancy, slow GI loss, nutritional  - FOBT positive- GI consulted; shows no signs of GIB  - s/p 2 untis pRBC on 4/22, hgb stable  - iron studies- elevated ferritin, low tranferrin, normal iron saturation appears to be anemia of chronic disease  - hx of malignancy, heme/onc consulted, recs appreciated  - Reticulocytes WNL  - CT A/P: Limited evaluation of bowel given lack of oral and intravenous contrast.   Questionable stricturing in the region of the sigmoid colon where there   is diverticular disease. Thickened small bowel loops in the left   hemiabdomen. Repeat examination with oral and intravenous contrast is   recommended. L2 lucency with cortical disruption concerning for pathologic fracture.  - Pt will require premedication for imaging with contrast  - Keep hb>8.5  - Monitor CBC - Found to have hb 7 on admission- anemia likely multifactorial- malignancy, slow GI loss, nutritional  - FOBT positive- GI consulted; shows no signs of GIB  - s/p 2 untis pRBC on 4/22, hgb stable  - iron studies- elevated ferritin, low tranferrin, normal iron saturation appears to be anemia of chronic disease  - hx of malignancy, heme/onc consulted, recs appreciated  - Reticulocytes WNL  - CT A/P: Limited evaluation of bowel given lack of oral and intravenous contrast.   Questionable stricturing in the region of the sigmoid colon where there   is diverticular disease. Thickened small bowel loops in the left   hemiabdomen. Repeat examination with oral and intravenous contrast is   recommended. L2 lucency with cortical disruption concerning for pathologic fracture.  - Surgery consulted, possible gastrogaffin study, will f/u recs  - Keep hb>8.5  - Monitor CBC

## 2022-04-25 NOTE — PROGRESS NOTE ADULT - SUBJECTIVE AND OBJECTIVE BOX
[facility]  Zohaib Carlson PA-C  [facility] [place] [address]  [phone]      INTERVAL HPI/OVERNIGHT EVENTS:  Pt s/e  Pt reports she had soft BM overnight but reports abdominal discomfort  Denies further GI complaints    MEDICATIONS  (STANDING):  anastrozole 1 milliGRAM(s) Oral daily  ascorbic acid 500 milliGRAM(s) Oral daily  collagenase Ointment 1 Application(s) Topical two times a day  digoxin     Tablet 125 MICROGram(s) Oral daily  escitalopram 10 milliGRAM(s) Oral daily  gabapentin 200 milliGRAM(s) Oral at bedtime  levothyroxine 25 MICROGram(s) Oral daily  metoprolol tartrate 25 milliGRAM(s) Oral daily  mirtazapine 15 milliGRAM(s) Oral at bedtime  multivitamin 1 Tablet(s) Oral daily  nystatin Powder 1 Application(s) Topical two times a day  pantoprazole    Tablet 40 milliGRAM(s) Oral before breakfast  polyethylene glycol 3350 17 Gram(s) Oral daily  senna 2 Tablet(s) Oral at bedtime    MEDICATIONS  (PRN):  acetaminophen     Tablet .. 650 milliGRAM(s) Oral every 6 hours PRN Temp greater or equal to 38C (100.4F), Mild Pain (1 - 3)  aluminum hydroxide/magnesium hydroxide/simethicone Suspension 30 milliLiter(s) Oral every 4 hours PRN Dyspepsia  bisacodyl Suppository 10 milliGRAM(s) Rectal daily PRN Constipation  magnesium hydroxide Suspension 15 milliLiter(s) Oral at bedtime PRN Constipation  melatonin 3 milliGRAM(s) Oral at bedtime PRN Insomnia  ondansetron Injectable 4 milliGRAM(s) IV Push every 8 hours PRN Nausea and/or Vomiting      Allergies  contrast media (iodine-based) (Unknown)  iodine (Short breath)  morphine (Unknown)  penicillins (Unknown)  strawberry (Hives)  Sulfacetamide Sodium (Rash)      PHYSICAL EXAM:   Vital Signs:  Vital Signs Last 24 Hrs  T(C): 37.1 (25 Apr 2022 05:06), Max: 37.1 (25 Apr 2022 05:06)  T(F): 98.8 (25 Apr 2022 05:06), Max: 98.8 (25 Apr 2022 05:06)  HR: 69 (25 Apr 2022 05:06) (62 - 73)  BP: 125/78 (25 Apr 2022 05:06) (104/66 - 125/78)  BP(mean): --  RR: 17 (25 Apr 2022 05:06) (17 - 17)  SpO2: 91% (25 Apr 2022 05:06) (91% - 92%)  Daily     Daily     GENERAL:  Appears stated age  ABDOMEN:  Soft, non-tender, non-distended  NEURO:  Alert      LABS:                        10.2   16.11 )-----------( 128      ( 25 Apr 2022 10:37 )             32.7     04-25    136  |  108  |  37<H>  ----------------------------<  74  4.3   |  20<L>  |  0.89    Ca    8.7      25 Apr 2022 10:37    TPro  6.5  /  Alb  1.8<L>  /  TBili  0.5  /  DBili  0.2  /  AST  17  /  ALT  12  /  AlkPhos  94  04-24    RADIOLOGY:  < from: CT Abdomen and Pelvis No Cont (04.24.22 @ 11:38) >    ACC: [id number] EXAM:  CT ABDOMEN AND PELVIS                          PROCEDURE DATE:  04/24/2022          INTERPRETATION:  CLINICAL INFORMATION: Anemia and abdominal pain.    COMPARISON: None.    CONTRAST/COMPLICATIONS:  IV Contrast: NONE  Oral Contrast: NONE  Complications: None reported at time of study completion    PROCEDURE:  CT of the Abdomen and Pelvis was performed.  Sagittal and coronal reformats were performed.    FINDINGS:  LOWER CHEST: Cardiomegaly. Coronary calcification. Moderate bilateral   pleural effusions with associated compressive atelectasis.    LIVER: Within normal limits.  BILE DUCTS: Normal caliber.  GALLBLADDER: Within normal limits.  SPLEEN: Within normal limits.  PANCREAS: Within normal limits.  ADRENALS: Within normal limits.  KIDNEYS/URETERS: Bilateral hydroureteronephrosis to level of a moderately   distended urinary bladder. No urinary tract calculi.    BLADDER: Within normal limits.  REPRODUCTIVE ORGANS: Hysterectomy.    BOWEL: Large amount of stool in thecolon. Sigmoid diverticulosis with   questionable stricturing of the sigmoid colon. Questionable small bowel   wall thickening in the left hemiabdomen. Evaluation is limited in the   absence of intravenous contrast.  PERITONEUM: No ascites.  VESSELS:Atherosclerotic changes.  RETROPERITONEUM/LYMPH NODES: No lymphadenopathy.  ABDOMINAL WALL: Anasarca.  BONES: Degenerative changes. Severe L1 compression fracture, age   indeterminate. L2 lucency with cortical disruption concerning for a   pathologicfracture.    IMPRESSION:  Limited evaluation of bowel given lack of oral and intravenous contrast.   Questionable stricturing in the region of the sigmoid colon where there   is diverticular disease. Thickened small bowel loops in the left   hemiabdomen. Repeat examination with oral and intravenous contrast is   recommended.    L2 lucency with cortical disruption concerning for pathologic fracture.    --- End of Report ---            LIZZ MARC MD; Attending Radiologist  This document has been electronically signed. Apr 24 2022  3:16PM    < end of copied text >

## 2022-04-25 NOTE — CONSULT NOTE ADULT - SUBJECTIVE AND OBJECTIVE BOX
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 The patient admits occasional dizziness upon sitting up in bed. States she was at [facility] earlier in April for blood transfusion due to anemia. The patient admits to chronic L wrist pain and generalized R leg pain as well. Is wheelchair bound at baseline with chronic LE weakness. The patient denies focal weakness, dizziness or headaches at this time, chest pain, SOB, palpitations, fevers, chills, n/v/d/c.  On admission: H/H 7.1/23.4    Interval HPI: Consulted by Medicine for results of a noncontrast CT abd/pelvis, "Sigmoid diverticulosis with questionable stricturing of the sigmoid colon." Upon seeing the pt, she reports a previous admission at [facility] for a SBO - for which she underwent a laparoscopic ileocolic bypass on 3/11/2022. At this time, pt admits to weakness, fatigue, feeling bloated, and incontinence to urine & stool. Denies nausea and vomiting. Pt was previously constipated but given a dose of Miralax last night and had a loose BM. Pt is unsure if she is passing flatus.    Past Medical & Surgical History:  PAST MEDICAL & SURGICAL HISTORY:  History of breast cancer    History of uterine cancer    H/O small bowel obstruction  hx ileocecal bypass 3/11/2021    CMML (chronic myelomonocytic leukemia)    Chronic atrial fibrillation  not on a/c d/t hx bleeding    ROS:  General: admits to fatigue/weakness, denies dizziness, fevers/chills, night sweats, weight loss  HEENT: denies vertigo, throat pain or dysphagia; denies neck pain/stiffness, swelling/lumps or hoarseness  Respiratory: denies shortness of breath, wheezing, dyspnea; denies cough or hemoptysis  Cardiac: denies chest pain, palpitations  GI: admits to bloating/fullness & stool incontinence; denies abdominal pain; no nausea, vomiting, hematemesis or heartburn; denies changes in bowel habits, no hematochezia or melena  GI: admits to urinary incontinence; denies urinary urgency/frequency, denies dysuria or hematuria  Neuro: denies headache, syncope, paraesthesias, paralysis or tremors  Extremities: denies pain/swelling  Skin: denies pruritus, rashes  Psych: denies hallucinations, visual disturbances    Medications:  Home Medications:  anastrozole 1 mg oral tablet: 1 tab(s) orally once a day at 9:00am (22 Apr 2022 14:26)  Digitek 125 mcg (0.125 mg) oral tablet: 1 tab(s) orally once a day (22 Apr 2022 14:26)  Dulcolax Laxative 10 mg rectal suppository: 1 suppository(ies) rectal once a day, As Needed (22 Apr 2022 14:26)  escitalopram 10 mg oral tablet: 1 tab(s) orally once a day (22 Apr 2022 14:26)  Fleet Enema 19 g-7 g rectal enema: 133 milliliter(s) rectal once, As Needed (22 Apr 2022 14:26)  gabapentin 100 mg oral capsule: 2 cap(s) orally once a day (at bedtime) (22 Apr 2022 14:26)  Metoprolol Tartrate 25 mg oral tablet: 1 tab(s) orally once a day (22 Apr 2022 14:26)  Milk of Magnesia 8% oral suspension: 15 milliliter(s) orally once a day (at bedtime), As Needed (22 Apr 2022 14:26)  mirtazapine 15 mg oral tablet: 1 tab(s) orally once a day (at bedtime) (22 Apr 2022 14:26)  Multiple Vitamins oral capsule: 1 cap(s) orally once a day (22 Apr 2022 14:26)  nystatin 100,000 units/g topical powder (obsolete): Apply topically to affected area 2 times a day to b/l groin region (22 Apr 2022 14:26)  omeprazole 20 mg oral delayed release tablet: 1 tab(s) orally once a day (22 Apr 2022 14:26)  Santyl 250 units/g topical ointment: Apply topically to affected area 2 times a day (22 Apr 2022 14:26)  Stress Formula with Zinc oral tablet: 1 tab(s) orally once a day (22 Apr 2022 14:26)  Vitamin C 1000 mg oral tablet: 1 tab(s) orally once a day (22 Apr 2022 14:26)  zinc sulfate 66 mg oral tablet: 1 tab(s) orally once a day (22 Apr 2022 14:26)    MEDICATIONS  (STANDING):  anastrozole 1 milliGRAM(s) Oral daily  ascorbic acid 500 milliGRAM(s) Oral daily  collagenase Ointment 1 Application(s) Topical two times a day  digoxin     Tablet 125 MICROGram(s) Oral daily  escitalopram 10 milliGRAM(s) Oral daily  gabapentin 200 milliGRAM(s) Oral at bedtime  levothyroxine 25 MICROGram(s) Oral daily  metoprolol tartrate 25 milliGRAM(s) Oral daily  mirtazapine 15 milliGRAM(s) Oral at bedtime  multivitamin 1 Tablet(s) Oral daily  nystatin Powder 1 Application(s) Topical two times a day  pantoprazole    Tablet 40 milliGRAM(s) Oral before breakfast  polyethylene glycol 3350 17 Gram(s) Oral daily  senna 2 Tablet(s) Oral at bedtime    MEDICATIONS  (PRN):  acetaminophen     Tablet .. 650 milliGRAM(s) Oral every 6 hours PRN Temp greater or equal to 38C (100.4F), Mild Pain (1 - 3)  aluminum hydroxide/magnesium hydroxide/simethicone Suspension 30 milliLiter(s) Oral every 4 hours PRN Dyspepsia  bisacodyl Suppository 10 milliGRAM(s) Rectal daily PRN Constipation  magnesium hydroxide Suspension 15 milliLiter(s) Oral at bedtime PRN Constipation  melatonin 3 milliGRAM(s) Oral at bedtime PRN Insomnia  ondansetron Injectable 4 milliGRAM(s) IV Push every 8 hours PRN Nausea and/or Vomiting    Allergies:  ALLERGIES: contrast media (iodine-based) (Unknown)  iodine (Short breath)  morphine (Unknown)  penicillins (Unknown)  strawberry (Hives)  Sulfacetamide Sodium (Rash)  INTOLERANCES: None, unless indicated below    Social History:  Social History:  [facility]: [facility]  ADLs: wheelchair dependent  Tobacco/EtOH: denies  Diet: soft, bite size (22 Apr 2022 13:19)    Vitals:  Vital Signs Last 24 Hrs  T(C): 36.3 (25 Apr 2022 12:34), Max: 37.1 (25 Apr 2022 05:06)  T(F): 97.3 (25 Apr 2022 12:34), Max: 98.8 (25 Apr 2022 05:06)  HR: 61 (25 Apr 2022 12:34) (61 - 73)  BP: 102/69 (25 Apr 2022 12:34) (102/69 - 125/78)    RR: 17 (25 Apr 2022 12:34) (17 - 17)  SpO2: 91% (25 Apr 2022 12:34) (91% - 91%)    Physical Exam:  General: thin female; no acute distress, appears comfortable  HEENT: normocephalic/atraumatic, vision grossly intact, hearing grossly intact, no nasal discharge, ears & nose symmetrical and atraumatic  Neck: supple  Chest: lungs clear to auscultation bilaterally,  Heart: heart rate and rhythm regular  Abdomen: soft, tender in LLQ & RLQ, nondistended, bowel sounds present; no guarding  Extremities: no gross deformities, pitting edema noted on bilateral lower extremities  Neuro: alert & oriented x3, motor and sensory grossly intact  Skin: warm, dry, good turgor    Labs:                        10.2   16.11 )-----------( 128      ( 25 Apr 2022 10:37 )             32.7     04-25    136  |  108  |  37<H>  ----------------------------<  74  4.3   |  20<L>  |  0.89    Ca    8.7      25 Apr 2022 10:37    TPro  6.5  /  Alb  1.8<L>  /  TBili  0.5  /  DBili  0.2  /  AST  17  /  ALT  12  /  AlkPhos  94  04-24    LIVER FUNCTIONS - ( 24 Apr 2022 09:46 )  Alb: 1.8 g/dL / Pro: 6.5 g/dL / ALK PHOS: 94 U/L / ALT: 12 U/L / AST: 17 U/L / GGT: x           Culture - Blood (collected 23 Apr 2022 13:00)  Source: .Blood Blood-Peripheral  Preliminary Report (24 Apr 2022 13:02):    No growth to date.    Culture - Blood (collected 23 Apr 2022 13:00)  Source: .Blood Blood-Peripheral  Preliminary Report (24 Apr 2022 13:02):    No growth to date.    Radiology & Additional Studies:  < from: CT Abdomen and Pelvis No Cont (04.24.22 @ 11:38) >    ACC: [id number] EXAM:  CT ABDOMEN AND PELVIS                          PROCEDURE DATE:  04/24/2022    INTERPRETATION:  CLINICAL INFORMATION: Anemia and abdominal pain.    COMPARISON: None.    CONTRAST/COMPLICATIONS:  IV Contrast: NONE  Oral Contrast: NONE  Complications: None reported at time of study completion    PROCEDURE:  CT of the Abdomen and Pelvis was performed.  Sagittal and coronal reformats were performed.    FINDINGS:  LOWER CHEST: Cardiomegaly. Coronary calcification. Moderate bilateral   pleural effusions with associated compressive atelectasis.    LIVER: Within normal limits.  BILE DUCTS: Normal caliber.  GALLBLADDER: Within normal limits.  SPLEEN: Within normal limits.  PANCREAS: Within normal limits.  ADRENALS: Within normal limits.  KIDNEYS/URETERS: Bilateral hydroureteronephrosis to level of a moderately   distended urinary bladder. No urinary tract calculi.    BLADDER: Within normal limits.  REPRODUCTIVE ORGANS: Hysterectomy.    BOWEL: Large amount of stool in thecolon. Sigmoid diverticulosis with   questionable stricturing of the sigmoid colon. Questionable small bowel   wall thickening in the left hemiabdomen. Evaluation is limited in the   absence of intravenous contrast.  PERITONEUM: No ascites.  VESSELS:Atherosclerotic changes.  RETROPERITONEUM/LYMPH NODES: No lymphadenopathy.  ABDOMINAL WALL: Anasarca.  BONES: Degenerative changes. Severe L1 compression fracture, age   indeterminate. L2 lucency with cortical disruption concerning for a   pathologicfracture.    IMPRESSION:  Limited evaluation of bowel given lack of oral and intravenous contrast.   Questionable stricturing in the region of the sigmoid colon where there   is diverticular disease. Thickened small bowel loops in the left   hemiabdomen. Repeat examination with oral and intravenous contrast is   recommended.    L2 lucency with cortical disruption concerning for pathologic fracture.    --- End of Report ---    LIZZ MARC MD; Attending Radiologist  This document has been electronically signed. 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 The patient admits occasional dizziness upon sitting up in bed. States she was at [place] earlier in April for blood transfusion due to anemia. The patient admits to chronic L wrist pain and generalized R leg pain as well. Is wheelchair bound at baseline with chronic LE weakness. The patient denies focal weakness, dizziness or headaches at this time, chest pain, SOB, palpitations, fevers, chills, n/v/d/c.  On admission: H/H 7.1/23.4    Interval HPI: Consulted by Medicine for results of a noncontrast CT abd/pelvis, "Sigmoid diverticulosis with questionable stricturing of the sigmoid colon." Upon seeing the pt, she reports a previous admission at [facility] for a SBO - for which she underwent a laparoscopic ileocolic bypass on 3/11/2022. At this time, pt admits to weakness, fatigue, feeling bloated, and incontinence to urine & stool. Denies nausea and vomiting. She denies abdominal pain at rest but does admit to LLQ/RLQ tenderness when palpated. Pt was previously constipated but given a dose of Miralax last night and had a loose BM. Pt is unsure if she is passing flatus.    Past Medical & Surgical History:  PAST MEDICAL & SURGICAL HISTORY:  History of breast cancer    History of uterine cancer    H/O small bowel obstruction  hx ileocecal bypass 3/11/2021    CMML (chronic myelomonocytic leukemia)    Chronic atrial fibrillation  not on a/c d/t hx bleeding    ROS:  General: admits to fatigue/weakness, denies dizziness, fevers/chills, night sweats, weight loss  HEENT: denies vertigo, throat pain or dysphagia; denies neck pain/stiffness, swelling/lumps or hoarseness  Respiratory: denies shortness of breath, wheezing, dyspnea; denies cough or hemoptysis  Cardiac: denies chest pain, palpitations  GI: admits to bloating/fullness & stool incontinence; denies abdominal pain; no nausea, vomiting, hematemesis or heartburn; denies changes in bowel habits, no hematochezia or melena  GI: admits to urinary incontinence; denies urinary urgency/frequency, denies dysuria or hematuria  Neuro: denies headache, syncope, paraesthesias, paralysis or tremors  Extremities: denies pain/swelling  Skin: denies pruritus, rashes  Psych: denies hallucinations, visual disturbances    Medications:  Home Medications:  anastrozole 1 mg oral tablet: 1 tab(s) orally once a day at 9:00am (22 Apr 2022 14:26)  Digitek 125 mcg (0.125 mg) oral tablet: 1 tab(s) orally once a day (22 Apr 2022 14:26)  Dulcolax Laxative 10 mg rectal suppository: 1 suppository(ies) rectal once a day, As Needed (22 Apr 2022 14:26)  escitalopram 10 mg oral tablet: 1 tab(s) orally once a day (22 Apr 2022 14:26)  Fleet Enema 19 g-7 g rectal enema: 133 milliliter(s) rectal once, As Needed (22 Apr 2022 14:26)  gabapentin 100 mg oral capsule: 2 cap(s) orally once a day (at bedtime) (22 Apr 2022 14:26)  Metoprolol Tartrate 25 mg oral tablet: 1 tab(s) orally once a day (22 Apr 2022 14:26)  Milk of Magnesia 8% oral suspension: 15 milliliter(s) orally once a day (at bedtime), As Needed (22 Apr 2022 14:26)  mirtazapine 15 mg oral tablet: 1 tab(s) orally once a day (at bedtime) (22 Apr 2022 14:26)  Multiple Vitamins oral capsule: 1 cap(s) orally once a day (22 Apr 2022 14:26)  nystatin 100,000 units/g topical powder (obsolete): Apply topically to affected area 2 times a day to b/l groin region (22 Apr 2022 14:26)  omeprazole 20 mg oral delayed release tablet: 1 tab(s) orally once a day (22 Apr 2022 14:26)  Santyl 250 units/g topical ointment: Apply topically to affected area 2 times a day (22 Apr 2022 14:26)  Stress Formula with Zinc oral tablet: 1 tab(s) orally once a day (22 Apr 2022 14:26)  Vitamin C 1000 mg oral tablet: 1 tab(s) orally once a day (22 Apr 2022 14:26)  zinc sulfate 66 mg oral tablet: 1 tab(s) orally once a day (22 Apr 2022 14:26)    MEDICATIONS  (STANDING):  anastrozole 1 milliGRAM(s) Oral daily  ascorbic acid 500 milliGRAM(s) Oral daily  collagenase Ointment 1 Application(s) Topical two times a day  digoxin     Tablet 125 MICROGram(s) Oral daily  escitalopram 10 milliGRAM(s) Oral daily  gabapentin 200 milliGRAM(s) Oral at bedtime  levothyroxine 25 MICROGram(s) Oral daily  metoprolol tartrate 25 milliGRAM(s) Oral daily  mirtazapine 15 milliGRAM(s) Oral at bedtime  multivitamin 1 Tablet(s) Oral daily  nystatin Powder 1 Application(s) Topical two times a day  pantoprazole    Tablet 40 milliGRAM(s) Oral before breakfast  polyethylene glycol 3350 17 Gram(s) Oral daily  senna 2 Tablet(s) Oral at bedtime    MEDICATIONS  (PRN):  acetaminophen     Tablet .. 650 milliGRAM(s) Oral every 6 hours PRN Temp greater or equal to 38C (100.4F), Mild Pain (1 - 3)  aluminum hydroxide/magnesium hydroxide/simethicone Suspension 30 milliLiter(s) Oral every 4 hours PRN Dyspepsia  bisacodyl Suppository 10 milliGRAM(s) Rectal daily PRN Constipation  magnesium hydroxide Suspension 15 milliLiter(s) Oral at bedtime PRN Constipation  melatonin 3 milliGRAM(s) Oral at bedtime PRN Insomnia  ondansetron Injectable 4 milliGRAM(s) IV Push every 8 hours PRN Nausea and/or Vomiting    Allergies:  ALLERGIES: contrast media (iodine-based) (Unknown)  iodine (Short breath)  morphine (Unknown)  penicillins (Unknown)  strawberry (Hives)  Sulfacetamide Sodium (Rash)  INTOLERANCES: None, unless indicated below    Social History:  Social History:  Lives: [facility]  ADLs: wheelchair dependent  Tobacco/EtOH: denies  Diet: soft, bite size (22 Apr 2022 13:19)    Vitals:  Vital Signs Last 24 Hrs  T(C): 36.3 (25 Apr 2022 12:34), Max: 37.1 (25 Apr 2022 05:06)  T(F): 97.3 (25 Apr 2022 12:34), Max: 98.8 (25 Apr 2022 05:06)  HR: 61 (25 Apr 2022 12:34) (61 - 73)  BP: 102/69 (25 Apr 2022 12:34) (102/69 - 125/78)    RR: 17 (25 Apr 2022 12:34) (17 - 17)  SpO2: 91% (25 Apr 2022 12:34) (91% - 91%)    Physical Exam:  General: thin female; no acute distress, appears comfortable  HEENT: normocephalic/atraumatic, vision grossly intact, hearing grossly intact, no nasal discharge, ears & nose symmetrical and atraumatic  Neck: supple  Chest: lungs clear to auscultation bilaterally,  Heart: heart rate and rhythm regular  Abdomen: soft, tender in LLQ & RLQ, nondistended, bowel sounds present; no guarding; healed midline incision (pt states from her appendectomy); healed old trochar sites   Extremities: no gross deformities, pitting edema noted on bilateral lower extremities  Neuro: alert & oriented x3, motor and sensory grossly intact  Skin: warm, dry, good turgor    Labs:                        10.2   16.11 )-----------( 128      ( 25 Apr 2022 10:37 )             32.7     04-25    136  |  108  |  37<H>  ----------------------------<  74  4.3   |  20<L>  |  0.89    Ca    8.7      25 Apr 2022 10:37    TPro  6.5  /  Alb  1.8<L>  /  TBili  0.5  /  DBili  0.2  /  AST  17  /  ALT  12  /  AlkPhos  94  04-24    LIVER FUNCTIONS - ( 24 Apr 2022 09:46 )  Alb: 1.8 g/dL / Pro: 6.5 g/dL / ALK PHOS: 94 U/L / ALT: 12 U/L / AST: 17 U/L / GGT: x           Culture - Blood (collected 23 Apr 2022 13:00)  Source: .Blood Blood-Peripheral  Preliminary Report (24 Apr 2022 13:02):    No growth to date.    Culture - Blood (collected 23 Apr 2022 13:00)  Source: .Blood Blood-Peripheral  Preliminary Report (24 Apr 2022 13:02):    No growth to date.    Radiology & Additional Studies:  < from: CT Abdomen and Pelvis No Cont (04.24.22 @ 11:38) >    ACC: [id number] EXAM:  CT ABDOMEN AND PELVIS                          PROCEDURE DATE:  04/24/2022    INTERPRETATION:  CLINICAL INFORMATION: Anemia and abdominal pain.    COMPARISON: None.    CONTRAST/COMPLICATIONS:  IV Contrast: NONE  Oral Contrast: NONE  Complications: None reported at time of study completion    PROCEDURE:  CT of the Abdomen and Pelvis was performed.  Sagittal and coronal reformats were performed.    FINDINGS:  LOWER CHEST: Cardiomegaly. Coronary calcification. Moderate bilateral   pleural effusions with associated compressive atelectasis.    LIVER: Within normal limits.  BILE DUCTS: Normal caliber.  GALLBLADDER: Within normal limits.  SPLEEN: Within normal limits.  PANCREAS: Within normal limits.  ADRENALS: Within normal limits.  KIDNEYS/URETERS: Bilateral hydroureteronephrosis to level of a moderately   distended urinary bladder. No urinary tract calculi.    BLADDER: Within normal limits.  REPRODUCTIVE ORGANS: Hysterectomy.    BOWEL: Large amount of stool in thecolon. Sigmoid diverticulosis with   questionable stricturing of the sigmoid colon. Questionable small bowel   wall thickening in the left hemiabdomen. Evaluation is limited in the   absence of intravenous contrast.  PERITONEUM: No ascites.  VESSELS:Atherosclerotic changes.  RETROPERITONEUM/LYMPH NODES: No lymphadenopathy.  ABDOMINAL WALL: Anasarca.  BONES: Degenerative changes. Severe L1 compression fracture, age   indeterminate. L2 lucency with cortical disruption concerning for a   pathologicfracture.    IMPRESSION:  Limited evaluation of bowel given lack of oral and intravenous contrast.   Questionable stricturing in the region of the sigmoid colon where there   is diverticular disease. Thickened small bowel loops in the left   hemiabdomen. Repeat examination with oral and intravenous contrast is   recommended.    L2 lucency with cortical disruption concerning for pathologic fracture.    --- End of Report ---    LIZZ MARC MD; Attending Radiologist  This document has been electronically signed. Apr 24 2022  3:16PM  < end of copied text >

## 2022-04-25 NOTE — CONSULT NOTE ADULT - ASSESSMENT
88 y/o female with PMHx of SBO, s/p laparoscopic ileocolic bypass on 3/11/2022 - presenting with strictures seen on CT.    PLAN:  - Spoke with medicine, unable to order a repeat CT with contrast d/t pt's history of a contrast reaction  - Possible ?gastrograffin challenge however pt is not presenting with any nausea/vomiting, she is unsure if she has passed flatus, (+) loose BM after miralax in PM.  - GI following  - Will update plan, to be discussed with Dr. Ireland 89 y/o female with PMHx of SBO, s/p laparoscopic ileocolic bypass on 3/11/2022 - presenting with strictures seen on CT.    PLAN:  - Spoke with medicine, unable to order a repeat CT with contrast d/t pt's history of a contrast reaction  - Pt is not presenting with any nausea/vomiting, she has passed flatus, (+) loose BM after miralax in PM  - GI following  - No acute surgical intervention at this time  - Will update plan, to be discussed with Dr. Ireland

## 2022-04-25 NOTE — PROGRESS NOTE ADULT - SUBJECTIVE AND OBJECTIVE BOX
Patient seen and examined;  Chart reviewed and events noted;   continues to feel cold; was started on synthroid at low dose  continues to have intermittent abdominal pain  Does not wish to return to [facility]      MEDICATIONS  (STANDING):  anastrozole 1 milliGRAM(s) Oral daily  ascorbic acid 500 milliGRAM(s) Oral daily  collagenase Ointment 1 Application(s) Topical two times a day  digoxin     Tablet 125 MICROGram(s) Oral daily  escitalopram 10 milliGRAM(s) Oral daily  gabapentin 200 milliGRAM(s) Oral at bedtime  levothyroxine 25 MICROGram(s) Oral daily  metoprolol tartrate 25 milliGRAM(s) Oral daily  mirtazapine 15 milliGRAM(s) Oral at bedtime  multivitamin 1 Tablet(s) Oral daily  nystatin Powder 1 Application(s) Topical two times a day  pantoprazole    Tablet 40 milliGRAM(s) Oral before breakfast  polyethylene glycol 3350 17 Gram(s) Oral daily  senna 2 Tablet(s) Oral at bedtime    MEDICATIONS  (PRN):  acetaminophen     Tablet .. 650 milliGRAM(s) Oral every 6 hours PRN Temp greater or equal to 38C (100.4F), Mild Pain (1 - 3)  aluminum hydroxide/magnesium hydroxide/simethicone Suspension 30 milliLiter(s) Oral every 4 hours PRN Dyspepsia  bisacodyl Suppository 10 milliGRAM(s) Rectal daily PRN Constipation  magnesium hydroxide Suspension 15 milliLiter(s) Oral at bedtime PRN Constipation  melatonin 3 milliGRAM(s) Oral at bedtime PRN Insomnia  ondansetron Injectable 4 milliGRAM(s) IV Push every 8 hours PRN Nausea and/or Vomiting      Vital Signs Last 24 Hrs  T(C): 37.1 (25 Apr 2022 05:06), Max: 37.1 (25 Apr 2022 05:06)  T(F): 98.8 (25 Apr 2022 05:06), Max: 98.8 (25 Apr 2022 05:06)  HR: 69 (25 Apr 2022 05:06) (62 - 73)  BP: 125/78 (25 Apr 2022 05:06) (104/66 - 125/78)  RR: 17 (25 Apr 2022 05:06) (17 - 17)  SpO2: 91% (25 Apr 2022 05:06) (91% - 92%)    PHYSICAL EXAM  General: adult elderly woman in NAD  HEENT: clear oropharynx, anicteric sclera, pink conjunctivae  Neck: supple  CV: normal S1S2 with no murmur rubs or gallops  Lungs: clear to auscultation, no wheezes, no rhales  Abdomen: soft non-tender non-distended, no hepato/splenomegaly  Ext: no clubbing cyanosis or edema  Skin: no rashes and no petichiae  Neuro: alert and oriented X3 no focal deficits      LABS:                        10.2   16.11 )-----------( 128      ( 25 Apr 2022 10:37 )             32.7     04-25    136  |  108  |  37<H>  ----------------------------<  74  4.3   |  20<L>  |  0.89    Ca    8.7      25 Apr 2022 10:37    TPro  6.5  /  Alb  1.8<L>  /  TBili  0.5  /  DBili  0.2  /  AST  17  /  ALT  12  /  AlkPhos  94  04-24    B12 1301  Folate 14.3  TSH 12.9, Thyroxine 0.8    RADIOLOGY  < from: CT Abdomen and Pelvis No Cont (04.24.22 @ 11:38) >    IMPRESSION:  Limited evaluation of bowel given lack of oral and intravenous contrast.   Questionable stricturing in the region of the sigmoid colon where there   is diverticular disease. Thickened small bowel loops in the left   hemiabdomen. Repeat examination with oral and intravenous contrast is   recommended.    L2 lucency with cortical disruption concerning for pathologic fracture.    < end of copied text >

## 2022-04-25 NOTE — PROGRESS NOTE ADULT - SUBJECTIVE AND OBJECTIVE BOX
Patient is a 89y old  Female who presents with a chief complaint of anemia (25 Apr 2022 12:04)      INTERVAL HPI/OVERNIGHT EVENTS: Patient seen and examined at bedside. No overnight events occurred. Patient is complaining of R hand pain where she has gotten blood drawn. She continues to complain of some mild abdominal pain which she states is chronic. Denies dizziness, lightheadedness, nausea, vomiting, fevers, chills, sob, cp, diarrhea, constipation.    MEDICATIONS  (STANDING):  anastrozole 1 milliGRAM(s) Oral daily  ascorbic acid 500 milliGRAM(s) Oral daily  collagenase Ointment 1 Application(s) Topical two times a day  digoxin     Tablet 125 MICROGram(s) Oral daily  escitalopram 10 milliGRAM(s) Oral daily  gabapentin 200 milliGRAM(s) Oral at bedtime  levothyroxine 25 MICROGram(s) Oral daily  metoprolol tartrate 25 milliGRAM(s) Oral daily  mirtazapine 15 milliGRAM(s) Oral at bedtime  multivitamin 1 Tablet(s) Oral daily  nystatin Powder 1 Application(s) Topical two times a day  pantoprazole    Tablet 40 milliGRAM(s) Oral before breakfast  polyethylene glycol 3350 17 Gram(s) Oral daily  senna 2 Tablet(s) Oral at bedtime    MEDICATIONS  (PRN):  acetaminophen     Tablet .. 650 milliGRAM(s) Oral every 6 hours PRN Temp greater or equal to 38C (100.4F), Mild Pain (1 - 3)  aluminum hydroxide/magnesium hydroxide/simethicone Suspension 30 milliLiter(s) Oral every 4 hours PRN Dyspepsia  bisacodyl Suppository 10 milliGRAM(s) Rectal daily PRN Constipation  magnesium hydroxide Suspension 15 milliLiter(s) Oral at bedtime PRN Constipation  melatonin 3 milliGRAM(s) Oral at bedtime PRN Insomnia  ondansetron Injectable 4 milliGRAM(s) IV Push every 8 hours PRN Nausea and/or Vomiting      Allergies    contrast media (iodine-based) (Unknown)  iodine (Short breath)  morphine (Unknown)  penicillins (Unknown)  strawberry (Hives)  Sulfacetamide Sodium (Rash)    Intolerances        REVIEW OF SYSTEMS:  CONSTITUTIONAL: No fever or chills   HEENT:  No headache, no sore throat  RESPIRATORY: No cough, wheezing, or shortness of breath  CARDIOVASCULAR: No chest pain, palpitations  GASTROINTESTINAL: + abd pain, no nausea, vomiting, or diarrhea  GENITOURINARY: No dysuria, frequency, or hematuria  NEUROLOGICAL: no focal weakness or dizziness  MUSCULOSKELETAL: no myalgias     Vital Signs Last 24 Hrs  T(C): 36.3 (25 Apr 2022 12:34), Max: 37.1 (25 Apr 2022 05:06)  T(F): 97.3 (25 Apr 2022 12:34), Max: 98.8 (25 Apr 2022 05:06)  HR: 61 (25 Apr 2022 12:34) (61 - 73)  BP: 102/69 (25 Apr 2022 12:34) (102/69 - 125/78)  BP(mean): --  RR: 17 (25 Apr 2022 12:34) (17 - 17)  SpO2: 91% (25 Apr 2022 12:34) (91% - 91%)    PHYSICAL EXAM:  GENERAL: NAD, frail appearing  HEENT:  anicteric, moist mucous membranes, no conjunctival pallor  CHEST/LUNG:  CTA b/l, no rales, wheezes, or rhonchi  HEART:  RRR, S1, S2  ABDOMEN:  BS+, soft, nondistended, nontender  EXTREMITIES: no cyanosis, or calf tenderness +midline L arm, 2+ pitting edema of RLE, 1+ of LLE  NERVOUS SYSTEM: answers questions and follows commands appropriately    LABS:                        10.2   16.11 )-----------( 128      ( 25 Apr 2022 10:37 )             32.7     CBC Full  -  ( 25 Apr 2022 10:37 )  WBC Count : 16.11 K/uL  Hemoglobin : 10.2 g/dL  Hematocrit : 32.7 %  Platelet Count - Automated : 128 K/uL  Mean Cell Volume : 92.9 fl  Mean Cell Hemoglobin : 29.0 pg  Mean Cell Hemoglobin Concentration : 31.2 gm/dL  Auto Neutrophil # : x  Auto Lymphocyte # : x  Auto Monocyte # : x  Auto Eosinophil # : x  Auto Basophil # : x  Auto Neutrophil % : x  Auto Lymphocyte % : x  Auto Monocyte % : x  Auto Eosinophil % : x  Auto Basophil % : x    25 Apr 2022 10:37    136    |  108    |  37     ----------------------------<  74     4.3     |  20     |  0.89     Ca    8.7        25 Apr 2022 10:37          CAPILLARY BLOOD GLUCOSE            Culture - Blood (collected 04-23-22 @ 13:00)  Source: .Blood Blood-Peripheral  Preliminary Report (04-24-22 @ 13:02):    No growth to date.    Culture - Blood (collected 04-23-22 @ 13:00)  Source: .Blood Blood-Peripheral  Preliminary Report (04-24-22 @ 13:02):    No growth to date.        RADIOLOGY & ADDITIONAL TESTS:    Personally reviewed.     Consultant(s) Notes Reviewed:  [x] YES  [ ] NO

## 2022-04-25 NOTE — PROGRESS NOTE ADULT - ATTENDING COMMENTS
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 Admitted for anemia and possible transfusion.  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 S/p 2 units pRBC (Hgb 6.9 -->9.6 post transfusion). In [facility], pt was there about 2 weeks ago-> hgb ranged 6.5 - 7.0. FOBT positive on admission, GI consulted, does not suspect active bleed. Hx of hysterectomy. Apprec Heme/onc consulted- Dr Duarte.  - RLQ abd pain, chronic (abdominal pain) -> CT abd reviewed, rec CT with PO/IV contrast. Consulted surgery.  GI following.   Spoke to pt and daughter about hx of contrast allergy --> per daughter allergy is rash. Per patient - allergy is facial swelling and trouble breathing. Unclear which is correct information but for now, will follow what patient says. Discussed with surgery about risk of contrast. F/u surgery recs.   - L2 pathologic fracture: XR lumbar spine f/u  - RLE>LLE swelling: check ultrasound LE.  - Hypothyroidism: elev TSH, lower T4 ; start Levothyroxine 25mcg, repeat labs in 4-6 weeks.   - Afib: on digoxin and metoprolol, not on AC chronically.   - Pt has hx of b/l mastectomy. She had midline in left arm. Given this hx, RN wanted to know which arm to use for blood collection, she states that in the past right arm was used for blood draw collection. For now, will use right arm for blood draw.

## 2022-04-26 NOTE — CONSULT NOTE ADULT - SUBJECTIVE AND OBJECTIVE BOX
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 On admission:  - H/H 7.1/23.4     (22 Apr 2022 13:19)      PAST MEDICAL & SURGICAL HISTORY:  History of breast cancer    History of uterine cancer    H/O small bowel obstruction  hx ileocecal bypass 3/11/2021    CMML (chronic myelomonocytic leukemia)    Chronic atrial fibrillation  not on a/c d/t hx bleeding      REVIEW OF SYSTEMS  CONSTITUTIONAL: +weakness, no fevers or chills  SKIN: c/o injury to backside  CARDIOVASCULAR: denies chest pain, palpitations  RESPIRATORY: denies shortness of breath, sputum production  GASTROINTESTINAL: denies nausea, vomiting, diarrhea, abdominal pain  all other ROS is negative unless indicated above      MEDICATIONS  (STANDING):  anastrozole 1 milliGRAM(s) Oral daily  ascorbic acid 500 milliGRAM(s) Oral daily  collagenase Ointment 1 Application(s) Topical two times a day  digoxin     Tablet 125 MICROGram(s) Oral daily  escitalopram 10 milliGRAM(s) Oral daily  gabapentin 200 milliGRAM(s) Oral at bedtime  levothyroxine 25 MICROGram(s) Oral daily  metoprolol tartrate 25 milliGRAM(s) Oral daily  mirtazapine 15 milliGRAM(s) Oral at bedtime  multivitamin 1 Tablet(s) Oral daily  nystatin Powder 1 Application(s) Topical two times a day  pantoprazole    Tablet 40 milliGRAM(s) Oral before breakfast  polyethylene glycol 3350 17 Gram(s) Oral daily  senna 2 Tablet(s) Oral at bedtime    MEDICATIONS  (PRN):  acetaminophen     Tablet .. 650 milliGRAM(s) Oral every 6 hours PRN Temp greater or equal to 38C (100.4F), Mild Pain (1 - 3)  aluminum hydroxide/magnesium hydroxide/simethicone Suspension 30 milliLiter(s) Oral every 4 hours PRN Dyspepsia  bisacodyl Suppository 10 milliGRAM(s) Rectal daily PRN Constipation  magnesium hydroxide Suspension 15 milliLiter(s) Oral at bedtime PRN Constipation  melatonin 3 milliGRAM(s) Oral at bedtime PRN Insomnia  ondansetron Injectable 4 milliGRAM(s) IV Push every 8 hours PRN Nausea and/or Vomiting      Allergies    contrast media (iodine-based) (Unknown)  iodine (Short breath)  morphine (Unknown)  penicillins (Unknown)  strawberry (Hives)  Sulfacetamide Sodium (Rash)    Intolerances      SOCIAL HISTORY:      FAMILY HISTORY:      Vital Signs Last 24 Hrs  T(C): 35.7 (26 Apr 2022 11:53), Max: 36.4 (25 Apr 2022 20:58)  T(F): 96.2 (26 Apr 2022 11:53), Max: 97.5 (25 Apr 2022 20:58)  HR: 56 (26 Apr 2022 11:53) (56 - 68)  BP: 102/61 (26 Apr 2022 11:53) (102/61 - 124/76)  BP(mean): --  RR: 18 (26 Apr 2022 11:53) (17 - 18)  SpO2: 94% (26 Apr 2022 11:53) (91% - 94%)    NAD /   A&Ox3/ Alert  within defined normal limits  Total Care Sport/ Versa Care P500 bed                            10.0   17.53 )-----------( 135      ( 26 Apr 2022 07:49 )             32.6     04-26    135  |  108  |  32<H>  ----------------------------<  80  4.8   |  20<L>  |  0.83    Ca    8.8      26 Apr 2022 08:47    TPro  6.9  /  Alb  1.6<L>  /  TBili  0.5  /  DBili  x   /  AST  30  /  ALT  12  /  AlkPhos  100  04-26          PHYSICAL EXAM:    Constitutional: resting comfortably in bed; NAD  Extremities: no clubbing , cyanosis; edema  Dermatologic: skin warm, unstageable pressure injury sacral/coccygeal region, frail, dry,  scant/moderate serosanguinous drainage, erythema  No odor, warmth, tenderness, induration, fluctuance  Lymphatic: no submandibular or cervical LAD  Neurologic: awake, alert, follows commands, weakened strength   Musculoskeletal/Vascular:  ROM intact, No edema, hemosiderin staining  no deformities/ contractures        :

## 2022-04-26 NOTE — PROGRESS NOTE ADULT - SUBJECTIVE AND OBJECTIVE BOX
All interim records and events noted.    mild fatigue, not worse      MEDICATIONS  (STANDING):  anastrozole 1 milliGRAM(s) Oral daily  ascorbic acid 500 milliGRAM(s) Oral daily  collagenase Ointment 1 Application(s) Topical two times a day  digoxin     Tablet 125 MICROGram(s) Oral daily  escitalopram 10 milliGRAM(s) Oral daily  gabapentin 200 milliGRAM(s) Oral at bedtime  levothyroxine 25 MICROGram(s) Oral daily  metoprolol tartrate 25 milliGRAM(s) Oral daily  mirtazapine 15 milliGRAM(s) Oral at bedtime  multivitamin 1 Tablet(s) Oral daily  nystatin Powder 1 Application(s) Topical two times a day  pantoprazole    Tablet 40 milliGRAM(s) Oral before breakfast  polyethylene glycol 3350 17 Gram(s) Oral daily  senna 2 Tablet(s) Oral at bedtime    MEDICATIONS  (PRN):  acetaminophen     Tablet .. 650 milliGRAM(s) Oral every 6 hours PRN Temp greater or equal to 38C (100.4F), Mild Pain (1 - 3)  aluminum hydroxide/magnesium hydroxide/simethicone Suspension 30 milliLiter(s) Oral every 4 hours PRN Dyspepsia  bisacodyl Suppository 10 milliGRAM(s) Rectal daily PRN Constipation  magnesium hydroxide Suspension 15 milliLiter(s) Oral at bedtime PRN Constipation  melatonin 3 milliGRAM(s) Oral at bedtime PRN Insomnia  ondansetron Injectable 4 milliGRAM(s) IV Push every 8 hours PRN Nausea and/or Vomiting      Vital Signs Last 24 Hrs  T(C): 35.7 (26 Apr 2022 11:53), Max: 36.4 (25 Apr 2022 20:58)  T(F): 96.2 (26 Apr 2022 11:53), Max: 97.5 (25 Apr 2022 20:58)  HR: 56 (26 Apr 2022 11:53) (56 - 68)  BP: 102/61 (26 Apr 2022 11:53) (102/61 - 124/76)  BP(mean): --  RR: 18 (26 Apr 2022 11:53) (17 - 18)  SpO2: 94% (26 Apr 2022 11:53) (91% - 94%)    PHYSICAL EXAM  General: well developed but thin frail elderly woman up in bed, in no acute distress  Head: atraumatic, normocephalic  ENT: sclera anicteric, buccal mucosa moist  Neck: supple, trachea midline  CV: S1 S2, regular rate and rhythm  Lungs: clear to auscultation, no wheezes/rhonchi  Abdomen: soft, nontender, bowel sounds present, no palpable masses  Extrem: no clubbing/cyanosis/edema  Skin: no significant increased ecchymosis/petechiae  Neuro: alert and oriented X3,  no focal deficits      LABS:             10.0   17.53 )-----------( 135      ( 04-26 @ 07:49 )             32.6                10.2   16.11 )-----------( 128      ( 04-25 @ 10:37 )             32.7                9.6    15.42 )-----------( 139      ( 04-24 @ 09:46 )             30.4       04-26    135  |  108  |  32<H>  ----------------------------<  80  4.8   |  20<L>  |  0.83    Ca    8.8      26 Apr 2022 08:47    TPro  6.9  /  Alb  1.6<L>  /  TBili  0.5  /  DBili  x   /  AST  30  /  ALT  12  /  AlkPhos  100  04-26 04-22 @ 14:46  PT14.8 INR1.26  PTT--      RADIOLOGY & ADDITIONAL STUDIES:    IMPRESSION/RECOMMENDATIONS:

## 2022-04-26 NOTE — PROGRESS NOTE ADULT - SUBJECTIVE AND OBJECTIVE BOX
[facility]  Zohaib Carlson PA-C  [facility] [place] [address]  [phone]      INTERVAL HPI/OVERNIGHT EVENTS:  Pt s/e  Pt reports she is having BMs  Denies further GI complaints    MEDICATIONS  (STANDING):  anastrozole 1 milliGRAM(s) Oral daily  ascorbic acid 500 milliGRAM(s) Oral daily  collagenase Ointment 1 Application(s) Topical two times a day  digoxin     Tablet 125 MICROGram(s) Oral daily  escitalopram 10 milliGRAM(s) Oral daily  gabapentin 200 milliGRAM(s) Oral at bedtime  levothyroxine 25 MICROGram(s) Oral daily  metoprolol tartrate 25 milliGRAM(s) Oral daily  mirtazapine 15 milliGRAM(s) Oral at bedtime  multivitamin 1 Tablet(s) Oral daily  nystatin Powder 1 Application(s) Topical two times a day  pantoprazole    Tablet 40 milliGRAM(s) Oral before breakfast  polyethylene glycol 3350 17 Gram(s) Oral daily  senna 2 Tablet(s) Oral at bedtime    MEDICATIONS  (PRN):  acetaminophen     Tablet .. 650 milliGRAM(s) Oral every 6 hours PRN Temp greater or equal to 38C (100.4F), Mild Pain (1 - 3)  aluminum hydroxide/magnesium hydroxide/simethicone Suspension 30 milliLiter(s) Oral every 4 hours PRN Dyspepsia  bisacodyl Suppository 10 milliGRAM(s) Rectal daily PRN Constipation  magnesium hydroxide Suspension 15 milliLiter(s) Oral at bedtime PRN Constipation  melatonin 3 milliGRAM(s) Oral at bedtime PRN Insomnia  ondansetron Injectable 4 milliGRAM(s) IV Push every 8 hours PRN Nausea and/or Vomiting      Allergies  contrast media (iodine-based) (Unknown)  iodine (Short breath)  morphine (Unknown)  penicillins (Unknown)  strawberry (Hives)  Sulfacetamide Sodium (Rash)          PHYSICAL EXAM:   Vital Signs:  Vital Signs Last 24 Hrs  T(C): 36.3 (26 Apr 2022 04:53), Max: 36.4 (25 Apr 2022 20:58)  T(F): 97.4 (26 Apr 2022 04:53), Max: 97.5 (25 Apr 2022 20:58)  HR: 59 (26 Apr 2022 04:53) (59 - 68)  BP: 120/74 (26 Apr 2022 04:53) (102/69 - 124/76)  BP(mean): --  RR: 18 (26 Apr 2022 04:53) (17 - 18)  SpO2: 91% (26 Apr 2022 04:53) (91% - 92%)  Daily     Daily     GENERAL:  Appears stated age  ABDOMEN:  Soft, non-tender, non-distended  NEURO:  Alert      LABS:                        10.0   17.53 )-----------( 135      ( 26 Apr 2022 07:49 )             32.6     04-26    135  |  108  |  32<H>  ----------------------------<  80  4.8   |  20<L>  |  0.83    Ca    8.8      26 Apr 2022 08:47    TPro  6.9  /  Alb  1.6<L>  /  TBili  0.5  /  DBili  x   /  AST  30  /  ALT  12  /  AlkPhos  100  04-26

## 2022-04-26 NOTE — PROGRESS NOTE ADULT - PROBLEM SELECTOR PLAN 1
UA: Moderate blood, moderate LE's, many bacteria, +WBC, +RBC  - Afebrile; leukocytosis at baseline 2/2 CMML  - Will start Ceftriaxone (penicillin allergy - hives)  - F/u UCx  - ID (Dr. Amaya) consulted, f/u recs

## 2022-04-26 NOTE — CONSULT NOTE ADULT - ASSESSMENT
Patient presents with:   1. Unstageable pressure injury 3 x 3 at sacral/coccygeal region, scant/moderate serosanguinous drainage, erythema  No odor, warmth, tenderness, induration, fluctuance  Recommendation:   -Continue current wound treatment orders santyl  Patient is on a low air loss mattress  This pressure injury is community acquired /YES  At risk for altered tissue perfusion /YES  Impaired perfusion of peripheral tissue /YES  Continue  Nutrition (as tolerated)  Continue  Offloading   Continue Pericare  Apply cair boots at all times while in bed.   Provide skin checks and foot placement q8h.  Care as per medicine will follow w/ you  [facility]   Findings and recommendations discussed with MARIELOS Briceño  Thank you for this consult  Evelyn Senior NP, [facility] [phone]

## 2022-04-26 NOTE — PROGRESS NOTE ADULT - ATTENDING COMMENTS
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 Admitted for anemia and possible transfusion.    - 4/26, complain of discomfort with urination per patient - > check UA -> grossly positive - ordered ceftriaxone - consult Infec disease, f/u urine culture.  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 S/p 2 units pRBC (Hgb 6.9 -->9.6 post transfusion). In [facility], pt was there about 2 weeks ago-> hgb ranged 6.5 - 7.0. FOBT positive on admission, GI consulted, does not suspect active bleed. Hx of hysterectomy. Apprec Heme/onc consulted- Dr Duarte.  - RLQ abd pain, chronic (abdominal pain) -> CT abd reviewed, rec CT with PO/IV contrast. Consulted surgery.  GI following. Discussed with surgery and GI -> pt is clinically well, no interventions regarding CT findings, conservative management recommended at this time.   Spoke to pt and daughter about hx of contrast allergy --> per daughter allergy is rash. Per patient - allergy is facial swelling and trouble breathing. Unclear which is correct information but for now, will follow what patient says. Discussed with surgery about risk of contrast. F/u surgery recs.   - L2 pathologic fracture: XR lumbar spine f/u  - RLE>LLE swelling: check ultrasound LE.  - Hypothyroidism: elev TSH, lower T4 ; start Levothyroxine 25mcg, repeat labs in 4-6 weeks.   - Afib: on digoxin and metoprolol, not on AC chronically.   - Pt has hx of b/l mastectomy. She had midline in left arm. Given this hx, RN wanted to know which arm to use for blood collection, she states that in the past right arm was used for blood draw collection. For now, will use right arm for blood draw.

## 2022-04-26 NOTE — PROGRESS NOTE ADULT - SUBJECTIVE AND OBJECTIVE BOX
Patient is a 89y old  Female who presents with a chief complaint of anemia (26 Apr 2022 13:29)      INTERVAL HPI/OVERNIGHT EVENTS: Patient seen and examined at bedside. No overnight events occurred. Patient complains of burning with urination that began last night. She denies fevers, chills, abdominal pain, increased frequency. Denies nausea, vomiting, diarrhea, constipation.     MEDICATIONS  (STANDING):  anastrozole 1 milliGRAM(s) Oral daily  ascorbic acid 500 milliGRAM(s) Oral daily  cefTRIAXone   IVPB      cefTRIAXone   IVPB 1000 milliGRAM(s) IV Intermittent once  collagenase Ointment 1 Application(s) Topical two times a day  digoxin     Tablet 125 MICROGram(s) Oral daily  escitalopram 10 milliGRAM(s) Oral daily  gabapentin 200 milliGRAM(s) Oral at bedtime  levothyroxine 25 MICROGram(s) Oral daily  metoprolol tartrate 25 milliGRAM(s) Oral daily  mirtazapine 15 milliGRAM(s) Oral at bedtime  multivitamin 1 Tablet(s) Oral daily  nystatin Powder 1 Application(s) Topical two times a day  pantoprazole    Tablet 40 milliGRAM(s) Oral before breakfast  polyethylene glycol 3350 17 Gram(s) Oral daily  senna 2 Tablet(s) Oral at bedtime    MEDICATIONS  (PRN):  acetaminophen     Tablet .. 650 milliGRAM(s) Oral every 6 hours PRN Temp greater or equal to 38C (100.4F), Mild Pain (1 - 3)  aluminum hydroxide/magnesium hydroxide/simethicone Suspension 30 milliLiter(s) Oral every 4 hours PRN Dyspepsia  bisacodyl Suppository 10 milliGRAM(s) Rectal daily PRN Constipation  magnesium hydroxide Suspension 15 milliLiter(s) Oral at bedtime PRN Constipation  melatonin 3 milliGRAM(s) Oral at bedtime PRN Insomnia  ondansetron Injectable 4 milliGRAM(s) IV Push every 8 hours PRN Nausea and/or Vomiting      Allergies    contrast media (iodine-based) (Unknown)  iodine (Short breath)  morphine (Unknown)  penicillins (Unknown)  strawberry (Hives)  Sulfacetamide Sodium (Rash)    Intolerances      REVIEW OF SYSTEMS:  CONSTITUTIONAL: No fever or chills   HEENT:  No headache, no sore throat  RESPIRATORY: No cough, wheezing, or shortness of breath  CARDIOVASCULAR: No chest pain, palpitations  GASTROINTESTINAL: no abd pain, no nausea, vomiting, or diarrhea  GENITOURINARY: + dysuria, no frequency, or hematuria  NEUROLOGICAL: no focal weakness or dizziness  MUSCULOSKELETAL: no myalgias     Vital Signs Last 24 Hrs  T(C): 35.7 (26 Apr 2022 11:53), Max: 36.4 (25 Apr 2022 20:58)  T(F): 96.2 (26 Apr 2022 11:53), Max: 97.5 (25 Apr 2022 20:58)  HR: 56 (26 Apr 2022 11:53) (56 - 68)  BP: 102/61 (26 Apr 2022 11:53) (102/61 - 124/76)  BP(mean): --  RR: 18 (26 Apr 2022 11:53) (17 - 18)  SpO2: 94% (26 Apr 2022 11:53) (91% - 94%)    PHYSICAL EXAM:  GENERAL: NAD, frail appearing, pleasant, sitting in chair  HEENT:  anicteric, moist mucous membranes, no conjunctival pallor  CHEST/LUNG:  CTA b/l, no rales, wheezes, or rhonchi  HEART:  RRR, S1, S2  ABDOMEN:  BS+, soft, nondistended, nontender  EXTREMITIES: no cyanosis, or calf tenderness +midline L arm, 2+ pitting edema of RLE, 1+ of LLE  NERVOUS SYSTEM: answers questions and follows commands appropriately    LABS:                        10.0   17.53 )-----------( 135      ( 26 Apr 2022 07:49 )             32.6     CBC Full  -  ( 26 Apr 2022 07:49 )  WBC Count : 17.53 K/uL  Hemoglobin : 10.0 g/dL  Hematocrit : 32.6 %  Platelet Count - Automated : 135 K/uL  Mean Cell Volume : 93.7 fl  Mean Cell Hemoglobin : 28.7 pg  Mean Cell Hemoglobin Concentration : 30.7 gm/dL  Auto Neutrophil # : x  Auto Lymphocyte # : x  Auto Monocyte # : x  Auto Eosinophil # : x  Auto Basophil # : x  Auto Neutrophil % : x  Auto Lymphocyte % : x  Auto Monocyte % : x  Auto Eosinophil % : x  Auto Basophil % : x    26 Apr 2022 08:47    135    |  108    |  32     ----------------------------<  80     4.8     |  20     |  0.83     Ca    8.8        26 Apr 2022 08:47    TPro  6.9    /  Alb  1.6    /  TBili  0.5    /  DBili  x      /  AST  30     /  ALT  12     /  AlkPhos  100    26 Apr 2022 08:47      Urinalysis Basic - ( 26 Apr 2022 11:20 )    Color: x / Appearance: x / SG: x / pH: x  Gluc: x / Ketone: x  / Bili: x / Urobili: x   Blood: x / Protein: x / Nitrite: x   Leuk Esterase: x / RBC: 11-25 /HPF / WBC 26-50   Sq Epi: x / Non Sq Epi: Occasional / Bacteria: Many      CAPILLARY BLOOD GLUCOSE            Culture - Blood (collected 04-23-22 @ 13:00)  Source: .Blood Blood-Peripheral  Preliminary Report (04-24-22 @ 13:02):    No growth to date.    Culture - Blood (collected 04-23-22 @ 13:00)  Source: .Blood Blood-Peripheral  Preliminary Report (04-24-22 @ 13:02):    No growth to date.        RADIOLOGY & ADDITIONAL TESTS:    Personally reviewed.     Consultant(s) Notes Reviewed:  [x] YES  [ ] NO

## 2022-04-26 NOTE — CHART NOTE - NSCHARTNOTEFT_GEN_A_CORE
Assessment: 89 y/o female adm with anemia. PMH CMML, afib, SBO, uterine ca, breast ca.   Pt visited at bedside this am. Pt reports that her appetite is fair to good, tolerating diet. No complaints. No food preferences at this time. Enjoys Ensure supplement. Dislike Bay. Last BM 4/25.      Factors impacting intake: [x ] none [ ] nausea  [ ] vomiting [ ] diarrhea [ ] constipation  [ ]chewing problems [ ] swallowing issues  [ ] other:     Diet Presciption: Diet, Regular:   Bay(7 Gm Arginine/7 Gm Glut/1.2 Gm HMB     Qty per Day:  2  Supplement Feeding Modality:  Oral  Ensure Enlive Cans or Servings Per Day:  1       Frequency:  Daily (04-23-22 @ 14:57)    Intake: fair to good     Current Weight: Weight (kg): 72.121 (04-23 @ 13:31)  1+ left/right leg    % Weight Change    Pertinent Medications: MEDICATIONS  (STANDING):  anastrozole 1 milliGRAM(s) Oral daily  ascorbic acid 500 milliGRAM(s) Oral daily  collagenase Ointment 1 Application(s) Topical two times a day  digoxin     Tablet 125 MICROGram(s) Oral daily  escitalopram 10 milliGRAM(s) Oral daily  gabapentin 200 milliGRAM(s) Oral at bedtime  levothyroxine 25 MICROGram(s) Oral daily  metoprolol tartrate 25 milliGRAM(s) Oral daily  mirtazapine 15 milliGRAM(s) Oral at bedtime  multivitamin 1 Tablet(s) Oral daily  nystatin Powder 1 Application(s) Topical two times a day  pantoprazole    Tablet 40 milliGRAM(s) Oral before breakfast  polyethylene glycol 3350 17 Gram(s) Oral daily  senna 2 Tablet(s) Oral at bedtime    MEDICATIONS  (PRN):  acetaminophen     Tablet .. 650 milliGRAM(s) Oral every 6 hours PRN Temp greater or equal to 38C (100.4F), Mild Pain (1 - 3)  aluminum hydroxide/magnesium hydroxide/simethicone Suspension 30 milliLiter(s) Oral every 4 hours PRN Dyspepsia  bisacodyl Suppository 10 milliGRAM(s) Rectal daily PRN Constipation  magnesium hydroxide Suspension 15 milliLiter(s) Oral at bedtime PRN Constipation  melatonin 3 milliGRAM(s) Oral at bedtime PRN Insomnia  ondansetron Injectable 4 milliGRAM(s) IV Push every 8 hours PRN Nausea and/or Vomiting    Pertinent Labs: 04-26 Na135 mmol/L Glu 80 mg/dL K+ 4.8 mmol/L Cr  0.83 mg/dL BUN 32 mg/dL<H> 04-23 Phos 3.4 mg/dL 04-26 Alb 1.6 g/dL<L>     CAPILLARY BLOOD GLUCOSE        Skin: Sacrum Stage III, coccyx Stage II     Estimated Needs:   [x ] no change since previous assessment  [ ] recalculated:     Previous Nutrition Diagnosis:   [ ] Inadequate Energy Intake [ ]Inadequate Oral Intake [ ] Excessive Energy Intake   [ ] Underweight [ ] Increased Nutrient Needs [ ] Overweight/Obesity   [ ] Altered GI Function [ ] Unintended Weight Loss [ ] Food & Nutrition Related Knowledge Deficit [ x] Malnutrition     Nutrition Diagnosis is [x ] ongoing  [ ] resolved [ ] not applicable     New Nutrition Diagnosis: [ x] not applicable       Interventions:   Recommend  [ ] Change Diet To:  [ ] Nutrition Supplement  [ ] Nutrition Support  [x ] Other: continue current diet order with Ensure supplement; may consider d/cing Bay as pt dislikes the taste of Bay.     Monitoring and Evaluation:   [x ] PO intake [ x ] Tolerance to diet prescription [ x ] weights [ x ] labs[ x ] follow up per protocol  [ ] other:

## 2022-04-26 NOTE — CONSULT NOTE ADULT - SUBJECTIVE AND OBJECTIVE BOX
[facility]  INFECTIOUS DISEASES   [address] [place] [address]  Tel: [phone]     Fax: [phone]  ======================================================  MD Dave Farrar Kaushal, MD Cho, Michelle, MD   ======================================================    [facility]-[id number]  NICK RIDDLE     CC: Patient is a 89y old  Female who presents with a chief complaint of anemia (26 Apr 2022 12:42)    HPI:  Patient is a 88 y/o F with pmhx chronic a fib (not on a/c due to hx bleeding in past), breast cancer (s/p b/l mastectomy), uterine cancer (s/p hysterectomy), 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 The patient admits occasional dizziness upon sitting up in bed. States she was at [place] earlier in April for blood transfusion due to anemia. The patient admits to chronic L wrist pain and generalized R leg pain as well. Is wheelchair bound at baseline with chronic LE weakness. The patient denies focal weakness, dizziness or headaches at this time, chest pain, SOB, palpitations, fevers, chills, n/v/d/c.    On admission:  - H/H 7.1/23.4     (22 Apr 2022 13:19)      PAST MEDICAL & SURGICAL HISTORY:  History of breast cancer    History of uterine cancer    H/O small bowel obstruction  hx ileocecal bypass 3/11/2021    CMML (chronic myelomonocytic leukemia)    Chronic atrial fibrillation  not on a/c d/t hx bleeding        Social Hx:     FAMILY HISTORY:      Allergies    contrast media (iodine-based) (Unknown)  iodine (Short breath)  morphine (Unknown)  penicillins (Unknown)  strawberry (Hives)  Sulfacetamide Sodium (Rash)    Intolerances        Antibiotics:  MEDICATIONS  (STANDING):  anastrozole 1 milliGRAM(s) Oral daily  ascorbic acid 500 milliGRAM(s) Oral daily  cefTRIAXone   IVPB      collagenase Ointment 1 Application(s) Topical two times a day  digoxin     Tablet 125 MICROGram(s) Oral daily  escitalopram 10 milliGRAM(s) Oral daily  gabapentin 200 milliGRAM(s) Oral at bedtime  levothyroxine 25 MICROGram(s) Oral daily  metoprolol tartrate 25 milliGRAM(s) Oral daily  mirtazapine 15 milliGRAM(s) Oral at bedtime  multivitamin 1 Tablet(s) Oral daily  nystatin Powder 1 Application(s) Topical two times a day  pantoprazole    Tablet 40 milliGRAM(s) Oral before breakfast  polyethylene glycol 3350 17 Gram(s) Oral daily  senna 2 Tablet(s) Oral at bedtime    MEDICATIONS  (PRN):  acetaminophen     Tablet .. 650 milliGRAM(s) Oral every 6 hours PRN Temp greater or equal to 38C (100.4F), Mild Pain (1 - 3)  aluminum hydroxide/magnesium hydroxide/simethicone Suspension 30 milliLiter(s) Oral every 4 hours PRN Dyspepsia  bisacodyl Suppository 10 milliGRAM(s) Rectal daily PRN Constipation  magnesium hydroxide Suspension 15 milliLiter(s) Oral at bedtime PRN Constipation  melatonin 3 milliGRAM(s) Oral at bedtime PRN Insomnia  ondansetron Injectable 4 milliGRAM(s) IV Push every 8 hours PRN Nausea and/or Vomiting       REVIEW OF SYSTEMS:  CONSTITUTIONAL:  No Fever or chills  HEENT:  No diplopia or blurred vision.  No sore throat or runny nose.  CARDIOVASCULAR:  No chest pain or SOB.  RESPIRATORY:  No cough, shortness of breath, PND or orthopnea.  GASTROINTESTINAL:  No nausea, vomiting or diarrhea.  GENITOURINARY:  No dysuria, frequency or urgency. No Blood in urine  MUSCULOSKELETAL:  no joint aches, no muscle pain  SKIN:  No change in skin, hair or nails.  NEUROLOGIC:  No paresthesias, fasciculations, seizures or weakness.  PSYCHIATRIC:  No disorder of thought or mood.  ENDOCRINE:  No heat or cold intolerance, polyuria or polydipsia.  HEMATOLOGICAL:  No easy bruising or bleeding.     Physical Exam:  Vital Signs Last 24 Hrs  T(C): 35.7 (26 Apr 2022 11:53), Max: 36.4 (25 Apr 2022 20:58)  T(F): 96.2 (26 Apr 2022 11:53), Max: 97.5 (25 Apr 2022 20:58)  HR: 56 (26 Apr 2022 11:53) (56 - 68)  BP: 102/61 (26 Apr 2022 11:53) (102/61 - 124/76)  BP(mean): --  RR: 18 (26 Apr 2022 11:53) (17 - 18)  SpO2: 94% (26 Apr 2022 11:53) (91% - 94%)  GEN: NAD  HEENT: normocephalic and atraumatic. EOMI. PERRL.    NECK: Supple.  No lymphadenopathy   LUNGS: Clear to auscultation.  HEART: Regular rate and rhythm without murmur.  ABDOMEN: Soft, nontender, and nondistended.  Positive bowel sounds.    : No CVA tenderness  EXTREMITIES: Without any cyanosis, clubbing, rash, lesions or edema.  NEUROLOGIC: grossly intact.  PSYCHIATRIC: Appropriate affect .  SKIN: No ulceration or induration present.    Labs:  04-26    135  |  108  |  32<H>  ----------------------------<  80  4.8   |  20<L>  |  0.83    Ca    8.8      26 Apr 2022 08:47    TPro  6.9  /  Alb  1.6<L>  /  TBili  0.5  /  DBili  x   /  AST  30  /  ALT  12  /  AlkPhos  100  04-26                        10.0   17.53 )-----------( 135      ( 26 Apr 2022 07:49 )             32.6     Urinalysis Basic - ( 26 Apr 2022 11:20 )    Color: x / Appearance: x / SG: x / pH: x  Gluc: x / Ketone: x  / Bili: x / Urobili: x   Blood: x / Protein: x / Nitrite: x   Leuk Esterase: x / RBC: 11-25 /HPF / WBC 26-50   Sq Epi: x / Non Sq Epi: Occasional / Bacteria: Many    LIVER FUNCTIONS - ( 26 Apr 2022 08:47 )  Alb: 1.6 g/dL / Pro: 6.9 g/dL / ALK PHOS: 100 U/L / ALT: 12 U/L / AST: 30 U/L / GGT: x           COVID-19 PCR: NotDetec (04-25-22 @ 13:58)    RECENT CULTURES:  04-23 @ 13:00 .Blood Blood-Peripheral     No growth to date.    All imaging and other data have been reviewed.  < from: CT Abdomen and Pelvis No Cont (04.24.22 @ 11:38) >  IMPRESSION:  Limited evaluation of bowel given lack of oral and intravenous contrast.   Questionable stricturing in the region of the sigmoid colon where there   is diverticular disease. Thickened small bowel loops in the left   hemiabdomen. Repeat examination with oral and intravenous contrast is   recommended.  L2 lucency with cortical disruption concerning for pathologic fracture.    Assessment and Plan:       Thank you for courtesy of this consult.     Will follow.  Discussed with the primary team.     Parrish Amaya MD  Division of Infectious Diseases   Please call ID service at [phone] with any question.      55 minutes spent on total encounter assessing patient, examination, chart reivew, counseling and coordinating care by the attending physician/nurse/care manager.   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 States she was at [place] earlier in April for blood transfusion due to anemia. She also had low H/H in admission on 4/22 at [facility] for which had transfusion. Today she started complaining of pain in LLQ and urinary symptoms so UA was sent that came back positive for WBC.   ID has been called for possible UTI and recommendations.     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  CMML (chronic myelomonocytic leukemia)  Chronic atrial fibrillation  not on a/c d/t hx bleeding    Social Hx: no smoking, ETOH or drugs     FAMILY HISTORY: noncontributory     Allergies  contrast media (iodine-based) (Unknown)  iodine (Short breath)  morphine (Unknown)  penicillins (Unknown)  strawberry (Hives)  Sulfacetamide Sodium (Rash)    Antibiotics:  MEDICATIONS  (STANDING):  anastrozole 1 milliGRAM(s) Oral daily  ascorbic acid 500 milliGRAM(s) Oral daily  cefTRIAXone   IVPB      collagenase Ointment 1 Application(s) Topical two times a day  digoxin     Tablet 125 MICROGram(s) Oral daily  escitalopram 10 milliGRAM(s) Oral daily  gabapentin 200 milliGRAM(s) Oral at bedtime  levothyroxine 25 MICROGram(s) Oral daily  metoprolol tartrate 25 milliGRAM(s) Oral daily  mirtazapine 15 milliGRAM(s) Oral at bedtime  multivitamin 1 Tablet(s) Oral daily  nystatin Powder 1 Application(s) Topical two times a day  pantoprazole    Tablet 40 milliGRAM(s) Oral before breakfast  polyethylene glycol 3350 17 Gram(s) Oral daily  senna 2 Tablet(s) Oral at bedtime    REVIEW OF SYSTEMS:  CONSTITUTIONAL:  No Fever or chills  HEENT:  No diplopia or blurred vision.  No sore throat or runny nose.  CARDIOVASCULAR:  No chest pain or SOB.  RESPIRATORY:  No cough, shortness of breath, PND or orthopnea.  GASTROINTESTINAL:  No nausea, vomiting or diarrhea.  GENITOURINARY:  + dysuria, +frequency or urgency. No Blood in urine, + Left lower abdominal pain   MUSCULOSKELETAL:  no joint aches, no muscle pain  SKIN:  No change in skin, hair or nails.  NEUROLOGIC:  No paresthesias, fasciculations, seizures or weakness.  PSYCHIATRIC:  No disorder of thought or mood.  ENDOCRINE:  No heat or cold intolerance, polyuria or polydipsia.  HEMATOLOGICAL:  No easy bruising or bleeding.     Physical Exam:  Vital Signs Last 24 Hrs  T(C): 35.7 (26 Apr 2022 11:53), Max: 36.4 (25 Apr 2022 20:58)  T(F): 96.2 (26 Apr 2022 11:53), Max: 97.5 (25 Apr 2022 20:58)  HR: 56 (26 Apr 2022 11:53) (56 - 68)  BP: 102/61 (26 Apr 2022 11:53) (102/61 - 124/76)  BP(mean): --  RR: 18 (26 Apr 2022 11:53) (17 - 18)  SpO2: 94% (26 Apr 2022 11:53) (91% - 94%)  GEN: NAD  HEENT: normocephalic and atraumatic. EOMI. PERRL.    NECK: Supple.  No lymphadenopathy   LUNGS: Clear to auscultation.  HEART: Regular rate and rhythm without murmur.  ABDOMEN: Soft, nondistended, mild tenderness in LLQ, Positive bowel sounds.    : No CVA tenderness   EXTREMITIES: Without any cyanosis, clubbing, rash, lesions or edema.  NEUROLOGIC: grossly intact.  PSYCHIATRIC: Appropriate affect .  SKIN: No ulceration or induration present.    Labs:  04-26    135  |  108  |  32<H>  ----------------------------<  80  4.8   |  20<L>  |  0.83    Ca    8.8      26 Apr 2022 08:47    TPro  6.9  /  Alb  1.6<L>  /  TBili  0.5  /  DBili  x   /  AST  30  /  ALT  12  /  AlkPhos  100  04-26                        10.0   17.53 )-----------( 135      ( 26 Apr 2022 07:49 )             32.6     Urinalysis Basic - ( 26 Apr 2022 11:20 )    Color: x / Appearance: x / SG: x / pH: x  Gluc: x / Ketone: x  / Bili: x / Urobili: x   Blood: x / Protein: x / Nitrite: x   Leuk Esterase: x / RBC: 11-25 /HPF / WBC 26-50   Sq Epi: x / Non Sq Epi: Occasional / Bacteria: Many    LIVER FUNCTIONS - ( 26 Apr 2022 08:47 )  Alb: 1.6 g/dL / Pro: 6.9 g/dL / ALK PHOS: 100 U/L / ALT: 12 U/L / AST: 30 U/L / GGT: x           COVID-19 PCR: NotDetec (04-25-22 @ 13:58)    RECENT CULTURES:  04-23 @ 13:00 .Blood Blood-Peripheral     No growth to date.    All imaging and other data have been reviewed.  < from: CT Abdomen and Pelvis No Cont (04.24.22 @ 11:38) >  IMPRESSION:  Limited evaluation of bowel given lack of oral and intravenous contrast.   Questionable stricturing in the region of the sigmoid colon where there   is diverticular disease. Thickened small bowel loops in the left   hemiabdomen. Repeat examination with oral and intravenous contrast is   recommended.  L2 lucency with cortical disruption concerning for pathologic fracture.    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  Today she started complaining of pain in LLQ and urinary symptoms so UA was sent that came back positive for WBC.   UTI is a strong possibility but her pain in LLQ not explained by UTI, usually patients have suprapubic discomfort. Will need to make sure that there is no abdominal pathology, such as diverticulitis, colovesical fistula and ...  Leukocytosis not helpful since she has CMML.     UTI  - Follow up UC  - Agree with ceftriaxone 1gm daily (never had MDROs)  - Abdominal CT with contrast     Thank you for courtesy of this consult.     Will follow.  Discussed with the primary team.     Parrish Amaya MD  Division of Infectious Diseases   Please call ID service at [phone] with any question.      55 minutes spent on total encounter assessing patient, examination, chart reivew, counseling and coordinating care by the attending physician/nurse/care manager.

## 2022-04-26 NOTE — PROGRESS NOTE ADULT - SUBJECTIVE AND OBJECTIVE BOX
SURGERY PA NOTE ON BEHALF OF DR. IRELAND / GENERAL SURGERY:    S: Patient seen and examined at bedside.  No overnight events noted.  Denies abdominal pain, nausea, or vomiting.  Unsure if she is passing flatus.  Admits to loose BM last night.  Tolerating diet without post-prandial complaints.  Urinating without issue.  Minimally OOB, PT working with patient currently.      MEDICATIONS:  acetaminophen     Tablet .. 650 milliGRAM(s) Oral every 6 hours PRN  aluminum hydroxide/magnesium hydroxide/simethicone Suspension 30 milliLiter(s) Oral every 4 hours PRN  anastrozole 1 milliGRAM(s) Oral daily  ascorbic acid 500 milliGRAM(s) Oral daily  bisacodyl Suppository 10 milliGRAM(s) Rectal daily PRN  collagenase Ointment 1 Application(s) Topical two times a day  digoxin     Tablet 125 MICROGram(s) Oral daily  escitalopram 10 milliGRAM(s) Oral daily  gabapentin 200 milliGRAM(s) Oral at bedtime  levothyroxine 25 MICROGram(s) Oral daily  magnesium hydroxide Suspension 15 milliLiter(s) Oral at bedtime PRN  melatonin 3 milliGRAM(s) Oral at bedtime PRN  metoprolol tartrate 25 milliGRAM(s) Oral daily  mirtazapine 15 milliGRAM(s) Oral at bedtime  multivitamin 1 Tablet(s) Oral daily  nystatin Powder 1 Application(s) Topical two times a day  ondansetron Injectable 4 milliGRAM(s) IV Push every 8 hours PRN  pantoprazole    Tablet 40 milliGRAM(s) Oral before breakfast  polyethylene glycol 3350 17 Gram(s) Oral daily  senna 2 Tablet(s) Oral at bedtime      O:  Vital Signs Last 24 Hrs  T(C): 36.3 (26 Apr 2022 04:53), Max: 36.4 (25 Apr 2022 20:58)  T(F): 97.4 (26 Apr 2022 04:53), Max: 97.5 (25 Apr 2022 20:58)  HR: 59 (26 Apr 2022 04:53) (59 - 68)  BP: 120/74 (26 Apr 2022 04:53) (102/69 - 124/76)  RR: 18 (26 Apr 2022 04:53) (17 - 18)  SpO2: 91% (26 Apr 2022 04:53) (91% - 92%)    I&O SUMMARY:    04-25-22 @ 07:01  -  04-26-22 @ 07:00  --------------------------------------------------------  IN: 0 mL / OUT: 450 mL / NET: -450 mL    PHYSICAL EXAM:  Lungs: CTA bilat without W/R/R  Card: S1S2  Abd: Softly distended.  NT.  +BS x 4.  No rebound/guarding.    Ext: Calves soft, NT, without edema bilat    LABS:                        10.0   17.53 )-----------( 135      ( 26 Apr 2022 07:49 )             32.6     04-26    135  |  108  |  32<H>  ----------------------------<  80  4.8   |  20<L>  |  0.83  Ca    8.8      26 Apr 2022 08:47  TPro  6.9  /  Alb  1.6<L>  /  TBili  0.5  /  DBili  x   /  AST  30  /  ALT  12  /  AlkPhos  100  04-26      A:  89-yo female with h/o afib (no AC), BrCa, CML, uterine Ca, h/o SBO (s/p prior ileocolic resection)    P:  Patient currently without complaints  Increase in leukocytosis noted, though chronically elevated 2/2 CML - recs per Heme/Onc  Vitals stable and reassuring, remains afebrile   H&H remains stable   Tolerating diet, with continued bowel function  Surgically stable at present - no invasive surgical treatment currently indicated or warranted  Discussed with Dr. Ireland

## 2022-04-26 NOTE — PROGRESS NOTE ADULT - TIME BILLING
Chart review, examination, documentation, care coordination and counseling.  Updated daughter in afternoon  discussed with surgery , Dr Ireland.

## 2022-04-27 NOTE — PROGRESS NOTE ADULT - PROBLEM SELECTOR PLAN 6
Diagnosed with hypothyroidism and started on Synthroid  Continue Synthroid  Has been on warming blanket PRN for hypothermia.   Hypothermia resolving
Stable  continue escitalopram 10mg po qd and mirtazapine 15mg po qhs
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 Inder Cabrales), f/u recs
local wound care  Continue Santyl  Frequent turns as tolerated
Stable  continue escitalopram 10mg po qd and mirtazapine 15mg po qhs

## 2022-04-27 NOTE — PROGRESS NOTE ADULT - PROBLEM SELECTOR PROBLEM 5
Malignancy
Sacral wound
Sacral wound
Severe protein-calorie malnutrition
Severe protein-calorie malnutrition
Sacral wound

## 2022-04-27 NOTE — PROGRESS NOTE ADULT - PROBLEM SELECTOR PLAN 10
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
CT: L2 lucency with cortical disruption concerning for pathologic fracture.  - F/u XR lumbar spine

## 2022-04-27 NOTE — PROGRESS NOTE ADULT - PROBLEM SELECTOR PLAN 9
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 No surgical intervention warranted at this time. Monitor.
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
GI prophylaxis- continue omeprazole 20mg po qd w pantoprazole 40mg po qd therapeutic interchange  DVT prophylaxis- Venodynes
GI prophylaxis- continue omeprazole 20mg po qd w pantoprazole 40mg po qd therapeutic interchange  DVT prophylaxis- Venodynes

## 2022-04-27 NOTE — PROGRESS NOTE ADULT - SUBJECTIVE AND OBJECTIVE BOX
[facility]  INFECTIOUS DISEASES   [address] [place] [address]  Tel: [phone]     Fax: [phone]  ======================================================  MD Dave Farrar Kaushal, MD Cho, Michelle, MD   ======================================================    [facility]-[id number]  NICK RIDDLE     Follow up: UTI    Today no abdominal pain, feels tired but urinary symptoms improving.  No fever.     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  CMML (chronic myelomonocytic leukemia)  Chronic atrial fibrillation  not on a/c d/t hx bleeding    Social Hx: no smoking, ETOH or drugs     FAMILY HISTORY: noncontributory     Allergies  contrast media (iodine-based) (Unknown)  iodine (Short breath)  morphine (Unknown)  penicillins (Unknown)  strawberry (Hives)  Sulfacetamide Sodium (Rash)    Antibiotics:  MEDICATIONS  (STANDING):  anastrozole 1 milliGRAM(s) Oral daily  ascorbic acid 500 milliGRAM(s) Oral daily  cefTRIAXone   IVPB      collagenase Ointment 1 Application(s) Topical two times a day  digoxin     Tablet 125 MICROGram(s) Oral daily  escitalopram 10 milliGRAM(s) Oral daily  gabapentin 200 milliGRAM(s) Oral at bedtime  levothyroxine 25 MICROGram(s) Oral daily  metoprolol tartrate 25 milliGRAM(s) Oral daily  mirtazapine 15 milliGRAM(s) Oral at bedtime  multivitamin 1 Tablet(s) Oral daily  nystatin Powder 1 Application(s) Topical two times a day  pantoprazole    Tablet 40 milliGRAM(s) Oral before breakfast  polyethylene glycol 3350 17 Gram(s) Oral daily  senna 2 Tablet(s) Oral at bedtime    REVIEW OF SYSTEMS:  CONSTITUTIONAL:  No Fever or chills  HEENT:  No diplopia or blurred vision.  No sore throat or runny nose.  CARDIOVASCULAR:  No chest pain or SOB.  RESPIRATORY:  No cough, shortness of breath, PND or orthopnea.  GASTROINTESTINAL:  No nausea, vomiting or diarrhea.  GENITOURINARY:  + dysuria, +frequency or urgency. No Blood in urine, + Left lower abdominal pain   MUSCULOSKELETAL:  no joint aches, no muscle pain  SKIN:  No change in skin, hair or nails.  NEUROLOGIC:  No paresthesias, fasciculations, seizures or weakness.  PSYCHIATRIC:  No disorder of thought or mood.  ENDOCRINE:  No heat or cold intolerance, polyuria or polydipsia.  HEMATOLOGICAL:  No easy bruising or bleeding.     Physical Exam:  Vital Signs Last 24 Hrs  T(C): 35.6 (27 Apr 2022 12:14), Max: 36.4 (26 Apr 2022 22:10)  T(F): 96.1 (27 Apr 2022 12:14), Max: 97.6 (26 Apr 2022 22:10)  HR: 58 (27 Apr 2022 12:14) (58 - 60)  BP: 106/58 (27 Apr 2022 12:14) (106/58 - 110/64)  BP(mean): --  RR: 17 (27 Apr 2022 12:14) (17 - 18)  SpO2: 91% (27 Apr 2022 12:14) (91% - 93%)  GEN: NAD  HEENT: normocephalic and atraumatic. EOMI. PERRL.    NECK: Supple.  No lymphadenopathy   LUNGS: Clear to auscultation.  HEART: Regular rate and rhythm without murmur.  ABDOMEN: Soft, nondistended, mild tenderness in LLQ, Positive bowel sounds.    : No CVA tenderness   EXTREMITIES: Without any cyanosis, clubbing, rash, lesions or edema.  NEUROLOGIC: grossly intact.  PSYCHIATRIC: Appropriate affect .  SKIN: No ulceration or induration present.      Labs:                        8.4    16.49 )-----------( 119      ( 27 Apr 2022 08:31 )             26.6     04-27    139  |  109<H>  |  29<H>  ----------------------------<  82  4.2   |  23  |  0.69    Ca    8.4<L>      27 Apr 2022 08:31    TPro  6.9  /  Alb  1.6<L>  /  TBili  0.5  /  DBili  x   /  AST  30  /  ALT  12  /  AlkPhos  100  04-26    Culture - Blood (collected 04-23-22 @ 13:00)  Source: .Blood Blood-Peripheral    Culture - Blood (collected 04-23-22 @ 13:00)  Source: .Blood Blood-Peripheral    WBC Count: 16.49 K/uL (04-27-22 @ 08:31)  WBC Count: 17.53 K/uL (04-26-22 @ 07:49)  WBC Count: 16.11 K/uL (04-25-22 @ 10:37)  WBC Count: 15.42 K/uL (04-24-22 @ 09:46)  WBC Count: 14.05 K/uL (04-23-22 @ 03:08)  WBC Count: 15.04 K/uL (04-22-22 @ 14:46)    Creatinine, Serum: 0.69 mg/dL (04-27-22 @ 08:31)  Creatinine, Serum: 0.83 mg/dL (04-26-22 @ 08:47)  Creatinine, Serum: 0.89 mg/dL (04-25-22 @ 10:37)  Creatinine, Serum: 1.10 mg/dL (04-24-22 @ 09:46)  Creatinine, Serum: 1.10 mg/dL (04-23-22 @ 03:08)  Creatinine, Serum: 1.10 mg/dL (04-22-22 @ 14:46)    Ferritin, Serum: 493 ng/mL (04-23-22 @ 10:24)     COVID-19 PCR: NotDetec (04-25-22 @ 13:58)    All imaging and other data have been reviewed.  < from: CT Abdomen and Pelvis No Cont (04.24.22 @ 11:38) >  IMPRESSION:  Limited evaluation of bowel given lack of oral and intravenous contrast.   Questionable stricturing in the region of the sigmoid colon where there   is diverticular disease. Thickened small bowel loops in the left   hemiabdomen. Repeat examination with oral and intravenous contrast is   recommended.  L2 lucency with cortical disruption concerning for pathologic fracture.    Assessment and Plan:   89 y/o woman with PMH of A fib, breast cancer (s/p b/l mastectomy), uterine cancer (s/p hysterectomy), SBO (s/p ileocolic bypass on 3/11/21) and CML, was admitted from [facility] for dizziness and anemia.  She had urinary symptoms so UA was sent that came back positive for WBC.   Leukocytosis not helpful since she has CMML.     UTI  - Follow up UC  - Continue ceftriaxone 1gm daily (never had MDROs)  - Can treat for 3-5days     Will sign off please call with any question.   Discussed with the primary team.     Parrish Amaya MD  Division of Infectious Diseases   Please call ID service at [phone] with any question.      35 minutes spent on total encounter assessing patient, examination, chart reivew, counseling and coordinating care by the attending physician/nurse/care manager.

## 2022-04-27 NOTE — PROGRESS NOTE ADULT - PROBLEM SELECTOR PLAN 4
Nutrition consult  Encourage nutritional supplements as tolerated
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
- stable and currently rate controlled  - Discontinued telemetry monitoring  - Continue home metoprolol 25mg po qd with hold parameters and digoxin 125mcg po qd  - digxin levels 2.1  - Not on anticoagulation at this time with anemia and FOBT positive as risk outweighs benefits at this time.  - Per daughter blood thinner was discontinued in 2019 after acute blood loss and hematoma following mastectomy.  Hematologist - Dr Martín Neal
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
- stable and currently rate controlled  - Discontinued telemetry monitoring  - Continue home metoprolol 25mg po qd with hold parameters and digoxin 125mcg po qd  - digxin levels 2.1  - Not on anticoagulation at this time with anemia and FOBT positive as risk outweighs benefits at this time.  - Per daughter blood thinner was discontinued in 2019 after acute blood loss and hematoma following mastectomy.  Hematologist - Dr Martín Neal

## 2022-04-27 NOTE — PROGRESS NOTE ADULT - SUBJECTIVE AND OBJECTIVE BOX
MEDICAL ATTENDING NOTE    Patient is a 89y old  Female who presents with a chief complaint of anemia (27 Apr 2022 13:15)      INTERVAL HPI/OVERNIGHT EVENTS: offers no new complaints today    MEDICATIONS  (STANDING):  anastrozole 1 milliGRAM(s) Oral daily  ascorbic acid 500 milliGRAM(s) Oral daily  cefTRIAXone   IVPB      cefTRIAXone   IVPB 1000 milliGRAM(s) IV Intermittent every 24 hours  collagenase Ointment 1 Application(s) Topical two times a day  digoxin     Tablet 125 MICROGram(s) Oral daily  escitalopram 10 milliGRAM(s) Oral daily  gabapentin 200 milliGRAM(s) Oral at bedtime  levothyroxine 25 MICROGram(s) Oral daily  metoprolol tartrate 25 milliGRAM(s) Oral daily  mirtazapine 15 milliGRAM(s) Oral at bedtime  multivitamin 1 Tablet(s) Oral daily  nystatin Powder 1 Application(s) Topical two times a day  pantoprazole    Tablet 40 milliGRAM(s) Oral before breakfast  polyethylene glycol 3350 17 Gram(s) Oral daily  senna 2 Tablet(s) Oral at bedtime    MEDICATIONS  (PRN):  acetaminophen     Tablet .. 650 milliGRAM(s) Oral every 6 hours PRN Temp greater or equal to 38C (100.4F), Mild Pain (1 - 3)  aluminum hydroxide/magnesium hydroxide/simethicone Suspension 30 milliLiter(s) Oral every 4 hours PRN Dyspepsia  bisacodyl Suppository 10 milliGRAM(s) Rectal daily PRN Constipation  magnesium hydroxide Suspension 15 milliLiter(s) Oral at bedtime PRN Constipation  melatonin 3 milliGRAM(s) Oral at bedtime PRN Insomnia  ondansetron Injectable 4 milliGRAM(s) IV Push every 8 hours PRN Nausea and/or Vomiting      __________________________________________________  ----------------------------------------------------------------------------------  REVIEW OF SYSTEMS: negative      Vital Signs Last 24 Hrs  T(C): 35.6 (27 Apr 2022 12:14), Max: 36.4 (26 Apr 2022 22:10)  T(F): 96.1 (27 Apr 2022 12:14), Max: 97.6 (26 Apr 2022 22:10)  HR: 58 (27 Apr 2022 12:14) (58 - 60)  BP: 106/58 (27 Apr 2022 12:14) (106/58 - 110/64)  BP(mean): --  RR: 17 (27 Apr 2022 12:14) (17 - 18)  SpO2: 91% (27 Apr 2022 12:14) (91% - 93%)    _________________  PHYSICAL EXAM:  ---------------------------   NAD; Normocephalic;   LUNGS - no wheezing  HEART: S1 S2+   ABDOMEN: Soft, Nontender, non distended  EXTREMITIES: no cyanosis; no edema  NERVOUS SYSTEM:  Awake and alert; no focal neuro deficits appreciated    _________________________________________________  LABS:                        9.7    17.72 )-----------( 114      ( 27 Apr 2022 13:00 )             31.3     04-27    139  |  109<H>  |  29<H>  ----------------------------<  82  4.2   |  23  |  0.69    Ca    8.4<L>      27 Apr 2022 08:31    TPro  6.9  /  Alb  1.6<L>  /  TBili  0.5  /  DBili  x   /  AST  30  /  ALT  12  /  AlkPhos  100  04-26      Urinalysis Basic - ( 26 Apr 2022 11:20 )                      Plan of care was discussed with patient and daughter ; all questions and concerns were addressed and care was aligned with patient's wishes.             MEDICAL ATTENDING NOTE    Patient is a 89y old  Female who presents with a chief complaint of anemia (27 Apr 2022 13:15)      INTERVAL HPI/OVERNIGHT EVENTS: feels tired otherwise offers no new complaints today    MEDICATIONS  (STANDING):  anastrozole 1 milliGRAM(s) Oral daily  ascorbic acid 500 milliGRAM(s) Oral daily  cefTRIAXone   IVPB      cefTRIAXone   IVPB 1000 milliGRAM(s) IV Intermittent every 24 hours  collagenase Ointment 1 Application(s) Topical two times a day  digoxin     Tablet 125 MICROGram(s) Oral daily  escitalopram 10 milliGRAM(s) Oral daily  gabapentin 200 milliGRAM(s) Oral at bedtime  levothyroxine 25 MICROGram(s) Oral daily  metoprolol tartrate 25 milliGRAM(s) Oral daily  mirtazapine 15 milliGRAM(s) Oral at bedtime  multivitamin 1 Tablet(s) Oral daily  nystatin Powder 1 Application(s) Topical two times a day  pantoprazole    Tablet 40 milliGRAM(s) Oral before breakfast  polyethylene glycol 3350 17 Gram(s) Oral daily  senna 2 Tablet(s) Oral at bedtime    MEDICATIONS  (PRN):  acetaminophen     Tablet .. 650 milliGRAM(s) Oral every 6 hours PRN Temp greater or equal to 38C (100.4F), Mild Pain (1 - 3)  aluminum hydroxide/magnesium hydroxide/simethicone Suspension 30 milliLiter(s) Oral every 4 hours PRN Dyspepsia  bisacodyl Suppository 10 milliGRAM(s) Rectal daily PRN Constipation  magnesium hydroxide Suspension 15 milliLiter(s) Oral at bedtime PRN Constipation  melatonin 3 milliGRAM(s) Oral at bedtime PRN Insomnia  ondansetron Injectable 4 milliGRAM(s) IV Push every 8 hours PRN Nausea and/or Vomiting      __________________________________________________  ----------------------------------------------------------------------------------  REVIEW OF SYSTEMS: negative for fever or SOB; no chest pain; had soft BM this morning      Vital Signs Last 24 Hrs  T(C): 35.6 (27 Apr 2022 12:14), Max: 36.4 (26 Apr 2022 22:10)  T(F): 96.1 (27 Apr 2022 12:14), Max: 97.6 (26 Apr 2022 22:10)  HR: 58 (27 Apr 2022 12:14) (58 - 60)  BP: 106/58 (27 Apr 2022 12:14) (106/58 - 110/64)  RR: 17 (27 Apr 2022 12:14) (17 - 18)  SpO2: 91% (27 Apr 2022 12:14) (91% - 93%)    _________________  PHYSICAL EXAM:  ---------------------------   NAD; Normocephalic; frail+  LUNGS - no wheezing  HEART: S1 S2+   ABDOMEN: Soft, Nontender, non distended, BS+  EXTREMITIES: no cyanosis; no edema  NERVOUS SYSTEM:  Awake and alert; no focal neuro deficits appreciated  SKIN: sacral pressure ulcer + with dressing+    _________________________________________________  LABS:                        9.7    17.72 )-----------( 114      ( 27 Apr 2022 13:00 )             31.3     04-27    139  |  109<H>  |  29<H>  ----------------------------<  82  4.2   |  23  |  0.69    Ca    8.4<L>      27 Apr 2022 08:31    TPro  6.9  /  Alb  1.6<L>  /  TBili  0.5  /  DBili  x   /  AST  30  /  ALT  12  /  AlkPhos  100  04-26      Urinalysis Basic - ( 26 Apr 2022 11:20 )                      Plan of care was discussed with patient and daughter ; all questions and concerns were addressed and care was aligned with patient's wishes.

## 2022-04-27 NOTE — PROGRESS NOTE ADULT - PROBLEM SELECTOR PROBLEM 3
Hypothyroidism
Chronic atrial fibrillation
Severe protein-calorie malnutrition
Hypothyroidism

## 2022-04-27 NOTE — PROGRESS NOTE ADULT - TIME BILLING
direct patient care including but not limited to reviewing chart, medications ,laboratory data, imaging reports, discussion of plan of care with consultants on the case, coordination of care with multidisciplinary team involved in the case and discussion of plan with patient.  Patient's daughter receptive and agreeable to plan of care and verbalized understanding the anticipated hospital course and discharge plan.  Planned for discharge to [facility] today if remains stable.   Patient and daughter requesting for discharge tomorrow rather than today.   Medically stable at this time for discharge although overall prognosis is guarded.   DNR/DNI

## 2022-04-27 NOTE — PROGRESS NOTE ADULT - PROBLEM SELECTOR PROBLEM 9
Need for prophylactic measure
H/O small bowel obstruction
Need for prophylactic measure
H/O small bowel obstruction

## 2022-04-27 NOTE — PROGRESS NOTE ADULT - PROBLEM SELECTOR PLAN 5
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
Continue local wound care  Continue Santyl  Frequent turns as tolerated
local wound care  Continue Santyl  Frequent turns as tolerated
Nutrition consult  Encourage nutritional supplements as tolerated
Nutrition consult  Encourage nutritional supplements as tolerated
local wound care  Continue Santyl  Frequent turns as tolerated

## 2022-04-27 NOTE — CHART NOTE - NSCHARTNOTEFT_GEN_A_CORE
Called by RN as patient hypotensive to 96 systolic, though trends have been low previously. Pt seen and examined at bedside. Pt reports pain at the site of her sacral wound and that she feels cold.    T(C): 36.2 (04-27-22 @ 20:51), Max: 36.4 (04-27-22 @ 05:18)  HR: 74 (04-27-22 @ 20:51) (58 - 74)  BP: 96/60 (04-27-22 @ 21:11) (96/60 - 106/62)  RR: 17 (04-27-22 @ 20:51) (17 - 17)  SpO2: 92% (04-27-22 @ 20:51) (91% - 93%)    GENERAL: NAD, in pain.  EYES: sclera clear, no exudates  ENMT: oropharynx clear without erythema, no exudates, moist mucous membranes  LUNGS: good air entry bilaterally, clear to auscultation, symmetric breath sounds, no wheezing or rhonchi appreciated  HEART: soft S1/S2, regular rate and rhythm, no murmurs noted. +2 pitting edema on anterior tibias b/l.  GASTROINTESTINAL: abdomen is soft, nontender, nondistended, normoactive bowel sounds, no palpable masses  INTEGUMENT: good skin turgor, warm skin, appears well perfused    Plan:  Albumin 1.6 upon chart review.   -Given patient hypovolemia with obvious 3rd spacing, Albumin 25% 100cc x 1 ordered.    RN to call with any changes. Will continue to monitor.

## 2022-04-27 NOTE — PROGRESS NOTE ADULT - SUBJECTIVE AND OBJECTIVE BOX
[facility]  Zohaib Carlson PA-C  [facility] [place] [address]  [phone]      INTERVAL HPI/OVERNIGHT EVENTS:  Pt s/e  Pt reports some abdominal discomfort  +BMs  Denies further GI complaints    MEDICATIONS  (STANDING):  anastrozole 1 milliGRAM(s) Oral daily  ascorbic acid 500 milliGRAM(s) Oral daily  cefTRIAXone   IVPB      cefTRIAXone   IVPB 1000 milliGRAM(s) IV Intermittent every 24 hours  collagenase Ointment 1 Application(s) Topical two times a day  digoxin     Tablet 125 MICROGram(s) Oral daily  escitalopram 10 milliGRAM(s) Oral daily  gabapentin 200 milliGRAM(s) Oral at bedtime  levothyroxine 25 MICROGram(s) Oral daily  metoprolol tartrate 25 milliGRAM(s) Oral daily  mirtazapine 15 milliGRAM(s) Oral at bedtime  multivitamin 1 Tablet(s) Oral daily  nystatin Powder 1 Application(s) Topical two times a day  pantoprazole    Tablet 40 milliGRAM(s) Oral before breakfast  polyethylene glycol 3350 17 Gram(s) Oral daily  senna 2 Tablet(s) Oral at bedtime    MEDICATIONS  (PRN):  acetaminophen     Tablet .. 650 milliGRAM(s) Oral every 6 hours PRN Temp greater or equal to 38C (100.4F), Mild Pain (1 - 3)  aluminum hydroxide/magnesium hydroxide/simethicone Suspension 30 milliLiter(s) Oral every 4 hours PRN Dyspepsia  bisacodyl Suppository 10 milliGRAM(s) Rectal daily PRN Constipation  magnesium hydroxide Suspension 15 milliLiter(s) Oral at bedtime PRN Constipation  melatonin 3 milliGRAM(s) Oral at bedtime PRN Insomnia  ondansetron Injectable 4 milliGRAM(s) IV Push every 8 hours PRN Nausea and/or Vomiting      Allergies  contrast media (iodine-based) (Unknown)  iodine (Short breath)  morphine (Unknown)  penicillins (Unknown)  strawberry (Hives)  Sulfacetamide Sodium (Rash)      PHYSICAL EXAM:   Vital Signs:  Vital Signs Last 24 Hrs  T(C): 35.6 (2022 12:14), Max: 36.4 (2022 22:10)  T(F): 96.1 (2022 12:14), Max: 97.6 (2022 22:10)  HR: 58 (2022 12:14) (58 - 60)  BP: 106/58 (2022 12:14) (106/58 - 110/64)  BP(mean): --  RR: 17 (2022 12:14) (17 - 18)  SpO2: 91% (2022 12:14) (91% - 93%)  Daily     Daily Weight in k.3 (2022 05:18)    GENERAL:  Appears stated age  ABDOMEN:  Soft, non-tender, non-distended  NEURO:  Alert      LABS:                        8.4    16.49 )-----------( 119      ( 2022 08:31 )             26.6     04-27    139  |  109<H>  |  29<H>  ----------------------------<  82  4.2   |  23  |  0.69    Ca    8.4<L>      2022 08:31    TPro  6.9  /  Alb  1.6<L>  /  TBili  0.5  /  DBili  x   /  AST  30  /  ALT  12  /  AlkPhos  100  04-26      Urinalysis Basic - ( 2022 11:20 )    Color: x / Appearance: x / SG: x / pH: x  Gluc: x / Ketone: x  / Bili: x / Urobili: x   Blood: x / Protein: x / Nitrite: x   Leuk Esterase: x / RBC: 11-25 /HPF / WBC 26-50   Sq Epi: x / Non Sq Epi: Occasional / Bacteria: Many

## 2022-04-27 NOTE — PROGRESS NOTE ADULT - SUBJECTIVE AND OBJECTIVE BOX
Patient is a 89y old  Female who presents with a chief complaint of anemia (27 Apr 2022 13:05)      INTERVAL HPI/OVERNIGHT EVENTS: Patient seen and examined at bedside. No overnight events occurred. Patient reports pain of sacral wound for which she just received Tylenol. Burning with urination is still present but less than day before. Denies fevers, chills, headache, lightheadedness, chest pain, dyspnea, abdominal pain, n/v/d/c.    MEDICATIONS  (STANDING):  anastrozole 1 milliGRAM(s) Oral daily  ascorbic acid 500 milliGRAM(s) Oral daily  cefTRIAXone   IVPB      cefTRIAXone   IVPB 1000 milliGRAM(s) IV Intermittent every 24 hours  collagenase Ointment 1 Application(s) Topical two times a day  digoxin     Tablet 125 MICROGram(s) Oral daily  escitalopram 10 milliGRAM(s) Oral daily  gabapentin 200 milliGRAM(s) Oral at bedtime  levothyroxine 25 MICROGram(s) Oral daily  metoprolol tartrate 25 milliGRAM(s) Oral daily  mirtazapine 15 milliGRAM(s) Oral at bedtime  multivitamin 1 Tablet(s) Oral daily  nystatin Powder 1 Application(s) Topical two times a day  pantoprazole    Tablet 40 milliGRAM(s) Oral before breakfast  polyethylene glycol 3350 17 Gram(s) Oral daily  senna 2 Tablet(s) Oral at bedtime    MEDICATIONS  (PRN):  acetaminophen     Tablet .. 650 milliGRAM(s) Oral every 6 hours PRN Temp greater or equal to 38C (100.4F), Mild Pain (1 - 3)  aluminum hydroxide/magnesium hydroxide/simethicone Suspension 30 milliLiter(s) Oral every 4 hours PRN Dyspepsia  bisacodyl Suppository 10 milliGRAM(s) Rectal daily PRN Constipation  magnesium hydroxide Suspension 15 milliLiter(s) Oral at bedtime PRN Constipation  melatonin 3 milliGRAM(s) Oral at bedtime PRN Insomnia  ondansetron Injectable 4 milliGRAM(s) IV Push every 8 hours PRN Nausea and/or Vomiting      Allergies    contrast media (iodine-based) (Unknown)  iodine (Short breath)  morphine (Unknown)  penicillins (Unknown)  strawberry (Hives)  Sulfacetamide Sodium (Rash)    Intolerances        REVIEW OF SYSTEMS:  CONSTITUTIONAL: No fever or chills   HEENT:  No headache, no sore throat  RESPIRATORY: No cough, wheezing, or shortness of breath  CARDIOVASCULAR: No chest pain, palpitations  GASTROINTESTINAL: no abd pain, no nausea, vomiting, or diarrhea  GENITOURINARY: + dysuria, no frequency, or hematuria  NEUROLOGICAL: no focal weakness or dizziness  MUSCULOSKELETAL: no myalgias     Vital Signs Last 24 Hrs  T(C): 35.6 (27 Apr 2022 12:14), Max: 36.4 (26 Apr 2022 22:10)  T(F): 96.1 (27 Apr 2022 12:14), Max: 97.6 (26 Apr 2022 22:10)  HR: 58 (27 Apr 2022 12:14) (58 - 60)  BP: 106/58 (27 Apr 2022 12:14) (106/58 - 110/64)  BP(mean): --  RR: 17 (27 Apr 2022 12:14) (17 - 18)  SpO2: 91% (27 Apr 2022 12:14) (91% - 93%)    PHYSICAL EXAM:  GENERAL: NAD, frail appearing, pleasant  HEENT:  anicteric, moist mucous membranes, no conjunctival pallor  CHEST/LUNG:  CTA b/l, no rales, wheezes, or rhonchi  HEART:  RRR, S1, S2  ABDOMEN:  BS+, soft, nondistended, mildly TTP in all 4 quadrants  : Suprapubic TTP  EXTREMITIES: no cyanosis, or calf tenderness +midline L arm, 2+ pitting edema of RLE, 1+ of LLE  NERVOUS SYSTEM: answers questions and follows commands  appropriately    LABS:                        8.4    16.49 )-----------( 119      ( 27 Apr 2022 08:31 )             26.6     CBC Full  -  ( 27 Apr 2022 08:31 )  WBC Count : 16.49 K/uL  Hemoglobin : 8.4 g/dL  Hematocrit : 26.6 %  Platelet Count - Automated : 119 K/uL  Mean Cell Volume : 92.4 fl  Mean Cell Hemoglobin : 29.2 pg  Mean Cell Hemoglobin Concentration : 31.6 gm/dL  Auto Neutrophil # : 13.36 K/uL  Auto Lymphocyte # : 0.82 K/uL  Auto Monocyte # : 2.31 K/uL  Auto Eosinophil # : 0.00 K/uL  Auto Basophil # : 0.00 K/uL  Auto Neutrophil % : 77.0 %  Auto Lymphocyte % : 5.0 %  Auto Monocyte % : 14.0 %  Auto Eosinophil % : 0.0 %  Auto Basophil % : 0.0 %    27 Apr 2022 08:31    139    |  109    |  29     ----------------------------<  82     4.2     |  23     |  0.69     Ca    8.4        27 Apr 2022 08:31        Urinalysis Basic - ( 26 Apr 2022 11:20 )    Color: x / Appearance: x / SG: x / pH: x  Gluc: x / Ketone: x  / Bili: x / Urobili: x   Blood: x / Protein: x / Nitrite: x   Leuk Esterase: x / RBC: 11-25 /HPF / WBC 26-50   Sq Epi: x / Non Sq Epi: Occasional / Bacteria: Many      CAPILLARY BLOOD GLUCOSE            Culture - Blood (collected 04-23-22 @ 13:00)  Source: .Blood Blood-Peripheral  Preliminary Report (04-24-22 @ 13:02):    No growth to date.    Culture - Blood (collected 04-23-22 @ 13:00)  Source: .Blood Blood-Peripheral  Preliminary Report (04-24-22 @ 13:02):    No growth to date.        RADIOLOGY & ADDITIONAL TESTS:    Personally reviewed.     Consultant(s) Notes Reviewed:  [x] YES  [ ] NO

## 2022-04-27 NOTE — DISCHARGE NOTE NURSING/CASE MANAGEMENT/SOCIAL WORK - PATIENT PORTAL LINK FT
You can access the FollowMyHealth Patient Portal offered by [facility] by registering at the following website: [email or web address]. By joining [facility]’s FollowMyHealth portal, you will also be able to view your health information using other applications (apps) compatible with our system.

## 2022-04-27 NOTE — PROGRESS NOTE ADULT - PROBLEM SELECTOR PROBLEM 4
Malignancy
Chronic atrial fibrillation
Severe protein-calorie malnutrition
Malignancy
Malignancy
Chronic atrial fibrillation

## 2022-04-27 NOTE — DISCHARGE NOTE NURSING/CASE MANAGEMENT/SOCIAL WORK - NSDCPEFALRISK_GEN_ALL_CORE
For information on Fall & Injury Prevention, visit: [email or web address] OR  [email or web address] OR  https://www.cdc.gov/steadi/patient.html

## 2022-04-27 NOTE — PROGRESS NOTE ADULT - PROBLEM SELECTOR PLAN 8
GI prophylaxis- continue omeprazole 20mg po qd w pantoprazole 40mg po qd therapeutic interchange  DVT prophylaxis- Venodynes
GI prophylaxis- continue omeprazole 20mg po qd w pantoprazole 40mg po qd therapeutic interchange  DVT prophylaxis- Venodynes
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
Stable  continue escitalopram 10mg po qd and mirtazapine 15mg po qhs
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
Stable  continue escitalopram 10mg po qd and mirtazapine 15mg po qhs

## 2022-04-27 NOTE — PROGRESS NOTE ADULT - PROBLEM SELECTOR PROBLEM 7
Sacral wound
Need for prophylactic measure
Need for prophylactic measure
Mood disorder
Mood disorder
Sacral wound

## 2022-04-27 NOTE — PROGRESS NOTE ADULT - NUTRITIONAL ASSESSMENT
This patient has been assessed with a concern for Malnutrition and has been determined to have a diagnosis/diagnoses of Severe protein-calorie malnutrition.    This patient is being managed with:   Diet Regular-  Bay(7 Gm Arginine/7 Gm Glut/1.2 Gm HMB     Qty per Day:  2  Supplement Feeding Modality:  Oral  Ensure Enlive Cans or Servings Per Day:  1       Frequency:  Daily  Entered: Apr 23 2022  2:57PM    

## 2022-04-27 NOTE — PROGRESS NOTE ADULT - PROBLEM SELECTOR PLAN 2
- Found to have hb 7 on admission- anemia likely multifactorial- malignancy, slow GI loss, nutritional  - FOBT positive- GI consulted; shows no signs of GIB  - s/p 2 untis pRBC on 4/22,  - Drop in Hgb to 8.4 from 10 today, will follow up repeat Hgb at 1600  - iron studies- elevated ferritin, low tranferrin, normal iron saturation appears to be anemia of chronic disease  - hx of malignancy, heme/onc consulted, recs appreciated  - Reticulocytes WNL  - CT A/P: Limited evaluation of bowel given lack of oral and intravenous contrast.   Questionable stricturing in the region of the sigmoid colon where there   is diverticular disease. Thickened small bowel loops in the left   hemiabdomen. Repeat examination with oral and intravenous contrast is   recommended. L2 lucency with cortical disruption concerning for pathologic fracture.  - Surgery consulted, no intervention warranted at this time  - Keep hb>8.5  - Monitor CBC  - Palliative consulted (Dr. Inder Cabrales), f/u recs
- Found to have hb 7 on admission- anemia likely multifactorial- malignancy, slow GI loss, nutritional  - FOBT positive- GI consulted; shows no signs of GIB  - s/p 2 untis pRBC on 4/22, hgb stable  - iron studies- elevated ferritin, low tranferrin, normal iron saturation appears to be anemia of chronic disease  - hx of malignancy, heme/onc consulted, recs appreciated  - Reticulocytes WNL  - CT A/P: Limited evaluation of bowel given lack of oral and intravenous contrast.   Questionable stricturing in the region of the sigmoid colon where there   is diverticular disease. Thickened small bowel loops in the left   hemiabdomen. Repeat examination with oral and intravenous contrast is   recommended. L2 lucency with cortical disruption concerning for pathologic fracture.  - Surgery consulted, possible gastrogaffin study, will f/u recs  - Keep hb>8.5  - Monitor CBC
- stable and currently rate controlled  - Discontinued telemetry monitoring  - Continue home metoprolol 25mg po qd with hold parameters and digoxin 125mcg po qd  - digxin levels 2.1 this AM  - Not on anticoagulation at this time with anemia and FOBT positive as risk outweighs benefits at this time.  - Per daughter blood thinner was discontinued in 2019 after acute blood loss and hematoma following mastectomy.  Hematologist - Dr Martín Neal
newly diagnosed, TSH 12.4, Free T4 0.8  - Will start on Synthroid 25mcg  - Will require outpatient follow up
stable and currently rate controlled  Continue Metoprolol  and digoxin       Not on anticoagulation at this time with anemia and FOBT positive as risk outweighs benefits at this time.  Per daughter blood thinner was discontinued in 2019 after acute blood loss and hematoma following mastectomy.  Hematologist - Dr Martín Neal
stable and currently rate controlled  Discontinued telemetry monitoring  Continue home metoprolol 25mg po qd with hold parameters and digoxin 125mcg po qd  check digoxin level due to risk of toxicity  Not on anticoagulation at this time with anemia and FOBT positive as risk outweighs benefits at this time.  Per daughter blood thinner was discontinued in 2019 after acute blood loss and hematoma following mastectomy.  Hematologist - Dr Martín Neal

## 2022-04-27 NOTE — PROGRESS NOTE ADULT - PROBLEM SELECTOR PROBLEM 2
Anemia
Hypothyroidism
Chronic atrial fibrillation
Chronic atrial fibrillation
Anemia
Chronic atrial fibrillation

## 2022-04-27 NOTE — PROGRESS NOTE ADULT - PROBLEM SELECTOR PLAN 1
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 Amaya) consulted, recs appreciated

## 2022-04-27 NOTE — PROGRESS NOTE ADULT - PROBLEM SELECTOR PLAN 1
Hb has stabilized since transfusion. hb down this morning but thought to be dilutional; repeat hb  stable hb at >9.  Anemia likely multifactorial due to chronic disease, nutritional, possible chronic slow GI loss and possible marrow suppression.   Overall, patient seems to be on the verge of transfusion dependence. I discussed with daughter extensively about this and that patient will benefit from hospice care. Daughter is open to considering hospice but states that she prefers to have patient go to rehab for now and if things don't work out, she will then consider at that time.   Overall prognosis remains guarded to poor.  Due to symptomatic anemia and recurrent need for PRBC transfusion and her comorbid conditions, patient remains at high risk of hospital readmission.

## 2022-04-27 NOTE — PROGRESS NOTE ADULT - PROBLEM SELECTOR PLAN 3
newly diagnosed, TSH 12.4, Free T4 0.8  - Will start on Synthroid 25mcg  - Will require outpatient follow up
Nutrition consult  Encourage nutritional supplements as tolerated
- stable and currently rate controlled  - Discontinued telemetry monitoring  - Continue home metoprolol 25mg po qd with hold parameters and digoxin 125mcg po qd  - digxin levels 2.1  - Not on anticoagulation at this time with anemia and FOBT positive as risk outweighs benefits at this time.  - Per daughter blood thinner was discontinued in 2019 after acute blood loss and hematoma following mastectomy.  Hematologist - Dr Martín Neal
Nutrition consult  Encourage nutritional supplements as tolerated
Continue nutritional supplements as tolerated
newly diagnosed, TSH 12.4, Free T4 0.8  - Continue Synthroid 25mcg  - Will require outpatient follow up

## 2022-04-27 NOTE — PROGRESS NOTE ADULT - SUBJECTIVE AND OBJECTIVE BOX
pt seen  doing well  no complaints  ICU Vital Signs Last 24 Hrs  T(C): 36.4 (27 Apr 2022 05:18), Max: 36.4 (26 Apr 2022 22:10)  T(F): 97.5 (27 Apr 2022 05:18), Max: 97.6 (26 Apr 2022 22:10)  HR: 60 (27 Apr 2022 05:18) (56 - 60)  BP: 106/62 (27 Apr 2022 05:18) (102/61 - 110/64)  BP(mean): --  ABP: --  ABP(mean): --  RR: 17 (27 Apr 2022 05:56) (17 - 18)  SpO2: 93% (27 Apr 2022 05:56) (93% - 94%)  gen-NAD  resp-clear  abd-soft Nt/ND                          8.4    16.49 )-----------( 119      ( 27 Apr 2022 08:31 )             26.6   04-27    139  |  109<H>  |  29<H>  ----------------------------<  82  4.2   |  23  |  0.69    Ca    8.4<L>      27 Apr 2022 08:31    TPro  6.9  /  Alb  1.6<L>  /  TBili  0.5  /  DBili  x   /  AST  30  /  ALT  12  /  AlkPhos  100  04-26

## 2022-04-27 NOTE — PROGRESS NOTE ADULT - PROBLEM SELECTOR PLAN 7
local wound care  Continue Santyl  Tylenol PRN  Frequent turns as tolerated  Wound care following
Stable  continue escitalopram 10mg po qd and mirtazapine 15mg po qhs
local wound care  Continue Santyl  Frequent turns as tolerated
GI prophylaxis- continue omeprazole 20mg po qd w pantoprazole 40mg po qd therapeutic interchange  DVT prophylaxis- Venodynes
Stable; continue escitalopram 10mg po qd and mirtazapine 15mg po qhs
GI prophylaxis- continue omeprazole 20mg po qd w pantoprazole 40mg po qd therapeutic interchange  DVT prophylaxis- Venodynes

## 2022-04-27 NOTE — PROGRESS NOTE ADULT - PROBLEM SELECTOR PLAN 11
CT: L2 lucency with cortical disruption concerning for pathologic fracture.  - XR lumbar spine: Chronic fracture deformities of the L1 and L2 vertebra with degenerative narrowing of L4-5 and 5 S1 vertebra as described
CT: L2 lucency with cortical disruption concerning for pathologic fracture.  - XR lumbar spine: Chronic fracture deformities of the L1 and L2 vertebra with degenerative narrowing of L4-5 and 5 S1 vertebra as described

## 2022-04-27 NOTE — PROGRESS NOTE ADULT - PROBLEM SELECTOR PROBLEM 8
H/O small bowel obstruction
Need for prophylactic measure
Need for prophylactic measure
H/O small bowel obstruction
Mood disorder
Mood disorder

## 2022-04-28 NOTE — PROGRESS NOTE ADULT - ASSESSMENT
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  Stool occult positive  Hematology asked to evaluate for anemia, leukocytosis and mild thrombocytopenia    -CBC findings due to the known Chronic Myelomonocytic Leukemia, additional w/u also sig for hypothyroid, now started on Synthroid. Iron studies c/w chronic ds, B12, folate adequate  -CT scan w ?sigmoid stricture and ?small bowel mucosa thickening, evaluated by GI, does not feel w stricture  -CBC stable  -cont Rx for hypothyroidism which may contribute to the hypothermia and exacerbation anemia. other wise symptomatic management and transfusion of PRBC as clinically indicated from Heme standpoint    will sign off for now, please call if any questions    
89 yo with CT findings concerning for sigmoid stricture. Asymptomatic  cont regular diet 
Anemia  etiology likely multifactorial   no signs of active GI bleeding  Trend CBC   ppi bid  pt has had GI work up ~4-5 years ago which was normal  heme fu  no plans for GI work up for now      Advanced care planning was discussed with patient and family.  Advanced care planning forms were reviewed and discussed.  Risks, benefits and alternatives of gastroenterologic procedures were discussed in detail and all questions were answered.    30 minutes spent. 
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 Admit for anemia and transfusion, c/b acute UTI, newly diagnosed hypothyroidism, and incidental findings on imaging.
Anemia  Abnormal imaging    etiology likely multifactorial   no signs of active GI bleeding  Trend CBC   ppi bid  pt has had GI work up ~4-5 years ago which was normal  heme fu  CT noted, questionable sigmoid stricture  Pt is allergic to iodine contrast, daughter reports she does not recall the exact reaction pt had  Will need repeat imaging with contrast  Consider surgical consult  Will f/u    Advanced care planning was discussed with patient and family.  Advanced care planning forms were reviewed and discussed.  Risks, benefits and alternatives of gastroenterologic procedures were discussed in detail and all questions were answered.    30 minutes spent. 
Anemia  Abnormal imaging    etiology likely multifactorial   no signs of active GI bleeding  ppi bid  pt has had GI work up ~4-5 years ago which was normal  CT noted, questionable sigmoid stricture  +BMs doubt stricture  miralax bid  no GI objection to begin d/c planning  Will f/u    Advanced care planning was discussed with patient and family.  Advanced care planning forms were reviewed and discussed.  Risks, benefits and alternatives of gastroenterologic procedures were discussed in detail and all questions were answered.    30 minutes spent. 
Anemia  Abnormal imaging    etiology likely multifactorial   no signs of active GI bleeding  ppi bid  pt has had GI work up ~4-5 years ago which was normal  CT noted, questionable sigmoid stricture  +BMs doubt stricture  miralax bid  no GI objection to begin d/c planning  Will f/u    Advanced care planning was discussed with patient and family.  Advanced care planning forms were reviewed and discussed.  Risks, benefits and alternatives of gastroenterologic procedures were discussed in detail and all questions were answered.    30 minutes spent.   
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    - hematology asked to evaluate for anemia, leukocytosis and mild thrombocytopenia  - found to have guaiac + stools; iron studies c/w chronic disease  - B12/folate replete  - thyroid function tests c/w hypothyroidism; started on synthroid; may need adjustment of dose  - reviewed non-contrast CT scans which showed possible thickened small bowels and possible sigmoid colon strictures; patient allergic to IV contrast; awaiting GI input with respect to possible invasive procedure  - case discussed with Medicine hospitalist (Dr. Briceño) and GI PA (Mague Carlson)  - will follow
88 yo with CT concerning for stricture, asymptomatic       cont regular diet  signing off, please call if needed
Anemia  Abnormal imaging    etiology likely multifactorial   no signs of active GI bleeding  ppi bid  pt has had GI work up ~4-5 years ago which was normal  CT noted, questionable sigmoid stricture  +BMs doubt stricture  miralax bid  no GI objection to begin d/c planning  Will f/u    Advanced care planning was discussed with patient and family.  Advanced care planning forms were reviewed and discussed.  Risks, benefits and alternatives of gastroenterologic procedures were discussed in detail and all questions were answered.    30 minutes spent.   
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 Admit for anemia requiring PRBC transfusion.
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 Admit for anemia and transfusion.
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 Admit for anemia and transfusion.
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 Admit for anemia and possible transfusion.
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 Admit for anemia and possible transfusion.

## 2022-04-28 NOTE — PROGRESS NOTE ADULT - SUBJECTIVE AND OBJECTIVE BOX
pt seen  no complaints  no pain  no nausea/vomiting  no fever/chills  ICU Vital Signs Last 24 Hrs  T(C): 37.1 (28 Apr 2022 05:00), Max: 37.1 (28 Apr 2022 05:00)  T(F): 98.8 (28 Apr 2022 05:00), Max: 98.8 (28 Apr 2022 05:00)  HR: 75 (28 Apr 2022 05:00) (58 - 75)  BP: 102/65 (28 Apr 2022 05:00) (96/60 - 106/58)  BP(mean): --  ABP: --  ABP(mean): --  RR: 18 (28 Apr 2022 05:00) (17 - 18)  SpO2: 91% (28 Apr 2022 05:00) (91% - 92%)  gen-NAD  resp-clear  abd-soft NT/ND

## 2022-04-28 NOTE — PROGRESS NOTE ADULT - SUBJECTIVE AND OBJECTIVE BOX
[facility]  INFECTIOUS DISEASES   [address] [place] [address]  Tel: [phone]     Fax: [phone]  ======================================================  MD Dave Farrar Kaushal, MD Cho, Michelle, MD   ======================================================    [id number]  NICK RIDDLE     Follow up: UTI    No urinary symptoms. No diarrhea.   No fever.     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  CMML (chronic myelomonocytic leukemia)  Chronic atrial fibrillation  not on a/c d/t hx bleeding    Social Hx: no smoking, ETOH or drugs     FAMILY HISTORY: noncontributory     Allergies  contrast media (iodine-based) (Unknown)  iodine (Short breath)  morphine (Unknown)  penicillins (Unknown)  strawberry (Hives)  Sulfacetamide Sodium (Rash)    Antibiotics:  MEDICATIONS  (STANDING):  anastrozole 1 milliGRAM(s) Oral daily  ascorbic acid 500 milliGRAM(s) Oral daily  cefTRIAXone   IVPB      collagenase Ointment 1 Application(s) Topical two times a day  digoxin     Tablet 125 MICROGram(s) Oral daily  escitalopram 10 milliGRAM(s) Oral daily  gabapentin 200 milliGRAM(s) Oral at bedtime  levothyroxine 25 MICROGram(s) Oral daily  metoprolol tartrate 25 milliGRAM(s) Oral daily  mirtazapine 15 milliGRAM(s) Oral at bedtime  multivitamin 1 Tablet(s) Oral daily  nystatin Powder 1 Application(s) Topical two times a day  pantoprazole    Tablet 40 milliGRAM(s) Oral before breakfast  polyethylene glycol 3350 17 Gram(s) Oral daily  senna 2 Tablet(s) Oral at bedtime    REVIEW OF SYSTEMS:  CONSTITUTIONAL:  No Fever or chills  HEENT:  No diplopia or blurred vision.  No sore throat or runny nose.  CARDIOVASCULAR:  No chest pain or SOB.  RESPIRATORY:  No cough, shortness of breath, PND or orthopnea.  GASTROINTESTINAL:  No nausea, vomiting or diarrhea.  GENITOURINARY:  + dysuria, +frequency or urgency. No Blood in urine, + Left lower abdominal pain   MUSCULOSKELETAL:  no joint aches, no muscle pain  SKIN:  No change in skin, hair or nails.  NEUROLOGIC:  No paresthesias, fasciculations, seizures or weakness.  PSYCHIATRIC:  No disorder of thought or mood.  ENDOCRINE:  No heat or cold intolerance, polyuria or polydipsia.  HEMATOLOGICAL:  No easy bruising or bleeding.     Physical Exam:  Vital Signs Last 24 Hrs  T(C): 36.3 (28 Apr 2022 13:48), Max: 37.1 (28 Apr 2022 05:00)  T(F): 97.4 (28 Apr 2022 13:48), Max: 98.8 (28 Apr 2022 05:00)  HR: 71 (28 Apr 2022 13:48) (69 - 75)  BP: 101/60 (28 Apr 2022 13:48) (96/60 - 107/69)  BP(mean): --  RR: 16 (28 Apr 2022 13:48) (16 - 18)  SpO2: 90% (28 Apr 2022 13:48) (90% - 92%)  GEN: NAD  HEENT: normocephalic and atraumatic. EOMI. PERRL.    NECK: Supple.  No lymphadenopathy   LUNGS: Clear to auscultation.  HEART: Regular rate and rhythm without murmur.  ABDOMEN: Soft, nondistended, mild tenderness in LLQ, Positive bowel sounds.    : No CVA tenderness   EXTREMITIES: Without any cyanosis, clubbing, rash, lesions or edema.  NEUROLOGIC: grossly intact.  PSYCHIATRIC: Appropriate affect .  SKIN: stage 2 sacral ulcer noted today no sign of infection     Labs:                        8.6    x     )-----------( x        ( 28 Apr 2022 10:15 )             26.8     04-28    140  |  109<H>  |  27<H>  ----------------------------<  78  4.0   |  22  |  0.66    Ca    8.3<L>      28 Apr 2022 07:50    TPro  6.2  /  Alb  1.7<L>  /  TBili  0.4  /  DBili  x   /  AST  16  /  ALT  9<L>  /  AlkPhos  89  04-28    Culture - Urine (collected 04-26-22 @ 22:28)  Source: Clean Catch Clean Catch (Midstream)  Final Report (04-28-22 @ 07:42):    >=3 organisms. Probable collection contamination.    Culture - Blood (collected 04-23-22 @ 13:00)  Source: .Blood Blood-Peripheral  Final Report (04-28-22 @ 14:00):    No Growth Final    Culture - Blood (collected 04-23-22 @ 13:00)  Source: .Blood Blood-Peripheral  Final Report (04-28-22 @ 14:00):    No Growth Final    WBC Count: 12.11 K/uL (04-28-22 @ 07:50)  WBC Count: 17.72 K/uL (04-27-22 @ 13:00)  WBC Count: 16.49 K/uL (04-27-22 @ 08:31)  WBC Count: 17.53 K/uL (04-26-22 @ 07:49)  WBC Count: 16.11 K/uL (04-25-22 @ 10:37)  WBC Count: 15.42 K/uL (04-24-22 @ 09:46)    Creatinine, Serum: 0.66 mg/dL (04-28-22 @ 07:50)  Creatinine, Serum: 0.69 mg/dL (04-27-22 @ 08:31)  Creatinine, Serum: 0.83 mg/dL (04-26-22 @ 08:47)  Creatinine, Serum: 0.89 mg/dL (04-25-22 @ 10:37)  Creatinine, Serum: 1.10 mg/dL (04-24-22 @ 09:46)    Ferritin, Serum: 493 ng/mL (04-23-22 @ 10:24)    COVID-19 PCR: NotDetec (04-25-22 @ 13:58)    All imaging and other data have been reviewed.  < from: CT Abdomen and Pelvis No Cont (04.24.22 @ 11:38) >  IMPRESSION:  Limited evaluation of bowel given lack of oral and intravenous contrast.   Questionable stricturing in the region of the sigmoid colon where there   is diverticular disease. Thickened small bowel loops in the left   hemiabdomen. Repeat examination with oral and intravenous contrast is   recommended.  L2 lucency with cortical disruption concerning for pathologic fracture.    Assessment and Plan:   89 y/o woman with PMH of A fib, breast cancer (s/p b/l mastectomy), uterine cancer (s/p hysterectomy), SBO (s/p ileocolic bypass on 3/11/21) and CML, was admitted from [facility] for dizziness and anemia.  She had urinary symptoms so UA was sent that came back positive for WBC.   Leukocytosis not helpful since she has CMML.     UTI  - UC with >3 different bacteria could be contamination   - On ceftriaxone 1gm daily (never had MDROs)  - Can switch to oral cefpodoxime 200mg q12   - Can treat for 5days today    Will sign off please call with any question.     Parrish Amaya MD  Division of Infectious Diseases   Please call ID service at [phone] with any question.      35 minutes spent on total encounter assessing patient, examination, chart reivew, counseling and coordinating care by the attending physician/nurse/care manager.

## 2022-04-28 NOTE — PROGRESS NOTE ADULT - SUBJECTIVE AND OBJECTIVE BOX
[facility]  Zohaib Carlson PA-C  [facility] [place] [address]  [phone]      INTERVAL HPI/OVERNIGHT EVENTS:  Pt s/e  Pt reports some abdominal discomfort  +BMs  Denies further GI complaints    MEDICATIONS  (STANDING):  anastrozole 1 milliGRAM(s) Oral daily  ascorbic acid 500 milliGRAM(s) Oral daily  cefTRIAXone   IVPB      cefTRIAXone   IVPB 1000 milliGRAM(s) IV Intermittent every 24 hours  collagenase Ointment 1 Application(s) Topical two times a day  digoxin     Tablet 125 MICROGram(s) Oral daily  escitalopram 10 milliGRAM(s) Oral daily  gabapentin 200 milliGRAM(s) Oral at bedtime  levothyroxine 25 MICROGram(s) Oral daily  metoprolol tartrate 25 milliGRAM(s) Oral daily  mirtazapine 15 milliGRAM(s) Oral at bedtime  multivitamin 1 Tablet(s) Oral daily  nystatin Powder 1 Application(s) Topical two times a day  pantoprazole    Tablet 40 milliGRAM(s) Oral before breakfast  polyethylene glycol 3350 17 Gram(s) Oral daily  senna 2 Tablet(s) Oral at bedtime    MEDICATIONS  (PRN):  acetaminophen     Tablet .. 650 milliGRAM(s) Oral every 6 hours PRN Temp greater or equal to 38C (100.4F), Mild Pain (1 - 3)  aluminum hydroxide/magnesium hydroxide/simethicone Suspension 30 milliLiter(s) Oral every 4 hours PRN Dyspepsia  bisacodyl Suppository 10 milliGRAM(s) Rectal daily PRN Constipation  magnesium hydroxide Suspension 15 milliLiter(s) Oral at bedtime PRN Constipation  melatonin 3 milliGRAM(s) Oral at bedtime PRN Insomnia  ondansetron Injectable 4 milliGRAM(s) IV Push every 8 hours PRN Nausea and/or Vomiting      Allergies  contrast media (iodine-based) (Unknown)  iodine (Short breath)  morphine (Unknown)  penicillins (Unknown)  strawberry (Hives)  Sulfacetamide Sodium (Rash)      PHYSICAL EXAM:   Vital Signs:  Vital Signs Last 24 Hrs  T(C): 35.6 (2022 12:14), Max: 36.4 (2022 22:10)  T(F): 96.1 (2022 12:14), Max: 97.6 (2022 22:10)  HR: 58 (2022 12:14) (58 - 60)  BP: 106/58 (2022 12:14) (106/58 - 110/64)  BP(mean): --  RR: 17 (2022 12:14) (17 - 18)  SpO2: 91% (2022 12:14) (91% - 93%)  Daily     Daily Weight in k.3 (2022 05:18)    GENERAL:  Appears stated age  ABDOMEN:  Soft, non-tender, non-distended  NEURO:  Alert      LABS:                        8.4    16.49 )-----------( 119      ( 2022 08:31 )             26.6     04-27    139  |  109<H>  |  29<H>  ----------------------------<  82  4.2   |  23  |  0.69    Ca    8.4<L>      2022 08:31    TPro  6.9  /  Alb  1.6<L>  /  TBili  0.5  /  DBili  x   /  AST  30  /  ALT  12  /  AlkPhos  100  04-26      Urinalysis Basic - ( 2022 11:20 )    Color: x / Appearance: x / SG: x / pH: x  Gluc: x / Ketone: x  / Bili: x / Urobili: x   Blood: x / Protein: x / Nitrite: x   Leuk Esterase: x / RBC: 11-25 /HPF / WBC 26-50   Sq Epi: x / Non Sq Epi: Occasional / Bacteria: Many

## 2022-04-28 NOTE — PROGRESS NOTE ADULT - PROVIDER SPECIALTY LIST ADULT
Gastroenterology
Gastroenterology
Infectious Disease
Heme/Onc
Infectious Disease
Gastroenterology
Heme/Onc
Internal Medicine
Surgery
Gastroenterology
Gastroenterology
Surgery
Surgery
Hospitalist
Internal Medicine
Hospitalist

## 2022-04-28 NOTE — PROGRESS NOTE ADULT - REASON FOR ADMISSION
anemia

## 2022-04-28 NOTE — PROVIDER CONTACT NOTE (OTHER) - ACTION/TREATMENT ORDERED:
Zander Pinedo ordered and blood cultures ordered for patient. Bear hugger applied to patient. Will endorse to day nurse to reassess patient accordingly.
per phone conversation with Md  telephone stat orders for hgb & hct at this time. no new orders.

## 2022-05-11 NOTE — ED PROVIDER NOTE - CLINICAL SUMMARY MEDICAL DECISION MAKING FREE TEXT BOX
89F hx of CML, afib on metoprolo, question dig, CML with anemia needing blood transfusion in past, SBO, uterine CA s/p radiation and hysterectomy, breast cancer s/p chemo and bilateral mastectomy here for shock state with 1 week of episodesof hypotension, hypoxia on abxs at nursing facility. LE bilateral swelling and LUE swelling, titrated off O2. Bradycardic, hypotensive, hypothermic. Concern for multifactorial shock, septic with element of cardiogenic. Plethoric IVC with very dilated LA on POCUS, bilateral bilateral lines. Feels cool. No overt signs of bleeding, no blood in stool on exam. Will give 1L crystalloid bolus, give 10mcg epi push to temporize bp, no atropine at this time. Will start levo, discuss with MICU. Check labs, lactate, UA, CXs, CXR, screening EKG, very judicious fluids, empiric abxs.

## 2022-05-11 NOTE — ED PROVIDER NOTE - NSICDXPASTSURGICALHX_GEN_ALL_CORE_FT
PAST SURGICAL HISTORY:  H/O mastectomy     H/O: hysterectomy     History of hysterectomy     S/P bilateral mastectomy

## 2022-05-11 NOTE — ED PROVIDER NOTE - NSICDXPASTMEDICALHX_GEN_ALL_CORE_FT
PAST MEDICAL HISTORY:  Afib     Atrial fibrillation     Breast cancer s/p chemo and mammogram    Breast cancer     Uterine cancer     Uterine cancer s/p radiation and hysterectomy

## 2022-05-11 NOTE — ED PROVIDER NOTE - PHYSICAL EXAMINATION
GENERAL: elderly female, appears ill, hypontesive, bradycardia, sat 98% on RA, hypothermic to 94.9  EYES: Conjunctiva noninjected or pale, sclera anicteric  HENT: NC/AT, slighty dry mucous membranes  NECK: Supple, trachea midline  LUNG: Nonlabored respirations, no wheezes, +scant rales at bases  CV: bradycardic, muffled heart sounds, Pulses- Radial/dorsalis pedis: 1+ bilateral and equal  ABDOMEN: distended, firm, + diffuse tenderness, not peritoneal  MSK: No visible deformities, nontender extremities, + pitting edema LUE, bilateral LE 4+  SKIN: No rashes, bruises  NEURO: awake alert to name name, moaning, occasionally conversing

## 2022-05-11 NOTE — ED PROVIDER NOTE - PROGRESS NOTE DETAILS
Attending Annalee:  have been in room since pt arrival, critically ill, diff broad at this time, cardiogenic shock seems likely given echo, held fluids for now iving pressors, also potentially sepsis, as pt is hypothremic, myxedema coma on diff less likely, bb toxicity?, dig seen on med list, level sent, however labs from earlier today normal cr and k, hg was 10, fast neg. currently consulting ccu and icu as pt has multiple issues, resusc limited d/t dnr/dni status, will hold on glucagon trial given potential of vomitus could be fatal at this point. Attending Annaele:  MICU has been made aware and is coming to see pt, reassessment maps of 63, levo being hung, micu recommends starting solucortef, agree with holding fluids for now, MICU states we can hold on CCU for now Attending Annalee:  MICU has been made aware and is coming to see pt, reassessment maps of 63, levo being hung, micu recommends starting solucortef, agree with holding fluids for now, MICU states we can hold on CCU for now. had discussion with daughter mady, who is health care proxy, pt is dnr/dni as per paige and daughter agrees, discussed critical illness, also discussed central line access pt would not want but fam is ok w/ pressors as of now. Attending Annalee:  MICU has been made aware and is coming to see pt, reassessment maps of 63, levo being hung, micu recommends starting solucortef, agree with holding fluids for now, MICU states we can hold on CCU for now. had discussion with daughter mady, who is health care proxy, pt is dnr/dni as per paige and daughter agrees, discussed critical illness, also discussed central line access pt would not want but fam is ok w/ pressors as of now. pt is now verbal and week but alert and oriented Yasmany Liu D.O., PGY3 (Resident)  Patient on levo 0.7mcg/kg/min. Maintaining maps. Discuissed with MICU. accepted. CT to be done on way up.

## 2022-05-11 NOTE — ED PROVIDER NOTE - ATTENDING CONTRIBUTION TO CARE
MD Mantilla:  patient seen and evaluated personally.   I agree with the History & Physical,  Impression & Plan other than what was detailed in my note.  MD Mantilla  88 y/o f hx of b/l masectomy, afib (on dig?), no AC, CML,  also hypothyroid, usually verbal and conversational [place] [id number] from rehab was being treated w/ levaquin and ceftriaxone, was reported to be acutely non verbal and sent to ed, pt was brought in upon entrance to room bp 55 systolic, hr in 40's, satting 100%, not sig responsive, no access, nurses obtained access, fluid started, levophen requested however at pharmacy so push dose epi used 10 mcg of epinephrine given with good response, found to be hypothermic, atropine pulled up and considered however hr improved to 60's, rectal temp 94, pt edematous throughout, bs echo showed enlarged heart w/ poor squeeze plethoric ivc, fast negative, concern for cardiogenic vs sepsis, vs myxedema coma, multifactorial, dig toxicity, other emergent pathology considered at this time, focus on resusciatation at this time however dnr/dni, micu consult.

## 2022-05-12 PROBLEM — C55 MALIGNANT NEOPLASM OF UTERUS, PART UNSPECIFIED: Chronic | Status: ACTIVE | Noted: 2022-01-01

## 2022-05-12 PROBLEM — I48.91 UNSPECIFIED ATRIAL FIBRILLATION: Chronic | Status: ACTIVE | Noted: 2022-01-01

## 2022-05-12 PROBLEM — C50.919 MALIGNANT NEOPLASM OF UNSPECIFIED SITE OF UNSPECIFIED FEMALE BREAST: Chronic | Status: ACTIVE | Noted: 2022-01-01

## 2022-05-12 NOTE — H&P ADULT - NSHPSOCIALHISTORY_GEN_ALL_CORE
Unable to obtain from pt given mental status. Pt resides at [facility]). Per chart review, pt previously denied smoking, drug, and EtOH abuse.

## 2022-05-12 NOTE — ED ADULT NURSE NOTE - NS ED NURSE TRANSPORT WITH
Cardiac Monitor/Defib/ACLS/Rescue Kit/O2/BVM/pulse ox Cardiac Monitor/Defib/ACLS/Rescue Kit/O2/BVM/pulse ox/ACLS Rescue Kit

## 2022-05-12 NOTE — ADVANCED PRACTICE NURSE CONSULT - ASSESSMENT
The pt was encountered in the MICU-  [place] is in a [employer] support surface and needs assistance with T&P. staff have applied Complete [employer]air boots to off-load the heels. The pt was alert and able to state her name.  As the pt was a/w a pressure injury, a nutrition consult is pending for further evaluation  upon assessment, the pt was incontinent of pasty brown stool and pericare was provided. On the b/l buttocks were skin changers consistent with IAD- the skin is intact with blanchable erythema.  On the sacrum was a wound measuring 2.5cmx 2.5cmx x0.2cm with soft yellow slough- will classify this as an unstageable pressure injury. there was scant drainage the periwound skin presented with blanchable erythema- Medihoney paste was applied to promote autolytic debridement followed with a foam to cushion the protuberant sacral bone  Staff had applied foam to the thoracic spine- the bones here were protuberant as well- the foam was gently lifted to visualize the skin- a deep tissue injury was noted measuring 3cmx 2cm x0cm- the skin is intact with a deep purple discoloration.

## 2022-05-12 NOTE — PATIENT PROFILE ADULT - FALL HARM RISK - HARM RISK INTERVENTIONS

## 2022-05-12 NOTE — ADVANCED PRACTICE NURSE CONSULT - RECOMMEDATIONS
Will recommend the followin. B/l buttocks: routine pericare with Nell  2. Sacrum: cavilon to periwound skin, Medihoney paste to the wound follow with foam, change every other day and prn  3. Thoracic spine: continue with foam dressing, change every 3 days   4. continue with T&P, boots  5. Seat cushion when OOB to chair  6. nutrition support as pt condition allows  Tx plan discussed with RN

## 2022-05-12 NOTE — PROGRESS NOTE ADULT - SUBJECTIVE AND OBJECTIVE BOX
INTERVAL HPI/OVERNIGHT EVENTS:    SUBJECTIVE: Patient seen and examined at bedside.     OBJECTIVE:    VITAL SIGNS:  ICU Vital Signs Last 24 Hrs  T(C): 37 (12 May 2022 08:00), Max: 37 (12 May 2022 08:00)  T(F): 98.6 (12 May 2022 08:00), Max: 98.6 (12 May 2022 08:00)  HR: 77 (12 May 2022 08:00) (44 - 86)  BP: 104/59 (12 May 2022 08:00) (62/44 - 138/72)  BP(mean): 79 (12 May 2022 08:00) (49 - 97)  ABP: --  ABP(mean): --  RR: 13 (12 May 2022 08:00) (11 - 30)  SpO2: 96% (12 May 2022 08:00) (93% - 100%)        05-11 @ 07:01  -  -12 @ 07:00  --------------------------------------------------------  IN: 449.9 mL / OUT: 460 mL / NET: -10.1 mL     @ 07:01  -  05-12 @ 08:52  --------------------------------------------------------  IN: 14.2 mL / OUT: 0 mL / NET: 14.2 mL      CAPILLARY BLOOD GLUCOSE      POCT Blood Glucose.: 115 mg/dL (12 May 2022 00:00)      PHYSICAL EXAM:    General: NAD  HEENT: NC/AT; PERRL, clear conjunctiva  Neck: supple  Respiratory: CTA b/l  Cardiovascular: +S1/S2; RRR  Abdomen: soft, NT/ND; +BS x4  Extremities: WWP, 2+ peripheral pulses b/l; no LE edema  Skin: normal color and turgor; no rash  Neurological:    MEDICATIONS:  MEDICATIONS  (STANDING):  buMETAnide Infusion 1 mG/Hr (5 mL/Hr) IV Continuous <Continuous>  cefTRIAXone   IVPB 1000 milliGRAM(s) IV Intermittent every 24 hours  chlorhexidine 4% Liquid 1 Application(s) Topical <User Schedule>  escitalopram 5 milliGRAM(s) Oral daily  levothyroxine Injectable 25 MICROGram(s) IV Push <User Schedule>  norepinephrine Infusion 0.05 MICROgram(s)/kG/Min (6.81 mL/Hr) IV Continuous <Continuous>  polyethylene glycol 3350 17 Gram(s) Oral daily  senna 2 Tablet(s) Oral at bedtime    MEDICATIONS  (PRN):  acetaminophen     Tablet .. 650 milliGRAM(s) Oral every 6 hours PRN Temp greater or equal to 38C (100.4F), Mild Pain (1 - 3), Moderate Pain (4 - 6)      ALLERGIES:  Allergies    contrast media (iodine-based) (Short breath)  iv  contrast (Unknown)  penicillin (Hives)  penicillin (Rash)  strawberry (Hives)  sulfa drugs (Short breath)  Sulfacetamide Sodium (Rash)    Intolerances        LABS:                        9.2    32.39 )-----------( 106      ( 11 May 2022 23:50 )             29.9     Hemoglobin: 9.2 g/dL ( @ 23:50)    CBC Full  -  ( 11 May 2022 23:50 )  WBC Count : 32.39 K/uL  RBC Count : 3.18 M/uL  Hemoglobin : 9.2 g/dL  Hematocrit : 29.9 %  Platelet Count - Automated : 106 K/uL  Mean Cell Volume : 94.0 fl  Mean Cell Hemoglobin : 28.9 pg  Mean Cell Hemoglobin Concentration : 30.8 gm/dL  Auto Neutrophil # : 31.81 K/uL  Auto Lymphocyte # : 0.00 K/uL  Auto Monocyte # : 0.58 K/uL  Auto Eosinophil # : 0.00 K/uL  Auto Basophil # : 0.00 K/uL  Auto Neutrophil % : 98.2 %  Auto Lymphocyte % : 0.0 %  Auto Monocyte % : 1.8 %  Auto Eosinophil % : 0.0 %  Auto Basophil % : 0.0 %    -12    131<L>  |  102  |  28<H>  ----------------------------<  143<H>  4.2   |  18<L>  |  1.05    Ca    8.4      12 May 2022 06:38  Phos  4.6     05-12  Mg     1.4         TPro  7.1  /  Alb  2.2<L>  /  TBili  0.4  /  DBili  x   /  AST  43<H>  /  ALT  15  /  AlkPhos  183<H>      Creatinine Trend: 1.05<--, 1.03<--  LIVER FUNCTIONS - ( 11 May 2022 23:50 )  Alb: 2.2 g/dL / Pro: 7.1 g/dL / ALK PHOS: 183 U/L / ALT: 15 U/L / AST: 43 U/L / GGT: x           PT/INR - ( 11 May 2022 23:50 )   PT: 23.0 sec;   INR: 1.97 ratio         PTT - ( 11 May 2022 23:50 )  PTT:35.0 sec    hs Troponin:                35 <<== 22 @ 23:50        03:19 - VBG - pH: 7.29  | pCO2: 42    | pO2: 40    | Lactate: 2.9    23:46 - VBG - pH: 7.32  | pCO2: 41    | pO2: 25    | Lactate: 3.8        Urinalysis Basic - ( 12 May 2022 00:51 )    Color: DARK BROWN / Appearance: Turbid / S.014 / pH: x  Gluc: x / Ketone: Negative  / Bili: Negative / Urobili: Negative   Blood: x / Protein: 300 mg/dL / Nitrite: Negative   Leuk Esterase: Large / RBC: 1 /hpf / WBC >50   Sq Epi: x / Non Sq Epi: 18 /hpf / Bacteria: Negative      CSF:                      EKG:   MICROBIOLOGY:    IMAGING:      Labs, imaging, EKG personally reviewed    RADIOLOGY & ADDITIONAL TESTS: Reviewed. INTERVAL HPI/ OVERNIGHT EVENTS: Patient transferred to the MICU overnight.     SUBJECTIVE: Patient seen and examined at bedside. Patient A+Ox3, no complaints at this time.     OBJECTIVE    VITAL SIGNS:  ICU Vital Signs Last 24 Hrs  T(C): 37 (12 May 2022 08:00), Max: 37 (12 May 2022 08:00)  T(F): 98.6 (12 May 2022 08:00), Max: 98.6 (12 May 2022 08:00)  HR: 77 (12 May 2022 08:00) (44 - 86)  BP: 104/59 (12 May 2022 08:00) (62/44 - 138/72)  BP(mean): 79 (12 May 2022 08:00) (49 - 97)  ABP: --  ABP(mean): --  RR: 13 (12 May 2022 08:00) (11 - 30)  SpO2: 96% (12 May 2022 08:00) (93% - 100%)        05-11 @ 07:  -  12 @ 07:00  --------------------------------------------------------  IN: 449.9 mL / OUT: 460 mL / NET: -10.1 mL     @ 07:01  -  -12 @ 08:52  --------------------------------------------------------  IN: 14.2 mL / OUT: 0 mL / NET: 14.2 mL      CAPILLARY BLOOD GLUCOSE      POCT Blood Glucose.: 115 mg/dL (12 May 2022 00:00)      PHYSICAL EXAM:  GENERAL: NAD  EYES: Sclera clear  ENT: Dry mucous membranes  CHEST/LUNG: Clear to auscultation bilaterally; No rales, rhonchi, wheezing, or rubs  HEART: Regular rate and rhythm; No rubs or gallops; +systolic murmur  ABDOMEN: Soft, nontender to palpation   : wilson in place draining very cloudy dark brown urine  EXTREMITIES:  1+ Peripheral Pulses, brisk capillary refill. No clubbing or cyanosis; 2+ pitting edema in b/l LEs  NERVOUS SYSTEM: A&Ox3   SKIN: sacral ulcer stage 3 with central protrusion of hair-like granulation tissue    MEDICATIONS:  MEDICATIONS  (STANDING):  buMETAnide Infusion 1 mG/Hr (5 mL/Hr) IV Continuous <Continuous>  cefTRIAXone   IVPB 1000 milliGRAM(s) IV Intermittent every 24 hours  chlorhexidine 4% Liquid 1 Application(s) Topical <User Schedule>  escitalopram 5 milliGRAM(s) Oral daily  levothyroxine Injectable 25 MICROGram(s) IV Push <User Schedule>  norepinephrine Infusion 0.05 MICROgram(s)/kG/Min (6.81 mL/Hr) IV Continuous <Continuous>  polyethylene glycol 3350 17 Gram(s) Oral daily  senna 2 Tablet(s) Oral at bedtime    MEDICATIONS  (PRN):  acetaminophen     Tablet .. 650 milliGRAM(s) Oral every 6 hours PRN Temp greater or equal to 38C (100.4F), Mild Pain (1 - 3), Moderate Pain (4 - 6)      ALLERGIES:  Allergies    contrast media (iodine-based) (Short breath)  iv  contrast (Unknown)  penicillin (Hives)  penicillin (Rash)  strawberry (Hives)  sulfa drugs (Short breath)  Sulfacetamide Sodium (Rash)    Intolerances        LABS:                        9.2    32.39 )-----------( 106      ( 11 May 2022 23:50 )             29.9     Hemoglobin: 9.2 g/dL ( @ 23:50)    CBC Full  -  ( 11 May 2022 23:50 )  WBC Count : 32.39 K/uL  RBC Count : 3.18 M/uL  Hemoglobin : 9.2 g/dL  Hematocrit : 29.9 %  Platelet Count - Automated : 106 K/uL  Mean Cell Volume : 94.0 fl  Mean Cell Hemoglobin : 28.9 pg  Mean Cell Hemoglobin Concentration : 30.8 gm/dL  Auto Neutrophil # : 31.81 K/uL  Auto Lymphocyte # : 0.00 K/uL  Auto Monocyte # : 0.58 K/uL  Auto Eosinophil # : 0.00 K/uL  Auto Basophil # : 0.00 K/uL  Auto Neutrophil % : 98.2 %  Auto Lymphocyte % : 0.0 %  Auto Monocyte % : 1.8 %  Auto Eosinophil % : 0.0 %  Auto Basophil % : 0.0 %        131<L>  |  102  |  28<H>  ----------------------------<  143<H>  4.2   |  18<L>  |  1.05    Ca    8.4      12 May 2022 06:38  Phos  4.6       Mg     1.4         TPro  7.1  /  Alb  2.2<L>  /  TBili  0.4  /  DBili  x   /  AST  43<H>  /  ALT  15  /  AlkPhos  183<H>      Creatinine Trend: 1.05<--, 1.03<--  LIVER FUNCTIONS - ( 11 May 2022 23:50 )  Alb: 2.2 g/dL / Pro: 7.1 g/dL / ALK PHOS: 183 U/L / ALT: 15 U/L / AST: 43 U/L / GGT: x           PT/INR - ( 11 May 2022 23:50 )   PT: 23.0 sec;   INR: 1.97 ratio         PTT - ( 11 May 2022 23:50 )  PTT:35.0 sec    hs Troponin:                35 <<== 22 @ 23:50        03:19 - VBG - pH: 7.29  | pCO2: 42    | pO2: 40    | Lactate: 2.9    23:46 - VBG - pH: 7.32  | pCO2: 41    | pO2: 25    | Lactate: 3.8        Urinalysis Basic - ( 12 May 2022 00:51 )    Color: DARK BROWN / Appearance: Turbid / S.014 / pH: x  Gluc: x / Ketone: Negative  / Bili: Negative / Urobili: Negative   Blood: x / Protein: 300 mg/dL / Nitrite: Negative   Leuk Esterase: Large / RBC: 1 /hpf / WBC >50   Sq Epi: x / Non Sq Epi: 18 /hpf / Bacteria: Negative      CSF:                      EKG:   MICROBIOLOGY:    IMAGING:      Labs, imaging, EKG personally reviewed    RADIOLOGY & ADDITIONAL TESTS: Reviewed.

## 2022-05-12 NOTE — H&P ADULT - NSHPPHYSICALEXAM_GEN_ALL_CORE
Vital Signs Last 24 Hrs  T(C): 34.9 (11 May 2022 23:30), Max: 34.9 (11 May 2022 23:30)  T(F): 94.9 (11 May 2022 23:30), Max: 94.9 (11 May 2022 23:30)  HR: 58 (12 May 2022 00:00) (44 - 58)  BP: 71/44 (12 May 2022 00:00) (62/44 - 80/56)  BP(mean): 53 (12 May 2022 00:00) (49 - 67)  RR: 17 (11 May 2022 23:50) (17 - 18)  SpO2: 98% (11 May 2022 23:50) (97% - 98%)    PHYSICAL EXAM:  GENERAL: NAD, lying in bed comfortably  HEAD:  Atraumatic, Normocephalic  EYES: EOMI, PERRLA, conjunctiva and sclera clear  ENT: Moist mucous membranes  NECK: Supple, No JVD; no palpable pre-auricular, post-auricular, occipital, mandibular, submental, supra-clavicular, or infra-clavicular lymph nodes   CHEST/LUNG: Clear to auscultation bilaterally; No rales, rhonchi, wheezing, or rubs. Unlabored respirations  HEART: Regular rate and rhythm; No murmurs, rubs, or gallops  ABDOMEN: Bowel sounds present; Soft, Nontender, Nondistended. No hepatomegaly  EXTREMITIES:  2+ Peripheral Pulses, brisk capillary refill. No clubbing, cyanosis, or edema  NERVOUS SYSTEM:  Alert & Oriented X3, speech clear. No deficits   MSK: FROM all 4 extremities, full and equal strength  SKIN: No rashes or lesions Vital Signs Last 24 Hrs  T(C): 34.9 (11 May 2022 23:30), Max: 34.9 (11 May 2022 23:30)  T(F): 94.9 (11 May 2022 23:30), Max: 94.9 (11 May 2022 23:30)  HR: 58 (12 May 2022 00:00) (44 - 58)  BP: 71/44 (12 May 2022 00:00) (62/44 - 80/56)  BP(mean): 53 (12 May 2022 00:00) (49 - 67)  RR: 17 (11 May 2022 23:50) (17 - 18)  SpO2: 98% (11 May 2022 23:50) (97% - 98%)    PHYSICAL EXAM:  GENERAL: NAD, lying in bed comfortably  HEAD:  Atraumatic, Normocephalic  EYES: EOMI, PERRLA, conjunctiva and sclera clear  ENT: Slightly dry mucous membranes  CHEST/LUNG: Clear to auscultation bilaterally; No rales, rhonchi, wheezing, or rubs  HEART: Bradycardic, regular rate and rhythm; No murmurs, rubs, or gallops  ABDOMEN: Firm, diffusely tender to palpation  : wilson in place draining very cloudy urine  EXTREMITIES:  1+ Peripheral Pulses, brisk capillary refill. No clubbing or cyanosis; pitting edema in LUE, 2+ pitting edema in b/l LEs  NERVOUS SYSTEM:  AAOx1-2 (oriented to name and ?place)  SKIN: No rashes or lesions Vital Signs Last 24 Hrs  T(C): 34.9 (11 May 2022 23:30), Max: 34.9 (11 May 2022 23:30)  T(F): 94.9 (11 May 2022 23:30), Max: 94.9 (11 May 2022 23:30)  HR: 58 (12 May 2022 00:00) (44 - 58)  BP: 71/44 (12 May 2022 00:00) (62/44 - 80/56)  BP(mean): 53 (12 May 2022 00:00) (49 - 67)  RR: 17 (11 May 2022 23:50) (17 - 18)  SpO2: 98% (11 May 2022 23:50) (97% - 98%)    PHYSICAL EXAM:  GENERAL: NAD  HEAD:  Atraumatic, Normocephalic appearing  EYES: Sclera clear  ENT: Slightly dry mucous membranes  CHEST/LUNG: Clear to auscultation bilaterally; No rales, rhonchi, wheezing, or rubs  HEART: Bradycardic, regular rate and rhythm; No murmurs, rubs, or gallops  ABDOMEN: Firm, diffusely tender to palpation  : wilson in place draining very cloudy dark brown urine  EXTREMITIES:  1+ Peripheral Pulses, brisk capillary refill. No clubbing or cyanosis; pitting edema in LUE, 2+ pitting edema in b/l LEs  NERVOUS SYSTEM: A&Ox1-2 (oriented to name and ?place)  SKIN: sacral ulcer stage 3 with central protrusion of hair-like granulation tissue Vital Signs Last 24 Hrs  T(C): 34.9 (11 May 2022 23:30), Max: 34.9 (11 May 2022 23:30)  T(F): 94.9 (11 May 2022 23:30), Max: 94.9 (11 May 2022 23:30)  HR: 58 (12 May 2022 00:00) (44 - 58)  BP: 71/44 (12 May 2022 00:00) (62/44 - 80/56)  BP(mean): 53 (12 May 2022 00:00) (49 - 67)  RR: 17 (11 May 2022 23:50) (17 - 18)  SpO2: 98% (11 May 2022 23:50) (97% - 98%)    PHYSICAL EXAM:  GENERAL: NAD  HEAD:  Atraumatic, Normocephalic  EYES: Sclera clear  ENT: Dry mucous membranes; poor dentition   CHEST/LUNG: Clear to auscultation bilaterally; No rales, rhonchi, wheezing, or rubs  HEART: Bradycardic, regular rate and rhythm; No rubs or gallops; +systolic murmur  ABDOMEN: Firm, diffusely tender to palpation  : wilson in place draining very cloudy dark brown urine  EXTREMITIES:  1+ Peripheral Pulses, brisk capillary refill. No clubbing or cyanosis; pitting edema in UEs, 2+ pitting edema in b/l LEs  NERVOUS SYSTEM: A&Ox1-2 (oriented to name and ?place); limited by mental status  SKIN: sacral ulcer stage 3 with central protrusion of hair-like granulation tissue

## 2022-05-12 NOTE — H&P ADULT - ATTENDING COMMENTS
89F w/ hx of Afib, CML, uterine cancer s/p radiation and hysterectomy, breast cancer s/p chemo and b/l mastectomy, recent SBO requiring ileocolic bypass in March 2022, previously on TPN, now presenting in shock state secondary to septic vs cardiogenic vs myxedema coma    NEURO: Septic encephalopathy with waxing and waning exam.     CV: Septic shok in the setting of Diastolic failure with severe Pulmonray HTN and RV failure and florid volume overload.   - levo to map >65 and Bumex to remove liters and liters of fluid.   - so far Fib is rate controlled.     PULM:  #b/l Pleural Effusion no plan for thora.     RENAL: Mild MERCEDEZ in the setting of sepsis/atn but now with great UOP though urine looks like brown puss.     GI: History of SBO, tender to palpation though ct to my eye with no issues some increased stool burden.  Pocus with ascities and dilated Gallbladder and possibly congestive hepatopathy  - diet when awake diuresses and bowel regiment.     ENDO:  #Hypothyroidism history but now with marked anasarca in the setting of septic shock; rule out severe hypothyroidism   -Continue levothyroxine 25mcg IV qd (equivalent to PO home dose)  -Follow up TSH and t4 got hydrocortisone in er.     Heme onc: CML and Breats ca on: anastrozole not on anything for CML.  - Does not appear to be her active issue but imsure contributing to her overall state.     #Leukocytosis in the setting of known CML and urosepsis  -Baseline WBC as per nursing home documentation is ~30-40; currently on admission WBC ~32  -Monitor for now while on abx      #DVT prophylaxis: SCDs for now in setting of thrombocytopenia    ID:Severe sepsis with septic shock  Urine as the source for sepsis, blood and urine cultures in the lab.   Ceftriaxone for now    SKIN: Small stage 3 decub, and excoration in the perinium.     ETHICS:  Daughter seemingly reverrsing patients decision about pure comfort care.  From previous hospital visit pt was comfort only, and such an order exhists In her nursing home documentation, as well as faimly reporting she did not want to be hospitalized.  Unfortunatly the [facility] sent over is missing back side with sepcific instruction. So we are following Daughters guidence as pt is currently no able to make decisions she is DNR/I but other wise full car.  Ethics seem murky here as we have evidence that the patient did not want this but not the legal documentation required.

## 2022-05-12 NOTE — CHART NOTE - NSCHARTNOTEFT_GEN_A_CORE
: Jaxon De La Cruz MD    INDICATION: Septic shock    PROCEDURE:  [x ] LIMITED ECHO  [ ] LIMITED CHEST  [ ] LIMITED RETROPERITONEAL  [ ] LIMITED ABDOMINAL  [ ] LIMITED DVT  [ ] NEEDLE GUIDANCE VASCULAR  [ ] NEEDLE GUIDANCE THORACENTESIS  [ ] NEEDLE GUIDANCE PARACENTESIS  [ ] NEEDLE GUIDANCE PERICARDIOCENTESIS  [ ] OTHER    FINDINGS:  Echo:        LVSF: Grossly preserved; no significant wall motion abnormalities       RVSF: Grossly preserved       LA: Enlarged       RA: Enlarged       Mitral Valve: Significant mitral regurgitation       Aortic Valve: Significant aortic insufficiency        Tricuspid Valve: Significant tricuspid regurgitation       Pulmonic Valve: Unable to assess       Pericardium: No pericardial effusion       IVC: 2.4cm with respiratory variation, as narrow as 1.4cm    INTERPRETATION:  - Grossly preserved LV and RV systolic function  - Dilated IVC with significant respiratory variation  - Significantly enlarged atria bilaterally  - Significant regurgitation of the tricuspid, mitral, and aortic valves    Jaxon De La Cruz MD  Critical Care Fellow  PGY-6 : Jaxon De La Cruz MD    INDICATION: Septic shock    PROCEDURE:  [x ] LIMITED ECHO  [ x] LIMITED CHEST  [ ] LIMITED RETROPERITONEAL  [ ] LIMITED ABDOMINAL  [ ] LIMITED DVT  [ ] NEEDLE GUIDANCE VASCULAR  [ ] NEEDLE GUIDANCE THORACENTESIS  [ ] NEEDLE GUIDANCE PARACENTESIS  [ ] NEEDLE GUIDANCE PERICARDIOCENTESIS  [ ] OTHER    FINDINGS:  Echo:        LVSF: Grossly preserved; no significant wall motion abnormalities       RVSF: Grossly preserved       LA: Enlarged       RA: Enlarged       Mitral Valve: Significant mitral regurgitation       Aortic Valve: Significant aortic insufficiency        Tricuspid Valve: Significant tricuspid regurgitation       Pulmonic Valve: Unable to assess       Pericardium: No pericardial effusion       IVC: 2.4cm with respiratory variation, as narrow as 1.4cm    Chest:      R Bittinger: A-line predominant      R Base: Consolidation with air bronchograms; Moderate pleural effusion with septations      L Bittinger: A-line predominant      L Base: Consolidation with air bronchograms;; Moderate simple appearing pleural effusion      Pleura: Regular at the apex bilaterally irregular at the bases    INTERPRETATION:  - Grossly preserved LV and RV systolic function  - Dilated IVC with significant respiratory variation  - Significantly enlarged atria bilaterally  - Significant regurgitation of the tricuspid, mitral, and aortic valves  - Bilateral pleural effusions, with septations on the right base, with associated BLL consolidations    Jaxon De La Cruz MD  Critical Care Fellow  PGY-6

## 2022-05-12 NOTE — H&P ADULT - HISTORY OF PRESENT ILLNESS
89F hx of CML, afib on metoprolo, question dig, CML with anemia needing blood transfusion in past, SBO, uterine CA s/p radiation and hysterectomy, breast cancer s/p chemo and bilateral mastectomy presents to the ED from nursing home 2/2 hypotension, bradycardia. Per charts, patient has had episodes of hypotension over this past week, given levaquin, fluids with improvement in BP .However, patient with episodes of hypoxia requriing NRB -> NC. Recently abxs changed to ceftriaxone given unclear source of possible infection but family requested patient be sent to ED instead. 89F w/ hx of Afib, CML, uterine cancer s/p radiation and hysterectomy, breast cancer s/p chemo and b/l mastectomy, recent SBO requiring ileocolic bypass in March 2022, previously on TPN, now presenting to the ED from nursing home for hypotension and bradycardia. Per charts, patient has had episodes of hypotension and hypoxia over this past week at NH, was given levaquin and fluids with improvement in BP. However, patient with episodes of hypoxia requiring NRB -> NC. Recently abxs changed to ceftriaxone given unclear source of possible infection but family requested patient be sent to ED instead. 89 y female PMH Afib (not on AC due to hx of bleeding), CML (WBC 30-40), uterine cancer s/p hysterectomy, breast cancer s/p b/l mastectomy, recent SBO requiring ileocolic bypass in March 2022, previously on TPN, presenting from  [facility] for acute onset change in mental status, labored breathing, hypotension (60/40), hypoxia (SpO2 87% on RA), but afebrile as measured by a facility NP. Patient was subsequently placed on NRB 10L/min O2 with recovery of SPO2 to 95%. Additionally, patient received D5 1/2 NS bolus. Blood pressure was subsequently rechecked: 90/70 followed by 2 hours later 123/70. Patient was then placed on 3L/min NC with SPO2 97% and return of mental status to baseline. She was also given Levaquin but then changed to ceftriaxone 1g, given unclear source of possible infection. Patient's family requested patient be sent to hospital.    ED Course: Noted to be hypothermic 94.9F; BP (83-94)/(36-59); HR 44-58; 97% 2L/min NC. She received 1L NS and one dose of each of vancomycin, cefepime, and metronidazole. Patient was subsequently started on levophed for blood pressure support.

## 2022-05-12 NOTE — ED ADULT NURSE REASSESSMENT NOTE - NS ED NURSE REASSESS COMMENT FT1
Levo currently @0.05mcg with MAPs as documented Levo currently @0.05mcg with MAPs as documented on sunrise, per MD Liu, to keep levo @0.05mcg at this time and not to titrate. 2 IVs WNL, extremities warm and appropriate color, cap refill <2sec. Pt on CM, pt lethargic but able a&ox3, responsive to verbal stimuli and able to answer questions.

## 2022-05-12 NOTE — PROGRESS NOTE ADULT - ASSESSMENT
89F w/ hx of Afib, CML, uterine cancer s/p radiation and hysterectomy, breast cancer s/p chemo and b/l mastectomy, recent SBO requiring ileocolic bypass in March 2022, previously on TPN, now presenting in shock state secondary to septic vs cardiogenic vs myxedema coma    NEURO:  #Mental status: unclear baseline but currently A&Ox2 likely metabolic in setting of shock    CV:  #Septic shock likely from UTI (refer to ID section)  - Continue levophed for now  - Ensure MAP >65  - Continue to monitor vitals     #known AFib history  - CHADSVASC score 4, but not AC due to history of bleeding   - hold home metoprolol 25mg BID for now given shock state  - Maintain serum K>4 and Mg >2  - Monitor telemetry    #Valvular dysfunction / ?CHF  - TTE (3/14): EF 60-65%; mod MR, mild-mod AR, severe TR; severely dilated LA and RA; unable to assess diastolic dysfunction  - consider repeat TTE if aligns with GOC  - c/w bumex gtt for now given overloaded state    PULM:  #b/l Pleural Effusion  - CXR (5/12): b/l pleural effusions similar to 4/4/22  - sating well on RA    RENAL:  #Elevated SCr  - baseline SCr per chart review ~0.8 but now slightly elevated to 1.03  - possibly 2/2 sepsis vs worsening CKD  - s/p 1L NS in ED   - encourage oral intake  - Avoid nephrotoxic medications  - Monitor BMP and UOP    #Mild hyponatremia to 131  - Patient has marked anasarca and started on Bumex gtt  - if substantial urine output, then trend BMP q8h and replete electrolytes accordingly    #Hyperkalemia likely from hemolysis; therefore, low concern for actual hyperkalemia  - Follow up repeat BMP and trend q8h while on bumex gtt    GI:  #Diffuse Abdominal TTP  - unclear etiology, possibly 2/2 stool burden  - no sign of free air on CXR  - f/u CT A/P non con  - c/w bowel regimen of miralax, senna, dulcolax suppository PRN    #Mild Transaminitis in setting of septic shock  - Monitor for now    #Diet:  NPO for now given mental status, will continue to reassess (was on Regular "chopped" diet at NH)    ENDO:  #Hypothyroidism history but now with marked anasarca in the setting of septic shock; rule out severe hypothyroidism   -Continue levothyroxine 25mcg IV qd (equivalent to PO home dose)  -Follow up TSH    #Breast and uterine cancer malignancy  -Patient is on home anastrazole; will hold for now given pt in shock state and with likely infection    HEMATOLOGIC:  #CML history  - per Heme/Onc note from previous admission states primary Oncologist noted early phase CMML, no tx indicated, symptom management only    #Leukocytosis in the setting of known CML and urosepsis  -Baseline WBC as per nursing home documentation is ~30-40; currently on admission WBC ~32  -Monitor for now while on abx    #Coag panel shows INR 1.97 but not on AC    #DVT prophylaxis: SCDs for now in setting of thrombocytopenia    ID:  #SIRS+(hypothermia 94.9, leukocytosis WBC ~32, hypotension SBP 80's) + UA with marked pyuria with dark brown urine concerning for complicated UTI, despite negative for bacteria?= Sepsis secondary to complicated UTI; course complicated by vasopressor requirement and lactate 3.8 concerning for septic shock  -Status post 1x dose of Levaquin at nursing home and vancomycin, cefepime, and metronidazole in ED  -pending blood cultures and UCx for abx sensitivities  -RVP and COVID negative  -UA remarkable for pyuria, brown, cloudy urine, large leukocyte esterase, but negative for bacteria (likely secondary to receiving ~4 types of antibiotics prior to UA collection), concerning for complicated UTI  -s/p vancomycin/cefepime/metronidazole (5/11), c/w ceftriaxone (5/12- )  -if BCx result positive for bacteremia, then switch to ceftriaxone 2g qd or based on sensitivities    -If patient becomes febrile may broaden antibiotics   -Monitor vitals    SKIN:  #Lines: LUE arrow  #Decubitus ulcers: 1 sacral ulcer on admission stage ~3 with central protrusion of ~4-inch hair-like granulation tissue    ETHICS:  GOC: Needs further clarification; DNR/DNI with limited interventions per MOLST (dated 4/11/22 from NH). Family okay with pressors, abx, and IVF, but no central lines. There is a question of comfort care only measures as orders from NH notes show pt was comfort care only and Palliative Care notes from recent admission in March-April 2022 documented DNR/DNI/DNH, and comfort measures  HCP: Daughter Zach 89F w/ hx of Afib, CML, uterine cancer s/p radiation and hysterectomy, breast cancer s/p chemo and b/l mastectomy, recent SBO requiring ileocolic bypass in March 2022, previously on TPN, now presenting in septic shock     NEURO:  #Mental status  - altered mental status on admission, now A+Ox3     CV:  #Septic shock likely from UTI (refer to ID section)  - Continue levophed for now, wean as tolerated   - start midodrine 10 TID     #known AFib history  - CHADSVASC score 4, but not on AC at home due to history of bleeding   - hold home metoprolol 25mg BID for now given shock state  - holding full anticoagulation, on SQH for DVT prophylaxis     #Valvular dysfunction and fluid overload   - TTE (3/14): EF 60-65%; mod MR, mild-mod AR, severe TR; severely dilated LA and RA; unable to assess diastolic dysfunction  - c/w bumex gtt for now given overloaded state    PULM:  #b/l Pleural Effusion  - CXR (5/12): b/l pleural effusions similar to 4/4/22  - sating well on RA    RENAL:  #MERCEDEZ   - likely secondary to sepsis   - s/p 1L NS in ED, encourage oral intake  - continue to monitor     GI:  #Abdominal Pain  - patient with abdominal pain on presentation   - CT with no evidence of infection   - c/w bowel regimen of miralax, senna, dulcolax suppository PRN    #Diet: Pureed (passed bedside speech and swallow)     ENDO:  #Hypothyroidism history but now with marked anasarca in the setting of septic shock; rule out severe hypothyroidism   -Continue levothyroxine 25mcg IV qd (equivalent to PO home dose)  -Follow up TSH, T3, T4     HEMATOLOGIC:  #Leukocytosis in the setting of known CML   -Baseline WBC as per nursing home documentation is ~30-40; currently on admission WBC ~32    #DVT prophylaxis: SQH    ID:  #UTI   - UA with pyuria and dark brown urine, lactate 3.8   - will switch antibiotics to cefepime, flagyl and vancomycin   - follow up blood and urine cultures     SKIN:  #Lines: LUE arrow  #Decubitus ulcers: 1 sacral ulcer on admission stage ~3 with central protrusion of ~4-inch hair-like granulation tissue    ETHICS:  GOC: Per Daughter Zach patient is DNR/DNI but with full medical treatment otherwise. Will clarify further with patient as her mental status improves.

## 2022-05-12 NOTE — H&P ADULT - NSHPLABSRESULTS_GEN_ALL_CORE
LABS:                        9.2    32.39 )-----------( 106      ( 11 May 2022 23:50 )             29.9     05-11    131<L>  |  100  |  28<H>  ----------------------------<  110<H>  5.8<H>   |  18<L>  |  1.03    Ca    8.8      11 May 2022 23:50    TPro  7.1  /  Alb  2.2<L>  /  TBili  0.4  /  DBili  x   /  AST  43<H>  /  ALT  15  /  AlkPhos  183<H>  05-11    PT/INR - ( 11 May 2022 23:50 )   PT: 23.0 sec;   INR: 1.97 ratio         PTT - ( 11 May 2022 23:50 )  PTT:35.0 sec        < from: Xray Chest 1 View- PORTABLE-Urgent (05.12.22 @ 00:43) >    FINDINGS:  The heart is poorly assessed on this projection but appears unchanged..  Small to moderate right and small left pleural effusions with associated   atelectasis.  There is no pneumothorax.  Left axillary surgical clips.    IMPRESSION:  Lateral pleural effusions similar to 4/4/2022.    ******PRELIMINARY REPORT******

## 2022-05-12 NOTE — ED ADULT NURSE REASSESSMENT NOTE - NS ED NURSE REASSESS COMMENT FT1
Per MD verbal orders, Kinsey catheter inserted using sterile technique, draining by gravity, secured with StatLock. Second RN present to confirm sterility. Thick, cloudy urine drained into collection bag, MD aware

## 2022-05-12 NOTE — ED ADULT NURSE NOTE - NSIMPLEMENTINTERV_GEN_ALL_ED
Implemented All Fall with Harm Risk Interventions:  [place] to call system. Call bell, personal items and telephone within reach. Instruct patient to call for assistance. Room bathroom lighting operational. Non-slip footwear when patient is off stretcher. Physically safe environment: no spills, clutter or unnecessary equipment. Stretcher in lowest position, wheels locked, appropriate side rails in place. Provide visual cue, wrist band, yellow gown, etc. Monitor gait and stability. Monitor for mental status changes and reorient to person, place, and time. Review medications for side effects contributing to fall risk. Reinforce activity limits and safety measures with patient and family. Provide visual clues: red socks.

## 2022-05-12 NOTE — CHART NOTE - NSCHARTNOTEFT_GEN_A_CORE
MICU Transfer Note    Transfer from: MICU    Transfer to: ( X ) Medicine    (  ) Telemetry     (   ) RCU        (    ) Palliative         (   ) Stroke Unit          (   ) __________________    Accepting Physician:   Signout given to:     MICU COURSE:  88 yo F with PMH of Afib, CML, uterine cancer s/p radiation and hysterectomy, breast ca s/p c/l mastectomy and chemotherapy, recent SBO requiring ileocolic bypass (3/2022), previously on TPN now presenting from [facility] for hypotension in shock state 2/2 septic shock likely d/t UTI vs component of cardiogenic shock i/s/o diastolic failure and severe pulmonary HTN, RV failure and volume overload. She was started on norepinephrine gtt and admitted to MICU for further management. While in MICU she remained on empiric antibiotics, Cefepime and Flagyl (Vancomycin d/c'd 2/2 negative MRSA PCR). Bumex gtt initiated to remove fluid and maintain net negative fluid balance for which she made significant UOP and the gtt was discontinued around 20:00. She was successfully titrated off vasopressors around 14:00 5/12 and otherwise remains hemodynamically stable on room air and is stable for transfer to the floors. Pt is DNR/DNI with completed MOLST in chart.       ASSESSMENT & PLAN:             FOR FOLLOW UP: MICU Transfer Note    Transfer from: MICU    Transfer to: ( X ) Medicine    (  ) Telemetry     (   ) RCU        (    ) Palliative         (   ) Stroke Unit          (   ) __________________    Accepting Physician:   Signout given to:     MICU COURSE:  89 yo F with PMH of Afib, CML, uterine cancer s/p radiation and hysterectomy, breast ca s/p c/l mastectomy and chemotherapy, recent SBO requiring ileocolic bypass (3/2022), previously on TPN now presenting from [facility] for hypotension in shock state 2/2 septic shock likely d/t UTI vs component of cardiogenic shock i/s/o diastolic failure and severe pulmonary HTN, RV failure and volume overload. She was started on norepinephrine gtt and admitted to MICU for further management. While in MICU she remained on empiric antibiotics, Cefepime and Flagyl (Vancomycin d/c'd 2/2 negative MRSA PCR). Bumex gtt initiated to remove fluid and maintain net negative fluid balance for which she made significant UOP and the gtt was discontinued around 20:00. She was successfully titrated off vasopressors around 14:00 5/12 and otherwise remains hemodynamically stable on room air and is stable for transfer to the floors. Pt is DNR/DNI with completed MOLST in chart.       Follow up:   [ ] Speech and swallow eval   [ ] On cefepime and flagyl (5/11 - ). Follow up cultures and de-escalate   [ ] Maintain net negative to even and avoid fluid overload       ASSESSMENT & PLAN:     89F w/ hx of Afib, CML, uterine cancer s/p radiation and hysterectomy, breast cancer s/p chemo and b/l mastectomy, recent SBO requiring ileocolic bypass in March 2022, previously on TPN, now presenting in septic shock     NEURO:  # No issues     CV:  # Septic shock likely from UTI    - Midodrine 10 TID     # AFib history  - CHADSVASC score 4, but not on AC at home due to history of bleeding   - Hold home metoprolol 25mg BID for now given shock state  - Holding full anticoagulation, on SQH for DVT prophylaxis     # Valvular dysfunction and fluid overload   - TTE (3/14): EF 60-65%; mod MR, mild-mod AR, severe TR; severely dilated LA and RA; unable to assess diastolic dysfunction  -     PULM:  # No issues     RENAL:  # No issues      GI:  # Abdominal Pain  - patient with abdominal pain on presentation, chronic in nautre   - CT with no evidence of infection   - c/w bowel regimen of miralax, senna, dulcolax suppository PRN    ENDO:  # Hypothyroidism history but now with marked anasarca in the setting of septic shock; rule out severe hypothyroidism   - Continue levothyroxine 25mcg IV qd (equivalent to PO home dose)  - Follow up TSH, T3, T4     HEMATOLOGIC:  # Leukocytosis in the setting of known CML   - Baseline WBC as per nursing home documentation is ~30-40; currently on admission WBC ~32    # DVT prophylaxis: [facility]    ID:  # UTI   - UA with pyuria and dark brown urine, lactate 3.8   - Continue antibiotics to cefepime, flagyl    - follow up blood and urine cultures     SKIN:  # Lines: LUE arrow  # Decubitus ulcers: 1 sacral ulcer on admission stage ~3 with central protrusion of ~4-inch hair-like granulation tissue    ETHICS:  GOC: Per Daughter Zach patient is DNR/DNI but with full medical treatment otherwise. Will clarify further with patient as her mental status improves. MICU Transfer Note    Transfer from: MICU    Transfer to: ( X ) Medicine    (  ) Telemetry     (   ) RCU        (    ) Palliative         (   ) Stroke Unit          (   ) __________________    Accepting Physician: Dr. Alonso Convissar   Signout given to:     MICU COURSE:  88 yo F with PMH of Afib, CML, uterine cancer s/p radiation and hysterectomy, breast ca s/p c/l mastectomy and chemotherapy, recent SBO requiring ileocolic bypass (3/2022), previously on TPN now presenting from [facility] for hypotension in shock state 2/2 septic shock likely d/t UTI vs component of cardiogenic shock i/s/o diastolic failure and severe pulmonary HTN, RV failure and volume overload. She was started on norepinephrine gtt and admitted to MICU for further management. While in MICU she remained on empiric antibiotics, Cefepime and Flagyl (Vancomycin d/c'd 2/2 negative MRSA PCR). Bumex gtt initiated to remove fluid and maintain net negative fluid balance for which she made significant UOP and the gtt was discontinued around 20:00. She was successfully titrated off vasopressors around 14:00 5/12 and otherwise remains hemodynamically stable on room air and is stable for transfer to the floors. Pt is DNR/DNI with completed MOLST in chart.       Follow up:   [ ] Speech and swallow eval   [ ] On cefepime and flagyl (5/11 - ). Follow up cultures and de-escalate   [ ] Maintain net negative to even and avoid fluid overload       ASSESSMENT & PLAN:     89F w/ hx of Afib, CML, uterine cancer s/p radiation and hysterectomy, breast cancer s/p chemo and b/l mastectomy, recent SBO requiring ileocolic bypass in March 2022, previously on TPN, now presenting in septic shock     NEURO:  # No issues     CV:  # Septic shock likely from UTI    - Midodrine 10 TID     # AFib history  - CHADSVASC score 4, but not on AC at home due to history of bleeding   - Hold home metoprolol 25mg BID for now given shock state  - Holding full anticoagulation, on SQH for DVT prophylaxis     # Valvular dysfunction and fluid overload   - TTE (3/14): EF 60-65%; mod MR, mild-mod AR, severe TR; severely dilated LA and RA; unable to assess diastolic dysfunction  -     PULM:  # No issues     RENAL:  # No issues      GI:  # Abdominal Pain  - patient with abdominal pain on presentation, chronic in nautre   - CT with no evidence of infection   - c/w bowel regimen of miralax, senna, dulcolax suppository PRN    ENDO:  # Hypothyroidism history but now with marked anasarca in the setting of septic shock; rule out severe hypothyroidism   - Continue levothyroxine 25mcg IV qd (equivalent to PO home dose)  - Follow up TSH, T3, T4     HEMATOLOGIC:  # Leukocytosis in the setting of known CML   - Baseline WBC as per nursing home documentation is ~30-40; currently on admission WBC ~32    # DVT prophylaxis: [facility]    ID:  # UTI   - UA with pyuria and dark brown urine, lactate 3.8   - Continue antibiotics to cefepime, flagyl    - follow up blood and urine cultures     SKIN:  # Lines: LUE arrow  # Decubitus ulcers: 1 sacral ulcer on admission stage ~3 with central protrusion of ~4-inch hair-like granulation tissue    ETHICS:  GOC: Per Daughter Zach patient is DNR/DNI but with full medical treatment otherwise. Will clarify further with patient as her mental status improves.

## 2022-05-12 NOTE — H&P ADULT - ASSESSMENT
Patient is 89yFemale with PMHx  presenting withshock secondary to septic vs cardiogenic vs myxedema coma    NEURO:  #Mental status: baseline/altered/non altered. Likely 2/2 ____ (structural (bleed or stroke), encephalitis, meningitis, non convulsive status epilepticus, metabolic cause (endogenous vs exogenous)).  -Currently A&O x 3  -Eligible for early mobilization and immediate physical therapy?    CV:  #STEMI (+trops, +EKG changes) | NSTEMI (+trops) | unstable angina (-trops): Typical/atypical symptoms, loaded with 325 mg ASA, 180mg Ticagrelor/ 600mg Clopidogrel, sublingual nitroglycerin 0.4mg, heparin bolus (check monogram)  - Revascularization (within 48hrs of symptoms) with PCI  - MAO score: (if >140, revascularize early)  - Nitroglycerin gtt 5mcg/min UNLESS anterior MI for pain relief  - DAPT: ASA 81, Clopidogrel 75mg qD /Ticagrelor 90mg BID for 1 year  - Statin: atorvastatin 80mg qD  - Heparin gtt (for 48 hrs or until revascularization with PCI, check monogram)    s/p stent: stop heparin, nitro gtt  -c/w DAPT  - start B-blocker: metoprolol tartrate  - start ACE-I (wait until AM labs return)   - statin: atorvastatin 80mg qD  - check ECHO in 48hr if anterior wall MI    #CHF: EF __%, on JVD ***  -Bblocker: metoprolol succinate 25mg qD / carvedilol 3.125mg qD  -Diuretic: furosemide (lasix) 20-40-60-80 mg qD | torsemide | bumetanide (bumex) 1mg qD  -Trend I/Os and daily weights, and Cr    #Arrythmia:   - Monitor on tele    #AFib:   - CHADSVASC score:  - HAS-BLED score:  - a/c: DOAC (apixiban 5mg BID (Eliquis)| dabigatran 150mg BID (Pradexa)| Riveroxaban 20mg qD (Xeralto) > Coumadin  - If new: get echo (check for tachycardia, valvular AF, L atrial size which is large if chronic)  - Rate control: BB/Digoxin/Amio if low EF  - Monitor telemetry    #s/p Atrial fibrillation Ablation:  - CHADSVASC/HAS-BLED score:  - a/c: Coumadin, DOAC (eliquis/pradexa/xeralto) resume after 6hrs post procedure  - Bedrest x6hrs, resume head of bed @30 degrees afterwards  - 12-lead EKG   - Continuous telemetry monitoring for arrythmias  - TTE echo (to r/o tamponade and heart fx)  - Protonix 40mg qD (prophylactic prevent acidity b/c heat from ablation since L atrium is close to esophagusx 1mo)   - DASH diet: Soft  - Pro-BNP labs in AM (to determine underlying dilation of heart)    #s/p Atrial flutter Ablation:  - CHADSVASC/HAS-BLED score:  - a/c: Coumadin, DOAC (eliquis/pradexa/xeralto)  - Rate control: CCB or BB  - Continuous telemetry monitoring for arrythmias  - Bedrest x6hrs, resume head of bed @30 degrees afterwards  - 12-lead EKG now    #Hemodynamically stable/unstable:  - On pressors due to ____ shock (cardiogenic due to muscle or valve failure, obstructive due to peff, PE, PTX, hypovolemic, vasopegic)     PULM:  #COPD: *Home meds* on __L O2    RENAL:  #MERCEDEZ:   -UO:  -Kinsey:    GI:  #Transaminitis: Elevated LFTs  #Diet:  #Bowel regiment:    ENDO:  #DM2: HbA1c ***   - Insulin Sliding Scale    METABOLIC:  #Electrolyte abnormalities    HEMATOLOGIC:  #CBC results show  #Coag panel shows  #DVT prophylaxis with     ID:  #Pt without strong objective or clinical evidence of infection. Will observe off antibiotics    SKIN:  #Lines:  #Decubitus ulcers:       89F w/ hx of Afib, CML, uterine cancer s/p radiation and hysterectomy, breast cancer s/p chemo and b/l mastectomy, recent SBO requiring ileocolic bypass in March 2022, previously on TPN, now presenting in shock state secondary to septic vs cardiogenic vs myxedema coma    NEURO:  #Mental status: baseline/altered/non altered. Likely 2/2 ____ (structural (bleed or stroke), encephalitis, meningitis, non convulsive status epilepticus, metabolic cause (endogenous vs exogenous)).  -Currently A&O x 3  -Eligible for early mobilization and immediate physical therapy?    CV:  #STEMI (+trops, +EKG changes) | NSTEMI (+trops) | unstable angina (-trops): Typical/atypical symptoms, loaded with 325 mg ASA, 180mg Ticagrelor/ 600mg Clopidogrel, sublingual nitroglycerin 0.4mg, heparin bolus (check monogram)  - Revascularization (within 48hrs of symptoms) with PCI  - MAO score: (if >140, revascularize early)  - Nitroglycerin gtt 5mcg/min UNLESS anterior MI for pain relief  - DAPT: ASA 81, Clopidogrel 75mg qD /Ticagrelor 90mg BID for 1 year  - Statin: atorvastatin 80mg qD  - Heparin gtt (for 48 hrs or until revascularization with PCI, check monogram)    s/p stent: stop heparin, nitro gtt  -c/w DAPT  - start B-blocker: metoprolol tartrate  - start ACE-I (wait until AM labs return)   - statin: atorvastatin 80mg qD  - check ECHO in 48hr if anterior wall MI    #CHF: EF __%, on JVD ***  -Bblocker: metoprolol succinate 25mg qD / carvedilol 3.125mg qD  -Diuretic: furosemide (lasix) 20-40-60-80 mg qD | torsemide | bumetanide (bumex) 1mg qD  -Trend I/Os and daily weights, and Cr    #Arrythmia:   - Monitor on tele    #AFib:   - CHADSVASC score:  - HAS-BLED score:  - a/c: DOAC (apixiban 5mg BID (Eliquis)| dabigatran 150mg BID (Pradexa)| Riveroxaban 20mg qD (Xeralto) > Coumadin  - If new: get echo (check for tachycardia, valvular AF, L atrial size which is large if chronic)  - Rate control: BB/Digoxin/Amio if low EF  - Monitor telemetry    #s/p Atrial fibrillation Ablation:  - CHADSVASC/HAS-BLED score:  - a/c: Coumadin, DOAC (eliquis/pradexa/xeralto) resume after 6hrs post procedure  - Bedrest x6hrs, resume head of bed @30 degrees afterwards  - 12-lead EKG   - Continuous telemetry monitoring for arrythmias  - TTE echo (to r/o tamponade and heart fx)  - Protonix 40mg qD (prophylactic prevent acidity b/c heat from ablation since L atrium is close to esophagusx 1mo)   - DASH diet: Soft  - Pro-BNP labs in AM (to determine underlying dilation of heart)    #s/p Atrial flutter Ablation:  - CHADSVASC/HAS-BLED score:  - a/c: Coumadin, DOAC (eliquis/pradexa/xeralto)  - Rate control: CCB or BB  - Continuous telemetry monitoring for arrythmias  - Bedrest x6hrs, resume head of bed @30 degrees afterwards  - 12-lead EKG now    #Hemodynamically stable/unstable:  - On pressors due to ____ shock (cardiogenic due to muscle or valve failure, obstructive due to peff, PE, PTX, hypovolemic, vasopegic)     PULM:  #COPD: *Home meds* on __L O2    RENAL:  #MERCEDEZ:   -UO:  -Kinsey:    GI:  #Transaminitis: Elevated LFTs  #Diet:  #Bowel regiment:    ENDO:  #DM2: HbA1c ***   - Insulin Sliding Scale    METABOLIC:  #Electrolyte abnormalities    HEMATOLOGIC:  #CBC results show  #Coag panel shows  #DVT prophylaxis with     ID:  #Pt without strong objective or clinical evidence of infection. Will observe off antibiotics    SKIN:  #Lines:  #Decubitus ulcers:       89F w/ hx of Afib, CML, uterine cancer s/p radiation and hysterectomy, breast cancer s/p chemo and b/l mastectomy, recent SBO requiring ileocolic bypass in March 2022, previously on TPN, now presenting in shock state secondary to septic vs cardiogenic vs myxedema coma    NEURO:  #Mental status: unclear baseline but currently A&Ox2 likely metabolic in setting of shock    CV:  #known AFib history but not AC due to history of bleeding   - CHADSVASC score: 4  - Continue home metoprolol 25mg bid  - Maintain serum K>4 and Mg >2  - Monitor telemetry    #Hemodynamically stable/unstable:  - On pressors due to presumed septic shock likely from complicated UTI    PULM:  #No active issues    RENAL:  #Mild MERCEDEZ  -baseline SCr per chart review ~0.8 but now 1.03  -Hydrate with mIVF PRN and encourage oral intake  -Avoid nephrotoxic medications    GI:  #Mild Transaminitis in setting of septic shock  -Monitor for now    #Diet: Regular diet     ENDO:  #Hypothyroidism history but now with marked anasarca in the setting of septic shock; rule out severe hypothyroidism   -Continue levothyroxine 50mcg qd  -Follow up TSH    #Breast and uterine cancer malignancy  -Patient is on home anastrazole; will hold for now given patient may immunocompromised in setting of septic shock     METABOLIC:  #Mild hyponatremia to 131  -Patient has marked anasarca and started on Bumex gtt;   -if substantial urine output, then trend BMP and replete electrolytes accordingly    #Hyperkalemia likely from hemolysis; therefore, low concern for actual hyperkalemia  -Follow up repeat BMP      HEMATOLOGIC:  #CML history  -Patient has opted not to treat her CML; monitor for now   -Will have to further clarify GOC with patient's family     #Leukocytosis in the setting of known CML and urosepsis  -Baseline WBC as per nursing home documentation is ~30-40; currently on admission WBC ~32  -Monitor for now while on abx    #Coag panel shows INR 1.97 but not on AC    #DVT prophylaxis not needed for now given elevated INR; will reassess    ID:  #Sepsis secondary to complicated UTI  -Status post 1x dose of vancomycin, cefepime, metronidazole  -UA positive for infection  -Follow up blood and urine cultures  -Continue with ceftriaxone 1g qd; if BCx result positive for bacteremia, then switch to ceftriaxone 2g qd or based on sensitivities   -If febrile even while on ceftriaxone, then broaden antibiotics    SKIN:  #Lines: LUE arrow  #Decubitus ulcers: 1 sacral ulcer on admission stage ~3 with central protrusion of ~4-inch hair-like granulation tissue 89F w/ hx of Afib, CML, uterine cancer s/p radiation and hysterectomy, breast cancer s/p chemo and b/l mastectomy, recent SBO requiring ileocolic bypass in March 2022, previously on TPN, now presenting in shock state secondary to septic vs cardiogenic vs myxedema coma    NEURO:  #Mental status: unclear baseline but currently A&Ox2 likely metabolic in setting of shock    CV:  #Septic shock likely from UTI (refer to ID section)  -Continue levophed for now  -Ensure MAP >65  -Continue to monitor vitals     #known AFib history but not AC due to history of bleeding   - CHADSVASC score: 4  - Continue home metoprolol 25mg bid  - Maintain serum K>4 and Mg >2  - Monitor telemetry    #Hemodynamically stable/unstable:  - On pressors due to presumed septic shock likely from complicated UTI    PULM:  #No active issues    RENAL:  #Mild MERCEDEZ  -baseline SCr per chart review ~0.8 but now 1.03  -Hydrate with mIVF PRN and encourage oral intake  -Avoid nephrotoxic medications    GI:  #Mild Transaminitis in setting of septic shock  -Monitor for now    #Diet: Regular diet     ENDO:  #Hypothyroidism history but now with marked anasarca in the setting of septic shock; rule out severe hypothyroidism   -Continue levothyroxine 50mcg qd  -Follow up TSH    #Breast and uterine cancer malignancy  -Patient is on home anastrazole; will hold for now given patient may immunocompromised in setting of septic shock     METABOLIC:  #Mild hyponatremia to 131  -Patient has marked anasarca and started on Bumex gtt;   -if substantial urine output, then trend BMP and replete electrolytes accordingly    #Hyperkalemia likely from hemolysis; therefore, low concern for actual hyperkalemia  -Follow up repeat BMP      HEMATOLOGIC:  #CML history  -Patient has opted not to treat her CML; monitor for now   -Will have to further clarify GOC with patient's family     #Leukocytosis in the setting of known CML and urosepsis  -Baseline WBC as per nursing home documentation is ~30-40; currently on admission WBC ~32  -Monitor for now while on abx    #Coag panel shows INR 1.97 but not on AC    #DVT prophylaxis not needed for now given elevated INR; will reassess    ID:  #SIRS+(hypothermia 94.9, leukocytosis WBC ~32, hypotension SBP 80's) + UA with marked pyuria with dark brown urine concerning for complicated UTI, despite negative for bacteria?= Sepsis secondary to complicated UTI; course complicated by vasopressor requirement and lactate 3.8 concerning for septic shock  -Status post 1x dose of Levaquin at nursing home and vancomycin, cefepime, and metronidazole in ED  -pending blood cultures and UCx for abx sensitivities  -RVP and COVID negative  -UA remarkable for pyuria, brown, cloudy urine, large leukocyte esterase, but negative for bacteria (likely secondary to receiving ~4 types of antibiotics prior to UA collection), concerning for complicated UTI  -Switched  vancomycin, cefepime, and metronidazole to ceftriaxone (5/11 - )  -if BCx result positive for bacteremia, then switch to ceftriaxone 2g qd or based on sensitivities    -If patient becomes febrile may broaden antibiotics   -Monitor vitals    SKIN:  #Lines: LUE arrow  #Decubitus ulcers: 1 sacral ulcer on admission stage ~3 with central protrusion of ~4-inch hair-like granulation tissue 89F w/ hx of Afib, CML, uterine cancer s/p radiation and hysterectomy, breast cancer s/p chemo and b/l mastectomy, recent SBO requiring ileocolic bypass in March 2022, previously on TPN, now presenting in shock state secondary to septic vs cardiogenic vs myxedema coma    NEURO:  #Mental status: unclear baseline but currently A&Ox2 likely metabolic in setting of shock    CV:  #Septic shock likely from UTI (refer to ID section)  - Continue levophed for now  - Ensure MAP >65  - Continue to monitor vitals     #known AFib history  - CHADSVASC score 4, but not AC due to history of bleeding   - hold home metoprolol 25mg BID for now given shock state  - Maintain serum K>4 and Mg >2  - Monitor telemetry    #Valvular dysfunction / ?CHF  - TTE (3/14): EF 60-65%; mod MR, mild-mod AR, severe TR; severely dilated LA and RA; unable to assess diastolic dysfunction  - consider repeat TTE if aligns with GOC  - c/w bumex gtt for now given overloaded state    PULM:  #b/l Pleural Effusion  - CXR (5/12): b/l pleural effusions similar to 4/4/22  - sating well on RA    RENAL:  #Elevated SCr  - baseline SCr per chart review ~0.8 but now slightly elevated to 1.03  - possibly 2/2 sepsis vs worsening CKD  - s/p 1L NS in ED   - encourage oral intake  - Avoid nephrotoxic medications  - Monitor BMP and UOP    #Mild hyponatremia to 131  - Patient has marked anasarca and started on Bumex gtt  - if substantial urine output, then trend BMP q8h and replete electrolytes accordingly    #Hyperkalemia likely from hemolysis; therefore, low concern for actual hyperkalemia  - Follow up repeat BMP and trend q8h while on bumex gtt    GI:  #Diffuse Abdominal TTP  - unclear etiology, possibly 2/2 stool burden  - no sign of free air on CXR  - f/u CT A/P non con  - c/w bowel regimen of miralax, senna, dulcolax suppository PRN    #Mild Transaminitis in setting of septic shock  - Monitor for now    #Diet:  NPO for now given mental status, will continue to reassess (was on Regular "chopped" diet at NH)    ENDO:  #Hypothyroidism history but now with marked anasarca in the setting of septic shock; rule out severe hypothyroidism   -Continue levothyroxine 50mcg qd  -Follow up TSH    #Breast and uterine cancer malignancy  -Patient is on home anastrazole; will hold for now given pt in shock state and with likely infection    HEMATOLOGIC:  #CML history  - per Heme/Onc note from previous admission states primary Oncologist noted early phase CMML, no tx indicated, symptom management only    #Leukocytosis in the setting of known CML and urosepsis  -Baseline WBC as per nursing home documentation is ~30-40; currently on admission WBC ~32  -Monitor for now while on abx    #Coag panel shows INR 1.97 but not on AC    #DVT prophylaxis: SCDs for now in setting of thrombocytopenia    ID:  #SIRS+(hypothermia 94.9, leukocytosis WBC ~32, hypotension SBP 80's) + UA with marked pyuria with dark brown urine concerning for complicated UTI, despite negative for bacteria?= Sepsis secondary to complicated UTI; course complicated by vasopressor requirement and lactate 3.8 concerning for septic shock  -Status post 1x dose of Levaquin at nursing home and vancomycin, cefepime, and metronidazole in ED  -pending blood cultures and UCx for abx sensitivities  -RVP and COVID negative  -UA remarkable for pyuria, brown, cloudy urine, large leukocyte esterase, but negative for bacteria (likely secondary to receiving ~4 types of antibiotics prior to UA collection), concerning for complicated UTI  -s/p vancomycin/cefepime/metronidazole (5/11), c/w ceftriaxone (5/12- )  -if BCx result positive for bacteremia, then switch to ceftriaxone 2g qd or based on sensitivities    -If patient becomes febrile may broaden antibiotics   -Monitor vitals    SKIN:  #Lines: LUE arrow  #Decubitus ulcers: 1 sacral ulcer on admission stage ~3 with central protrusion of ~4-inch hair-like granulation tissue    ETHICS:  GOC: Needs further clarification; DNR/DNI with limited interventions per MOLST (dated 4/11/22 from NH). Family okay with pressors, abx, and IVF, but no central lines. There is a question of comfort care only measures as orders from NH notes show pt was comfort care only and Palliative Care notes from recent admission in March-April 2022 documented DNR/DNI/DNH, and comfort measures  HCP: Daughter Zach 89F w/ hx of Afib, CML, uterine cancer s/p radiation and hysterectomy, breast cancer s/p chemo and b/l mastectomy, recent SBO requiring ileocolic bypass in March 2022, previously on TPN, now presenting in shock state secondary to septic vs cardiogenic vs myxedema coma    NEURO:  #Mental status: unclear baseline but currently A&Ox2 likely metabolic in setting of shock    CV:  #Septic shock likely from UTI (refer to ID section)  - Continue levophed for now  - Ensure MAP >65  - Continue to monitor vitals     #known AFib history  - CHADSVASC score 4, but not AC due to history of bleeding   - hold home metoprolol 25mg BID for now given shock state  - Maintain serum K>4 and Mg >2  - Monitor telemetry    #Valvular dysfunction / ?CHF  - TTE (3/14): EF 60-65%; mod MR, mild-mod AR, severe TR; severely dilated LA and RA; unable to assess diastolic dysfunction  - consider repeat TTE if aligns with GOC  - c/w bumex gtt for now given overloaded state    PULM:  #b/l Pleural Effusion  - CXR (5/12): b/l pleural effusions similar to 4/4/22  - sating well on RA    RENAL:  #Elevated SCr  - baseline SCr per chart review ~0.8 but now slightly elevated to 1.03  - possibly 2/2 sepsis vs worsening CKD  - s/p 1L NS in ED   - encourage oral intake  - Avoid nephrotoxic medications  - Monitor BMP and UOP    #Mild hyponatremia to 131  - Patient has marked anasarca and started on Bumex gtt  - if substantial urine output, then trend BMP q8h and replete electrolytes accordingly    #Hyperkalemia likely from hemolysis; therefore, low concern for actual hyperkalemia  - Follow up repeat BMP and trend q8h while on bumex gtt    GI:  #Diffuse Abdominal TTP  - unclear etiology, possibly 2/2 stool burden  - no sign of free air on CXR  - f/u CT A/P non con  - c/w bowel regimen of miralax, senna, dulcolax suppository PRN    #Mild Transaminitis in setting of septic shock  - Monitor for now    #Diet:  NPO for now given mental status, will continue to reassess (was on Regular "chopped" diet at NH)    ENDO:  #Hypothyroidism history but now with marked anasarca in the setting of septic shock; rule out severe hypothyroidism   -Continue levothyroxine 25mcg IV qd (equivalent to PO home dose)  -Follow up TSH    #Breast and uterine cancer malignancy  -Patient is on home anastrazole; will hold for now given pt in shock state and with likely infection    HEMATOLOGIC:  #CML history  - per Heme/Onc note from previous admission states primary Oncologist noted early phase CMML, no tx indicated, symptom management only    #Leukocytosis in the setting of known CML and urosepsis  -Baseline WBC as per nursing home documentation is ~30-40; currently on admission WBC ~32  -Monitor for now while on abx    #Coag panel shows INR 1.97 but not on AC    #DVT prophylaxis: SCDs for now in setting of thrombocytopenia    ID:  #SIRS+(hypothermia 94.9, leukocytosis WBC ~32, hypotension SBP 80's) + UA with marked pyuria with dark brown urine concerning for complicated UTI, despite negative for bacteria?= Sepsis secondary to complicated UTI; course complicated by vasopressor requirement and lactate 3.8 concerning for septic shock  -Status post 1x dose of Levaquin at nursing home and vancomycin, cefepime, and metronidazole in ED  -pending blood cultures and UCx for abx sensitivities  -RVP and COVID negative  -UA remarkable for pyuria, brown, cloudy urine, large leukocyte esterase, but negative for bacteria (likely secondary to receiving ~4 types of antibiotics prior to UA collection), concerning for complicated UTI  -s/p vancomycin/cefepime/metronidazole (5/11), c/w ceftriaxone (5/12- )  -if BCx result positive for bacteremia, then switch to ceftriaxone 2g qd or based on sensitivities    -If patient becomes febrile may broaden antibiotics   -Monitor vitals    SKIN:  #Lines: LUE arrow  #Decubitus ulcers: 1 sacral ulcer on admission stage ~3 with central protrusion of ~4-inch hair-like granulation tissue    ETHICS:  GOC: Needs further clarification; DNR/DNI with limited interventions per [facility]OLST (dated 4/11/22 from NH). Family okay with pressors, abx, and IVF, but no central lines. There is a question of comfort care only measures as orders from NH notes show pt was comfort care only and Palliative Care notes from recent admission in March-April 2022 documented DNR/DNI/DNH, and comfort measures  HCP: Daughter Zach

## 2022-05-12 NOTE — ED ADULT NURSE NOTE - OBJECTIVE STATEMENT
88 yo female with pmh Afib, CML, uterince CA s/p hysterectomy, breast CA s/p b/l mastectomy presents to ED by EMS from nursing home for "elevated WBC count," hypotension, and "mental status changes." Per daughter at bedside, pt a&ox3 at baseline but has been more confused lately. Upon assessment, pt drowsy and minimally conversive but a&ox3, responsive to verbal stimuli, breathing spontaneous and unlabored, skin warm and appropriate color, lymphedema noted to L arm, b/l LE edema noted, b/l peripheral pulses noted, abdomen distended and tender upon exam, b/l crackles noted. Pt hypotensive, MD aware and at bedside. Pt afib on CM. Per MD Mantilla, to obtain peripheral IVs and okay to use both extremities for IVs/BP regardless of history/lymphedema. Pt safety and comfort provided.

## 2022-05-12 NOTE — H&P ADULT - NSHPREVIEWOFSYSTEMS_GEN_ALL_CORE
REVIEW OF SYSTEMS: As indicated above; otherwise negative    CONSTITUTIONAL: No weakness, fevers or chills  EYES/ENT: No visual changes; no vertigo or throat pain   NECK: No pain or stiffness  RESPIRATORY: No cough, wheezing, hemoptysis; No shortness of breath  CARDIOVASCULAR: No chest pain or palpitations  GASTROINTESTINAL: No abdominal or epigastric pain. No nausea, vomiting, or hematemesis; no diarrhea or constipation; no melena or hematochezia.  GENITOURINARY: No dysuria, frequency or hematuria  NEUROLOGICAL: No numbness or weakness  SKIN: No itching, rashes REVIEW OF SYSTEMS: Unable to fully obtain from pt given mental status.     CONSTITUTIONAL:, hypothermia  EYES/ENT: No visual changes; no vertigo or throat pain   NECK: No pain or stiffness  RESPIRATORY: No cough, wheezing, hemoptysis; No shortness of breath  CARDIOVASCULAR: No chest pain or palpitations  GASTROINTESTINAL: No abdominal or epigastric pain. No nausea, vomiting, or hematemesis; no diarrhea or constipation; no melena or hematochezia.  GENITOURINARY: No dysuria, frequency or hematuria  NEUROLOGICAL: No numbness or weakness  SKIN: No itching, rashes REVIEW OF SYSTEMS:   Unable to obtain from pt given mental status

## 2022-05-12 NOTE — ADVANCED PRACTICE NURSE CONSULT - REASON FOR CONSULT
Requested by staff to assess skin status of pt a/w multiple wounds. 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 Patient was subsequently placed on NRB 10L/min O2 with recovery of SPO2 to 95%. Additionally, patient received D5 1/2 NS bolus. Blood pressure was subsequently rechecked: 90/70 followed by 2 hours later 123/70. Patient was then placed on 3L/min NC with SPO2 97% and return of mental status to baseline. She was also given Levaquin but then changed to ceftriaxone 1g, given unclear source of possible infection. Patient's family requested patient be sent to hospital.    ED Course: Noted to be hypothermic 94.9F; BP (83-94)/(36-59); HR 44-58; 97% 2L/min NC. She received 1L NS and one dose of each of vancomycin, cefepime, and metronidazole. Patient was subsequently started on levophed for blood pressure support.

## 2022-05-13 NOTE — DIETITIAN INITIAL EVALUATION ADULT - NS FNS WEIGHT CHANGE REASON
- pt family reports wt loss PTA not sure how much   state her UBW ~140 pounds  - per [facility]IN[facility] wt history in pounds: - pt family reports wt loss PTA not sure how much   state her UBW ~140 pounds  - per [facility]IN[facility] wt history no noted wt loss   - will continue to monitor wt trends

## 2022-05-13 NOTE — PROGRESS NOTE ADULT - PROBLEM SELECTOR PLAN 1
septic shock iso UTI  - on pressors in the MICU, now on floors  - on midodrine 10 TID, will cont to wean as tolerated by dose  - on cefepime and metro since 5/11  - afebrile, will cont to monitor fever curve and reculture iso fevers or change in clinical picture

## 2022-05-13 NOTE — PROGRESS NOTE ADULT - SUBJECTIVE AND OBJECTIVE BOX
Ezekiel Costa MD PGY-1  Internal Medicine  [employer]    PROGRESS NOTE:     Patient is a 89y old  Female who presents with a chief complaint of septic shock (13 May 2022 07:56)      SUBJECTIVE AND OVERNIGHT EVENTS: Patient was seen and examined at bedside this morning.  No acute events overnight      ADDITIONAL REVIEW OF SYSTEMS:    MEDICATIONS  (STANDING):  cefepime   IVPB 2000 milliGRAM(s) IV Intermittent every 8 hours  cefepime   IVPB      escitalopram 5 milliGRAM(s) Oral daily  heparin   Injectable 5000 Unit(s) SubCutaneous every 8 hours  levothyroxine Injectable 25 MICROGram(s) IV Push <User Schedule>  metroNIDAZOLE  IVPB 500 milliGRAM(s) IV Intermittent every 8 hours  metroNIDAZOLE  IVPB      midodrine 10 milliGRAM(s) Oral every 8 hours  polyethylene glycol 3350 17 Gram(s) Oral daily  senna 2 Tablet(s) Oral at bedtime    MEDICATIONS  (PRN):  acetaminophen     Tablet .. 650 milliGRAM(s) Oral every 6 hours PRN Temp greater or equal to 38C (100.4F), Mild Pain (1 - 3), Moderate Pain (4 - 6)  melatonin 3 milliGRAM(s) Oral at bedtime PRN Insomnia      CAPILLARY BLOOD GLUCOSE        I&O's Summary    12 May 2022 07:01  -  13 May 2022 07:00  --------------------------------------------------------  IN: 1466.4 mL / OUT: 4755 mL / NET: -3288.6 mL    13 May 2022 07:01  -  13 May 2022 16:19  --------------------------------------------------------  IN: 300 mL / OUT: 470 mL / NET: -170 mL        PHYSICAL EXAM:  Vital Signs Last 24 Hrs  T(C): 36.4 (13 May 2022 16:00), Max: 36.8 (13 May 2022 12:00)  T(F): 97.6 (13 May 2022 16:00), Max: 98.2 (13 May 2022 12:00)  HR: 63 (13 May 2022 16:00) (52 - 71)  BP: 89/54 (13 May 2022 16:00) (86/47 - 126/64)  BP(mean): 70 (13 May 2022 14:00) (61 - 89)  RR: 18 (13 May 2022 16:00) (11 - 21)  SpO2: 95% (13 May 2022 16:00) (92% - 97%)    CONSTITUTIONAL: NAD, well-developed  HEENT: NC/AT, EOMI  RESPIRATORY: No increased work of breathing, CTAB, no wheezes or crackles appreciated  CARDIOVASCULAR: RRR, S1 and S2 present, no m/r/g  ABDOMEN: soft, NT, ND, bowel sounds present  EXTREMITIES: No LE edema  MUSCULOSKELETAL: no joint swelling or tenderness to palpation  NEURO: A&Ox3, moving all extremities    LABS:                        8.6    16.21 )-----------( 107      ( 13 May 2022 00:10 )             27.1     05-    136  |  102  |  28<H>  ----------------------------<  104<H>  3.4<L>   |  20<L>  |  0.96    Ca    8.5      13 May 2022 07:40  Phos  4.2       Mg     2.0         TPro  7.1  /  Alb  2.2<L>  /  TBili  0.4  /  DBili  x   /  AST  43<H>  /  ALT  15  /  AlkPhos  183<H>  0511    PT/INR - ( 13 May 2022 00:10 )   PT: 24.9 sec;   INR: 2.15 ratio         PTT - ( 13 May 2022 00:10 )  PTT:39.4 sec      Urinalysis Basic - ( 12 May 2022 00:51 )    Color: DARK BROWN / Appearance: Turbid / S.014 / pH: x  Gluc: x / Ketone: Negative  / Bili: Negative / Urobili: Negative   Blood: x / Protein: 300 mg/dL / Nitrite: Negative   Leuk Esterase: Large / RBC: 1 /hpf / WBC >50   Sq Epi: x / Non Sq Epi: 18 /hpf / Bacteria: Negative        Culture - Blood (collected 11 May 2022 23:55)  Source: .Blood Blood-Peripheral  Preliminary Report (13 May 2022 06:01):    No growth to date.    Culture - Blood (collected 11 May 2022 23:50)  Source: .Blood Blood-Peripheral  Preliminary Report (13 May 2022 06:01):    No growth to date.        RADIOLOGY & ADDITIONAL TESTS:    Results Reviewed:   Imaging Personally Reviewed:  Electrocardiogram Personally Reviewed:    COORDINATION OF CARE:  Care Discussed with Consultants/Other Providers [Y/N]:  Prior or Outpatient Records Reviewed [Y/N]:   Ezekiel Costa MD PGY-1  Internal Medicine  [employer]    PROGRESS NOTE:     Patient is a 89y old  Female who presents with a chief complaint of septic shock (13 May 2022 07:56)      SUBJECTIVE AND OVERNIGHT EVENTS:       ADDITIONAL REVIEW OF SYSTEMS:    MEDICATIONS  (STANDING):  cefepime   IVPB 2000 milliGRAM(s) IV Intermittent every 8 hours  cefepime   IVPB      escitalopram 5 milliGRAM(s) Oral daily  heparin   Injectable 5000 Unit(s) SubCutaneous every 8 hours  levothyroxine Injectable 25 MICROGram(s) IV Push <User Schedule>  metroNIDAZOLE  IVPB 500 milliGRAM(s) IV Intermittent every 8 hours  metroNIDAZOLE  IVPB      midodrine 10 milliGRAM(s) Oral every 8 hours  polyethylene glycol 3350 17 Gram(s) Oral daily  senna 2 Tablet(s) Oral at bedtime    MEDICATIONS  (PRN):  acetaminophen     Tablet .. 650 milliGRAM(s) Oral every 6 hours PRN Temp greater or equal to 38C (100.4F), Mild Pain (1 - 3), Moderate Pain (4 - 6)  melatonin 3 milliGRAM(s) Oral at bedtime PRN Insomnia      CAPILLARY BLOOD GLUCOSE        I&O's Summary    12 May 2022 07:01  -  13 May 2022 07:00  --------------------------------------------------------  IN: 1466.4 mL / OUT: 4755 mL / NET: -3288.6 mL    13 May 2022 07:01  -  13 May 2022 16:19  --------------------------------------------------------  IN: 300 mL / OUT: 470 mL / NET: -170 mL        PHYSICAL EXAM:  Vital Signs Last 24 Hrs  T(C): 36.4 (13 May 2022 16:00), Max: 36.8 (13 May 2022 12:00)  T(F): 97.6 (13 May 2022 16:00), Max: 98.2 (13 May 2022 12:00)  HR: 63 (13 May 2022 16:00) (52 - 71)  BP: 89/54 (13 May 2022 16:00) (86/47 - 126/64)  BP(mean): 70 (13 May 2022 14:00) (61 - 89)  RR: 18 (13 May 2022 16:00) (11 - 21)  SpO2: 95% (13 May 2022 16:00) (92% - 97%)    CONSTITUTIONAL: NAD, well-developed  HEENT: NC/AT, EOMI  RESPIRATORY: No increased work of breathing, CTAB, no wheezes or crackles appreciated  CARDIOVASCULAR: RRR, S1 and S2 present, no m/r/g  ABDOMEN: soft, NT, ND, bowel sounds present  EXTREMITIES: No LE edema  MUSCULOSKELETAL: no joint swelling or tenderness to palpation  NEURO: A&Ox3, moving all extremities    LABS:                        8.6    16.21 )-----------( 107      ( 13 May 2022 00:10 )             27.1     05-13    136  |  102  |  28<H>  ----------------------------<  104<H>  3.4<L>   |  20<L>  |  0.96    Ca    8.5      13 May 2022 07:40  Phos  4.2     05-  Mg     2.0     -    TPro  7.1  /  Alb  2.2<L>  /  TBili  0.4  /  DBili  x   /  AST  43<H>  /  ALT  15  /  AlkPhos  183<H>  05-11    PT/INR - ( 13 May 2022 00:10 )   PT: 24.9 sec;   INR: 2.15 ratio         PTT - ( 13 May 2022 00:10 )  PTT:39.4 sec      Urinalysis Basic - ( 12 May 2022 00:51 )    Color: DARK BROWN / Appearance: Turbid / S.014 / pH: x  Gluc: x / Ketone: Negative  / Bili: Negative / Urobili: Negative   Blood: x / Protein: 300 mg/dL / Nitrite: Negative   Leuk Esterase: Large / RBC: 1 /hpf / WBC >50   Sq Epi: x / Non Sq Epi: 18 /hpf / Bacteria: Negative        Culture - Blood (collected 11 May 2022 23:55)  Source: .Blood Blood-Peripheral  Preliminary Report (13 May 2022 06:01):    No growth to date.    Culture - Blood (collected 11 May 2022 23:50)  Source: .Blood Blood-Peripheral  Preliminary Report (13 May 2022 06:01):    No growth to date.        RADIOLOGY & ADDITIONAL TESTS:    Results Reviewed:   Imaging Personally Reviewed:  Electrocardiogram Personally Reviewed:    COORDINATION OF CARE:  Care Discussed with Consultants/Other Providers [Y/N]:  Prior or Outpatient Records Reviewed [Y/N]:   Ezekiel Costa MD PGY-1  Internal Medicine  [employer]    PROGRESS NOTE:     Patient is a 89y old  Female who presents with a chief complaint of septic shock (13 May 2022 07:56)      SUBJECTIVE AND OVERNIGHT EVENTS:   Patient was seen and examined at bedside with her son and daughter present. She endorsed no complaints.     ADDITIONAL REVIEW OF SYSTEMS:    MEDICATIONS  (STANDING):  cefepime   IVPB 2000 milliGRAM(s) IV Intermittent every 8 hours  cefepime   IVPB      escitalopram 5 milliGRAM(s) Oral daily  heparin   Injectable 5000 Unit(s) SubCutaneous every 8 hours  levothyroxine Injectable 25 MICROGram(s) IV Push <User Schedule>  metroNIDAZOLE  IVPB 500 milliGRAM(s) IV Intermittent every 8 hours  metroNIDAZOLE  IVPB      midodrine 10 milliGRAM(s) Oral every 8 hours  polyethylene glycol 3350 17 Gram(s) Oral daily  senna 2 Tablet(s) Oral at bedtime    MEDICATIONS  (PRN):  acetaminophen     Tablet .. 650 milliGRAM(s) Oral every 6 hours PRN Temp greater or equal to 38C (100.4F), Mild Pain (1 - 3), Moderate Pain (4 - 6)  melatonin 3 milliGRAM(s) Oral at bedtime PRN Insomnia      CAPILLARY BLOOD GLUCOSE        I&O's Summary    12 May 2022 07:  -  13 May 2022 07:00  --------------------------------------------------------  IN: 1466.4 mL / OUT: 4755 mL / NET: -3288.6 mL    13 May 2022 07:01  -  13 May 2022 16:19  --------------------------------------------------------  IN: 300 mL / OUT: 470 mL / NET: -170 mL        PHYSICAL EXAM:  Vital Signs Last 24 Hrs  T(C): 36.4 (13 May 2022 16:00), Max: 36.8 (13 May 2022 12:00)  T(F): 97.6 (13 May 2022 16:00), Max: 98.2 (13 May 2022 12:00)  HR: 63 (13 May 2022 16:00) (52 - 71)  BP: 89/54 (13 May 2022 16:00) (86/47 - 126/64)  BP(mean): 70 (13 May 2022 14:00) (61 - 89)  RR: 18 (13 May 2022 16:00) (11 - 21)  SpO2: 95% (13 May 2022 16:00) (92% - 97%)    CONSTITUTIONAL: NAD, in bed lethargic  HEENT: NC/AT, EOMI, dry membranes  RESPIRATORY: No increased work of breathing, diffuse crackles appreciated  CARDIOVASCULAR: irregularly irregular  ABDOMEN: soft, NT, ND, bowel sounds present  EXTREMITIES: significant edema in all four extremities, pitting edema to knees  MUSCULOSKELETAL: no joint swelling or tenderness to palpation  NEURO: lethargic but awake and communicative, able to follow commands   LABS:                        8.6    16.21 )-----------( 107      ( 13 May 2022 00:10 )             27.1     05-13    136  |  102  |  28<H>  ----------------------------<  104<H>  3.4<L>   |  20<L>  |  0.96    Ca    8.5      13 May 2022 07:40  Phos  4.2     05-  Mg     2.0         TPro  7.1  /  Alb  2.2<L>  /  TBili  0.4  /  DBili  x   /  AST  43<H>  /  ALT  15  /  AlkPhos  183<H>  05-11    PT/INR - ( 13 May 2022 00:10 )   PT: 24.9 sec;   INR: 2.15 ratio         PTT - ( 13 May 2022 00:10 )  PTT:39.4 sec      Urinalysis Basic - ( 12 May 2022 00:51 )    Color: DARK BROWN / Appearance: Turbid / S.014 / pH: x  Gluc: x / Ketone: Negative  / Bili: Negative / Urobili: Negative   Blood: x / Protein: 300 mg/dL / Nitrite: Negative   Leuk Esterase: Large / RBC: 1 /hpf / WBC >50   Sq Epi: x / Non Sq Epi: 18 /hpf / Bacteria: Negative        Culture - Blood (collected 11 May 2022 23:55)  Source: .Blood Blood-Peripheral  Preliminary Report (13 May 2022 06:01):    No growth to date.    Culture - Blood (collected 11 May 2022 23:50)  Source: .Blood Blood-Peripheral  Preliminary Report (13 May 2022 06:01):    No growth to date.        RADIOLOGY & ADDITIONAL TESTS:    Results Reviewed:   Imaging Personally Reviewed:  Electrocardiogram Personally Reviewed:    COORDINATION OF CARE:  Care Discussed with Consultants/Other Providers [Y/N]:  Prior or Outpatient Records Reviewed [Y/N]:

## 2022-05-13 NOTE — DIETITIAN INITIAL EVALUATION ADULT - PHYSICAL ASSESSMENT DORSAL HAND
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 Patient received 3L bolus in ED, s/p BP was still low 70/30s. Patient admitted to ICU for septic shock requiring pressors.     INTERVAL HPI/OVERNIGHT EVENTS:   No interval changes, for PEG today    REVIEW OF SYSTEMS:  CONSTITUTIONAL: No fever, weight loss, or fatigue  RESPIRATORY: No cough, wheezing, chills or hemoptysis; No shortness of breath  CARDIOVASCULAR: No chest pain, palpitations, dizziness, or leg swelling  GASTROINTESTINAL: No abdominal pain. No nausea, vomiting, or hematemesis; No diarrhea or constipation. No melena or hematochezia.  GENITOURINARY: No dysuria or hematuria, urinary frequency  NEUROLOGICAL: No headaches, memory loss, loss of strength, numbness, or tremors  SKIN: No itching, burning, rashes, or lesions     MEDICATIONS  (STANDING):  apixaban 5 milliGRAM(s) Oral two times a day  aspirin  chewable 81 milliGRAM(s) Oral daily  atorvastatin 40 milliGRAM(s) Oral at bedtime  collagenase Ointment 1 Application(s) Topical daily  dextrose 5%. 1000 milliLiter(s) (75 mL/Hr) IV Continuous <Continuous>  finasteride 5 milliGRAM(s) Oral daily  influenza  Vaccine (HIGH DOSE) 0.7 milliLiter(s) IntraMuscular once  insulin lispro (ADMELOG) corrective regimen sliding scale   SubCutaneous every 6 hours  metoprolol tartrate 12.5 milliGRAM(s) Oral every 12 hours  mirtazapine 7.5 milliGRAM(s) Oral daily  nystatin Ointment 1 Application(s) Topical two times a day  pantoprazole    Tablet 40 milliGRAM(s) Oral before breakfast  tamsulosin 0.4 milliGRAM(s) Oral at bedtime    MEDICATIONS  (PRN):  acetaminophen     Tablet .. 650 milliGRAM(s) Oral every 6 hours PRN Temp greater or equal to 38C (100.4F), Mild Pain (1 - 3)  ondansetron Injectable 4 milliGRAM(s) IV Push every 8 hours PRN Nausea and/or Vomiting      Vital Signs Last 24 Hrs  T(C): 36.8 (02 Feb 2022 13:59), Max: 37.2 (01 Feb 2022 20:30)  T(F): 98.3 (02 Feb 2022 13:59), Max: 98.9 (01 Feb 2022 20:30)  HR: 88 (02 Feb 2022 13:59) (70 - 114)  BP: 115/66 (02 Feb 2022 13:59) (115/66 - 142/76)  BP(mean): --  RR: 15 (02 Feb 2022 13:59) (15 - 20)  SpO2: 100% (02 Feb 2022 13:59) (97% - 100%)    PHYSICAL EXAMINATION:  General: No acute distress  HEENT: EOMI, PERRL  Neck: Supple, [ ] JVD  Lungs: decreased breath sounds   Heart: Regular rate and rhythm  Abdomen: Nontender, bowel sounds present  Extremities: No clubbing, cyanosis, edema  Nervous system:  Alert & Oriented X3, no focal deficits  Psychiatric: Normal affect  Skin: No rashes or lesions                          7.1    5.49  )-----------( 102      ( 02 Feb 2022 05:31 )             21.0     02-02    137  |  104  |  25<H>  ----------------------------<  138<H>  3.4<L>   |  24  |  1.23    Ca    7.6<L>      02 Feb 2022 05:31                I&O's Summary    01 Feb 2022 07:01  -  02 Feb 2022 07:00  --------------------------------------------------------  IN: 75 mL / OUT: 600 mL / NET: -525 mL    02 Feb 2022 07:01  -  02 Feb 2022 16:14  --------------------------------------------------------  IN: 0 mL / OUT: 200 mL / NET: -200 mL            CAPILLARY BLOOD GLUCOSE      RADIOLOGY & ADDITIONAL TESTS:                   severe

## 2022-05-13 NOTE — DIETITIAN INITIAL EVALUATION ADULT - PERTINENT LABORATORY DATA
05-13    136  |  102  |  28<H>  ----------------------------<  104<H>  3.4<L>   |  20<L>  |  0.96    Ca    8.5      13 May 2022 07:40  Phos  4.2     05-13  Mg     2.0     05-13    TPro  7.1  /  Alb  2.2<L>  /  TBili  0.4  /  DBili  x   /  AST  43<H>  /  ALT  15  /  AlkPhos  183<H>  05-11 05-13 @ 07:40: Na 136, BUN 28<H>, Cr 0.96, <H>, K+ 3.4<L>, Phos 4.2, Mg 2.0, Alk Phos --, ALT/SGPT --, AST/SGOT --, HbA1c --  05-13 @ 00:10: Na 136, BUN 28<H>, Cr 0.98, <H>, K+ 3.3<L>, Phos 4.7<H>, Mg 1.6, Alk Phos --, ALT/SGPT --, AST/SGOT --, HbA1c --    A1C with Estimated Average Glucose Result: 5.5 % (03-13-22 @ 11:43)

## 2022-05-13 NOTE — DIETITIAN INITIAL EVALUATION ADULT - PERTINENT MEDS FT
MEDICATIONS  (STANDING):  cefepime   IVPB 2000 milliGRAM(s) IV Intermittent every 8 hours  cefepime   IVPB      escitalopram 5 milliGRAM(s) Oral daily  heparin   Injectable 5000 Unit(s) SubCutaneous every 8 hours  levothyroxine Injectable 25 MICROGram(s) IV Push <User Schedule>  metroNIDAZOLE  IVPB      metroNIDAZOLE  IVPB 500 milliGRAM(s) IV Intermittent every 8 hours  midodrine 10 milliGRAM(s) Oral every 8 hours  polyethylene glycol 3350 17 Gram(s) Oral daily  senna 2 Tablet(s) Oral at bedtime    MEDICATIONS  (PRN):  acetaminophen     Tablet .. 650 milliGRAM(s) Oral every 6 hours PRN Temp greater or equal to 38C (100.4F), Mild Pain (1 - 3), Moderate Pain (4 - 6)  melatonin 3 milliGRAM(s) Oral at bedtime PRN Insomnia

## 2022-05-13 NOTE — DIETITIAN INITIAL EVALUATION ADULT - NSFNSNUTRHOMESUPPLEMENTFT_GEN_A_CORE
- Per H&P, PTA supplements/ nutrition related Rx: senna, MiraLax, Multivitamin, Levothyroxine,Ensure Original

## 2022-05-13 NOTE — PROGRESS NOTE ADULT - PROBLEM SELECTOR PLAN 3
- CHADSVASC score 4, but not on AC at home due to history of bleeding   - Hold home metoprolol 25mg BID for now given shock state  - Holding full anticoagulation, on SQH for DVT prophylaxis

## 2022-05-13 NOTE — DIETITIAN INITIAL EVALUATION ADULT - ORAL INTAKE PTA/DIET HISTORY
- Pt's family endorses decreased to fair appetite and PO intake PTA. Reports not following specific diet but having a soft diet PTA. Confirmed strawberry food allergy

## 2022-05-13 NOTE — DIETITIAN INITIAL EVALUATION ADULT - NSFNSGIASSESSMENTFT_GEN_A_CORE
- no noted vomiting, diarrhea, or constipation.   - Last BM:5/13; currently ordered for bowel regimens

## 2022-05-13 NOTE — PROGRESS NOTE ADULT - ASSESSMENT
89F w/ hx of Afib, CML, uterine cancer s/p radiation and hysterectomy, breast cancer s/p chemo and b/l mastectomy, recent SBO requiring ileocolic bypass in March 2022, previously on TPN, now presenting in septic shock

## 2022-05-13 NOTE — DIETITIAN INITIAL EVALUATION ADULT - ADD RECOMMEND
- K+ 3.4<L> today, Consider adding thiamine daily as pt may be at risk for REFEEDING syndrome, continue to monitor K+, Phos, Mg & replete aggressively PRN.   - Will continue to monitor PO intake, weight, labs, skin, GI status, diet.   - Malnutrition sticker placed, RD to follow-up as per protocol  - RD remains available to review diet education and adjust diet recommendations as needed.

## 2022-05-13 NOTE — DIETITIAN INITIAL EVALUATION ADULT - NSFNSPHYEXAMSKINFT_GEN_A_CORE
Pressure Injury 1: sacrum, Unstageable  Pressure Injury 2: none, Suspected deep tissue injury  Pressure Injury 11: none, none, Pressure Injury 1: sacrum, Unstageable  Pressure Injury 2: thoracic spine, Stage I    129% IBW ( pounds)  Performed nutrition focused physical exam and noted severe signs of muscle/fat loss

## 2022-05-13 NOTE — DIETITIAN INITIAL EVALUATION ADULT - NSPROEDAREADYLEARN_GEN_A_NUR
- Discussed the importance of including adequate calories and proteins throughout the day; reviewed protein calorie dense food sources/ meal and snacks ideas.

## 2022-05-13 NOTE — CHART NOTE - NSCHARTNOTEFT_GEN_A_CORE
Provider was contacted due to patient being unable to sallow her schedule pills including Midodrine. Examined patient at bedside and she was found to be awake in no acute distress. The patient reported that she cannot swallow anything.    The patient's daughter, Zach Snow, was contacted to discuss further GOC due to concern of patient deteriorating overnight. Zach was informed about her mother's current condition and that she was unable to take her medication meaning she could potentially have low blood pressures again overnight. Multiple options were discussed including IVFs, NGT and pressors. Zach expressed that putting an NGT would NOT be in line with her mother's wishes and she does NOT want it placed. Additionally, we discusses IV pressors and returning to MICU if her mother's condition worsened. After a lengthy discussion, Mrs. Snow decided that using IV pressors and returning to the MICU would NOT be in line with her mother's wishes as she understands the overall poor prognosis. She expressed that she would not want her mother to suffer and to go "back and forth" from MICU to floors.     Mrs. Snow DOES want to try IVFs if her mother becomes hypotensive. She was informed about the risks of fluid overload as patient has history of pulmonary effusions. She reported that she wants to try fluids as tolerated. Mrs. Snow expressed that if the BP does not respond to fluids, her mother's comfort should be prioritized over invasive measures knowing that this could mean that her mother would pass away.     Barb King MD  PGY-1 Internal Medicine

## 2022-05-13 NOTE — PROGRESS NOTE ADULT - SUBJECTIVE AND OBJECTIVE BOX
Kristen Chatterjee MD   PGY3 Internal Medicine  Pager [phone] ([facility]) [phone] ([facility])  Available on Microsoft Teams     SUBJECTIVE / OVERNIGHT EVENTS:      No acute events overnight.    MEDICATIONS  (STANDING):  cefepime   IVPB 2000 milliGRAM(s) IV Intermittent every 8 hours  cefepime   IVPB      chlorhexidine 4% Liquid 1 Application(s) Topical <User Schedule>  escitalopram 5 milliGRAM(s) Oral daily  heparin   Injectable 5000 Unit(s) SubCutaneous every 8 hours  levothyroxine Injectable 25 MICROGram(s) IV Push <User Schedule>  metroNIDAZOLE  IVPB      metroNIDAZOLE  IVPB 500 milliGRAM(s) IV Intermittent every 8 hours  midodrine 10 milliGRAM(s) Oral every 8 hours  polyethylene glycol 3350 17 Gram(s) Oral daily  senna 2 Tablet(s) Oral at bedtime    MEDICATIONS  (PRN):  acetaminophen     Tablet .. 650 milliGRAM(s) Oral every 6 hours PRN Temp greater or equal to 38C (100.4F), Mild Pain (1 - 3), Moderate Pain (4 - 6)      CAPILLARY BLOOD GLUCOSE        I&O's Summary    12 May 2022 07:01  -  13 May 2022 07:00  --------------------------------------------------------  IN: 1466.4 mL / OUT: 4755 mL / NET: -3288.6 mL        PHYSICAL EXAM:  Vital Signs Last 24 Hrs  T(C): 36.1 (13 May 2022 04:00), Max: 37 (12 May 2022 08:00)  T(F): 97 (13 May 2022 04:00), Max: 98.6 (12 May 2022 08:00)  HR: 58 (13 May 2022 07:00) (55 - 82)  BP: 107/55 (13 May 2022 07:00) (86/47 - 126/64)  BP(mean): 77 (13 May 2022 07:00) (61 - 89)  RR: 15 (13 May 2022 07:00) (11 - 19)  SpO2: 94% (13 May 2022 07:00) (92% - 97%)    ____________________  PHYSICAL EXAM:   GENERAL: NAD  EYES: Sclera clear  ENT: Dry mucous membranes  CHEST/LUNG: Clear to auscultation bilaterally; No rales, rhonchi, wheezing, or rubs  HEART: Regular rate and rhythm; No rubs or gallops; +systolic murmur  ABDOMEN: Soft, nontender to palpation   : wilson in place draining very cloudy dark brown urine  EXTREMITIES:  1+ Peripheral Pulses, brisk capillary refill. No clubbing or cyanosis; 2+ pitting edema in b/l LEs  NERVOUS SYSTEM: A&Ox3   SKIN: sacral ulcer stage 3 with central protrusion of hair-like granulation tissue  LABS:                        8.6    16.21 )-----------( 107      ( 13 May 2022 00:10 )             27.1     05-    136  |  101  |  28<H>  ----------------------------<  110<H>  3.3<L>   |  21<L>  |  0.98    Ca    8.1<L>      13 May 2022 00:10  Phos  4.7     -  Mg     1.6         TPro  7.1  /  Alb  2.2<L>  /  TBili  0.4  /  DBili  x   /  AST  43<H>  /  ALT  15  /  AlkPhos  183<H>  05-11    PT/INR - ( 13 May 2022 00:10 )   PT: 24.9 sec;   INR: 2.15 ratio         PTT - ( 13 May 2022 00:10 )  PTT:39.4 sec      Urinalysis Basic - ( 12 May 2022 00:51 )    Color: DARK BROWN / Appearance: Turbid / S.014 / pH: x  Gluc: x / Ketone: Negative  / Bili: Negative / Urobili: Negative   Blood: x / Protein: 300 mg/dL / Nitrite: Negative   Leuk Esterase: Large / RBC: 1 /hpf / WBC >50   Sq Epi: x / Non Sq Epi: 18 /hpf / Bacteria: Negative        Culture - Blood (collected 11 May 2022 23:55)  Source: .Blood Blood-Peripheral  Preliminary Report (13 May 2022 06:01):    No growth to date.    Culture - Blood (collected 11 May 2022 23:50)  Source: .Blood Blood-Peripheral  Preliminary Report (13 May 2022 06:01):    No growth to date.            SUBJECTIVE / OVERNIGHT EVENTS: Bumex gtt discontinued with good UOP. Received albumin 25% 100cc x1 for transient hypotension overnight. Otherwise, patient afebrile, HDS.        No acute events overnight.    MEDICATIONS  (STANDING):  cefepime   IVPB 2000 milliGRAM(s) IV Intermittent every 8 hours  cefepime   IVPB      chlorhexidine 4% Liquid 1 Application(s) Topical <User Schedule>  escitalopram 5 milliGRAM(s) Oral daily  heparin   Injectable 5000 Unit(s) SubCutaneous every 8 hours  levothyroxine Injectable 25 MICROGram(s) IV Push <User Schedule>  metroNIDAZOLE  IVPB      metroNIDAZOLE  IVPB 500 milliGRAM(s) IV Intermittent every 8 hours  midodrine 10 milliGRAM(s) Oral every 8 hours  polyethylene glycol 3350 17 Gram(s) Oral daily  senna 2 Tablet(s) Oral at bedtime    MEDICATIONS  (PRN):  acetaminophen     Tablet .. 650 milliGRAM(s) Oral every 6 hours PRN Temp greater or equal to 38C (100.4F), Mild Pain (1 - 3), Moderate Pain (4 - 6)      CAPILLARY BLOOD GLUCOSE        I&O's Summary    12 May 2022 07:01  -  13 May 2022 07:00  --------------------------------------------------------  IN: 1466.4 mL / OUT: 4755 mL / NET: -3288.6 mL        PHYSICAL EXAM:  Vital Signs Last 24 Hrs  T(C): 36.1 (13 May 2022 04:00), Max: 37 (12 May 2022 08:00)  T(F): 97 (13 May 2022 04:00), Max: 98.6 (12 May 2022 08:00)  HR: 58 (13 May 2022 07:00) (55 - 82)  BP: 107/55 (13 May 2022 07:00) (86/47 - 126/64)  BP(mean): 77 (13 May 2022 07:00) (61 - 89)  RR: 15 (13 May 2022 07:00) (11 - 19)  SpO2: 94% (13 May 2022 07:00) (92% - 97%)    ____________________  PHYSICAL EXAM:    GENERAL: NAD  EYES: Sclera clear  CHEST/LUNG: Bibasilar crackles. No rales, rhonchi, wheezing, or rubs  HEART: Regular rate and rhythm; No rubs or gallops; +systolic murmur  ABDOMEN: Soft, nontender to palpation   : wilson in place draining yellow urine  EXTREMITIES:  1+ Peripheral Pulses, brisk capillary refill. No clubbing or cyanosis; 2+ pitting edema in b/l LEs  NERVOUS SYSTEM: A&Ox3   SKIN: sacral ulcer stage 3 with central protrusion of hair-like granulation tissue                          8.6    16.21 )-----------( 107      ( 13 May 2022 00:10 )             27.1         136  |  101  |  28<H>  ----------------------------<  110<H>  3.3<L>   |  21<L>  |  0.98    Ca    8.1<L>      13 May 2022 00:10  Phos  4.7     05-  Mg     1.6     05-13    TPro  7.1  /  Alb  2.2<L>  /  TBili  0.4  /  DBili  x   /  AST  43<H>  /  ALT  15  /  AlkPhos  183<H>  05-11    PT/INR - ( 13 May 2022 00:10 )   PT: 24.9 sec;   INR: 2.15 ratio         PTT - ( 13 May 2022 00:10 )  PTT:39.4 sec      Urinalysis Basic - ( 12 May 2022 00:51 )    Color: DARK BROWN / Appearance: Turbid / S.014 / pH: x  Gluc: x / Ketone: Negative  / Bili: Negative / Urobili: Negative   Blood: x / Protein: 300 mg/dL / Nitrite: Negative   Leuk Esterase: Large / RBC: 1 /hpf / WBC >50   Sq Epi: x / Non Sq Epi: 18 /hpf / Bacteria: Negative        Culture - Blood (collected 11 May 2022 23:55)  Source: .Blood Blood-Peripheral  Preliminary Report (13 May 2022 06:01):    No growth to date.    Culture - Blood (collected 11 May 2022 23:50)  Source: .Blood Blood-Peripheral  Preliminary Report (13 May 2022 06:01):    No growth to date.

## 2022-05-13 NOTE — PROGRESS NOTE ADULT - PROBLEM SELECTOR PLAN 2
TTE (3/14): EF 60-65%; mod MR, mild-mod AR, severe TR; severely dilated LA and RA; unable to assess diastolic dysfunction  - repeat echo when euvolemic  - s/p bumex gtt  - assess volume status and diurese as needed  - holding metoprolol 25 BID iso shock  - monitor I&Os with goal net neg 1L TTE (3/14): EF 60-65%; mod MR, mild-mod AR, severe TR; severely dilated LA and RA; unable to assess diastolic dysfunction  - repeat echo when euvolemic  - s/p bumex gtt  - assess volume status and diurese as needed with parameters  - can consider albumin assisted diuresis  - holding metoprolol 25 BID iso shock  - monitor I&Os with goal net neg 1L TTE (3/14): EF 60-65%; mod MR, mild-mod AR, severe TR; severely dilated LA and RA; unable to assess diastolic dysfunction  - repeat echo when euvolemic  - s/p bumex gtt  - assess volume status and diurese as needed with parameters  - can consider albumin  - holding metoprolol 25 BID iso shock  - monitor I&Os with goal net neg 1L

## 2022-05-13 NOTE — PROGRESS NOTE ADULT - ASSESSMENT
89F w/ hx of Afib, CML, uterine cancer s/p radiation and hysterectomy, breast cancer s/p chemo and b/l mastectomy, recent SBO requiring ileocolic bypass in March 2022, previously on TPN, now presenting in septic shock     NEURO:  #Mental status  - altered mental status on admission, now A+Ox3     CV:  #Septic shock likely from UTI (refer to ID section)  - Continue levophed for now, wean as tolerated   - start midodrine 10 TID     #known AFib history  - CHADSVASC score 4, but not on AC at home due to history of bleeding   - hold home metoprolol 25mg BID for now given shock state  - holding full anticoagulation, on SQH for DVT prophylaxis     #Valvular dysfunction and fluid overload   - TTE (3/14): EF 60-65%; mod MR, mild-mod AR, severe TR; severely dilated LA and RA; unable to assess diastolic dysfunction  - c/w bumex gtt for now given overloaded state    PULM:  #b/l Pleural Effusion  - CXR (5/12): b/l pleural effusions similar to 4/4/22  - sating well on RA    RENAL:  #MERCEDEZ   - likely secondary to sepsis   - s/p 1L NS in ED, encourage oral intake  - continue to monitor     GI:  #Abdominal Pain  - patient with abdominal pain on presentation   - CT with no evidence of infection   - c/w bowel regimen of miralax, senna, dulcolax suppository PRN    #Diet: Pureed (passed bedside speech and swallow)     ENDO:  #Hypothyroidism history but now with marked anasarca in the setting of septic shock; rule out severe hypothyroidism   -Continue levothyroxine 25mcg IV qd (equivalent to PO home dose)  -Follow up TSH, T3, T4     HEMATOLOGIC:  #Leukocytosis in the setting of known CML   -Baseline WBC as per nursing home documentation is ~30-40; currently on admission WBC ~32    #DVT prophylaxis: SQH    ID:  #UTI   - UA with pyuria and dark brown urine, lactate 3.8   - will switch antibiotics to cefepime, flagyl and vancomycin   - follow up blood and urine cultures     SKIN:  #Lines: LUE arrow  #Decubitus ulcers: 1 sacral ulcer on admission stage ~3 with central protrusion of ~4-inch hair-like granulation tissue    ETHICS:  GOC: Per Daughter Zach patient is DNR/DNI but with full medical treatment otherwise. Will clarify further with patient as her mental status improves.    89F w/ hx of Afib, CML, uterine cancer s/p radiation and hysterectomy, breast cancer s/p chemo and b/l mastectomy, recent SBO requiring ileocolic bypass in March 2022, previously on TPN, now presenting in septic shock     NEURO:  # No issues, A&Ox3     CV:  # Septic shock likely from UTI    - Improving. Midodrine 10 TID     # AFib history  - CHADSVASC score 4, but not on AC at home due to history of bleeding   - Hold home metoprolol 25mg BID for now given shock state  - Holding full anticoagulation, on SQH for DVT prophylaxis     # Valvular dysfunction and fluid overload   - TTE (3/14): EF 60-65%; mod MR, mild-mod AR, severe TR; severely dilated LA and RA; unable to assess diastolic dysfunction  - Diuresis as needed to goal net negative    PULM:  # No issues     RENAL:  # No issues      GI:  # Abdominal Pain  - patient with abdominal pain on presentation, chronic in nautre   - CT with no evidence of infection   - C/w bowel regimen of miralax, senna, dulcolax suppository PRN    ENDO:  # Hypothyroidism history but now with marked anasarca in the setting of septic shock; rule out severe hypothyroidism   - Continue levothyroxine 25mcg IV qd (equivalent to PO home dose)  - Follow up TSH, T3, T4     HEMATOLOGIC:  # Leukocytosis in the setting of known CML   - Baseline WBC as per nursing home documentation is ~30-40; currently on admission WBC ~32    # DVT prophylaxis: SQH    ID:  # UTI   - UA with pyuria and dark brown urine, lactate 3.8   - Continue antibiotics to cefepime, flagyl    - Follow up blood and urine cultures     SKIN:  # Lines: LUE arrow  # Decubitus ulcers: 1 sacral ulcer on admission stage ~3 with central protrusion of ~4-inch hair-like granulation tissue    ETHICS:  GOC: Per Daughter Zach patient is DNR/DNI but with full medical treatment otherwise. Will clarify further with patient as her mental status improves.

## 2022-05-13 NOTE — DIETITIAN INITIAL EVALUATION ADULT - REASON INDICATOR FOR ASSESSMENT
Consult received for pressure injury stage 2/>   Information obtained from pt's family at bedside (pt's daughter and her spouse), pt non verbal, EMR.

## 2022-05-13 NOTE — CHART NOTE - NSCHARTNOTEFT_GEN_A_CORE
MAR Accept Note  Transfer to:    Accepting Attending Physician:    Assigned Room:        Assessed patient at bedside, hemodynamically stable, patient sleepy, but wakes up to voice commands, spoke in very low voice, oriented x3, follows verbal commands (raises arms).   HPI/MICU COURSE:   Please refer to MICU transfer note for full details. Briefly, this is a 88 yo F with PMH of Afib, CML, uterine cancer s/p radiation and hysterectomy, breast ca s/p c/l mastectomy and chemotherapy, recent SBO requiring ileocolic bypass (3/2022), previously on TPN now presenting from [facility] for hypotension in shock state 2/2 septic shock likely d/t UTI vs component of cardiogenic shock i/s/o diastolic failure and severe pulmonary HTN, RV failure and volume overload. She was started on norepinephrine gtt and admitted to MICU for further management. While in MICU she remained on empiric antibiotics, Cefepime and Flagyl (Vancomycin d/c'd 2/2 negative MRSA PCR). Bumex gtt initiated to remove fluid and maintain net negative fluid balance for which she made significant UOP and the gtt was discontinued around 20:00. She was successfully titrated off vasopressors around 14:00 5/12 and otherwise remains hemodynamically stable on room air. Pt is DNR/DNI with completed MOLST in chart.       FOR FOLLOW-UP:  [ ] Speech and swallow eval   [ ] On cefepime and flagyl (5/11 - ). Follow up cultures and de-escalate   [ ] Maintain net negative to even and avoid fluid overload

## 2022-05-13 NOTE — DIETITIAN INITIAL EVALUATION ADULT - SIGNS/SYMPTOMS
pt with multiple pressure injuries <75% EER>1 month, reported wt loss PTA, severe muscle and fat loss, +2 edema

## 2022-05-13 NOTE — DIETITIAN INITIAL EVALUATION ADULT - ETIOLOGY
inadequate protein energy intake in settings of decreased appetite and difficulty swallowing increased physiological demands of stress factor and wound healing

## 2022-05-14 NOTE — PROGRESS NOTE ADULT - SUBJECTIVE AND OBJECTIVE BOX
Ezekiel Costa MD PGY-1  Internal Medicine  [employer]    PROGRESS NOTE:     Patient is a 89y old  Female who presents with a chief complaint of Sepsis     (13 May 2022 17:14)      SUBJECTIVE AND OVERNIGHT EVENTS: Patient was seen and examined at bedside this morning.  No acute events overnight      ADDITIONAL REVIEW OF SYSTEMS:    MEDICATIONS  (STANDING):  albumin human 25% IVPB 100 milliLiter(s) IV Intermittent every 8 hours  cefepime   IVPB 2000 milliGRAM(s) IV Intermittent every 8 hours  cefepime   IVPB      escitalopram 5 milliGRAM(s) Oral daily  heparin   Injectable 5000 Unit(s) SubCutaneous every 8 hours  levothyroxine Injectable 25 MICROGram(s) IV Push <User Schedule>  metroNIDAZOLE  IVPB 500 milliGRAM(s) IV Intermittent every 8 hours  metroNIDAZOLE  IVPB      midodrine 10 milliGRAM(s) Oral every 8 hours  multivitamin/minerals 1 Tablet(s) Oral daily  polyethylene glycol 3350 17 Gram(s) Oral daily  senna 2 Tablet(s) Oral at bedtime  thiamine 100 milliGRAM(s) Oral daily    MEDICATIONS  (PRN):  acetaminophen     Tablet .. 650 milliGRAM(s) Oral every 6 hours PRN Temp greater or equal to 38C (100.4F), Mild Pain (1 - 3), Moderate Pain (4 - 6)  melatonin 3 milliGRAM(s) Oral at bedtime PRN Insomnia      CAPILLARY BLOOD GLUCOSE        I&O's Summary    13 May 2022 07:01  -  14 May 2022 07:00  --------------------------------------------------------  IN: 300 mL / OUT: 545 mL / NET: -245 mL        PHYSICAL EXAM:  Vital Signs Last 24 Hrs  T(C): 36.4 (14 May 2022 05:03), Max: 36.8 (13 May 2022 12:00)  T(F): 97.6 (14 May 2022 05:03), Max: 98.2 (13 May 2022 12:00)  HR: 64 (14 May 2022 04:55) (52 - 67)  BP: 106/55 (14 May 2022 04:55) (89/54 - 120/50)  BP(mean): 70 (13 May 2022 14:00) (70 - 82)  RR: 16 (14 May 2022 04:55) (16 - 21)  SpO2: 94% (14 May 2022 04:55) (93% - 97%)    CONSTITUTIONAL: NAD, well-developed  HEENT: NC/AT, EOMI  RESPIRATORY: No increased work of breathing, CTAB, no wheezes or crackles appreciated  CARDIOVASCULAR: RRR, S1 and S2 present, no m/r/g  ABDOMEN: soft, NT, ND, bowel sounds present  EXTREMITIES: No LE edema  MUSCULOSKELETAL: no joint swelling or tenderness to palpation  NEURO: A&Ox3, moving all extremities    LABS:                        8.6    16.21 )-----------( 107      ( 13 May 2022 00:10 )             27.1     05-13    136  |  102  |  28<H>  ----------------------------<  104<H>  3.4<L>   |  20<L>  |  0.96    Ca    8.5      13 May 2022 07:40  Phos  4.2     05-13  Mg     2.0     05-13      PT/INR - ( 13 May 2022 00:10 )   PT: 24.9 sec;   INR: 2.15 ratio         PTT - ( 13 May 2022 00:10 )  PTT:39.4 sec      Culture - Blood (collected 11 May 2022 23:55)  Source: .Blood Blood-Peripheral  Preliminary Report (13 May 2022 06:01):    No growth to date.    Culture - Blood (collected 11 May 2022 23:50)  Source: .Blood Blood-Peripheral  Preliminary Report (13 May 2022 06:01):    No growth to date.        RADIOLOGY & ADDITIONAL TESTS:    Results Reviewed:   Imaging Personally Reviewed:  Electrocardiogram Personally Reviewed:    COORDINATION OF CARE:  Care Discussed with Consultants/Other Providers [Y/N]:  Prior or Outpatient Records Reviewed [Y/N]:   Ezekiel Costa MD PGY-1  Internal Medicine  [employer]    PROGRESS NOTE:     Patient is a 89y old  Female who presents with a chief complaint of Sepsis     (13 May 2022 17:14)      SUBJECTIVE AND OVERNIGHT EVENTS:   Patient was seen and examined at bedside this morning. She was admittedly very weak but reported no other complaints or pain. She was able to interact limitedly due to her weakness during the encounter.     Family was contacted overnight due to her inability to tolerate midodrine and they expressed desire to keep her out of the MICU and avoid any invasive feeding modalities.  They are aware of these potential consequences and per the [facility] note and prior conversations, wish to preserve her dignity and comfort at this time to keep in line with her goals of care and preserve some quality of life.     ADDITIONAL REVIEW OF SYSTEMS:    MEDICATIONS  (STANDING):  albumin human 25% IVPB 100 milliLiter(s) IV Intermittent every 8 hours  cefepime   IVPB 2000 milliGRAM(s) IV Intermittent every 8 hours  cefepime   IVPB      escitalopram 5 milliGRAM(s) Oral daily  heparin   Injectable 5000 Unit(s) SubCutaneous every 8 hours  levothyroxine Injectable 25 MICROGram(s) IV Push <User Schedule>  metroNIDAZOLE  IVPB 500 milliGRAM(s) IV Intermittent every 8 hours  metroNIDAZOLE  IVPB      midodrine 10 milliGRAM(s) Oral every 8 hours  multivitamin/minerals 1 Tablet(s) Oral daily  polyethylene glycol 3350 17 Gram(s) Oral daily  senna 2 Tablet(s) Oral at bedtime  thiamine 100 milliGRAM(s) Oral daily    MEDICATIONS  (PRN):  acetaminophen     Tablet .. 650 milliGRAM(s) Oral every 6 hours PRN Temp greater or equal to 38C (100.4F), Mild Pain (1 - 3), Moderate Pain (4 - 6)  melatonin 3 milliGRAM(s) Oral at bedtime PRN Insomnia      CAPILLARY BLOOD GLUCOSE        I&O's Summary    13 May 2022 07:01  -  14 May 2022 07:00  --------------------------------------------------------  IN: 300 mL / OUT: 545 mL / NET: -245 mL        PHYSICAL EXAM:  Vital Signs Last 24 Hrs  T(C): 36.4 (14 May 2022 05:03), Max: 36.8 (13 May 2022 12:00)  T(F): 97.6 (14 May 2022 05:03), Max: 98.2 (13 May 2022 12:00)  HR: 64 (14 May 2022 04:55) (52 - 67)  BP: 106/55 (14 May 2022 04:55) (89/54 - 120/50)  BP(mean): 70 (13 May 2022 14:00) (70 - 82)  RR: 16 (14 May 2022 04:55) (16 - 21)  SpO2: 94% (14 May 2022 04:55) (93% - 97%)    CONSTITUTIONAL: NAD, lethargic, frail, edematous  HEENT: NC/AT, EOMI, mucus membranes dry  RESPIRATORY: No increased work of breathing, CTAB, no wheezes or crackles appreciated  CARDIOVASCULAR: RRR, +systolic murmur  ABDOMEN: soft, NT, ND, bowel sounds present  EXTREMITIES: + upper and lower extremity pitting edema  MUSCULOSKELETAL: no joint swelling or tenderness to palpation  NEURO: lethargic and weak, able to communicate with significant hypophonia    LABS:                        8.6    8.17  )-----------( 104      ( 14 May 2022 07:12 )             27.3     05-14    138  |  104  |  27<H>  ----------------------------<  77  3.5   |  22  |  0.89    Ca    8.3<L>      14 May 2022 07:12  Phos  3.3     05-14  Mg     1.8     05-14    TPro  6.1  /  Alb  2.3<L>  /  TBili  0.4  /  DBili  x   /  AST  18  /  ALT  9<L>  /  AlkPhos  134<H>  05-14    PT/INR - ( 13 May 2022 00:10 )   PT: 24.9 sec;   INR: 2.15 ratio         PTT - ( 13 May 2022 00:10 )  PTT:39.4 sec      Culture - Blood (collected 11 May 2022 23:55)  Source: .Blood Blood-Peripheral  Preliminary Report (13 May 2022 06:01):    No growth to date.    Culture - Blood (collected 11 May 2022 23:50)  Source: .Blood Blood-Peripheral  Preliminary Report (13 May 2022 06:01):    No growth to date.      RADIOLOGY & ADDITIONAL TESTS:  CT ABDOMEN  IMPRESSION:  Limited noncontrast study.    Increasing pleural effusions.    No CT evidence of acute intra-abdominal process.

## 2022-05-14 NOTE — PROGRESS NOTE ADULT - PROBLEM SELECTOR PLAN 2
TTE (3/14): EF 60-65%; mod MR, mild-mod AR, severe TR; severely dilated LA and RA; unable to assess diastolic dysfunction  - repeat echo when euvolemic  - s/p bumex gtt  - assess volume status and diurese as needed with parameters  - can consider albumin  - holding metoprolol 25 BID iso shock  - monitor I&Os with goal net neg 1L

## 2022-05-14 NOTE — PROGRESS NOTE ADULT - CONVERSATION DETAILS
Received a call from the floor reporting that the daughter was upset and asking to speak to a provider. During the discussion we clarified her prognosis as being poor and discussed that despite the vitals being stable at the moment she was not tolerating anything by mouth. Zach (daughter) was clear that her mother would not want any invasive means of nutrition. She emphasized that her mother would prefer to be comfortable and would move to comfort measures to stop all lab draws, vital checks, PO medications and when possible could start pleasure feeds. She was open to the Palliative Medicine consult to support her comfort measures at this time.

## 2022-05-14 NOTE — CONSULT NOTE ADULT - ATTENDING COMMENTS
89 yo F w/ hx of Afib, CML, uterine cancer s/p radiation and hysterectomy, breast cancer s/p chemo and b/l mastectomy, recent SBO requiring ileocolic bypass in March 2022, previously on TPN, now presenting in septic shock. Patient was on IV pressors and antibiotics but able to be weaned off and downgraded from MICU to floors. Clinically, however, patient has not recovered and per GOC with surrogate, daughter Zach, focus shifted to comfort measures. Geriatrics and Palliative Medicine ([facility]) Team was consulted for PCU eval.    Pain:   Uncontrolled   - Dilaudid 0.2mg IV q2h PRN moderate pain  - Dilaudid 0.5mg IV q2h PRN severe pain  - Bowel regimen while on opioids  Septic Shock   - D/c antibiotics as not in line with family's GOC  - No pressors  GOC. For details, please see GOC notes as above.  - DNR/DNI  - No blood draws, antibiotics  - Symptoms Rx only   - PCU transfer when bed available  For symptoms, will also recommend:  - Ativan 0.2mg IV q2h PRN agitation, anxiety  - Robinul 0.4mg IV q6h PRN excessive secretions  - Dulcolax 10mg suppository daily PRN for constipation        Chinedu West MD   Geriatrics and Palliative Care ([facility]) Consult Service    of Geriatric and Palliative Medicine  [facility]      Please page the following number for clinical matters between the hours of 9 am and 5 pm from Monday through Friday : [phone]    After 5pm and on weekends, please see the contact information below:    In the event of newly developing, evolving, or worsening symptoms, please contact the Palliative Medicine team via pager (if the patient is at [facility] #[phone] or if the patient is at [facility] #[phone]) The Geriatric and Palliative Medicine service has coverage 24 hours a day/ 7 days a week to provide medical recommendations regarding symptom management needs via telephone

## 2022-05-14 NOTE — CONSULT NOTE ADULT - PROBLEM SELECTOR RECOMMENDATION 4
GOC as above.  - DNR/DNI  - No blood draws, antibiotics  - Comfort measures only  - PCU transfer when bed available

## 2022-05-14 NOTE — SWALLOW BEDSIDE ASSESSMENT ADULT - SLP PERTINENT HISTORY OF CURRENT PROBLEM
FOOD ALLERGY: strawberry.  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 Patient was subsequently placed on NRB 10L/min O2 with recovery of SPO2 to 95%. Additionally, patient received D5 1/2 NS bolus. Blood pressure was subsequently rechecked: 90/70 followed by 2 hours later 123/70. Patient was then placed on 3L/min NC with SPO2 97% and return of mental status to baseline. She was also given Levaquin but then changed to ceftriaxone 1g, given unclear source of possible infection. Patient's family requested patient be sent to hospital.

## 2022-05-14 NOTE — CONSULT NOTE ADULT - PROBLEM SELECTOR RECOMMENDATION 9
Appears uncomfortable, PAINAD 3.  - Dilaudid 0.2mg IV q2h PRN moderate pain  - Dilaudid 0.5mg IV q2h PRN severe pain  - Bowel regimen while on opioids

## 2022-05-14 NOTE — SWALLOW BEDSIDE ASSESSMENT ADULT - SLP GENERAL OBSERVATIONS
Pt found in bed, sleeping upon encounter. Pt opens eyes briefly to tactile stimulation,  has difficulty maintaining attention to task/speaker. Pt on room air. Voice hypophonic, suspect reduced inspiratory/expiratory effort. 1-2 word utterances produced, follows simple 1-step commands.

## 2022-05-14 NOTE — SWALLOW BEDSIDE ASSESSMENT ADULT - SWALLOW EVAL: RECOMMENDED DIET
Based upon results of this exam, NPO, with non-oral nutrition/hydration/medications. However, if alternative means of nutrition/hydration are not within patient/family wishes, consider GOC discussion re provision of nutrition/hydration and a conservative feeding approach of puree and mildly thick liquids for pleasure feeds/quality of life. Feed only via tsp, in full upright when patient is alert. Discontinue feeding if patient demonstrates any s/s of aspiration.

## 2022-05-14 NOTE — CONSULT NOTE ADULT - SUBJECTIVE AND OBJECTIVE BOX
HPI:  89 y female PMH Afib (not on AC due to hx of bleeding), CML (WBC 30-40), uterine cancer s/p hysterectomy, breast cancer s/p b/l mastectomy, recent SBO requiring ileocolic bypass in March 2022, previously on TPN, presenting from  [facility] for acute onset change in mental status, labored breathing, hypotension (60/40), hypoxia (SpO2 87% on RA), but afebrile as measured by a facility NP. Patient was subsequently placed on NRB 10L/min O2 with recovery of SPO2 to 95%. Additionally, patient received D5 1/2 NS bolus. Blood pressure was subsequently rechecked: 90/70 followed by 2 hours later 123/70. Patient was then placed on 3L/min NC with SPO2 97% and return of mental status to baseline. She was also given Levaquin but then changed to ceftriaxone 1g, given unclear source of possible infection. Patient's family requested patient be sent to hospital.    ED Course: Noted to be hypothermic 94.9F; BP (83-94)/(36-59); HR 44-58; 97% 2L/min NC. She received 1L NS and one dose of each of vancomycin, cefepime, and metronidazole. Patient was subsequently started on levophed for blood pressure support. (12 May 2022 01:31)    Additional HPI: Per daughter Zach, patient was independent prior to March when she developed a bowel obstruction and underwent surgery. Since then, she has been continuously declining clinically. She was septic on this admission and has improved somewhat based on vitals but they feel she is still declining despite recent downgrade from MICU.    PERTINENT PM/SXH:   Breast cancer    Uterine cancer    Atrial fibrillation    Breast cancer    Uterine cancer    Afib      H/O mastectomy    H/O: hysterectomy    No significant past surgical history    No significant past surgical history    S/P bilateral mastectomy    History of hysterectomy      FAMILY HISTORY:    Family Hx substance abuse [ ]yes [X]no  ITEMS NOT CHECKED ARE NOT PRESENT    SOCIAL HISTORY:   Significant other/partner[ ]  Children[X]  Christianity/Spirituality:  Substance hx:  [ ]   Tobacco hx:  [ ]   Alcohol hx: [ ]   Home Opioid hx:  [ ] I-Stop Reference No:  Living Situation: [ ]Home  [ ]Long term care  [X]Rehab [ ]Other    ADVANCE DIRECTIVES:    DNR/MOLST  [X]  Living Will  [ ]   DECISION MAKER(s):  [ ] Health Care Proxy(s)  [X] Surrogate(s)  [ ] Guardian           Name(s): Zach Snow Phone Number(s): [phone]    BASELINE (I)ADL(s) (prior to admission):  [place]: [ ]Total  [ ] Moderate [X]Dependent    Allergies    contrast media (iodine-based) (Short breath)  iv  contrast (Unknown)  penicillin (Hives)  penicillin (Rash)  strawberry (Hives)  sulfa drugs (Short breath)  Sulfacetamide Sodium (Rash)    Intolerances    MEDICATIONS  (STANDING):  cefepime   IVPB 2000 milliGRAM(s) IV Intermittent every 8 hours  heparin   Injectable 5000 Unit(s) SubCutaneous every 8 hours  levothyroxine Injectable 25 MICROGram(s) IV Push <User Schedule>  metroNIDAZOLE  IVPB 500 milliGRAM(s) IV Intermittent every 8 hours    MEDICATIONS  (PRN):  acetaminophen     Tablet .. 650 milliGRAM(s) Oral every 6 hours PRN Temp greater or equal to 38C (100.4F), Mild Pain (1 - 3), Moderate Pain (4 - 6)  HYDROmorphone  Injectable 0.2 milliGRAM(s) IV Push every 4 hours PRN dyspnea    PRESENT SYMPTOMS: [X]Unable to self-report  [ ] CPOT [X] PAINADs [ ] RDOS  Source if other than patient:  [ ]Family   [ ]Team     Pain: [ ]yes [ ]no  QOL impact -   Location -                    Aggravating factors -  Quality -  Radiation -  Timing-  Severity (0-10 scale):  Minimal acceptable level (0-10 scale):     CPOT:    https://www.[facility].org/getattachment/[email or web address]/Critical-Care-Pain-Observation-Tool-(CPOT)    PAIN AD Score:  3  http://geriatrictoolkit.[employer].edu/cog/painad.pdf (press ctrl +  left click to view)    Dyspnea:                           [ ]Mild [ ]Moderate [ ]Severe    RDOS:  https://homecareinformation.net/handouts/hen/Respiratory_Distress_Observation_Scale.pdf (Ctrl +  left click to view)     Anxiety:                             [ ]Mild [ ]Moderate [ ]Severe  Fatigue:                             [ ]Mild [ ]Moderate [ ]Severe  Nausea:                             [ ]Mild [ ]Moderate [ ]Severe  Loss of appetite:              [ ]Mild [ ]Moderate [ ]Severe  Constipation:                    [ ]Mild [ ]Moderate [ ]Severe    Other Symptoms:  [X]All other review of systems negative     Palliative Performance Status Version 2:         10%    http://[facility].org/files/news/palliative_performance_scale_ppsv2.pdf  PHYSICAL EXAM:  Vital Signs Last 24 Hrs  T(C): 36.5 (14 May 2022 11:56), Max: 36.7 (14 May 2022 03:46)  T(F): 97.7 (14 May 2022 11:56), Max: 98 (14 May 2022 03:46)  HR: 73 (14 May 2022 11:56) (60 - 73)  BP: 139/79 (14 May 2022 11:56) (89/54 - 139/79)  BP(mean): 70 (13 May 2022 14:00) (70 - 70)  RR: 16 (14 May 2022 11:56) (16 - 20)  SpO2: 97% (14 May 2022 11:56) (93% - 97%) I&O's Summary    13 May 2022 07:01  -  14 May 2022 07:00  --------------------------------------------------------  IN: 300 mL / OUT: 545 mL / NET: -245 mL      GENERAL: [ ]Cachexia    [ ]Alert  [ ]Oriented x   [X]Lethargic  [ ]Unarousable  [ ]Verbal  [ ]Non-Verbal  Behavioral:   [ ] Anxiety  [ ] Delirium [ ] Agitation [ ] Other  HEENT:  [ ]Normal   [X]Dry mouth   [ ]ET Tube/Trach  [ ]Oral lesions  PULMONARY:   [X]Clear [ ]Tachypnea  [ ]Audible excessive secretions   [ ]Rhonchi        [ ]Right [ ]Left [ ]Bilateral  [ ]Crackles        [ ]Right [ ]Left [ ]Bilateral  [ ]Wheezing     [ ]Right [ ]Left [ ]Bilateral  [ ]Diminished breath sounds [ ]right [ ]left [ ]bilateral  CARDIOVASCULAR:    [X]Regular [ ]Irregular [ ]Tachy  [ ][place] [ ]Murmur [ ]Other  GASTROINTESTINAL:  [X]Soft  [ ]Distended   [ ]+BS  [ ]Non tender [X]Tender  [ ]Other [ ]PEG [ ]OGT/ NGT  Last BM:  GENITOURINARY:  [ ]Normal [X] Incontinent   [ ]Oliguria/Anuria   [ ][place]  MUSCULOSKELETAL:   [ ]Normal   [ ]Weakness  [X]Bed/Wheelchair bound [ ]Edema  NEUROLOGIC:   [X]No focal deficits  [ ]Cognitive impairment  [ ]Dysphagia [ ]Dysarthria [ ]Paresis [ ]Other   SKIN:   [ ]Normal  [ ]Rash  [ ]Other  [ ]Pressure ulcer(s)       Present on admission [ ]y [ ]n    CRITICAL CARE:  [ ] Shock Present  [ ]Septic [ ]Cardiogenic [ ]Neurologic [ ]Hypovolemic  [ ]  Vasopressors [ ]  Inotropes   [ ]Respiratory failure present [ ]Mechanical ventilation [ ]Non-invasive ventilatory support [ ]High flow    [ ]Acute  [ ]Chronic [ ]Hypoxic  [ ]Hypercarbic [ ]Other  [ ]Other organ failure     LABS:                        8.6    8.17  )-----------( 104      ( 14 May 2022 07:12 )             27.3   05-14    138  |  104  |  27<H>  ----------------------------<  77  3.5   |  22  |  0.89    Ca    8.3<L>      14 May 2022 07:12  Phos  3.3     05-14  Mg     1.8     05-14    TPro  6.1  /  Alb  2.3<L>  /  TBili  0.4  /  DBili  x   /  AST  18  /  ALT  9<L>  /  AlkPhos  134<H>  05-14  PT/INR - ( 13 May 2022 00:10 )   PT: 24.9 sec;   INR: 2.15 ratio         PTT - ( 13 May 2022 00:10 )  PTT:39.4 sec      RADIOLOGY & ADDITIONAL STUDIES:    PROTEIN CALORIE MALNUTRITION PRESENT: [ ]mild [ ]moderate [ ]severe [ ]underweight [ ]morbid obesity  https://www.andeal.org/vault/2440/web/files/ONC/Table_Clinical%20Characteristics%20to%20Document%20Malnutrition-White%20JV%20et%20al%202012.pdf    Height (cm): 162.6 (05-12-22 @ 02:46), 172.7 (03-08-22 @ 07:54)  Weight (kg): 70.2 (05-12-22 @ 02:46), 65.5 (03-12-22 @ 23:45)  BMI (kg/m2): 26.6 (05-12-22 @ 02:46), 22 (03-12-22 @ 23:45)    [X]PPSV2 < or = to 30% [ ]significant weight loss  [X]poor nutritional intake  [ ]anasarca[ ]Artificial Nutrition      REFERRALS:   [ ]Chaplaincy  [ ]Hospice  [ ]Child Life  [X]Social Work  [ ]Case management [ ]Holistic Therapy     Goals of Care Document: NILSA Alfredo (04-07-22 @ 14:13)  Goals of Care Conversation:   Participants:  · Participants  Patient    Advance Directives:  · Caregiver:  no    Conversation Discussion:  · Conversation Details  Palliative care SW met with pt at bedside to provide emotional support. Pt reported that she was having a difficult time adjusting to her hospitalization. Pt shared that she was eager to be discharged to a facility for rehab, as she feels that over the past month she has found herself being less mobile and she felt she could benefit from rehabilitation. Pt reflected on her medical history and expressed feelings of optimism around her ability to return to baseline. Pt expressed feelings of appreciation around her family, sharing that her daughter has been very supportive throughout this hospitalization. Pt discussed the impact that her hospitalization has had on her wellbeing, as well as that of her families. Palliative care SW provided empathetic listening, emotional support, and normalization/validation. Pt agreed to reach out as needed.    Keegan Alfredo  [phone]    What Matters Most To Patient and Family:  · What matters most to patient and family  Going to rehab so she can return to baseline.    Personal Advance Directives Treatment Guidelines:   MOLST:  · Completed  31-Mar-2022    Location of Discussion:   Location of Discussion:  · Location of discussion  Face to face      Electronic Signatures:  Jaden Alfredo ([place])  (Signed 07-Apr-2022 14:20)  	Authored: Goals of Care Conversation, Personal Advance Directives Treatment Guidelines, Location of Discussion      Last Updated: 07-Apr-2022 14:20 by Jaden Alfredo ([place])

## 2022-05-14 NOTE — CONSULT NOTE ADULT - PROBLEM SELECTOR RECOMMENDATION 5
Focus on comfort and PCU transfer when available.  For symptoms, recommend:  - Dilaudid 0.2mg IV q2h PRN increased work of breathing  - Dilaudid 0.2mg IV q2h PRN moderate pain  - Dilaudid 0.5mg IV q2h PRN severe pain  - Ativan 0.2mg IV q2h PRN agitation, anxiety  - Robinul 0.4mg IV q6h PRN excessive secretions  - Dulcolax 10mg suppository daily PRN for constipation

## 2022-05-14 NOTE — CONSULT NOTE ADULT - PROBLEM SELECTOR RECOMMENDATION 2
Admitted to MICU for septic shock, now downgraded to floors.  - D/c antibiotics as not in line with family's GOC  - No pressors  - Comfort measures only

## 2022-05-14 NOTE — SWALLOW BEDSIDE ASSESSMENT ADULT - ASR SWALLOW LINGUAL MOBILITY
suspect due to deconditioning/impaired protrusion/impaired anterior elevation/impaired left lateral movement

## 2022-05-14 NOTE — SWALLOW BEDSIDE ASSESSMENT ADULT - SWALLOW EVAL: CURRENT DIET
Per H&P-> initially npo given mental status. Now on Puree diet. Noted in Patient Profile->+difficulty swallowing liquids.

## 2022-05-14 NOTE — PROGRESS NOTE ADULT - PROBLEM SELECTOR PLAN 6
DVT: SQH  Diet: puree  Dispo: palliative and potential PCU consult DVT: SQH  Diet: NPO for now, will discuss pleasure feeds  Dispo: palliative and potential PCU consult

## 2022-05-14 NOTE — CONSULT NOTE ADULT - ASSESSMENT
88 yo F w/ hx of Afib, CML, uterine cancer s/p radiation and hysterectomy, breast cancer s/p chemo and b/l mastectomy, recent SBO requiring ileocolic bypass in March 2022, previously on TPN, now presenting in septic shock. Patient was on IV pressors and antibiotics but able to be weaned off and downgraded from MICU to floors. Clinically, however, patient has not recovered and per GOC with surrogate, daughter Zach, focus shifted to comfort measures.

## 2022-05-14 NOTE — SWALLOW BEDSIDE ASSESSMENT ADULT - ADDITIONAL RECOMMENDATIONS
maintain good oral hygiene    Recommend medical team have GOC discussion with family re provision of nutrition/hydration, and educate on the risks/consequences of aspiration.

## 2022-05-14 NOTE — CONSULT NOTE ADULT - CONVERSATION DETAILS
Discussed with patient's daughter over the phone and with patient's grandson at bedside patient's goals of care. Zach indicated that her mother would not want to live the way she is "without a quality of life." Her mother had made it clear previously that she did not want life sustaining measures if she had no ability to enjoy life and had previously made herself DNR/DNI. Zach felt that at this point, we are near the end of her mother's life and fluids, feeds, antibiotics and any other treatment is futile and not in line with her mother's wishes. She was in agreement with stopping all medications that do not promote symptom control and transfer to PCU when a bed is available.

## 2022-05-14 NOTE — SWALLOW BEDSIDE ASSESSMENT ADULT - COMMENTS
Continued H&P/Hospital course: ED Course: Noted to be hypothermic 94.9F; BP (83-94)/(36-59); HR 44-58; 97% 2L/min NC. She received 1L NS and one dose of each of vancomycin, cefepime, and metronidazole. Pt found to in shock state 2/2 septic shock likely d/t UTI vs component of cardiogenic shock i/s/o diastolic failure and severe pulmonary HTN, RV failure and volume overload. Pt started on pressors, empiric antibiotics and admitted to MICU for further management. Pt titrated off vasopressors, maintained hemodynamic stability and was transferred to the floors. Pt is DNR/DNI.   5/13: Dietary evaluation: Pt's family endorses decreased to fair appetite and PO intake PTA. Reports not following specific diet but having a soft diet PTA.  5/13: Provider contact note: pt unable to swallow medication, pt coughing with water.   As per GOC discussion 5/13-> alternative means of nutrition not within pt/family wishes.     Imaging:   CT Chest/ABD/Pelvis: IMPRESSION: Limited noncontrast study. Increasing pleural effusions. No CT evidence of acute intra-abdominal process.  CXRAY: IMPRESSION Bilateral pleural effusions similar to 4/4/2022. Continued H&P/Hospital course: ED Course: Noted to be hypothermic 94.9F; BP (83-94)/(36-59); HR 44-58; 97% 2L/min NC. She received 1L NS and one dose of each of vancomycin, cefepime, and metronidazole. Pt found to in shock state 2/2 septic shock likely d/t UTI vs component of cardiogenic shock i/s/o diastolic failure and severe pulmonary HTN, RV failure and volume overload. Pt started on pressors, empiric antibiotics and admitted to MICU for further management. Pt titrated off vasopressors, maintained hemodynamic stability and was transferred to the floors. Pt is DNR/DNI.   5/13: Dietary evaluation: Pt's family endorses decreased to fair appetite and PO intake PTA. Reports not following specific diet but having a soft diet PTA.  5/13: Provider contact note: pt unable to swallow medication, pt coughing with water.   As per GOC discussion 5/13-> NGT not within pt/family wishes.     Imaging:   CT Chest/ABD/Pelvis: IMPRESSION: Limited noncontrast study. Increasing pleural effusions. No CT evidence of acute intra-abdominal process.  CXRAY: IMPRESSION Bilateral pleural effusions similar to 4/4/2022.

## 2022-05-14 NOTE — SWALLOW BEDSIDE ASSESSMENT ADULT - SWALLOW EVAL: DIAGNOSIS
89F w/ hx of Afib, CML, uterine cancer s/p radiation and hysterectomy, breast cancer s/p chemo and b/l mastectomy, recent SBO requiring ileocolic bypass in March 2022, previously on TPN, presents with septic shock. Pt now seen for bedside swallow evaluation to r/o dysphagia.  Pt presents with evidence of an oropharyngeal dysphagia superimposed upon lethargy and deconditioning. The swallow sequence is marked by reduced oral grading to utensil, delayed oral transit time and significantly delayed pharyngeal swallows requiring re-direction to feeding task. Suspect component of fatigue contributing factor to delayed swallows. Reduced hyolaryngeal excursion upon palpation and multiple swallows indicative of post swallow residue, evident on PO trials of crushed ice chips. No further PO trials administered given high aspiration risk with current swallow profile and mental status.

## 2022-05-15 NOTE — PROGRESS NOTE ADULT - SUBJECTIVE AND OBJECTIVE BOX
GAP TEAM PALLIATIVE CARE UNIT PROGRESS NOTE:      [  ] Patient on hospice program.    INDICATION FOR PALLIATIVE CARE UNIT SERVICES/Interval HPI: Symptom directed care    OVERNIGHT EVENTS: Used x2 PRNs Dilaudid 0.2mg IV for moderate pain and x1 PRN 0.5mg IV for severe pain.    DNR on chart: Yes  Yes      Allergies    contrast media (iodine-based) (Short breath)  iv  contrast (Unknown)  penicillin (Hives)  penicillin (Rash)  strawberry (Hives)  sulfa drugs (Short breath)  Sulfacetamide Sodium (Rash)    Intolerances    MEDICATIONS  (STANDING):  HYDROmorphone  Injectable 0.2 milliGRAM(s) IV Push every 4 hours  levothyroxine Injectable 25 MICROGram(s) IV Push <User Schedule>    MEDICATIONS  (PRN):  acetaminophen  Suppository .. 650 milliGRAM(s) Rectal every 6 hours PRN Temp greater or equal to 38C (100.4F)  bisacodyl Suppository 10 milliGRAM(s) Rectal daily PRN Constipation  HYDROmorphone  Injectable 0.2 milliGRAM(s) IV Push every 2 hours PRN Dyspnea or increased work of breathing  HYDROmorphone  Injectable 0.2 milliGRAM(s) IV Push every 2 hours PRN Moderate Pain (4 - 6)  HYDROmorphone  Injectable 0.5 milliGRAM(s) IV Push every 2 hours PRN Severe Pain (7 - 10)  LORazepam   Injectable 0.2 milliGRAM(s) IV Push every 2 hours PRN Anxiety, agitation    ITEMS UNCHECKED ARE NOT PRESENT    PRESENT SYMPTOMS: [ ]Unable to self-report  [ ]PAINADs [ ]RDOS  Source if other than patient:  [ ]Family   [ ]Team     Pain: [ ] yes [X] no  QOL impact -   Location -                    Aggravating factors -  Quality -  Radiation -  Timing-  Severity (0-10 scale):  Minimal acceptable level (0-10 scale):     PAINAD Score:  http://geriatrictoolkit.[employer].edu/cog/painad.pdf (Ctrl +  left click to view)    Dyspnea:                           [ ]Mild [ ]Moderate [ ]Severe    RDOS:  0 to 2  minimal or no respiratory distress   3  mild distress  4 to 6 moderate distress  >7 severe distress  https://[email or web address]/handouts/hen/Respiratory_Distress_Observation_Scale.pdf (Ctrl +  left click to view)    Anxiety:                             [ ]Mild [ ]Moderate [ ]Severe  Fatigue:                             [ ]Mild [ ]Moderate [ ]Severe  Nausea:                             [ ]Mild [ ]Moderate [ ]Severe  Loss of appetite:              [ ]Mild [ ]Moderate [ ]Severe  Constipation:                    [ ]Mild [ ]Moderate [ ]Severe    Other Symptoms:  [X]All other review of systems negative    Palliative Performance Status Version 2:         10%  [email or web address]    PHYSICAL EXAM:   Vital Signs Last 24 Hrs  T(C): 36.7 (15 May 2022 08:52), Max: 36.7 (15 May 2022 08:52)  T(F): 98 (15 May 2022 08:52), Max: 98 (15 May 2022 08:52)  HR: 70 (15 May 2022 08:52) (67 - 70)  BP: 111/71 (15 May 2022 08:52) (111/71 - 122/74)  BP(mean): --  RR: 18 (15 May 2022 08:52) (18 - 18)  SpO2: 95% (15 May 2022 08:52) (95% - 97%) I&O's Summary    GENERAL: [ ] Cachexia  [ ]Alert  [ ]Oriented x   [X]Lethargic  [ ]Unarousable  [X]Verbal  [ ]Non-Verbal  Behavioral:   [ ] Anxiety  [ ] Delirium [ ] Agitation [ ] Other  HEENT:  [ ]Normal   [X]Dry mouth   [ ]ET Tube/Trach  [ ]Oral lesions  PULMONARY:   [X]Clear [ ]Tachypnea  [ ]Audible excessive secretions   [ ]Rhonchi        [ ]Right [ ]Left [ ]Bilateral  [ ]Crackles        [ ]Right [ ]Left [ ]Bilateral  [ ]Wheezing     [ ]Right [ ]Left [ ]Bilateral  [ ]Diminished BS [ ]Right [ ]Left [ ]Bilateral    CARDIOVASCULAR:    [X]Regular [ ]Irregular [ ]Tachy  [ ][place] [ ]Murmur [ ]Other  GASTROINTESTINAL:  [X]Soft  [ ]Distended   [ ]+BS  [X]Non tender [ ]Tender  [ ]Other [ ]PEG [ ]OGT/ NGT   Last BM:    GENITOURINARY:  [ ]Normal [X] Incontinent   [ ]Oliguria/Anuria   [ ]Kinsey  MUSCULOSKELETAL:   [ ]Normal   [ ]Weakness  [X]Bed/Wheelchair bound [ ]Edema  NEUROLOGIC:   [X]No focal deficits  [ ] Cognitive impairment  [ ] Dysphagia [ ]Dysarthria [ ] Paresis [ ]Other   SKIN:   [ ]Normal  [ ]Rash  [ ]Other  [X]Pressure ulcer(s)  [X]y [ ]n  Present on admission      CRITICAL CARE:  [ ] Shock Present  [ ]Septic [ ]Cardiogenic [ ]Neurologic [ ]Hypovolemic  [ ]  Vasopressors [ ]  Inotropes   [ ] Respiratory failure present [ ] Mechanical Ventilation [ ] Non-invasive ventilatory support [ ] High-Flow  [ ] Acute  [ ] Chronic [ ] Hypoxic  [ ] Hypercarbic [ ] Other  [ ] Other organ failure     LABS:                        8.6    8.17  )-----------( 104      ( 14 May 2022 07:12 )             27.3   05-14    138  |  104  |  27<H>  ----------------------------<  77  3.5   |  22  |  0.89    Ca    8.3<L>      14 May 2022 07:12  Phos  3.3     05-14  Mg     1.8     05-14    TPro  6.1  /  Alb  2.3<L>  /  TBili  0.4  /  DBili  x   /  AST  18  /  ALT  9<L>  /  AlkPhos  134<H>  05-14        RADIOLOGY & ADDITIONAL STUDIES:    PROTEIN CALORIE MALNUTRITION: [ ] mild [ ] moderate [ ] severe  [ ] underweight [ ] morbid obesity    https://www.andeal.org/vault/2440/web/files/ONC/Table_Clinical%20Characteristics%20to%20Document%20Malnutrition-White%20JV%20et%20al%202012.pdf    Height (cm): 162.6 (05-12-22 @ 02:46), 172.7 (03-08-22 @ 07:54)  Weight (kg): 70.2 (05-12-22 @ 02:46), 65.5 (03-12-22 @ 23:45)  BMI (kg/m2): 26.6 (05-12-22 @ 02:46), 22 (03-12-22 @ 23:45)    [X] PPSV2 < or = 30% [ ] significant weight loss [X] poor nutritional intake [ ] anasarca   Artificial Nutrition [ ]     REFERRALS:   [ ]Chaplaincy  [ ] Hospice  [ ]Child Life  [X]Social Work  [ ]Case management [ ]Holistic Therapy [ ] Physical Therapy [ ] Dietary   Goals of Care Document: NILSA Alfredo (04-07-22 @ 14:13)  Goals of Care Conversation:   Participants:  · Participants  Patient    Advance Directives:  · Caregiver:  no    Conversation Discussion:  · Conversation Details  Palliative care SW met with pt at bedside to provide emotional support. Pt reported that she was having a difficult time adjusting to her hospitalization. Pt shared that she was eager to be discharged to a facility for rehab, as she feels that over the past month she has found herself being less mobile and she felt she could benefit from rehabilitation. Pt reflected on her medical history and expressed feelings of optimism around her ability to return to baseline. Pt expressed feelings of appreciation around her family, sharing that her daughter has been very supportive throughout this hospitalization. Pt discussed the impact that her hospitalization has had on her wellbeing, as well as that of her families. Palliative care SW provided empathetic listening, emotional support, and normalization/validation. Pt agreed to reach out as needed.    Keegan Sayda  [phone]    What Matters Most To Patient and Family:  · What matters most to patient and family  Going to rehab so she can return to baseline.    Personal Advance Directives Treatment Guidelines:   MOLST:  · Completed  31-Mar-2022    Location of Discussion:   Location of Discussion:  · Location of discussion  Face to face      Electronic Signatures:  Jaden Alfredo ([facility] [place])  (Signed 07-Apr-2022 14:20)  	Authored: Goals of Care Conversation, Personal Advance Directives Treatment Guidelines, Location of Discussion      Last Updated: 07-Apr-2022 14:20 by Jaden Alfredo ([facility] [place])     GAP TEAM PALLIATIVE CARE UNIT PROGRESS NOTE:      [  ] Patient on hospice program.    INDICATION FOR PALLIATIVE CARE UNIT SERVICES/Interval HPI: Symptom directed care    OVERNIGHT EVENTS: Used x2 PRNs Dilaudid 0.2mg IV for moderate pain and x1 PRN 0.5mg IV for severe pain.    DNR on chart: Yes  Yes      Allergies    contrast media (iodine-based) (Short breath)  iv  contrast (Unknown)  penicillin (Hives)  penicillin (Rash)  strawberry (Hives)  sulfa drugs (Short breath)  Sulfacetamide Sodium (Rash)    Intolerances    MEDICATIONS  (STANDING):  HYDROmorphone  Injectable 0.2 milliGRAM(s) IV Push every 4 hours  levothyroxine Injectable 25 MICROGram(s) IV Push <User Schedule>    MEDICATIONS  (PRN):  acetaminophen  Suppository .. 650 milliGRAM(s) Rectal every 6 hours PRN Temp greater or equal to 38C (100.4F)  bisacodyl Suppository 10 milliGRAM(s) Rectal daily PRN Constipation  HYDROmorphone  Injectable 0.2 milliGRAM(s) IV Push every 2 hours PRN Dyspnea or increased work of breathing  HYDROmorphone  Injectable 0.2 milliGRAM(s) IV Push every 2 hours PRN Moderate Pain (4 - 6)  HYDROmorphone  Injectable 0.5 milliGRAM(s) IV Push every 2 hours PRN Severe Pain (7 - 10)  LORazepam   Injectable 0.2 milliGRAM(s) IV Push every 2 hours PRN Anxiety, agitation    ITEMS UNCHECKED ARE NOT PRESENT    PRESENT SYMPTOMS: [ ]Unable to self-report  [ ]PAINADs [ ]RDOS  Source if other than patient:  [ ]Family   [ ]Team     Pain: [ ] yes [X] no  QOL impact -   Location -                    Aggravating factors -  Quality -  Radiation -  Timing-  Severity (0-10 scale):  Minimal acceptable level (0-10 scale):     PAINAD Score:  http://geriatrictoolkit.[employer].edu/cog/painad.pdf (Ctrl +  left click to view)    Dyspnea:                           [ ]Mild [ ]Moderate [ ]Severe    RDOS: 0  0 to 2  minimal or no respiratory distress   3  mild distress  4 to 6 moderate distress  >7 severe distress  https://[email or web address]/handouts/hen/Respiratory_Distress_Observation_Scale.pdf (Ctrl +  left click to view)    Anxiety:                             [ ]Mild [ ]Moderate [ ]Severe  Fatigue:                             [ ]Mild [ ]Moderate [ ]Severe  Nausea:                             [ ]Mild [ ]Moderate [ ]Severe  Loss of appetite:              [ ]Mild [ ]Moderate [ ]Severe  Constipation:                    [ ]Mild [ ]Moderate [ ]Severe    Other Symptoms:  [X]All other review of systems negative    Palliative Performance Status Version 2:         10%  [email or web address]_performance_[email or web address]_[email or web address]    PHYSICAL EXAM:   Vital Signs Last 24 Hrs  T(C): 36.7 (15 May 2022 08:52), Max: 36.7 (15 May 2022 08:52)  T(F): 98 (15 May 2022 08:52), Max: 98 (15 May 2022 08:52)  HR: 70 (15 May 2022 08:52) (67 - 70)  BP: 111/71 (15 May 2022 08:52) (111/71 - 122/74)  BP(mean): --  RR: 18 (15 May 2022 08:52) (18 - 18)  SpO2: 95% (15 May 2022 08:52) (95% - 97%) I&O's Summary    GENERAL: [ ] Cachexia  [ ]Alert  [ ]Oriented x   [X]Lethargic  [ ]Unarousable  [X]Verbal  [ ]Non-Verbal  Behavioral:   [ ] Anxiety  [ ] Delirium [ ] Agitation [ ] Other  HEENT:  [ ]Normal   [X]Dry mouth   [ ]ET Tube/Trach  [ ]Oral lesions  PULMONARY:   [X]Clear [ ]Tachypnea  [ ]Audible excessive secretions   [ ]Rhonchi        [ ]Right [ ]Left [ ]Bilateral  [ ]Crackles        [ ]Right [ ]Left [ ]Bilateral  [ ]Wheezing     [ ]Right [ ]Left [ ]Bilateral  [ ]Diminished BS [ ]Right [ ]Left [ ]Bilateral    CARDIOVASCULAR:    [X]Regular [ ]Irregular [ ]Tachy  [ ][place] [ ]Murmur [ ]Other  GASTROINTESTINAL:  [X]Soft  [ ]Distended   [ ]+BS  [X]Non tender [ ]Tender  [ ]Other [ ]PEG [ ]OGT/ NGT   Last BM:    GENITOURINARY:  [ ]Normal [X] Incontinent   [ ]Oliguria/Anuria   [ ]Kinsey  MUSCULOSKELETAL:   [ ]Normal   [ ]Weakness  [X]Bed/Wheelchair bound [ ]Edema  NEUROLOGIC:   [X]No focal deficits  [ ] Cognitive impairment  [ ] Dysphagia [ ]Dysarthria [ ] Paresis [ ]Other   SKIN:   [ ]Normal  [ ]Rash  [ ]Other  [X]Pressure ulcer(s)  [X]y [ ]n  Present on admission      CRITICAL CARE:  [ ] Shock Present  [ ]Septic [ ]Cardiogenic [ ]Neurologic [ ]Hypovolemic  [ ]  Vasopressors [ ]  Inotropes   [ ] Respiratory failure present [ ] Mechanical Ventilation [ ] Non-invasive ventilatory support [ ] High-Flow  [ ] Acute  [ ] Chronic [ ] Hypoxic  [ ] Hypercarbic [ ] Other  [ ] Other organ failure     LABS:                        8.6    8.17  )-----------( 104      ( 14 May 2022 07:12 )             27.3   05-14    138  |  104  |  27<H>  ----------------------------<  77  3.5   |  22  |  0.89    Ca    8.3<L>      14 May 2022 07:12  Phos  3.3     05-14  Mg     1.8     05-14    TPro  6.1  /  Alb  2.3<L>  /  TBili  0.4  /  DBili  x   /  AST  18  /  ALT  9<L>  /  AlkPhos  134<H>  05-14        RADIOLOGY & ADDITIONAL STUDIES:  No new results.   PROTEIN CALORIE MALNUTRITION: [ ] mild [ ] moderate [ ] severe  [ ] underweight [ ] morbid obesity    https://www.andeal.org/vault/2440/web/files/ONC/Table_Clinical%20Characteristics%20to%20Document%20Malnutrition-White%20JV%20et%20al%202012.pdf    Height (cm): 162.6 (05-12-22 @ 02:46), 172.7 (03-08-22 @ 07:54)  Weight (kg): 70.2 (05-12-22 @ 02:46), 65.5 (03-12-22 @ 23:45)  BMI (kg/m2): 26.6 (05-12-22 @ 02:46), 22 (03-12-22 @ 23:45)    [X] PPSV2 < or = 30% [ ] significant weight loss [X] poor nutritional intake [ ] anasarca   Artificial Nutrition [ ]     REFERRALS:   [ ]Chaplaincy  [ ] Hospice  [ ]Child Life  [X]Social Work  [ ]Case management [ ]Holistic Therapy [ ] Physical Therapy [ ] Dietary   Goals of Care Document: NILSA Alfredo (04-07-22 @ 14:13)  Goals of Care Conversation:   Participants:  · Participants  Patient    Advance Directives:  · Caregiver:  no    Conversation Discussion:  · Conversation Details  Palliative care SW met with pt at bedside to provide emotional support. Pt reported that she was having a difficult time adjusting to her hospitalization. Pt shared that she was eager to be discharged to a facility for rehab, as she feels that over the past month she has found herself being less mobile and she felt she could benefit from rehabilitation. Pt reflected on her medical history and expressed feelings of optimism around her ability to return to baseline. Pt expressed feelings of appreciation around her family, sharing that her daughter has been very supportive throughout this hospitalization. Pt discussed the impact that her hospitalization has had on her wellbeing, as well as that of her families. Palliative care SW provided empathetic listening, emotional support, and normalization/validation. Pt agreed to reach out as needed.    Keegan Sayda  [phone]    What Matters Most To Patient and Family:  · What matters most to patient and family  Going to rehab so she can return to baseline.    Personal Advance Directives Treatment Guidelines:   NIKUNJST:  · Completed  31-Mar-2022    Location of Discussion:   Location of Discussion:  · Location of discussion  Face to face      Electronic Signatures:  Jaden Alfredo ([facility] [place])  (Signed 07-Apr-2022 14:20)  	Authored: Goals of Care Conversation, Personal Advance Directives Treatment Guidelines, Location of Discussion      Last Updated: 07-Apr-2022 14:20 by Jaden Alfredo ([facility] [place])

## 2022-05-15 NOTE — PROGRESS NOTE ADULT - PROBLEM SELECTOR PLAN 2
Admitted to MICU for septic shock, now downgraded to floors. No abx per family's goals.  - No pressors  - Comfort measures only.

## 2022-05-15 NOTE — PROGRESS NOTE ADULT - ASSESSMENT
88 yo F w/ hx of Afib, CML, uterine cancer s/p radiation and hysterectomy, breast cancer s/p chemo and b/l mastectomy, recent SBO requiring ileocolic bypass in March 2022, previously on TPN, now presenting in septic shock. Patient was on IV pressors and antibiotics but able to be weaned off and downgraded from MICU to floors. Clinically, however, patient has not recovered and per GOC with surrogate, daughter Zach, focus shifted to comfort measures. Transferred to PCU for symptom-focused care.

## 2022-05-15 NOTE — PROGRESS NOTE ADULT - PROBLEM SELECTOR PLAN 4
See [facility] note from 5/14.  - DNR/DNI  - No blood draws, antibiotics  - Comfort measures only

## 2022-05-15 NOTE — PROGRESS NOTE ADULT - PROBLEM SELECTOR PLAN 1
Appears uncomfortable intermittently but good response to pain medications. Very weak with difficulty verbalizing when pain develops. Used x2 PRNs Dilaudid 0.2mg IV for moderate pain and x1 PRN 0.5mg IV for severe pain.  - Start Dilaudid 0.2mg IV q4h ATC  - Dilaudid 0.2mg IV q2h PRN moderate pain  - Dilaudid 0.5mg IV q2h PRN severe pain  - Bowel regimen while on opioids.

## 2022-05-16 NOTE — PROGRESS NOTE ADULT - PROBLEM SELECTOR PLAN 7
Comfort and PCU care  For symptoms, recommend:  - Dilaudid 0.2mg IV q4h ATC  - Dilaudid 0.2mg IV q1h PRN moderate pain  - Dilaudid 0.5mg IV q1h PRN severe pain  - Ativan 0.2mg IV q2h PRN agitation, anxiety  - Robinul 0.4mg IV q6h PRN excessive secretions  - Dulcolax 10mg suppository daily PRN for constipation. Continue symptom directed care in PCU  Chaplaincy following

## 2022-05-16 NOTE — PROGRESS NOTE ADULT - ASSESSMENT
89 yo F w/ hx of Afib, CML, uterine cancer s/p radiation and hysterectomy, breast cancer s/p chemo and b/l mastectomy, recent SBO requiring ileocolic bypass in March 2022, previously on TPN, now presenting in septic shock. Patient was on IV pressors and antibiotics but able to be weaned off and downgraded from MICU to floors. Clinically, however, patient has not recovered and per GOC with surrogate, daughter Zach, focus shifted to comfort measures.  Patient has used 1 PRN Dilaudid  0.5  mg. Poor Po tolerance.   [place]  89 yo F w/ hx of Afib, CML, uterine cancer s/p radiation and hysterectomy, breast cancer s/p chemo and b/l mastectomy, recent SBO requiring ileocolic bypass in March 2022, previously on TPN, now presenting in septic shock. Patient was on IV pressors and antibiotics but able to be weaned off and downgraded from MICU to floors. Clinically, however, patient has not recovered and per GOC with surrogate, daughter Zach, focus shifted to comfort measures. Transferred to PCU for symptom-focused care.

## 2022-05-16 NOTE — PROGRESS NOTE ADULT - PROBLEM SELECTOR PLAN 5
- Pt extremely lethargic, PPSV 10%.  - Requires full supportive care for ADLs. -  Dilaudid 0.2mg IV q 4h ATC  - Dilaudid 0.2mg IV q1h PRN moderate pain  - Dilaudid 0.5mg IV q1h PRN severe pain  - Bowel regimen while on opioids.

## 2022-05-16 NOTE — PROGRESS NOTE ADULT - PROBLEM SELECTOR PLAN 4
PAINAD 2 under current regime.  -  Dilaudid 0.2mg IV q 4h ATC  - Dilaudid 0.2mg IV q1h PRN moderate pain  - Dilaudid 0.5mg IV q1h PRN severe pain  - Bowel regimen while on opioids. continue symptom directed care

## 2022-05-16 NOTE — PROGRESS NOTE ADULT - PROBLEM SELECTOR PLAN 1
- Admitted to MICU for septic shock, now downgraded to PCU.  - D/c antibiotics as not in line with family's GOC  - No pressors  - Comfort measures only. - Pt extremely lethargic, PPSV 10%.  - Requires full supportive care for ADLs.

## 2022-05-16 NOTE — PROGRESS NOTE ADULT - PROBLEM SELECTOR PLAN 2
- Admitted to MICU for septic shock, now downgraded to PCU.  - D/c antibiotics as not in line with family's GOC  - No pressors  - Comfort measures only.

## 2022-05-16 NOTE — PROGRESS NOTE ADULT - PROBLEM SELECTOR PLAN 6
- GOC as Note 5/12.  - DNR/DNI  - No blood draws, antibiotics  - Comfort measures only  - PCU transfer when bed available.

## 2022-05-16 NOTE — PROGRESS NOTE ADULT - SUBJECTIVE AND OBJECTIVE BOX
GAP TEAM PALLIATIVE CARE UNIT PROGRESS NOTE:      [  ] Patient on hospice program.    INDICATION FOR PALLIATIVE CARE UNIT SERVICES: End of life care    INTERVAL HPI/OVERNIGHT EVENTS:  Patient unable to tolerate diet. Currently NPO. On standing Dilaudid tolerating well. Patient required 1PRN dose of 0.5 mg Dilaudid for severe pain.    DNR on chart: Yes  Yes      Allergies    contrast media (iodine-based) (Short breath)  iv  contrast (Unknown)  penicillin (Hives)  penicillin (Rash)  strawberry (Hives)  sulfa drugs (Short breath)  Sulfacetamide Sodium (Rash)    Intolerances    MEDICATIONS  (STANDING):  HYDROmorphone  Injectable 0.2 milliGRAM(s) IV Push every 4 hours  levothyroxine Injectable 25 MICROGram(s) IV Push <User Schedule>    MEDICATIONS  (PRN):  acetaminophen  Suppository .. 650 milliGRAM(s) Rectal every 6 hours PRN Temp greater or equal to 38C (100.4F)  bisacodyl Suppository 10 milliGRAM(s) Rectal daily PRN Constipation  glycopyrrolate Injectable 0.4 milliGRAM(s) IV Push every 6 hours PRN secretions  HYDROmorphone  Injectable 0.2 milliGRAM(s) IV Push every 1 hour PRN Moderate Pain (4 - 6)  HYDROmorphone  Injectable 0.5 milliGRAM(s) IV Push every 1 hour PRN Severe Pain (7 - 10)  HYDROmorphone  Injectable 0.2 milliGRAM(s) IV Push every 1 hour PRN Dyspnea or increased work of breathing  LORazepam   Injectable 0.2 milliGRAM(s) IV Push every 2 hours PRN Anxiety, agitation    ITEMS UNCHECKED ARE NOT PRESENT    PRESENT SYMPTOMS: [x ]Unable due to poor mentation   Source if other than patient:  [ ]Family   [ ]Team     Pain: [ ] yes [ ] no  QOL impact -   Location -                    Aggravating factors -  Quality -  Radiation -  Timing-  Severity (0-10 scale):  Minimal acceptable level (0-10 scale):     Dyspnea:                           [ ]Mild [ ]Moderate [ ]Severe  Anxiety:                             [ ]Mild [ ]Moderate [ ]Severe  Fatigue:                             [ ]Mild [ ]Moderate [ ]Severe  Nausea:                             [ ]Mild [ ]Moderate [ ]Severe  Loss of appetite:              [ ]Mild [ ]Moderate [ ]Severe  Constipation:                    [ ]Mild [ ]Moderate [ ]Severe    PAINAD Score:2    [email or web address]/cog/painad.pdf (Ctrl +  left click to view)  		  Other Symptoms:  [x ]All other review of systems negative     Palliative Performance Status Version 2:    10     %         http://[email or web address]/files/news/palliative_performance_scale_[email or web address].pdf  PHYSICAL EXAM:  Vital Signs Last 24 Hrs  T(C): 36.8 (16 May 2022 07:48), Max: 36.8 (16 May 2022 07:48)  T(F): 98.3 (16 May 2022 07:48), Max: 98.3 (16 May 2022 07:48)  HR: 78 (16 May 2022 07:48) (78 - 78)  BP: 147/87 (16 May 2022 07:48) (147/87 - 147/87)  BP(mean): --  RR: 16 (16 May 2022 07:48) (16 - 16)  SpO2: 94% (16 May 2022 07:48) (94% - 94%) I&O's Summary    15 May 2022 07:01  -  16 May 2022 07:00  --------------------------------------------------------  IN: 0 mL / OUT: 1050 mL / NET: -1050 mL    GENERAL:  [ ]Alert  [x ]Oriented x 1  [x ]Somnolent  [ ]Cachexia  [ ]Unarousable  [x] Mumbling few phrases  [ ]Non-Verbal  Behavioral:   [ ] Anxiety  [ ] Delirium [ ] Agitation [ ] Other  HEENT:  [x ]Normal   [ ]Dry mouth   [ ]ET Tube/Trach  [ ]Oral lesions  PULMONARY:   [ ]Clear [ ]Tachypnea  [ ]Audible excessive secretions   [ ]Rhonchi        [ ]Right [ ]Left [ ]Bilateral  [ ]Crackles        [ ]Right [ ]Left [ ]Bilateral  [ ]Wheezing     [ ]Right [ ]Left [ ]Bilateral  [x ]Diminished BS [ ]Right [ ]Left [ x]Bilateral    CARDIOVASCULAR:    [x ]Regular [ ]Irregular [ ]Tachy  [ ][place]otah [ ]Murmur [ ]Other  GASTROINTESTINAL:  [x ]Soft  [ ]Distended   [ x]+BS  [x ]Non tender [ ]Tender  [ ]PEG [ ]OGT/ NGT   Last BM:    GENITOURINARY:  [ ]Normal [x ] Incontinent   [ ]Oliguria/Anuria   [ ]Kinsey  MUSCULOSKELETAL:   [ ]Normal   [ x]Weakness  [ ]Bed/Wheelchair bound [ ]Edema  NEUROLOGIC:   [ ]No focal deficits  [x ] Cognitive impairment  [x ] Dysphagia [ ]Dysarthria [ ] Paresis [ ]Other   SKIN:   [ ]Normal  [ ]Rash     [ ]Pressure ulcer(s)  [ ]y [ ]n  Present on admission      CRITICAL CARE:  [ ] Shock Present  [ ]Septic [ ]Cardiogenic [ ]Neurologic [ ]Hypovolemic  [ ]  Vasopressors [ ]  Inotropes   [ ] Respiratory failure present [ ] Mechanical Ventilation [ ] Non-invasive ventilatory support [ ] High-Flow  [ ] Acute  [ ] Chronic [ ] Hypoxic  [ ] Hypercarbic [ ] Other  [ ] Other organ failure     LABS:            RADIOLOGY & ADDITIONAL STUDIES:    PROTEIN CALORIE MALNUTRITION: [z ] mild [ ] moderate [ ] severe  [ ] underweight [ ] morbid obesity    https://www.andeal.org/vault/2440/web/files/ONC/Table_Clinical%20Characteristics%20to%20Document%20Malnutrition-White%20JV%20et%20al%054513.pdf    Height (cm): 162.6 (05-12-22 @ 02:46), 172.7 (03-08-22 @ 07:54)  Weight (kg): 70.2 (05-12-22 @ 02:46), 65.5 (03-12-22 @ 23:45)  BMI (kg/m2): 26.6 (05-12-22 @ 02:46), 22 (03-12-22 @ 23:45)    [ ] PPSV2 < or = 30% [ ] significant weight loss [ ] poor nutritional intake [ ] anasarca   Artificial Nutrition [ ]     REFERRALS:   [x ]Chaplaincy ([facility]) [ ] Hospice  [ ]Child Life  [ ]Social Work  [ ]Case management [ ]Holistic Therapy [ ] Physical Therapy [ ] Dietary   Goals of Care Document: NILSA Alfredo (04-07-22 @ 14:13)  Goals of Care Conversation:   Participants:  · Participants  Patient    Advance Directives:  · Caregiver:  no    Conversation Discussion:  · Conversation Details  Palliative care SW met with pt at bedside to provide emotional support. Pt reported that she was having a difficult time adjusting to her hospitalization. Pt shared that she was eager to be discharged to a facility for rehab, as she feels that over the past month she has found herself being less mobile and she felt she could benefit from rehabilitation. Pt reflected on her medical history and expressed feelings of optimism around her ability to return to baseline. Pt expressed feelings of appreciation around her family, sharing that her daughter has been very supportive throughout this hospitalization. Pt discussed the impact that her hospitalization has had on her wellbeing, as well as that of her families. Palliative care SW provided empathetic listening, emotional support, and normalization/validation. Pt agreed to reach out as needed.    Keegan Alfredo  [phone]    What Matters Most To Patient and Family:  · What matters most to patient and family  Going to rehab so she can return to baseline.    Personal Advance Directives Treatment Guidelines:   MOLST:  · Completed  31-Mar-2022    Location of Discussion:   Location of Discussion:  · Location of discussion  Face to face      Electronic Signatures:  Jaden Alfredo ([facility])  (Signed 07-Apr-2022 14:20)  	Authored: Goals of Care Conversation, Personal Advance Directives Treatment Guidelines, Location of Discussion      Last Updated: 07-Apr-2022 14:20 by Jaden Alfredo ([facility])     GAP TEAM PALLIATIVE CARE UNIT PROGRESS NOTE:      [  ] Patient on hospice program.    INDICATION FOR PALLIATIVE CARE UNIT SERVICES: End of life care    INTERVAL HPI/OVERNIGHT EVENTS:  Patient unable to tolerate diet. Currently NPO. On standing Dilaudid tolerating well. Patient required 1PRN dose of 0.5 mg Dilaudid for severe pain.    DNR on chart: Yes  Yes      Allergies    contrast media (iodine-based) (Short breath)  iv  contrast (Unknown)  penicillin (Hives)  penicillin (Rash)  strawberry (Hives)  sulfa drugs (Short breath)  Sulfacetamide Sodium (Rash)    Intolerances    MEDICATIONS  (STANDING):  HYDROmorphone  Injectable 0.2 milliGRAM(s) IV Push every 4 hours  levothyroxine Injectable 25 MICROGram(s) IV Push <User Schedule>    MEDICATIONS  (PRN):  acetaminophen  Suppository .. 650 milliGRAM(s) Rectal every 6 hours PRN Temp greater or equal to 38C (100.4F)  bisacodyl Suppository 10 milliGRAM(s) Rectal daily PRN Constipation  glycopyrrolate Injectable 0.4 milliGRAM(s) IV Push every 6 hours PRN secretions  HYDROmorphone  Injectable 0.2 milliGRAM(s) IV Push every 1 hour PRN Moderate Pain (4 - 6)  HYDROmorphone  Injectable 0.5 milliGRAM(s) IV Push every 1 hour PRN Severe Pain (7 - 10)  HYDROmorphone  Injectable 0.2 milliGRAM(s) IV Push every 1 hour PRN Dyspnea or increased work of breathing  LORazepam   Injectable 0.2 milliGRAM(s) IV Push every 2 hours PRN Anxiety, agitation    ITEMS UNCHECKED ARE NOT PRESENT    PRESENT SYMPTOMS: [x ]Unable due to poor mentation   Source if other than patient:  [ ]Family   [x]Team     Pain: [ ] yes [ ] no see PAINAD  QOL impact -   Location -                    Aggravating factors -  Quality -  Radiation -  Timing-  Severity (0-10 scale):  Minimal acceptable level (0-10 scale):     Dyspnea:                           [ ]Mild [ ]Moderate [ ]Severe  Anxiety:                             [ ]Mild [ ]Moderate [ ]Severe  Fatigue:                             [ ]Mild [ ]Moderate [ ]Severe  Nausea:                             [ ]Mild [ ]Moderate [ ]Severe  Loss of appetite:              [ ]Mild [ ]Moderate [ ]Severe  Constipation:                    [ ]Mild [ ]Moderate [ ]Severe    PAINAD Score:2    http://[email or web address]/cog/painad.pdf (Ctrl +  left click to view)  		  Other Symptoms:  [x ]All other review of systems unable to assess due to poor mentation    Palliative Performance Status Version 2:    10     %         [email or web address]://[email or web address]/files/[email or web address]_performance_scale_[email or web address].pdf  PHYSICAL EXAM:  Vital Signs Last 24 Hrs  T(C): 36.8 (16 May 2022 07:48), Max: 36.8 (16 May 2022 07:48)  T(F): 98.3 (16 May 2022 07:48), Max: 98.3 (16 May 2022 07:48)  HR: 78 (16 May 2022 07:48) (78 - 78)  BP: 147/87 (16 May 2022 07:48) (147/87 - 147/87)  BP(mean): --  RR: 16 (16 May 2022 07:48) (16 - 16)  SpO2: 94% (16 May 2022 07:48) (94% - 94%) I&O's Summary    15 May 2022 07:01  -  16 May 2022 07:00  --------------------------------------------------------  IN: 0 mL / OUT: 1050 mL / NET: -1050 mL    GENERAL:  [ ]Alert  [x ]Oriented x 1  [x ]Somnolent  [ ]Cachexia  [ ]Unarousable  [x] Mumbling few phrases  [ ]Non-Verbal  Behavioral:   [ ] Anxiety  [ ] Delirium [ ] Agitation [ ] Other  HEENT:  [x ]Normal   [ ]Dry mouth   [ ]ET Tube/Trach  [ ]Oral lesions  PULMONARY:   [ ]Clear [ ]Tachypnea  [ ]Audible excessive secretions   [ ]Rhonchi        [ ]Right [ ]Left [ ]Bilateral  [ ]Crackles        [ ]Right [ ]Left [ ]Bilateral  [ ]Wheezing     [ ]Right [ ]Left [ ]Bilateral  [x ]Diminished BS [ ]Right [ ]Left [ x]Bilateral    CARDIOVASCULAR:    [x ]Regular [ ]Irregular [ ]Tachy  [ ]Dakotah [ ]Murmur [ ]Other  GASTROINTESTINAL:  [x ]Soft  [ ]Distended   [ x]+BS  [x ]Non tender [ ]Tender  [ ]PEG [ ]OGT/ NGT   Last BM:    GENITOURINARY:  [ ]Normal [x ] Incontinent   [ ]Oliguria/Anuria   [ ]Kinsey  MUSCULOSKELETAL:   [ ]Normal   [ x]Weakness  [ ]Bed/Wheelchair bound [ ]Edema  NEUROLOGIC:   [ ]No focal deficits  [x ] Cognitive impairment  [x ] Dysphagia [ ]Dysarthria [ ] Paresis [ ]Other   SKIN:   [ ]Normal  [ ]Rash     [ ]Pressure ulcer(s)  [ ]y [ ]n  Present on admission      CRITICAL CARE:  [ ] Shock Present  [ ]Septic [ ]Cardiogenic [ ]Neurologic [ ]Hypovolemic  [ ]  Vasopressors [ ]  Inotropes   [ ] Respiratory failure present [ ] Mechanical Ventilation [ ] Non-invasive ventilatory support [ ] High-Flow  [ ] Acute  [ ] Chronic [ ] Hypoxic  [ ] Hypercarbic [ ] Other  [ ] Other organ failure     LABS: No new labs    RADIOLOGY & ADDITIONAL STUDIES: No new imaging    PROTEIN CALORIE MALNUTRITION: [z ] mild [ ] moderate [ ] severe  [ ] underweight [ ] morbid obesity    https://www.andeal.org/vault/2440/web/files/ONC/Table_Clinical%20Characteristics%20to%20Document%20Malnutrition-White%20JV%20et%20al%155505.pdf    Height (cm): 162.6 (05-12-22 @ 02:46), 172.7 (03-08-22 @ 07:54)  Weight (kg): 70.2 (05-12-22 @ 02:46), 65.5 (03-12-22 @ 23:45)  BMI (kg/m2): 26.6 (05-12-22 @ 02:46), 22 (03-12-22 @ 23:45)    [x] PPSV2 < or = 30% [ ] significant weight loss [ ] poor nutritional intake [ ] anasarca   Artificial Nutrition [ ]     REFERRALS:   [x ]Chaplaincy ([facility]) [ ] Hospice  [ ]Child Life  [ ]Social Work  [ ]Case management [ ]Holistic Therapy [ ] Physical Therapy [ ] Dietary     Goals of Care Conversation:

## 2022-05-17 NOTE — PROGRESS NOTE ADULT - PROBLEM SELECTOR PLAN 6
- See [facility] Note from 5/12  - DNR/DNI  - No blood draws, no antibiotics  - Comfort measures only

## 2022-05-17 NOTE — CHART NOTE - NSCHARTNOTEFT_GEN_A_CORE
Chart Note: Nutrition Services    Pt visited for nutrition follow up. Chart reviewed, events noted. Per RN pt inappropriate for assessment at this time. Pt NPO, comfort measures only.    RD remains available.    Mariaelena Schreiber MS, RD, CDN, [facility] #[phone]

## 2022-05-17 NOTE — PROGRESS NOTE ADULT - PROBLEM SELECTOR PLAN 2
Admitted to MICU for septic shock then downgraded to floors. Per GOC with family, opted to stop antibiotics.  - No pressors, no further antibiotics, DNR/DNI  - Comfort measures only.

## 2022-05-17 NOTE — PROGRESS NOTE ADULT - ASSESSMENT
89 yo F w/ hx of Afib, CML, uterine cancer s/p radiation and hysterectomy, breast cancer s/p chemo and b/l mastectomy, recent SBO requiring ileocolic bypass in March 2022, previously on TPN, now presenting in septic shock. Patient was on IV pressors and antibiotics but able to be weaned off and downgraded from MICU to floors. Clinically, however, patient has not recovered and per GOC with surrogate, daughter Zach, focus shifted to comfort measures. Transferred to PCU for symptom-focused care.

## 2022-05-17 NOTE — PROGRESS NOTE ADULT - PROBLEM SELECTOR PLAN 1
Localized pain to back.  - Dilaudid 0.2mg IV q 4h ATC  - Dilaudid 0.2mg IV q1h PRN moderate pain  - Dilaudid 0.5mg IV q1h PRN severe pain  - Bowel regimen while on opioids.

## 2022-05-17 NOTE — PROGRESS NOTE ADULT - SUBJECTIVE AND OBJECTIVE BOX
GAP TEAM PALLIATIVE CARE UNIT PROGRESS NOTE:      [  ] Patient on hospice program.    INDICATION FOR PALLIATIVE CARE UNIT SERVICES/Interval HPI: Symptom-directed care    OVERNIGHT EVENTS: No PRNs used in past 24 hours.    DNR on chart: Yes  Yes      Allergies    contrast media (iodine-based) (Short breath)  iv  contrast (Unknown)  penicillin (Hives)  penicillin (Rash)  strawberry (Hives)  sulfa drugs (Short breath)  Sulfacetamide Sodium (Rash)    Intolerances    MEDICATIONS  (STANDING):  HYDROmorphone  Injectable 0.2 milliGRAM(s) IV Push every 4 hours  levothyroxine Injectable 25 MICROGram(s) IV Push <User Schedule>    MEDICATIONS  (PRN):  acetaminophen  Suppository .. 650 milliGRAM(s) Rectal every 6 hours PRN Temp greater or equal to 38C (100.4F)  bisacodyl Suppository 10 milliGRAM(s) Rectal daily PRN Constipation  glycopyrrolate Injectable 0.4 milliGRAM(s) IV Push every 6 hours PRN secretions  HYDROmorphone  Injectable 0.2 milliGRAM(s) IV Push every 1 hour PRN Moderate Pain (4 - 6)  HYDROmorphone  Injectable 0.5 milliGRAM(s) IV Push every 1 hour PRN Severe Pain (7 - 10)  HYDROmorphone  Injectable 0.2 milliGRAM(s) IV Push every 1 hour PRN Dyspnea or increased work of breathing  LORazepam   Injectable 0.2 milliGRAM(s) IV Push every 2 hours PRN Anxiety, agitation    ITEMS UNCHECKED ARE NOT PRESENT    PRESENT SYMPTOMS: [ ]Unable to self-report  [ ]PAINADs [ ]RDOS  Source if other than patient:  [ ]Family   [ ]Team     Pain: [X] yes [ ] no  QOL impact - Unable to assess dimensions of pain 2/2 weakness  Location -                    Aggravating factors -  Quality -  Radiation -  Timing-  Severity (0-10 scale):  Minimal acceptable level (0-10 scale):     PAINAD Score: 2  http://geriatrictoolkit.[place].[facility]/cog/painad.pdf (Ctrl +  left click to view)    Dyspnea:                           [ ]Mild [ ]Moderate [ ]Severe    RDOS:  0 to 2  minimal or no respiratory distress   3  mild distress  4 to 6 moderate distress  >7 severe distress  https://homecareinformation.net/handouts/hen/Respiratory_Distress_Observation_Scale.pdf (Ctrl +  left click to view)    Anxiety:                             [ ]Mild [ ]Moderate [ ]Severe  Fatigue:                             [ ]Mild [ ]Moderate [ ]Severe  Nausea:                             [ ]Mild [ ]Moderate [ ]Severe  Loss of appetite:              [ ]Mild [ ]Moderate [ ]Severe  Constipation:                    [ ]Mild [ ]Moderate [ ]Severe    Other Symptoms:  [X]All other review of systems negative    Palliative Performance Status Version 2:         10%  http://Psychiatric.org/files/news/palliative_performance_scale_[email or web address].pdf    PHYSICAL EXAM:   Vital Signs Last 24 Hrs  T(C): 36.2 (17 May 2022 08:32), Max: 36.2 (17 May 2022 08:32)  T(F): 97.1 (17 May 2022 08:32), Max: 97.1 (17 May 2022 08:32)  HR: 75 (17 May 2022 08:32) (75 - 75)  BP: 108/72 (17 May 2022 08:32) (108/72 - 108/72)  BP(mean): --  RR: 18 (17 May 2022 08:32) (18 - 18)  SpO2: 95% (17 May 2022 08:32) (95% - 95%) I&O's Summary    16 May 2022 07:01  -  17 May 2022 07:00  --------------------------------------------------------  IN: 0 mL / OUT: 900 mL / NET: -900 mL      GENERAL: [X] Cachexia  [ ]Alert  [ ]Oriented x   [X]Lethargic  [ ]Unarousable  [X]Verbal  [ ]Non-Verbal  Behavioral:   [ ] Anxiety  [ ] Delirium [ ] Agitation [ ] Other  HEENT:  [ ]Normal   [X]Dry mouth   [ ]ET Tube/Trach  [ ]Oral lesions  PULMONARY:   [X]Clear [ ]Tachypnea  [ ]Audible excessive secretions   [ ]Rhonchi        [ ]Right [ ]Left [ ]Bilateral  [ ]Crackles        [ ]Right [ ]Left [ ]Bilateral  [ ]Wheezing     [ ]Right [ ]Left [ ]Bilateral  [ ]Diminished BS [ ]Right [ ]Left [ ]Bilateral    CARDIOVASCULAR:    [X]Regular [ ]Irregular [ ]Tachy  [ ]Dakotah [ ]Murmur [ ]Other  GASTROINTESTINAL:  [X]Soft  [ ]Distended   [ ]+BS  [ ]Non tender [X]Tender  [ ]Other [ ]PEG [ ]OGT/ NGT   Last BM:  5/15  GENITOURINARY:  [ ]Normal [X] Incontinent   [ ]Oliguria/Anuria   [ ]Kinsey  MUSCULOSKELETAL:   [ ]Normal   [ ]Weakness  [X]Bed/Wheelchair bound [ ]Edema  NEUROLOGIC:   [X]No focal deficits  [ ] Cognitive impairment  [ ] Dysphagia [ ]Dysarthria [ ] Paresis [ ]Other   SKIN:   [ ]Normal  [ ]Rash  [ ]Other  [X]Pressure ulcer(s)  [X]y [ ]n  Present on admission      CRITICAL CARE:  [ ] Shock Present  [ ]Septic [ ]Cardiogenic [ ]Neurologic [ ]Hypovolemic  [ ]  Vasopressors [ ]  Inotropes   [ ] Respiratory failure present [ ] Mechanical Ventilation [ ] Non-invasive ventilatory support [ ] High-Flow  [ ] Acute  [ ] Chronic [ ] Hypoxic  [ ] Hypercarbic [ ] Other  [ ] Other organ failure     LABS:        RADIOLOGY & ADDITIONAL STUDIES:    PROTEIN CALORIE MALNUTRITION: [ ] mild [ ] moderate [ ] severe  [ ] underweight [ ] morbid obesity    https://www.andeal.org/vault/2440/web/files/ONC/Table_Clinical%20Characteristics%20to%20Document%20Malnutrition-White%20JV%20et%20al%954978.pdf    Height (cm): 162.6 (05-12-22 @ 02:46), 172.7 (03-08-22 @ 07:54)  Weight (kg): 70.2 (05-12-22 @ 02:46), 65.5 (03-12-22 @ 23:45)  BMI (kg/m2): 26.6 (05-12-22 @ 02:46), 22 (03-12-22 @ 23:45)    [X] PPSV2 < or = 30% [ ] significant weight loss [X] poor nutritional intake [ ] anasarca   Artificial Nutrition [ ]     REFERRALS:   [ ]Chaplaincy  [ ] Hospice  [ ]Child Life  [X]Social Work  [ ]Case management [ ]Holistic Therapy [ ] Physical Therapy [ ] Dietary   Goals of Care Document: NILSA Alfredo (04-07-22 @ 14:13)  Goals of Care Conversation:   Participants:  · Participants  Patient    Advance Directives:  · Caregiver:  no    Conversation Discussion:  · Conversation Details  Palliative care SW met with pt at bedside to provide emotional support. Pt reported that she was having a difficult time adjusting to her hospitalization. Pt shared that she was eager to be discharged to a facility for rehab, as she feels that over the past month she has found herself being less mobile and she felt she could benefit from rehabilitation. Pt reflected on her medical history and expressed feelings of optimism around her ability to return to baseline. Pt expressed feelings of appreciation around her family, sharing that her daughter has been very supportive throughout this hospitalization. Pt discussed the impact that her hospitalization has had on her wellbeing, as well as that of her families. Palliative care SW provided empathetic listening, emotional support, and normalization/validation. Pt agreed to reach out as needed.    Keegancorine Alfredo  [phone]    What Matters Most To Patient and Family:  · What matters most to patient and family  Going to rehab so she can return to baseline.    Personal Advance Directives Treatment Guidelines:   MOLST:  · Completed  31-Mar-2022    Location of Discussion:   Location of Discussion:  · Location of discussion  Face to face      Electronic Signatures:  Jaden Alfredo ([facility])  (Signed 07-Apr-2022 14:20)  	Authored: Goals of Care Conversation, Personal Advance Directives Treatment Guidelines, Location of Discussion      Last Updated: 07-Apr-2022 14:20 by Jaden Alfredo ([facility])

## 2022-05-18 NOTE — PROGRESS NOTE ADULT - PROBLEM SELECTOR PLAN 1
Localized pain to back, abdomen.  - No PRN doses required  - Dilaudid 0.2mg IV q 4h ATC  - Dilaudid 0.2mg IV q1h PRN moderate pain  - Dilaudid 0.5mg IV q1h PRN severe pain  - Bowel regimen while on opioids.

## 2022-05-18 NOTE — PROGRESS NOTE ADULT - PROBLEM SELECTOR PLAN 7
- Continue symptom directed care in PCU  [facility] - Continue symptom directed care in [facility]U. Dispo planning ongoing.   - Chaplaincy following.

## 2022-05-18 NOTE — PROGRESS NOTE ADULT - ASSESSMENT
88 yo F w/ hx of Afib, CML, uterine cancer s/p radiation and hysterectomy, breast cancer s/p chemo and b/l mastectomy, recent SBO requiring ileocolic bypass in March 2022, previously on TPN, now presenting in septic shock. Patient was on IV pressors and antibiotics but able to be weaned off and downgraded from MICU to floors. Clinically, however, patient has not recovered and per GOC with surrogate, daughter Zach, focus shifted to comfort measures. Transferred to PCU for symptom-focused care.  Ongoing discussion regarding  hospice care.  88 yo F w/ hx of Afib, CML, uterine cancer s/p radiation and hysterectomy, breast cancer s/p chemo and b/l mastectomy, recent SBO requiring ileocolic bypass in March 2022, previously on TPN, now presenting in septic shock. Patient was on IV pressors and antibiotics but able to be weaned off and downgraded from MICU to floors. Clinically, however, patient has not recovered and per GOC with surrogate, daughter Zach, focus shifted to comfort measures. Transferred to PCU for symptom-focused care.

## 2022-05-18 NOTE — PROGRESS NOTE ADULT - PROBLEM SELECTOR PLAN 2
- Admitted to MICU for septic shock then downgraded to floors. Per GOC with family, opted to stop antibiotics.  - No pressors, no further antibiotics, DNR/DNI  - Comfort measures only.

## 2022-05-18 NOTE — PROGRESS NOTE ADULT - PROBLEM SELECTOR PLAN 6
- See [facility] Note from 5/12  - DNR/DNI  - No blood draws, no antibiotics  - Comfort measures only.

## 2022-05-18 NOTE — PROGRESS NOTE ADULT - SUBJECTIVE AND OBJECTIVE BOX
GAP TEAM PALLIATIVE CARE UNIT PROGRESS NOTE:      [  ] Patient on hospice program.    INDICATION FOR PALLIATIVE CARE UNIT SERVICES: Symptom- direct care    INTERVAL HPI/OVERNIGHT EVENTS: No PRN medication use in the  last 24 hours.     DNR on chart: Yes  Yes      Allergies    contrast media (iodine-based) (Short breath)  iv  contrast (Unknown)  penicillin (Hives)  penicillin (Rash)  strawberry (Hives)  sulfa drugs (Short breath)  Sulfacetamide Sodium (Rash)    Intolerances    MEDICATIONS  (STANDING):  HYDROmorphone  Injectable 0.2 milliGRAM(s) IV Push every 4 hours  levothyroxine Injectable 25 MICROGram(s) IV Push <User Schedule>    MEDICATIONS  (PRN):  acetaminophen  Suppository .. 650 milliGRAM(s) Rectal every 6 hours PRN Temp greater or equal to 38C (100.4F)  bisacodyl Suppository 10 milliGRAM(s) Rectal daily PRN Constipation  glycopyrrolate Injectable 0.4 milliGRAM(s) IV Push every 6 hours PRN secretions  HYDROmorphone  Injectable 0.2 milliGRAM(s) IV Push every 1 hour PRN Moderate Pain (4 - 6)  HYDROmorphone  Injectable 0.5 milliGRAM(s) IV Push every 1 hour PRN Severe Pain (7 - 10)  HYDROmorphone  Injectable 0.2 milliGRAM(s) IV Push every 1 hour PRN Dyspnea or increased work of breathing  LORazepam   Injectable 0.2 milliGRAM(s) IV Push every 2 hours PRN Anxiety, agitation    ITEMS UNCHECKED ARE NOT PRESENT    PRESENT SYMPTOMS: [ x ]Unable to obtain due to poor mentation  [ x ]PAINADs [ x ]RDOS  Source if other than patient:  [ ]Family   [ ]Team     Pain: [ ] yes [ ] no  QOL impact -   Location -                    Aggravating factors -  Quality -  Radiation -  Timing-  Severity (0-10 scale):  Minimal acceptable level (0-10 scale):     Dyspnea:                           [ ]Mild [ ]Moderate [ ]Severe  Anxiety:                             [ ]Mild [ ]Moderate [ ]Severe  Fatigue:                             [ ]Mild [ ]Moderate [ ]Severe  Nausea:                             [ ]Mild [ ]Moderate [ ]Severe  Loss of appetite:              [ ]Mild [ ]Moderate [ ]Severe  Constipation:                    [ ]Mild [ ]Moderate [ ]Severe    PAINAD Score: 0  RDOS: 0    [email or web address] (Ctrl +  left click to view)  		  Other Symptoms:  [  ]All other review of systems negative     Palliative Performance Status Version 2:         %         http://npcrc.org/files/news/palliative_performance_scale_ppsv2.pdf  PHYSICAL EXAM:  Vital Signs Last 24 Hrs  T(C): 37.4 (18 May 2022 08:50), Max: 37.4 (18 May 2022 08:50)  T(F): 99.3 (18 May 2022 08:50), Max: 99.3 (18 May 2022 08:50)  HR: 84 (18 May 2022 08:50) (84 - 84)  BP: 102/63 (18 May 2022 08:50) (102/63 - 102/63)  BP(mean): --  RR: 16 (18 May 2022 08:50) (16 - 16)  SpO2: 94% (18 May 2022 08:50) (94% - 94%) I&O's Summary    17 May 2022 07:01  -  18 May 2022 07:00  --------------------------------------------------------  IN: 0 mL / OUT: 550 mL / NET: -550 mL    GENERAL:  [ ]Alert  [ ]Oriented x 0  [x ]Lethargic  [ ]Cachexia  [ ]Unarousable  [ ]Verbal  [x ]Non-Verbal  Behavioral:   [ ] Anxiety  [ ] Delirium [ ] Agitation [ ] Other  HEENT:  [ ]Normal   [ x]Dry mouth   [ ]ET Tube/Trach  [ ]Oral lesions  PULMONARY:   [ x ]Clear [ ]Tachypnea  [ ]Audible excessive secretions   [ ]Rhonchi        [ ]Right [ ]Left [ ]Bilateral  [ ]Crackles        [ ]Right [ ]Left [ ]Bilateral  [ ]Wheezing     [ ]Right [ ]Left [ ]Bilateral  [ ]Diminished BS [ ]Right [ ]Left [ ]Bilateral    CARDIOVASCULAR:    [x ]Regular [ ]Irregular [ ]Tachy  [ ][place] [ ]Murmur [ ]Other  GASTROINTESTINAL:  [x ]Soft  [ ]Distended   [ x]+BS  [ ]Non tender [ x] diffuse Tender, no rebound   [ ]PEG [ ]OGT/ NGT   Last BM:5/16    GENITOURINARY:  [ ]Normal [x ] Incontinent   [ ]Oliguria/Anuria   [ ]Kinsey  MUSCULOSKELETAL:   [ ]Normal   [ ]Weakness  [ x]Bed/Wheelchair bound [ ]Edema  NEUROLOGIC:   [ ]No focal deficits  [x ] Cognitive impairment  [ ] Dysphagia [ ]Dysarthria [ ] Paresis [ ]Other   SKIN:   [ ]Normal  [ ]Rash     [ x]Pressure ulcer(s)  [x ]y [ ]n  Present on admission      CRITICAL CARE:  [ ] Shock Present  [ ]Septic [ ]Cardiogenic [ ]Neurologic [ ]Hypovolemic  [ ]  Vasopressors [ ]  Inotropes   [ ] Respiratory failure present [ ] Mechanical Ventilation [ ] Non-invasive ventilatory support [ ] High-Flow  [ ] Acute  [ ] Chronic [ ] Hypoxic  [ ] Hypercarbic [ ] Other  [ x] Other organ failure  Skin  organ  failure  (pressure  ulcer)    LABS:            RADIOLOGY & ADDITIONAL STUDIES:    PROTEIN CALORIE MALNUTRITION: [ ] mild [ ] moderate [x ] severe  [ ] underweight [ ] morbid obesity    https://www.andeal.org/vault/2440/web/files/ONC/Table_Clinical%20Characteristics%20to%20Document%20Malnutrition-White%20JV%20et%20al%863793.pdf    Height (cm): 162.6 (05-12-22 @ 02:46), 172.7 (03-08-22 @ 07:54)  Weight (kg): 70.2 (05-12-22 @ 02:46), 65.5 (03-12-22 @ 23:45)  BMI (kg/m2): 26.6 (05-12-22 @ 02:46), 22 (03-12-22 @ 23:45)    [x ] PPSV2 < or = 30% [ ] significant weight loss [ ] poor nutritional intake [ ] anasarca   Artificial Nutrition [ ]     REFERRALS:   [ ]Chaplaincy  [ ] Hospice  [ ]Child Life  [ ]Social Work  [ ]Case management [ ]Holistic Therapy [ ] Physical Therapy [ ] Dietary   Goals of Care Document: NILSA Alfredo (04-07-22 @ 14:13)  Goals of Care Conversation:   Participants:  · Participants  Patient    Advance Directives:  · Caregiver:  no    Conversation Discussion:  · Conversation Details  Palliative care SW met with pt at bedside to provide emotional support. Pt reported that she was having a difficult time adjusting to her hospitalization. Pt shared that she was eager to be discharged to a facility for rehab, as she feels that over the past month she has found herself being less mobile and she felt she could benefit from rehabilitation. Pt reflected on her medical history and expressed feelings of optimism around her ability to return to baseline. Pt expressed feelings of appreciation around her family, sharing that her daughter has been very supportive throughout this hospitalization. Pt discussed the impact that her hospitalization has had on her wellbeing, as well as that of her families. Palliative care SW provided empathetic listening, emotional support, and normalization/validation. Pt agreed to reach out as needed.    Keegan Alfredo  [phone]    What Matters Most To Patient and Family:  · What matters most to patient and family  Going to rehab so she can return to baseline.    Personal Advance Directives Treatment Guidelines:   MOLST:  · Completed  31-Mar-2022    Location of Discussion:   Location of Discussion:  · Location of discussion  Face to face      Electronic Signatures:  Jaden Alfredo ([facility] [place] [facility])  (Signed 07-Apr-2022 14:20)  	Authored: Goals of Care Conversation, Personal Advance Directives Treatment Guidelines, Location of Discussion      Last Updated: 07-Apr-2022 14:20 by Jaden Alfredo ([facility] [place] [facility])     GAP TEAM PALLIATIVE CARE UNIT PROGRESS NOTE:      [  ] Patient on hospice program.    INDICATION FOR PALLIATIVE CARE UNIT SERVICES: Symptom- direct care    INTERVAL HPI/OVERNIGHT EVENTS: No PRN medication use in the  last 24 hours.     DNR on chart: Yes      Allergies    contrast media (iodine-based) (Short breath)  iv  contrast (Unknown)  penicillin (Hives)  penicillin (Rash)  strawberry (Hives)  sulfa drugs (Short breath)  Sulfacetamide Sodium (Rash)    Intolerances    MEDICATIONS  (STANDING):  HYDROmorphone  Injectable 0.2 milliGRAM(s) IV Push every 4 hours  levothyroxine Injectable 25 MICROGram(s) IV Push <User Schedule>    MEDICATIONS  (PRN):  acetaminophen  Suppository .. 650 milliGRAM(s) Rectal every 6 hours PRN Temp greater or equal to 38C (100.4F)  bisacodyl Suppository 10 milliGRAM(s) Rectal daily PRN Constipation  glycopyrrolate Injectable 0.4 milliGRAM(s) IV Push every 6 hours PRN secretions  HYDROmorphone  Injectable 0.2 milliGRAM(s) IV Push every 1 hour PRN Moderate Pain (4 - 6)  HYDROmorphone  Injectable 0.5 milliGRAM(s) IV Push every 1 hour PRN Severe Pain (7 - 10)  HYDROmorphone  Injectable 0.2 milliGRAM(s) IV Push every 1 hour PRN Dyspnea or increased work of breathing  LORazepam   Injectable 0.2 milliGRAM(s) IV Push every 2 hours PRN Anxiety, agitation    ITEMS UNCHECKED ARE NOT PRESENT    PRESENT SYMPTOMS: [ x ]Unable to obtain due to poor mentation  [ x ]PAINADs [ x ]RDOS  Source if other than patient:  [ ]Family   [ ]Team     Pain: [ ] yes [ ] no  QOL impact -   Location -                    Aggravating factors -  Quality -  Radiation -  Timing-  Severity (0-10 scale):  Minimal acceptable level (0-10 scale):     Dyspnea:                           [ ]Mild [ ]Moderate [ ]Severe  Anxiety:                             [ ]Mild [ ]Moderate [ ]Severe  Fatigue:                             [ ]Mild [ ]Moderate [ ]Severe  Nausea:                             [ ]Mild [ ]Moderate [ ]Severe  Loss of appetite:              [ ]Mild [ ]Moderate [ ]Severe  Constipation:                    [ ]Mild [ ]Moderate [ ]Severe    PAINAD Score: 0  RDOS: 0    [email or web address] (Ctrl +  left click to view)  		  Other Symptoms:  [x]All other review of systems negative - unable to assess due to poor mentation    Palliative Performance Status Version 2:     10    %         http://npcrc.org/files/news/palliative_performance_scale_ppsv2.pdf  PHYSICAL EXAM:  Vital Signs Last 24 Hrs  T(C): 37.4 (18 May 2022 08:50), Max: 37.4 (18 May 2022 08:50)  T(F): 99.3 (18 May 2022 08:50), Max: 99.3 (18 May 2022 08:50)  HR: 84 (18 May 2022 08:50) (84 - 84)  BP: 102/63 (18 May 2022 08:50) (102/63 - 102/63)  BP(mean): --  RR: 16 (18 May 2022 08:50) (16 - 16)  SpO2: 94% (18 May 2022 08:50) (94% - 94%) I&O's Summary    17 May 2022 07:01  -  18 May 2022 07:00  --------------------------------------------------------  IN: 0 mL / OUT: 550 mL / NET: -550 mL    GENERAL:  [ ]Alert  [ ]Oriented x 0  [x ]Lethargic  [ ]Cachexia  [ ]Unarousable  [ ]Verbal  [x ]Non-Verbal  Behavioral:   [ ] Anxiety  [ ] Delirium [ ] Agitation [ ] Other  HEENT:  [ ]Normal   [ x]Dry mouth   [ ]ET Tube/Trach  [ ]Oral lesions  PULMONARY:   [ x ]Clear [ ]Tachypnea  [ ]Audible excessive secretions   [ ]Rhonchi        [ ]Right [ ]Left [ ]Bilateral  [ ]Crackles        [ ]Right [ ]Left [ ]Bilateral  [ ]Wheezing     [ ]Right [ ]Left [ ]Bilateral  [ ]Diminished BS [ ]Right [ ]Left [ ]Bilateral    CARDIOVASCULAR:    [x ]Regular [ ]Irregular [ ]Tachy  [ ][place] [ ]Murmur [ ]Other  GASTROINTESTINAL:  [x ]Soft  [ ]Distended   [ x]+BS  [ ]Non tender [ x] diffuse Tender, no rebound   [ ]PEG [ ]OGT/ NGT   Last BM:5/16    GENITOURINARY:  [ ]Normal [x ] Incontinent   [ ]Oliguria/Anuria   [ ]Kinsey  MUSCULOSKELETAL:   [ ]Normal   [ ]Weakness  [ x]Bed/Wheelchair bound [ ]Edema  NEUROLOGIC:   [ ]No focal deficits  [x ] Cognitive impairment  [ ] Dysphagia [ ]Dysarthria [ ] Paresis [ ]Other   SKIN:   [ ]Normal  [ ]Rash     [ x]Pressure ulcer(s)  [x ]y [ ]n  Present on admission      CRITICAL CARE:  [ ] Shock Present  [ ]Septic [ ]Cardiogenic [ ]Neurologic [ ]Hypovolemic  [ ]  Vasopressors [ ]  Inotropes   [ ] Respiratory failure present [ ] Mechanical Ventilation [ ] Non-invasive ventilatory support [ ] High-Flow  [ ] Acute  [ ] Chronic [ ] Hypoxic  [ ] Hypercarbic [ ] Other  [ x] Other organ failure  Skin  organ  failure  (pressure  ulcer)    LABS: no new labs    RADIOLOGY & ADDITIONAL STUDIES: No new labs    PROTEIN CALORIE MALNUTRITION: [ ] mild [ ] moderate [x ] severe  [ ] underweight [ ] morbid obesity    https://www.andeal.org/vault/2440/web/files/ONC/Table_Clinical%20Characteristics%20to%20Document%20Malnutrition-White%20JV%20et%20al%667345.pdf    Height (cm): 162.6 (05-12-22 @ 02:46), 172.7 (03-08-22 @ 07:54)  Weight (kg): 70.2 (05-12-22 @ 02:46), 65.5 (03-12-22 @ 23:45)  BMI (kg/m2): 26.6 (05-12-22 @ 02:46), 22 (03-12-22 @ 23:45)    [x ] PPSV2 < or = 30% [ ] significant weight loss [ ] poor nutritional intake [ ] anasarca   Artificial Nutrition [ ]     REFERRALS:   [ ]Chaplaincy  [ ] Hospice  [ ]Child Life  [ ]Social Work  [ ]Case management [ ]Holistic Therapy [ ] Physical Therapy [ ] Dietary   Goals of Care Document: NILSA Alfredo (04-07-22 @ 14:13)  Goals of Care Conversation:   Participants:  · Participants  Patient    Advance Directives:  · Caregiver:  no    Conversation Discussion:  · Conversation Details  Palliative care SW met with pt at bedside to provide emotional support. Pt reported that she was having a difficult time adjusting to her hospitalization. Pt shared that she was eager to be discharged to a facility for rehab, as she feels that over the past month she has found herself being less mobile and she felt she could benefit from rehabilitation. Pt reflected on her medical history and expressed feelings of optimism around her ability to return to baseline. Pt expressed feelings of appreciation around her family, sharing that her daughter has been very supportive throughout this hospitalization. Pt discussed the impact that her hospitalization has had on her wellbeing, as well as that of her families. Palliative care SW provided empathetic listening, emotional support, and normalization/validation. Pt agreed to reach out as needed.    Keegan Alfredo  [phone]    What Matters Most To Patient and Family:  · What matters most to patient and family  Going to rehab so she can return to baseline.    Personal Advance Directives Treatment Guidelines:   MOLST:  · Completed  31-Mar-2022    Location of Discussion:   Location of Discussion:  · Location of discussion  Face to face      Electronic Signatures:  Jaden Alfredo [place]  (Signed 07-Apr-2022 14:20)  	Authored: Goals of Care Conversation, Personal Advance Directives Treatment Guidelines, Location of Discussion      Last Updated: 07-Apr-2022 14:20 by Jaden Alfredo ([facility] [place]

## 2022-05-19 NOTE — PROGRESS NOTE ADULT - PROBLEM SELECTOR PLAN 6
- See [facility] Note from 5/12  - DNR/DNI  - No blood draws, no antibiotics  - Comfort measures only.

## 2022-05-19 NOTE — PROGRESS NOTE ADULT - ASSESSMENT
88 yo F w/ hx of Afib, CML, uterine cancer s/p radiation and hysterectomy, breast cancer s/p chemo and b/l mastectomy, recent SBO requiring ileocolic bypass in March 2022, previously on TPN, now presenting in septic shock. Patient was on IV pressors and antibiotics but able to be weaned off and downgraded from MICU to floors. Clinically, however, patient has not recovered and per GOC with surrogate, daughter Zach, focus shifted to comfort measures. Transferred to PCU for symptom-focused care.   Patient required 2  doses of Dilaudid for dyspnea and 1 dose  of  lorazepam. Symptoms improved

## 2022-05-19 NOTE — PROGRESS NOTE ADULT - SUBJECTIVE AND OBJECTIVE BOX
GAP TEAM PALLIATIVE CARE UNIT PROGRESS NOTE:      [  ] Patient on hospice program.    INDICATION FOR PALLIATIVE CARE UNIT SERVICES: Symptom directed care    INTERVAL HPI/OVERNIGHT EVENTS:  Patient has required 2  PRN doses of 0.2 mg  for dyspnea 1 dose  of lorazepam for agitation. Tolerates well ATC pain regime. 4  days  NPO    DNR on chart: Yes  Yes      Allergies    contrast media (iodine-based) (Short breath)  iv  contrast (Unknown)  penicillin (Hives)  penicillin (Rash)  strawberry (Hives)  sulfa drugs (Short breath)  Sulfacetamide Sodium (Rash)    Intolerances    MEDICATIONS  (STANDING):  HYDROmorphone  Injectable 0.2 milliGRAM(s) IV Push every 4 hours  levothyroxine Injectable 25 MICROGram(s) IV Push <User Schedule>    MEDICATIONS  (PRN):  acetaminophen  Suppository .. 650 milliGRAM(s) Rectal every 6 hours PRN Temp greater or equal to 38C (100.4F)  bisacodyl Suppository 10 milliGRAM(s) Rectal daily PRN Constipation  glycopyrrolate Injectable 0.4 milliGRAM(s) IV Push every 6 hours PRN secretions  HYDROmorphone  Injectable 0.2 milliGRAM(s) IV Push every 1 hour PRN Dyspnea or increased work of breathing  HYDROmorphone  Injectable 0.2 milliGRAM(s) IV Push every 1 hour PRN Moderate Pain (4 - 6)  HYDROmorphone  Injectable 0.5 milliGRAM(s) IV Push every 1 hour PRN Severe Pain (7 - 10)  LORazepam   Injectable 0.2 milliGRAM(s) IV Push every 2 hours PRN Anxiety, agitation    ITEMS UNCHECKED ARE NOT PRESENT    PRESENT SYMPTOMS: [x ]Unable to obtain due to poor mentation[x]PAINAD  [x] RDOS  Source if other than patient:  [ ]Family   [x ]Team     Pain: [ ] yes [ ] no  QOL impact -   Location -                    Aggravating factors -  Quality -  Radiation -  Timing-  Severity (0-10 scale):  Minimal acceptable level (0-10 scale):     Dyspnea:                           [ ]Mild [ x]Moderate [ ]Severe  Anxiety:                             [ ]Mild [ x]Moderate [ ]Severe  Fatigue:                             [ ]Mild [ ]Moderate [ ]Severe  Nausea:                             [ ]Mild [ ]Moderate [ ]Severe  Loss of appetite:              [ ]Mild [ ]Moderate [ ]Severe  Constipation:                    [ ]Mild [ ]Moderate [ ]Severe    PAINAD Score: 4  RDOS scale 4    [email or web address] (Ctrl +  left click to view)  		  Other Symptoms:  [ x]All other review of systems negative     Palliative Performance Status Version 2:   10      %         [email or web address]  PHYSICAL EXAM:  Vital Signs Last 24 Hrs  T(C): 36.4 (19 May 2022 10:07), Max: 36.4 (19 May 2022 10:07)  T(F): 97.5 (19 May 2022 10:07), Max: 97.5 (19 May 2022 10:07)  HR: 79 (19 May 2022 10:07) (79 - 79)  BP: 113/57 (19 May 2022 10:07) (113/57 - 113/57)  BP(mean): --  RR: 18 (19 May 2022 10:07) (18 - 18)  SpO2: 96% (19 May 2022 10:07) (96% - 96%) I&O's Summary    18 May 2022 07:01  -  19 May 2022 07:00  --------------------------------------------------------  IN: 0 mL / OUT: 400 mL / NET: -400 mL    GENERAL:  [ ]Alert  [ ]Oriented x0   [x ]Lethargic  [ ]Cachexia  [ ]Unarousable  [ ]Verbal  [x ]Non-Verbal  Behavioral:   [x ] Anxiety  [ ] Delirium [ ] Agitation [x ] grimacing  which responded to treatment  HEENT:  [x ]Normal   [ ]Dry mouth   [ ]ET Tube/Trach  [ ]Oral lesions  PULMONARY:   [x ]Clear [ ]Tachypnea  [ ]Audible excessive secretions   [ ]Rhonchi        [ ]Right [ ]Left [ ]Bilateral  [ ]Crackles        [ ]Right [ ]Left [ ]Bilateral  [ ]Wheezing     [ ]Right [ ]Left [ ]Bilateral  [x ]Diminished BS [ ]Right [ ]Left [ ]Bilateral    CARDIOVASCULAR:    [x ]Regular [ ]Irregular [ ]Tachy  [ ][place] [ ]Murmur [ ]Other  GASTROINTESTINAL:  [x ]Soft  [ ]Distended   [x ]+BS  [ ]Non tender [x ]Diffuse Tender  [ ]PEG [ ]OGT/ NGT   Last BM:  5/16  GENITOURINARY:  [ ]Normal [ ] Incontinent   [ ]Oliguria/Anuria   [ x]Kinsey  MUSCULOSKELETAL:   [ ]Normal   [ x]Weakness  [x ]Bed/Wheelchair bound [ ]Edema  NEUROLOGIC:   [ ]No focal deficits  [x ] Cognitive impairment  [x ] Dysphagia [ ]Dysarthria [ ] Paresis [ ]Other   SKIN:   [ ]Normal  [ ]Rash     [x ]Pressure ulcer(s)  [ x]y [ ]n  Present on admission      CRITICAL CARE:  [ ] Shock Present  [ ]Septic [ ]Cardiogenic [ ]Neurologic [ ]Hypovolemic  [ ]  Vasopressors [ ]  Inotropes   [ ] Respiratory failure present [ ] Mechanical Ventilation [ ] Non-invasive ventilatory support [ ] High-Flow  [ ] Acute  [ ] Chronic [ ] Hypoxic  [ ] Hypercarbic [ ] Other  [ ] Other organ failure     LABS: No recent labs            RADIOLOGY & ADDITIONAL STUDIES: No recent imaging    PROTEIN CALORIE MALNUTRITION: [ ] mild [x ] moderate [ ] severe  [ ] underweight [ ] morbid obesity    https://www.andeal.org/vault/2440/web/files/ONC/Table_Clinical%20Characteristics%20to%20Document%20Malnutrition-White%20JV%20et%20al%347411.pdf    Height (cm): 162.6 (05-12-22 @ 02:46), 172.7 (03-08-22 @ 07:54)  Weight (kg): 70.2 (05-12-22 @ 02:46), 65.5 (03-12-22 @ 23:45)  BMI (kg/m2): 26.6 (05-12-22 @ 02:46), 22 (03-12-22 @ 23:45)    [ x] PPSV2 < or = 30% [ x] significant weight loss [ ] poor nutritional intake [ ] anasarca   Artificial Nutrition [ ]     REFERRALS:   [ ]Chaplaincy  [ ] Hospice  [ ]Child Life  [ ]Social Work  [ ]Case management [ ]Holistic Therapy [ ] Physical Therapy [ ] Dietary   Goals of Care Document: NILSA Alfredo (04-07-22 @ 14:13)  Goals of Care Conversation:   Participants:  · Participants  Patient    Advance Directives:  · Caregiver:  no    Conversation Discussion:  · Conversation Details  Palliative care SW met with pt at bedside to provide emotional support. Pt reported that she was having a difficult time adjusting to her hospitalization. Pt shared that she was eager to be discharged to a facility for rehab, as she feels that over the past month she has found herself being less mobile and she felt she could benefit from rehabilitation. Pt reflected on her medical history and expressed feelings of optimism around her ability to return to baseline. Pt expressed feelings of appreciation around her family, sharing that her daughter has been very supportive throughout this hospitalization. Pt discussed the impact that her hospitalization has had on her wellbeing, as well as that of her families. Palliative care SW provided empathetic listening, emotional support, and normalization/validation. Pt agreed to reach out as needed.    Keegan Alfredo  [phone]    What Matters Most To Patient and Family:  · What matters most to patient and family  Going to rehab so she can return to baseline.    Personal Advance Directives Treatment Guidelines:   MOLST:  · Completed  31-Mar-2022    Location of Discussion:   Location of Discussion:  · Location of discussion  Face to face      Electronic Signatures:  Jaden Alfredo ([facility])  (Signed 07-Apr-2022 14:20)  	Authored: Goals of Care Conversation, Personal Advance Directives Treatment Guidelines, Location of Discussion      Last Updated: 07-Apr-2022 14:20 by Jaden Alfredo ([facility])     GAP TEAM PALLIATIVE CARE UNIT PROGRESS NOTE:      [  ] Patient on hospice program.    INDICATION FOR PALLIATIVE CARE UNIT SERVICES: Symptom directed care    INTERVAL HPI/OVERNIGHT EVENTS:  Patient has required 2  PRN doses of 0.2 mg  for dyspnea 1 dose  of lorazepam for agitation. Tolerates well ATC pain regimen.     DNR on chart: Yes  Yes      Allergies    contrast media (iodine-based) (Short breath)  iv  contrast (Unknown)  penicillin (Hives)  penicillin (Rash)  strawberry (Hives)  sulfa drugs (Short breath)  Sulfacetamide Sodium (Rash)    Intolerances    MEDICATIONS  (STANDING):  HYDROmorphone  Injectable 0.2 milliGRAM(s) IV Push every 4 hours  levothyroxine Injectable 25 MICROGram(s) IV Push <User Schedule>    MEDICATIONS  (PRN):  acetaminophen  Suppository .. 650 milliGRAM(s) Rectal every 6 hours PRN Temp greater or equal to 38C (100.4F)  bisacodyl Suppository 10 milliGRAM(s) Rectal daily PRN Constipation  glycopyrrolate Injectable 0.4 milliGRAM(s) IV Push every 6 hours PRN secretions  HYDROmorphone  Injectable 0.2 milliGRAM(s) IV Push every 1 hour PRN Dyspnea or increased work of breathing  HYDROmorphone  Injectable 0.2 milliGRAM(s) IV Push every 1 hour PRN Moderate Pain (4 - 6)  HYDROmorphone  Injectable 0.5 milliGRAM(s) IV Push every 1 hour PRN Severe Pain (7 - 10)  LORazepam   Injectable 0.2 milliGRAM(s) IV Push every 2 hours PRN Anxiety, agitation    ITEMS UNCHECKED ARE NOT PRESENT    PRESENT SYMPTOMS: [x ]Unable to obtain due to poor mentation[x]PAINAD  [x] RDOS  Source if other than patient:  [ ]Family   [x ]Team     Pain: [ ] yes [ ] no  QOL impact -   Location -                    Aggravating factors -  Quality -  Radiation -  Timing-  Severity (0-10 scale):  Minimal acceptable level (0-10 scale):     Dyspnea:                           [ ]Mild [ x]Moderate [ ]Severe  Anxiety:                             [ ]Mild [ x]Moderate [ ]Severe  Fatigue:                             [ ]Mild [ ]Moderate [ ]Severe  Nausea:                             [ ]Mild [ ]Moderate [ ]Severe  Loss of appetite:              [ ]Mild [ ]Moderate [ ]Severe  Constipation:                    [ ]Mild [ ]Moderate [ ]Severe    PAINAD Score: 4  RDOS scale 4    [email or web address] (Ctrl +  left click to view)  		  Other Symptoms:  [ x]All other review of systems unable to obtain due to poor mentation    Palliative Performance Status Version 2:   10      %         [email or web address]  PHYSICAL EXAM:  Vital Signs Last 24 Hrs  T(C): 36.4 (19 May 2022 10:07), Max: 36.4 (19 May 2022 10:07)  T(F): 97.5 (19 May 2022 10:07), Max: 97.5 (19 May 2022 10:07)  HR: 79 (19 May 2022 10:07) (79 - 79)  BP: 113/57 (19 May 2022 10:07) (113/57 - 113/57)  BP(mean): --  RR: 18 (19 May 2022 10:07) (18 - 18)  SpO2: 96% (19 May 2022 10:07) (96% - 96%) I&O's Summary    18 May 2022 07:01  -  19 May 2022 07:00  --------------------------------------------------------  IN: 0 mL / OUT: 400 mL / NET: -400 mL    GENERAL:  [ ]Alert  [ ]Oriented x0   [x ]Lethargic  [ ]Cachexia  [ ]Unarousable  [ ]Verbal  [x ]Non-Verbal  Behavioral:   [x ] Anxiety  [ ] Delirium [ ] Agitation [x ] grimacing  which responded to treatment  HEENT:  [x ]Normal   [ ]Dry mouth   [ ]ET Tube/Trach  [ ]Oral lesions  PULMONARY:   [x ]Clear [ ]Tachypnea  [ ]Audible excessive secretions   [ ]Rhonchi        [ ]Right [ ]Left [ ]Bilateral  [ ]Crackles        [ ]Right [ ]Left [ ]Bilateral  [ ]Wheezing     [ ]Right [ ]Left [ ]Bilateral  [x ]Diminished BS [ ]Right [ ]Left [ ]Bilateral    CARDIOVASCULAR:    [x ]Regular [ ]Irregular [ ]Tachy  [ ][place] [ ]Murmur [ ]Other  GASTROINTESTINAL:  [x ]Soft  [ ]Distended   [x ]+BS  [ ]Non tender [x ]Diffuse Tender  [ ]PEG [ ]OGT/ NGT   Last BM:  5/16  GENITOURINARY:  [ ]Normal [ ] Incontinent   [ ]Oliguria/Anuria   [ x]Kinsey  MUSCULOSKELETAL:   [ ]Normal   [ x]Weakness  [x ]Bed/Wheelchair bound [ ]Edema  NEUROLOGIC:   [ ]No focal deficits  [x ] Cognitive impairment  [x ] Dysphagia [ ]Dysarthria [ ] Paresis [ ]Other   SKIN:   [ ]Normal  [ ]Rash     [x ]Pressure ulcer(s)  [ x]y [ ]n  Present on admission      CRITICAL CARE:  [ ] Shock Present  [ ]Septic [ ]Cardiogenic [ ]Neurologic [ ]Hypovolemic  [ ]  Vasopressors [ ]  Inotropes   [ ] Respiratory failure present [ ] Mechanical Ventilation [ ] Non-invasive ventilatory support [ ] High-Flow  [ ] Acute  [ ] Chronic [ ] Hypoxic  [ ] Hypercarbic [ ] Other  [ ] Other organ failure     LABS: No recent labs            RADIOLOGY & ADDITIONAL STUDIES: No recent imaging    PROTEIN CALORIE MALNUTRITION: [ ] mild [x ] moderate [ ] severe  [ ] underweight [ ] morbid obesity    https://www.andeal.org/vault/2440/web/files/ONC/Table_Clinical%20Characteristics%20to%20Document%20Malnutrition-White%20JV%20et%20al%402267.pdf    Height (cm): 162.6 (05-12-22 @ 02:46), 172.7 (03-08-22 @ 07:54)  Weight (kg): 70.2 (05-12-22 @ 02:46), 65.5 (03-12-22 @ 23:45)  BMI (kg/m2): 26.6 (05-12-22 @ 02:46), 22 (03-12-22 @ 23:45)    [ x] PPSV2 < or = 30% [ x] significant weight loss [ ] poor nutritional intake [ ] anasarca   Artificial Nutrition [ ]     REFERRALS:   [ ]Chaplaincy  [ ] Hospice  [ ]Child Life  [ ]Social Work  [ ]Case management [ ]Holistic Therapy [ ] Physical Therapy [ ] Dietary   Goals of Care Document: NILSA Alfredo (04-07-22 @ 14:13)  Goals of Care Conversation:   Participants:  · Participants  Patient    Advance Directives:  · Caregiver:  no    Conversation Discussion:  · Conversation Details  Palliative care SW met with pt at bedside to provide emotional support. Pt reported that she was having a difficult time adjusting to her hospitalization. Pt shared that she was eager to be discharged to a facility for rehab, as she feels that over the past month she has found herself being less mobile and she felt she could benefit from rehabilitation. Pt reflected on her medical history and expressed feelings of optimism around her ability to return to baseline. Pt expressed feelings of appreciation around her family, sharing that her daughter has been very supportive throughout this hospitalization. Pt discussed the impact that her hospitalization has had on her wellbeing, as well as that of her families. Palliative care SW provided empathetic listening, emotional support, and normalization/validation. Pt agreed to reach out as needed.    Keegan Alfredo  [phone]    What Matters Most To Patient and Family:  · What matters most to patient and family  Going to rehab so she can return to baseline.    Personal Advance Directives Treatment Guidelines:   MOLST:  · Completed  31-Mar-2022    Location of Discussion:   Location of Discussion:  · Location of discussion  Face to face      Electronic Signatures:  Jaden Alfredo ([facility]  (Signed 07-Apr-2022 14:20)  	Authored: Goals of Care Conversation, Personal Advance Directives Treatment Guidelines, Location of Discussion      Last Updated: 07-Apr-2022 14:20 by Jaden Alfredo ([facility]

## 2022-05-19 NOTE — PROGRESS NOTE ADULT - PROBLEM SELECTOR PLAN 1
Localized pain to back, abdomen.  - PAINADS 4 while showing  RDOS 3 responded to 0.2 diluadid for dyspnea/pain  - Dilaudid 0.2mg IV q 4h ATC  - Dilaudid 0.2mg IV q1h PRN moderate pain  - Dilaudid 0.5mg IV q1h PRN severe pain  - Bowel regimen while on opioids.

## 2022-05-20 NOTE — PROGRESS NOTE ADULT - PROBLEM SELECTOR PLAN 1
Localized pain to back, abdomen. No PRNs used for pain in 24 hours.  - Dilaudid 0.2mg IV q4h ATC  - Dilaudid 0.2mg IV q1h PRN moderate pain  - Dilaudid 0.5mg IV q1h PRN severe pain  - Bowel regimen while on opioids.

## 2022-05-20 NOTE — PROGRESS NOTE ADULT - SUBJECTIVE AND OBJECTIVE BOX
GAP TEAM PALLIATIVE CARE UNIT PROGRESS NOTE:      [  ] Patient on hospice program.    INDICATION FOR PALLIATIVE CARE UNIT SERVICES/Interval HPI: End of life care    OVERNIGHT EVENTS: Used x1 PRN Dilaudid 0.2mg IV and x2 PRNs Ativan 0.2mg IV for dyspnea in 24 hours (8am-8am).    DNR on chart: Yes  Yes      Allergies    contrast media (iodine-based) (Short breath)  iv  contrast (Unknown)  penicillin (Hives)  penicillin (Rash)  strawberry (Hives)  sulfa drugs (Short breath)  Sulfacetamide Sodium (Rash)    Intolerances    MEDICATIONS  (STANDING):  HYDROmorphone  Injectable 0.2 milliGRAM(s) IV Push every 4 hours  levothyroxine Injectable 25 MICROGram(s) IV Push <User Schedule>    MEDICATIONS  (PRN):  acetaminophen  Suppository .. 650 milliGRAM(s) Rectal every 6 hours PRN Temp greater or equal to 38C (100.4F)  bisacodyl Suppository 10 milliGRAM(s) Rectal daily PRN Constipation  glycopyrrolate Injectable 0.4 milliGRAM(s) IV Push every 6 hours PRN secretions  HYDROmorphone  Injectable 0.2 milliGRAM(s) IV Push every 1 hour PRN Moderate Pain (4 - 6)  HYDROmorphone  Injectable 0.5 milliGRAM(s) IV Push every 1 hour PRN Severe Pain (7 - 10)  HYDROmorphone  Injectable 0.2 milliGRAM(s) IV Push every 1 hour PRN Dyspnea or increased work of breathing  LORazepam   Injectable 0.2 milliGRAM(s) IV Push every 2 hours PRN Anxiety, agitation    ITEMS UNCHECKED ARE NOT PRESENT    PRESENT SYMPTOMS: [ ]Unable to self-report  [X]PAINADs [X]RDOS  Source if other than patient:  [ ]Family   [ ]Team     Pain: [ ] yes [ ] no  QOL impact -   Location -                    Aggravating factors -  Quality -  Radiation -  Timing-  Severity (0-10 scale):  Minimal acceptable level (0-10 scale):     PAINAD Score: 0  [email or web address] (Ctrl +  left click to view)    Dyspnea:                           [ ]Mild [ ]Moderate [ ]Severe    RDOS: 2  0 to 2  minimal or no respiratory distress   3  mild distress  4 to 6 moderate distress  >7 severe distress  [email or web address]://[email or web address]/handouts/hen/Respiratory_Distress_Observation_Scale.pdf (Ctrl +  left click to view)    Anxiety:                             [ ]Mild [ ]Moderate [ ]Severe  Fatigue:                             [ ]Mild [ ]Moderate [ ]Severe  Nausea:                             [ ]Mild [ ]Moderate [ ]Severe  Loss of appetite:              [ ]Mild [ ]Moderate [ ]Severe  Constipation:                    [ ]Mild [ ]Moderate [ ]Severe    Other Symptoms:  [X]All other review of systems negative    Palliative Performance Status Version 2:         10%  [email or web address]://[email or web address]/files/[email or web address]_performance_scale_[email or web address].pdf    PHYSICAL EXAM:   Vital Signs Last 24 Hrs  T(C): 36.7 (20 May 2022 08:02), Max: 36.7 (20 May 2022 08:02)  T(F): 98.1 (20 May 2022 08:02), Max: 98.1 (20 May 2022 08:02)  HR: 103 (20 May 2022 08:02) (103 - 103)  BP: 117/66 (20 May 2022 08:02) (117/66 - 117/66)  BP(mean): --  RR: 18 (20 May 2022 08:02) (18 - 18)  SpO2: 93% (20 May 2022 08:02) (93% - 93%) I&O's Summary    19 May 2022 07:01  -  20 May 2022 07:00  --------------------------------------------------------  IN: 0 mL / OUT: 1100 mL / NET: -1100 mL      GENERAL: [ ] Cachexia  [ ]Alert  [ ]Oriented x   [X]Lethargic  [ ]Unarousable  [ ]Verbal  [ ]Non-Verbal  Behavioral:  [ ] Anxiety  [ ] Delirium [ ] Agitation [ ] Other  HEENT:  [ ]Normal   [X]Dry mouth   [ ]ET Tube/Trach  [ ]Oral lesions  PULMONARY:  [X]Clear [ ]Tachypnea  [ ]Audible excessive secretions   [ ]Rhonchi        [ ]Right [ ]Left [ ]Bilateral  [ ]Crackles        [ ]Right [ ]Left [ ]Bilateral  [ ]Wheezing     [ ]Right [ ]Left [ ]Bilateral  [ ]Diminished BS [ ]Right [ ]Left [ ]Bilateral    CARDIOVASCULAR:    [X]Regular [ ]Irregular [ ]Tachy  [ ][place] [ ]Murmur [ ]Other  GASTROINTESTINAL:  [X]Soft  [ ]Distended   [ ]+BS  [ ]Non tender [X]Tender  [ ]Other [ ]PEG [ ]OGT/ NGT   Last BM:  5/15  GENITOURINARY:  [ ]Normal [ ] Incontinent   [ ]Oliguria/Anuria   [X]Kinsey  MUSCULOSKELETAL:  [ ]Normal   [ ]Weakness  [X]Bed/Wheelchair bound [ ]Edema  NEUROLOGIC:  [X]No focal deficits  [ ] Cognitive impairment  [ ] Dysphagia [ ]Dysarthria [ ] Paresis [ ]Other   SKIN:  [ ]Normal  [ ]Rash  [ ]Other  [X]Pressure ulcer(s)  [X]y [ ]n  Present on admission      CRITICAL CARE:  [ ] Shock Present  [ ]Septic [ ]Cardiogenic [ ]Neurologic [ ]Hypovolemic  [ ]  Vasopressors [ ]  Inotropes   [ ] Respiratory failure present [ ] Mechanical Ventilation [ ] Non-invasive ventilatory support [ ] High-Flow  [ ] Acute  [ ] Chronic [ ] Hypoxic  [ ] Hypercarbic [ ] Other  [ ] Other organ failure     LABS:        RADIOLOGY & ADDITIONAL STUDIES:    PROTEIN CALORIE MALNUTRITION: [ ] mild [ ] moderate [ ] severe  [ ] underweight [ ] morbid obesity    https://www.andeal.org/vault/2440/web/files/ONC/Table_Clinical%20Characteristics%20to%20Document%20Malnutrition-White%20JV%20et%20al%556105.pdf    Height (cm): 162.6 (05-12-22 @ 02:46), 172.7 (03-08-22 @ 07:54)  Weight (kg): 70.2 (05-12-22 @ 02:46), 65.5 (03-12-22 @ 23:45)  BMI (kg/m2): 26.6 (05-12-22 @ 02:46), 22 (03-12-22 @ 23:45)    [ ] PPSV2 < or = 30% [ ] significant weight loss [ ] poor nutritional intake [ ] anasarca   Artificial Nutrition [ ]     REFERRALS:   [X]Chaplaincy  [ ] Hospice  [ ]Child Life  [X]Social Work  [X]Case management [ ]Holistic Therapy [ ] Physical Therapy [ ] Dietary   Goals of Care Document: NILSA Rauschveronica (04-07-22 @ 14:13)  Goals of Care Conversation:   Participants:  · Participants  Patient    Advance Directives:  · Caregiver:  no    Conversation Discussion:  · Conversation Details  Palliative care SW met with pt at bedside to provide emotional support. Pt reported that she was having a difficult time adjusting to her hospitalization. Pt shared that she was eager to be discharged to a facility for rehab, as she feels that over the past month she has found herself being less mobile and she felt she could benefit from rehabilitation. Pt reflected on her medical history and expressed feelings of optimism around her ability to return to baseline. Pt expressed feelings of appreciation around her family, sharing that her daughter has been very supportive throughout this hospitalization. Pt discussed the impact that her hospitalization has had on her wellbeing, as well as that of her families. Palliative care SW provided empathetic listening, emotional support, and normalization/validation. Pt agreed to reach out as needed.    Keegan Sayda  [phone]    What Matters Most To Patient and Family:  · What matters most to patient and family  Going to rehab so she can return to baseline.    Personal Advance Directives Treatment Guidelines:   MOLST:  · Completed  31-Mar-2022    Location of Discussion:   Location of Discussion:  · Location of discussion  Face to face      Electronic Signatures:  aJden Alfredo ([facility])  (Signed 07-Apr-2022 14:20)  	Authored: Goals of Care Conversation, Personal Advance Directives Treatment Guidelines, Location of Discussion      Last Updated: 07-Apr-2022 14:20 by Jaden Alfredo ([facility])     GAP TEAM PALLIATIVE CARE UNIT PROGRESS NOTE:      [  ] Patient on hospice program.    INDICATION FOR PALLIATIVE CARE UNIT SERVICES/Interval HPI: End of life care    OVERNIGHT EVENTS: Used x1 PRN Dilaudid 0.2mg IV and x2 PRNs Ativan 0.2mg IV for dyspnea in 24 hours (8am-8am).    DNR on chart: Yes  Yes    Allergies    contrast media (iodine-based) (Short breath)  iv  contrast (Unknown)  penicillin (Hives)  penicillin (Rash)  strawberry (Hives)  sulfa drugs (Short breath)  Sulfacetamide Sodium (Rash)    Intolerances    MEDICATIONS  (STANDING):  HYDROmorphone  Injectable 0.2 milliGRAM(s) IV Push every 4 hours  levothyroxine Injectable 25 MICROGram(s) IV Push <User Schedule>    MEDICATIONS  (PRN):  acetaminophen  Suppository .. 650 milliGRAM(s) Rectal every 6 hours PRN Temp greater or equal to 38C (100.4F)  bisacodyl Suppository 10 milliGRAM(s) Rectal daily PRN Constipation  glycopyrrolate Injectable 0.4 milliGRAM(s) IV Push every 6 hours PRN secretions  HYDROmorphone  Injectable 0.2 milliGRAM(s) IV Push every 1 hour PRN Moderate Pain (4 - 6)  HYDROmorphone  Injectable 0.5 milliGRAM(s) IV Push every 1 hour PRN Severe Pain (7 - 10)  HYDROmorphone  Injectable 0.2 milliGRAM(s) IV Push every 1 hour PRN Dyspnea or increased work of breathing  LORazepam   Injectable 0.2 milliGRAM(s) IV Push every 2 hours PRN Anxiety, agitation    ITEMS UNCHECKED ARE NOT PRESENT    PRESENT SYMPTOMS: [ ]Unable to self-report  [X]PAINADs [X]RDOS  Source if other than patient:  [ ]Family   [ ]Team     Pain: [ ] yes [ ] no  QOL impact -   Location -                    Aggravating factors -  Quality -  Radiation -  Timing-  Severity (0-10 scale):  Minimal acceptable level (0-10 scale):     PAINAD Score: 0  [email or web address]/[email or web address] (Ctrl +  left click to view)    Dyspnea:                           [ ]Mild [ ]Moderate [ ]Severe    RDOS: 2  0 to 2  minimal or no respiratory distress   3  mild distress  4 to 6 moderate distress  >7 severe distress  https://[email or web address]/handouts/hen/Respiratory_Distress_Observation_Scale.pdf (Ctrl +  left click to view)    Anxiety:                             [ ]Mild [ ]Moderate [ ]Severe  Fatigue:                             [ ]Mild [ ]Moderate [ ]Severe  Nausea:                             [ ]Mild [ ]Moderate [ ]Severe  Loss of appetite:              [ ]Mild [ ]Moderate [ ]Severe  Constipation:                    [ ]Mild [ ]Moderate [ ]Severe    Other Symptoms:  [X]All other review of systems unable to obtain due to poor mentation    Palliative Performance Status Version 2:         10%  http://npcrc.org/files/news/palliative_performance_scale_[email or web address].pdf    PHYSICAL EXAM:   Vital Signs Last 24 Hrs  T(C): 36.7 (20 May 2022 08:02), Max: 36.7 (20 May 2022 08:02)  T(F): 98.1 (20 May 2022 08:02), Max: 98.1 (20 May 2022 08:02)  HR: 103 (20 May 2022 08:02) (103 - 103)  BP: 117/66 (20 May 2022 08:02) (117/66 - 117/66)  BP(mean): --  RR: 18 (20 May 2022 08:02) (18 - 18)  SpO2: 93% (20 May 2022 08:02) (93% - 93%) I&O's Summary    19 May 2022 07:01  -  20 May 2022 07:00  --------------------------------------------------------  IN: 0 mL / OUT: 1100 mL / NET: -1100 mL      GENERAL: [ ] Cachexia  [ ]Alert  [ ]Oriented x   [X]Lethargic  [ ]Unarousable  [ ]Verbal  [ ]Non-Verbal  Behavioral:  [ ] Anxiety  [ ] Delirium [ ] Agitation [ ] Other  HEENT:  [ ]Normal   [X]Dry mouth   [ ]ET Tube/Trach  [ ]Oral lesions  PULMONARY:  [X]Clear [ ]Tachypnea  [ ]Audible excessive secretions   [ ]Rhonchi        [ ]Right [ ]Left [ ]Bilateral  [ ]Crackles        [ ]Right [ ]Left [ ]Bilateral  [ ]Wheezing     [ ]Right [ ]Left [ ]Bilateral  [ ]Diminished BS [ ]Right [ ]Left [ ]Bilateral    CARDIOVASCULAR:    [X]Regular [ ]Irregular [ ]Tachy  [ ][place] [ ]Murmur [ ]Other  GASTROINTESTINAL:  [X]Soft  [ ]Distended   [ ]+BS  [ ]Non tender [X]Tender  [ ]Other [ ]PEG [ ]OGT/ NGT   Last BM:  5/15  GENITOURINARY:  [ ]Normal [ ] Incontinent   [ ]Oliguria/Anuria   [X]Kinsey  MUSCULOSKELETAL:  [ ]Normal   [ ]Weakness  [X]Bed/Wheelchair bound [ ]Edema  NEUROLOGIC:  [X]No focal deficits  [ ] Cognitive impairment  [ ] Dysphagia [ ]Dysarthria [ ] Paresis [ ]Other   SKIN:  [ ]Normal  [ ]Rash  [ ]Other  [X]Pressure ulcer(s)  [X]y [ ]n  Present on admission      CRITICAL CARE:  [ ] Shock Present  [ ]Septic [ ]Cardiogenic [ ]Neurologic [ ]Hypovolemic  [ ]  Vasopressors [ ]  Inotropes   [ ] Respiratory failure present [ ] Mechanical Ventilation [ ] Non-invasive ventilatory support [ ] High-Flow  [ ] Acute  [ ] Chronic [ ] Hypoxic  [ ] Hypercarbic [ ] Other  [ ] Other organ failure     LABS: No new labs        RADIOLOGY & ADDITIONAL STUDIES: No new imaging    PROTEIN CALORIE MALNUTRITION: [ ] mild [ ] moderate [ ] severe  [ ] underweight [ ] morbid obesity    https://www.andeal.org/vault/2440/web/files/ONC/Table_Clinical%20Characteristics%20to%20Document%20Malnutrition-White%20JV%20et%20al%525043.pdf    Height (cm): 162.6 (05-12-22 @ 02:46), 172.7 (03-08-22 @ 07:54)  Weight (kg): 70.2 (05-12-22 @ 02:46), 65.5 (03-12-22 @ 23:45)  BMI (kg/m2): 26.6 (05-12-22 @ 02:46), 22 (03-12-22 @ 23:45)    [ ] PPSV2 < or = 30% [ ] significant weight loss [ ] poor nutritional intake [ ] anasarca   Artificial Nutrition [ ]     REFERRALS:   [X]Chaplaincy  [ ] Hospice  [ ]Child Life  [X]Social Work  [X]Case management [ ]Holistic Therapy [ ] Physical Therapy [ ] Dietary   Goals of Care Document: NILSA Alfredo (04-07-22 @ 14:13)  Goals of Care Conversation:   Participants:  · Participants  Patient    Advance Directives:  · Caregiver:  no    Conversation Discussion:  · Conversation Details  Palliative care SW met with pt at bedside to provide emotional support. Pt reported that she was having a difficult time adjusting to her hospitalization. Pt shared that she was eager to be discharged to a facility for rehab, as she feels that over the past month she has found herself being less mobile and she felt she could benefit from rehabilitation. Pt reflected on her medical history and expressed feelings of optimism around her ability to return to baseline. Pt expressed feelings of appreciation around her family, sharing that her daughter has been very supportive throughout this hospitalization. Pt discussed the impact that her hospitalization has had on her wellbeing, as well as that of her families. Palliative care SW provided empathetic listening, emotional support, and normalization/validation. Pt agreed to reach out as needed.    Keegan Sayda  [phone]    What Matters Most To Patient and Family:  · What matters most to patient and family  Going to rehab so she can return to baseline.    Personal Advance Directives Treatment Guidelines:   MOLST:  · Completed  31-Mar-2022    Location of Discussion:   Location of Discussion:  · Location of discussion  Face to face      Electronic Signatures:  Jaden Alfredo ([facility])  (Signed 07-Apr-2022 14:20)  	Authored: Goals of Care Conversation, Personal Advance Directives Treatment Guidelines, Location of Discussion      Last Updated: 07-Apr-2022 14:20 by Jaden Alfredo ([facility])

## 2022-05-20 NOTE — PROGRESS NOTE ADULT - PROBLEM SELECTOR PLAN 6
- See [facility] Note from 5/12  - DNR/DNI  - No blood draws, no antibiotics  - Comfort measures only.

## 2022-05-21 NOTE — PROGRESS NOTE ADULT - PROBLEM SELECTOR PLAN 1
Localized pain to back, abdomen. No PRNs used for pain in 24 hours.  - Increase Dilaudid to 0.5 mg IV q 6 hrs ATC  - Dilaudid 0.2mg IV q1h PRN moderate pain  - Dilaudid 0.5mg IV q1h PRN severe pain  - Bowel regimen while on opioids.

## 2022-05-21 NOTE — PROGRESS NOTE ADULT - SUBJECTIVE AND OBJECTIVE BOX
GAP TEAM PALLIATIVE CARE UNIT PROGRESS NOTE:      [  ] Patient on hospice program.    INDICATION FOR PALLIATIVE CARE UNIT SERVICES/Interval HPI: End of life care    OVERNIGHT EVENTS: Pt seen and examined at bedside. Requiring ATC Dilaudid for symptom control. Per RN reports, patient displaying symtoms of discomfort 30 min to 1 hr prior to next scheduled dose of ATC medications.    DNR on chart: Yes  Yes    Allergies    contrast media (iodine-based) (Short breath)  iv  contrast (Unknown)  penicillin (Hives)  penicillin (Rash)  strawberry (Hives)  sulfa drugs (Short breath)  Sulfacetamide Sodium (Rash)    Intolerances    MEDICATIONS  (STANDING):  HYDROmorphone  Injectable 0.5 milliGRAM(s) IV Push every 6 hours  levothyroxine Injectable 25 MICROGram(s) IV Push <User Schedule>    MEDICATIONS  (PRN):  acetaminophen  Suppository .. 650 milliGRAM(s) Rectal every 6 hours PRN Temp greater or equal to 38C (100.4F)  bisacodyl Suppository 10 milliGRAM(s) Rectal daily PRN Constipation  glycopyrrolate Injectable 0.4 milliGRAM(s) IV Push every 6 hours PRN secretions  HYDROmorphone  Injectable 0.2 milliGRAM(s) IV Push every 1 hour PRN Moderate Pain (4 - 6)  HYDROmorphone  Injectable 0.5 milliGRAM(s) IV Push every 1 hour PRN Severe Pain (7 - 10)  HYDROmorphone  Injectable 0.2 milliGRAM(s) IV Push every 1 hour PRN Dyspnea or increased work of breathing  LORazepam   Injectable 0.2 milliGRAM(s) IV Push every 2 hours PRN Anxiety, agitation    ITEMS UNCHECKED ARE NOT PRESENT    PRESENT SYMPTOMS: [ ]Unable to self-report  [X]PAINADs [X]RDOS  Source if other than patient:  [ ]Family   [ ]Team     Pain: [ ] yes [ ] no  QOL impact -   Location -                    Aggravating factors -  Quality -  Radiation -  Timing-  Severity (0-10 scale):  Minimal acceptable level (0-10 scale):     PAINAD Score: 0  [email or web address]/painad.pdf (Ctrl +  left click to view)    Dyspnea:                           [ ]Mild [ ]Moderate [ ]Severe    RDOS: 2  0 to 2  minimal or no respiratory distress   3  mild distress  4 to 6 moderate distress  >7 severe distress  https://[email or web address].net/handouts/hen/Respiratory_Distress_Observation_Scale.pdf (Ctrl +  left click to view)    Anxiety:                             [ ]Mild [ ]Moderate [ ]Severe  Fatigue:                             [ ]Mild [ ]Moderate [ ]Severe  Nausea:                             [ ]Mild [ ]Moderate [ ]Severe  Loss of appetite:              [ ]Mild [ ]Moderate [ ]Severe  Constipation:                    [ ]Mild [ ]Moderate [ ]Severe    Other Symptoms:  [X]All other review of systems unable to obtain due to poor mentation    Palliative Performance Status Version 2:         10%  http://npcrc.org/files/news/palliative_performance_scale_ppsv2.pdf    PHYSICAL EXAM:   Vital Signs Last 24 Hrs  T(C): 36.6 (21 May 2022 09:00), Max: 36.6 (21 May 2022 09:00)  T(F): 97.9 (21 May 2022 09:00), Max: 97.9 (21 May 2022 09:00)  HR: 88 (21 May 2022 09:00) (88 - 88)  BP: 114/73 (21 May 2022 09:00) (114/73 - 114/73)  BP(mean): --  RR: 18 (21 May 2022 09:00) (18 - 18)  SpO2: 93% (21 May 2022 09:00) (93% - 93%)      GENERAL: [ ] Cachexia  [ ]Alert  [ ]Oriented x   [X]Lethargic  [ ]Unarousable  [ ]Verbal  [x]Non-Verbal  Behavioral:  [ ] Anxiety  [ ] Delirium [ ] Agitation [ ] Other  HEENT:  [ ]Normal   [X]Dry mouth   [ ]ET Tube/Trach  [ ]Oral lesions  PULMONARY:  [X]Clear [ ]Tachypnea  [ ]Audible excessive secretions   [ ]Rhonchi        [ ]Right [ ]Left [ ]Bilateral  [ ]Crackles        [ ]Right [ ]Left [ ]Bilateral  [ ]Wheezing     [ ]Right [ ]Left [ ]Bilateral  [ ]Diminished BS [ ]Right [ ]Left [ ]Bilateral    CARDIOVASCULAR:    [X]Regular [ ]Irregular [ ]Tachy  [ ][place] [ ]Murmur [ ]Other  GASTROINTESTINAL:  [X]Soft  [ ]Distended   [ ]+BS  [ ]Non tender [X]Tender  [ ]Other [ ]PEG [ ]OGT/ NGT   Last BM:  5/15  GENITOURINARY:  [ ]Normal [ ] Incontinent   [ ]Oliguria/Anuria   [X]Kinsey  MUSCULOSKELETAL:  [ ]Normal   [ ]Weakness  [X]Bed/Wheelchair bound [ ]Edema  NEUROLOGIC:  [X]No focal deficits  [ ] Cognitive impairment  [ ] Dysphagia [ ]Dysarthria [ ] Paresis [ ]Other   SKIN:  [ ]Normal  [ ]Rash  [ ]Other  [X]Pressure ulcer(s)  [X]y [ ]n  Present on admission      CRITICAL CARE:  [ ] Shock Present  [ ]Septic [ ]Cardiogenic [ ]Neurologic [ ]Hypovolemic  [ ]  Vasopressors [ ]  Inotropes   [ ] Respiratory failure present [ ] Mechanical Ventilation [ ] Non-invasive ventilatory support [ ] High-Flow  [ ] Acute  [ ] Chronic [ ] Hypoxic  [ ] Hypercarbic [ ] Other  [ ] Other organ failure     LABS: No new labs        RADIOLOGY & ADDITIONAL STUDIES: No new imaging    PROTEIN CALORIE MALNUTRITION: [ ] mild [ ] moderate [ ] severe  [ ] underweight [ ] morbid obesity    https://www.andeal.org/vault/2440/web/files/ONC/Table_Clinical%20Characteristics%20to%20Document%20Malnutrition-White%20JV%20et%20al%154774.pdf    Height (cm): 162.6 (05-12-22 @ 02:46), 172.7 (03-08-22 @ 07:54)  Weight (kg): 70.2 (05-12-22 @ 02:46), 65.5 (03-12-22 @ 23:45)  BMI (kg/m2): 26.6 (05-12-22 @ 02:46), 22 (03-12-22 @ 23:45)    [ ] PPSV2 < or = 30% [ ] significant weight loss [ ] poor nutritional intake [ ] anasarca   Artificial Nutrition [ ]     REFERRALS:   [X]Chaplaincy  [ ] Hospice  [ ]Child Life  [X]Social Work  [X]Case management [ ]Holistic Therapy [ ] Physical Therapy [ ] Dietary   Goals of Care Document: NILSA Alfredo (04-07-22 @ 14:13)  Goals of Care Conversation:   Participants:  · Participants  Patient    Advance Directives:  · Caregiver:  no    Conversation Discussion:  · Conversation Details  Palliative care SW met with pt at bedside to provide emotional support. Pt reported that she was having a difficult time adjusting to her hospitalization. Pt shared that she was eager to be discharged to a facility for rehab, as she feels that over the past month she has found herself being less mobile and she felt she could benefit from rehabilitation. Pt reflected on her medical history and expressed feelings of optimism around her ability to return to baseline. Pt expressed feelings of appreciation around her family, sharing that her daughter has been very supportive throughout this hospitalization. Pt discussed the impact that her hospitalization has had on her wellbeing, as well as that of her families. Palliative care SW provided empathetic listening, emotional support, and normalization/validation. Pt agreed to reach out as needed.    Keegan Alfredo  [phone]    What Matters Most To Patient and Family:  · What matters most to patient and family  Going to rehab so she can return to baseline.    Personal Advance Directives Treatment Guidelines:   JOSUE:  · Completed  31-Mar-2022    Location of Discussion:   Location of Discussion:  · Location of discussion  Face to face      Electronic Signatures:  Jaden Alfredo ([facility] [place])  (Signed 07-Apr-2022 14:20)  	Authored: Goals of Care Conversation, Personal Advance Directives Treatment Guidelines, Location of Discussion      Last Updated: 07-Apr-2022 14:20 by Jaden Alfredo ([facility] [place])     GAP TEAM PALLIATIVE CARE UNIT PROGRESS NOTE:      [  ] Patient on hospice program.    INDICATION FOR PALLIATIVE CARE UNIT SERVICES/Interval HPI: End of life care    OVERNIGHT EVENTS: Pt seen and examined at bedside. Requiring ATC Dilaudid for symptom control. Per RN reports, patient displaying symtoms of discomfort 30 min to 1 hr prior to next scheduled dose of ATC medications.    DNR on chart: Yes  Yes    Allergies    contrast media (iodine-based) (Short breath)  iv  contrast (Unknown)  penicillin (Hives)  penicillin (Rash)  strawberry (Hives)  sulfa drugs (Short breath)  Sulfacetamide Sodium (Rash)    Intolerances    MEDICATIONS  (STANDING):  HYDROmorphone  Injectable 0.5 milliGRAM(s) IV Push every 6 hours  levothyroxine Injectable 25 MICROGram(s) IV Push <User Schedule>    MEDICATIONS  (PRN):  acetaminophen  Suppository .. 650 milliGRAM(s) Rectal every 6 hours PRN Temp greater or equal to 38C (100.4F)  bisacodyl Suppository 10 milliGRAM(s) Rectal daily PRN Constipation  glycopyrrolate Injectable 0.4 milliGRAM(s) IV Push every 6 hours PRN secretions  HYDROmorphone  Injectable 0.2 milliGRAM(s) IV Push every 1 hour PRN Moderate Pain (4 - 6)  HYDROmorphone  Injectable 0.5 milliGRAM(s) IV Push every 1 hour PRN Severe Pain (7 - 10)  HYDROmorphone  Injectable 0.2 milliGRAM(s) IV Push every 1 hour PRN Dyspnea or increased work of breathing  LORazepam   Injectable 0.2 milliGRAM(s) IV Push every 2 hours PRN Anxiety, agitation    ITEMS UNCHECKED ARE NOT PRESENT    PRESENT SYMPTOMS: [ ]Unable to self-report  [X]PAINADs [X]RDOS  Source if other than patient:  [ ]Family   [ ]Team     Pain: [ ] yes [ ] no  QOL impact -   Location -                    Aggravating factors -  Quality -  Radiation -  Timing-  Severity (0-10 scale):  Minimal acceptable level (0-10 scale):     PAINAD Score: 0  [email or web address]/cog/painad.pdf (Ctrl +  left click to view)    Dyspnea:                           [ ]Mild [ ]Moderate [ ]Severe    RDOS: 2  0 to 2  minimal or no respiratory distress   3  mild distress  4 to 6 moderate distress  >7 severe distress  https://homecareinformation.net/handouts/hen/Respiratory_Distress_Observation_Scale.pdf (Ctrl +  left click to view)    Anxiety:                             [ ]Mild [ ]Moderate [ ]Severe  Fatigue:                             [ ]Mild [ ]Moderate [ ]Severe  Nausea:                             [ ]Mild [ ]Moderate [ ]Severe  Loss of appetite:              [ ]Mild [ ]Moderate [ ]Severe  Constipation:                    [ ]Mild [ ]Moderate [ ]Severe    Other Symptoms:  [X]All other review of systems unable to obtain due to poor mentation    Palliative Performance Status Version 2:         10%  http://npcrc.org/files/news/palliative_performance_scale_ppsv2.pdf    PHYSICAL EXAM:   Vital Signs Last 24 Hrs  T(C): 36.6 (21 May 2022 09:00), Max: 36.6 (21 May 2022 09:00)  T(F): 97.9 (21 May 2022 09:00), Max: 97.9 (21 May 2022 09:00)  HR: 88 (21 May 2022 09:00) (88 - 88)  BP: 114/73 (21 May 2022 09:00) (114/73 - 114/73)  BP(mean): --  RR: 18 (21 May 2022 09:00) (18 - 18)  SpO2: 93% (21 May 2022 09:00) (93% - 93%)      GENERAL: [ ] Cachexia  [ ]Alert  [ ]Oriented x   [X]Lethargic  [ ]Unarousable  [ ]Verbal  [x]Non-Verbal  Behavioral:  [ ] Anxiety  [ ] Delirium [ ] Agitation [ ] Other  HEENT:  [ ]Normal   [X]Dry mouth   [ ]ET Tube/Trach  [ ]Oral lesions  PULMONARY:  [X]Clear [ ]Tachypnea  [ ]Audible excessive secretions   [ ]Rhonchi        [ ]Right [ ]Left [ ]Bilateral  [ ]Crackles        [ ]Right [ ]Left [ ]Bilateral  [ ]Wheezing     [ ]Right [ ]Left [ ]Bilateral  [ ]Diminished BS [ ]Right [ ]Left [ ]Bilateral    CARDIOVASCULAR:    [X]Regular [ ]Irregular [ ]Tachy  [ ][place] [ ]Murmur [ ]Other  GASTROINTESTINAL:  [X]Soft  [ ]Distended   [ ]+BS  [ ]Non tender [X]Tender  [ ]Other [ ]PEG [ ]OGT/ NGT   Last BM:  5/16  GENITOURINARY:  [ ]Normal [ ] Incontinent   [ ]Oliguria/Anuria   [X]Kinsey  MUSCULOSKELETAL:  [ ]Normal   [ ]Weakness  [X]Bed/Wheelchair bound [ ]Edema  NEUROLOGIC:  [X]No focal deficits  [ ] Cognitive impairment  [ ] Dysphagia [ ]Dysarthria [ ] Paresis [ ]Other   SKIN:  [ ]Normal  [ ]Rash  [ ]Other  [X]Pressure ulcer(s)  [X]y [ ]n  Present on admission      CRITICAL CARE:  [ ] Shock Present  [ ]Septic [ ]Cardiogenic [ ]Neurologic [ ]Hypovolemic  [ ]  Vasopressors [ ]  Inotropes   [ ] Respiratory failure present [ ] Mechanical Ventilation [ ] Non-invasive ventilatory support [ ] High-Flow  [ ] Acute  [ ] Chronic [ ] Hypoxic  [ ] Hypercarbic [ ] Other  [ ] Other organ failure     LABS: No new labs        RADIOLOGY & ADDITIONAL STUDIES: No new imaging    PROTEIN CALORIE MALNUTRITION: [ ] mild [ ] moderate [ ] severe  [ ] underweight [ ] morbid obesity    https://www.andeal.org/vault/2440/web/files/ONC/Table_Clinical%20Characteristics%20to%20Document%20Malnutrition-White%20JV%20et%20al%012268.pdf    Height (cm): 162.6 (05-12-22 @ 02:46), 172.7 (03-08-22 @ 07:54)  Weight (kg): 70.2 (05-12-22 @ 02:46), 65.5 (03-12-22 @ 23:45)  BMI (kg/m2): 26.6 (05-12-22 @ 02:46), 22 (03-12-22 @ 23:45)    [ ] PPSV2 < or = 30% [ ] significant weight loss [ ] poor nutritional intake [ ] anasarca   Artificial Nutrition [ ]     REFERRALS:   [X]Chaplaincy  [ ] Hospice  [ ]Child Life  [X]Social Work  [X]Case management [ ]Holistic Therapy [ ] Physical Therapy [ ] Dietary   Goals of Care Document: NILSA Alfredo (04-07-22 @ 14:13)  Goals of Care Conversation:   Participants:  · Participants  Patient    Advance Directives:  · Caregiver:  no    Conversation Discussion:  · Conversation Details  Palliative care SW met with pt at bedside to provide emotional support. Pt reported that she was having a difficult time adjusting to her hospitalization. Pt shared that she was eager to be discharged to a facility for rehab, as she feels that over the past month she has found herself being less mobile and she felt she could benefit from rehabilitation. Pt reflected on her medical history and expressed feelings of optimism around her ability to return to baseline. Pt expressed feelings of appreciation around her family, sharing that her daughter has been very supportive throughout this hospitalization. Pt discussed the impact that her hospitalization has had on her wellbeing, as well as that of her families. Palliative care SW provided empathetic listening, emotional support, and normalization/validation. Pt agreed to reach out as needed.    Keegan Alfredo  [phone]    What Matters Most To Patient and Family:  · What matters most to patient and family  Going to rehab so she can return to baseline.    Personal Advance Directives Treatment Guidelines:   JOSUE:  · Completed  31-Mar-2022    Location of Discussion:   Location of Discussion:  · Location of discussion  Face to face      Electronic Signatures:  Jaden Alfredo ([facility] [place])  (Signed 07-Apr-2022 14:20)  	Authored: Goals of Care Conversation, Personal Advance Directives Treatment Guidelines, Location of Discussion      Last Updated: 07-Apr-2022 14:20 by Jaden Alfredo ([facility] [place])

## 2022-05-21 NOTE — PROGRESS NOTE ADULT - PROBLEM SELECTOR PLAN 6
- See [facility] Note from 5/12  - DNR/DNI  - No blood draws, no antibiotics  - Comfort measures only.

## 2022-05-22 NOTE — PROGRESS NOTE ADULT - PROBLEM SELECTOR PLAN 7
- Continue symptom directed care in PCU.  - Chaplaincy following. - See [facility] Note from 5/12  - DNR/DNI  - No blood draws, no antibiotics  - Comfort measures only.

## 2022-05-22 NOTE — PROGRESS NOTE ADULT - PROBLEM SELECTOR PLAN 3
- Continue symptom directed care. Admitted to MICU for septic shock then downgraded to floors. Per GOC with family, opted to stop antibiotics.  - No pressors, no further antibiotics, DNR/DNI  - Comfort measures only.

## 2022-05-22 NOTE — PROGRESS NOTE ADULT - ATTENDING COMMENTS
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 Hypoxemia likely secondary to acute pulmonary edema due to acute on chronic HFpEF.     - Supplemental O2 as needed goal O2 sat >= 90%  - Titrate pressors as needed goal MAP >=65  - Midodrine 10mg q8hrs  - Diuresis goal net negative  - Broad spectrum abx  - DVT prophylaxis  - DNR/DNI
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 Hypoxemia likely secondary to acute pulmonary edema due to acute on chronic HFpEF.     - Hypoxemia resolved  - Midodrine 10mg q8hrs  - Diuresis goal net negative  - Broad spectrum abx  - DVT prophylaxis  - DNR/DNI  - Downgrade to medicine
88 yo F w/ hx of Afib, CML, uterine cancer s/p radiation and hysterectomy, breast cancer s/p chemo and b/l mastectomy, recent SBO requiring ileocolic bypass in March 2022, previously on TPN, now presenting in septic shock. Patient was on IV pressors and antibiotics but able to be weaned off and downgraded from MICU to floors. Clinically, however, patient has not recovered and per GOC with surrogate, daughter Zach, focus shifted to comfort measures. Geriatrics and Palliative Medicine ([facility]) Team was consulted for PCU eval.    Pain:   Better controlled   - Dilaudid 0.2mg IV q2h PRN moderate pain  - Dilaudid 0.5mg IV q2h PRN severe pain  - Bowel regimen while on opioids  Septic Shock   - D/c antibiotics as not in line with family's GOC  - No pressors  GOC. For details, please see GOC notes as above.  - DNR/DNI  - No blood draws, antibiotics  - Symptoms Rx only   For symptoms, will also recommend:  - Ativan 0.2mg IV q2h PRN agitation, anxiety  - Robinul 0.4mg IV q6h PRN excessive secretions  - Dulcolax 10mg suppository daily PRN for constipation  Dispo: Depending on the patient's clinical condition, may consider inpatient hospice referral.         Chinedu West MD   Geriatrics and Palliative Care ([facility]) Consult Service    of Geriatric and Palliative Medicine  [facility]      Please page the following number for clinical matters between the hours of 9 am and 5 pm from Monday through Friday : [phone]    After 5pm and on weekends, please see the contact information below:    In the event of newly developing, evolving, or worsening symptoms, please contact the Palliative Medicine team via pager (if the patient is at [facility] #[phone] or if the patient is at [facility] #[phone]) The Geriatric and Palliative Medicine service has coverage 24 hours a day/ 7 days a week to provide medical recommendations regarding symptom management needs via telephone
88 yo F w/ hx of Afib, CML, uterine cancer s/p radiation and hysterectomy, breast cancer s/p chemo and b/l mastectomy, recent SBO requiring ileocolic bypass in March 2022, previously on TPN, now presenting in septic shock. Patient was on IV pressors and antibiotics but able to be weaned off and downgraded from MICU to floors. Clinically, however, patient has not recovered and per GOC with surrogate, daughter Zach, focus shifted to comfort measures. Geriatrics and Palliative Medicine ([facility]) Team was consulted for PCU eval. Patient transferred to PCU for symptom directed care.    Pt seen and examined at bedside. Case discussing during IDT rounds. Pt required PRN IV dilaudid x2 in in last 24 hours in addition to IV dilaudid 0.2mg q4h ATC for pain. Pt also required PRN IV ativan x1. Met with patient's daughter along with palliative SW to discuss disposition. Daughter shared that patient has had bad experiences at other facilities and she is finally comfortable so daughter can't bear the thought of moving her out of PCU. She understands patient is appropriate for transfer to inpatient hospice and same level of care will be provided but she does not wish to move patient at this time. Extensive emotional support provided as daughter became tearful during conversation. She is open to ongoing discussions next week. All questions answered.       Chery White MD  Geriatrics and Palliative Medicine Attending  [facility] pager: [phone]
88 yo F w/ hx of Afib, CML, uterine cancer s/p radiation and hysterectomy, breast cancer s/p chemo and b/l mastectomy, recent SBO requiring ileocolic bypass in March 2022, previously on TPN, now presenting in septic shock. Patient was on IV pressors and antibiotics but able to be weaned off and downgraded from MICU to floors. Clinically, however, patient has not recovered and per GOC with surrogate, daughter Zach, focus shifted to comfort measures. Geriatrics and Palliative Medicine ([facility]) Team was consulted for PCU eval. Patient transferred to PCU for symptom directed care.    Pt seen and examined at bedside. Case discussing during IDT rounds. Pt requiring IV dilaudid 0.2mg q4h ATC for pain. Pt required additional PRN dose of IV dilaudid 0.5mg x1 in 24 hours. Updated provided to patient's daughter. Will continue symptom management in PCU.    Chery White MD  Geriatrics and Palliative Medicine Attending  [facility] pager: [phone]
89 yo F w/ hx of Afib, CML, uterine cancer s/p radiation and hysterectomy, breast cancer s/p chemo and b/l mastectomy, recent SBO requiring ileocolic bypass in March 2022, previously on TPN, now presenting in septic shock. Patient was on IV pressors and antibiotics but able to be weaned off and downgraded from MICU to floors. Clinically, however, patient has not recovered and per GOC with surrogate, daughter Zach, focus shifted to comfort measures. Geriatrics and Palliative Medicine ([facility]) Team was consulted for PCU eval. Patient transferred to PCU for symptom directed care.    Pt seen and examined at bedside. Case discussing during IDT rounds. Pt continues to require IV dilaudid 0.2mg q4h ATC for pain. Discussed transfer to inpatient hospice with patient's daughter and palliative SW. Daughter understandably reluctant to have patient moved. Explained that inpatient hospice is able to provide same level of care that patient is receiving here. Daughter understands and is agreeable to further discussion tomorrow when she is able to visit patient. All questions answered and emotional support provided.      Chery White MD  Geriatrics and Palliative Medicine Attending  [facility] pager: [phone]
89 yo F w/ hx of Afib, CML, uterine cancer s/p radiation and hysterectomy, breast cancer s/p chemo and b/l mastectomy, recent SBO requiring ileocolic bypass in March 2022, previously on TPN, now presenting in septic shock. Patient was on IV pressors and antibiotics but able to be weaned off and downgraded from MICU to floors. Clinically, however, patient has not recovered and per GOC with surrogate, daughter Zach, focus shifted to comfort measures. Geriatrics and Palliative Medicine ([facility]) Team was consulted for PCU eval. Patient transferred to PCU for symptom directed care.    Pt seen and examined at bedside. Case discussing during IDT rounds. Pt continues to required IV dilaudid 0.2mg q4h ATC for pain. Updated provided to patient's daughter by PCU. Will continue symptom management in PCU. Dispo planning ongoing, will discuss inpatient hospice with daughter tomorrow.     Chery White MD  Geriatrics and Palliative Medicine Attending  [facility] pager: [phone]
89 yo F w/ hx of Afib, CML, uterine cancer s/p radiation and hysterectomy, breast cancer s/p chemo and b/l mastectomy, recent SBO requiring ileocolic bypass in March 2022, previously on TPN, now presenting in septic shock. Patient was on IV pressors and antibiotics but able to be weaned off and downgraded from MICU to floors. Clinically, however, patient has not recovered and per GOC with surrogate, daughter Zach, focus shifted to comfort measures. Geriatrics and Palliative Medicine ([facility]) Team was consulted for PCU eval. Patient transferred to PCU for symptom directed care.    Pt seen and examined at bedside. Case discussing during IDT rounds. Pt required PRN IV dilaudid x1 in in last 24 hours in addition to IV dilaudid 0.2mg q4h ATC for pain. Pt also required PRN IV ativan x2. Will continue symptom management/end of life care in PCU. DIspo planning discussed with daughter yesterday. Plan to monitor symptoms over weekend and discuss again next week.    Chery White MD  Geriatrics and Palliative Medicine Attending  [facility] pager: [phone]
hx of CMML  recent hospitalization for SBO requiring resection  now presents from rehab with sepsis due to UTI  she remains hypotensive and lethargic with persistent anasarca, likely due to HFpEF exacerbation and third spacing of fluid  cont IV albumin for resuscitation and empiric abx  bradycardia precludes inc midodrine and she has significant pleural effusions limiting fluids  overall very frail with very guarded prognosis despite all interventions  comfort may be best approach
88 yo F w/ hx of Afib, CML, uterine cancer s/p radiation and hysterectomy, breast cancer s/p chemo and b/l mastectomy, recent SBO requiring ileocolic bypass in March 2022, previously on TPN, now presenting in septic shock. Patient was on IV pressors and antibiotics but able to be weaned off and downgraded from MICU to floors. Clinically, however, patient has not recovered and per GOC with surrogate, daughter Zach, focus shifted to comfort measures. Geriatrics and Palliative Medicine ([facility]) Team was consulted for PCU eval. Patient transferred to PCU for symptom directed care.    Pt seen and examined at bedside. Case discussing during IDT rounds. RN reported pt becoming symptomatic approx 30min prior to ATC dose being due. Will increase IV dilaudid dose to 0.5mg q6h PRN.  Pt also required PRN IV ativan x1. Last BM 5/16. RN to give suppository today. Will continue symptom management/end of life care in PCU. DIspo planning ongoing.     Chery White MD  Geriatrics and Palliative Medicine Attending  [facility] pager: [phone]
BP stable for the moment with albumin.  Overall feel she is at end of life--further aggressive interventions unlikely to alter prognosis.  GOC ongoing, Palliative care consulted
88 yo F w/ hx of Afib, CML, uterine cancer s/p radiation and hysterectomy, breast cancer s/p chemo and b/l mastectomy, recent SBO requiring ileocolic bypass in March 2022, previously on TPN, now presenting in septic shock. Patient was on IV pressors and antibiotics but able to be weaned off and downgraded from MICU to floors. Clinically, however, patient has not recovered and per GOC with surrogate, daughter Zach, focus shifted to comfort measures. Geriatrics and Palliative Medicine ([facility]) Team was consulted for PCU eval. Patient transferred to PCU for symptom directed care.    Pt seen and examined at bedside. Case discussing during IDT rounds. Pt with worsening symptoms in last 24 hours requiring escalating dose of IV prn dialudid and ativan. Will increase ATC dosing of IV dilaudid to 0.5mg q4h ATC and prn dosing to 1mg q1h PRN dyspnea, 0.8mg prn moderate pain and 1mg prn severe pain.     Chery White MD  Geriatrics and Palliative Medicine Attending  [facility] pager: [phone]

## 2022-05-22 NOTE — PROGRESS NOTE ADULT - PROBLEM SELECTOR PROBLEM 6
Patient Name:ARANZA HUERTA   Account Number:[id number]  [id number]  MRN:    YOB: 2019 5:37 AM    Prematurity Ophthalmic Examination    Gestational Age:30 weeks 4 days  Date of exam:01/01/2020 15:21  Postmenstrual Age:36 weeks 3 days    ODOS  Chronologic Age: 1 month 10 days  NormalAbnormalNormalAbnormal    Optic discXX  MaculaXX  CorneaXX  LensXX    OD Vessels to:Zone 1:Zone 2:Posterior:Mid:Anterior:XZone 3:  OS Vessels to:Zone 1:Zone 2:Posterior:Mid:Anterior:XZone 3:    STAGE AT CLOCK HOURS  [id number]    [id number]    Blank - normal  1- Demarcation line  2- Ridge  3- Extraret prolif.  4- Retinal detach.  5- No Information  [id number]    [id number]    [id number]    [id number]    [id number]    [id number]    Plus disease:OD:OS:Vitreous haze:OD:OS:Retinal hemorrhage:OD:OS:    ImpressionODOSRecommendationROP brochure  Immature retinal vasculature:XXRecheck in    2weeksgiven to parentsleft at   bedside  Diagnoses  OD: (H35.111) - Retinopathy of prematurity, stage 0, right eyeOS: (H35.112) -   Retinopathy of prematurity, stage 0, left eye  Comments:    Attending:MONICA: Karthikeyan Alvarez MD 1/1/2020 3:22 PM   Advanced care planning/counseling discussion Functional quadriplegia

## 2022-05-22 NOTE — PROGRESS NOTE ADULT - PROBLEM SELECTOR PLAN 1
Localized pain to back, abdomen. No PRNs used for pain in 24 hours.  - Increase Dilaudid to 1 mg q 4 hrs ATC  - Dilaudid 0.8 mg IV q1h PRN moderate pain  - Dilaudid 1 mg IV q1h PRN severe pain  - Bowel regimen while on opioids. Localized pain to back, abdomen. 2 PRNs used for pain in 24 hours.  - Increase IV dilaudid to 0.5 mg q 4 hrs ATC  - Dilaudid 0.8 mg IV q1h PRN moderate pain  - Dilaudid 1 mg IV q1h PRN severe pain  - Bowel regimen while on opioids.

## 2022-05-22 NOTE — PROGRESS NOTE ADULT - PROBLEM SELECTOR PLAN 2
Admitted to MICU for septic shock then downgraded to floors. Per GOC with family, opted to stop antibiotics.  - No pressors, no further antibiotics, DNR/DNI  - Comfort measures only. Required PRN IV dilaudid x1 in last 24 hours  - Increase IV dilaudid to 0.5 mg q 4 hrs ATC  - Increase PRN dose to 1mg q1h PRN Required PRN IV dilaudid x1 in last 24 hours  - Increase IV dilaudid to 0.5 mg q 4 hrs ATC  - Increase PRN dose to 1mg q1h PRN  - IV ativan increased to 0.5mg q1h PRN refractory dyspnea

## 2022-05-22 NOTE — PROGRESS NOTE ADULT - SUBJECTIVE AND OBJECTIVE BOX
GAP TEAM PALLIATIVE CARE UNIT PROGRESS NOTE:      [  ] Patient on hospice program.    INDICATION FOR PALLIATIVE CARE UNIT SERVICES/Interval HPI: End of life care    OVERNIGHT EVENTS: Pt seen and examined at bedside. Has required Dilaudid 1 mg IV x3 over hte last 24 hrs in addition to Ativan 0.5 mg IV and ATC Dilaudid.    DNR on chart: Yes  Yes    Allergies    contrast media (iodine-based) (Short breath)  iv  contrast (Unknown)  penicillin (Hives)  penicillin (Rash)  strawberry (Hives)  sulfa drugs (Short breath)  Sulfacetamide Sodium (Rash)    Intolerances    MEDICATIONS  (STANDING):  HYDROmorphone  Injectable 0.5 milliGRAM(s) IV Push every 6 hours  levothyroxine Injectable 25 MICROGram(s) IV Push <User Schedule>    MEDICATIONS  (PRN):  acetaminophen  Suppository .. 650 milliGRAM(s) Rectal every 6 hours PRN Temp greater or equal to 38C (100.4F)  bisacodyl Suppository 10 milliGRAM(s) Rectal daily PRN Constipation  glycopyrrolate Injectable 0.4 milliGRAM(s) IV Push every 6 hours PRN secretions  HYDROmorphone  Injectable 0.2 milliGRAM(s) IV Push every 1 hour PRN Moderate Pain (4 - 6)  HYDROmorphone  Injectable 0.5 milliGRAM(s) IV Push every 1 hour PRN Severe Pain (7 - 10)  HYDROmorphone  Injectable 0.2 milliGRAM(s) IV Push every 1 hour PRN Dyspnea or increased work of breathing  LORazepam   Injectable 0.2 milliGRAM(s) IV Push every 2 hours PRN Anxiety, agitation    ITEMS UNCHECKED ARE NOT PRESENT    PRESENT SYMPTOMS: [ ]Unable to self-report  [X]PAINADs [X]RDOS  Source if other than patient:  [ ]Family   [ ]Team     Pain: [ ] yes [ ] no  QOL impact -   Location -                    Aggravating factors -  Quality -  Radiation -  Timing-  Severity (0-10 scale):  Minimal acceptable level (0-10 scale):     PAINAD Score: 4  http://geriatrictoolkit.[facility]/cog/painad.pdf (Ctrl +  left click to view)    Dyspnea:                           [ ]Mild [ ]Moderate [ ]Severe    RDOS: 2  0 to 2  minimal or no respiratory distress   3  mild distress  4 to 6 moderate distress  >7 severe distress  https://[email or web address].[email or web address]/handouts/hen/Respiratory_Distress_Observation_Scale.pdf (Ctrl +  left click to view)    Anxiety:                             [ ]Mild [ ]Moderate [ ]Severe  Fatigue:                             [ ]Mild [ ]Moderate [ ]Severe  Nausea:                             [ ]Mild [ ]Moderate [ ]Severe  Loss of appetite:              [ ]Mild [ ]Moderate [ ]Severe  Constipation:                    [ ]Mild [ ]Moderate [ ]Severe    Other Symptoms:  [X]All other review of systems unable to obtain due to poor mentation    Palliative Performance Status Version 2:         10%  http://npcrc.org/files/news/palliative_performance_scale_ppsv2.pdf    PHYSICAL EXAM:   Vital Signs Last 24 Hrs  T(C): 36.7 (22 May 2022 07:45), Max: 36.7 (22 May 2022 07:45)  T(F): 98 (22 May 2022 07:45), Max: 98 (22 May 2022 07:45)  HR: 118 (22 May 2022 07:45) (118 - 118)  BP: 108/65 (22 May 2022 07:45) (108/65 - 108/65)  BP(mean): --  RR: 16 (22 May 2022 07:45) (16 - 16)  SpO2: 90% (22 May 2022 07:45) (90% - 90%)      GENERAL: [ ] Cachexia  [ ]Alert  [ ]Oriented x   [X]Lethargic  [ ]Unarousable  [ ]Verbal  [x]Non-Verbal  Behavioral:  [ ] Anxiety  [ ] Delirium [ ] Agitation [ ] Other  HEENT:  [ ]Normal   [X]Dry mouth   [ ]ET Tube/Trach  [ ]Oral lesions  PULMONARY:  [X]Clear [ ]Tachypnea  [ ]Audible excessive secretions   [ ]Rhonchi        [ ]Right [ ]Left [ ]Bilateral  [ ]Crackles        [ ]Right [ ]Left [ ]Bilateral  [ ]Wheezing     [ ]Right [ ]Left [ ]Bilateral  [ ]Diminished BS [ ]Right [ ]Left [ ]Bilateral    CARDIOVASCULAR:    [X]Regular [ ]Irregular [ ]Tachy  [ ][place] [ ]Murmur [ ]Other  GASTROINTESTINAL:  [X]Soft  [ ]Distended   [ ]+BS  [ ]Non tender [X]Tender  [ ]Other [ ]PEG [ ]OGT/ NGT   Last BM:  5/16  GENITOURINARY:  [ ]Normal [ ] Incontinent   [ ]Oliguria/Anuria   [X]Kinesy  MUSCULOSKELETAL:  [ ]Normal   [ ]Weakness  [X]Bed/Wheelchair bound [ ]Edema  NEUROLOGIC:  [X]No focal deficits  [ ] Cognitive impairment  [ ] Dysphagia [ ]Dysarthria [ ] Paresis [ ]Other   SKIN:  [ ]Normal  [ ]Rash  [ ]Other  [X]Pressure ulcer(s)  [X]y [ ]n  Present on admission      CRITICAL CARE:  [ ] Shock Present  [ ]Septic [ ]Cardiogenic [ ]Neurologic [ ]Hypovolemic  [ ]  Vasopressors [ ]  Inotropes   [ ] Respiratory failure present [ ] Mechanical Ventilation [ ] Non-invasive ventilatory support [ ] High-Flow  [ ] Acute  [ ] Chronic [ ] Hypoxic  [ ] Hypercarbic [ ] Other  [ ] Other organ failure     LABS: No new labs        RADIOLOGY & ADDITIONAL STUDIES: No new imaging    PROTEIN CALORIE MALNUTRITION: [ ] mild [ ] moderate [ ] severe  [ ] underweight [ ] morbid obesity    https://www.andeal.org/vault/2440/web/files/ONC/Table_Clinical%20Characteristics%20to%20Document%20Malnutrition-White%20JV%20et%20al%507498.pdf    Height (cm): 162.6 (05-12-22 @ 02:46), 172.7 (03-08-22 @ 07:54)  Weight (kg): 70.2 (05-12-22 @ 02:46), 65.5 (03-12-22 @ 23:45)  BMI (kg/m2): 26.6 (05-12-22 @ 02:46), 22 (03-12-22 @ 23:45)    [ ] PPSV2 < or = 30% [ ] significant weight loss [ ] poor nutritional intake [ ] anasarca   Artificial Nutrition [ ]     REFERRALS:   [X]Chaplaincy  [ ] Hospice  [ ]Child Life  [X]Social Work  [X]Case management [ ]Holistic Therapy [ ] Physical Therapy [ ] Dietary   Goals of Care Document: NILSA Alfredo (04-07-22 @ 14:13)  Goals of Care Conversation:   Participants:  · Participants  Patient    Advance Directives:  · Caregiver:  no    Conversation Discussion:  · Conversation Details  Palliative care SW met with pt at bedside to provide emotional support. Pt reported that she was having a difficult time adjusting to her hospitalization. Pt shared that she was eager to be discharged to a facility for rehab, as she feels that over the past month she has found herself being less mobile and she felt she could benefit from rehabilitation. Pt reflected on her medical history and expressed feelings of optimism around her ability to return to baseline. Pt expressed feelings of appreciation around her family, sharing that her daughter has been very supportive throughout this hospitalization. Pt discussed the impact that her hospitalization has had on her wellbeing, as well as that of her families. Palliative care SW provided empathetic listening, emotional support, and normalization/validation. Pt agreed to reach out as needed.    Keegan Alfredo  [phone]    What Matters Most To Patient and Family:  · What matters most to patient and family  Going to rehab so she can return to baseline.    Personal Advance Directives Treatment Guidelines:   MOLST:  · Completed  31-Mar-2022    Location of Discussion:   Location of Discussion:  · Location of discussion  Face to face      Electronic Signatures:  Jaden Alfredo ([facility] [place]  (Signed 07-Apr-2022 14:20)  	Authored: Goals of Care Conversation, Personal Advance Directives Treatment Guidelines, Location of Discussion      Last Updated: 07-Apr-2022 14:20 by Jaden Alfredo ([facility] [place]     GAP TEAM PALLIATIVE CARE UNIT PROGRESS NOTE:      [  ] Patient on hospice program.    INDICATION FOR PALLIATIVE CARE UNIT SERVICES/Interval HPI: End of life care    OVERNIGHT EVENTS: Pt seen and examined at bedside. Has required Dilaudid 1 mg IV x3 over the last 24 hrs in addition to Ativan 0.5 mg IV and ATC Dilaudid.    DNR on chart: Yes  Yes    Allergies    contrast media (iodine-based) (Short breath)  iv  contrast (Unknown)  penicillin (Hives)  penicillin (Rash)  strawberry (Hives)  sulfa drugs (Short breath)  Sulfacetamide Sodium (Rash)    Intolerances    MEDICATIONS  (STANDING):  HYDROmorphone  Injectable 0.5 milliGRAM(s) IV Push every 6 hours  levothyroxine Injectable 25 MICROGram(s) IV Push <User Schedule>    MEDICATIONS  (PRN):  acetaminophen  Suppository .. 650 milliGRAM(s) Rectal every 6 hours PRN Temp greater or equal to 38C (100.4F)  bisacodyl Suppository 10 milliGRAM(s) Rectal daily PRN Constipation  glycopyrrolate Injectable 0.4 milliGRAM(s) IV Push every 6 hours PRN secretions  HYDROmorphone  Injectable 0.2 milliGRAM(s) IV Push every 1 hour PRN Moderate Pain (4 - 6)  HYDROmorphone  Injectable 0.5 milliGRAM(s) IV Push every 1 hour PRN Severe Pain (7 - 10)  HYDROmorphone  Injectable 0.2 milliGRAM(s) IV Push every 1 hour PRN Dyspnea or increased work of breathing  LORazepam   Injectable 0.2 milliGRAM(s) IV Push every 2 hours PRN Anxiety, agitation    ITEMS UNCHECKED ARE NOT PRESENT    PRESENT SYMPTOMS: [ ]Unable to self-report  [X]PAINADs [X]RDOS  Source if other than patient:  [ ]Family   [ ]Team     Pain: [ ] yes [ ] no  QOL impact -   Location -                    Aggravating factors -  Quality -  Radiation -  Timing-  Severity (0-10 scale):  Minimal acceptable level (0-10 scale):     PAINAD Score: 4  http://geriatrictoolkit.[place].[facility]/cog/painad.pdf (Ctrl +  left click to view)    Dyspnea:                           [ ]Mild [ ]Moderate [ ]Severe    RDOS: 2  0 to 2  minimal or no respiratory distress   3  mild distress  4 to 6 moderate distress  >7 severe distress  https://homecareinformation.net/handouts/hen/Respiratory_Distress_Observation_Scale.pdf (Ctrl +  left click to view)    Anxiety:                             [ ]Mild [ ]Moderate [ ]Severe  Fatigue:                             [ ]Mild [ ]Moderate [ ]Severe  Nausea:                             [ ]Mild [ ]Moderate [ ]Severe  Loss of appetite:              [ ]Mild [ ]Moderate [ ]Severe  Constipation:                    [ ]Mild [ ]Moderate [ ]Severe    Other Symptoms:  [X]All other review of systems unable to obtain due to poor mentation    Palliative Performance Status Version 2:         10%  http://npcrc.org/files/news/palliative_performance_scale_ppsv2.pdf    PHYSICAL EXAM:   Vital Signs Last 24 Hrs  T(C): 36.7 (22 May 2022 07:45), Max: 36.7 (22 May 2022 07:45)  T(F): 98 (22 May 2022 07:45), Max: 98 (22 May 2022 07:45)  HR: 118 (22 May 2022 07:45) (118 - 118)  BP: 108/65 (22 May 2022 07:45) (108/65 - 108/65)  BP(mean): --  RR: 16 (22 May 2022 07:45) (16 - 16)  SpO2: 90% (22 May 2022 07:45) (90% - 90%)      GENERAL: [ ] Cachexia  [ ]Alert  [ ]Oriented x   [X]Lethargic  [ ]Unarousable  [ ]Verbal  [x]Non-Verbal  Behavioral:  [ ] Anxiety  [ ] Delirium [ ] Agitation [ ] Other  HEENT:  [ ]Normal   [X]Dry mouth   [ ]ET Tube/Trach  [ ]Oral lesions  PULMONARY:  [X]Clear [ ]Tachypnea  [ ]Audible excessive secretions   [ ]Rhonchi        [ ]Right [ ]Left [ ]Bilateral  [ ]Crackles        [ ]Right [ ]Left [ ]Bilateral  [ ]Wheezing     [ ]Right [ ]Left [ ]Bilateral  [ ]Diminished BS [ ]Right [ ]Left [ ]Bilateral    CARDIOVASCULAR:    [X]Regular [ ]Irregular [ ]Tachy  [ ][place] [ ]Murmur [ ]Other  GASTROINTESTINAL:  [X]Soft  [ ]Distended   [ ]+BS  [ ]Non tender [X]Tender  [ ]Other [ ]PEG [ ]OGT/ NGT   Last BM:  5/16  GENITOURINARY:  [ ]Normal [ ] Incontinent   [ ]Oliguria/Anuria   [X]Kinsey  MUSCULOSKELETAL:  [ ]Normal   [ ]Weakness  [X]Bed/Wheelchair bound [ ]Edema  NEUROLOGIC:  [X]No focal deficits  [ ] Cognitive impairment  [ ] Dysphagia [ ]Dysarthria [ ] Paresis [ ]Other   SKIN:  [ ]Normal  [ ]Rash  [ ]Other  [X]Pressure ulcer(s)  [X]y [ ]n  Present on admission      CRITICAL CARE:  [ ] Shock Present  [ ]Septic [ ]Cardiogenic [ ]Neurologic [ ]Hypovolemic  [ ]  Vasopressors [ ]  Inotropes   [ ] Respiratory failure present [ ] Mechanical Ventilation [ ] Non-invasive ventilatory support [ ] High-Flow  [ ] Acute  [ ] Chronic [ ] Hypoxic  [ ] Hypercarbic [ ] Other  [ ] Other organ failure     LABS: No new labs        RADIOLOGY & ADDITIONAL STUDIES: No new imaging    PROTEIN CALORIE MALNUTRITION: [ ] mild [ ] moderate [ ] severe  [ ] underweight [ ] morbid obesity    https://www.andeal.org/vault/2440/web/files/ONC/Table_Clinical%20Characteristics%20to%20Document%20Malnutrition-White%20JV%20et%20al%795664.pdf    Height (cm): 162.6 (05-12-22 @ 02:46), 172.7 (03-08-22 @ 07:54)  Weight (kg): 70.2 (05-12-22 @ 02:46), 65.5 (03-12-22 @ 23:45)  BMI (kg/m2): 26.6 (05-12-22 @ 02:46), 22 (03-12-22 @ 23:45)    [ ] PPSV2 < or = 30% [ ] significant weight loss [ ] poor nutritional intake [ ] anasarca   Artificial Nutrition [ ]     REFERRALS:   [X]Chaplaincy  [ ] Hospice  [ ]Child Life  [X]Social Work  [X]Case management [ ]Holistic Therapy [ ] Physical Therapy [ ] Dietary   Goals of Care Document: NILSA Alfredo (04-07-22 @ 14:13)  Goals of Care Conversation:   Participants:  · Participants  Patient    Advance Directives:  · Caregiver:  no    Conversation Discussion:  · Conversation Details  Palliative care SW met with pt at bedside to provide emotional support. Pt reported that she was having a difficult time adjusting to her hospitalization. Pt shared that she was eager to be discharged to a facility for rehab, as she feels that over the past month she has found herself being less mobile and she felt she could benefit from rehabilitation. Pt reflected on her medical history and expressed feelings of optimism around her ability to return to baseline. Pt expressed feelings of appreciation around her family, sharing that her daughter has been very supportive throughout this hospitalization. Pt discussed the impact that her hospitalization has had on her wellbeing, as well as that of her families. Palliative care SW provided empathetic listening, emotional support, and normalization/validation. Pt agreed to reach out as needed.    Keegan Alfredo  [phone]    What Matters Most To Patient and Family:  · What matters most to patient and family  Going to rehab so she can return to baseline.    Personal Advance Directives Treatment Guidelines:   MOLST:  · Completed  31-Mar-2022    Location of Discussion:   Location of Discussion:  · Location of discussion  Face to face      Electronic Signatures:  Jaden Alfredo ([facility] [place])  (Signed 07-Apr-2022 14:20)  	Authored: Goals of Care Conversation, Personal Advance Directives Treatment Guidelines, Location of Discussion      Last Updated: 07-Apr-2022 14:20 by Jaden Alfredo ([facility] [place])     GAP TEAM PALLIATIVE CARE UNIT PROGRESS NOTE:      [  ] Patient on hospice program.    INDICATION FOR PALLIATIVE CARE UNIT SERVICES/Interval HPI: End of life care    OVERNIGHT EVENTS: Pt seen and examined at bedside. Has required Dilaudid 1 mg IV x3 over the last 24 hrs in addition to IV Ativan 0.2mg x1 and 0.5 mg x1and ATC Dilaudid.    DNR on chart: Yes  Yes    Allergies    contrast media (iodine-based) (Short breath)  iv  contrast (Unknown)  penicillin (Hives)  penicillin (Rash)  strawberry (Hives)  sulfa drugs (Short breath)  Sulfacetamide Sodium (Rash)    Intolerances    MEDICATIONS  (STANDING):  HYDROmorphone  Injectable 0.5 milliGRAM(s) IV Push every 6 hours  levothyroxine Injectable 25 MICROGram(s) IV Push <User Schedule>    MEDICATIONS  (PRN):  acetaminophen  Suppository .. 650 milliGRAM(s) Rectal every 6 hours PRN Temp greater or equal to 38C (100.4F)  bisacodyl Suppository 10 milliGRAM(s) Rectal daily PRN Constipation  glycopyrrolate Injectable 0.4 milliGRAM(s) IV Push every 6 hours PRN secretions  HYDROmorphone  Injectable 0.2 milliGRAM(s) IV Push every 1 hour PRN Moderate Pain (4 - 6)  HYDROmorphone  Injectable 0.5 milliGRAM(s) IV Push every 1 hour PRN Severe Pain (7 - 10)  HYDROmorphone  Injectable 0.2 milliGRAM(s) IV Push every 1 hour PRN Dyspnea or increased work of breathing  LORazepam   Injectable 0.2 milliGRAM(s) IV Push every 2 hours PRN Anxiety, agitation    ITEMS UNCHECKED ARE NOT PRESENT    PRESENT SYMPTOMS: [ ]Unable to self-report  [X]PAINADs [X]RDOS  Source if other than patient:  [ ]Family   [ ]Team     Pain: [ ] yes [ ] no  QOL impact -   Location -                    Aggravating factors -  Quality -  Radiation -  Timing-  Severity (0-10 scale):  Minimal acceptable level (0-10 scale):     PAINAD Score: 4  http://geriatrictoolkit.[place].[facility]/cog/painad.pdf (Ctrl +  left click to view)    Dyspnea:                           [ ]Mild [ ]Moderate [ ]Severe    RDOS: 2  0 to 2  minimal or no respiratory distress   3  mild distress  4 to 6 moderate distress  >7 severe distress  https://homecareinformation.net/handouts/hen/Respiratory_Distress_Observation_Scale.pdf (Ctrl +  left click to view)    Anxiety:                             [ ]Mild [ ]Moderate [ ]Severe  Fatigue:                             [ ]Mild [ ]Moderate [ ]Severe  Nausea:                             [ ]Mild [ ]Moderate [ ]Severe  Loss of appetite:              [ ]Mild [ ]Moderate [ ]Severe  Constipation:                    [ ]Mild [ ]Moderate [ ]Severe    Other Symptoms:  [X]All other review of systems unable to obtain due to poor mentation    Palliative Performance Status Version 2:         10%  http://npcrc.org/files/news/palliative_performance_scale_ppsv2.pdf    PHYSICAL EXAM:   Vital Signs Last 24 Hrs  T(C): 36.7 (22 May 2022 07:45), Max: 36.7 (22 May 2022 07:45)  T(F): 98 (22 May 2022 07:45), Max: 98 (22 May 2022 07:45)  HR: 118 (22 May 2022 07:45) (118 - 118)  BP: 108/65 (22 May 2022 07:45) (108/65 - 108/65)  BP(mean): --  RR: 16 (22 May 2022 07:45) (16 - 16)  SpO2: 90% (22 May 2022 07:45) (90% - 90%)      GENERAL: [ ] Cachexia  [ ]Alert  [ ]Oriented x   [X]Lethargic  [ ]Unarousable  [ ]Verbal  [x]Non-Verbal  Behavioral:  [ ] Anxiety  [ ] Delirium [ ] Agitation [ ] Other  HEENT:  [ ]Normal   [X]Dry mouth   [ ]ET Tube/Trach  [ ]Oral lesions  PULMONARY:  [X]Clear [ ]Tachypnea  [ ]Audible excessive secretions   [ ]Rhonchi        [ ]Right [ ]Left [ ]Bilateral  [ ]Crackles        [ ]Right [ ]Left [ ]Bilateral  [ ]Wheezing     [ ]Right [ ]Left [ ]Bilateral  [ ]Diminished BS [ ]Right [ ]Left [ ]Bilateral    CARDIOVASCULAR:    [X]Regular [ ]Irregular [ ]Tachy  [ ][place]ot[place] [ ]Murmur [ ]Other  GASTROINTESTINAL:  [X]Soft  [ ]Distended   [ ]+BS  [ ]Non tender [X]Tender  [ ]Other [ ]PEG [ ]OGT/ NGT   Last BM:  5/16  GENITOURINARY:  [ ]Normal [ ] Incontinent   [ ]Oliguria/Anuria   [X]Kinsey  MUSCULOSKELETAL:  [ ]Normal   [ ]Weakness  [X]Bed/Wheelchair bound [ ]Edema  NEUROLOGIC:  [X]No focal deficits  [ ] Cognitive impairment  [ ] Dysphagia [ ]Dysarthria [ ] Paresis [ ]Other   SKIN:  [ ]Normal  [ ]Rash  [ ]Other  [X]Pressure ulcer(s)  [X]y [ ]n  Present on admission      CRITICAL CARE:  [ ] Shock Present  [ ]Septic [ ]Cardiogenic [ ]Neurologic [ ]Hypovolemic  [ ]  Vasopressors [ ]  Inotropes   [ ] Respiratory failure present [ ] Mechanical Ventilation [ ] Non-invasive ventilatory support [ ] High-Flow  [ ] Acute  [ ] Chronic [ ] Hypoxic  [ ] Hypercarbic [ ] Other  [ ] Other organ failure     LABS: No new labs        RADIOLOGY & ADDITIONAL STUDIES: No new imaging    PROTEIN CALORIE MALNUTRITION: [ ] mild [ ] moderate [ ] severe  [ ] underweight [ ] morbid obesity    https://www.andeal.org/vault/2440/web/files/ONC/Table_Clinical%20Characteristics%20to%20Document%20Malnutrition-White%20JV%20et%20al%178063.pdf    Height (cm): 162.6 (05-12-22 @ 02:46), 172.7 (03-08-22 @ 07:54)  Weight (kg): 70.2 (05-12-22 @ 02:46), 65.5 (03-12-22 @ 23:45)  BMI (kg/m2): 26.6 (05-12-22 @ 02:46), 22 (03-12-22 @ 23:45)    [ ] PPSV2 < or = 30% [ ] significant weight loss [ ] poor nutritional intake [ ] anasarca   Artificial Nutrition [ ]     REFERRALS:   [X]Chaplaincy  [ ] Hospice  [ ]Child Life  [X]Social Work  [X]Case management [ ]Holistic Therapy [ ] Physical Therapy [ ] Dietary   Goals of Care Document: NILSA Alfredo (04-07-22 @ 14:13)  Goals of Care Conversation:   Participants:  · Participants  Patient    Advance Directives:  · Caregiver:  no    Conversation Discussion:  · Conversation Details  Palliative care SW met with pt at bedside to provide emotional support. Pt reported that she was having a difficult time adjusting to her hospitalization. Pt shared that she was eager to be discharged to a facility for rehab, as she feels that over the past month she has found herself being less mobile and she felt she could benefit from rehabilitation. Pt reflected on her medical history and expressed feelings of optimism around her ability to return to baseline. Pt expressed feelings of appreciation around her family, sharing that her daughter has been very supportive throughout this hospitalization. Pt discussed the impact that her hospitalization has had on her wellbeing, as well as that of her families. Palliative care SW provided empathetic listening, emotional support, and normalization/validation. Pt agreed to reach out as needed.    Keegan Alfredo  [phone]    What Matters Most To Patient and Family:  · What matters most to patient and family  Going to rehab so she can return to baseline.    Personal Advance Directives Treatment Guidelines:   JOSUE:  · Completed  31-Mar-2022    Location of Discussion:   Location of Discussion:  · Location of discussion  Face to face      Electronic Signatures:  Jaden Alfredo ([facility] [place])  (Signed 07-Apr-2022 14:20)  	Authored: Goals of Care Conversation, Personal Advance Directives Treatment Guidelines, Location of Discussion      Last Updated: 07-Apr-2022 14:20 by Jaden Alferdo ([facility] [place])

## 2022-05-22 NOTE — PROGRESS NOTE ADULT - PROBLEM SELECTOR PLAN 6
- See [facility] Note from 5/12  - DNR/DNI  - No blood draws, no antibiotics  - Comfort measures only. - Pt extremely lethargic, PPSV 10%.  - Requires full supportive care for ADLs.

## 2022-05-22 NOTE — PROGRESS NOTE ADULT - PROBLEM SELECTOR PLAN 5
- Pt extremely lethargic, PPSV 10%.  - Requires full supportive care for ADLs. - Continue symptom directed care.

## 2022-05-23 NOTE — PROGRESS NOTE ADULT - PROBLEM SELECTOR PLAN 3
- The patient required PRN Ativan 0.5mg IV X3 within a 24hr period 8am-8am  - Increased PRN Ativan to 1mg IV q1hr PRN  - Ativan 0.5mg IV q4hr ATC ordered  - Monitor for constipation, urinary retention, pain, hunger, thirst, etc.  Promote sleep wake cycle and reorientation as indicated.

## 2022-05-23 NOTE — PROGRESS NOTE ADULT - NS PANP COMMENT GEN_ALL_CORE FT
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 Patient was subsequently placed on NRB 10L/min O2 with recovery of SPO2 to 95%. Additionally, patient received D5 1/2 NS bolus. Blood pressure was subsequently rechecked: 90/70 followed by 2 hours later 123/70. Patient was then placed on 3L/min NC with SPO2 97% and return of mental status to baseline. She was also given Levaquin but then changed to ceftriaxone 1g, given unclear source of possible infection. Patient's family requested patient be sent to hospital.    ED Course: Noted to be hypothermic 94.9F; BP (83-94)/(36-59); HR 44-58; 97% 2L/min NC. She received 1L NS and one dose of each of vancomycin, cefepime, and metronidazole. Patient was subsequently started on levophed for blood pressure support. (12 May 2022 01:31)    The patient was seen and examined  Annotations are embedded above  Predominant symptom(s):  #encephalopathy-persistent. No overt changes according to the team  Relevant factors:  #Dyspnea-significant PRN use. PT now controlled, post IV dilaudid 1mg q4H ATC  #Pain-ulcer and immobility. continue to monitor PAINAD  #debility-PPSv2 30  #Palliative-Daughter apprised. Will support PRN      Ryan Gonsales MD   of Geriatric and Palliative Medicine  [facility]    Between the hours of 9 am and 5 pm from Monday through Friday, please contact me on [email or web address] Teams    After 5pm and on weekends, please see the contact information below:    In the event of newly developing, evolving, or worsening symptoms, please contact the Palliative Medicine team via pager (if the patient is at [facility] #[phone] or if the patient is at [facility] #[phone]) The Geriatric and Palliative Medicine service has coverage 24 hours a day/ 7 days a week to provide medical recommendations regarding symptom management needs via telephone

## 2022-05-23 NOTE — PROGRESS NOTE ADULT - PROBLEM SELECTOR PLAN 2
The patient required PRN Dilaudid 1mg IV X2 for dyspnea and X4 for severe pain within a 24hr period 8am-8am.  - Increased ATC Dilaudid from 0.5mg to 1mg IV q4hr   - Increased Dilaudid from 0.8mg to 1mg IV q1hr PRN for moderate pain  - Increased Dilaudid from 1mg to 2mg IV q1hr PRN for severe pain  - Bowel regimen while on opioids.

## 2022-05-23 NOTE — PROGRESS NOTE ADULT - PROBLEM SELECTOR PLAN 7
- Pt extremely lethargic, PPSV 10%. The patient requires nursing assistance with all ADLs  - Supportive care   - Turn and position   - Continue with good skin care

## 2022-05-23 NOTE — PROGRESS NOTE ADULT - SUBJECTIVE AND OBJECTIVE BOX
GAP TEAM PALLIATIVE CARE UNIT PROGRESS NOTE:      [  ] Patient on hospice program.    INDICATION FOR PALLIATIVE CARE UNIT SERVICES/Interval HPI: symptom management and EOL care in the setting of a 88 yo F with CHF, CML, recent bowel obstruction (3/2022) admitted in septic shock 2/2 UTI with resolution of shock and downgrade from MICU.     OVERNIGHT EVENTS:    DNR on chart: Yes  Yes      Allergies    contrast media (iodine-based) (Short breath)  iv  contrast (Unknown)  penicillin (Hives)  penicillin (Rash)  strawberry (Hives)  sulfa drugs (Short breath)  Sulfacetamide Sodium (Rash)    Intolerances    MEDICATIONS  (STANDING):  HYDROmorphone  Injectable 1 milliGRAM(s) IV Push every 4 hours  levothyroxine Injectable 25 MICROGram(s) IV Push <User Schedule>  LORazepam   Injectable 0.5 milliGRAM(s) IV Push every 4 hours    MEDICATIONS  (PRN):  acetaminophen  Suppository .. 650 milliGRAM(s) Rectal every 6 hours PRN Temp greater or equal to 38C (100.4F)  bisacodyl Suppository 10 milliGRAM(s) Rectal daily PRN Constipation  glycopyrrolate Injectable 0.4 milliGRAM(s) IV Push every 6 hours PRN secretions  HYDROmorphone  Injectable 2 milliGRAM(s) IV Push every 1 hour PRN Dyspnea  HYDROmorphone  Injectable 1 milliGRAM(s) IV Push every 1 hour PRN Moderate Pain (4 - 6)  HYDROmorphone  Injectable 2 milliGRAM(s) IV Push every 1 hour PRN Severe Pain (7 - 10)  LORazepam   Injectable 1 milliGRAM(s) IV Push every 1 hour PRN Anxiety and/or refractory dyspnea    ITEMS UNCHECKED ARE NOT PRESENT    PRESENT SYMPTOMS: [ ]Unable to self-report  [ ]PAINADs [ ]RDOS  Source if other than patient:  [ ]Family   [ ]Team     Pain: [ ] yes [ ] no  QOL impact -   Location -                    Aggravating factors -  Quality -  Radiation -  Timing-  Severity (0-10 scale):  Minimal acceptable level (0-10 scale):     PAINAD Score:  http://geriatrictoolkit.[place].[email or web address]/cog/painad.pdf (Ctrl +  left click to view)    Dyspnea:                           [ ]Mild [ ]Moderate [ ]Severe    RDOS:  0 to 2  minimal or no respiratory distress   3  mild distress  4 to 6 moderate distress  >7 severe distress  https://[email or web address].net/handouts/hen/Respiratory_Distress_Observation_Scale.pdf (Ctrl +  left click to view)     Anxiety:                             [ ]Mild [ ]Moderate [ ]Severe  Fatigue:                             [ ]Mild [ ]Moderate [ ]Severe  Nausea:                             [ ]Mild [ ]Moderate [ ]Severe  Loss of appetite:              [ ]Mild [ ]Moderate [ ]Severe  Constipation:                    [ ]Mild [ ]Moderate [ ]Severe  		  Other Symptoms:  [ ]All other review of systems negative     Palliative Performance Status Version 2:         %         http://npcrc.org/files/news/palliative_performance_scale_ppsv2.pdf  PHYSICAL EXAM:   Vital Signs Last 24 Hrs  T(C): 36.7 (23 May 2022 08:25), Max: 36.7 (23 May 2022 08:25)  T(F): 98.1 (23 May 2022 08:25), Max: 98.1 (23 May 2022 08:25)  HR: 81 (23 May 2022 08:25) (81 - 81)  BP: 190/65 (23 May 2022 08:25) (190/65 - 190/65)  BP(mean): --  RR: 20 (23 May 2022 08:25) (20 - 20)  SpO2: 90% (23 May 2022 08:25) (90% - 90%) I&O's Summary    22 May 2022 07:01  -  23 May 2022 07:00  --------------------------------------------------------  IN: 0 mL / OUT: 400 mL / NET: -400 mL      GENERAL: [ ] Cachexia  [ ]Alert  [ ]Oriented x   [ ]Lethargic  [ ]Unarousable  [ ]Verbal  [ ]Non-Verbal  Behavioral:   [ ] Anxiety  [ ] Delirium [ ] Agitation [ ] Other  HEENT:  [ ]Normal   [ ]Dry mouth   [ ]ET Tube/Trach  [ ]Oral lesions  PULMONARY:   [ ]Clear [ ]Tachypnea  [ ]Audible excessive secretions   [ ]Rhonchi        [ ]Right [ ]Left [ ]Bilateral  [ ]Crackles        [ ]Right [ ]Left [ ]Bilateral  [ ]Wheezing     [ ]Right [ ]Left [ ]Bilateral  [ ]Diminished BS [ ]Right [ ]Left [ ]Bilateral    CARDIOVASCULAR:    [ ]Regular [ ]Irregular [ ]Tachy  [ ][place] [ ]Murmur [ ]Other  GASTROINTESTINAL:  [ ]Soft  [ ]Distended   [ ]+BS  [ ]Non tender [ ]Tender  [ ]Other [ ]PEG [ ]OGT/ NGT   Last BM:    GENITOURINARY:  [ ]Normal [ ] Incontinent   [ ]Oliguria/Anuria   [ ]Kinsey  MUSCULOSKELETAL:   [ ]Normal   [ ]Weakness  [ ]Bed/Wheelchair bound [ ]Edema  NEUROLOGIC:   [ ]No focal deficits  [ ] Cognitive impairment  [ ] Dysphagia [ ]Dysarthria [ ] Paresis [ ]Other   SKIN:   [ ]Normal  [ ]Rash  [ ]Other  [ ]Pressure ulcer(s)  [ ]y [ ]n  Present on admission      CRITICAL CARE:  [ ] Shock Present  [ ]Septic [ ]Cardiogenic [ ]Neurologic [ ]Hypovolemic  [ ]  Vasopressors [ ]  Inotropes   [ ] Respiratory failure present [ ] Mechanical Ventilation [ ] Non-invasive ventilatory support [ ] High-Flow  [ ] Acute  [ ] Chronic [ ] Hypoxic  [ ] Hypercarbic [ ] Other  [ ] Other organ failure     LABS:            RADIOLOGY & ADDITIONAL STUDIES:    PROTEIN CALORIE MALNUTRITION: [ ] mild [ ] moderate [ ] severe  [ ] underweight [ ] morbid obesity    https://www.andeal.org/vault/2440/web/files/ONC/Table_Clinical%20Characteristics%20to%20Document%20Malnutrition-White%20JV%20et%20al%064831.pdf    Height (cm): 162.6 (05-12-22 @ 02:46), 172.7 (03-08-22 @ 07:54)  Weight (kg): 70.2 (05-12-22 @ 02:46), 65.5 (03-12-22 @ 23:45)  BMI (kg/m2): 26.6 (05-12-22 @ 02:46), 22 (03-12-22 @ 23:45)    [ ] PPSV2 < or = 30% [ ] significant weight loss [ ] poor nutritional intake [ ] anasarca   Artificial Nutrition [ ]     REFERRALS:   [ ]Chaplaincy  [ ] Hospice  [ ]Child Life  [ ]Social Work  [ ]Case management [ ]Holistic Therapy [ ] Physical Therapy [ ] Dietary   Goals of Care Document: NILSA Alfredo (04-07-22 @ 14:13)  Goals of Care Conversation:   Participants:  · Participants  Patient    Advance Directives:  · Caregiver:  no    Conversation Discussion:  · Conversation Details  Palliative care SW met with pt at bedside to provide emotional support. Pt reported that she was having a difficult time adjusting to her hospitalization. Pt shared that she was eager to be discharged to a facility for rehab, as she feels that over the past month she has found herself being less mobile and she felt she could benefit from rehabilitation. Pt reflected on her medical history and expressed feelings of optimism around her ability to return to baseline. Pt expressed feelings of appreciation around her family, sharing that her daughter has been very supportive throughout this hospitalization. Pt discussed the impact that her hospitalization has had on her wellbeing, as well as that of her families. Palliative care SW provided empathetic listening, emotional support, and normalization/validation. Pt agreed to reach out as needed.    Keegan Sayda  [phone]    What Matters Most To Patient and Family:  · What matters most to patient and family  Going to rehab so she can return to baseline.    Personal Advance Directives Treatment Guidelines:   MOLST:  · Completed  31-Mar-2022    Location of Discussion:   Location of Discussion:  · Location of discussion  Face to face      Electronic Signatures:  Jaden Alfredo ([facility] [place])  (Signed 07-Apr-2022 14:20)  	Authored: Goals of Care Conversation, Personal Advance Directives Treatment Guidelines, Location of Discussion      Last Updated: 07-Apr-2022 14:20 by Jaden Alfredo ([facility] [place])      GAP TEAM PALLIATIVE CARE UNIT PROGRESS NOTE:      [  ] Patient on hospice program.    INDICATION FOR PALLIATIVE CARE UNIT SERVICES/Interval HPI: symptom management and EOL care in the setting of a 88 yo F with CHF, CML, recent bowel obstruction (3/2022) admitted in septic shock 2/2 UTI with resolution of shock and downgrade from MICU.     OVERNIGHT EVENTS: Chart reviewed. The patient is seen and examined at the bedside. She required PRN Ativan 0.5mg IV X3, PRN Dilaudid 1mg IV X2 for dyspnea and X4 for severe pain within a 24hr period 8am-8am.    DNR on chart: Yes  Yes      Allergies    contrast media (iodine-based) (Short breath)  iv  contrast (Unknown)  penicillin (Hives)  penicillin (Rash)  strawberry (Hives)  sulfa drugs (Short breath)  Sulfacetamide Sodium (Rash)    Intolerances    MEDICATIONS  (STANDING):  HYDROmorphone  Injectable 1 milliGRAM(s) IV Push every 4 hours  levothyroxine Injectable 25 MICROGram(s) IV Push <User Schedule>  LORazepam   Injectable 0.5 milliGRAM(s) IV Push every 4 hours    MEDICATIONS  (PRN):  acetaminophen  Suppository .. 650 milliGRAM(s) Rectal every 6 hours PRN Temp greater or equal to 38C (100.4F)  bisacodyl Suppository 10 milliGRAM(s) Rectal daily PRN Constipation  glycopyrrolate Injectable 0.4 milliGRAM(s) IV Push every 6 hours PRN secretions  HYDROmorphone  Injectable 2 milliGRAM(s) IV Push every 1 hour PRN Dyspnea  HYDROmorphone  Injectable 1 milliGRAM(s) IV Push every 1 hour PRN Moderate Pain (4 - 6)  HYDROmorphone  Injectable 2 milliGRAM(s) IV Push every 1 hour PRN Severe Pain (7 - 10)  LORazepam   Injectable 1 milliGRAM(s) IV Push every 1 hour PRN Anxiety and/or refractory dyspnea    ITEMS UNCHECKED ARE NOT PRESENT    PRESENT SYMPTOMS: [ X]Unable to self-report  [ X]PAINADs [ X]RDOS  Source if other than patient:  [ ]Family   [X ]Team     Pain: [ X] yes [ ] no  QOL impact -   Location -                    Aggravating factors -  Quality -  Radiation -  Timing-  Severity (0-10 scale):  Minimal acceptable level (0-10 scale):     PAINAD Score: a score of 7&8 within the last 24hr period 8am-8am  [email or web address]/cog/painad.pdf (Ctrl +  left click to view)    Dyspnea:                           [ ]Mild [ ]Moderate [ ]Severe    RDOS: 1  0 to 2  minimal or no respiratory distress   3  mild distress  4 to 6 moderate distress  >7 severe distress  https://[email or web address]/handouts/hen/Respiratory_Distress_Observation_Scale.pdf (Ctrl +  left click to view)     Anxiety:                             [ ]Mild [ ]Moderate [ ]Severe  Fatigue:                             [ ]Mild [ ]Moderate [ ]Severe  Nausea:                             [ ]Mild [ ]Moderate [ ]Severe  Loss of appetite:              [ ]Mild [ ]Moderate [ ]Severe  Constipation:                    [ ]Mild [ ]Moderate [ ]Severe  		  Other Symptoms:  [ ]All other review of systems negative- unable to assess     Palliative Performance Status Version 2: 10 %         http://[facility].org/files/news/palliative_performance_scale_ppsv2.pdf  PHYSICAL EXAM:   Vital Signs Last 24 Hrs  T(C): 36.7 (23 May 2022 08:25), Max: 36.7 (23 May 2022 08:25)  T(F): 98.1 (23 May 2022 08:25), Max: 98.1 (23 May 2022 08:25)  HR: 81 (23 May 2022 08:25) (81 - 81)  BP: 190/65 (23 May 2022 08:25) (190/65 - 190/65)  BP(mean): --  RR: 20 (23 May 2022 08:25) (20 - 20)  SpO2: 90% (23 May 2022 08:25) (90% - 90%) I&O's Summary    22 May 2022 07:01  -  23 May 2022 07:00  --------------------------------------------------------  IN: 0 mL / OUT: 400 mL / NET: -400 mL    GENERAL: [ ] Cachexia  [ ]Alert  [ ]Oriented x   [ X]Lethargic  [ ]Unarousable  [ ]Verbal  [X ]Non-Verbal  Behavioral:   [ ] Anxiety  [ ] Delirium [ ] Agitation [ ] Other  HEENT:  [ ]Normal   [X]Dry mouth   [ ]ET Tube/Trach  [ ]Oral lesions  PULMONARY:   [X ]Clear [ ]Tachypnea  [ ]Audible excessive secretions   [ ]Rhonchi        [ ]Right [ ]Left [ ]Bilateral  [ ]Crackles        [ ]Right [ ]Left [ ]Bilateral  [ ]Wheezing     [ ]Right [ ]Left [ ]Bilateral  [ ]Diminished BS [ ]Right [ ]Left [ ]Bilateral    CARDIOVASCULAR:    [ X]Regular [ ]Irregular [ ]Tachy  [ ]Dakotah [ ]Murmur [ ]Other  GASTROINTESTINAL:  [ X]Soft  [ ]Distended   [X ]+BS  [X ]Non tender [ ]Tender  [ ]Other [ ]PEG [ ]OGT/ NGT   Last BM:  5/22/22  GENITOURINARY:  [ ]Normal [ X] Incontinent   [ ]Oliguria/Anuria   [X ]Kinsey  MUSCULOSKELETAL:   [ ]Normal   [X ]Weakness  [ X]Bed/Wheelchair bound [ ]Edema  NEUROLOGIC:   [ ]No focal deficits  [ X] Cognitive impairment  [ ] Dysphagia [ ]Dysarthria [ ] Paresis [ ]Other   SKIN:   [ ]Normal  [ ]Rash  [ ]Other  [ X]Pressure ulcer(s)  [X ]y [ ]n  Present on admission- Sacrum unstageable. Please see nursing assessment for further details for which I have reviewed     CRITICAL CARE:  [ ] Shock Present  [ ]Septic [ ]Cardiogenic [ ]Neurologic [ ]Hypovolemic  [ ]  Vasopressors [ ]  Inotropes   [ ] Respiratory failure present [ ] Mechanical Ventilation [ ] Non-invasive ventilatory support [ ] High-Flow  [ ] Acute  [ ] Chronic [ ] Hypoxic  [ ] Hypercarbic [ ] Other  [X ] Other organ failure- Skin    LABS: None new    RADIOLOGY & ADDITIONAL STUDIES: none new    PROTEIN CALORIE MALNUTRITION: [ ] mild [ ] moderate [X ] severe- please see nursing assessment for further details for which I have reviewed  [ ] underweight [ ] morbid obesity    https://www.andeal.org/vault/8330/web/files/ONC/Table_Clinical%20Characteristics%20to%20Document%20Malnutrition-White%20JV%20et%20al%202012.pdf    Height (cm): 162.6 (05-12-22 @ 02:46), 172.7 (03-08-22 @ 07:54)  Weight (kg): 70.2 (05-12-22 @ 02:46), 65.5 (03-12-22 @ 23:45)  BMI (kg/m2): 26.6 (05-12-22 @ 02:46), 22 (03-12-22 @ 23:45)    [X ] PPSV2 < or = 30% [ ] significant weight loss [ ] poor nutritional intake [ ] anasarca   Artificial Nutrition [ ]     REFERRALS:   [ X]Chaplaincy  [ ] Hospice  [ ]Child Life  [ X]Social Work  [ ]Case management [ ]Holistic Therapy [ ] Physical Therapy [ ] Dietary   Goals of Care Document: NILSA Alfredo (04-07-22 @ 14:13)  Goals of Care Conversation:   Participants:  · Participants  Patient    Advance Directives:  · Caregiver:  no    Conversation Discussion:  · Conversation Details  Palliative care SW met with pt at bedside to provide emotional support. Pt reported that she was having a difficult time adjusting to her hospitalization. Pt shared that she was eager to be discharged to a facility for rehab, as she feels that over the past month she has found herself being less mobile and she felt she could benefit from rehabilitation. Pt reflected on her medical history and expressed feelings of optimism around her ability to return to baseline. Pt expressed feelings of appreciation around her family, sharing that her daughter has been very supportive throughout this hospitalization. Pt discussed the impact that her hospitalization has had on her wellbeing, as well as that of her families. Palliative care SW provided empathetic listening, emotional support, and normalization/validation. Pt agreed to reach out as needed.    Keegancorine Alfredo  [phone]    What Matters Most To Patient and Family:  · What matters most to patient and family  Going to rehab so she can return to baseline.    Personal Advance Directives Treatment Guidelines:   MOLST:  · Completed  31-Mar-2022    Location of Discussion:   Location of Discussion:  · Location of discussion  Face to face      Electronic Signatures:  Jaden Alfredo ([facility] [place])  (Signed 07-Apr-2022 14:20)  	Authored: Goals of Care Conversation, Personal Advance Directives Treatment Guidelines, Location of Discussion      Last Updated: 07-Apr-2022 14:20 by Jaden Alfredo ([facility] [place])

## 2022-05-24 NOTE — PROVIDER CONTACT NOTE (OTHER) - ASSESSMENT
unstageable sacrum
No response to external stimuli.   No spontaneous respirations.   No apical heart rate.   Negative pupillary response to light.
Pt Aox2. pt temp: 96.8; HR: 60; BP: 120/50; respirations: 18

## 2022-05-24 NOTE — PROVIDER CONTACT NOTE (OTHER) - REASON
Patient 
pt unable to swallow medication. pt due to midodrine. pt coughing when given water. pt temp: 96.8 rectal
Pt. is unsable to swallow PO meds
pressure ulcer

## 2022-05-24 NOTE — PROGRESS NOTE ADULT - PROBLEM SELECTOR PLAN 2
- Continue with Dilaudid 1mg IV q4hr ATC  - Continue with Dilaudid 2mg IV q1hr PRN for dyspnea ( 0 required within the last 24hr period 8am-8am)  - Bowel regimen while on opioids

## 2022-05-24 NOTE — PROGRESS NOTE ADULT - REASON FOR ADMISSION
septic shock

## 2022-05-24 NOTE — DISCHARGE NOTE FOR THE EXPIRED PATIENT - HOSPITAL COURSE
88 yo F w/ hx of Afib, CML, uterine cancer s/p radiation and hysterectomy, breast cancer s/p chemo and b/l mastectomy, recent SBO requiring ileocolic bypass in 2022, previously on TPN, now presenting in septic shock. Patient was on IV pressors and antibiotics but able to be weaned off and downgraded from MICU to floors. Clinically, however, patient has not recovered and per GOC with surrogate, daughter Zach, focus shifted to comfort measures. Transferred to PCU for symptom-focused care. The patient  on 22 at 1710.

## 2022-05-24 NOTE — PROGRESS NOTE ADULT - NUTRITIONAL ASSESSMENT
This patient has been assessed with a concern for Malnutrition and has been determined to have a diagnosis/diagnoses of Severe protein-calorie malnutrition.    This patient is being managed with:   Diet NPO-  Entered: May 16 2022  1:01PM    
This patient has been assessed with a concern for Malnutrition and has been determined to have a diagnosis/diagnoses of Severe protein-calorie malnutrition.    This patient is being managed with:   Diet Pureed-  Supplement Feeding Modality:  Oral  Ensure Pudding Cans or Servings Per Day:  1       Frequency:  Three Times a day  Entered: May 13 2022  5:59PM    
This patient has been assessed with a concern for Malnutrition and has been determined to have a diagnosis/diagnoses of Severe protein-calorie malnutrition.    This patient is being managed with:   Diet NPO-  Entered: May 16 2022  1:01PM

## 2022-05-24 NOTE — PROGRESS NOTE ADULT - NS ATTEND AMEND GEN_ALL_CORE FT
The patient was seen and examined  Annotations are embedded above  Predominant symptom(s):  #encephalopathy-unchanged  Relevant factors:  #Dyspnea-controlled  #Pain-PAINAD 0  #debility-PPSv2 30  #Palliative Encounter- continue to monitor for symptoms

## 2022-05-24 NOTE — CHART NOTE - NSCHARTNOTEFT_GEN_A_CORE
Interim Note    Per RN staff, pt inappropriate for nutrition follow up at this time.    RD remains available.  Carolyn Marquez MS, RD, CDN, [facility] Pager #[phone]

## 2022-05-24 NOTE — PROGRESS NOTE ADULT - PROBLEM SELECTOR PLAN 8
Spoke to the patient's daughter Zach. Educated as to what to expect.  Questions answered.  Emotional support provided.  Will continue with care in the PCU
Spoke to the patient's daughter Zach.  Educated as to what to expect.  Questions answered.  Emotional support provided.   Zach will be visiting her mother this evening.  Will continue with care in the PCU
- Continue symptom directed care in PCU.  - Chaplaincy following.

## 2022-05-24 NOTE — PROVIDER CONTACT NOTE (OTHER) - SITUATION
Patient without spontaneous respirations or pulse.
dx: sepsis
Pt. is lethargic and unable to swallow PO Medications.
pt admitted from er with urosepsis.

## 2022-05-24 NOTE — CHART NOTE - NSCHARTNOTESELECT_GEN_ALL_CORE
POCUS/Event Note
GOC/Event Note
MAR Accept Note/Event Note
Nutrition Services
Nutrition Services
Transfer Note

## 2022-05-24 NOTE — PROGRESS NOTE ADULT - PROVIDER SPECIALTY LIST ADULT
Palliative Care
MICU
MICU
Palliative Care
Internal Medicine
Palliative Care
Internal Medicine
Palliative Care

## 2022-05-24 NOTE — PROGRESS NOTE ADULT - SUBJECTIVE AND OBJECTIVE BOX
GAP TEAM PALLIATIVE CARE UNIT PROGRESS NOTE:      [  ] Patient on hospice program.    INDICATION FOR PALLIATIVE CARE UNIT SERVICES/Interval HPI:  symptom management and EOL care in the setting of a 89 yo F with CHF, CML, recent bowel obstruction (3/2022) admitted in septic shock 2/2 UTI with resolution of shock and downgrade from MICU.     OVERNIGHT EVENTS: Chart reviewed. The patient is seen and examined at the bedside. The patient required PRN morphine 2mg IV X1 for severe pain within a 24hr period 8am-8am    DNR on chart: Yes  Yes      Allergies    contrast media (iodine-based) (Short breath)  iv  contrast (Unknown)  penicillin (Hives)  penicillin (Rash)  strawberry (Hives)  sulfa drugs (Short breath)  Sulfacetamide Sodium (Rash)    Intolerances    MEDICATIONS  (STANDING):  HYDROmorphone  Injectable 1 milliGRAM(s) IV Push every 4 hours  levothyroxine Injectable 25 MICROGram(s) IV Push <User Schedule>  LORazepam   Injectable 0.5 milliGRAM(s) IV Push every 4 hours    MEDICATIONS  (PRN):  acetaminophen  Suppository .. 650 milliGRAM(s) Rectal every 6 hours PRN Temp greater or equal to 38C (100.4F)  bisacodyl Suppository 10 milliGRAM(s) Rectal daily PRN Constipation  glycopyrrolate Injectable 0.4 milliGRAM(s) IV Push every 6 hours PRN secretions  HYDROmorphone  Injectable 2 milliGRAM(s) IV Push every 1 hour PRN Dyspnea  HYDROmorphone  Injectable 1 milliGRAM(s) IV Push every 1 hour PRN Moderate Pain (4 - 6)  HYDROmorphone  Injectable 2 milliGRAM(s) IV Push every 1 hour PRN Severe Pain (7 - 10)  LORazepam   Injectable 1 milliGRAM(s) IV Push every 1 hour PRN Anxiety and/or refractory dyspnea    ITEMS UNCHECKED ARE NOT PRESENT    PRESENT SYMPTOMS: [X ]Unable to self-report  [X ]PAINADs [X ]RDOS  Source if other than patient:  [ ]Family   [ X]Team     Pain: [ X] yes [ ] no  QOL impact -   Location -                    Aggravating factors -  Quality -  Radiation -  Timing-  Severity (0-10 scale):  Minimal acceptable level (0-10 scale):     PAINAD Score: a score of 7 within a 24hr period 8am-8am  [email or web address] (Ctrl +  left click to view)    Dyspnea:                           [ ]Mild [ ]Moderate [ ]Severe    RDOS: 3  0 to 2  minimal or no respiratory distress   3  mild distress  4 to 6 moderate distress  >7 severe distress  https://[email or web address]/handouts/hen/Respiratory_Distress_Observation_Scale.pdf (Ctrl +  left click to view)     Anxiety:                             [ ]Mild [ ]Moderate [ ]Severe  Fatigue:                             [ ]Mild [ ]Moderate [ ]Severe  Nausea:                             [ ]Mild [ ]Moderate [ ]Severe  Loss of appetite:              [ ]Mild [ ]Moderate [ ]Severe  Constipation:                    [ ]Mild [ ]Moderate [ ]Severe  		  Other Symptoms:  [ ]All other review of systems negative- unable to assess     Palliative Performance Status Version 2: 10%         http://npcrc.org/files/news/palliative_performance_scale_ppsv2.pdf  PHYSICAL EXAM:   Vital Signs Last 24 Hrs  T(C): 36.6 (24 May 2022 08:13), Max: 36.6 (24 May 2022 08:13)  T(F): 97.9 (24 May 2022 08:13), Max: 97.9 (24 May 2022 08:13)  HR: 123 (24 May 2022 08:13) (123 - 123)  BP: 116/63 (24 May 2022 08:13) (116/63 - 116/63)  BP(mean): --  RR: 22 (24 May 2022 08:13) (22 - 22)  SpO2: 93% (24 May 2022 08:13) (93% - 93%) I&O's Summary    23 May 2022 07:01  -  24 May 2022 07:00  --------------------------------------------------------  IN: 0 mL / OUT: 250 mL / NET: -250 mL    GENERAL: [ ] Cachexia  [ ]Alert  [ ]Oriented x   [X ]Lethargic  [ ]Unarousable  [ ]Verbal  [X ]Non-Verbal  Behavioral:   [ ] Anxiety  [ ] Delirium [ ] Agitation [ ] Other  HEENT:  [ ]Normal   [X]Dry mouth   [ ]ET Tube/Trach  [ ]Oral lesions  PULMONARY:   [X ]Clear [ ]Tachypnea  [ ]Audible excessive secretions   [ ]Rhonchi        [ ]Right [ ]Left [ ]Bilateral  [ ]Crackles        [ ]Right [ ]Left [ ]Bilateral  [ ]Wheezing     [ ]Right [ ]Left [ ]Bilateral  [ ]Diminished BS [ ]Right [ ]Left [ ]Bilateral    CARDIOVASCULAR:    [ ]Regular [ ]Irregular [ X]Tachy  [ ][place] [ ]Murmur [ ]Other  GASTROINTESTINAL:  [ X]Soft  [ ]Distended   [X ]+BS  [X ]Non tender [ ]Tender  [ ]Other [ ]PEG [ ]OGT/ NGT   Last BM:  5/22/22  GENITOURINARY:  [ ]Normal [ X] Incontinent   [ ]Oliguria/Anuria   [X ]Kinsey  MUSCULOSKELETAL:   [ ]Normal   [X ]Weakness  [ X]Bed/Wheelchair bound [ ]Edema  NEUROLOGIC:   [ ]No focal deficits  [ X] Cognitive impairment  [ ] Dysphagia [ ]Dysarthria [ ] Paresis [ ]Other   SKIN:   [ ]Normal  [ ]Rash  [ ]Other  [ X]Pressure ulcer(s)  [X ]y [ ]n  Present on admission- Sacrum unstageable. Please see nursing assessment for further details for which I have reviewed     CRITICAL CARE:  [ ] Shock Present  [ ]Septic [ ]Cardiogenic [ ]Neurologic [ ]Hypovolemic  [ ]  Vasopressors [ ]  Inotropes   [ ] Respiratory failure present [ ] Mechanical Ventilation [ ] Non-invasive ventilatory support [ ] High-Flow  [ ] Acute  [ ] Chronic [ ] Hypoxic  [ ] Hypercarbic [ ] Other  [X ] Other organ failure- Skin    LABS: None new    RADIOLOGY & ADDITIONAL STUDIES: none new    PROTEIN CALORIE MALNUTRITION: [ ] mild [ ] moderate [ ] severe  [ ] underweight [ ] morbid obesity    https://www.andeal.org/vault/2440/web/files/ONC/Table_Clinical%20Characteristics%20to%20Document%20Malnutrition-White%20JV%20et%20al%202012.pdf    Height (cm): 162.6 (05-12-22 @ 02:46), 172.7 (03-08-22 @ 07:54)  Weight (kg): 70.2 (05-12-22 @ 02:46), 65.5 (03-12-22 @ 23:45)  BMI (kg/m2): 26.6 (05-12-22 @ 02:46), 22 (03-12-22 @ 23:45)    [ ] PPSV2 < or = 30% [ ] significant weight loss [ ] poor nutritional intake [ ] anasarca   Artificial Nutrition [ ]     REFERRALS:   [ ]Chaplaincy  [ ] Hospice  [ ]Child Life  [ ]Social Work  [ ]Case management [ ]Holistic Therapy [ ] Physical Therapy [ ] Dietary   Goals of Care Document: NILSA Alfredo (04-07-22 @ 14:13)  Goals of Care Conversation:   Participants:  · Participants  Patient    Advance Directives:  · Caregiver:  no    Conversation Discussion:  · Conversation Details  Palliative care SW met with pt at bedside to provide emotional support. Pt reported that she was having a difficult time adjusting to her hospitalization. Pt shared that she was eager to be discharged to a facility for rehab, as she feels that over the past month she has found herself being less mobile and she felt she could benefit from rehabilitation. Pt reflected on her medical history and expressed feelings of optimism around her ability to return to baseline. Pt expressed feelings of appreciation around her family, sharing that her daughter has been very supportive throughout this hospitalization. Pt discussed the impact that her hospitalization has had on her wellbeing, as well as that of her families. Palliative care SW provided empathetic listening, emotional support, and normalization/validation. Pt agreed to reach out as needed.    Keegan Sayda  [phone]    What Matters Most To Patient and Family:  · What matters most to patient and family  Going to rehab so she can return to baseline.    Personal Advance Directives Treatment Guidelines:   MOLST:  · Completed  31-Mar-2022    Location of Discussion:   Location of Discussion:  · Location of discussion  Face to face      Electronic Signatures:  Jaden Alfredo ([facility] [place])  (Signed 07-Apr-2022 14:20)  	Authored: Goals of Care Conversation, Personal Advance Directives Treatment Guidelines, Location of Discussion      Last Updated: 07-Apr-2022 14:20 by Jaden Alfredo ([facility] [place])      GAP TEAM PALLIATIVE CARE UNIT PROGRESS NOTE:      [  ] Patient on hospice program.    INDICATION FOR PALLIATIVE CARE UNIT SERVICES/Interval HPI:  symptom management and EOL care in the setting of a 88 yo F with CHF, CML, recent bowel obstruction (3/2022) admitted in septic shock 2/2 UTI with resolution of shock and downgrade from MICU.     OVERNIGHT EVENTS: Chart reviewed. The patient is seen and examined at the bedside. The patient required PRN morphine 2mg IV X1 for severe pain within a 24hr period 8am-8am    DNR on chart: Yes  Yes      Allergies    contrast media (iodine-based) (Short breath)  iv  contrast (Unknown)  penicillin (Hives)  penicillin (Rash)  strawberry (Hives)  sulfa drugs (Short breath)  Sulfacetamide Sodium (Rash)    Intolerances    MEDICATIONS  (STANDING):  HYDROmorphone  Injectable 1 milliGRAM(s) IV Push every 4 hours  levothyroxine Injectable 25 MICROGram(s) IV Push <User Schedule>  LORazepam   Injectable 0.5 milliGRAM(s) IV Push every 4 hours    MEDICATIONS  (PRN):  acetaminophen  Suppository .. 650 milliGRAM(s) Rectal every 6 hours PRN Temp greater or equal to 38C (100.4F)  bisacodyl Suppository 10 milliGRAM(s) Rectal daily PRN Constipation  glycopyrrolate Injectable 0.4 milliGRAM(s) IV Push every 6 hours PRN secretions  HYDROmorphone  Injectable 2 milliGRAM(s) IV Push every 1 hour PRN Dyspnea  HYDROmorphone  Injectable 1 milliGRAM(s) IV Push every 1 hour PRN Moderate Pain (4 - 6)  HYDROmorphone  Injectable 2 milliGRAM(s) IV Push every 1 hour PRN Severe Pain (7 - 10)  LORazepam   Injectable 1 milliGRAM(s) IV Push every 1 hour PRN Anxiety and/or refractory dyspnea    ITEMS UNCHECKED ARE NOT PRESENT    PRESENT SYMPTOMS: [X ]Unable to self-report  [X ]PAINADs [X ]RDOS  Source if other than patient:  [ ]Family   [ X]Team     Pain: [ X] yes [ ] no  QOL impact -   Location -                    Aggravating factors -  Quality -  Radiation -  Timing-  Severity (0-10 scale):  Minimal acceptable level (0-10 scale):     PAINAD Score: a score of 7 within a 24hr period 8am-8am  [email or web address].pdf (Ctrl +  left click to view)    Dyspnea:                           [ ]Mild [ ]Moderate [ ]Severe    RDOS: 3  0 to 2  minimal or no respiratory distress   3  mild distress  4 to 6 moderate distress  >7 severe distress  https://[email or web address]/handouts/hen/Respiratory_Distress_Observation_Scale.pdf (Ctrl +  left click to view)     Anxiety:                             [ ]Mild [ ]Moderate [ ]Severe  Fatigue:                             [ ]Mild [ ]Moderate [ ]Severe  Nausea:                             [ ]Mild [ ]Moderate [ ]Severe  Loss of appetite:              [ ]Mild [ ]Moderate [ ]Severe  Constipation:                    [ ]Mild [ ]Moderate [ ]Severe  		  Other Symptoms:  [ ]All other review of systems negative- unable to assess     Palliative Performance Status Version 2: 10%         http://npcrc.org/files/news/palliative_performance_scale_ppsv2.pdf  PHYSICAL EXAM:   Vital Signs Last 24 Hrs  T(C): 36.6 (24 May 2022 08:13), Max: 36.6 (24 May 2022 08:13)  T(F): 97.9 (24 May 2022 08:13), Max: 97.9 (24 May 2022 08:13)  HR: 123 (24 May 2022 08:13) (123 - 123)  BP: 116/63 (24 May 2022 08:13) (116/63 - 116/63)  BP(mean): --  RR: 22 (24 May 2022 08:13) (22 - 22)  SpO2: 93% (24 May 2022 08:13) (93% - 93%) I&O's Summary    23 May 2022 07:01  -  24 May 2022 07:00  --------------------------------------------------------  IN: 0 mL / OUT: 250 mL / NET: -250 mL    GENERAL: [ ] Cachexia  [ ]Alert  [ ]Oriented x   [X ]Lethargic  [ ]Unarousable  [ ]Verbal  [X ]Non-Verbal  Behavioral:   [ ] Anxiety  [ ] Delirium [ ] Agitation [ ] Other  HEENT:  [ ]Normal   [X]Dry mouth   [ ]ET Tube/Trach  [ ]Oral lesions  PULMONARY:   [X ]Clear [ ]Tachypnea  [ ]Audible excessive secretions   [ ]Rhonchi        [ ]Right [ ]Left [ ]Bilateral  [ ]Crackles        [ ]Right [ ]Left [ ]Bilateral  [ ]Wheezing     [ ]Right [ ]Left [ ]Bilateral  [ ]Diminished BS [ ]Right [ ]Left [ ]Bilateral    CARDIOVASCULAR:    [ ]Regular [ ]Irregular [ X]Tachy  [ ][place] [ ]Murmur [ ]Other  GASTROINTESTINAL:  [ X]Soft  [ ]Distended   [X ]+BS  [X ]Non tender [ ]Tender  [ ]Other [ ]PEG [ ]OGT/ NGT   Last BM:  5/22/22  GENITOURINARY:  [ ]Normal [ X] Incontinent   [ ]Oliguria/Anuria   [X ]Kinsey  MUSCULOSKELETAL:   [ ]Normal   [X ]Weakness  [ X]Bed/Wheelchair bound [ ]Edema  NEUROLOGIC:   [ ]No focal deficits  [ X] Cognitive impairment  [ ] Dysphagia [ ]Dysarthria [ ] Paresis [ ]Other   SKIN:   [ ]Normal  [ ]Rash  [ ]Other  [ X]Pressure ulcer(s)  [X ]y [ ]n  Present on admission- Sacrum unstageable. Please see nursing assessment for further details for which I have reviewed     CRITICAL CARE:  [ ] Shock Present  [ ]Septic [ ]Cardiogenic [ ]Neurologic [ ]Hypovolemic  [ ]  Vasopressors [ ]  Inotropes   [ ] Respiratory failure present [ ] Mechanical Ventilation [ ] Non-invasive ventilatory support [ ] High-Flow  [ ] Acute  [ ] Chronic [ ] Hypoxic  [ ] Hypercarbic [ ] Other  [X ] Other organ failure- Skin    LABS: None new    RADIOLOGY & ADDITIONAL STUDIES: none new    PROTEIN CALORIE MALNUTRITION: [ ] mild [ ] moderate [ X] severe- please see the nutritionist note  [ ] underweight [ ] morbid obesity    https://www.andeal.org/vault/2440/web/files/ONC/Table_Clinical%20Characteristics%20to%20Document%20Malnutrition-White%20JV%20et%20al%202012.pdf    Height (cm): 162.6 (05-12-22 @ 02:46), 172.7 (03-08-22 @ 07:54)  Weight (kg): 70.2 (05-12-22 @ 02:46), 65.5 (03-12-22 @ 23:45)  BMI (kg/m2): 26.6 (05-12-22 @ 02:46), 22 (03-12-22 @ 23:45)    [ X] PPSV2 < or = 30% [ ] significant weight loss [ ] poor nutritional intake [ ] anasarca   Artificial Nutrition [ ]     REFERRALS:   [ X]Chaplaincy  [X ] Hospice  [ ]Child Life  [ X]Social Work  [ ]Case management [ ]Holistic Therapy [ ] Physical Therapy [ ] Dietary   Goals of Care Document: NILSA Alfredo (04-07-22 @ 14:13)  Goals of Care Conversation:   Participants:  · Participants  Patient    Advance Directives:  · Caregiver:  no    Conversation Discussion:  · Conversation Details  Palliative care SW met with pt at bedside to provide emotional support. Pt reported that she was having a difficult time adjusting to her hospitalization. Pt shared that she was eager to be discharged to a facility for rehab, as she feels that over the past month she has found herself being less mobile and she felt she could benefit from rehabilitation. Pt reflected on her medical history and expressed feelings of optimism around her ability to return to baseline. Pt expressed feelings of appreciation around her family, sharing that her daughter has been very supportive throughout this hospitalization. Pt discussed the impact that her hospitalization has had on her wellbeing, as well as that of her families. Palliative care SW provided empathetic listening, emotional support, and normalization/validation. Pt agreed to reach out as needed.    Keegan Sayda  [phone]    What Matters Most To Patient and Family:  · What matters most to patient and family  Going to rehab so she can return to baseline.    Personal Advance Directives Treatment Guidelines:   MOLST:  · Completed  31-Mar-2022    Location of Discussion:   Location of Discussion:  · Location of discussion  Face to face      Electronic Signatures:  Jaden Alfredo ([facility] [place])  (Signed 07-Apr-2022 14:20)  	Authored: Goals of Care Conversation, Personal Advance Directives Treatment Guidelines, Location of Discussion      Last Updated: 07-Apr-2022 14:20 by Jaden Alfredo ([facility] [place])

## 2022-05-24 NOTE — PROVIDER CONTACT NOTE (OTHER) - ACTION/TREATMENT ORDERED:
Pt. is on comfort care
allevyn placed. wound consult placed
MD made aware. awaiting orders
See patient expiration note.

## 2022-05-24 NOTE — PROGRESS NOTE ADULT - PROBLEM SELECTOR PLAN 1
- Continue with Dilaudid 1mg IV q4hr ATC  - Continue with Dilaudid 1mg IV q1hr PRN for moderate pain (0 required within the last 24hr period 8am-8am)  - Continue with Dilaudid 2mg IV q1hr PRN for severe pain ( 1 required within the last 24hr period 8am-8am)  - Bowel regimen while on opioids.

## 2022-07-14 NOTE — PROGRESS NOTE ADULT - PROBLEM SELECTOR PLAN 7
- Continue symptom directed care in PCU.  - Chaplaincy following. Cyclosporine Pregnancy And Lactation Text: This medication is Pregnancy Category C and it isn't know if it is safe during pregnancy. This medication is excreted in breast milk.

## 2022-07-31 NOTE — CONSULT NOTE ADULT - ASSESSMENT
Anemia  etiology likely multifactorial   no signs of active GI bleeding  ppi bid  pt has had GI work up ~4-5 years ago which was normal  heme fu  no plans for GI work up for now done

## 2022-10-10 RX ORDER — ZINC SULFATE TAB 220 MG (50 MG ZINC EQUIVALENT) 220 (50 ZN) MG
1 TAB ORAL
Qty: 0 | Refills: 0 | DISCHARGE

## 2022-10-10 RX ORDER — MIRTAZAPINE 45 MG/1
1 TABLET, ORALLY DISINTEGRATING ORAL
Qty: 0 | Refills: 0 | DISCHARGE

## 2022-10-10 RX ORDER — ANASTROZOLE 1 MG/1
1 TABLET ORAL
Qty: 0 | Refills: 0 | DISCHARGE

## 2022-10-10 RX ORDER — NYSTATIN 500MM UNIT
0 POWDER (EA) MISCELLANEOUS
Qty: 0 | Refills: 0 | DISCHARGE

## 2022-10-10 RX ORDER — DIGOXIN 250 MCG
1 TABLET ORAL
Qty: 0 | Refills: 0 | DISCHARGE

## 2022-10-10 RX ORDER — MULTIVIT-MIN/FERROUS GLUCONATE 9 MG/15 ML
1 LIQUID (ML) ORAL
Qty: 0 | Refills: 0 | DISCHARGE

## 2022-10-10 RX ORDER — COLLAGENASE CLOSTRIDIUM HIST. 250 UNIT/G
1 OINTMENT (GRAM) TOPICAL
Qty: 0 | Refills: 0 | DISCHARGE

## 2022-10-10 RX ORDER — FERROUS SULFATE 325(65) MG
1 TABLET ORAL
Qty: 0 | Refills: 0 | DISCHARGE

## 2022-10-10 RX ORDER — METOPROLOL TARTRATE 50 MG
0.5 TABLET ORAL
Qty: 0 | Refills: 0 | DISCHARGE

## 2022-10-10 RX ORDER — MAGNESIUM HYDROXIDE 400 MG/1
15 TABLET, CHEWABLE ORAL
Qty: 0 | Refills: 0 | DISCHARGE

## 2022-10-10 RX ORDER — OMEPRAZOLE 10 MG/1
1 CAPSULE, DELAYED RELEASE ORAL
Qty: 0 | Refills: 0 | DISCHARGE

## 2022-10-10 RX ORDER — GABAPENTIN 400 MG/1
2 CAPSULE ORAL
Qty: 0 | Refills: 0 | DISCHARGE

## 2022-10-10 RX ORDER — ASCORBIC ACID 60 MG
1 TABLET,CHEWABLE ORAL
Qty: 0 | Refills: 0 | DISCHARGE

## 2022-10-10 RX ORDER — ESCITALOPRAM OXALATE 10 MG/1
1 TABLET, FILM COATED ORAL
Qty: 0 | Refills: 0 | DISCHARGE

## 2022-10-10 RX ORDER — METOPROLOL TARTRATE 50 MG
1 TABLET ORAL
Qty: 0 | Refills: 0 | DISCHARGE

## 2023-06-25 NOTE — DIETITIAN INITIAL EVALUATION ADULT - PROBLEM SELECTOR PROBLEM 8
· Encourage weight loss  · Patient received 2 doses of Lasix since the patient has some water retention as per the family and the patient  Need for prophylactic measure

## 2024-02-04 NOTE — PATIENT PROFILE ADULT. - DOES PATIENT HAVE ADVANCE DIRECTIVE
Subjective:       Patient ID: Rosa Aceves is a 81 y.o. female.    Chief Complaint: Ingrown Toenail  Patient presents today she is complaining about painfully ingrown toenails on both feet she also has severe hammertoes her toes are rubbing together and causing irritation patient was treated for breast cancer in May of this year.  Patient also states she has some new calluses that have formed from some new shoes that were too tight and rubbing her toes.    Past Medical History:   Diagnosis Date    Aortic valve regurgitation     Benign paroxysmal vertigo, bilateral     Chronic kidney disease, unspecified     Coronary artery disease     Essential (primary) hypertension     Malignant neoplasm of right breast     Mitral valve regurgitation     Obesity, unspecified     Osteopenia     Positive colorectal cancer screening using Cologuard test     Rheumatoid arthritis, unspecified     Ruptured lumbar disc     Unspecified cataract      Past Surgical History:   Procedure Laterality Date    BREAST BIOPSY Left     BREAST SURGERY Left     CHOLECYSTECTOMY      EYE SURGERY      INJECTION FOR SENTINEL NODE IDENTIFICATION Left 5/26/2022    Procedure: INJECTION, FOR SENTINEL NODE IDENTIFICATION;  Surgeon: Renzo Jim MD;  Location: [facility] OR;  Service: General;  Laterality: Left;    INSERTION OF TUNNELED CENTRAL VENOUS CATHETER (CVC) WITH SUBCUTANEOUS PORT N/A 8/1/2022    Procedure: [id number];  Surgeon: Renzo Jim MD;  Location: [facility] OR;  Service: General;  Laterality: N/A;    MASTECTOMY Left 5/26/2022    Procedure: MASTECTOMY;  Surgeon: Renzo Jim MD;  Location: [facility] OR;  Service: General;  Laterality: Left;    MASTECTOMY, RADICAL Right     SENTINEL LYMPH NODE BIOPSY Left 5/26/2022    Procedure: BIOPSY, LYMPH NODE, SENTINEL;  Surgeon: Renzo Jim MD;  Location: [facility] OR;  Service: General;  Laterality: Left;    TUBAL LIGATION       Family History   Problem Relation Age of Onset    Cancer 
"Mother     Heart failure Father     Cancer Sister     Cancer Grandchild      Social History     Socioeconomic History    Marital status:     Number of children: 5   Tobacco Use    Smoking status: Never    Smokeless tobacco: Never   Substance and Sexual Activity    Alcohol use: Never    Drug use: Never    Sexual activity: Not Currently     Partners: Male       Current Outpatient Medications   Medication Sig Dispense Refill    acetaminophen (TYLENOL) 500 MG tablet Take 500 mg by mouth every 6 (six) hours as needed.      hydrALAZINE (APRESOLINE) 50 MG tablet Take 50 mg by mouth 2 (two) times daily.      lisinopriL (PRINIVIL,ZESTRIL) 40 MG tablet Take 40 mg by mouth.      NIFEdipine (ADALAT CC) 90 MG TbSR Take 90 mg by mouth.       No current facility-administered medications for this visit.     Facility-Administered Medications Ordered in Other Visits   Medication Dose Route Frequency Provider Last Rate Last Admin    electrolyte-S (ISOLYTE)   Intravenous Continuous Irwin Russo MD        fentaNYL 50 mcg/mL injection 25 mcg  25 mcg Intravenous Q5 Min PRN Irwin Russo MD        HYDROmorphone (PF) injection 0.2 mg  0.2 mg Intravenous Q5 Min PRN Irwin Russo MD        lactated ringers infusion   Intravenous Continuous Irwin Russo MD 10 mL/hr at 08/01/22 1120 Restarted at 08/01/22 1234    LIDOcaine (PF) 10 mg/ml (1%) injection 10 mg  1 mL Intradermal Once Irwin Russo MD        oxyCODONE immediate release tablet 5 mg  5 mg Oral Q3H PRN Irwin Russo MD         Review of patient's allergies indicates:  No Known Allergies    Review of Systems   Musculoskeletal:  Positive for arthralgias.   All other systems reviewed and are negative.      Objective:      Vitals:    02/01/24 1153   BP: 139/68   BP Location: Right arm   Patient Position: Sitting   Pulse: 64   Weight: 90.3 kg (199 lb)   Height: 5' 5" (1.651 m)     Physical Exam  Vitals and nursing note reviewed. Exam conducted with a chaperone "
present.   Constitutional:       Appearance: Normal appearance.   Cardiovascular:      Pulses:           Dorsalis pedis pulses are 1+ on the right side and 1+ on the left side.        Posterior tibial pulses are 0 on the right side and 0 on the left side.   Pulmonary:      Effort: Pulmonary effort is normal.   Musculoskeletal:         General: Tenderness and deformity present.   Feet:      Right foot:      Protective Sensation: 2 sites tested.  2 sites sensed.      Skin integrity: Erythema present.      Toenail Condition: Right toenails are abnormally thick, long and ingrown. Fungal disease present.     Left foot:      Protective Sensation: 2 sites tested.  2 sites sensed.      Skin integrity: Erythema present.      Toenail Condition: Left toenails are abnormally thick, long and ingrown. Fungal disease present.  Skin:     General: Skin is warm.      Capillary Refill: Capillary refill takes more than 3 seconds.      Findings: Erythema present.   Neurological:      General: No focal deficit present.      Mental Status: She is alert.   Psychiatric:         Mood and Affect: Mood normal.         Behavior: Behavior normal.                                                                                                Assessment:       1. Ingrown nail    2. Hammer toes of both feet    3. Ulcer of both feet, limited to breakdown of skin          Plan:       Patient presents today complaining about painfully ingrown toenails on both feet she also has severe hammertoes her toes are rubbing together and causing irritation patient was treated for breast cancer in May of this year.  Patient also states she has some new calluses that have formed from some new shoes that were too tight and rubbing her toes.Patient's family member states that they had no idea that the patient's toenails were so long and so ingrown.   Patient had severely elongated ingrowing toenails on multiple digits bilateral while the patient did not need a nail 
avulsion I was able to aggressively trim and remove the ingrowing toenails which the patient tolerated well patient noted to me to be immediate relief upon removing the ingrown toenail this was especially noticeable on the bilateral hallux.  Patient does have irritation 5th digit left and needs to make sure she is wearing shoes that accommodate for the digital contracture this irritation could certainly lead to breakdown ulceration which can lead to complication with the patient's peripheral vascular disease.  Patient had several new areas of pre ulcerative callus formation the most significant was overlying the lateral aspect of the 5th metatarsal phalangeal joint right I did debride this area there is a little bit of a sore underlying this area and I advised the patient this needs to be monitored closely but should resolve as long as no further irritation presents over the area patient also has hyperkeratotic lesion sub 5th metatarsal left all of this is related to the patient's new shoes that were too narrow rubbing and irritating these areas she states she is no longer wearing the shoes and now that she is not wearing the shoes these areas are not as tender as they had been each of these areas were non excisionally debrided.  Recommended follow-up will be as needed if the patient is not getting complete relief of the ingrowing toenails in the next 4-5 days to contact us for follow-up further evaluation treatment but I do recommend keeping the patient's skin well moisturized well hydrated on a daily basis.  Total time including discussion evaluation treatment discussion of treatment options treatment plan removal of ingrown toenails as well as comprehensive new patient exam and documentation equaled 20 minutes.This note was created using [employer] voice recognition software that occasionally misinterpreted phrases or words.       
No
none
No

## 2024-02-27 NOTE — ED PROVIDER NOTE - OBJECTIVE STATEMENT
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 Per charts, patient has had episodes of hypotension over this past week, given levaquin, fluids with improvement in BP .However, patient with episodes of hypoxia requriing NRB -> NC. Recently abxs changed to ceftriaxone given unclear source of possible infection but family requested patient be sent to ED instead.

## 2024-03-04 NOTE — ED ADULT TRIAGE NOTE - BSA (M2)
Reviewed HCG with Dr. Liu.  Uterine size<dates and inappropriate rise in HCG.  Rev'd with Dr. Liu, agrees with Methotrexate    Dr. Liu please call patient.          Component  Ref Range & Units 1 d ago 3 d ago 5 d ago   Hcg Quantitative  <=4.2 mIU/mL 293.9 High  229.3 High  .5 High  CM          PACS Images     Show images for US PREG 1ST TRIM W/EV (CPT=76801/81432)  Study Result    Narrative   PROCEDURE: US PREG 1ST TRIM W/EV (CPT=76801/55017)     COMPARISON: None.     INDICATIONS: Bleeding with pregnancy     TECHNIQUE: Transabdominal and endovaginal pelvic ultrasound examinations were performed.  A transvaginal scan was performed.     FINDINGS:     UTERUS: The uterus measures 7.7 x 3.5 x 4 cm. The endometrial echo complex measures 0.5 cm. An intrauterine pregnancy is not seen.     RIGHT OVARY: 3.7 x 1.7 x 2.7 cm.  8 mm right ovarian follicle.     LEFT OVARY: 3.2 x 2.4 x 3 cm.       CUL-DE-SAC: Trace free fluid.           Impression   CONCLUSION:     No intrauterine pregnancy is identified. Although this may represent an early pregnancy, an ectopic pregnancy or abnormal pregnancy and/or spontaneous  is not excluded. Serial beta hCGs are recommended with possible serial followup ultrasound per   clinical discretion.          Dictated by (CST): Jhonathan Calderon MD on 3/01/2024 at 9:32 AM      Finalized by (CST): Jhonathan Calderon MD on 3/01/2024 at 9:33 AM        1.7

## 2025-06-05 NOTE — PROGRESS NOTE ADULT - PROBLEM SELECTOR PROBLEM 4
[FreeTextEntry1] : 37 old female presents here for cardiac evaluation   She had an ER hospital visit on 11/21/2024. for c/o left calf pain x  5 hours with left upper chest discomfort which lasted 2 to 3 hours.  Discomfort was constant in nature and not related to position movement and breathing. She has not had any the symptoms before and they all spontaneously dissipated without intervention. ER workup consisted of blood work and a chest x-ray There is no history of chest wall trauma and there is no major exertional activity in the day or 2 before. We ordered a chest CTA PE protocol.  Patient walks regularly and has no exertional difficulty. There is no pre-existing history of any cardiovascular disease. +History of hyperlipidemia Denies tobacco use, diabetes, hypertension. No family history premature coronary disease.  Only other medical history is that of PCOS. CML (chronic myeloid leukemia)

## (undated) DEVICE — DRAPE LIGHT HANDLE COVER BLUE

## (undated) DEVICE — GLV 7 DURAPRENE

## (undated) DEVICE — GLV 8.5 PROTEXIS BLUE

## (undated) DEVICE — BLANKET WARMER UPPER ADULT

## (undated) DEVICE — SUT VICRYL 0 18" ENDOLOOP LIGATURE

## (undated) DEVICE — D HELP - CLEARVIEW CLEARIFY SYSTEM

## (undated) DEVICE — STAPLER COVIDIEN ENDO GIA STANDARD HANDLE

## (undated) DEVICE — GLV 6.5 PROTEXIS

## (undated) DEVICE — MARKER SKIN MULTI TIP 6"

## (undated) DEVICE — GLV 7.5 PROTEXIS

## (undated) DEVICE — SUT BIOSYN 4-0 18" P-12

## (undated) DEVICE — FOR-ESU VALLEYLAB T7E14999DX: Type: DURABLE MEDICAL EQUIPMENT

## (undated) DEVICE — ELCTR FOOT CONTROL L WIRE LAPAROSCOPIC

## (undated) DEVICE — NDL HYPO SAFE 25G X 1.5"

## (undated) DEVICE — SUT HISTOACRYL BLUE

## (undated) DEVICE — LIGASURE MARYLAND JAW LAPAROSCOPIC SEALER 37CM

## (undated) DEVICE — GLV 8 PROTEXIS

## (undated) DEVICE — TIP SCISSOR ENDO CUT

## (undated) DEVICE — SYR ASEPTO

## (undated) DEVICE — SOL INJ NS 0.9% 1000ML

## (undated) DEVICE — BLADE SURGICAL #11 CARBON

## (undated) DEVICE — GLV 6 PROTEXIS

## (undated) DEVICE — TUBING STRYKEFLOW II SUCTION / IRRIGATOR

## (undated) DEVICE — SUT POLYSORB 0 30" GS-21 UNDYED

## (undated) DEVICE — SUT POLYSORB 0 30" GU-46

## (undated) DEVICE — SUT PDO 0 1/2 CIRCLE 22MM NDL 20CM

## (undated) DEVICE — TROCAR COVIDIEN VERSAPORT OPTICAL BLADELESS 12MM STD

## (undated) DEVICE — NDL INSUFFLATION SURGINEEDLE 120MM

## (undated) DEVICE — SUT ETHIBOND 2-0 44" EN3

## (undated) DEVICE — WRAP COMPRESSION CALF MED

## (undated) DEVICE — TROCAR COVIDIEN VERSAONE OPTICAL BLADELESS 5MM

## (undated) DEVICE — PACK GENERAL LAPAROSCOPY

## (undated) DEVICE — TUBING STRYKER PNEUMOSURE HEATED RTP

## (undated) DEVICE — SOL IRR POUR H2O 1500ML